# Patient Record
Sex: FEMALE | Race: WHITE | Employment: OTHER | URBAN - METROPOLITAN AREA
[De-identification: names, ages, dates, MRNs, and addresses within clinical notes are randomized per-mention and may not be internally consistent; named-entity substitution may affect disease eponyms.]

---

## 2017-07-13 LAB — ANTIBODY: NEGATIVE

## 2019-03-12 LAB
AVERAGE GLUCOSE: 103
BASOPHILS ABSOLUTE: 0.04 /ΜL
BASOPHILS RELATIVE PERCENT: 0.6 %
EOSINOPHILS ABSOLUTE: 0.2 /ΜL
EOSINOPHILS RELATIVE PERCENT: 2.9 %
HBA1C MFR BLD: 5.2 %
HCT VFR BLD CALC: 38.1 % (ref 36–46)
HEMOGLOBIN: 12.4 G/DL (ref 12–16)
LYMPHOCYTES ABSOLUTE: 1.5 /ΜL
LYMPHOCYTES RELATIVE PERCENT: 21.8 %
MCH RBC QN AUTO: 30 PG
MCHC RBC AUTO-ENTMCNC: 32.5 G/DL
MCV RBC AUTO: 92 FL
MONOCYTES ABSOLUTE: 0.65 /ΜL
MONOCYTES RELATIVE PERCENT: 9.4 %
NEUTROPHILS ABSOLUTE: 4.49 /ΜL
NEUTROPHILS RELATIVE PERCENT: 65.3 %
PLATELET # BLD: 236 K/ΜL
PMV BLD AUTO: 9.8 FL
RBC # BLD: 4.14 10^6/ΜL
VITAMIN D 25-HYDROXY: 47.6
VITAMIN D2, 25 HYDROXY: NORMAL
VITAMIN D3,25 HYDROXY: NORMAL
WBC # BLD: 6.88 10^3/ML

## 2019-09-16 LAB
ALBUMIN SERPL-MCNC: 4.3 G/DL
ALP BLD-CCNC: 72 U/L
ALT SERPL-CCNC: 14 U/L
ANION GAP SERPL CALCULATED.3IONS-SCNC: 11 MMOL/L
AST SERPL-CCNC: 19 U/L
BILIRUB SERPL-MCNC: 0.2 MG/DL (ref 0.1–1.4)
BUN BLDV-MCNC: 17 MG/DL
CALCIUM SERPL-MCNC: 9.7 MG/DL
CHLORIDE BLD-SCNC: 103 MMOL/L
CO2: 27 MMOL/L
CREAT SERPL-MCNC: 0.91 MG/DL
GFR CALCULATED: NORMAL
GLUCOSE BLD-MCNC: 92 MG/DL
POTASSIUM SERPL-SCNC: 5.4 MMOL/L
SODIUM BLD-SCNC: 141 MMOL/L
T3 FREE: 2.7
T4 FREE: 1.5
TOTAL PROTEIN: 7
TSH SERPL DL<=0.05 MIU/L-ACNC: 0.35 UIU/ML

## 2020-06-26 ENCOUNTER — OFFICE VISIT (OUTPATIENT)
Dept: INTERNAL MEDICINE | Age: 70
End: 2020-06-26
Payer: MEDICARE

## 2020-06-26 VITALS
TEMPERATURE: 97.8 F | HEIGHT: 66 IN | SYSTOLIC BLOOD PRESSURE: 118 MMHG | DIASTOLIC BLOOD PRESSURE: 82 MMHG | WEIGHT: 204 LBS | HEART RATE: 67 BPM | OXYGEN SATURATION: 97 % | BODY MASS INDEX: 32.78 KG/M2

## 2020-06-26 PROCEDURE — 99202 OFFICE O/P NEW SF 15 MIN: CPT | Performed by: PHYSICIAN ASSISTANT

## 2020-06-26 RX ORDER — LEVOTHYROXINE SODIUM 0.07 MG/1
TABLET ORAL
COMMUNITY
Start: 2020-04-16 | End: 2020-10-13 | Stop reason: SDUPTHER

## 2020-06-26 RX ORDER — PRAVASTATIN SODIUM 20 MG
20 TABLET ORAL DAILY
COMMUNITY
Start: 2019-09-16 | End: 2020-10-13 | Stop reason: SDUPTHER

## 2020-06-26 RX ORDER — CITALOPRAM 20 MG/1
20 TABLET ORAL DAILY
COMMUNITY
Start: 2019-09-16 | End: 2020-10-13 | Stop reason: SDUPTHER

## 2020-06-26 RX ORDER — AMOXICILLIN AND CLAVULANATE POTASSIUM 875; 125 MG/1; MG/1
1 TABLET, FILM COATED ORAL 2 TIMES DAILY
Qty: 14 TABLET | Refills: 0 | Status: SHIPPED | OUTPATIENT
Start: 2020-06-26 | End: 2021-08-30

## 2020-06-26 RX ORDER — KRILL/OM-3/DHA/EPA/PHOSPHO/AST 500MG-86MG
1 CAPSULE ORAL DAILY
COMMUNITY

## 2020-06-26 ASSESSMENT — ENCOUNTER SYMPTOMS
SHORTNESS OF BREATH: 0
SINUS COMPLAINT: 1
SINUS PRESSURE: 1
SORE THROAT: 0
SINUS PAIN: 1
SWOLLEN GLANDS: 0
RHINORRHEA: 1
COUGH: 0
TROUBLE SWALLOWING: 0

## 2020-06-26 ASSESSMENT — PATIENT HEALTH QUESTIONNAIRE - PHQ9
SUM OF ALL RESPONSES TO PHQ9 QUESTIONS 1 & 2: 2
SUM OF ALL RESPONSES TO PHQ QUESTIONS 1-9: 2
SUM OF ALL RESPONSES TO PHQ QUESTIONS 1-9: 2
1. LITTLE INTEREST OR PLEASURE IN DOING THINGS: 1
2. FEELING DOWN, DEPRESSED OR HOPELESS: 1

## 2020-06-26 NOTE — PROGRESS NOTES
daily Yes Historical Provider, MD   Cholecalciferol 50 MCG (2000 UT) CAPS Take by mouth daily Yes Historical Provider, MD   amoxicillin-clavulanate (AUGMENTIN) 875-125 MG per tablet Take 1 tablet by mouth 2 times daily Yes OMAR Caruso   paroxetine (PAXIL) 20 MG tablet Take 1 tablet by mouth daily. Yes Priscilla Quiñones MD   simvastatin (ZOCOR) 40 MG tablet Take 1 tablet by mouth daily. Yes Priscilla Quiñones MD   SELENIUM SULFIDE 2.5 % by Other route twice a week.  SHAMPOO  Yes Historical Provider, MD        No Known Allergies    Past Medical History:   Diagnosis Date    Depression     Hyperlipidemia     Protruded lumbar disc     L4/L5    Stress     Vitamin D deficiency        Past Surgical History:   Procedure Laterality Date    ANKLE SURGERY  2001    FOOT SURGERY  2001    SINUS SURGERY  07-       Social History     Socioeconomic History    Marital status:      Spouse name: Not on file    Number of children: Not on file    Years of education: Not on file    Highest education level: Not on file   Occupational History    Not on file   Social Needs    Financial resource strain: Not on file    Food insecurity     Worry: Not on file     Inability: Not on file    Transportation needs     Medical: Not on file     Non-medical: Not on file   Tobacco Use    Smoking status: Never Smoker    Smokeless tobacco: Never Used   Substance and Sexual Activity    Alcohol use: No    Drug use: Not on file    Sexual activity: Not on file   Lifestyle    Physical activity     Days per week: Not on file     Minutes per session: Not on file    Stress: Not on file   Relationships    Social connections     Talks on phone: Not on file     Gets together: Not on file     Attends Shinto service: Not on file     Active member of club or organization: Not on file     Attends meetings of clubs or organizations: Not on file     Relationship status: Not on file    Intimate partner violence     Fear of current

## 2020-09-14 ENCOUNTER — HOSPITAL ENCOUNTER (OUTPATIENT)
Dept: WOMENS IMAGING | Age: 70
Discharge: HOME | End: 2020-09-16
Payer: MEDICARE

## 2020-09-14 PROCEDURE — 77063 BREAST TOMOSYNTHESIS BI: CPT

## 2020-10-13 NOTE — TELEPHONE ENCOUNTER
Patient requesting refills to [address] in [facility].  Please advise

## 2020-10-14 RX ORDER — LEVOTHYROXINE SODIUM 0.07 MG/1
TABLET ORAL
Qty: 30 TABLET | Refills: 0 | Status: SHIPPED | OUTPATIENT
Start: 2020-10-14 | End: 2020-11-13 | Stop reason: SDUPTHER

## 2020-10-14 RX ORDER — CITALOPRAM 20 MG/1
20 TABLET ORAL DAILY
Qty: 30 TABLET | Refills: 0 | Status: SHIPPED | OUTPATIENT
Start: 2020-10-14 | End: 2020-11-13 | Stop reason: SDUPTHER

## 2020-10-14 RX ORDER — PRAVASTATIN SODIUM 20 MG
20 TABLET ORAL DAILY
Qty: 30 TABLET | Refills: 0 | Status: SHIPPED | OUTPATIENT
Start: 2020-10-14 | End: 2020-11-13 | Stop reason: SDUPTHER

## 2020-11-13 ENCOUNTER — OFFICE VISIT (OUTPATIENT)
Dept: INTERNAL MEDICINE | Age: 70
End: 2020-11-13
Payer: MEDICARE

## 2020-11-13 VITALS
WEIGHT: 204.2 LBS | TEMPERATURE: 96.6 F | DIASTOLIC BLOOD PRESSURE: 72 MMHG | BODY MASS INDEX: 32.82 KG/M2 | HEART RATE: 75 BPM | OXYGEN SATURATION: 98 % | SYSTOLIC BLOOD PRESSURE: 120 MMHG | HEIGHT: 66 IN

## 2020-11-13 DIAGNOSIS — E55.9 VITAMIN D DEFICIENCY: ICD-10-CM

## 2020-11-13 DIAGNOSIS — Z00.00 ROUTINE GENERAL MEDICAL EXAMINATION AT A HEALTH CARE FACILITY: ICD-10-CM

## 2020-11-13 DIAGNOSIS — E03.9 HYPOTHYROIDISM, UNSPECIFIED TYPE: ICD-10-CM

## 2020-11-13 PROBLEM — N95.0 POSTMENOPAUSAL BLEEDING: Status: ACTIVE | Noted: 2018-04-12

## 2020-11-13 PROBLEM — R93.89 THICKENED ENDOMETRIUM: Status: ACTIVE | Noted: 2018-04-12

## 2020-11-13 PROBLEM — C54.1 ENDOMETRIAL CANCER (HCC): Status: ACTIVE | Noted: 2018-05-25

## 2020-11-13 PROBLEM — M85.80 OSTEOPENIA: Status: ACTIVE | Noted: 2020-11-13

## 2020-11-13 PROBLEM — C55 UTERINE CANCER (HCC): Status: ACTIVE | Noted: 2018-05-30

## 2020-11-13 LAB
ALBUMIN SERPL-MCNC: 4.1 G/DL (ref 3.5–4.6)
ALP BLD-CCNC: 68 U/L (ref 40–130)
ALT SERPL-CCNC: 16 U/L (ref 0–33)
ANION GAP SERPL CALCULATED.3IONS-SCNC: 9 MEQ/L (ref 9–15)
AST SERPL-CCNC: 23 U/L (ref 0–35)
BASOPHILS ABSOLUTE: 0.1 K/UL (ref 0–0.2)
BASOPHILS RELATIVE PERCENT: 0.9 %
BILIRUB SERPL-MCNC: 0.3 MG/DL (ref 0.2–0.7)
BUN BLDV-MCNC: 19 MG/DL (ref 8–23)
CALCIUM SERPL-MCNC: 9.1 MG/DL (ref 8.5–9.9)
CHLORIDE BLD-SCNC: 101 MEQ/L (ref 95–107)
CHOLESTEROL, TOTAL: 232 MG/DL (ref 0–199)
CO2: 27 MEQ/L (ref 20–31)
CREAT SERPL-MCNC: 1.01 MG/DL (ref 0.5–0.9)
EOSINOPHILS ABSOLUTE: 0.2 K/UL (ref 0–0.7)
EOSINOPHILS RELATIVE PERCENT: 3.9 %
GFR AFRICAN AMERICAN: >60
GFR NON-AFRICAN AMERICAN: 53.9
GLOBULIN: 2.8 G/DL (ref 2.3–3.5)
GLUCOSE BLD-MCNC: 84 MG/DL (ref 70–99)
HCT VFR BLD CALC: 36.9 % (ref 37–47)
HDLC SERPL-MCNC: 58 MG/DL (ref 40–59)
HEMOGLOBIN: 12.3 G/DL (ref 12–16)
LDL CHOLESTEROL CALCULATED: 142 MG/DL (ref 0–129)
LYMPHOCYTES ABSOLUTE: 1.1 K/UL (ref 1–4.8)
LYMPHOCYTES RELATIVE PERCENT: 19.6 %
MCH RBC QN AUTO: 30.6 PG (ref 27–31.3)
MCHC RBC AUTO-ENTMCNC: 33.4 % (ref 33–37)
MCV RBC AUTO: 91.7 FL (ref 82–100)
MONOCYTES ABSOLUTE: 0.5 K/UL (ref 0.2–0.8)
MONOCYTES RELATIVE PERCENT: 8.4 %
NEUTROPHILS ABSOLUTE: 3.7 K/UL (ref 1.4–6.5)
NEUTROPHILS RELATIVE PERCENT: 67.2 %
PDW BLD-RTO: 13.9 % (ref 11.5–14.5)
PLATELET # BLD: 235 K/UL (ref 130–400)
POTASSIUM SERPL-SCNC: 5 MEQ/L (ref 3.4–4.9)
RBC # BLD: 4.02 M/UL (ref 4.2–5.4)
SODIUM BLD-SCNC: 137 MEQ/L (ref 135–144)
TOTAL PROTEIN: 6.9 G/DL (ref 6.3–8)
TRIGL SERPL-MCNC: 160 MG/DL (ref 0–150)
TSH REFLEX: 0.7 UIU/ML (ref 0.44–3.86)
WBC # BLD: 5.6 K/UL (ref 4.8–10.8)

## 2020-11-13 PROCEDURE — G0439 PPPS, SUBSEQ VISIT: HCPCS | Performed by: PHYSICIAN ASSISTANT

## 2020-11-13 RX ORDER — CITALOPRAM 20 MG/1
20 TABLET ORAL DAILY
Qty: 90 TABLET | Refills: 1 | Status: SHIPPED | OUTPATIENT
Start: 2020-11-13 | End: 2021-05-13 | Stop reason: SDUPTHER

## 2020-11-13 RX ORDER — LEVOTHYROXINE SODIUM 0.07 MG/1
TABLET ORAL
Qty: 90 TABLET | Refills: 1 | Status: SHIPPED | OUTPATIENT
Start: 2020-11-13 | End: 2021-05-13 | Stop reason: SDUPTHER

## 2020-11-13 RX ORDER — PRAVASTATIN SODIUM 20 MG
20 TABLET ORAL DAILY
Qty: 90 TABLET | Refills: 1 | Status: SHIPPED | OUTPATIENT
Start: 2020-11-13 | End: 2021-05-13 | Stop reason: SDUPTHER

## 2020-11-13 RX ORDER — PAROXETINE HYDROCHLORIDE 20 MG/1
20 TABLET, FILM COATED ORAL DAILY
Qty: 90 TABLET | Refills: 1 | Status: CANCELLED | OUTPATIENT
Start: 2020-11-13

## 2020-11-13 ASSESSMENT — PATIENT HEALTH QUESTIONNAIRE - PHQ9
1. LITTLE INTEREST OR PLEASURE IN DOING THINGS: 1
SUM OF ALL RESPONSES TO PHQ QUESTIONS 1-9: 2
SUM OF ALL RESPONSES TO PHQ9 QUESTIONS 1 & 2: 2
SUM OF ALL RESPONSES TO PHQ QUESTIONS 1-9: 2
SUM OF ALL RESPONSES TO PHQ QUESTIONS 1-9: 2
2. FEELING DOWN, DEPRESSED OR HOPELESS: 1

## 2020-11-13 ASSESSMENT — LIFESTYLE VARIABLES: HOW OFTEN DO YOU HAVE A DRINK CONTAINING ALCOHOL: 0

## 2020-11-13 NOTE — PROGRESS NOTES
Medicare Annual Wellness Visit  Name: Boby Robert Date: 2020   MRN: [id number] Sex: Female   Age: 68 y.o. Ethnicity: Non-/Non    : 1950 Race: Jarod Gan is here for Medicare AWV    Screenings for behavioral, psychosocial and functional/safety risks, and cognitive dysfunction are all negative except as indicated below. These results, as well as other patient data from the [address] form, are documented in Flowsheets linked to this Encounter. No Known Allergies      Prior to Visit Medications    Medication Sig Taking? Authorizing Provider   pravastatin (PRAVACHOL) 20 MG tablet Take 1 tablet by mouth daily Yes OMAR Muro   levothyroxine (SYNTHROID) 75 MCG tablet Take 1 tablet by mouth once daily Yes OMAR Muro   citalopram (CELEXA) 20 MG tablet Take 1 tablet by mouth daily Yes OMAR Muro   UNABLE TO FIND Take 1 tablet by mouth daily [facility] colon health Yes Historical Provider, MD [place] Oil 500 MG CAPS Take 1 capsule by mouth daily Yes Historical Provider, MD   UNABLE TO FIND Take 1 capsule by mouth daily Tumeric root extract Yes Historical Provider, MD   Pediatric Multivitamins-Fl (MULTI VIT/FL PO) Take 1 capsule by mouth daily Yes Historical Provider, MD   Cholecalciferol 50 MCG ( UT) CAPS Take by mouth daily Yes Historical Provider, MD   SELENIUM SULFIDE 2.5 % by Other route twice a week.  SHAMPOO  Yes Historical Provider, MD   amoxicillin-clavulanate (AUGMENTIN) 875-125 MG per tablet Take 1 tablet by mouth 2 times daily  OMAR Miguel         Past Medical History:   Diagnosis Date    Depression     Hyperlipidemia     Protruded lumbar disc     L4/L5    Stress     Vitamin D deficiency        Past Surgical History:   Procedure Laterality Date    ANKLE SURGERY      FOOT SURGERY      SINUS SURGERY  07-         Family History   Problem Relation Age of Onset    Anemia Mother    [place] Sicard Heart Disease Mother     Depression Mother     High Blood Pressure Father        CareTeam (Including outside providers/suppliers regularly involved in providing care):   Patient Care Team:  OMAR Rudolph as PCP - General (Physician Assistant)  OMAR Rudolph as PCP - [facility]    Wt Readings from Last 3 Encounters:   11/13/20 204 lb 3.2 oz (92.6 kg)   06/26/20 204 lb (92.5 kg)   08/17/12 217 lb 5 oz (98.6 kg)     Vitals:    11/13/20 0943   BP: 120/72   Site: Left Upper Arm   Position: Sitting   Cuff Size: Medium Adult   Pulse: 75   Temp: 96.6 °F (35.9 °C)   TempSrc: Temporal   SpO2: 98%   Weight: 204 lb 3.2 oz (92.6 kg)   Height: 5' 6\" (1.676 m)     Body mass index is 32.96 kg/m². Based upon direct observation of the patient, evaluation of cognition reveals recent and remote memory intact.     General Appearance: alert and oriented to person, place and time, well developed and well- nourished, in no acute distress  Skin: warm and dry, no rash or erythema  Head: normocephalic and atraumatic  Eyes: pupils equal, round, and reactive to light, extraocular eye movements intact, conjunctivae normal  ENT: tympanic membrane, external ear and ear canal normal bilaterally, nose without deformity, nasal mucosa and turbinates normal without polyps  Neck: supple and non-tender without mass, no thyromegaly or thyroid nodules, no cervical lymphadenopathy  Pulmonary/Chest: clear to auscultation bilaterally- no wheezes, rales or rhonchi, normal air movement, no respiratory distress  Cardiovascular: normal rate, regular rhythm, normal S1 and S2, no murmurs, rubs, clicks, or gallops, distal pulses intact, no carotid bruits  Abdomen: soft, non-tender, non-distended, normal bowel sounds, no masses or organomegaly  Extremities: no cyanosis, clubbing or edema  Musculoskeletal: normal range of motion, no joint swelling, deformity or tenderness  Neurologic: reflexes normal and symmetric, no cranial nerve deficit, gait, coordination and speech normal    Patient's complete Health Risk Assessment and screening values have been reviewed and are found in Flowsheets. The following problems were reviewed today and where indicated follow up appointments were made and/or referrals ordered. Positive Risk Factor Screenings with Interventions:       General Health and ACP:  General  In general, how would you say your health is?: Fair  In the past 7 days, have you experienced any of the following?  New or Increased Pain, New or Increased Fatigue, Loneliness, Social Isolation, Stress or Anger?: (!) New or Increased Pain, New or Increased Fatigue, Loneliness  Do you get the social and emotional support that you need?: Yes  Do you have a Living Will?: Yes  Advance Directives     Power of  Living Will ACP-Advance Directive ACP-Power of     Not on File Not on File [place]      General Health Risk Interventions:  · Pain issues: back pain  that is chronic, new knee pain, seeing ortho     Health Habits/Nutrition:  Health Habits/Nutrition  Do you exercise for at least 20 minutes 2-3 times per week?: (!) No  Have you lost any weight without trying in the past 3 months?: No  Do you eat fewer than 2 meals per day?: No  Have you seen a dentist within the past year?: Yes  Body mass index: (!) 32.96  Health Habits/Nutrition Interventions:  · Inadequate physical activity:  patient is not ready to increase his/her physical activity level at this time    Hearing/Vision:  No exam data present  Hearing/Vision  Do you or your family notice any trouble with your hearing?: No  Do you have difficulty driving, watching TV, or doing any of your daily activities because of your eyesight?: No  Have you had an eye exam within the past year?: (!) No  Hearing/Vision Interventions:  · Vision concerns:  patient encouraged to make appointment with his/her eye specialist    Safety:  Safety  Do you have working smoke detectors?: (!) No  Have all throw rugs been removed or fastened?: (!) No  Do you have non-slip mats or surfaces in all bathtubs/showers?: Yes  Do all of your stairways have a railing or banister?: Yes  Are your doorways, halls and stairs free of clutter?: Yes  Do you always fasten your seatbelt when you are in a car?: Yes  Safety Interventions:  · Home safety tips provided    Personalized Preventive Plan   Current Health Maintenance Status  Immunization History   Administered Date(s) Administered    Influenza Virus Vaccine 12/06/2013    Influenza Whole 11/16/2007    Influenza, High Dose (Fluzone 65 yrs and older) 09/22/2015, 09/17/2016, 09/20/2017, 10/19/2018, 10/22/2019    Influenza, High-dose, Quadv, 65 yrs +, IM (Fluzone) 11/04/2020    Pneumococcal Conjugate 13-valent ([id number]) 09/17/2016    Pneumococcal Polysaccharide ([id number]) 06/10/2014, 09/20/2017        Health Maintenance   Topic Date Due    DTaP/Tdap/Td vaccine (1 - Tdap) 12/01/1967    Shingles Vaccine (1 of 2) 12/01/1998    Annual Wellness Visit (AWV)  07/05/2020    Lipid screen  11/13/2021    TSH testing  11/13/2021    Breast cancer screen  09/14/2022    Colon cancer screen colonoscopy  10/16/2026    DEXA (modify frequency per FRAX score)  Completed    Flu vaccine  Completed    Pneumococcal 65+ years Vaccine  Completed    Hepatitis C screen  Completed    Hepatitis A vaccine  Aged Out    Hepatitis B vaccine  Aged Out    Hib vaccine  Aged Out    Meningococcal (ACWY) vaccine  Aged Out     Recommendations for [employer] Due: see orders and patient instructions/AVS.  . Recommended screening schedule for the next 5-10 years is provided to the patient in written form: see Patient Instructions/AVS.    Maggieeligio Dallas was seen today for medicare awv. Diagnoses and all orders for this visit:    Routine general medical examination at a health care facility  -     Comprehensive Metabolic Panel;  Future  -     CBC With Auto Differential; Future  -     Lipid Panel; Future    Hypothyroidism, unspecified type  -     levothyroxine (SYNTHROID) 75 MCG tablet; Take 1 tablet by mouth once daily  -     TSH with Reflex; Future    Mixed hyperlipidemia  -     pravastatin (PRAVACHOL) 20 MG tablet; Take 1 tablet by mouth daily    Major depressive disorder, recurrent episode, moderate (HCC)  -     citalopram (CELEXA) 20 MG tablet; Take 1 tablet by mouth daily    Vitamin D deficiency  -     Vitamin D 25 Hydroxy;  Future

## 2020-11-13 NOTE — PATIENT INSTRUCTIONS
Personalized Preventive Plan for Tomi Mendosa - 11/13/2020  Medicare offers a range of preventive health benefits. Some of the tests and screenings are paid in full while other may be subject to a deductible, co-insurance, and/or copay. Some of these benefits include a comprehensive review of your medical history including lifestyle, illnesses that may run in your family, and various assessments and screenings as appropriate. After reviewing your medical record and screening and assessments performed today your provider may have ordered immunizations, labs, imaging, and/or referrals for you. A list of these orders (if applicable) as well as your Preventive Care list are included within your After Visit Summary for your review. Other Preventive Recommendations:    · A preventive eye exam performed by an eye specialist is recommended every 1-2 years to screen for glaucoma; cataracts, macular degeneration, and other eye disorders. · A preventive dental visit is recommended every 6 months. · Try to get at least 150 minutes of exercise per week or 10,000 steps per day on a pedometer . · Order or download the FREE \"Exercise & Physical Activity: Your Everyday Guide\" from The [employer]. Call 5-233.592.6154 or search [employer] online. · You need 5807-3810 mg of calcium and 1916-9906 IU of vitamin D per day. It is possible to meet your calcium requirement with diet alone, but a vitamin D supplement is usually necessary to meet this goal.  · When exposed to the sun, use a sunscreen that protects against both UVA and UVB radiation with an SPF of 30 or greater. Reapply every 2 to 3 hours or after sweating, drying off with a towel, or swimming. · Always wear a seat belt when traveling in a car. Always wear a helmet when riding a bicycle or motorcycle.

## 2020-11-14 LAB — VITAMIN D 25-HYDROXY: 66.8 NG/ML (ref 30–100)

## 2021-05-13 ENCOUNTER — OFFICE VISIT (OUTPATIENT)
Dept: INTERNAL MEDICINE | Age: 71
End: 2021-05-13
Payer: MEDICARE

## 2021-05-13 VITALS
WEIGHT: 204 LBS | HEIGHT: 66 IN | OXYGEN SATURATION: 97 % | SYSTOLIC BLOOD PRESSURE: 122 MMHG | HEART RATE: 74 BPM | BODY MASS INDEX: 32.78 KG/M2 | TEMPERATURE: 97.3 F | DIASTOLIC BLOOD PRESSURE: 64 MMHG

## 2021-05-13 DIAGNOSIS — C54.1 ENDOMETRIAL CANCER (HCC): ICD-10-CM

## 2021-05-13 DIAGNOSIS — F33.1 MAJOR DEPRESSIVE DISORDER, RECURRENT EPISODE, MODERATE (HCC): ICD-10-CM

## 2021-05-13 DIAGNOSIS — C55 MALIGNANT NEOPLASM OF UTERUS, UNSPECIFIED SITE (HCC): ICD-10-CM

## 2021-05-13 DIAGNOSIS — E55.9 VITAMIN D DEFICIENCY: ICD-10-CM

## 2021-05-13 DIAGNOSIS — E03.9 HYPOTHYROIDISM, UNSPECIFIED TYPE: ICD-10-CM

## 2021-05-13 DIAGNOSIS — E78.2 MIXED HYPERLIPIDEMIA: Primary | ICD-10-CM

## 2021-05-13 PROCEDURE — 99214 OFFICE O/P EST MOD 30 MIN: CPT | Performed by: PHYSICIAN ASSISTANT

## 2021-05-13 RX ORDER — LEVOTHYROXINE SODIUM 0.07 MG/1
TABLET ORAL
Qty: 90 TABLET | Refills: 1 | Status: ON HOLD | OUTPATIENT
Start: 2021-05-13 | End: 2021-09-06 | Stop reason: HOSPADM

## 2021-05-13 RX ORDER — CITALOPRAM 20 MG/1
20 TABLET ORAL DAILY
Qty: 90 TABLET | Refills: 1 | Status: SHIPPED | OUTPATIENT
Start: 2021-05-13 | End: 2021-11-15 | Stop reason: SDUPTHER

## 2021-05-13 RX ORDER — LEVOTHYROXINE SODIUM 0.07 MG/1
TABLET ORAL
Qty: 90 TABLET | Refills: 1 | Status: SHIPPED | OUTPATIENT
Start: 2021-05-13 | End: 2021-05-13 | Stop reason: SDUPTHER

## 2021-05-13 RX ORDER — PRAVASTATIN SODIUM 20 MG
20 TABLET ORAL DAILY
Qty: 90 TABLET | Refills: 1 | Status: SHIPPED | OUTPATIENT
Start: 2021-05-13 | End: 2021-05-13 | Stop reason: SDUPTHER

## 2021-05-13 RX ORDER — CITALOPRAM 20 MG/1
20 TABLET ORAL DAILY
Qty: 90 TABLET | Refills: 1 | Status: SHIPPED | OUTPATIENT
Start: 2021-05-13 | End: 2021-05-13 | Stop reason: SDUPTHER

## 2021-05-13 RX ORDER — PRAVASTATIN SODIUM 20 MG
20 TABLET ORAL DAILY
Qty: 90 TABLET | Refills: 1 | Status: SHIPPED | OUTPATIENT
Start: 2021-05-13 | End: 2021-11-15 | Stop reason: SDUPTHER

## 2021-05-13 ASSESSMENT — ENCOUNTER SYMPTOMS
RESPIRATORY NEGATIVE: 1
GASTROINTESTINAL NEGATIVE: 1

## 2021-05-13 ASSESSMENT — PATIENT HEALTH QUESTIONNAIRE - PHQ9
SUM OF ALL RESPONSES TO PHQ QUESTIONS 1-9: 0
2. FEELING DOWN, DEPRESSED OR HOPELESS: 0
SUM OF ALL RESPONSES TO PHQ9 QUESTIONS 1 & 2: 0

## 2021-05-13 NOTE — PROGRESS NOTES
Nikki Campbell (: 1950) is a 65 y.o. female, Established patient, here for evaluation of the following chief complaint(s):  6 Month Follow-Up        ASSESSMENT/PLAN:  1. Mixed hyperlipidemia  - stable    - pravastatin (PRAVACHOL) 20 MG tablet; Take 1 tablet by mouth daily  Dispense: 90 tablet; Refill: 1    2. Hypothyroidism, unspecified type  - stable    - levothyroxine (SYNTHROID) 75 MCG tablet; Take 1 tablet by mouth once daily  Dispense: 90 tablet; Refill: 1    3. Major depressive disorder, recurrent episode, moderate (HCC)  - stable on Celexa   - citalopram (CELEXA) 20 MG tablet; Take 1 tablet by mouth daily  Dispense: 90 tablet; Refill: 1    4. Malignant neoplasm of uterus, unspecified site ([facility] [address]  5. Endometrial cancer ([facility] [address]  - advised a vaginal PAP with gyn , since has not seen them in many years     6. Vitamin D deficiency  - will get level drawn at next appt          Return in about 6 months (around 2021). SUBJECTIVE/OBJECTIVE:  HPI      Dyslipidemia  On pravastatin 20 mg  LDl was slightly elevated     Thyroid  Stable on Levothroid     Depression  Stable on celexa 20 mg             Review of Systems   Constitutional: Negative. HENT: Negative. Respiratory: Negative. Cardiovascular: Negative. Gastrointestinal: Negative. Genitourinary: Negative. Musculoskeletal: Negative. Neurological: Negative. Hematological: Negative. Psychiatric/Behavioral: Negative. Physical Exam  Vitals signs reviewed. Constitutional:       Appearance: Normal appearance. HENT:      Head: Normocephalic and atraumatic. Cardiovascular:      Rate and Rhythm: Normal rate and regular rhythm. Pulses: Normal pulses. Heart sounds: Normal heart sounds. Pulmonary:      Effort: Pulmonary effort is normal.      Breath sounds: Normal breath sounds. Neurological:      Mental Status: She is alert and oriented to person, place, and time.    Psychiatric:         Mood and Affect: Mood normal.         Behavior: Behavior normal.         Thought Content: Thought content normal.         Judgment: Judgment normal.         Vitals:    05/13/21 1104   BP: 122/64   Pulse: 74   Temp: 97.3 °F (36.3 °C)   SpO2: 97%   Weight: 204 lb (92.5 kg)   Height: 5' 6\" (1.676 m)                 An electronic signature was used to authenticate this note.     --OMAR Kerr

## 2021-08-30 ENCOUNTER — VIRTUAL VISIT (OUTPATIENT)
Dept: FAMILY MEDICINE CLINIC | Age: 71
End: 2021-08-30
Payer: MEDICARE

## 2021-08-30 DIAGNOSIS — J01.00 ACUTE MAXILLARY SINUSITIS, RECURRENCE NOT SPECIFIED: Primary | ICD-10-CM

## 2021-08-30 PROCEDURE — 99442 PR PHYS/QHP TELEPHONE EVALUATION 11-20 MIN: CPT | Performed by: NURSE PRACTITIONER

## 2021-08-30 RX ORDER — AMOXICILLIN AND CLAVULANATE POTASSIUM 875; 125 MG/1; MG/1
1 TABLET, FILM COATED ORAL 2 TIMES DAILY
Qty: 14 TABLET | Refills: 0 | Status: ON HOLD | OUTPATIENT
Start: 2021-08-30 | End: 2021-09-06 | Stop reason: HOSPADM

## 2021-08-30 SDOH — ECONOMIC STABILITY: FOOD INSECURITY: WITHIN THE PAST 12 MONTHS, THE FOOD YOU BOUGHT JUST DIDN'T LAST AND YOU DIDN'T HAVE MONEY TO GET MORE.: NEVER TRUE

## 2021-08-30 SDOH — ECONOMIC STABILITY: FOOD INSECURITY: WITHIN THE PAST 12 MONTHS, YOU WORRIED THAT YOUR FOOD WOULD RUN OUT BEFORE YOU GOT MONEY TO BUY MORE.: NEVER TRUE

## 2021-08-30 ASSESSMENT — ENCOUNTER SYMPTOMS
DIARRHEA: 1
RHINORRHEA: 0
ABDOMINAL PAIN: 0
COUGH: 1
WHEEZING: 0
SINUS PRESSURE: 1
SINUS PAIN: 1
NAUSEA: 1
VOMITING: 0
SORE THROAT: 0
SHORTNESS OF BREATH: 0

## 2021-08-30 ASSESSMENT — SOCIAL DETERMINANTS OF HEALTH (SDOH): HOW HARD IS IT FOR YOU TO PAY FOR THE VERY BASICS LIKE FOOD, HOUSING, MEDICAL CARE, AND HEATING?: NOT HARD AT ALL

## 2021-08-30 NOTE — PROGRESS NOTES
Hedy Aguiar is a 65 y.o. female evaluated via telephone on 8/30/2021. Consent:  She and/or health care decision maker is aware that that she may receive a bill for this telephone service, depending on her insurance coverage, and has provided verbal consent to proceed: Yes    Documentation:  I communicated with the patient and/or health care decision maker about URI symptoms. Details of this discussion including any medical advice provided:    Subjective  Hedy Aguiar, 65 y.o. female presents today with:  Chief Complaint   Patient presents with    Cough     SX for 8-9 days    Diarrhea       Cough  This is a new problem. The current episode started 1 to 4 weeks ago. The problem has been gradually worsening. The cough is non-productive. Associated symptoms include ear pain (right ear hurts occasionally), nasal congestion and postnasal drip. Pertinent negatives include no chest pain, chills, fever (resolved), rhinorrhea, sore throat, shortness of breath or wheezing. Treatments tried: OTC meds. The treatment provided no relief. Diarrhea   Associated symptoms include coughing (dry). Pertinent negatives include no abdominal pain, chills, fever (resolved) or vomiting. Patient presents for URI symptoms, sinus pain/pressure for about 9 days. States she aches all over. Also having diarrhea.     Has tried pepto bismol for the diarrhea, several OTC meds for sinus issues/cough, nothing seems to be helping    Has history of recurrent sinus infections and sinus surgery    Past Medical History:   Diagnosis Date    Depression     Hyperlipidemia     Protruded lumbar disc     L4/L5    Stress     Vitamin D deficiency        No Known Allergies    Current Outpatient Medications on File Prior to Visit   Medication Sig Dispense Refill    pravastatin (PRAVACHOL) 20 MG tablet Take 1 tablet by mouth daily 90 tablet 1    citalopram (CELEXA) 20 MG tablet Take 1 tablet by mouth daily 90 tablet 1    UNABLE TO FIND Take 1 tablet by mouth daily [place] [employer]      [place] Oil 500 MG CAPS Take 1 capsule by mouth daily      UNABLE TO FIND Take 1 capsule by mouth daily Tumeric root extract      Pediatric Multivitamins-Fl (MULTI VIT/FL PO) Take 1 capsule by mouth daily      Cholecalciferol 50 MCG (2000 UT) CAPS Take by mouth daily      SELENIUM SULFIDE 2.5 % by Other route twice a week. SHAMPOO        No current facility-administered medications on file prior to visit. Review of Systems   Constitutional: Positive for appetite change. Negative for chills and fever (resolved). HENT: Positive for congestion, ear pain (right ear hurts occasionally), postnasal drip, sinus pressure and sinus pain. Negative for rhinorrhea and sore throat. Head feels like full of cotton   Respiratory: Positive for cough (dry). Negative for shortness of breath and wheezing. Cardiovascular: Negative for chest pain. Gastrointestinal: Positive for diarrhea and nausea. Negative for abdominal pain and vomiting. Objective  Physical Exam:  Due to the current efforts to prevent transmission of COVID-19 and also the need to preserve PPE for other caregivers, a face-to-face encounter with the patient was not performed. That being said, all relevant records and diagnostic tests were reviewed, including laboratory results and imaging. Please reference any relevant documentation elsewhere. Care will be coordinated with the primary service. Assessment & Plan     Margaret Carcamo was seen today for cough and diarrhea. Diagnoses and all orders for this visit:    Acute maxillary sinusitis, recurrence not specified  -     COVID-19; Future  - amoxicillin-clavulanate (AUGMENTIN) 875-125 MG per tablet; Take 1 tablet by mouth 2 times daily for 7 days    - Given duration of symptoms and history of sinus issues/surgery will treat for sinus infection. Also testing for COVID. Advised to quarantine until the test results.  Advised ED for any worsening of symptoms. The patient was reminded that if an antibiotic has been prescribed the predicted course is improvement to cure with no persistent issues. Take antibiotics as directed. If any problems occur, an appointment should be made or ER visit if severe. Because of the risk with ANY antibiotic of C. Difficile colitis if persistent diarrhea or abdominal pain or any concerning symptoms, we should be notified. To reduce this risk, a probiotic pill, yogurt or other preparations containing active cultures should be ingested daily -particularly while on the antibiotic. If any persistent symptoms of illness, follow up appointment should be made in a timely fashion with a physician. Return if symptoms worsen or fail to improve, for follow up. I affirm this is a Patient Initiated Episode with a Patient who has not had a related appointment within my department in the past 7 days or scheduled within the next 24 hours. Patient identification was verified at the start of the visit: Yes    Total Time: minutes: 11-20 minutes    This telephone visit was provided as a focused evaluation during the [facility] pandemic/national emergency. A comprehensive review of all previous patient history and testing was not conducted. Pertinent findings were elicited during the visit.       Note: not billable if this call serves to triage the patient into an appointment for the relevant concern      Larwence Aase, AZAEL - CNP

## 2021-08-30 NOTE — PROGRESS NOTES
[x]  Normal  [] Abnormal-  Sclera  [x]  Normal  [] Abnormal -         Discharge [x]  None visible  [] Abnormal -    HENT:   [x] Normocephalic, atraumatic. [] Abnormal   [] Mouth/Throat: Mucous membranes are moist.     External Ears [x] Normal  [] Abnormal-     Neck: [x] No visualized mass     Pulmonary/Chest: [x] Respiratory effort normal.  [x] No visualized signs of difficulty breathing or respiratory distress        [] Abnormal-      Musculoskeletal:   [] Normal gait with no signs of ataxia         [] Normal range of motion of neck        [] Abnormal-       Neurological:        [x] No Facial Asymmetry (Cranial nerve 7 motor function) (limited exam to video visit)          [] No gaze palsy        [] Abnormal-         Skin:        [x] No significant exanthematous lesions or discoloration noted on facial skin         [] Abnormal-            Psychiatric:       [] Normal Affect [] No Hallucinations        [] Abnormal-     Other pertinent observable physical exam findings-     ASSESSMENT/PLAN:  There are no diagnoses linked to this encounter. No follow-ups on file. Lazaro Salmon is a 65 y.o. female being evaluated by a Virtual Visit (video visit) encounter to address concerns as mentioned above. A caregiver was present when appropriate. Due to this being a TeleHealth encounter (During [place] [address] public health emergency), evaluation of the following organ systems was limited: Vitals/Constitutional/EENT/Resp/CV/GI//MS/Neuro/Skin/Heme-Lymph-Imm. Pursuant to the emergency declaration under the [address] [place] [address], [address] [place] [address] authority and the [employer] and [employer] Act, this Virtual Visit was conducted with patient's (and/or legal guardian's) consent, to reduce the patient's risk of exposure to COVID-19 and provide necessary medical care.   The patient (and/or legal guardian) has also been advised to contact this office for worsening conditions or problems, and seek emergency medical treatment and/or call 911 if deemed necessary. Patient identification was verified at the start of the visit: YES    Total time spent on this encounter: Not billed by time. Services were provided through a video synchronous discussion virtually to substitute for in-person clinic visit. Patient was located at home. Provider was located at the [facility] office. --AZAEL Hill CNP on 8/30/2021 at 11:29 AM    An electronic signature was used to authenticate this note.

## 2021-08-31 ENCOUNTER — HOSPITAL ENCOUNTER (OUTPATIENT)
Age: 71
Setting detail: SPECIMEN
Discharge: HOME | End: 2021-08-31
Payer: MEDICARE

## 2021-09-03 ENCOUNTER — APPOINTMENT (OUTPATIENT)
Dept: GENERAL RADIOLOGY | Age: 71
End: 2021-09-03
Payer: MEDICARE

## 2021-09-03 ENCOUNTER — HOSPITAL ENCOUNTER (INPATIENT)
Age: 71
LOS: 2 days | Discharge: HOME | DRG: 178 | End: 2021-09-06
Attending: INTERNAL MEDICINE | Admitting: INTERNAL MEDICINE
Payer: MEDICARE

## 2021-09-03 ENCOUNTER — APPOINTMENT (OUTPATIENT)
Dept: CT IMAGING | Age: 71
End: 2021-09-03
Payer: MEDICARE

## 2021-09-03 ENCOUNTER — HOSPITAL ENCOUNTER (EMERGENCY)
Age: 71
Discharge: ANOTHER HOSPITAL | End: 2021-09-03
Attending: EMERGENCY MEDICINE
Payer: MEDICARE

## 2021-09-03 VITALS
BODY MASS INDEX: 30.53 KG/M2 | DIASTOLIC BLOOD PRESSURE: 67 MMHG | OXYGEN SATURATION: 97 % | SYSTOLIC BLOOD PRESSURE: 142 MMHG | RESPIRATION RATE: 17 BRPM | TEMPERATURE: 99.1 F | HEIGHT: 66 IN | HEART RATE: 87 BPM | WEIGHT: 190 LBS

## 2021-09-03 DIAGNOSIS — U07.1 PNEUMONIA DUE TO COVID-19 VIRUS: Primary | ICD-10-CM

## 2021-09-03 DIAGNOSIS — R53.1 GENERAL WEAKNESS: ICD-10-CM

## 2021-09-03 DIAGNOSIS — R19.7 DIARRHEA, UNSPECIFIED TYPE: ICD-10-CM

## 2021-09-03 DIAGNOSIS — N30.00 ACUTE CYSTITIS WITHOUT HEMATURIA: ICD-10-CM

## 2021-09-03 DIAGNOSIS — E86.0 DEHYDRATION: ICD-10-CM

## 2021-09-03 DIAGNOSIS — J12.82 PNEUMONIA DUE TO COVID-19 VIRUS: Primary | ICD-10-CM

## 2021-09-03 LAB
ALBUMIN SERPL-MCNC: 4.2 G/DL (ref 3.5–4.6)
ALP BLD-CCNC: 81 U/L (ref 40–130)
ALT SERPL-CCNC: 42 U/L (ref 0–33)
AMORPHOUS: ABNORMAL
AMYLASE: 57 U/L (ref 22–93)
ANION GAP SERPL CALCULATED.3IONS-SCNC: 16 MEQ/L (ref 9–15)
AST SERPL-CCNC: 37 U/L (ref 0–35)
BACTERIA: ABNORMAL /HPF
BASOPHILS ABSOLUTE: 0 K/UL (ref 0–0.1)
BASOPHILS RELATIVE PERCENT: 0.2 % (ref 0.1–1.2)
BILIRUB SERPL-MCNC: 0.4 MG/DL (ref 0.2–0.7)
BILIRUBIN URINE: ABNORMAL
BLOOD, URINE: NEGATIVE
BUN BLDV-MCNC: 18 MG/DL (ref 8–23)
CALCIUM SERPL-MCNC: 9.4 MG/DL (ref 8.5–9.9)
CHLORIDE BLD-SCNC: 96 MEQ/L (ref 95–107)
CLARITY: CLEAR
CO2: 20 MEQ/L (ref 20–31)
COLOR: ABNORMAL
CREAT SERPL-MCNC: 0.85 MG/DL (ref 0.5–0.9)
EOSINOPHILS ABSOLUTE: 0 K/UL (ref 0–0.4)
EOSINOPHILS RELATIVE PERCENT: 0.2 % (ref 0.7–5.8)
EPITHELIAL CELLS, UA: ABNORMAL /HPF
GFR AFRICAN AMERICAN: >60
GFR NON-AFRICAN AMERICAN: >60
GLOBULIN: 4 G/DL (ref 2.3–3.5)
GLUCOSE BLD-MCNC: 141 MG/DL (ref 70–99)
GLUCOSE URINE: NEGATIVE MG/DL
HCT VFR BLD CALC: 39.1 % (ref 37–47)
HEMOGLOBIN: 12.7 G/DL (ref 11.2–15.7)
IMMATURE GRANULOCYTES #: 0.1 K/UL
IMMATURE GRANULOCYTES %: 0.5 %
KETONES, URINE: ABNORMAL MG/DL
LACTIC ACID: 0.9 MMOL/L (ref 0.5–2.2)
LEUKOCYTE ESTERASE, URINE: ABNORMAL
LIPASE: 42 U/L (ref 12–95)
LYMPHOCYTES ABSOLUTE: 0.3 K/UL (ref 1.2–3.7)
LYMPHOCYTES RELATIVE PERCENT: 1.5 %
MCH RBC QN AUTO: 29.2 PG (ref 25.6–32.2)
MCHC RBC AUTO-ENTMCNC: 32.5 % (ref 32.2–35.5)
MCV RBC AUTO: 89.9 FL (ref 79.4–94.8)
MONOCYTES ABSOLUTE: 0.8 K/UL (ref 0.2–0.9)
MONOCYTES RELATIVE PERCENT: 3.9 % (ref 4.7–12.5)
MUCUS: PRESENT /LPF
NEUTROPHILS ABSOLUTE: 18.2 K/UL (ref 1.6–6.1)
NEUTROPHILS RELATIVE PERCENT: 93.7 % (ref 34–71.1)
NITRITE, URINE: NEGATIVE
PDW BLD-RTO: 12.8 % (ref 11.7–14.4)
PH UA: 5.5 (ref 5–9)
PLATELET # BLD: 405 K/UL (ref 182–369)
POTASSIUM SERPL-SCNC: 3.8 MEQ/L (ref 3.4–4.9)
PROTEIN UA: 30 MG/DL
RBC # BLD: 4.35 M/UL (ref 3.93–5.22)
SARS-COV-2, NAAT: DETECTED
SODIUM BLD-SCNC: 132 MEQ/L (ref 135–144)
SPECIFIC GRAVITY UA: >=1.03 (ref 1–1.03)
TOTAL PROTEIN: 8.2 G/DL (ref 6.3–8)
URINE REFLEX TO CULTURE: YES
UROBILINOGEN, URINE: 0.2 E.U./DL
WBC # BLD: 19.4 K/UL (ref 4–10)
WBC UA: ABNORMAL /HPF (ref 0–5)

## 2021-09-03 PROCEDURE — 2500000003 HC RX 250 WO HCPCS: Performed by: EMERGENCY MEDICINE

## 2021-09-03 PROCEDURE — 96365 THER/PROPH/DIAG IV INF INIT: CPT

## 2021-09-03 PROCEDURE — 96361 HYDRATE IV INFUSION ADD-ON: CPT

## 2021-09-03 PROCEDURE — 74176 CT ABD & PELVIS W/O CONTRAST: CPT

## 2021-09-03 PROCEDURE — 87086 URINE CULTURE/COLONY COUNT: CPT

## 2021-09-03 PROCEDURE — G0378 HOSPITAL OBSERVATION PER HR: HCPCS

## 2021-09-03 PROCEDURE — 96366 THER/PROPH/DIAG IV INF ADDON: CPT

## 2021-09-03 PROCEDURE — 81001 URINALYSIS AUTO W/SCOPE: CPT

## 2021-09-03 PROCEDURE — 6360000002 HC RX W HCPCS: Performed by: EMERGENCY MEDICINE

## 2021-09-03 PROCEDURE — 96375 TX/PRO/DX INJ NEW DRUG ADDON: CPT

## 2021-09-03 PROCEDURE — 85025 COMPLETE CBC W/AUTO DIFF WBC: CPT

## 2021-09-03 PROCEDURE — 80053 COMPREHEN METABOLIC PANEL: CPT

## 2021-09-03 PROCEDURE — 2580000003 HC RX 258: Performed by: EMERGENCY MEDICINE

## 2021-09-03 PROCEDURE — 36415 COLL VENOUS BLD VENIPUNCTURE: CPT

## 2021-09-03 PROCEDURE — G0379 DIRECT REFER HOSPITAL OBSERV: HCPCS

## 2021-09-03 PROCEDURE — 82150 ASSAY OF AMYLASE: CPT

## 2021-09-03 PROCEDURE — 87635 SARS-COV-2 COVID-19 AMP PRB: CPT

## 2021-09-03 PROCEDURE — 87040 BLOOD CULTURE FOR BACTERIA: CPT

## 2021-09-03 PROCEDURE — 83605 ASSAY OF LACTIC ACID: CPT

## 2021-09-03 PROCEDURE — 99285 EMERGENCY DEPT VISIT HI MDM: CPT

## 2021-09-03 PROCEDURE — 71045 X-RAY EXAM CHEST 1 VIEW: CPT

## 2021-09-03 PROCEDURE — 83690 ASSAY OF LIPASE: CPT

## 2021-09-03 RX ORDER — SODIUM CHLORIDE 9 MG/ML
INJECTION, SOLUTION INTRAVENOUS CONTINUOUS
Status: DISCONTINUED | OUTPATIENT
Start: 2021-09-03 | End: 2021-09-03 | Stop reason: HOSPADM

## 2021-09-03 RX ORDER — KETOROLAC TROMETHAMINE 30 MG/ML
30 INJECTION, SOLUTION INTRAMUSCULAR; INTRAVENOUS ONCE
Status: COMPLETED | OUTPATIENT
Start: 2021-09-03 | End: 2021-09-03

## 2021-09-03 RX ORDER — DEXAMETHASONE SODIUM PHOSPHATE 10 MG/ML
8 INJECTION, SOLUTION INTRAMUSCULAR; INTRAVENOUS ONCE
Status: COMPLETED | OUTPATIENT
Start: 2021-09-03 | End: 2021-09-03

## 2021-09-03 RX ORDER — 0.9 % SODIUM CHLORIDE 0.9 %
500 INTRAVENOUS SOLUTION INTRAVENOUS ONCE
Status: COMPLETED | OUTPATIENT
Start: 2021-09-03 | End: 2021-09-03

## 2021-09-03 RX ORDER — ONDANSETRON 2 MG/ML
4 INJECTION INTRAMUSCULAR; INTRAVENOUS ONCE
Status: COMPLETED | OUTPATIENT
Start: 2021-09-03 | End: 2021-09-03

## 2021-09-03 RX ADMIN — ONDANSETRON 4 MG: 2 INJECTION INTRAMUSCULAR; INTRAVENOUS at 15:20

## 2021-09-03 RX ADMIN — DEXAMETHASONE SODIUM PHOSPHATE 8 MG: 10 INJECTION, SOLUTION INTRAMUSCULAR; INTRAVENOUS at 17:14

## 2021-09-03 RX ADMIN — KETOROLAC TROMETHAMINE 30 MG: 30 INJECTION, SOLUTION INTRAMUSCULAR; INTRAVENOUS at 15:19

## 2021-09-03 RX ADMIN — SODIUM CHLORIDE: 9 INJECTION, SOLUTION INTRAVENOUS at 16:01

## 2021-09-03 RX ADMIN — CEFTRIAXONE 1000 MG: 1 INJECTION, POWDER, FOR SOLUTION INTRAMUSCULAR; INTRAVENOUS at 17:14

## 2021-09-03 RX ADMIN — SODIUM CHLORIDE 500 ML: 9 INJECTION, SOLUTION INTRAVENOUS at 15:19

## 2021-09-03 RX ADMIN — FAMOTIDINE 20 MG: 10 INJECTION, SOLUTION INTRAVENOUS at 15:20

## 2021-09-03 ASSESSMENT — ENCOUNTER SYMPTOMS
COUGH: 1
VOICE CHANGE: 0
STRIDOR: 0
CHOKING: 0
WHEEZING: 0
ABDOMINAL PAIN: 0
RHINORRHEA: 1
SINUS PRESSURE: 0
SHORTNESS OF BREATH: 1
SINUS PAIN: 1
TROUBLE SWALLOWING: 0
BACK PAIN: 0
EYE PAIN: 0
DIARRHEA: 1
EYE REDNESS: 0
FACIAL SWELLING: 0
NAUSEA: 1
CONSTIPATION: 0
BLOOD IN STOOL: 0
VOMITING: 0
EYE DISCHARGE: 0
SORE THROAT: 0
CHEST TIGHTNESS: 0

## 2021-09-03 ASSESSMENT — PAIN DESCRIPTION - FREQUENCY: FREQUENCY: CONTINUOUS

## 2021-09-03 ASSESSMENT — PAIN SCALES - GENERAL
PAINLEVEL_OUTOF10: 5
PAINLEVEL_OUTOF10: 5

## 2021-09-03 ASSESSMENT — PAIN DESCRIPTION - LOCATION: LOCATION: GENERALIZED

## 2021-09-03 ASSESSMENT — PAIN DESCRIPTION - DESCRIPTORS: DESCRIPTORS: ACHING

## 2021-09-03 ASSESSMENT — PAIN DESCRIPTION - PAIN TYPE: TYPE: ACUTE PAIN

## 2021-09-03 NOTE — ED NOTES
Dr. Le called through transfer center to consult with Dr. Abbe Neves.       Svitlana Davis  09/03/21 [id number]

## 2021-09-03 NOTE — ED NOTES
Transfer center called with bed assignment. Pt. Will go to [address]. Report number is [phone]. [facility] will be here for transport within the hour.        Brandee Cuba RN  09/03/21 0569

## 2021-09-03 NOTE — ED NOTES
Patient and family updated with POC and son would like notifed after bed assigned.  Geetha Moore (son) cell [facility]     Bianka Nunez RN  09/03/21 6603

## 2021-09-03 NOTE — ED PROVIDER NOTES
[facility] ED  eMERGENCY dEPARTMENT eNCOUnter      Pt Name: Maya Watters  [id number]  [place] 1950  Date of evaluation: 9/3/2021  Provider: Zeferino Doyle MD    [address] [place] [address]       Chief Complaint   Patient presents with    Concern For COVID-19     times one week     Generalized Body Aches     times one week     Diarrhea     times one week     Fatigue     HISTORY OF PRESENT ILLNESS   (Location/Symptom, Timing/Onset,Context/Setting, Quality, Duration, Modifying Factors, Severity)  Note limiting factors. Maya Watters is a 65 y.o. female who presents to the emergency department patient is sick for the past 1 week time with diarrhea ongoing several times a day watery stools no blood in the stool no fever no chills patient to worry about COVID-19 infection has generalized body ache fatigue no energy denies any chest tightness chest pain denies any fever patient has history anxiety depression hyperlipidemia hypothyroidism arthritis and remote history of uterine cancer patient non-smoker, has remote hysterectomy because of uterine cancer in the past patient with ear pain cough congestion for the last few days time seen by primary care physician and put on Augmentin as per patient    HPI    NursingNotes were reviewed. REVIEW OF SYSTEMS    (2-9 systems for level 4, 10 or more for level 5)     Review of Systems   Constitutional: Positive for activity change, appetite change and fatigue. Negative for fever. HENT: Positive for congestion, postnasal drip, rhinorrhea and sinus pain. Negative for drooling, facial swelling, mouth sores, nosebleeds, sinus pressure, sore throat, trouble swallowing and voice change. Eyes: Negative for pain, discharge, redness and visual disturbance. Respiratory: Positive for cough and shortness of breath. Negative for choking, chest tightness, wheezing and stridor. Cardiovascular: Negative for chest pain, palpitations and leg swelling.    Gastrointestinal: Positive for diarrhea and nausea. Negative for abdominal pain, blood in stool, constipation and vomiting. Endocrine: Negative for cold intolerance, polyphagia and polyuria. Genitourinary: Negative for dysuria, flank pain, frequency, genital sores and urgency. Musculoskeletal: Negative for back pain, joint swelling, neck pain and neck stiffness. Skin: Negative for pallor and rash. Neurological: Positive for weakness. Negative for tremors, seizures, syncope, numbness and headaches. Hematological: Negative for adenopathy. Does not bruise/bleed easily. Psychiatric/Behavioral: Negative for agitation, behavioral problems, hallucinations and sleep disturbance. The patient is not hyperactive. All other systems reviewed and are negative. Except as noted above the remainder of the review of systems was reviewed and negative. PAST MEDICAL HISTORY     Past Medical History:   Diagnosis Date    Depression     Hyperlipidemia     Protruded lumbar disc     L4/L5    Stress     Vitamin D deficiency          SURGICALHISTORY       Past Surgical History:   Procedure Laterality Date    ANKLE SURGERY  2001    FOOT SURGERY  2001    SINUS SURGERY  07-         CURRENT MEDICATIONS       Previous Medications    AMOXICILLIN-CLAVULANATE (AUGMENTIN) 875-125 MG PER TABLET    Take 1 tablet by mouth 2 times daily for 7 days    CHOLECALCIFEROL 50 MCG (2000 UT) CAPS    Take by mouth daily    CITALOPRAM (CELEXA) 20 MG TABLET    Take 1 tablet by mouth daily    KRILL  MG CAPS    Take 1 capsule by mouth daily    LEVOTHYROXINE (SYNTHROID) 75 MCG TABLET    Take 1 tablet by mouth once daily    PEDIATRIC MULTIVITAMINS-FL (MULTI VIT/FL PO)    Take 1 capsule by mouth daily    PRAVASTATIN (PRAVACHOL) 20 MG TABLET    Take 1 tablet by mouth daily    SELENIUM SULFIDE    2.5 % by Other route twice a week.  SHAMPOO     UNABLE TO FIND    Take 1 tablet by mouth daily [facility]    UNABLE TO FIND    Take 1 capsule by mouth daily Tumeric root extract       ALLERGIES     Patient has no known allergies. FAMILY HISTORY       Family History   Problem Relation Age of Onset    Anemia Mother     Heart Disease Mother     Depression Mother     High Blood Pressure Father           SOCIAL HISTORY       Social History     Socioeconomic History    Marital status:      Spouse name: None    Number of children: None    Years of education: None    Highest education level: None   Occupational History    None   Tobacco Use    Smoking status: Never Smoker    Smokeless tobacco: Never Used   Substance and Sexual Activity    Alcohol use: No    Drug use: Never    Sexual activity: Not Currently     Partners: Male   Other Topics Concern    None   Social History Narrative    None     Social Determinants of Health     Financial Resource Strain: Low Risk     Difficulty of Paying Living Expenses: Not hard at all   Food Insecurity: No Food Insecurity    Worried About Running Out of Food in the Last Year: Never true    Evans of Food in the Last Year: Never true   Transportation Needs:     Lack of Transportation (Medical):      Lack of Transportation (Non-Medical):    Physical Activity:     Days of Exercise per Week:     Minutes of Exercise per Session:    Stress:     Feeling of Stress :    Social Connections:     Frequency of Communication with Friends and Family:     Frequency of Social Gatherings with Friends and Family:     Attends Taoism Services:     Active Member of Clubs or Organizations:     Attends Club or Organization Meetings:     Marital Status:    Intimate Partner Violence:     Fear of Current or Ex-Partner:     Emotionally Abused:     Physically Abused:     Sexually Abused:        SCREENINGS    Sara Coma Scale  Eye Opening: Spontaneous  Best Verbal Response: Oriented  Best Motor Response: Obeys commands  Sara Coma Scale Score: 15 @FLOW([id number])@      PHYSICAL EXAM    (up to 7 for takes less than 2 seconds. Coloration: Skin is not jaundiced. Findings: No erythema, lesion or rash. Neurological:      Mental Status: She is alert and oriented to person, place, and time. Cranial Nerves: No cranial nerve deficit. Sensory: No sensory deficit. Motor: No weakness or abnormal muscle tone. Gait: Gait normal.      Deep Tendon Reflexes: Reflexes normal.   Psychiatric:         Behavior: Behavior normal.         Thought Content: Thought content normal.         DIAGNOSTIC RESULTS     EKG: All EKG's are interpreted by the Emergency Department Physician who either signs or Co-signsthis chart in the absence of a cardiologist.        RADIOLOGY:   Ethlyn Hippo such as CT, Ultrasound and MRI are read by the radiologist. Plain radiographic images are visualized and preliminarily interpreted by the emergency physician with the below findings:        Interpretation per the Radiologist below, if available at the time ofthis note:    CT ABDOMEN PELVIS WO CONTRAST Additional Contrast? None   Final Result      Bibasilar peripheral groundglass opacity. These findings may be seen in patients with covid 19 pneumonia. However, other infectious and inflammatory etiologies may present with similar appearance. Colonic diverticulosis, greatest in sigmoid colon. Fluid-filled ascending and transverse colon, may reflect diarrheal state. Loss colonic haustration. Finding nonspecific, may be seen in variety of inflammatory or infectious etiologies. Mild/moderate hiatal hernia. All CT scans at this facility use dose modulation, iterative reconstruction, and/or weight based dosing when appropriate to reduce radiation dose to as low as reasonably achievable. XR CHEST PORTABLE   Final Result      Patchy opacity in the left lung suspicious for an infectious process such as pneumonia.             ED BEDSIDE ULTRASOUND:   Performed by ED Physician - none    LABS:  Labs Reviewed   COVID-19, RAPID - Abnormal; Notable for the following components:       Result Value    SARS-CoV-2, NAAT DETECTED (*)     All other components within normal limits    Narrative:     Abdullahi OCONNOR tel. [phone],  covid results called to and read back by Brendacamrynzack Ham, 09/03/2021 15:46, by [facility]   COMPREHENSIVE METABOLIC PANEL - Abnormal; Notable for the following components:    Sodium 132 (*)     Anion Gap 16 (*)     Glucose 141 (*)     Total Protein 8.2 (*)     ALT 42 (*)     AST 37 (*)     Globulin 4.0 (*)     All other components within normal limits   CBC WITH AUTO DIFFERENTIAL - Abnormal; Notable for the following components:    WBC 19.4 (*)     Platelets 176 (*)     Neutrophils % 93.7 (*)     Monocytes % 3.9 (*)     Eosinophils % 0.2 (*)     Neutrophils Absolute 18.2 (*)     Lymphocytes Absolute 0.3 (*)     All other components within normal limits   URINE RT REFLEX TO CULTURE - Abnormal; Notable for the following components:    Protein, UA 30 (*)     All other components within normal limits   MICROSCOPIC URINALYSIS - Abnormal; Notable for the following components:    WBC, UA 10-20 (*)     Bacteria, UA FEW (*)     All other components within normal limits   C DIFF TOXIN/ANTIGEN   CULTURE, BLOOD 1   CULTURE, BLOOD 2   CULTURE, URINE   LIPASE   AMYLASE   LACTIC ACID, PLASMA       All other labs were within normal range or not returned as of this dictation.     EMERGENCY DEPARTMENT COURSE and DIFFERENTIAL DIAGNOSIS/MDM:   Vitals:    Vitals:    09/03/21 1457 09/03/21 1600 09/03/21 1716   BP: (!) 142/75 139/65 (!) 142/72   Pulse: 92 90 92   Resp: 18 20 20   Temp: 98.5 °F (36.9 °C)     TempSrc: Temporal     SpO2: 96% 97% 95%   Weight: 190 lb (86.2 kg)     Height: 5' 6\" (1.676 m)         MDM  Number of Diagnoses or Management Options  Acute cystitis without hematuria  Dehydration  Diarrhea, unspecified type  General weakness: established and improving  Pneumonia due to COVID-19 virus  Diagnosis management comments: Patient is dealing with sickness for the last 1 week time family think that she may be coming with the COVID-19 difficult to take care of at home because of extreme weakness and diarrhea for 5 times a day for the last 5 days ever since started with Augmentin patient was seen for cough congestion started on Augmentin few days ago on virtual visit Case discussed with the hospitalist at [facility], will start the patient to go to Covid unit and transfer center involved in arranging a ambulance after room assignment       Amount and/or Complexity of Data Reviewed  Clinical lab tests: ordered and reviewed  Tests in the radiology section of CPT®: ordered and reviewed      CRITICAL CARE TIME   Total Critical Care time was  minutes, excluding separately reportableprocedures. There was a high probability of clinicallysignificant/life threatening deterioration in the patient's condition which required my urgent intervention. CONSULTS:  None    PROCEDURES:  Unless otherwise noted below, none     Procedures    FINAL IMPRESSION      1. Pneumonia due to COVID-19 virus    2. Acute cystitis without hematuria    3. Diarrhea, unspecified type    4. Dehydration    5. General weakness          DISPOSITION/PLAN   DISPOSITION        PATIENT REFERRED TO:  No follow-up provider specified.     DISCHARGE MEDICATIONS:  New Prescriptions    No medications on file          (Please note that portions of this note were completed with a voice recognition program.  Efforts were made to edit the dictations but occasionally words are mis-transcribed.)    Schuyler Franco MD (electronically signed)  Attending Emergency Physician       Schuyler Franco MD  09/03/21 5994

## 2021-09-03 NOTE — ED NOTES
Spoke with transfer center to leilani hospitalist at [facility] for Dr to  consult.       Angela Ramos  09/03/21 9431

## 2021-09-03 NOTE — ED TRIAGE NOTES
Patient presents to ED with c/o generalized fatigue, body, aches, nausea, and diarrhea.  She had a virtual visit with her MD as few days ago and started on augmentin and swabbed for COVID test which is still pending

## 2021-09-04 PROBLEM — U07.1 COVID-19: Status: ACTIVE | Noted: 2021-09-04

## 2021-09-04 LAB
ALBUMIN SERPL-MCNC: 3.4 G/DL (ref 3.5–4.6)
ALP BLD-CCNC: 59 U/L (ref 40–130)
ALT SERPL-CCNC: 28 U/L (ref 0–33)
ANION GAP SERPL CALCULATED.3IONS-SCNC: 11 MEQ/L (ref 9–15)
AST SERPL-CCNC: 20 U/L (ref 0–35)
BASOPHILS ABSOLUTE: 0 K/UL (ref 0–0.2)
BASOPHILS RELATIVE PERCENT: 0.1 %
BILIRUB SERPL-MCNC: <0.2 MG/DL (ref 0.2–0.7)
BUN BLDV-MCNC: 11 MG/DL (ref 8–23)
C DIFF TOXIN/ANTIGEN: NORMAL
C-REACTIVE PROTEIN: 90.4 MG/L (ref 0–5)
C. DIFFICILE TOXIN MOLECULAR: ABNORMAL
CALCIUM SERPL-MCNC: 8.3 MG/DL (ref 8.5–9.9)
CHLORIDE BLD-SCNC: 106 MEQ/L (ref 95–107)
CO2: 20 MEQ/L (ref 20–31)
CREAT SERPL-MCNC: 0.72 MG/DL (ref 0.5–0.9)
D DIMER: 2.97 MG/L FEU (ref 0–0.5)
EOSINOPHILS ABSOLUTE: 0 K/UL (ref 0–0.7)
EOSINOPHILS RELATIVE PERCENT: 0 %
GFR AFRICAN AMERICAN: >60
GFR NON-AFRICAN AMERICAN: >60
GI BACTERIAL PATHOGENS BY PCR: NORMAL
GLOBULIN: 2.2 G/DL (ref 2.3–3.5)
GLUCOSE BLD-MCNC: 134 MG/DL (ref 70–99)
HCT VFR BLD CALC: 30.2 % (ref 37–47)
HEMOGLOBIN: 10.2 G/DL (ref 12–16)
LYMPHOCYTES ABSOLUTE: 0.3 K/UL (ref 1–4.8)
LYMPHOCYTES RELATIVE PERCENT: 1.8 %
MCH RBC QN AUTO: 29.6 PG (ref 27–31.3)
MCHC RBC AUTO-ENTMCNC: 33.9 % (ref 33–37)
MCV RBC AUTO: 87.3 FL (ref 82–100)
MONOCYTES ABSOLUTE: 0.6 K/UL (ref 0.2–0.8)
MONOCYTES RELATIVE PERCENT: 3.5 %
NEUTROPHILS ABSOLUTE: 15.8 K/UL (ref 1.4–6.5)
NEUTROPHILS RELATIVE PERCENT: 94.6 %
PDW BLD-RTO: 13.4 % (ref 11.5–14.5)
PLATELET # BLD: 355 K/UL (ref 130–400)
POTASSIUM SERPL-SCNC: 3.9 MEQ/L (ref 3.4–4.9)
PROCALCITONIN: 0.06 NG/ML (ref 0–0.15)
RBC # BLD: 3.46 M/UL (ref 4.2–5.4)
SARS-COV-2, PCR: DETECTED
SODIUM BLD-SCNC: 137 MEQ/L (ref 135–144)
TOTAL PROTEIN: 5.6 G/DL (ref 6.3–8)
TSH SERPL DL<=0.05 MIU/L-ACNC: 0.1 UIU/ML (ref 0.44–3.86)
WBC # BLD: 16.7 K/UL (ref 4.8–10.8)

## 2021-09-04 PROCEDURE — 84443 ASSAY THYROID STIM HORMONE: CPT

## 2021-09-04 PROCEDURE — 85025 COMPLETE CBC W/AUTO DIFF WBC: CPT

## 2021-09-04 PROCEDURE — 87506 IADNA-DNA/RNA PROBE TQ 6-11: CPT

## 2021-09-04 PROCEDURE — 36415 COLL VENOUS BLD VENIPUNCTURE: CPT

## 2021-09-04 PROCEDURE — 86140 C-REACTIVE PROTEIN: CPT

## 2021-09-04 PROCEDURE — 6370000000 HC RX 637 (ALT 250 FOR IP): Performed by: INTERNAL MEDICINE

## 2021-09-04 PROCEDURE — 85379 FIBRIN DEGRADATION QUANT: CPT

## 2021-09-04 PROCEDURE — 80053 COMPREHEN METABOLIC PANEL: CPT

## 2021-09-04 PROCEDURE — G0378 HOSPITAL OBSERVATION PER HR: HCPCS

## 2021-09-04 PROCEDURE — 87493 C DIFF AMPLIFIED PROBE: CPT

## 2021-09-04 PROCEDURE — 2580000003 HC RX 258: Performed by: INTERNAL MEDICINE

## 2021-09-04 PROCEDURE — 96372 THER/PROPH/DIAG INJ SC/IM: CPT

## 2021-09-04 PROCEDURE — 87449 NOS EACH ORGANISM AG IA: CPT

## 2021-09-04 PROCEDURE — 87324 CLOSTRIDIUM AG IA: CPT

## 2021-09-04 PROCEDURE — 6360000002 HC RX W HCPCS: Performed by: INTERNAL MEDICINE

## 2021-09-04 PROCEDURE — 2060000000 HC ICU INTERMEDIATE R&B

## 2021-09-04 PROCEDURE — 84145 PROCALCITONIN (PCT): CPT

## 2021-09-04 RX ORDER — SODIUM CHLORIDE, SODIUM LACTATE, POTASSIUM CHLORIDE, AND CALCIUM CHLORIDE .6; .31; .03; .02 G/100ML; G/100ML; G/100ML; G/100ML
1000 INJECTION, SOLUTION INTRAVENOUS ONCE
Status: COMPLETED | OUTPATIENT
Start: 2021-09-04 | End: 2021-09-04

## 2021-09-04 RX ORDER — POLYETHYLENE GLYCOL 3350 17 G/17G
17 POWDER, FOR SOLUTION ORAL DAILY PRN
Status: DISCONTINUED | OUTPATIENT
Start: 2021-09-04 | End: 2021-09-06 | Stop reason: HOSPADM

## 2021-09-04 RX ORDER — ACETAMINOPHEN 325 MG/1
650 TABLET ORAL EVERY 6 HOURS PRN
Status: DISCONTINUED | OUTPATIENT
Start: 2021-09-04 | End: 2021-09-06 | Stop reason: HOSPADM

## 2021-09-04 RX ORDER — ACETAMINOPHEN 650 MG/1
650 SUPPOSITORY RECTAL EVERY 6 HOURS PRN
Status: DISCONTINUED | OUTPATIENT
Start: 2021-09-04 | End: 2021-09-06 | Stop reason: HOSPADM

## 2021-09-04 RX ORDER — ONDANSETRON 2 MG/ML
4 INJECTION INTRAMUSCULAR; INTRAVENOUS EVERY 6 HOURS PRN
Status: DISCONTINUED | OUTPATIENT
Start: 2021-09-04 | End: 2021-09-06 | Stop reason: HOSPADM

## 2021-09-04 RX ORDER — ONDANSETRON 4 MG/1
4 TABLET, ORALLY DISINTEGRATING ORAL EVERY 8 HOURS PRN
Status: DISCONTINUED | OUTPATIENT
Start: 2021-09-04 | End: 2021-09-06 | Stop reason: HOSPADM

## 2021-09-04 RX ORDER — CITALOPRAM 20 MG/1
20 TABLET ORAL DAILY
Status: DISCONTINUED | OUTPATIENT
Start: 2021-09-04 | End: 2021-09-06 | Stop reason: HOSPADM

## 2021-09-04 RX ADMIN — Medication 125 MG: at 20:04

## 2021-09-04 RX ADMIN — SODIUM CHLORIDE, POTASSIUM CHLORIDE, SODIUM LACTATE AND CALCIUM CHLORIDE 1000 ML: 600; 310; 30; 20 INJECTION, SOLUTION INTRAVENOUS at 03:24

## 2021-09-04 RX ADMIN — CITALOPRAM HYDROBROMIDE 20 MG: 20 TABLET ORAL at 09:10

## 2021-09-04 RX ADMIN — ENOXAPARIN SODIUM 40 MG: 40 INJECTION SUBCUTANEOUS at 09:10

## 2021-09-04 RX ADMIN — Medication 125 MG: at 23:51

## 2021-09-04 RX ADMIN — LEVOTHYROXINE SODIUM 75 MCG: 25 TABLET ORAL at 05:16

## 2021-09-04 ASSESSMENT — PAIN DESCRIPTION - PAIN TYPE
TYPE: ACUTE PAIN;CHRONIC PAIN

## 2021-09-04 ASSESSMENT — PAIN DESCRIPTION - DESCRIPTORS
DESCRIPTORS: DULL;DISCOMFORT
DESCRIPTORS: DULL
DESCRIPTORS: DULL

## 2021-09-04 ASSESSMENT — PAIN DESCRIPTION - FREQUENCY
FREQUENCY: INTERMITTENT

## 2021-09-04 ASSESSMENT — PAIN SCALES - GENERAL
PAINLEVEL_OUTOF10: 0
PAINLEVEL_OUTOF10: 2
PAINLEVEL_OUTOF10: 2
PAINLEVEL_OUTOF10: 0

## 2021-09-04 ASSESSMENT — ENCOUNTER SYMPTOMS
VOMITING: 0
NAUSEA: 0
DIARRHEA: 0
SHORTNESS OF BREATH: 0
COUGH: 0

## 2021-09-04 ASSESSMENT — PAIN DESCRIPTION - LOCATION
LOCATION: GENERALIZED

## 2021-09-04 NOTE — CARE COORDINATION
[facility] Case Management Initial Discharge Assessment    Met with Patient to discuss discharge plan. PCP: OMAR Andrea                                Date of Last Visit: MARCH AND VV WITH OTHER MD     If no PCP, list provided? N/A    Discharge Planning    Living Arrangements: independently at home    Who do you live with? SON    Who helps you with your care:  self    If lives at home:     Do you have any barriers navigating in your home? no    Patient can perform ADL? Yes    Current Services (outpatient and in home) :  None    Dialysis: No    Is transportation available to get to your appointments? Yes    DME Equipment:  yes - [facility]     Respiratory equipment: None    Respiratory provider:  no     Pharmacy:  yes - [facility]    Consult with Medication Assistance Program?  No      Patient agreeable to [facility]? Declined    Patient agreeable to SNF/Rehab? Declined    Other discharge needs identified? N/A      Initial Discharge Plan? (Note: please see concurrent daily documentation for any updates after initial note).     DENIES NEEDS    Readmission Risk              Risk of Unplanned Readmission:  11         Electronically signed by Antoinette Nava RN on 9/4/2021 at 5:38 PM

## 2021-09-04 NOTE — PROGRESS NOTES
Comprehensive Nutrition Assessment    Type and Reason for Visit:  Initial, Positive Nutrition Screen (poor appetite/wt loss)    Nutrition Recommendations/Plan:   Continue General diet. Start high calorie ONS TID (strawberry)    Nutrition Assessment:  Pt meets criteria for severe malnutrition if current bedscale wt is accurate. Noted wt loss of 17% over the past 4 months with decreased po intake past week due to COVID-19 infection. Will start a high calorie ONS TID. Recommend verify actual wt (zero bedscale prior to weighing). Malnutrition Assessment:  Malnutrition Status:  Severe malnutrition (if current wt is accurate)    Context:  Acute Illness     Findings of the 6 clinical characteristics of malnutrition:  Energy Intake:  7 - 50% or less of estimated energy requirements for 5 or more days  Weight Loss:  7 - Greater than 7.5% over 3 months     Body Fat Loss:  Unable to assess     Muscle Mass Loss:  Unable to assess    Fluid Accumulation:  Unable to assess     Strength:  Not Performed    Estimated Daily Nutrient Needs:  Energy (kcal):  1660-9861  (kg x 22-25); Weight Used for Energy Requirements:  Current (76.6 kg)     Protein (g):  76-82 gm (kg IBW x 1.3-1.4); Weight Used for Protein Requirements:  Ideal (59 kg)        Fluid (ml/day):  ~1900 ml; Method Used for Fluid Requirements:  1 ml/kcal      Nutrition Related Findings:  Brionna Cox by phone, pt stated her appetite/po intake have declined over the past week. Lost sense of taste and smell. PMH: hld, depression. Labs/Meds reviewed. Wounds:  None       Current Nutrition Therapies:    ADULT DIET;  Regular; Safety Tray; Safety Tray (Disposables) (26-50%)    Anthropometric Measures:  · Height: 5' 6\" (167.6 cm)  · Current Body Weight: 168 lb (76.2 kg) (9/4)   · Admission Body Weight: 190 lb (86.2 kg) (stated)    · Usual Body Weight: 204 lb (92.5 kg) (5/13/21-office visit)     · Ideal Body Weight: 130 lbs; % Ideal Body Weight  > 100%   · BMI:

## 2021-09-04 NOTE — H&P
Hospital Medicine  History and Physical    Patient:  Vernon Reyes  MRN: [id number]    CHIEF COMPLAINT:  diarrhea    History Obtained From:  patient, electronic medical record  Primary Care Physician: OMAR Rowell    HISTORY OF PRESENT ILLNESS:   The patient is a 65 y.o. female who is transferred to [facility] from [place] emergency department for intractable diarrhea with dehydration. Patient is a 76-year-old female with a history of hypothyroid disease hyperlipidemia vitamin D deficiency who presents emergency department with approximately 4 days of intractable diarrhea of about 4-5 bowel movements daily. Bowel movements are watery they are nonbloody they are not formed she does not note that she can tell any severe malodorous nature of them given that she lost her taste and smell to cope and she was diagnosed with COVID-19. She tested positive for Covid yesterday. She admits to loss of taste and smell shortness of breath and fatigue. She is vaccinated against Covid x2    Past Medical History:      Diagnosis Date    Depression     Hyperlipidemia     Protruded lumbar disc     L4/L5    Stress     Vitamin D deficiency        Past Surgical History:      Procedure Laterality Date    ANKLE SURGERY  2001    FOOT SURGERY  2001    SINUS SURGERY  07-       Medications Prior to Admission:    Prior to Admission medications    Medication Sig Start Date End Date Taking?  Authorizing Provider   amoxicillin-clavulanate (AUGMENTIN) 875-125 MG per tablet Take 1 tablet by mouth 2 times daily for 7 days 8/30/21 9/6/21 Yes AZAEL Suazo - CNP   pravastatin (PRAVACHOL) 20 MG tablet Take 1 tablet by mouth daily 5/13/21  Yes OMAR Rowell   levothyroxine (SYNTHROID) 75 MCG tablet Take 1 tablet by mouth once daily 5/13/21  Yes OMAR Tian   citalopram (CELEXA) 20 MG tablet Take 1 tablet by mouth daily 5/13/21  Yes OMAR Tian   UNABLE TO FIND Take 1 tablet by mouth daily John [facility] 11/17/15   Historical Provider, MD Ryan Juan 500 MG CAPS Take 1 capsule by mouth daily    Historical Provider, MD   UNABLE TO FIND Take 1 capsule by mouth daily Tumeric root extract    Historical Provider, MD   Pediatric Multivitamins-Fl (MULTI VIT/FL PO) Take 1 capsule by mouth daily    Historical Provider, MD   Cholecalciferol 50 MCG (2000 UT) CAPS Take by mouth daily    Historical Provider, MD   SELENIUM SULFIDE 2.5 % by Other route twice a week. SHAMPOO     Historical Provider, MD       Allergies:  Patient has no known allergies. Social History:   TOBACCO:   reports that she has never smoked. She has never used smokeless tobacco.  ETOH:   reports no history of alcohol use. OCCUPATION: retired    Family History:       Problem Relation Age of Onset    Anemia Mother     Heart Disease Mother     Depression Mother     High Blood Pressure Father        REVIEW OF SYSTEMS:  Ten systems reviewed and negative except for as above. Physical Exam:    Vitals: /60   Pulse 86   Temp 98.4 °F (36.9 °C) (Oral)   Resp 18   Ht 5' 6\" (1.676 m)   Wt 190 lb (86.2 kg)   LMP  (LMP Unknown)   SpO2 94%   BMI 30.67 kg/m²   Constitutional: alert, appears stated age and cooperative  Skin: Skin color, texture, turgor normal. No rashes or lesions  Eyes:Eye: Normal external eye, conjunctiva, VINCE. ENT: Head: Normocephalic, no lesions, without obvious abnormality. Neck: no adenopathy, no carotid bruit, no JVD, supple, symmetrical, trachea midline and thyroid not enlarged, symmetric, no tenderness/mass/nodules  Respiratory: clear to auscultation bilaterally  Cardiovascular: regular rate and rhythm, S1, S2 normal, no murmur, click, rub or gallop  Gastrointestinal: soft, non-tender; bowel sounds normal; no masses,  no organomegaly  Genitourinary: Deferred  Musculoskeletal:extremities normal, atraumatic, no cyanosis or edema  Neurologic: Mental status AAOx3 No facial asymmetry or droop. transverse colon. Cecum through transverse colon fluid-filled. Loss of haustration, transverse and descending colon. No pericolonic fat stranding. No pericolonic fluid collections. Peritoneum:  No ascites, free air, or fluid collections. Vessels: Aorta normal in course and caliber. Lymph nodes:  Retroperitoneal:  No enlarged retroperitoneal lymph nodes. Mesenteric:  No enlarged mesenteric lymph nodes. Pelvic: No enlarged pelvic lymph nodes. Ureters: Normal in course and caliber. No calcifications. Bladder: No wall thickening. Reproductive organs: No pelvic masses. Abdominal Wall: 1.6 cm fat-containing periumbilical anterior abdominal wall defect Bones:  No bone lesions. Diffuse disc space narrowing L2-L3 through L5-S1, greatest at L5-S1. 3 mm anterolisthesis, L4 on L5. Small anterior osteophytes L2-L5. No post operative changes. Bibasilar peripheral groundglass opacity. These findings may be seen in patients with covid 19 pneumonia. However, other infectious and inflammatory etiologies may present with similar appearance. Colonic diverticulosis, greatest in sigmoid colon. Fluid-filled ascending and transverse colon, may reflect diarrheal state. Loss colonic haustration. Finding nonspecific, may be seen in variety of inflammatory or infectious etiologies. Mild/moderate hiatal hernia. All CT scans at this facility use dose modulation, iterative reconstruction, and/or weight based dosing when appropriate to reduce radiation dose to as low as reasonably achievable. XR CHEST PORTABLE    Result Date: 9/3/2021  EXAMINATION:  CHEST RADIOGRAPH (PORTABLE SINGLE VIEW AP) Exam Date/Time:  9/3/2021 3:10 PM Clinical History:   covid 19 sympt Comparison:  None available  RESULT: Lines, tubes, and devices:  None. Lungs and pleura:  Patchy left mid lung zone peripheral opacities. No pneumothorax. No pleural effusion. Low lung volumes. Cardiomediastinal silhouette:  Normal cardiomediastinal silhouette.  Other:   No acute osseous abnormality. Patchy opacity in the left lung suspicious for an infectious process such as pneumonia. Assessment and Plan   1. Dehydration with diarrhea: Check stools for C. difficile as symptoms may have started after antibiotics but the patient cannot quite remember. Likely diarrhea secondary to COVID-19, however given elevated leukocytosis and frequency of stools need to rule out C. difficile. Stool cultures ordered. LR infusion 100ml/hr x1L   2. Acute bacterial sinusitis: Patient has had 5 days of antibiotic therapy with Augmentin. Theoretically she has had to minimal threshold for full treatment regimen we will hold any further antibiotics until status testing can be completed  3. Mild elevated transaminitis: Repeat LFTs avoid hepatotoxic agents should trend down with IV fluids  4. Thrombocytopenia: Likely secondary to COVID-19 infection follow  5. Lymphocytopenia also secondary to COVID-19 follow  6. Hypothyroid disease check TSH continue Synthroid  7. Continue to follow inflammatory and thrombophilic factors.     8. DVT prophylaxis Lovenox per Covid protocol    Patient Active Problem List   Diagnosis Code    Hyperlipidemia E78.5    Depression F32.9    Stress F43.9    Vitamin D deficiency E55.9    Endometrial cancer ([facility] [address] C54.1    Hypothyroid E03.9    Maisonneuve fracture of right fibula S82.861A    Major depressive disorder, recurrent episode, moderate ([address] F33.1    Osteopenia M85.80    Other joint derangement, not elsewhere classified, ankle and foot M24.873, M24.876    Postmenopausal bleeding N95.0    Sprain of ankle, deltoid ligament S93.429A    Thickened endometrium R93.89    Uterine cancer ([address] C55    COVID-19 U07.1       Danuta Andino DO  Admitting Hospitalist    Emergency Contact:

## 2021-09-04 NOTE — PLAN OF CARE
Nutrition Problem #1: Unintended weight loss  Intervention: Food and/or Nutrient Delivery: Continue Current Diet, Start Oral Nutrition Supplement (Continue General diet. Start high calorie ONS TID (sratrawberry))  Nutritional Goals: intake > 75%.   Verify actual wt

## 2021-09-04 NOTE — PROGRESS NOTES
Progress Note  Date:2021       [facility]  Patient Name:Gloria Abbott     YOB: 1950     Age:70 y.o. Subjective    Subjective:  Symptoms:  No shortness of breath, malaise, cough, chest pain, weakness, headache, chest pressure, anorexia, diarrhea or anxiety. Diet:  No nausea or vomiting. Review of Systems   Respiratory: Negative for cough and shortness of breath. Cardiovascular: Negative for chest pain. Gastrointestinal: Negative for anorexia, diarrhea, nausea and vomiting. Neurological: Negative for weakness. Objective         Vitals Last 24 Hours:  TEMPERATURE:  Temp  Av.5 °F (36.9 °C)  Min: 98.1 °F (36.7 °C)  Max: 99.1 °F (37.3 °C)  RESPIRATIONS RANGE: Resp  Av.5  Min: 17  Max: 20  PULSE OXIMETRY RANGE: SpO2  Av.3 %  Min: 94 %  Max: 99 %  PULSE RANGE: Pulse  Av.5  Min: 72  Max: 92  BLOOD PRESSURE RANGE: Systolic (25GYJ), HPP:472 , Min:131 , OPH:733   ; Diastolic (17FGE), HJS:39, Min:60, Max:75    I/O (24Hr): Intake/Output Summary (Last 24 hours) at 2021 1155  Last data filed at 2021 1110  Gross per 24 hour   Intake 360 ml   Output 800 ml   Net -440 ml     Objective:  General Appearance:  Comfortable, well-appearing and in no acute distress. Vital signs: (most recent): Blood pressure 131/70, pulse 72, temperature 98.1 °F (36.7 °C), temperature source Oral, resp. rate 18, height 5' 6\" (1.676 m), weight 168 lb 14 oz (76.6 kg), SpO2 99 %, not currently breastfeeding. HEENT: Normal HEENT exam.    Lungs:  Normal effort and normal respiratory rate. Breath sounds clear to auscultation. Heart: Normal rate. Regular rhythm. S1 normal and S2 normal.    Abdomen: Abdomen is soft. There is no epigastric area or suprapubic area tenderness. Neurological: Patient is alert and oriented to person, place and time. Pupils:  Pupils are equal, round, and reactive to light. Skin:  Warm.       Labs/Imaging/Diagnostics Labs:  CBC:  Recent Labs     09/03/21  1537 09/04/21  0610   WBC 19.4* 16.7*   RBC 4.35 3.46*   HGB 12.7 10.2*   HCT 39.1 30.2*   MCV 89.9 87.3   RDW 12.8 13.4   * 355     CHEMISTRIES:  Recent Labs     09/03/21  1537 09/04/21  0610   * 137   K 3.8 3.9   CL 96 106   CO2 20 20   BUN 18 11   CREATININE 0.85 0.72   GLUCOSE 141* 134*     PT/INR:No results for input(s): PROTIME, INR in the last 72 hours. APTT:No results for input(s): APTT in the last 72 hours. LIVER PROFILE:  Recent Labs     09/03/21 1537 09/04/21  0610   AST 37* 20   ALT 42* 28   BILITOT 0.4 <0.2   ALKPHOS 81 59       Imaging Last 24 Hours:  CT ABDOMEN PELVIS WO CONTRAST Additional Contrast? None    Result Date: 9/3/2021  CT of the Abdomen and Pelvis without intravenous contrast medium History:  Abdominal pain, watery stool, covid 19 positive, weakness Technical Factors: CT imaging of the abdomen and pelvis were obtained and formatted as 5 mm contiguous axial images from the domes of the diaphragm to the symphysis pubis. Sagittal and coronal reconstructions were also obtained. Oral contrast medium:  None. Intravenous contrast medium:  None. Comparison:  None. Findings: Lungs/mediastinum:  Bibasilar peripheral groundglass opacities. Mild/moderate hiatal hernia. Liver:  Normal in size, shape, and attenuation. Bile Ducts:  Normal in caliber. Gallbladder:  No stones or wall thickening. Pancreas:  Normal without masses, cysts, ductal dilatation or calcification. Spleen:  Normal in size without masses or calcifications. No splenules. Kidneys:  Normal in size. Right kidney shows 2.7 cm cyst lateral midpole. No hydronephrosis, and no calculi bilaterally. Adrenals:  Normal. Small bowel:  Normal in caliber. Appendix:  Normal. Colon:  Normal in caliber. Diverticular change, sigmoid colon. Scattered diverticula are identified within the ascending and transverse colon. Cecum through transverse colon fluid-filled.  Loss of haustration, transverse and descending colon. No pericolonic fat stranding. No pericolonic fluid collections. Peritoneum:  No ascites, free air, or fluid collections. Vessels: Aorta normal in course and caliber. Lymph nodes:  Retroperitoneal:  No enlarged retroperitoneal lymph nodes. Mesenteric:  No enlarged mesenteric lymph nodes. Pelvic: No enlarged pelvic lymph nodes. Ureters: Normal in course and caliber. No calcifications. Bladder: No wall thickening. Reproductive organs: No pelvic masses. Abdominal Wall: 1.6 cm fat-containing periumbilical anterior abdominal wall defect Bones:  No bone lesions. Diffuse disc space narrowing L2-L3 through L5-S1, greatest at L5-S1. 3 mm anterolisthesis, L4 on L5. Small anterior osteophytes L2-L5. No post operative changes. Bibasilar peripheral groundglass opacity. These findings may be seen in patients with covid 19 pneumonia. However, other infectious and inflammatory etiologies may present with similar appearance. Colonic diverticulosis, greatest in sigmoid colon. Fluid-filled ascending and transverse colon, may reflect diarrheal state. Loss colonic haustration. Finding nonspecific, may be seen in variety of inflammatory or infectious etiologies. Mild/moderate hiatal hernia. All CT scans at this facility use dose modulation, iterative reconstruction, and/or weight based dosing when appropriate to reduce radiation dose to as low as reasonably achievable. XR CHEST PORTABLE    Result Date: 9/3/2021  EXAMINATION:  CHEST RADIOGRAPH (PORTABLE SINGLE VIEW AP) Exam Date/Time:  9/3/2021 3:10 PM Clinical History:   covid 19 sympt Comparison:  None available  RESULT: Lines, tubes, and devices:  None. Lungs and pleura:  Patchy left mid lung zone peripheral opacities. No pneumothorax. No pleural effusion. Low lung volumes. Cardiomediastinal silhouette:  Normal cardiomediastinal silhouette. Other:   No acute osseous abnormality.       Patchy opacity in the left lung suspicious for an infectious process such as pneumonia. Assessment//Plan           Hospital Problems         Last Modified POA    COVID-19 9/4/2021 Yes        Assessment & Plan  9/4: Patient was seen and evaluated. Follow-up diarrhea stool studies. No diarrhea since admission. No overnight events. Anticipate discharge tomorrow. Spoke with nursing.   Electronically signed by Pablo Giles MD on 9/4/21 at 11:55 AM EDT

## 2021-09-04 NOTE — ED NOTES
Patients son, Chaz Vidal, updated on plan of care and patient transfer. Room number given. [phone] cell, [phone] home.       Delia Aiken RN  09/03/21 6938

## 2021-09-04 NOTE — ACP (ADVANCE CARE PLANNING)
Advance Care Planning     Advance Care Planning Activator (Inpatient)  Conversation Note      Date of ACP Conversation: 9/4/2021     Conversation Conducted with: Patient with Decision Making Capacity    ACP Activator: Dominick Colmenares RN        Health Care Decision Maker:     Current Designated Health Care Decision Maker:     Primary Decision Maker: Yesi Almonte Child - [phone]    Secondary Decision Maker: Derrick Quinn - Child - [phone]  Click here to complete Healthcare Decision Makers including section of the Healthcare Decision Maker Relationship (ie \"Primary\")  Today we documented Decision Maker(s). The patient will provide ACP documents. Care Preferences    Ventilation: \"If you were in your present state of health and suddenly became very ill and were unable to breathe on your own, what would your preference be about the use of a ventilator (breathing machine) if it were available to you? \"      Would the patient desire the use of ventilator (breathing machine)?: yes    \"If your health worsens and it becomes clear that your chance of recovery is unlikely, what would your preference be about the use of a ventilator (breathing machine) if it were available to you? \"     Would the patient desire the use of ventilator (breathing machine)?: No      Resuscitation  \"CPR works best to restart the heart when there is a sudden event, like a heart attack, in someone who is otherwise healthy. Unfortunately, CPR does not typically restart the heart for people who have serious health conditions or who are very sick. \"    \"In the event your heart stopped as a result of an underlying serious health condition, would you want attempts to be made to restart your heart (answer \"yes\" for attempt to resuscitate) or would you prefer a natural death (answer \"no\" for do not attempt to resuscitate)? \" yes       [] Yes   [] No   Educated Patient / Beverly Alvarez regarding differences between Advance Directives and portable DNR orders.     Length of ACP Conversation in minutes:      Conversation Outcomes:  [x] ACP discussion completed  [] Existing advance directive reviewed with patient; no changes to patient's previously recorded wishes  [] New Advance Directive completed  [] Portable Do Not Rescitate prepared for Provider review and signature  [] POLST/POST/MOLST/MOST prepared for Provider review and signature      Follow-up plan:    [] Schedule follow-up conversation to continue planning  [] Referred individual to Provider for additional questions/concerns   [] Advised patient/agent/surrogate to review completed ACP document and update if needed with changes in condition, patient preferences or care setting    [x] This note routed to one or more involved healthcare providers    PT AWARE TO PROVIDE HER POA AND LIVING WILL WHEN ABLE

## 2021-09-04 NOTE — FLOWSHEET NOTE
2130 - pt to the floor. VS taken. No distress noted at this point. Admission assessment done. Home meds reviewed and ready to be reordered. Notified Dr. Asya Tello.     Electronically signed by Ankit Sales RN on 9/5/2021 at 7:28 AM

## 2021-09-05 LAB
ALBUMIN SERPL-MCNC: 3 G/DL (ref 3.5–4.6)
ALP BLD-CCNC: 63 U/L (ref 40–130)
ALT SERPL-CCNC: 25 U/L (ref 0–33)
ANION GAP SERPL CALCULATED.3IONS-SCNC: 9 MEQ/L (ref 9–15)
APTT: 32.2 SEC (ref 24.4–36.8)
AST SERPL-CCNC: 20 U/L (ref 0–35)
BASOPHILS ABSOLUTE: 0 K/UL (ref 0–0.2)
BASOPHILS RELATIVE PERCENT: 0.4 %
BILIRUB SERPL-MCNC: <0.2 MG/DL (ref 0.2–0.7)
BUN BLDV-MCNC: 12 MG/DL (ref 8–23)
C-REACTIVE PROTEIN: 62.4 MG/L (ref 0–5)
CALCIUM SERPL-MCNC: 8.3 MG/DL (ref 8.5–9.9)
CHLORIDE BLD-SCNC: 105 MEQ/L (ref 95–107)
CO2: 24 MEQ/L (ref 20–31)
CREAT SERPL-MCNC: 0.72 MG/DL (ref 0.5–0.9)
D DIMER: 2.76 MG/L FEU (ref 0–0.5)
EOSINOPHILS ABSOLUTE: 0.1 K/UL (ref 0–0.7)
EOSINOPHILS RELATIVE PERCENT: 0.6 %
FERRITIN: 326 NG/ML (ref 13–150)
FIBRINOGEN: 496 MG/DL (ref 235–507)
GFR AFRICAN AMERICAN: >60
GFR NON-AFRICAN AMERICAN: >60
GLOBULIN: 2.7 G/DL (ref 2.3–3.5)
GLUCOSE BLD-MCNC: 90 MG/DL (ref 70–99)
HCT VFR BLD CALC: 30.9 % (ref 37–47)
HEMOGLOBIN: 10.3 G/DL (ref 12–16)
INR BLD: 1.1
LACTATE DEHYDROGENASE: 173 U/L (ref 135–214)
LACTIC ACID: 0.6 MMOL/L (ref 0.5–2.2)
LYMPHOCYTES ABSOLUTE: 0.9 K/UL (ref 1–4.8)
LYMPHOCYTES RELATIVE PERCENT: 7.5 %
MAGNESIUM: 1.9 MG/DL (ref 1.7–2.4)
MCH RBC QN AUTO: 29.1 PG (ref 27–31.3)
MCHC RBC AUTO-ENTMCNC: 33.2 % (ref 33–37)
MCV RBC AUTO: 87.6 FL (ref 82–100)
MONOCYTES ABSOLUTE: 1.1 K/UL (ref 0.2–0.8)
MONOCYTES RELATIVE PERCENT: 8.8 %
NEUTROPHILS ABSOLUTE: 10 K/UL (ref 1.4–6.5)
NEUTROPHILS RELATIVE PERCENT: 82.7 %
PDW BLD-RTO: 13.2 % (ref 11.5–14.5)
PLATELET # BLD: 392 K/UL (ref 130–400)
POTASSIUM SERPL-SCNC: 3.9 MEQ/L (ref 3.4–4.9)
PROTHROMBIN TIME: 13.9 SEC (ref 12.3–14.9)
RBC # BLD: 3.53 M/UL (ref 4.2–5.4)
SODIUM BLD-SCNC: 138 MEQ/L (ref 135–144)
TOTAL PROTEIN: 5.7 G/DL (ref 6.3–8)
TROPONIN: <0.01 NG/ML (ref 0–0.01)
URINE CULTURE, ROUTINE: NORMAL
VITAMIN D 25-HYDROXY: 54.7 NG/ML (ref 30–100)
WBC # BLD: 12.1 K/UL (ref 4.8–10.8)

## 2021-09-05 PROCEDURE — 85025 COMPLETE CBC W/AUTO DIFF WBC: CPT

## 2021-09-05 PROCEDURE — 82728 ASSAY OF FERRITIN: CPT

## 2021-09-05 PROCEDURE — 85610 PROTHROMBIN TIME: CPT

## 2021-09-05 PROCEDURE — 83615 LACTATE (LD) (LDH) ENZYME: CPT

## 2021-09-05 PROCEDURE — G0378 HOSPITAL OBSERVATION PER HR: HCPCS

## 2021-09-05 PROCEDURE — 36415 COLL VENOUS BLD VENIPUNCTURE: CPT

## 2021-09-05 PROCEDURE — 80053 COMPREHEN METABOLIC PANEL: CPT

## 2021-09-05 PROCEDURE — 86140 C-REACTIVE PROTEIN: CPT

## 2021-09-05 PROCEDURE — 6370000000 HC RX 637 (ALT 250 FOR IP): Performed by: INTERNAL MEDICINE

## 2021-09-05 PROCEDURE — 85384 FIBRINOGEN ACTIVITY: CPT

## 2021-09-05 PROCEDURE — 2060000000 HC ICU INTERMEDIATE R&B

## 2021-09-05 PROCEDURE — 6360000002 HC RX W HCPCS: Performed by: INTERNAL MEDICINE

## 2021-09-05 PROCEDURE — 83605 ASSAY OF LACTIC ACID: CPT

## 2021-09-05 PROCEDURE — 96372 THER/PROPH/DIAG INJ SC/IM: CPT

## 2021-09-05 PROCEDURE — 85730 THROMBOPLASTIN TIME PARTIAL: CPT

## 2021-09-05 PROCEDURE — 84484 ASSAY OF TROPONIN QUANT: CPT

## 2021-09-05 PROCEDURE — 82306 VITAMIN D 25 HYDROXY: CPT

## 2021-09-05 PROCEDURE — 83735 ASSAY OF MAGNESIUM: CPT

## 2021-09-05 PROCEDURE — 85379 FIBRIN DEGRADATION QUANT: CPT

## 2021-09-05 RX ORDER — LEVOTHYROXINE SODIUM 0.05 MG/1
50 TABLET ORAL DAILY
Status: DISCONTINUED | OUTPATIENT
Start: 2021-09-06 | End: 2021-09-06 | Stop reason: HOSPADM

## 2021-09-05 RX ADMIN — ENOXAPARIN SODIUM 40 MG: 40 INJECTION SUBCUTANEOUS at 08:07

## 2021-09-05 RX ADMIN — Medication 250 MG: at 11:23

## 2021-09-05 RX ADMIN — Medication 250 MG: at 17:00

## 2021-09-05 RX ADMIN — LEVOTHYROXINE SODIUM 75 MCG: 25 TABLET ORAL at 05:55

## 2021-09-05 RX ADMIN — CITALOPRAM HYDROBROMIDE 20 MG: 20 TABLET ORAL at 08:07

## 2021-09-05 RX ADMIN — Medication 125 MG: at 05:55

## 2021-09-05 ASSESSMENT — PAIN DESCRIPTION - LOCATION
LOCATION: GENERALIZED

## 2021-09-05 ASSESSMENT — PAIN DESCRIPTION - FREQUENCY
FREQUENCY: INTERMITTENT
FREQUENCY: INTERMITTENT

## 2021-09-05 ASSESSMENT — PAIN SCALES - GENERAL
PAINLEVEL_OUTOF10: 0

## 2021-09-05 ASSESSMENT — ENCOUNTER SYMPTOMS
VOMITING: 0
SHORTNESS OF BREATH: 0
COUGH: 0
DIARRHEA: 0
NAUSEA: 0

## 2021-09-05 ASSESSMENT — PAIN DESCRIPTION - PAIN TYPE
TYPE: ACUTE PAIN;CHRONIC PAIN

## 2021-09-05 ASSESSMENT — PAIN DESCRIPTION - DESCRIPTORS
DESCRIPTORS: DULL;DISCOMFORT
DESCRIPTORS: DULL;DISCOMFORT
DESCRIPTORS: DISCOMFORT;DULL

## 2021-09-05 NOTE — PROGRESS NOTES
Progress Note  Date:2021       [facility]  Patient Name:Gloria Abbott     YOB: 1950     Age:70 y.o. Subjective    Subjective:  Symptoms:  No shortness of breath, malaise, cough, chest pain, weakness, headache, chest pressure, anorexia, diarrhea or anxiety. Diet:  No nausea or vomiting. Review of Systems   Respiratory: Negative for cough and shortness of breath. Cardiovascular: Negative for chest pain. Gastrointestinal: Negative for anorexia, diarrhea, nausea and vomiting. Neurological: Negative for weakness. Objective         Vitals Last 24 Hours:  TEMPERATURE:  Temp  Av.1 °F (36.7 °C)  Min: 98.1 °F (36.7 °C)  Max: 98.1 °F (36.7 °C)  RESPIRATIONS RANGE: Resp  Av  Min: 18  Max: 18  PULSE OXIMETRY RANGE: SpO2  Av %  Min: 96 %  Max: 100 %  PULSE RANGE: Pulse  Av  Min: 70  Max: 72  BLOOD PRESSURE RANGE: Systolic (07BLV), WUR:168 , Min:124 , VYM:941   ; Diastolic (38NHF), LZW:88, Min:54, Max:56    I/O (24Hr): Intake/Output Summary (Last 24 hours) at 2021 1033  Last data filed at 2021 1023  Gross per 24 hour   Intake 1680 ml   Output 800 ml   Net 880 ml     Objective:  General Appearance:  Comfortable, well-appearing and in no acute distress. Vital signs: (most recent): Blood pressure (!) 130/56, pulse 71, temperature 98.1 °F (36.7 °C), temperature source Oral, resp. rate 18, height 5' 6\" (1.676 m), weight 172 lb 9.3 oz (78.3 kg), SpO2 96 %, not currently breastfeeding. HEENT: Normal HEENT exam.    Lungs:  Normal effort and normal respiratory rate. Breath sounds clear to auscultation. Heart: Normal rate. Regular rhythm. S1 normal and S2 normal.    Abdomen: Abdomen is soft. There is no epigastric area or suprapubic area tenderness. Neurological: Patient is alert and oriented to person, place and time. Pupils:  Pupils are equal, round, and reactive to light. Skin:  Warm.       Labs/Imaging/Diagnostics Labs:  CBC:  Recent Labs     09/03/21  1537 09/04/21  0610 09/05/21  0738   WBC 19.4* 16.7* 12.1*   RBC 4.35 3.46* 3.53*   HGB 12.7 10.2* 10.3*   HCT 39.1 30.2* 30.9*   MCV 89.9 87.3 87.6   RDW 12.8 13.4 13.2   * 355 392     CHEMISTRIES:  Recent Labs     09/03/21  1537 09/04/21  0610 09/05/21  0737   * 137 138   K 3.8 3.9 3.9   CL 96 106 105   CO2 20 20 24   BUN 18 11 12   CREATININE 0.85 0.72 0.72   GLUCOSE 141* 134* 90   MG  --   --  1.9     PT/INR:  Recent Labs     09/05/21  0738   PROTIME 13.9   INR 1.1     APTT:  Recent Labs     09/05/21  0738   APTT 32.2     LIVER PROFILE:  Recent Labs     09/03/21 1537 09/04/21  0610 09/05/21  0737   AST 37* 20 20   ALT 42* 28 25   BILITOT 0.4 <0.2 <0.2   ALKPHOS 81 59 63       Imaging Last 24 Hours:  CT ABDOMEN PELVIS WO CONTRAST Additional Contrast? None    Result Date: 9/3/2021  CT of the Abdomen and Pelvis without intravenous contrast medium History:  Abdominal pain, watery stool, covid 19 positive, weakness Technical Factors: CT imaging of the abdomen and pelvis were obtained and formatted as 5 mm contiguous axial images from the domes of the diaphragm to the symphysis pubis. Sagittal and coronal reconstructions were also obtained. Oral contrast medium:  None. Intravenous contrast medium:  None. Comparison:  None. Findings: Lungs/mediastinum:  Bibasilar peripheral groundglass opacities. Mild/moderate hiatal hernia. Liver:  Normal in size, shape, and attenuation. Bile Ducts:  Normal in caliber. Gallbladder:  No stones or wall thickening. Pancreas:  Normal without masses, cysts, ductal dilatation or calcification. Spleen:  Normal in size without masses or calcifications. No splenules. Kidneys:  Normal in size. Right kidney shows 2.7 cm cyst lateral midpole. No hydronephrosis, and no calculi bilaterally. Adrenals:  Normal. Small bowel:  Normal in caliber. Appendix:  Normal. Colon:  Normal in caliber. Diverticular change, sigmoid colon.  Scattered diverticula are identified within the ascending and transverse colon. Cecum through transverse colon fluid-filled. Loss of haustration, transverse and descending colon. No pericolonic fat stranding. No pericolonic fluid collections. Peritoneum:  No ascites, free air, or fluid collections. Vessels: Aorta normal in course and caliber. Lymph nodes:  Retroperitoneal:  No enlarged retroperitoneal lymph nodes. Mesenteric:  No enlarged mesenteric lymph nodes. Pelvic: No enlarged pelvic lymph nodes. Ureters: Normal in course and caliber. No calcifications. Bladder: No wall thickening. Reproductive organs: No pelvic masses. Abdominal Wall: 1.6 cm fat-containing periumbilical anterior abdominal wall defect Bones:  No bone lesions. Diffuse disc space narrowing L2-L3 through L5-S1, greatest at L5-S1. 3 mm anterolisthesis, L4 on L5. Small anterior osteophytes L2-L5. No post operative changes. Bibasilar peripheral groundglass opacity. These findings may be seen in patients with covid 19 pneumonia. However, other infectious and inflammatory etiologies may present with similar appearance. Colonic diverticulosis, greatest in sigmoid colon. Fluid-filled ascending and transverse colon, may reflect diarrheal state. Loss colonic haustration. Finding nonspecific, may be seen in variety of inflammatory or infectious etiologies. Mild/moderate hiatal hernia. All CT scans at this facility use dose modulation, iterative reconstruction, and/or weight based dosing when appropriate to reduce radiation dose to as low as reasonably achievable. XR CHEST PORTABLE    Result Date: 9/3/2021  EXAMINATION:  CHEST RADIOGRAPH (PORTABLE SINGLE VIEW AP) Exam Date/Time:  9/3/2021 3:10 PM Clinical History:   covid 19 sympt Comparison:  None available  RESULT: Lines, tubes, and devices:  None. Lungs and pleura:  Patchy left mid lung zone peripheral opacities. No pneumothorax. No pleural effusion. Low lung volumes.  Cardiomediastinal silhouette:  Normal cardiomediastinal silhouette. Other:   No acute osseous abnormality. Patchy opacity in the left lung suspicious for an infectious process such as pneumonia. Assessment//Plan           Hospital Problems         Last Modified POA    COVID-19 9/4/2021 Yes        Assessment & Plan    9/4: Patient was seen and evaluated. Follow-up diarrhea stool studies. No diarrhea since admission. No overnight events. Anticipate discharge tomorrow. Spoke with nursing. 9/5: Stool positive for C. difficile, reduce Synthroid for abnormal TSH, no overnight events. Has diarrhea. Anticipate discharge tomorrow.   Electronically signed by Tone Trevino MD on 9/4/21 at 11:55 AM EDT

## 2021-09-05 NOTE — PROGRESS NOTES
Physician Progress Note      Alex Lynn  CSN #:                  [id number]  :                       1950  ADMIT DATE:       9/3/2021 10:34 PM  [address] [place] DATE:  RESPONDING  PROVIDER #:        Dorene Padron MD          QUERY TEXT:    Pt admitted with ***. Pt noted to have ***. If possible, please document in   the progress notes and discharge summary if you are evaluating and/or treating   any of the following: The medical record reflects the following:  Risk Factors: COVID-19 infection  Clinical Indicators: cough, SOB, WBC 19.4, D-dimer 2.97; CXR:  Patchy opacity   in the left lung suspicious for an infectious process such as pneumonia; CTA   chest - Bibasilar peripheral groundglass opacity. These findings may be seen   in patients with covid 19 pneumonia. Treatment: COVID protocols, Isolation, tele, CXR, CTA chest, labs and   monitoring    Thank you,  Real Guillen, RN BSN CDS  Options provided:  -- pneumonia due to COVID-19 infection  -- secondary bacterial pneumonia due to COVID-19 infection  -- Pneumonia ruled out  -- Other - I will add my own diagnosis  -- Disagree - Not applicable / Not valid  -- Disagree - Clinically unable to determine / Unknown  -- Refer to Clinical Documentation Reviewer    PROVIDER RESPONSE TEXT:    No Pneumonia present on admission.     Query created by: Juma Rico on 2021 8:58 AM      Electronically signed by:  Dorene Padron MD 2021 7:52 AM

## 2021-09-06 VITALS
WEIGHT: 172 LBS | OXYGEN SATURATION: 96 % | TEMPERATURE: 98.1 F | DIASTOLIC BLOOD PRESSURE: 56 MMHG | BODY MASS INDEX: 27.64 KG/M2 | RESPIRATION RATE: 16 BRPM | HEART RATE: 72 BPM | HEIGHT: 66 IN | SYSTOLIC BLOOD PRESSURE: 135 MMHG

## 2021-09-06 LAB
ALBUMIN SERPL-MCNC: 3.5 G/DL (ref 3.5–4.6)
ALP BLD-CCNC: 69 U/L (ref 40–130)
ALT SERPL-CCNC: 25 U/L (ref 0–33)
ANION GAP SERPL CALCULATED.3IONS-SCNC: 9 MEQ/L (ref 9–15)
AST SERPL-CCNC: 22 U/L (ref 0–35)
BASOPHILS ABSOLUTE: 0.1 K/UL (ref 0–0.2)
BASOPHILS RELATIVE PERCENT: 0.7 %
BILIRUB SERPL-MCNC: <0.2 MG/DL (ref 0.2–0.7)
BUN BLDV-MCNC: 9 MG/DL (ref 8–23)
C-REACTIVE PROTEIN: 49.5 MG/L (ref 0–5)
CALCIUM SERPL-MCNC: 9 MG/DL (ref 8.5–9.9)
CHLORIDE BLD-SCNC: 105 MEQ/L (ref 95–107)
CO2: 27 MEQ/L (ref 20–31)
CREAT SERPL-MCNC: 0.84 MG/DL (ref 0.5–0.9)
D DIMER: 2.63 MG/L FEU (ref 0–0.5)
EOSINOPHILS ABSOLUTE: 0.2 K/UL (ref 0–0.7)
EOSINOPHILS RELATIVE PERCENT: 2.6 %
GFR AFRICAN AMERICAN: >60
GFR NON-AFRICAN AMERICAN: >60
GLOBULIN: 2.6 G/DL (ref 2.3–3.5)
GLUCOSE BLD-MCNC: 85 MG/DL (ref 70–99)
HCT VFR BLD CALC: 33.8 % (ref 37–47)
HEMOGLOBIN: 11.7 G/DL (ref 12–16)
L. PNEUMOPHILA SEROGP 1 UR AG: NEGATIVE
LYMPHOCYTES ABSOLUTE: 1.4 K/UL (ref 1–4.8)
LYMPHOCYTES RELATIVE PERCENT: 16.1 %
MCH RBC QN AUTO: 30.2 PG (ref 27–31.3)
MCHC RBC AUTO-ENTMCNC: 34.5 % (ref 33–37)
MCV RBC AUTO: 87.6 FL (ref 82–100)
MONOCYTES ABSOLUTE: 0.9 K/UL (ref 0.2–0.8)
MONOCYTES RELATIVE PERCENT: 10.9 %
NEUTROPHILS ABSOLUTE: 5.9 K/UL (ref 1.4–6.5)
NEUTROPHILS RELATIVE PERCENT: 69.7 %
PDW BLD-RTO: 13.3 % (ref 11.5–14.5)
PLATELET # BLD: 415 K/UL (ref 130–400)
POTASSIUM SERPL-SCNC: 4.9 MEQ/L (ref 3.4–4.9)
PROCALCITONIN: 0.07 NG/ML (ref 0–0.15)
RBC # BLD: 3.86 M/UL (ref 4.2–5.4)
SODIUM BLD-SCNC: 141 MEQ/L (ref 135–144)
TOTAL PROTEIN: 6.1 G/DL (ref 6.3–8)
WBC # BLD: 8.5 K/UL (ref 4.8–10.8)

## 2021-09-06 PROCEDURE — 86140 C-REACTIVE PROTEIN: CPT

## 2021-09-06 PROCEDURE — 6370000000 HC RX 637 (ALT 250 FOR IP): Performed by: INTERNAL MEDICINE

## 2021-09-06 PROCEDURE — 36415 COLL VENOUS BLD VENIPUNCTURE: CPT

## 2021-09-06 PROCEDURE — G0378 HOSPITAL OBSERVATION PER HR: HCPCS

## 2021-09-06 PROCEDURE — 85025 COMPLETE CBC W/AUTO DIFF WBC: CPT

## 2021-09-06 PROCEDURE — 6360000002 HC RX W HCPCS: Performed by: NURSE PRACTITIONER

## 2021-09-06 PROCEDURE — 96372 THER/PROPH/DIAG INJ SC/IM: CPT

## 2021-09-06 PROCEDURE — 80053 COMPREHEN METABOLIC PANEL: CPT

## 2021-09-06 PROCEDURE — 85379 FIBRIN DEGRADATION QUANT: CPT

## 2021-09-06 PROCEDURE — 96374 THER/PROPH/DIAG INJ IV PUSH: CPT

## 2021-09-06 PROCEDURE — 84145 PROCALCITONIN (PCT): CPT

## 2021-09-06 PROCEDURE — 6360000002 HC RX W HCPCS: Performed by: INTERNAL MEDICINE

## 2021-09-06 RX ORDER — VANCOMYCIN HYDROCHLORIDE 250 MG/1
250 CAPSULE ORAL 4 TIMES DAILY
Qty: 40 CAPSULE | Refills: 0 | Status: SHIPPED | OUTPATIENT
Start: 2021-09-06 | End: 2021-09-16

## 2021-09-06 RX ORDER — LEVOTHYROXINE SODIUM 0.05 MG/1
50 TABLET ORAL DAILY
Qty: 30 TABLET | Refills: 3 | Status: SHIPPED | OUTPATIENT
Start: 2021-09-06 | End: 2022-03-14 | Stop reason: SDUPTHER

## 2021-09-06 RX ORDER — HYDRALAZINE HYDROCHLORIDE 20 MG/ML
10 INJECTION INTRAMUSCULAR; INTRAVENOUS EVERY 6 HOURS PRN
Status: DISCONTINUED | OUTPATIENT
Start: 2021-09-06 | End: 2021-09-06 | Stop reason: HOSPADM

## 2021-09-06 RX ADMIN — CITALOPRAM HYDROBROMIDE 20 MG: 20 TABLET ORAL at 09:42

## 2021-09-06 RX ADMIN — HYDRALAZINE HYDROCHLORIDE 10 MG: 20 INJECTION INTRAMUSCULAR; INTRAVENOUS at 06:19

## 2021-09-06 RX ADMIN — Medication 250 MG: at 06:15

## 2021-09-06 RX ADMIN — ENOXAPARIN SODIUM 40 MG: 40 INJECTION SUBCUTANEOUS at 09:43

## 2021-09-06 RX ADMIN — Medication 250 MG: at 00:02

## 2021-09-06 NOTE — DISCHARGE SUMMARY
Discharge Summary    Date: 9/6/2021  Patient Name: Ruth Skaggs YOB: 1950 Age: 65 y.o. Admit Date: 9/3/2021  Discharge Date: 9/6/2021  Discharge Condition: Edgard Napoles    Admission Diagnosis  COVID-19 (U07.1)     Discharge Diagnosis  Active Problems: COVID-19Resolved Problems: * No resolved hospital problems. [facility] Stay  Narrative of Hospital Course:  Patient was recently finished antibiotics for sinus infection, comes in with diarrhea found to have C. difficile. Patient started on p.o. vancomycin. Resolved diarrhea. White count is improving. Stools are more formed now compared to before. Patient will be discharged home. Patient already got a vaccinated for Will    Consultants:  None    Surgeries/procedures Performed:       Treatments:    IV Hydration and Antibiotics    Vancomycin    Discharge Plan/Disposition:  Home    Hospital/Incidental Findings Requiring Follow Up:    Patient Instructions:    Diet: Regular Diet    Activity:Activity as Tolerated  For number of days (if applicable): Other Instructions:    Provider Follow-Up:   No follow-ups on file. Significant Diagnostic Studies:    Recent Labs:  Admission on 09/03/2021C. difficile toxin Molecular                  Date: 09/04/2021Value:  *            Status: Final                 Value:POSITIVE  for  Clostridia Difficile A/BCONTACT PRECAUTIONS INDICATEDNormal Range:  NegativeGI Bacterial Pathogens By PCR                 Date: 09/04/2021Value:               Status: Final                 Value:DNA of organisms below NOT DETECTEDThe following organisms have been tested using nucleic acidtesting technology. Campylobacter speciesSalmonella speciesShigella speciesVibrio speciesYersinia enterocoliticaShigatoxin 1 and 2 (STEC)NorovirusRotavirusNormal Range: Not DetectedWBC                                           Date: 09/04/2021Value: 16.7*       Ref range: 4.8 - 10.8 K/uL    Status: Earnstine Shaper Date: 09/04/2021Value: 3.46*       Ref range: 4.20 - 5.40 M/uL   Status: FinalHemoglobin                                    Date: 09/04/2021Value: 10.2*       Ref range: 12.0 - 16.0 g/dL   Status: FinalHematocrit                                    Date: 09/04/2021Value: 30.2*       Ref range: 37.0 - 47.0 %      Status: FinalMCV                                           Date: 09/04/2021Value: 87.3        Ref range: 82.0 - 100.0 fL    Status: [address]                                           Date: 09/04/2021Value: 29.6        Ref range: 27.0 - 31.3 pg     Status: [address]                                          Date: 09/04/2021Value: 33.9        Ref range: 33.0 - 37.0 %      Status: FinalRDW                                           Date: 09/04/2021Value: 13.4        Ref range: 11.5 - 14.5 %      Status: FinalPlatelets                                     Date: 09/04/2021Value: 355         Ref range: 130 - 400 K/uL     Status: FinalNeutrophils %                                 Date: 09/04/2021Value: 94.6        Ref range: %                  Status: FinalLymphocytes %                                 Date: 09/04/2021Value: 1.8         Ref range: %                  Status: FinalMonocytes %                                   Date: 09/04/2021Value: 3.5         Ref range: %                  Status: FinalEosinophils %                                 Date: 09/04/2021Value: 0.0         Ref range: %                  Status: FinalBasophils %                                   Date: 09/04/2021Value: 0.1         Ref range: %                  Status: FinalNeutrophils Absolute                          Date: 09/04/2021Value: 15.8*       Ref range: 1.4 - 6.5 K/uL     Status: FinalLymphocytes Absolute                          Date: 09/04/2021Value: 0.3*        Ref range: 1.0 - 4.8 K/uL     Status: FinalMonocytes Absolute                            Date: 09/04/2021Value: 0.6         Ref range: 0.2 - 0.8 K/uL     Status: FinalEosinophils Absolute                          Date: 09/04/2021Value: 0.0         Ref range: 0.0 - 0.7 K/uL     Status: FinalBasophils Absolute                            Date: 09/04/2021Value: 0.0         Ref range: 0.0 - 0.2 K/uL     Status: FinalSodium                                        Date: 09/04/2021Value: 137         Ref range: 135 - 144 mEq/L    Status: FinalPotassium                                     Date: 09/04/2021Value: 3.9         Ref range: 3.4 - 4.9 mEq/L    Status: FinalChloride                                      Date: 09/04/2021Value: 106         Ref range: 95 - 107 mEq/L     Status: FinalCO2                                           Date: 09/04/2021Value: 20          Ref range: 20 - 31 mEq/L      Status: FinalAnion Gap                                     Date: 09/04/2021Value: 11          Ref range: 9 - 15 mEq/L       Status: FinalGlucose                                       Date: 09/04/2021Value: 134*        Ref range: 70 - 99 mg/dL      Status: FinalBUN                                           Date: 09/04/2021Value: 11          Ref range: 8 - 23 mg/dL       Status: FinalCREATININE                                    Date: 09/04/2021Value: 0.72        Ref range: 0.50 - 0.90 mg/dL  Status: FinalGFR Non-                      Date: 09/04/2021Value: >60.0       Ref range: >60                Status: Final              Comment: >60 mL/min/1.73m2 EGFR, calc. for ages 25 and older using theMDRD formula (not corrected for weight), is valid for stablerenal function. GFR                           Date: 09/04/2021Value: >60.0       Ref range: >60                Status: Final              Comment: >60 mL/min/1.73m2 EGFR, calc. for ages 25 and older using theMDRD formula (not corrected for weight), is valid for stablerenal function. Calcium                                       Date: 09/04/2021Value: 8.3*        Ref range: 8.5 - 9.9 mg/dL    Status: FinalTotal Protein Date: 09/04/2021Value: 5.6*        Ref range: 6.3 - 8.0 g/dL     Status: FinalAlbumin                                       Date: 09/04/2021Value: 3.4*        Ref range: 3.5 - 4.6 g/dL     Status: FinalTotal Bilirubin                               Date: 09/04/2021Value: <0.2        Ref range: 0.2 - 0.7 mg/dL    Status: FinalAlkaline Phosphatase                          Date: 09/04/2021Value: 59          Ref range: 40 - 130 U/L       Status: FinalALT                                           Date: 09/04/2021Value: 28          Ref range: 0 - 33 U/L         Status: FinalAST                                           Date: 09/04/2021Value: 20          Ref range: 0 - 35 U/L         Status: FinalGlobulin                                      Date: 09/04/2021Value: 2.2*        Ref range: 2.3 - 3.5 g/dL     Status: FinalProcalcitonin                                 Date: 09/04/2021Value: 0.06        Ref range: 0.00 - 0.15 ng/mL  Status: Final              Comment: Suspected Sepsis:Low likelihood of sepsis  <.50 ng/mLIncreased likelihood of sepsis 0.50-2.00 ng/mLAntibiotics encouragedHigh risk of sepsis/shock   >2.00 ng/mLAntibiotics strongly encouragedSuspected Lower Respiratory Tract Infections:Low likelihood of bacterial infection  <0.24 ng/mLIncreased likelihood of bacterial infection >0.24 ng/mLAntibiotics encouragedWith successful antibiotic therapy, PCT levels should decreaserapidly. (Half-life of 24 to 36 hours. )Procalcitonin values from samples collected within the first6 hours of systemic infection may still be low. Retesting may be indicated. Values from day 1 and day 4 can be entered into the Change inProcalcitonin Calculator to determine the patient'sMortality Risk http://www.leal.info/. com)In healthy neonates, plasma Procalcitonin (PCT) concentrationsincrease gradually after birth, reaching peak values at about24 hours of age then decrease to normal values below 0.5 ng/mLby 48-72 hours of age. CRP                                           Date: 09/04/2021Value: 90.4*       Ref range: 0.0 - 5.0 mg/L     Status: FinalD-Dimer, Quant                                Date: 09/04/2021Value: 2.97*       Ref range: 0.00 - 0.50 mg/L*  Status: Final              Comment: VTE (DVT or PE) cut-off = 0.50 mg/L FEUL. pneumophila Serogp 1 Ur Ag                 Date: 09/04/2021Value: Negative    Ref range: Negative           Status: Final              Comment: Sample is negative for the presence of L. pneumophila serogroup 1antigen inurine, suggesting no recent or current infection. Legionnaires' Diseasecannot be ruled out since other serogroups and species may also causedisease. INTERPRETIVE INFORMATION: Legionella pneumophila Antigen, UrineThis assay detects Legionella pneumophila serogroup one (1) antigen. Performed By: Pawan Ricardo [id number] Director: Amy Nix                                           Date: 09/04/2021Value: 0.105*      Ref range: 0.440 - 3.860 uI*  Status: FinalC. diff Toxin/Antigen                          Date: 09/04/2021Value:               Status: Final                 Value: Indeterminate- see results of C. difficile amplification PCRNormal Range: NegativeWBC                                           Date: 09/05/2021Value: 12.1*       Ref range: 4.8 - 10.8 K/uL    Status: FinalRBC                                           Date: 09/05/2021Value: 3.53*       Ref range: 4.20 - 5.40 M/uL   Status: FinalHemoglobin                                    Date: 09/05/2021Value: 10.3*       Ref range: 12.0 - 16.0 g/dL   Status: FinalHematocrit                                    Date: 09/05/2021Value: 30.9*       Ref range: 37.0 - 47.0 %      Status: FinalMCV                                           Date: 09/05/2021Value: 87.6        Ref range: 82.0 - 100.0 fL    [facility]                                           Date: 09/05/2021Value: 29.1        Ref range: 27.0 - 31.3 pg     Status: [address]                                          Date: 09/05/2021Value: 33.2        Ref range: 33.0 - 37.0 %      Status: FinalRDW                                           Date: 09/05/2021Value: 13.2        Ref range: 11.5 - 14.5 %      Status: FinalPlatelets                                     Date: 09/05/2021Value: 392         Ref range: 130 - 400 K/uL     Status: FinalNeutrophils %                                 Date: 09/05/2021Value: 82.7        Ref range: %                  Status: FinalLymphocytes %                                 Date: 09/05/2021Value: 7.5         Ref range: %                  Status: FinalMonocytes %                                   Date: 09/05/2021Value: 8.8         Ref range: %                  Status: FinalEosinophils %                                 Date: 09/05/2021Value: 0.6         Ref range: %                  Status: FinalBasophils %                                   Date: 09/05/2021Value: 0.4         Ref range: %                  Status: FinalNeutrophils Absolute                          Date: 09/05/2021Value: 10.0*       Ref range: 1.4 - 6.5 K/uL     Status: FinalLymphocytes Absolute                          Date: 09/05/2021Value: 0.9*        Ref range: 1.0 - 4.8 K/uL     Status: FinalMonocytes Absolute                            Date: 09/05/2021Value: 1.1*        Ref range: 0.2 - 0.8 K/uL     Status: FinalEosinophils Absolute                          Date: 09/05/2021Value: 0.1         Ref range: 0.0 - 0.7 K/uL     Status: FinalBasophils Absolute                            Date: 09/05/2021Value: 0.0         Ref range: 0.0 - 0.2 K/uL     Status: FinalSodium                                        Date: 09/05/2021Value: 138         Ref range: 135 - 144 mEq/L    Status: FinalPotassium                                     Date: 09/05/2021Value: 3.9         Ref range: 3.4 - 4.9 mEq/L    Status: FinalChloride Date: 09/05/2021Value: 25          Ref range: 0 - 33 U/L         Status: FinalAST                                           Date: 09/05/2021Value: 20          Ref range: 0 - 35 U/L         Status: FinalGlobulin                                      Date: 09/05/2021Value: 2.7         Ref range: 2.3 - 3.5 g/dL     Status: FinalFibrinogen                                    Date: 09/05/2021Value: 496.0       Ref range: 235.0 - 507.0 mg*  Status: FinalProtime                                       Date: 09/05/2021Value: 13.9        Ref range: 12.3 - 14.9 sec    Status: FinalINR                                           Date: 09/05/2021Value: 1.1           Status: FinalaPTT                                          Date: 09/05/2021Value: 32.2        Ref range: 24.4 - 36.8 sec    Status: Final              Comment: Effective 11/4/2020:Heparin Therapeutic Range: 64.0 - 98.0 seconds. CRP                                           Date: 09/05/2021Value: 62.4*       Ref range: 0.0 - 5.0 mg/L     Status: FinalLD                                            Date: 09/05/2021Value: 173         Ref range: 135 - 214 U/L      Status: FinalFerritin                                      Date: 09/05/2021Value: 326.0*      Ref range: 13.0 - 150.0 ng/*  Status: FinalD-Dimer, Quant                                Date: 09/05/2021Value: 2.76*       Ref range: 0.00 - 0.50 mg/L*  Status: Final              Comment: VTE (DVT or PE) cut-off = 0.50 mg/L FEUTroponin                                      Date: 09/05/2021Value: <0.010      Ref range: 0.000 - 0.010 ng*  Status: Final              Comment: Methodology by Troponin T. Lactic Acid                                   Date: 09/05/2021Value: 0.6         Ref range: 0.5 - 2.2 mmol/L   Status: FinalVit D, 25-Hydroxy                             Date: 09/05/2021Value: 54.7        Ref range: 30.0 - 100.0 ng/*  Status: Final              Comment: ( ng/mL)  Optimum LevelThis assay accurately quantifies the sum of vitamin D3, 25-Hydroxy andvitamin D2, 25-Hyroxy. Magnesium                                     Date: 09/05/2021Value: 1.9         Ref range: 1.7 - 2.4 mg/dL    Status: [address] [facility]                                           Date: 09/06/2021Value: 8.5         Ref range: 4.8 - 10.8 K/uL    Status: FinalRBC                                           Date: 09/06/2021Value: 3.86*       Ref range: 4.20 - 5.40 M/uL   Status: FinalHemoglobin                                    Date: 09/06/2021Value: 11.7*       Ref range: 12.0 - 16.0 g/dL   Status: FinalHematocrit                                    Date: 09/06/2021Value: 33.8*       Ref range: 37.0 - 47.0 %      Status: FinalMCV                                           Date: 09/06/2021Value: 87.6        Ref range: 82.0 - 100.0 fL    Status: [address] [place]                                           Date: 09/06/2021Value: 30.2        Ref range: 27.0 - 31.3 pg     Status: [address]                                          Date: 09/06/2021Value: 34.5        Ref range: 33.0 - 37.0 %      Status: FinalRDW                                           Date: 09/06/2021Value: 13.3        Ref range: 11.5 - 14.5 %      Status: FinalPlatelets                                     Date: 09/06/2021Value: 415*        Ref range: 130 - 400 K/uL     Status: FinalNeutrophils %                                 Date: 09/06/2021Value: 69.7        Ref range: %                  Status: FinalLymphocytes %                                 Date: 09/06/2021Value: 16.1        Ref range: %                  Status: FinalMonocytes %                                   Date: 09/06/2021Value: 10.9        Ref range: %                  Status: FinalEosinophils %                                 Date: 09/06/2021Value: 2.6         Ref range: %                  Status: FinalBasophils %                                   Date: 09/06/2021Value: 0.7         Ref range: %                  Status: FinalNeutrophils Absolute                          Date: 09/06/2021Value: 5.9         Ref range: 1.4 - 6.5 K/uL     Status: FinalLymphocytes Absolute                          Date: 09/06/2021Value: 1.4         Ref range: 1.0 - 4.8 K/uL     Status: FinalMonocytes Absolute                            Date: 09/06/2021Value: 0.9*        Ref range: 0.2 - 0.8 K/uL     Status: FinalEosinophils Absolute                          Date: 09/06/2021Value: 0.2         Ref range: 0.0 - 0.7 K/uL     Status: FinalBasophils Absolute                            Date: 09/06/2021Value: 0.1         Ref range: 0.0 - 0.2 K/uL     Status: FinalSodium                                        Date: 09/06/2021Value: 141         Ref range: 135 - 144 mEq/L    Status: FinalPotassium                                     Date: 09/06/2021Value: 4.9         Ref range: 3.4 - 4.9 mEq/L    Status: FinalChloride                                      Date: 09/06/2021Value: 105         Ref range: 95 - 107 mEq/L     Status: FinalCO2                                           Date: 09/06/2021Value: 27          Ref range: 20 - 31 mEq/L      Status: FinalAnion Gap                                     Date: 09/06/2021Value: 9           Ref range: 9 - 15 mEq/L       Status: FinalGlucose                                       Date: 09/06/2021Value: 85          Ref range: 70 - 99 mg/dL      Status: FinalBUN                                           Date: 09/06/2021Value: 9           Ref range: 8 - 23 mg/dL       Status: FinalCREATININE                                    Date: 09/06/2021Value: 0.84        Ref range: 0.50 - 0.90 mg/dL  Status: FinalGFR Non-                      Date: 09/06/2021Value: >60.0       Ref range: >60                Status: Final              Comment: >60 mL/min/1.73m2 EGFR, calc. for ages 25 and older using theMDRD formula (not corrected for weight), is valid for stablerenal function. GFR                           Date: 09/06/2021Value: >60.0       Ref range: >60                Status: Final              Comment: >60 mL/min/1.73m2 EGFR, calc. for ages 25 and older using theMDRD formula (not corrected for weight), is valid for stablerenal function. Calcium                                       Date: 09/06/2021Value: 9.0         Ref range: 8.5 - 9.9 mg/dL    Status: FinalTotal Protein                                 Date: 09/06/2021Value: 6.1*        Ref range: 6.3 - 8.0 g/dL     Status: FinalAlbumin                                       Date: 09/06/2021Value: 3.5         Ref range: 3.5 - 4.6 g/dL     Status: FinalTotal Bilirubin                               Date: 09/06/2021Value: <0.2        Ref range: 0.2 - 0.7 mg/dL    Status: FinalAlkaline Phosphatase                          Date: 09/06/2021Value: 69          Ref range: 40 - 130 U/L       Status: FinalALT                                           Date: 09/06/2021Value: 25          Ref range: 0 - 33 U/L         Status: FinalAST                                           Date: 09/06/2021Value: 22          Ref range: 0 - 35 U/L         Status: FinalGlobulin                                      Date: 09/06/2021Value: 2.6         Ref range: 2.3 - 3.5 g/dL     Status: FinalCRP                                           Date: 09/06/2021Value: 49.5*       Ref range: 0.0 - 5.0 mg/L     Status: FinalD-Dimer, Quant                                Date: 09/06/2021Value: 2.63*       Ref range: 0.00 - 0.50 mg/L*  Status: Final              Comment: VTE (DVT or PE) cut-off = 0.50 mg/L FEUProcalcitonin                                 Date: 09/06/2021Value: 0.07        Ref range: 0.00 - 0.15 ng/mL  Status: Final              Comment: Suspected Sepsis:Low likelihood of sepsis  <.50 ng/mLIncreased likelihood of sepsis 0.50-2.00 ng/mLAntibiotics encouragedHigh risk of sepsis/shock   >2.00 ng/mLAntibiotics strongly encouragedSuspected Lower Respiratory Tract Infections:Low likelihood of bacterial infection  <0.24 ng/mLIncreased likelihood of bacterial infection >0.24 ng/mLAntibiotics encouragedWith successful antibiotic therapy, PCT levels should decreaserapidly. (Half-life of 24 to 36 hours. )Procalcitonin values from samples collected within the first6 hours of systemic infection may still be low. Retesting may be indicated. Values from day 1 and day 4 can be entered into the Change inProcalcitonin Calculator to determine the patient'sMortality Risk http://www.leal.info/. com)In healthy neonates, plasma Procalcitonin (PCT) concentrationsincrease gradually after birth, reaching peak values at about24 hours of age then decrease to normal values below 0.5                        ng/mLby 48-72 hours of age.------------    Radiology last 7 days:  CT ABDOMEN PELVIS WO CONTRAST Additional Contrast? NoneResult Date: 9/3/2021Bibasilar peripheral groundglass opacity. These findings may be seen in patients with covid 19 pneumonia. However, other infectious and inflammatory etiologies may present with similar appearance. Colonic diverticulosis, greatest in sigmoid colon. Fluid-filled ascending and transverse colon, may reflect diarrheal state. Loss colonic haustration. Finding nonspecific, may be seen in variety of inflammatory or infectious etiologies. Mild/moderate hiatal hernia. All CT scans at this facility use dose modulation, iterative reconstruction, and/or weight based dosing when appropriate to reduce radiation dose to as low as reasonably achievable. XR CHEST PORTABLEResult Date: 9/3/2021Patchy opacity in the left lung suspicious for an infectious process such as pneumonia.      [unfilled]    Discharge Medications    Current Discharge Medication ListSTART taking these medicationsvancomycin (VANCOCIN) 250 MG capsuleTake 1 capsule by mouth 4 times daily for 10 daysQty: 40 capsule Refills: 0    Current Discharge Medication ListCONTINUE these medications which have CHANGEDlevothyroxine (SYNTHROID) 50 MCG tabletTake 1 tablet by mouth DailyQty: 30 tablet Refills: 3    Current Discharge Medication ListCONTINUE these medications which have NOT CHANGEDpravastatin (PRAVACHOL) 20 MG tabletTake 1 tablet by mouth dailyQty: 90 tablet Refills: 1Associated Diagnoses:Mixed hyperlipidemiacitalopram (CELEXA) 20 MG tabletTake 1 tablet by mouth dailyQty: 90 tablet Refills: 1Associated Diagnoses:Major depressive disorder, recurrent episode, moderate ([facility] [address]! ! UNABLE TO FINDTake 1 tablet by mouth daily Castro colon healthKrill Oil 500 MG CAPSTake 1 capsule by mouth daily!! UNABLE TO FINDTake 1 capsule by mouth daily Tumeric root extractPediatric Multivitamins-Fl (MULTI VIT/FL PO)Take 1 capsule by mouth dailyCholecalciferol 50 MCG (2000 UT) CAPSTake by mouth dailySELENIUM SULFIDE2. 5 % by Other route twice a week. SHAMPOO ! ! - Potential duplicate medications found. Please discuss with provider. Current Discharge Medication ListSTOP taking these medicationsamoxicillin-clavulanate (AUGMENTIN) 875-125 MG per tabletComments:Reason for Stopping:    Time Spent on Discharge:E] minutes were spent in patient examination, evaluation, counseling as well as medication reconciliation, prescriptions for required medications, discharge plan, and follow up.     Electronically signed by Mahsa Calvert MD on 9/6/21 at 10:44 AM EDT   overtime on dc summary was 45 min

## 2021-09-07 ENCOUNTER — CARE COORDINATION (OUTPATIENT)
Dept: CASE MANAGEMENT | Age: 71
End: 2021-09-07

## 2021-09-07 DIAGNOSIS — U07.1 COVID-19: Primary | ICD-10-CM

## 2021-09-07 PROCEDURE — 1111F DSCHRG MED/CURRENT MED MERGE: CPT | Performed by: PHYSICIAN ASSISTANT

## 2021-09-07 NOTE — CARE COORDINATION
[facility] Transitions Initial Follow Up Call    Call within 2 business days of discharge: Yes    Patient: Gloria Jacinto Patient : 1950   MRN: [id number]  Reason for Admission: 9/3/2021 - 2021 CV-19 PN, Sinus infection, C-difficile. Discharge Date: 21 RARS: Readmission Risk Score: 10  NR  CV-19    Last Discharge [facility]       Complaint Diagnosis Description Type Department Provider    9/3/21   Admission (Discharged) Lesley Chamorro MD    9/3/21 Concern For COVID-19; Generalized Body Aches; Diarrhea; Fatigue Pneumonia due to COVID-19 virus . .. ED (TRANSFER) [facility]  ED Francois Krishna MD           PCP VV  4:45    Transitions of Care Initial Call    Was this an external facility discharge? No Discharge Facility:     Challenges to be reviewed by the provider   Additional needs identified to be addressed with provider: No  none             Method of communication with provider : none      Advance Care Planning:   Does patient have an Advance Directive: reviewed and current. Was this a readmission? No  Patient stated reason for admission: Torie Jay reports loss of smell and decreased taste. Admits poor appetite. Has watery stools daily. No N/V. Has cough and stuffy nose. States generalized weakness. Has cane. Has/taking meds and on Vanco therapy. Enc good hydration and to attempt easy to digest bland bites as appetite develops, v/u. No home O2 or pulse oximeter. No fevers, facial pain, or hearing changes. No HHC or med needs. Patients top risk factors for readmission: medical condition-CV-19, C-dif    Care Transition Nurse (CTN) contacted the patient by telephone to perform post hospital discharge assessment. Verified name and  with patient as identifiers. Provided introduction to self, and explanation of the CTN role. CTN reviewed discharge instructions, medical action plan and red flags with patient who verbalized understanding.  Patient given an opportunity to ask questions and does not have any further questions or concerns at this time. Were discharge instructions available to patient? Yes. Reviewed appropriate site of care based on symptoms and resources available to patient including: When to call 911. The patient agrees to contact the PCP office for questions related to their healthcare. Medication reconciliation was performed with patient, who verbalizes understanding of administration of home medications. Advised obtaining a 90-day supply of all daily and as-needed medications. Covid Risk Education     Educated patient about risk for severe COVID-19 due to risk factors according to CDC guidelines. CTN reviewed discharge instructions, medical action plan and red flag symptoms with the patient who verbalized understanding. Discussed COVID vaccination status: Yes. Education provided on COVID-19 vaccination as appropriate. Discussed exposure protocols and quarantine with CDC Guidelines. Patient was given an opportunity to verbalize any questions and concerns and agrees to contact CTN or health care provider for questions related to their healthcare. Reviewed and educated patient on any new and changed medications related to discharge diagnosis. Was patient discharged with a pulse oximeter? No Discussed and confirmed pulse oximeter discharge instructions and when to notify provider or seek emergency care. CTN provided contact information. Plan for follow-up call in 3-5 days based on severity of symptoms and risk factors.   Plan for next call: symptom management-CV-19, C-dif    Care Transitions 24 Hour Call    Schedule Follow Up Appointment with PCP: Completed  Do you have any ongoing symptoms?: Yes  Patient-reported symptoms: Cough, Other (Comment: Poor appetite, Loss of smell and decreased taste, Stuffy nose, Watery stools.)  Do you have a copy of your discharge instructions?: Yes  Do you have all of your prescriptions and are they filled?: Yes  Have you scheduled your follow up appointment?: Yes  Were you discharged with any Home Care or Post Acute Services: No  Do you feel like you have everything you need to keep you well at home?: Yes  Care Transitions Interventions         Follow Up  Future Appointments   Date Time Provider Taye Shelley   11/15/2021 10:00 AM Yaakov Paulson, [facility], [address] [place] [address]

## 2021-09-09 LAB
BLOOD CULTURE, ROUTINE: NORMAL
CULTURE, BLOOD 2: NORMAL

## 2021-09-10 ENCOUNTER — CARE COORDINATION (OUTPATIENT)
Dept: CASE MANAGEMENT | Age: 71
End: 2021-09-10

## 2021-09-10 NOTE — CARE COORDINATION
[facility] Transitions Follow Up Call    9/10/2021    Patient: Lilibeth Dangelo  Patient : 1950   MRN: [id number]  Reason for Admission: 9/3/2021 - 2021 CV-19 PN, Sinus infection, C-difficile. Discharge Date: 21 RARS: Readmission Risk Score: 10  NR  CV-19     Care Transitions Follow Up Call    Needs to be reviewed by the provider   Additional needs identified to be addressed with provider: No  none             Method of communication with provider : none      Care Transition Nurse (CTN) contacted the patient by telephone to follow up after admission. Verified name and  with patient as identifiers. Addressed changes since last contact: Reports sinus congestion/pressure. Shares she has had sinus surgery in past and had h/o sinus fungal infection. Strongly advised to fu w/ ENT post quarantine for evaluation, v/u. Advised CV-19 can progress sinus problems, v/u. No fevers or chills. Stools have improved to 2 x daily and are now soft to formed. States improvement in cough and SOB. Has less fatigue but still rests often. No home or med needs. Discussed follow-up appointments. If no appointment was previously scheduled, appointment scheduling offered:  4:45 PCP. Is follow up appointment scheduled within 7 days of discharge? pending. CTN reviewed discharge instructions, medical action plan and red flags with patient and discussed any barriers to care and/or understanding of plan of care after discharge. Discussed appropriate site of care based on symptoms and resources available to patient including: When to call 911. The patient agrees to contact the PCP office for questions related to their healthcare. Patients top risk factors for readmission: CV-19, Sinusitis. Interventions to address risk factors: Reviewed s/s sinus infection to fu w/ ENT, v/u.      Non-[facility] follow up appointment(s):     CTN provided contact information for future needs.  No further follow-up call indicated based on severity of symptoms and risk factors. Plan for next call: symptom management-Hosp FU    Care Transitions Subsequent and Final Call    Subsequent and Final Calls  Do you have any ongoing symptoms?: Yes  Patient-reported symptoms: Other, Cough, Shortness of Breath  Have your medications changed?: No  Do you have any questions related to your medications?: No  Do you currently have any active services?: No  Do you have any needs or concerns that I can assist you with?: No  Identified Barriers: Lack of Education  Care Transitions Interventions  Other Interventions:            Follow Up  Future Appointments   Date Time Provider Taye Shelley   9/13/2021  4:45 PM OMAR [facility] Kendra   11/15/2021 10:00 AM MOAR [facility]

## 2021-09-13 ENCOUNTER — VIRTUAL VISIT (OUTPATIENT)
Dept: INTERNAL MEDICINE | Age: 71
End: 2021-09-13
Payer: MEDICARE

## 2021-09-13 DIAGNOSIS — U07.1 PNEUMONIA DUE TO 2019 NOVEL CORONAVIRUS: Primary | ICD-10-CM

## 2021-09-13 DIAGNOSIS — J12.82 PNEUMONIA DUE TO 2019 NOVEL CORONAVIRUS: Primary | ICD-10-CM

## 2021-09-13 DIAGNOSIS — Z12.31 ENCOUNTER FOR SCREENING MAMMOGRAM FOR MALIGNANT NEOPLASM OF BREAST: ICD-10-CM

## 2021-09-13 PROCEDURE — 99442 PR PHYS/QHP TELEPHONE EVALUATION 11-20 MIN: CPT | Performed by: PHYSICIAN ASSISTANT

## 2021-09-13 ASSESSMENT — ENCOUNTER SYMPTOMS
GASTROINTESTINAL NEGATIVE: 1
RESPIRATORY NEGATIVE: 1

## 2021-09-13 NOTE — PROGRESS NOTES
Brooklyn Stephens (: 1950) is a 65 y.o. female, Established patient, here for evaluation of the following chief complaint(s):  Follow-Up from Hospital (9/3/21 covid pneumonia. while there got c-diff)        ASSESSMENT/PLAN:  1. Pneumonia due to 2019 novel coronavirus  - feeling much better, will repeat CXR in one week  - pt understands to go back to ER of breathing issues occur   - XR CHEST STANDARD (2 VW); Future    2. Encounter for screening mammogram for malignant neoplasm of breast  - EDILMA DIGITAL SCREEN W OR WO CAD BILATERAL; Future      No follow-ups on file. SUBJECTIVE/OBJECTIVE:  HPI      Covid pneumonia , started   Got worse and went to the hospital 9/3  admited 9/3  Discharged    Was int  hospital , and got c dif   States no SOB, cough or fever   Abd pain is better    States stool is getting firmer    She is staying hydrated       Review of Systems   Constitutional: Negative. HENT: Negative. Respiratory: Negative. Cardiovascular: Negative. Gastrointestinal: Negative. Genitourinary: Negative. Neurological: Negative. Hematological: Negative. Psychiatric/Behavioral: Negative. No flowsheet data found. Physical Exam  Pulmonary:      Effort: Pulmonary effort is normal.   Neurological:      Mental Status: She is alert and oriented to person, place, and time. Psychiatric:         Mood and Affect: Mood normal.         Behavior: Behavior normal.         Thought Content: Thought content normal.         Judgment: Judgment normal.         There were no vitals filed for this visit. On this date 2021 I have spent 15 minutes reviewing previous notes, test results and face to face (virtual) with the patient discussing the diagnosis and importance of compliance with the treatment plan as well as documenting on the day of the visit.       Brooklyn Stephens is a 65 y.o. female being evaluated by a Virtual Visit (video visit) encounter to address concerns as mentioned above. A caregiver was present when appropriate. Due to this being a TeleHealth encounter (During YJOEH-92 public health emergency), evaluation of the following organ systems was limited: Vitals/Constitutional/EENT/Resp/CV/GI//MS/Neuro/Skin/Heme-Lymph-Imm. Pursuant to the emergency declaration under the [address] [place] [address], [address] [place] [address] and the [employer] Act, this Virtual Visit was conducted with patient's (and/or legal guardian's) consent, to reduce the patient's risk of exposure to COVID-19 and provide necessary medical care. The patient (and/or legal guardian) has also been advised to contact this office for worsening conditions or problems, and seek emergency medical treatment and/or call 911 if deemed necessary. Patient identification was verified at the start of the visit: Yes    Services were provided through a video synchronous discussion virtually to substitute for in-person clinic visit. Patient was located at home and provider was located in office or at home. An electronic signature was used to authenticate this note.     --OMAR Campbell

## 2021-09-15 ENCOUNTER — CARE COORDINATION (OUTPATIENT)
Dept: CASE MANAGEMENT | Age: 71
End: 2021-09-15

## 2021-09-20 ENCOUNTER — HOSPITAL ENCOUNTER (OUTPATIENT)
Age: 71
Discharge: HOME | End: 2021-09-22
Payer: MEDICARE

## 2021-09-20 ENCOUNTER — HOSPITAL ENCOUNTER (OUTPATIENT)
Dept: GENERAL RADIOLOGY | Age: 71
Discharge: HOME | End: 2021-09-22
Payer: MEDICARE

## 2021-09-20 DIAGNOSIS — U07.1 PNEUMONIA DUE TO 2019 NOVEL CORONAVIRUS: ICD-10-CM

## 2021-09-20 DIAGNOSIS — J12.82 PNEUMONIA DUE TO 2019 NOVEL CORONAVIRUS: ICD-10-CM

## 2021-09-20 PROCEDURE — 71046 X-RAY EXAM CHEST 2 VIEWS: CPT

## 2021-09-21 ENCOUNTER — HOSPITAL ENCOUNTER (OUTPATIENT)
Dept: WOMENS IMAGING | Age: 71
Discharge: HOME | End: 2021-09-23
Payer: MEDICARE

## 2021-09-21 ENCOUNTER — TELEPHONE (OUTPATIENT)
Dept: FAMILY MEDICINE CLINIC | Age: 71
End: 2021-09-21

## 2021-09-21 DIAGNOSIS — Z12.31 ENCOUNTER FOR SCREENING MAMMOGRAM FOR MALIGNANT NEOPLASM OF BREAST: ICD-10-CM

## 2021-09-21 PROCEDURE — 77063 BREAST TOMOSYNTHESIS BI: CPT

## 2021-09-21 NOTE — TELEPHONE ENCOUNTER
----- Message from Robert Samano sent at 9/20/2021  4:56 PM EDT -----  Subject: Message to Provider    QUESTIONS  Information for Provider? The patient is wanting the results for her x ray   she had done today. requesting call back as soon as possible.  ---------------------------------------------------------------------------  --------------  CALL BACK INFO  What is the best way for the office to contact you? OK to leave message on   voicemail  Preferred Call Back Phone Number? [phone]  ---------------------------------------------------------------------------  --------------  SCRIPT ANSWERS  Relationship to Patient?  Self

## 2021-09-22 ENCOUNTER — CARE COORDINATION (OUTPATIENT)
Dept: CASE MANAGEMENT | Age: 71
End: 2021-09-22

## 2021-09-22 NOTE — CARE COORDINATION
[facility] Transitions Follow Up Call    2021    Patient: Hawk Laboy  Patient : 1950   MRN: [id number]  Reason for Admission: 9/3/2021 - 2021 CV-19 PN, Sinus infection, C-difficile.   Discharge Date: 21 RARS: Readmission Risk Score: 10    Second subsequent CV-19 outreach attempt. Left hippa VM. CTN s/o. Care Transitions Subsequent and Final Call    Subsequent and Final Calls  Care Transitions Interventions  Other Interventions:            Follow Up  Future Appointments   Date Time Provider Taye Shelley   11/15/2021 10:00 AM Nils Osler, [facility]

## 2021-11-15 ENCOUNTER — OFFICE VISIT (OUTPATIENT)
Dept: INTERNAL MEDICINE | Age: 71
End: 2021-11-15
Payer: MEDICARE

## 2021-11-15 VITALS
SYSTOLIC BLOOD PRESSURE: 128 MMHG | HEIGHT: 64 IN | DIASTOLIC BLOOD PRESSURE: 66 MMHG | TEMPERATURE: 97.2 F | BODY MASS INDEX: 32.78 KG/M2 | OXYGEN SATURATION: 97 % | WEIGHT: 192 LBS | HEART RATE: 67 BPM

## 2021-11-15 DIAGNOSIS — M17.12 ARTHRITIS OF LEFT KNEE: ICD-10-CM

## 2021-11-15 DIAGNOSIS — E78.2 MIXED HYPERLIPIDEMIA: ICD-10-CM

## 2021-11-15 DIAGNOSIS — Z00.00 ROUTINE GENERAL MEDICAL EXAMINATION AT A HEALTH CARE FACILITY: Primary | ICD-10-CM

## 2021-11-15 DIAGNOSIS — E03.9 HYPOTHYROIDISM, UNSPECIFIED TYPE: ICD-10-CM

## 2021-11-15 DIAGNOSIS — F33.1 MAJOR DEPRESSIVE DISORDER, RECURRENT EPISODE, MODERATE (HCC): ICD-10-CM

## 2021-11-15 DIAGNOSIS — C54.1 ENDOMETRIAL CANCER (HCC): ICD-10-CM

## 2021-11-15 PROBLEM — Z85.42 HISTORY OF ENDOMETRIAL CANCER: Status: ACTIVE | Noted: 2018-05-25

## 2021-11-15 PROBLEM — Z85.42 HISTORY OF UTERINE CANCER: Status: ACTIVE | Noted: 2018-05-30

## 2021-11-15 LAB
T4 FREE: 1.28 NG/DL (ref 0.84–1.68)
TSH SERPL DL<=0.05 MIU/L-ACNC: 1.11 UIU/ML (ref 0.44–3.86)

## 2021-11-15 PROCEDURE — G0439 PPPS, SUBSEQ VISIT: HCPCS | Performed by: PHYSICIAN ASSISTANT

## 2021-11-15 RX ORDER — PRAVASTATIN SODIUM 20 MG
20 TABLET ORAL DAILY
Qty: 90 TABLET | Refills: 1 | Status: SHIPPED | OUTPATIENT
Start: 2021-11-15 | End: 2022-06-03

## 2021-11-15 RX ORDER — CITALOPRAM 20 MG/1
20 TABLET ORAL DAILY
Qty: 90 TABLET | Refills: 1 | Status: SHIPPED | OUTPATIENT
Start: 2021-11-15 | End: 2022-06-03

## 2021-11-15 RX ORDER — CITALOPRAM 10 MG/1
10 TABLET ORAL DAILY
Qty: 90 TABLET | Refills: 1 | Status: SHIPPED | OUTPATIENT
Start: 2021-11-15 | End: 2022-06-03

## 2021-11-15 ASSESSMENT — PATIENT HEALTH QUESTIONNAIRE - PHQ9
SUM OF ALL RESPONSES TO PHQ QUESTIONS 1-9: 11
1. LITTLE INTEREST OR PLEASURE IN DOING THINGS: 2
SUM OF ALL RESPONSES TO PHQ QUESTIONS 1-9: 11
10. IF YOU CHECKED OFF ANY PROBLEMS, HOW DIFFICULT HAVE THESE PROBLEMS MADE IT FOR YOU TO DO YOUR WORK, TAKE CARE OF THINGS AT HOME, OR GET ALONG WITH OTHER PEOPLE: 1
6. FEELING BAD ABOUT YOURSELF - OR THAT YOU ARE A FAILURE OR HAVE LET YOURSELF OR YOUR FAMILY DOWN: 1
2. FEELING DOWN, DEPRESSED OR HOPELESS: 1
3. TROUBLE FALLING OR STAYING ASLEEP: 1
4. FEELING TIRED OR HAVING LITTLE ENERGY: 3
8. MOVING OR SPEAKING SO SLOWLY THAT OTHER PEOPLE COULD HAVE NOTICED. OR THE OPPOSITE, BEING SO FIGETY OR RESTLESS THAT YOU HAVE BEEN MOVING AROUND A LOT MORE THAN USUAL: 1
SUM OF ALL RESPONSES TO PHQ QUESTIONS 1-9: 11
5. POOR APPETITE OR OVEREATING: 1
7. TROUBLE CONCENTRATING ON THINGS, SUCH AS READING THE NEWSPAPER OR WATCHING TELEVISION: 1
SUM OF ALL RESPONSES TO PHQ9 QUESTIONS 1 & 2: 3

## 2021-11-15 ASSESSMENT — COLUMBIA-SUICIDE SEVERITY RATING SCALE - C-SSRS
1. WITHIN THE PAST MONTH, HAVE YOU WISHED YOU WERE DEAD OR WISHED YOU COULD GO TO SLEEP AND NOT WAKE UP?: NO
6. HAVE YOU EVER DONE ANYTHING, STARTED TO DO ANYTHING, OR PREPARED TO DO ANYTHING TO END YOUR LIFE?: NO
2. HAVE YOU ACTUALLY HAD ANY THOUGHTS OF KILLING YOURSELF?: NO

## 2021-11-15 ASSESSMENT — LIFESTYLE VARIABLES: HOW OFTEN DO YOU HAVE A DRINK CONTAINING ALCOHOL: 0

## 2021-11-15 NOTE — PATIENT INSTRUCTIONS
Personalized Preventive Plan for Astrid Villegas - 11/15/2021  Medicare offers a range of preventive health benefits. Some of the tests and screenings are paid in full while other may be subject to a deductible, co-insurance, and/or copay. Some of these benefits include a comprehensive review of your medical history including lifestyle, illnesses that may run in your family, and various assessments and screenings as appropriate. After reviewing your medical record and screening and assessments performed today your provider may have ordered immunizations, labs, imaging, and/or referrals for you. A list of these orders (if applicable) as well as your Preventive Care list are included within your After Visit Summary for your review. Other Preventive Recommendations:    · A preventive eye exam performed by an eye specialist is recommended every 1-2 years to screen for glaucoma; cataracts, macular degeneration, and other eye disorders. · A preventive dental visit is recommended every 6 months. · Try to get at least 150 minutes of exercise per week or 10,000 steps per day on a pedometer . · Order or download the FREE \"Exercise & Physical Activity: Your Everyday Guide\" from [employer]. Call 4-392.449.8810 or search [employer] online. · You need 4318-0104 mg of calcium and 5884-2186 IU of vitamin D per day. It is possible to meet your calcium requirement with diet alone, but a vitamin D supplement is usually necessary to meet this goal.  · When exposed to the sun, use a sunscreen that protects against both UVA and UVB radiation with an SPF of 30 or greater. Reapply every 2 to 3 hours or after sweating, drying off with a towel, or swimming. · Always wear a seat belt when traveling in a car. Always wear a helmet when riding a bicycle or motorcycle.

## 2021-11-15 NOTE — PROGRESS NOTES
CareTeam (Including outside providers/suppliers regularly involved in providing care):   Patient Care Team:  OMAR Rivas as PCP - General (Physician Assistant)  OMAR Rivas as PCP - [facility]    Wt Readings from Last 3 Encounters:   11/15/21 192 lb (87.1 kg)   09/06/21 172 lb (78 kg)   09/03/21 190 lb (86.2 kg)     Vitals:    11/15/21 1038   BP: 128/66   Site: Right Upper Arm   Position: Sitting   Cuff Size: Large Adult   Pulse: 67   Temp: 97.2 °F (36.2 °C)   TempSrc: Temporal   SpO2: 97%   Weight: 192 lb (87.1 kg)   Height: 5' 4\" (1.626 m)     Body mass index is 32.96 kg/m². Based upon direct observation of the patient, evaluation of cognition reveals recent and remote memory intact.     General Appearance: alert and oriented to person, place and time, well developed and well- nourished, in no acute distress  Skin: warm and dry, no rash or erythema  Head: normocephalic and atraumatic  Eyes: pupils equal, round, and reactive to light, extraocular eye movements intact, conjunctivae normal  ENT: tympanic membrane, external ear and ear canal normal bilaterally, nose without deformity, nasal mucosa and turbinates normal without polyps  Neck: supple and non-tender without mass, no thyromegaly or thyroid nodules, no cervical lymphadenopathy  Pulmonary/Chest: clear to auscultation bilaterally- no wheezes, rales or rhonchi, normal air movement, no respiratory distress  Cardiovascular: normal rate, regular rhythm, normal S1 and S2, no murmurs, rubs, clicks, or gallops, distal pulses intact, no carotid bruits  Abdomen: soft, non-tender, non-distended, normal bowel sounds, no masses or organomegaly  Extremities: no cyanosis, clubbing or edema  Musculoskeletal: normal range of motion, no joint swelling, deformity or tenderness  Neurologic: reflexes normal and symmetric, no cranial nerve deficit, gait, coordination and speech normal    Patient's complete Health Risk Assessment and screening values have been reviewed and are found in Flowsheets. The following problems were reviewed today and where indicated follow up appointments were made and/or referrals ordered. Positive Risk Factor Screenings with Interventions:       Depression:  PHQ-2 Score: 3  PHQ-9 Total Score: 11    Severity:1-4 = minimal depression, 5-9 = mild depression, 10-14 = moderate depression, 15-19 = moderately severe depression, 20-27 = severe depression  Depression Interventions:  · on Celexa, does not feel like herself since covid    · Will increase Celexa to 30 mg         General Health and ACP:  General  In general, how would you say your health is?: Good  In the past 7 days, have you experienced any of the following?  New or Increased Pain, New or Increased Fatigue, Loneliness, Social Isolation, Stress or Anger?: (!) New or Increased Fatigue, Loneliness  Do you get the social and emotional support that you need?: Yes  Do you have a Living Will?: Yes  Advance Directives     Power of  Living Will ACP-Advance Directive ACP-Power of     Not on File Not on File Not on File Not on File      General Health Risk Interventions:  · Fatigue: since covid, will increase the Celexa dose     Health Habits/Nutrition:  Health Habits/Nutrition  Do you exercise for at least 20 minutes 2-3 times per week?: (!) No  Have you lost any weight without trying in the past 3 months?: (!) Yes  Do you eat only one meal per day?: No  Have you seen the dentist within the past year?: Appointment is scheduled  Body mass index: (!) 32.95  Health Habits/Nutrition Interventions:  · Inadequate physical activity:  educational materials provided to promote increased physical activity    Hearing/Vision:  No exam data present  Hearing/Vision  Do you or your family notice any trouble with your hearing that hasn't been managed with hearing aids?: No  Do you have difficulty driving, watching TV, or doing any of your daily activities because of your eyesight?: No  Have you had an eye exam within the past year?: (!) No  Hearing/Vision Interventions:  · Vision concerns:  patient encouraged to make appointment with his/her eye specialist    Safety:  Safety  Do you have working smoke detectors?: (!) No  Have all throw rugs been removed or fastened?: (!) No  Do you have non-slip mats or surfaces in all bathtubs/showers?: Yes  Do all of your stairways have a railing or banister?: Yes  Are your doorways, halls and stairs free of clutter?: Yes  Do you always fasten your seatbelt when you are in a car?: Yes  Safety Interventions:  · Home safety tips provided     Personalized Preventive Plan   Current Health Maintenance Status  Immunization History   Administered Date(s) Administered    COVID-19, Jauregui Peter, PF, 30mcg/0.3mL 03/05/2021, 03/26/2021    Influenza Virus Vaccine 12/06/2013, 11/09/2017    Influenza Whole 11/16/2007, 11/16/2007    Influenza, High Dose (Fluzone 65 yrs and older) 09/22/2015, 09/22/2015, 09/17/2016, 09/17/2016, 09/20/2017, 09/20/2017, 10/19/2018, 10/19/2018, 10/22/2019, 10/22/2019, 10/27/2021    Influenza, High-dose, Quadv, 65 yrs +, IM (Fluzone) 11/04/2020, 11/04/2020    Pneumococcal Conjugate 13-valent (Kailey Axe) 09/17/2016, 09/17/2016    Pneumococcal Polysaccharide ([id number]) 06/10/2014, 09/20/2017, 09/20/2017, 11/01/2017        Health Maintenance   Topic Date Due    DTaP/Tdap/Td vaccine (1 - Tdap) Never done    Shingles Vaccine (1 of 2) Never done    COVID-19 Vaccine (3 - Booster for Juventino Gilbert series) 09/26/2021    Annual Wellness Visit (AWV)  11/14/2021    Lipid screen  11/13/2021    TSH testing  11/15/2022    Breast cancer screen  09/21/2023    Colon cancer screen colonoscopy  10/16/2026    DEXA (modify frequency per FRAX score)  Completed    Flu vaccine  Completed    Pneumococcal 65+ years Vaccine  Completed    Hepatitis C screen  Completed    Hepatitis A vaccine  Aged Out    Hepatitis B vaccine  Aged Out    Hib vaccine  Aged Out    Meningococcal (ACWY) vaccine  Aged Out     Recommendations for [place] Due: see orders and patient instructions/AVS.  . Recommended screening schedule for the next 5-10 years is provided to the patient in written form: see Patient Instructions/AVS.    Holden Delgado was seen today for medicare aw. Diagnoses and all orders for this visit:    Routine general medical examination at a health care facility       -       Reviewed labs done  This year     Major depressive disorder, recurrent episode, moderate ([facility] [address]  -     Not controlled , will increase dose to celexa 30 mg daily    -     citalopram (CELEXA) 10 MG tablet; Take 1 tablet by mouth daily With the 20 mg tablet  -     citalopram (CELEXA) 20 MG tablet; Take 1 tablet by mouth daily    Endometrial cancer ([facility] [address]          -    Had hysterectomy    Mixed hyperlipidemia  -     pravastatin (PRAVACHOL) 20 MG tablet; Take 1 tablet by mouth daily    Hypothyroidism, unspecified type  -     TSH Without Reflex;  Future  -     T4, Free; Future    Arthritis of left knee  -     Ambulatory referral to Orthopedic Surgery

## 2021-12-20 DIAGNOSIS — M17.12 PRIMARY OSTEOARTHRITIS OF LEFT KNEE: Primary | ICD-10-CM

## 2021-12-21 ENCOUNTER — HOSPITAL ENCOUNTER (OUTPATIENT)
Dept: GENERAL RADIOLOGY | Age: 71
Discharge: HOME | End: 2021-12-23
Payer: MEDICARE

## 2021-12-21 ENCOUNTER — OFFICE VISIT (OUTPATIENT)
Dept: ORTHOPEDIC SURGERY | Age: 71
End: 2021-12-21
Payer: MEDICARE

## 2021-12-21 VITALS
WEIGHT: 192 LBS | HEIGHT: 64 IN | TEMPERATURE: 97 F | BODY MASS INDEX: 32.78 KG/M2 | OXYGEN SATURATION: 96 % | HEART RATE: 68 BPM

## 2021-12-21 DIAGNOSIS — M17.12 PRIMARY OSTEOARTHRITIS OF LEFT KNEE: ICD-10-CM

## 2021-12-21 DIAGNOSIS — M17.0 PRIMARY OSTEOARTHRITIS OF BOTH KNEES: ICD-10-CM

## 2021-12-21 DIAGNOSIS — M17.12 PRIMARY OSTEOARTHRITIS OF LEFT KNEE: Primary | ICD-10-CM

## 2021-12-21 PROCEDURE — 73564 X-RAY EXAM KNEE 4 OR MORE: CPT | Performed by: ORTHOPAEDIC SURGERY

## 2021-12-21 PROCEDURE — 99204 OFFICE O/P NEW MOD 45 MIN: CPT | Performed by: ORTHOPAEDIC SURGERY

## 2021-12-21 PROCEDURE — 73564 X-RAY EXAM KNEE 4 OR MORE: CPT

## 2021-12-21 PROCEDURE — 20610 DRAIN/INJ JOINT/BURSA W/O US: CPT | Performed by: ORTHOPAEDIC SURGERY

## 2021-12-21 RX ORDER — LIDOCAINE HYDROCHLORIDE 10 MG/ML
8 INJECTION, SOLUTION INFILTRATION; PERINEURAL ONCE
Status: COMPLETED | OUTPATIENT
Start: 2021-12-21 | End: 2021-12-21

## 2021-12-21 RX ORDER — CAYENNE 450 MG
CAPSULE ORAL
COMMUNITY
End: 2022-05-13 | Stop reason: ALTCHOICE

## 2021-12-21 RX ORDER — TRIAMCINOLONE ACETONIDE 40 MG/ML
80 INJECTION, SUSPENSION INTRA-ARTICULAR; INTRAMUSCULAR ONCE
Status: COMPLETED | OUTPATIENT
Start: 2021-12-21 | End: 2021-12-21

## 2021-12-21 RX ORDER — MELOXICAM 15 MG/1
15 TABLET ORAL DAILY
Qty: 30 TABLET | Refills: 1 | Status: SHIPPED | OUTPATIENT
Start: 2021-12-21 | End: 2022-01-20

## 2021-12-21 RX ADMIN — LIDOCAINE HYDROCHLORIDE 8 ML: 10 INJECTION, SOLUTION INFILTRATION; PERINEURAL at 14:09

## 2021-12-21 RX ADMIN — TRIAMCINOLONE ACETONIDE 80 MG: 40 INJECTION, SUSPENSION INTRA-ARTICULAR; INTRAMUSCULAR at 14:11

## 2021-12-21 ASSESSMENT — ENCOUNTER SYMPTOMS
SHORTNESS OF BREATH: 0
DIARRHEA: 0
ABDOMINAL PAIN: 0
EYE PAIN: 0
CONSTIPATION: 0
COUGH: 0
EYE ITCHING: 0
EYE DISCHARGE: 0

## 2021-12-21 NOTE — PROGRESS NOTES
This is a consult from [place], [place] for evaluation of the patient's left knee pain. Patient ID:  Charly Braun is a 66 y.o. female who presents today for evaluation of left knee pain. Injury: no  Metal Allergy: no    Location: left knee pain, located on the medial and lateral aspect of the knee  Pain: yes; 6 on a scale of 1 to 10  Onset: gradual  Duration: 4 months  Frequency:  occurs daily  Quality: aching and boring   Swelling: patient notes intermittent swelling of the joint  Aggravating factors: weight bearing activity, standing and walking  Alleviating factors: removing weight from leg and rest  Mechanical symptoms: none  Radiation: no    Activities: walking independently  Restriction:  decreased ambulatory tolerance  Progression:  worsening    Previous treatment:  anti-inflammatories and steroid injection   NSAIDs:  ibuprofen/motrin  PT:  none    Medications:    Current Outpatient Medications on File Prior to Visit   Medication Sig Dispense Refill    Probiotic Product (ACIDOPHILUS) CHEW       citalopram (CELEXA) 10 MG tablet Take 1 tablet by mouth daily With the 20 mg tablet 90 tablet 1    pravastatin (PRAVACHOL) 20 MG tablet Take 1 tablet by mouth daily 90 tablet 1    citalopram (CELEXA) 20 MG tablet Take 1 tablet by mouth daily 90 tablet 1    levothyroxine (SYNTHROID) 50 MCG tablet Take 1 tablet by mouth Daily 30 tablet 3    UNABLE TO FIND Take 1 tablet by mouth daily [facility]      Krill Oil 500 MG CAPS Take 1 capsule by mouth daily      UNABLE TO FIND Take 1 capsule by mouth daily Tumeric root extract      Pediatric Multivitamins-Fl (MULTI VIT/FL PO) Take 1 capsule by mouth daily      Cholecalciferol 50 MCG (2000 UT) CAPS Take by mouth daily       No current facility-administered medications on file prior to visit.        Allergies:    No Known Allergies    Past Medical History:    Past Medical History:   Diagnosis Date    Depression     Hyperlipidemia     Protruded lumbar disc     L4/L5    Stress     Vitamin D deficiency        Past Surgical History:    Past Surgical History:   Procedure Laterality Date    ANKLE SURGERY  2001   [address]  [address]    SINUS SURGERY  07-       Social History:    Social History     Socioeconomic History    Marital status:      Spouse name: Not on file    Number of children: Not on file    Years of education: Not on file    Highest education level: Not on file   Occupational History    Not on file   Tobacco Use    Smoking status: Never Smoker    Smokeless tobacco: Never Used   Substance and Sexual Activity    Alcohol use: No    Drug use: Never    Sexual activity: Not Currently     Partners: Male   Other Topics Concern    Not on file   Social History Narrative    Not on file     Social Determinants of Health     Financial Resource Strain: Low Risk     Difficulty of Paying Living Expenses: Not hard at all   Food Insecurity: No Food Insecurity    Worried About Running Out of Food in the Last Year: Never true    [address] in the Last Year: Never true   Transportation Needs:     Lack of Transportation (Medical): Not on file    Lack of Transportation (Non-Medical):  Not on file   Physical Activity:     Days of Exercise per Week: Not on file    Minutes of Exercise per Session: Not on file   Stress:     Feeling of Stress : Not on file   Social Connections:     Frequency of Communication with Friends and Family: Not on file    Frequency of Social Gatherings with Friends and Family: Not on file    Attends Zoroastrianism Services: Not on file    Active Member of Clubs or Organizations: Not on file    Attends Club or Organization Meetings: Not on file    Marital Status: Not on file   Intimate Partner Violence:     Fear of Current or Ex-Partner: Not on file    Emotionally Abused: Not on file    Physically Abused: Not on file    Sexually Abused: Not on file   Housing Stability:     Unable to Pay for Housing in the Last Year: Not on file    Number of Places Lived in the Last Year: Not on file    Unstable Housing in the Last Year: Not on file       Family History:    Family History   Problem Relation Age of Onset    Anemia Mother     Heart Disease Mother     Depression Mother     High Blood Pressure Father        Occupation:  retired   - Occupational requirements:  none    Workers Compensation:  Have you missed work for this issue?  no  Is this issue being addressed under a worker's comp claim? no    Review of Systems:    Review of Systems   Constitutional: Negative for activity change, appetite change and chills. HENT: Negative for congestion, ear pain and hearing loss. Eyes: Negative for pain, discharge and itching. Respiratory: Negative for cough and shortness of breath. Cardiovascular: Negative for chest pain and leg swelling. Gastrointestinal: Negative for abdominal pain, constipation and diarrhea. Endocrine: Negative for cold intolerance, heat intolerance and polydipsia. Genitourinary: Negative for difficulty urinating, flank pain and frequency. Skin: Negative for rash and wound. Allergic/Immunologic: Negative for environmental allergies and food allergies. Neurological: Negative for dizziness, seizures and syncope. She has not regained her sense of smell or taste since she had Covid around Labor Day. No acute illness. Physical Exam:    Examination of the left knee    Gait:  antalgic gait affecting left  Inspection:  neutral  Swelling:  none  Erythema:  none  Ecchymosis:  none  Effusion:  1+  Palpation:  distal medial femoral condyle, distal lateral femoral condyle and lateral joint line  Extension:  5  Flexion:  110  Strength:  5  Varus/Valgus Instability:  none  Anterior/Posterior Instability:  none  Malika:  negative  Thessaly:  negative  Modified Apley:  negative  Lachman:  negative  Patellar compression:  positive  Neurological/Vascular:  Sensation grossly intact.   Dorsalis pedis palpable and 2+    Radiographs:  Radiographs of the bilateral knee were personally reviewed, bilateral standing AP, bilateral notch, bilateral sunrise, lateral left knee and lateral right knee and they revealed:  no evidence of fracture and The patient has significant lateral compartment osteoarthritis of the left knee. She has marginal osteophytes. She has some patellofemoral osteophytes on the sunrise. She appears to be maintaining a medial joint line. On the right knee, the patient has some mild medial knee osteoarthritis. Also mild patellofemoral osteoarthritis. She is maintaining a lateral joint space. MRI: None    Diagnosis:   Diagnosis Orders   1. Primary osteoarthritis of left knee         Plan:    Patient has osteoarthritis of the left knee. Treatment options were discussed. She has had 2 steroid injections, and she has gotten about a years relief out of each 1. The last one was in the year 2000. So, we went over treatment options. Patient opted for an intra-articular steroid injection in the left knee as well as meloxicam 15 mg once a day. We discussed arthritis and its progressive nature. She will follow-up as needed. Time Out  [x] Patient Verified  [x] Site Verified  [x] Laterality Verified  [x] Procedure Verified    Before aspiration/injection risks/benefits of a cortisone injection including infection, local skin irritation, skin atrophy, continued pain/discomfort, elevated blood sugar, burning, failure to relieve pain, possible late infection were discussed with patient. Patient verbalized understanding and wanted to proceed with planned procedure. After prepping and draping the left knee in the usual sterile fashion, 2 cc of 40 mg/ml kenalog with 8 cc of 1% lidocaine were injected intra-articularly without any complications. Patient tolerated this well.     Post-procedure discomfort can be alleviated with additional medications/ice/elevation/rest over the first 24 hours as recommended. The patient tolerated the procedure well. If fluid was aspirated it was sent for further studies  in sterile specimen container as ordered to the lab    No orders of the defined types were placed in this encounter. Orders Placed This Encounter   Medications    meloxicam (MOBIC) 15 MG tablet     Sig: Take 1 tablet by mouth daily     Dispense:  30 tablet     Refill:  1       Return if symptoms worsen or fail to improve.     Josefa Mast MD

## 2022-03-14 RX ORDER — LEVOTHYROXINE SODIUM 0.05 MG/1
50 TABLET ORAL DAILY
Qty: 30 TABLET | Refills: 3 | Status: SHIPPED | OUTPATIENT
Start: 2022-03-14 | End: 2022-07-08

## 2022-05-13 ENCOUNTER — OFFICE VISIT (OUTPATIENT)
Dept: INTERNAL MEDICINE | Age: 72
End: 2022-05-13
Payer: MEDICARE

## 2022-05-13 VITALS
OXYGEN SATURATION: 97 % | HEART RATE: 65 BPM | BODY MASS INDEX: 33.12 KG/M2 | SYSTOLIC BLOOD PRESSURE: 122 MMHG | WEIGHT: 194 LBS | HEIGHT: 64 IN | DIASTOLIC BLOOD PRESSURE: 68 MMHG

## 2022-05-13 DIAGNOSIS — E03.9 HYPOTHYROIDISM, UNSPECIFIED TYPE: ICD-10-CM

## 2022-05-13 DIAGNOSIS — U09.9 POST-COVID CHRONIC FATIGUE: ICD-10-CM

## 2022-05-13 DIAGNOSIS — U09.9 POST-COVID-19 SYNDROME MANIFESTING AS CHRONIC LOSS OF SMELL AND TASTE: Primary | ICD-10-CM

## 2022-05-13 DIAGNOSIS — R43.8 POST-COVID-19 SYNDROME MANIFESTING AS CHRONIC LOSS OF SMELL AND TASTE: Primary | ICD-10-CM

## 2022-05-13 DIAGNOSIS — G93.32 POST-COVID CHRONIC FATIGUE: ICD-10-CM

## 2022-05-13 LAB
ALBUMIN SERPL-MCNC: 4.3 G/DL (ref 3.5–4.6)
ALP BLD-CCNC: 76 U/L (ref 40–130)
ALT SERPL-CCNC: 12 U/L (ref 0–33)
ANION GAP SERPL CALCULATED.3IONS-SCNC: 12 MEQ/L (ref 9–15)
AST SERPL-CCNC: 23 U/L (ref 0–35)
BASOPHILS ABSOLUTE: 0 K/UL (ref 0–0.2)
BASOPHILS RELATIVE PERCENT: 0.7 %
BILIRUB SERPL-MCNC: <0.2 MG/DL (ref 0.2–0.7)
BUN BLDV-MCNC: 20 MG/DL (ref 8–23)
CALCIUM SERPL-MCNC: 8.7 MG/DL (ref 8.5–9.9)
CHLORIDE BLD-SCNC: 102 MEQ/L (ref 95–107)
CO2: 26 MEQ/L (ref 20–31)
CREAT SERPL-MCNC: 1.04 MG/DL (ref 0.5–0.9)
EOSINOPHILS ABSOLUTE: 0.2 K/UL (ref 0–0.7)
EOSINOPHILS RELATIVE PERCENT: 2.5 %
GFR AFRICAN AMERICAN: >60
GFR NON-AFRICAN AMERICAN: 51.9
GLOBULIN: 2.6 G/DL (ref 2.3–3.5)
GLUCOSE BLD-MCNC: 82 MG/DL (ref 70–99)
HCT VFR BLD CALC: 38.1 % (ref 37–47)
HEMOGLOBIN: 12.4 G/DL (ref 12–16)
LYMPHOCYTES ABSOLUTE: 1 K/UL (ref 1–4.8)
LYMPHOCYTES RELATIVE PERCENT: 17.2 %
MCH RBC QN AUTO: 30.3 PG (ref 27–31.3)
MCHC RBC AUTO-ENTMCNC: 32.6 % (ref 33–37)
MCV RBC AUTO: 92.9 FL (ref 82–100)
MONOCYTES ABSOLUTE: 0.5 K/UL (ref 0.2–0.8)
MONOCYTES RELATIVE PERCENT: 8.6 %
NEUTROPHILS ABSOLUTE: 4.3 K/UL (ref 1.4–6.5)
NEUTROPHILS RELATIVE PERCENT: 71 %
PDW BLD-RTO: 13.9 % (ref 11.5–14.5)
PLATELET # BLD: 257 K/UL (ref 130–400)
POTASSIUM SERPL-SCNC: 4.7 MEQ/L (ref 3.4–4.9)
RBC # BLD: 4.1 M/UL (ref 4.2–5.4)
SODIUM BLD-SCNC: 140 MEQ/L (ref 135–144)
T4 FREE: 1.43 NG/DL (ref 0.84–1.68)
TOTAL PROTEIN: 6.9 G/DL (ref 6.3–8)
TSH SERPL DL<=0.05 MIU/L-ACNC: 1.42 UIU/ML (ref 0.44–3.86)
WBC # BLD: 6.1 K/UL (ref 4.8–10.8)

## 2022-05-13 PROCEDURE — 99214 OFFICE O/P EST MOD 30 MIN: CPT | Performed by: PHYSICIAN ASSISTANT

## 2022-05-13 ASSESSMENT — ENCOUNTER SYMPTOMS
RESPIRATORY NEGATIVE: 1
GASTROINTESTINAL NEGATIVE: 1

## 2022-05-13 NOTE — PROGRESS NOTES
Eliazar Krishna (: 1950) is a 66 y.o. female, Established patient, here for evaluation of the following chief complaint(s):  Fatigue (Pt had covid 9mo. ago, and says she is still very tired, and cannot taste and smell everything. )        ASSESSMENT/PLAN:  1. 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 Post-COVID chronic fatigue  - will get dome labs today   - CBC with Auto Differential; Future  - Comprehensive Metabolic Panel; Future  - T4, Free; Future  - TSH; Future    3. Hypothyroidism, unspecified type  - recent change in medication, will have sure this is not contributing to the fatigue   - T4, Free; Future  - TSH; Future        No follow-ups on file. SUBJECTIVE/OBJECTIVE:  HPI    Post COVID follow-up  Patient had COVID 9 months ago, states that she is still having chronic fatigue, still cannot taste or smell most things  She is hypothyroid, but stable on current medication. Last TSH was normal in November,  while inpatient, her dose of Synthroid was decreased    She denies cough and shortness of breath  No chest pain  She is not hypoxic today, SPO2 is at 97%  Feels \" like my head is full of cotton\"     Review of Systems   Constitutional: Positive for fatigue. HENT: Negative. Respiratory: Negative. Cardiovascular: Negative. Gastrointestinal: Negative. Neurological: Negative. Psychiatric/Behavioral: Negative. Physical Exam  HENT:      Head: Normocephalic. Cardiovascular:      Rate and Rhythm: Normal rate and regular rhythm. Heart sounds: Normal heart sounds. Pulmonary:      Effort: Pulmonary effort is normal.      Breath sounds: Normal breath sounds. Musculoskeletal:      Cervical back: No tenderness. Neurological:      General: No focal deficit present. Mental Status: She is alert and oriented to person, place, and time.    Psychiatric:         Mood and Affect: Mood normal. Behavior: Behavior normal.         Vitals:    05/13/22 1009   BP: 122/68   Site: Left Upper Arm   Position: Sitting   Cuff Size: Large Adult   Pulse: 65   SpO2: 97%   Weight: 194 lb (88 kg)   Height: 5' 4\" (1.626 m)                 An electronic signature was used to authenticate this note.     --OMAR Enriquez

## 2022-05-31 DIAGNOSIS — F33.1 MAJOR DEPRESSIVE DISORDER, RECURRENT EPISODE, MODERATE (HCC): ICD-10-CM

## 2022-05-31 DIAGNOSIS — E78.2 MIXED HYPERLIPIDEMIA: ICD-10-CM

## 2022-06-02 NOTE — TELEPHONE ENCOUNTER
pharmacy requesting medication refill. Please approve or deny this request.    Rx requested:  Requested Prescriptions     Pending Prescriptions Disp Refills    citalopram (CELEXA) 10 MG tablet [Pharmacy Med Name: Citalopram Hydrobromide 10 MG Oral Tablet] 90 tablet 0     Sig: TAKE 1 TABLET BY MOUTH ONCE DAILY WITH  THE  20  MG  TABLET    pravastatin (PRAVACHOL) 20 MG tablet [Pharmacy Med Name: Pravastatin Sodium 20 MG Oral Tablet] 90 tablet 0     Sig: Take 1 tablet by mouth once daily    citalopram (CELEXA) 20 mg tablet [Pharmacy Med Name: Citalopram Hydrobromide 20 MG Oral Tablet] 90 tablet 0     Sig: Take 1 tablet by mouth once daily         Last Office Visit:   5/13/2022      Next Visit Date:  No future appointments.

## 2022-06-03 RX ORDER — CITALOPRAM 20 MG/1
TABLET ORAL
Qty: 90 TABLET | Refills: 1 | Status: SHIPPED | OUTPATIENT
Start: 2022-06-03

## 2022-06-03 RX ORDER — PRAVASTATIN SODIUM 20 MG
TABLET ORAL
Qty: 90 TABLET | Refills: 1 | Status: SHIPPED | OUTPATIENT
Start: 2022-06-03

## 2022-06-03 RX ORDER — CITALOPRAM 10 MG/1
TABLET ORAL
Qty: 90 TABLET | Refills: 1 | Status: SHIPPED | OUTPATIENT
Start: 2022-06-03

## 2022-07-08 RX ORDER — LEVOTHYROXINE SODIUM 50 UG/1
TABLET ORAL
Qty: 90 TABLET | Refills: 1 | Status: SHIPPED | OUTPATIENT
Start: 2022-07-08

## 2022-07-08 NOTE — TELEPHONE ENCOUNTER
Comments:     Last Office Visit (last PCP visit):   5/13/2022    Next Visit Date:  No future appointments. **If hasn't been seen in over a year OR hasn't followed up according to last diabetes/ADHD visit, make appointment for patient before sending refill to provider.     Rx requested:  Requested Prescriptions     Pending Prescriptions Disp Refills    EUTHYROX 50 MCG tablet [Pharmacy Med Name: Euthyrox 50 MCG Oral Tablet] 30 tablet 0     Sig: Take 1 tablet by mouth once daily

## 2022-08-15 ENCOUNTER — TELEPHONE (OUTPATIENT)
Dept: INTERNAL MEDICINE | Age: 72
End: 2022-08-15

## 2022-08-15 DIAGNOSIS — Z12.31 SCREENING MAMMOGRAM, ENCOUNTER FOR: Primary | ICD-10-CM

## 2022-08-15 NOTE — TELEPHONE ENCOUNTER
Patient states she is due for a mammogram in September and would like a order put in so she can schedule    Thank you

## 2022-09-30 ENCOUNTER — HOSPITAL ENCOUNTER (OUTPATIENT)
Dept: WOMENS IMAGING | Age: 72
Discharge: HOME | End: 2022-10-02
Payer: MEDICARE

## 2022-09-30 DIAGNOSIS — Z12.31 SCREENING MAMMOGRAM, ENCOUNTER FOR: ICD-10-CM

## 2022-09-30 PROCEDURE — 77063 BREAST TOMOSYNTHESIS BI: CPT

## 2022-10-31 DIAGNOSIS — M17.12 PRIMARY OSTEOARTHRITIS OF LEFT KNEE: ICD-10-CM

## 2022-10-31 DIAGNOSIS — M17.11 PRIMARY OSTEOARTHRITIS OF RIGHT KNEE: Primary | ICD-10-CM

## 2022-11-03 ENCOUNTER — OFFICE VISIT (OUTPATIENT)
Dept: ORTHOPEDIC SURGERY | Age: 72
End: 2022-11-03
Payer: MEDICARE

## 2022-11-03 DIAGNOSIS — M17.12 PRIMARY OSTEOARTHRITIS OF LEFT KNEE: Primary | ICD-10-CM

## 2022-11-03 PROCEDURE — 20610 DRAIN/INJ JOINT/BURSA W/O US: CPT | Performed by: ORTHOPAEDIC SURGERY

## 2022-11-03 RX ORDER — TRIAMCINOLONE ACETONIDE 40 MG/ML
80 INJECTION, SUSPENSION INTRA-ARTICULAR; INTRAMUSCULAR ONCE
Status: COMPLETED | OUTPATIENT
Start: 2022-11-03 | End: 2022-11-03

## 2022-11-03 RX ORDER — LIDOCAINE HYDROCHLORIDE 10 MG/ML
8 INJECTION, SOLUTION INFILTRATION; PERINEURAL ONCE
Status: COMPLETED | OUTPATIENT
Start: 2022-11-03 | End: 2022-11-03

## 2022-11-03 RX ADMIN — LIDOCAINE HYDROCHLORIDE 8 ML: 10 INJECTION, SOLUTION INFILTRATION; PERINEURAL at 10:24

## 2022-11-03 RX ADMIN — TRIAMCINOLONE ACETONIDE 80 MG: 40 INJECTION, SUSPENSION INTRA-ARTICULAR; INTRAMUSCULAR at 10:25

## 2022-11-03 NOTE — PROGRESS NOTES
Patient ID:  Suhail Pedroza is a 66 y.o. female who presents today for follow up of left knee pain. Injury: no    Location: left knee pain, located on the global aspect of the knee  Pain: yes; 7 on a scale of 1 to 10  Onset: gradual  Duration: 3 months  Frequency:  occurs daily  Quality: aching and boring   Swelling: patient notes intermittent swelling of the joint  Aggravating factors: weight bearing activity, standing, and walking  Alleviating factors: removing weight from leg and rest  Mechanical symptoms: none  Radiation: no    Activities: walking independently  Restriction:  decreased ambulatory tolerance  Progression:  worsening    Previous treatment:  anti-inflammatories and steroid injection   NSAIDs:  ibuprofen/motrin  PT:  none    Medications:    Current Outpatient Medications on File Prior to Visit   Medication Sig Dispense Refill    EUTHYROX 50 MCG tablet Take 1 tablet by mouth once daily 90 tablet 1    citalopram (CELEXA) 10 MG tablet TAKE 1 TABLET BY MOUTH ONCE DAILY WITH  THE  20  MG  TABLET 90 tablet 1    pravastatin (PRAVACHOL) 20 MG tablet Take 1 tablet by mouth once daily 90 tablet 1    citalopram (CELEXA) 20 mg tablet Take 1 tablet by mouth once daily 90 tablet 1    UNABLE TO FIND Take 1 tablet by mouth daily [facility]      Krill Oil 500 MG CAPS Take 1 capsule by mouth daily      UNABLE TO FIND Take 1 capsule by mouth daily Tumeric root extract      Pediatric Multivitamins-Fl (MULTI VIT/FL PO) Take 1 capsule by mouth daily      Cholecalciferol 50 MCG (2000 UT) CAPS Take by mouth daily      meloxicam (MOBIC) 15 MG tablet Take 1 tablet by mouth daily 30 tablet 1     No current facility-administered medications on file prior to visit.        Allergies:    No Known Allergies    Physical Exam:    Examination of the left knee    Gait:  antalgic gait affecting left  Inspection:  neutral  Swelling:  none  Erythema:  none  Ecchymosis:  none  Effusion:  1+  Palpation:  distal medial femoral condyle, distal lateral femoral condyle, and lateral patella  Extension:  5  Flexion:  110  Strength:  5  Varus/Valgus stability:  none  Anterior/Posterior Instability:  none  Malika:  negative  Thessaly:  positive  Modified Apley:  negative  Lachman:  negative  Patellar compression:  positive  Neurological/Vascular:  Sensation grossly intact. Dorsalis pedis palpable and 2+    Radiographs:  None today    MRI: None    Diagnosis:   Diagnosis Orders   1. Primary osteoarthritis of left knee  [place] ARTHROCENTESIS ASPIR&/INJ MAJOR JT/BURSA W/O US          Plan:    The patient got a good response from the last steroid injection but now it has worn off. She refused x-rays and said that she wanted to get an MRI to figure out what is going on inside the knee. I explained to her that based on my examination as well as the previous x-rays, that I am fairly confident the main issue is the arthritis. She probably is working on tearing the meniscus on the medial side of the knee because she has arthritis there, but the main issue is the arthritis. We talked about this. Treatment options were discussed. We discussed redoing the steroid injection. I explained to her that if it is the arthritis this will make the knee feel better. If it does not help, then we can loop around and obtain an MRI. She was receptive to this. Follow-up as needed. Time Out  [x] Patient Verified  [x] Site Verified  [x] Laterality Verified  [x] Procedure Verified    Before aspiration/injection risks/benefits of a cortisone injection including infection, local skin irritation, skin atrophy, continued pain/discomfort, elevated blood sugar, burning, failure to relieve pain, possible late infection were discussed with patient. Patient verbalized understanding and wanted to proceed with planned procedure.     After prepping and draping the left knee in the usual sterile fashion, 2 cc of 40 mg/ml kenalog with 8 cc of 1% lidocaine were injected intra-articularly without any complications. Patient tolerated this well. Post-procedure discomfort can be alleviated with additional medications/ice/elevation/rest over the first 24 hours as recommended. The patient tolerated the procedure well. If fluid was aspirated it was sent for further studies  in sterile specimen container as ordered to the lab     Orders Placed This Encounter   Procedures    [place] ARTHROCENTESIS ASPIR&/INJ MAJOR JT/BURSA W/O US       Orders Placed This Encounter   Medications    lidocaine 1 % injection 8 mL    triamcinolone acetonide (KENALOG-40) injection 80 mg       Return if symptoms worsen or fail to improve.     Geetha Oseguera MD

## 2022-12-02 DIAGNOSIS — E78.2 MIXED HYPERLIPIDEMIA: ICD-10-CM

## 2022-12-02 DIAGNOSIS — F33.1 MAJOR DEPRESSIVE DISORDER, RECURRENT EPISODE, MODERATE (HCC): ICD-10-CM

## 2022-12-02 RX ORDER — CITALOPRAM 10 MG/1
TABLET ORAL
Qty: 90 TABLET | Refills: 1 | Status: SHIPPED | OUTPATIENT
Start: 2022-12-02

## 2022-12-02 RX ORDER — PRAVASTATIN SODIUM 20 MG
TABLET ORAL
Qty: 90 TABLET | Refills: 1 | Status: SHIPPED | OUTPATIENT
Start: 2022-12-02

## 2022-12-02 RX ORDER — CITALOPRAM 20 MG/1
TABLET ORAL
Qty: 90 TABLET | Refills: 1 | Status: SHIPPED | OUTPATIENT
Start: 2022-12-02

## 2022-12-02 NOTE — TELEPHONE ENCOUNTER
Comments:     Last Office Visit (last PCP visit):   5/13/2022    Next Visit Date:  No future appointments. **If hasn't been seen in over a year OR hasn't followed up according to last diabetes/ADHD visit, make appointment for patient before sending refill to provider.     Rx requested:  Requested Prescriptions     Pending Prescriptions Disp Refills    citalopram (CELEXA) 20 MG tablet [Pharmacy Med Name: Citalopram Hydrobromide 20 MG Oral Tablet] 90 tablet 0     Sig: Take 1 tablet by mouth once daily    citalopram (CELEXA) 10 MG tablet [Pharmacy Med Name: Citalopram Hydrobromide 10 MG Oral Tablet] 90 tablet 0     Sig: TAKE 1 TABLET BY MOUTH ONCE DAILY WITH  THE  20MG  TABLET    pravastatin (PRAVACHOL) 20 MG tablet [Pharmacy Med Name: Pravastatin Sodium 20 MG Oral Tablet] 90 tablet 0     Sig: Take 1 tablet by mouth once daily

## 2022-12-09 ENCOUNTER — OFFICE VISIT (OUTPATIENT)
Dept: ORTHOPEDIC SURGERY | Age: 72
End: 2022-12-09
Payer: MEDICARE

## 2022-12-09 VITALS
TEMPERATURE: 96.8 F | OXYGEN SATURATION: 99 % | WEIGHT: 189 LBS | HEART RATE: 75 BPM | BODY MASS INDEX: 32.27 KG/M2 | HEIGHT: 64 IN

## 2022-12-09 DIAGNOSIS — M17.12 PRIMARY OSTEOARTHRITIS OF LEFT KNEE: Primary | ICD-10-CM

## 2022-12-09 PROCEDURE — 1123F ACP DISCUSS/DSCN MKR DOCD: CPT | Performed by: PHYSICIAN ASSISTANT

## 2022-12-09 PROCEDURE — 99214 OFFICE O/P EST MOD 30 MIN: CPT | Performed by: PHYSICIAN ASSISTANT

## 2022-12-09 RX ORDER — HYALURONATE SODIUM, STABILIZED 60 MG/3 ML
60 SYRINGE (ML) INTRAARTICULAR ONCE
Qty: 3 ML | Refills: 0 | Status: SHIPPED | OUTPATIENT
Start: 2022-12-09 | End: 2022-12-09

## 2022-12-09 ASSESSMENT — ENCOUNTER SYMPTOMS
RESPIRATORY NEGATIVE: 1
EYES NEGATIVE: 1
GASTROINTESTINAL NEGATIVE: 1

## 2022-12-09 NOTE — PROGRESS NOTES
Jeet Auguste (:  1950) is a 74 y.o. female,Established patient, here for evaluation of the following chief complaint(s):  Follow-up (Primary osteoarthritis of left knee- Pt states she was given an cortisone injection that did not help she says she is still having pain while walking or standing. )         ASSESSMENT/PLAN:  1. Primary osteoarthritis of left knee  -     MRI KNEE LEFT WO CONTRAST; Future    No follow-ups on file. Subjective   SUBJECTIVE/OBJECTIVE:  This is 73-year-old female complaining of left knee pain. She been told previously that she has arthritis of the knee as well as a medial meniscal tear from MRI scan 4 years ago. She had seen Dr. Purnima Ann 5 weeks ago and was given intra-articular cortisone injection of the left knee. States she has not received any lasting benefit. She would like an MRI scan. Eyes instability or locking. She states she cannot continue the way she is due to the amount of pain she is having presently. She had refused x-rays at her last visit. Review of Systems   Constitutional: Negative. HENT: Negative. Eyes: Negative. Respiratory: Negative. Gastrointestinal: Negative. Genitourinary: Negative. Musculoskeletal: Negative. Psychiatric/Behavioral: Negative. Objective   Radiographs of the bilateral knee were personally reviewed, bilateral    standing AP, bilateral notch, bilateral sunrise, lateral left knee and    lateral right knee and they revealed:   no evidence of fracture and The patient has significant lateral    compartment osteoarthritis of the left knee. She has marginal    osteophytes. She has some patellofemoral osteophytes on the sunrise. She    appears to be maintaining a medial joint line. On the right knee, the    patient has some mild medial knee osteoarthritis. Also mild    patellofemoral osteoarthritis. She is maintaining a lateral joint space.          Physical Exam  Musculoskeletal: Comments: Left knee-little joint effusion, small popliteal cyst.  Tenderness with palpation to lateral femoral condyle. Patellofemoral crepitus with range of motion. Calf is soft and nontender. Plain to the patient x-rays she had done a year ago showed degenerative arthritis of the knee. She states her cortisone injection offered her very limited improvement in her pain. She would like an MRI scan. I told her we will proceed with MRI scan looking for any operative meniscal tearing. I would like to proceed with joint lubricant request from her insurance company. She will follow-up with me after her MRI scan. Rationale for Viscosupplementation: Renewal Request   As a part of a multimodal treatment plan, we are requesting authorization of hyaluronic acid viscosupplementation injections for the improvement of symptoms related to osteoarthritis. Authorization is being requested for treatment of the left knee. Rationale for authorization of these injections is based on the following elements:     Signs and Symptoms   Length of symptoms > 3 months   Pain interferes with ADLs? Yes   Radiographic evidence of OA? Yes     Previous Treatments   Bracing attempted? Yes   Formal PT attempted? Yes   NSAID medication attempted? Yes   Corticosteroid injection attempted? Yes   Weight management attempted? Yes     Prior Viscosupplementation   Prior viscosupplementation? No   Prior viscosupplementation improved  symptoms? No  Prior viscosupplementation improved  symptoms at least 6 months? No      Requested Viscosupplementation   Preferred product: Durolane  Alternative product: Synvisc 1 or payor preferred       On this date 12/9/2022 I have spent 35 minutes reviewing previous notes, test results and face to face with the patient discussing the diagnosis and importance of compliance with the treatment plan as well as documenting on the day of the visit.       An electronic signature was used to authenticate this note.    --OMAR Goldberg

## 2022-12-16 ENCOUNTER — HOSPITAL ENCOUNTER (OUTPATIENT)
Dept: MRI IMAGING | Age: 72
End: 2022-12-16
Payer: MEDICARE

## 2022-12-16 DIAGNOSIS — M17.12 PRIMARY OSTEOARTHRITIS OF LEFT KNEE: ICD-10-CM

## 2022-12-16 PROCEDURE — 73721 MRI JNT OF LWR EXTRE W/O DYE: CPT

## 2022-12-27 ENCOUNTER — TELEPHONE (OUTPATIENT)
Dept: ORTHOPEDIC SURGERY | Age: 72
End: 2022-12-27

## 2023-01-04 ENCOUNTER — OFFICE VISIT (OUTPATIENT)
Dept: ORTHOPEDIC SURGERY | Age: 73
End: 2023-01-04
Payer: MEDICARE

## 2023-01-04 VITALS
HEIGHT: 64 IN | WEIGHT: 189 LBS | HEART RATE: 76 BPM | OXYGEN SATURATION: 96 % | BODY MASS INDEX: 32.27 KG/M2 | TEMPERATURE: 97.7 F

## 2023-01-04 DIAGNOSIS — M17.11 PRIMARY OSTEOARTHRITIS OF RIGHT KNEE: Primary | ICD-10-CM

## 2023-01-04 DIAGNOSIS — M17.12 PRIMARY OSTEOARTHRITIS OF LEFT KNEE: ICD-10-CM

## 2023-01-04 PROCEDURE — 99214 OFFICE O/P EST MOD 30 MIN: CPT | Performed by: PHYSICIAN ASSISTANT

## 2023-01-04 PROCEDURE — 1123F ACP DISCUSS/DSCN MKR DOCD: CPT | Performed by: PHYSICIAN ASSISTANT

## 2023-01-04 RX ORDER — VIT C/B6/B5/MAGNESIUM/HERB 173 50-5-6-5MG
CAPSULE ORAL
COMMUNITY

## 2023-01-04 RX ORDER — ASCORBIC ACID 125 MG
TABLET,CHEWABLE ORAL
COMMUNITY

## 2023-01-04 ASSESSMENT — ENCOUNTER SYMPTOMS
RESPIRATORY NEGATIVE: 1
GASTROINTESTINAL NEGATIVE: 1
EYES NEGATIVE: 1

## 2023-01-04 NOTE — PROGRESS NOTES
Laurita Bran (:  1950) is a 74 y.o. female,Established patient, here for evaluation of the following chief complaint(s):  Follow-up (Pt is here for Follow up , Pt is here for MRI Results for left knee. Pt states she is having continuous pain on left knee. )         ASSESSMENT/PLAN:  1. Primary osteoarthritis of right knee  -     XR KNEE RIGHT (MIN 4 VIEWS); Future      No follow-ups on file. Subjective   SUBJECTIVE/OBJECTIVE:  This is a 68-year-old retired female complaining of left knee pain. She had received intra-articular cortisone injection performed by Dr. Eh Khan November 3, 2022 with no improvement in her pain. She had requested an MRI scan and is here for results from the MRI scan. We were awaiting approval for Visco lubricant. She states she has been taking over-the-counter Tylenol and using topical anti-inflammatory creams with limited improvement in her pain. States she cannot continue to go on with this knee pain. Review of Systems   Constitutional: Negative. HENT: Negative. Eyes: Negative. Respiratory: Negative. Gastrointestinal: Negative. Genitourinary: Negative. Musculoskeletal: Negative. Psychiatric/Behavioral: Negative. Objective   EXAMINATION:   MRI OF THE LEFT KNEE WITHOUT CONTRAST, 2022 1:47 pm       TECHNIQUE:   Multiplanar multisequence MRI of the left knee was performed without the   administration of intravenous contrast.       COMPARISON:   None. HISTORY:   ORDERING SYSTEM PROVIDED HISTORY: Primary osteoarthritis of left knee   TECHNOLOGIST PROVIDED HISTORY:   Reason for exam:->knee pain and swelling   What reading provider will be dictating this exam?->CRC       FINDINGS:   MENISCI: Intact medial and lateral menisci with chronic myxoid degeneration. There is marked extrusion of the lateral meniscus from the joint line   secondary to lateral compartment marginal osteophytes.        CRUCIATE LIGAMENTS: Intact anterior cruciate and posterior cruciate ligaments. EXTENSOR MECHANISM: Intact quadriceps and patellar tendons. Intact patellar   retinacula. LATERAL COLLATERAL LIGAMENT COMPLEX: Intact IT band, lateral collateral   ligament proper, biceps femoris tendon and popliteus tendon. MEDIAL COLLATERAL LIGAMENT COMPLEX: The superficial and deep components of   the medial collateral ligament are intact. KNEE JOINT: Widespread grade 4 cartilage degeneration through the lateral   compartment with mild subchondral edema. Mild grade 2 cartilage damage   through the patellofemoral and medial compartments, without subchondral   edema. Moderate effusion and small, leaking Baker's cyst.       BONE MARROW: No evidence of fracture. Normal marrow signal.           Impression   1. Severe lateral compartment degenerative change with mild subchondral edema. 2. Moderate effusion and small, leaking Baker's cyst.       RECOMMENDATIONS:   Careful clinical correlation and follow up recommended. Physical Exam  Musculoskeletal:      Comments: Left knee-joint effusion present. Popliteal cyst present. No warmth, erythema, fluctuance or sign of infection. Full extension, flexion is 110 degrees about 10 degrees less than the right. Tenderness with palpation at the medial femoral condyle. Patellofemoral crepitus with range of motion. Calf is soft and nontender. Explained to the patient she has severe degenerative arthritis of the left knee lateral compartment. I explained to her that she is a candidate for joint replacement. I explained her we could try the Visco lubricant but that eventually we would end up performing joint replacement surgery. She prefers to proceed with joint replacement surgery. She will discuss this with Dr. Wesly Hernandez. I would like her to have x-rays in a knee performed before she leaves today.   On this date 1/4/2023 I have spent 35 minutes reviewing previous notes, test results and face to face with the patient discussing the diagnosis and importance of compliance with the treatment plan as well as documenting on the day of the visit. An electronic signature was used to authenticate this note.     --OMAR Hewitt

## 2023-01-06 NOTE — TELEPHONE ENCOUNTER
Comments:     Last Office Visit (last PCP visit):   5/13/2022    Next Visit Date:  No future appointments. **If hasn't been seen in over a year OR hasn't followed up according to last diabetes/ADHD visit, make appointment for patient before sending refill to provider.     Rx requested:  Requested Prescriptions     Pending Prescriptions Disp Refills    levothyroxine (SYNTHROID) 50 MCG tablet [Pharmacy Med Name: Levothyroxine Sodium 50 MCG Oral Tablet] 90 tablet 0     Sig: Take 1 tablet by mouth once daily

## 2023-01-09 RX ORDER — LEVOTHYROXINE SODIUM 0.05 MG/1
TABLET ORAL
Qty: 90 TABLET | Refills: 1 | Status: SHIPPED | OUTPATIENT
Start: 2023-01-09

## 2023-01-09 RX ORDER — LEVOTHYROXINE SODIUM 0.05 MG/1
50 TABLET ORAL DAILY
Qty: 30 TABLET | Refills: 3 | OUTPATIENT
Start: 2023-01-09

## 2023-01-09 NOTE — TELEPHONE ENCOUNTER
Comments:     Last Office Visit (last PCP visit):   5/13/2022    Next Visit Date:  No future appointments. **If hasn't been seen in over a year OR hasn't followed up according to last diabetes/ADHD visit, make appointment for patient before sending refill to provider.     Rx requested:  Requested Prescriptions     Pending Prescriptions Disp Refills    levothyroxine (SYNTHROID) 50 MCG tablet 30 tablet 3     Sig: Take 1 tablet by mouth Daily

## 2023-01-18 ENCOUNTER — TELEPHONE (OUTPATIENT)
Dept: INTERNAL MEDICINE | Age: 73
End: 2023-01-18

## 2023-01-18 NOTE — TELEPHONE ENCOUNTER
Called pt and she will call back to schedule AWV. She is having knee replacement and will be meeting with surgeon tomorrow to find out when it is.

## 2023-01-19 ENCOUNTER — OFFICE VISIT (OUTPATIENT)
Dept: ORTHOPEDIC SURGERY | Age: 73
End: 2023-01-19
Payer: MEDICARE

## 2023-01-19 VITALS
WEIGHT: 188 LBS | BODY MASS INDEX: 32.1 KG/M2 | OXYGEN SATURATION: 98 % | TEMPERATURE: 97.6 F | HEIGHT: 64 IN | HEART RATE: 72 BPM

## 2023-01-19 DIAGNOSIS — D69.6 THIN BLOOD (HCC): ICD-10-CM

## 2023-01-19 DIAGNOSIS — E10.44 DIABETIC AMYOTROPHY ASSOCIATED WITH TYPE 1 DIABETES MELLITUS (HCC): ICD-10-CM

## 2023-01-19 DIAGNOSIS — N30.01 ACUTE CYSTITIS WITH HEMATURIA: ICD-10-CM

## 2023-01-19 DIAGNOSIS — M17.12 PRIMARY OSTEOARTHRITIS OF LEFT KNEE: Primary | ICD-10-CM

## 2023-01-19 PROCEDURE — 99214 OFFICE O/P EST MOD 30 MIN: CPT | Performed by: ORTHOPAEDIC SURGERY

## 2023-01-19 PROCEDURE — 1123F ACP DISCUSS/DSCN MKR DOCD: CPT | Performed by: ORTHOPAEDIC SURGERY

## 2023-01-19 NOTE — PROGRESS NOTES
Patient ID:  Hedy Aguiar is a 74 y.o. female who presents today for follow up of left knee pain. Injury: no  Metal Allergy: no     Location: left knee pain, located on the medial and lateral aspect of the knee  Pain: yes; 6 on a scale of 1 to 10  Onset: gradual  Duration: 4 months  Frequency:  occurs daily  Quality: aching and boring   Swelling: patient notes intermittent swelling of the joint  Aggravating factors: weight bearing activity, standing and walking  Alleviating factors: removing weight from leg and rest  Mechanical symptoms: none  Radiation: no     Activities: walking independently  Restriction:  decreased ambulatory tolerance  Progression:  worsening     Previous treatment:  anti-inflammatories and steroid injection   NSAIDs:  ibuprofen/motrin  PT:  none    Medications:    Current Outpatient Medications on File Prior to Visit   Medication Sig Dispense Refill    levothyroxine (SYNTHROID) 50 MCG tablet Take 1 tablet by mouth once daily 90 tablet 1    turmeric 500 MG CAPS       citalopram (CELEXA) 20 MG tablet Take 1 tablet by mouth once daily 90 tablet 1    citalopram (CELEXA) 10 MG tablet TAKE 1 TABLET BY MOUTH ONCE DAILY WITH  THE  20MG  TABLET 90 tablet 1    pravastatin (PRAVACHOL) 20 MG tablet Take 1 tablet by mouth once daily 90 tablet 1    UNABLE TO FIND Take 1 tablet by mouth daily [facility]      Krill Oil 500 MG CAPS Take 1 capsule by mouth daily      UNABLE TO FIND Take 1 capsule by mouth daily Tumeric root extract      Pediatric Multivitamins-Fl (MULTI VIT/FL PO) Take 1 capsule by mouth daily      Cholecalciferol (VITAMIN D3 ADULT GUMMIES) 25 MCG (1000 UT) CHEW       meloxicam (MOBIC) 15 MG tablet Take 1 tablet by mouth daily 30 tablet 1    Cholecalciferol 50 MCG (2000 UT) CAPS Take by mouth daily       No current facility-administered medications on file prior to visit.        Allergies:    No Known Allergies    Physical Exam:    Examination of the left knee    Gait: antalgic gait affecting left  Inspection:  genu valgus  Swelling:  none  Erythema:  none  Ecchymosis:  none  Effusion:  1+  Palpation:  distal medial femoral condyle, medial joint line, distal lateral femoral condyle, lateral joint line, and lateral patella  Extension:  5  Flexion:  110  Strength:  5  Varus/Valgus stability:  none  Anterior/Posterior Instability:  none  Malika:  negative  Thessaly:  positive  Modified Apley:  positive  Lachman:  negative  Patellar compression:  positive  Neurological/Vascular:  Sensation grossly intact. Dorsalis pedis palpable and 2+    Radiographs:  Radiographs of the left knee were personally reviewed, and they revealed:  Radiographs left knee, 4 view, bilateral AP weightbearing, bilateral    tunnel view, left lateral, bilateral merchant view. No acute fracture or    dislocation. Severe degenerative arthritis of the lateral compartment the    left knee with bone-on-bone contact. Patellofemoral joint spacing is    preserved. Medial compartment joint spacing bilaterally is preserved. Significant increase in degenerative arthritis of the lateral compartment    of the left knee when compared to previous left knee x-rays December 21, 2021       MRI: None    Diagnosis:   Diagnosis Orders   1. Primary osteoarthritis of left knee  Ambulatory referral to [address] Metabolic Panel    Prealbumin    Ambulatory referral to Physical Therapy      2. 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 Thin blood (HCC)  CBC with Auto Differential    Ferritin    Iron and TIBC    Protime-INR    Transferrin    Type and screen      4. Acute cystitis with hematuria  Culture, Urine    Urinalysis          Plan:    Patient has osteoarthritis left knee. We sat down and discussed arthritis. Treatment options were discussed. Ultimately, the patient opted to proceed with a left total knee replacement.   The patient is opted to proceed with a knee replacement surgery. I brought the model in, and we sat down and talked about surgery. We talked about the recovery. I stressed the importance of physical therapy and active participation in physical therapy to the patient in order to achieve a satisfactory postoperative outcome. We went over the risks of surgery. Risks include, but are not limited to, infection, neurovascular injury, hematoma formation, wound healing complications, blood clot, persistent postoperative pain, ligament injury, and risks of anesthesia. The patient expressed understanding and wishes to proceed with a total knee replacement as above. Orders Placed This Encounter   Procedures    Culture, Urine     Standing Status:   Future     Standing Expiration Date:   1/19/2024     Order Specific Question:   Specify (ex-cath, midstream, cysto, etc)? Answer:   MIDSTREAM    Basic Metabolic Panel     Standing Status:   Future     Standing Expiration Date:   1/19/2024    CBC with Auto Differential     Standing Status:   Future     Standing Expiration Date:   1/19/2024    Ferritin     Standing Status:   Future     Standing Expiration Date:   1/19/2024    Hemoglobin A1C     Standing Status:   Future     Standing Expiration Date:   1/19/2024    Iron and TIBC     Standing Status:   Future     Standing Expiration Date:   1/19/2024     Order Specific Question:   Is Patient Fasting? Answer:   NO     Order Specific Question:   No of Hours? Answer:   0    Prealbumin     Standing Status:   Future     Standing Expiration Date:   1/19/2024    Protime-INR     Standing Status:   Future     Standing Expiration Date:   1/19/2024     Order Specific Question:   Daily Coumadin Dose?      Answer:   NO    Transferrin     Standing Status:   Future     Standing Expiration Date:   1/19/2024    Urinalysis     Standing Status:   Future     Standing Expiration Date:   1/19/2024    Ambulatory referral to Home Health     Referral Priority:   Routine Referral Type:   Home Health Care     Referral Reason:   Specialty Services Required     Number of Visits Requested:   1    Ambulatory referral to Physical Therapy     Referral Priority:   Routine     Referral Type:   Eval and Treat     Referral Reason:   Specialty Services Required     Requested Specialty:   Physical Therapist     Number of Visits Requested:   1    Type and screen     Standing Status:   Future     Standing Expiration Date:   2024       Orders Placed This Encounter   Medications    Misc. Devices (BATH/SHOWER SEAT) MISC     Sig: Dispense 1 Shower/Bath seat     Dispense:  1 each     Refill:  0    Misc. Devices (CLASSICS ROLLING WALKER) MISC     Sig: Dispense 1 rolling walker     Dispense:  1 each     Refill:  0    Misc. Devices (RAISED TOILET SEAT) MISC     Sig: Dispense 1 raised toilet seat     Dispense:  1 each     Refill:  0       No follow-ups on file. Yifan Rosario MD        Surgery Phone: [phone] [facility]   Surgery Fax: [phone]    Phone: [phone]          Fax: [phone]: Surgery Scheduling, PAT & PRE-OP Order Form  Call to advance [place] at [phone] at least 24 hours prior to date of service     Surgery Location: [facility] [place] [facility]: [address] [place] [address], [address] [place] [address], MD Surgery Date: 23  Time: to follow  Patient's Name: Tomi Mendosa : 1950    #:  [id number]  Gender: female  Home Phone:  [phone] Cell Phone: [phone]  Emergency Contact:  [address]   Phone: [phone]  Payor: Ledy Herrera /  /  /    ID No.: [id number]      PROVIDER TO COMPLETE:  Diagnosis: The primary encounter diagnosis was Primary osteoarthritis of left knee. 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    Procedure/Consent: Left total knee arthroplasty  CPT CODES: 03352  Case Comments/Implants: Juan C Page Total Knee System  Surgery Scheduled as: Outpatient  Anesthesia Requested: Choice with adductor canal block  Referring Family Doctor: [address] [place], [place] [address]OMAR  [x] [facility] PAT Date/Time:                                                            [x] History & Physical [] Physician will Provide [] Attached [] Dictated [] Other  [x] Follow Anesthesia Pre-Op Orders for X-rays, [facility]     [x] SN & PT to evaluate and treat/educate disease management, medications, home safety & equipment needs for total joint patients  [] Other: ____________________________________________________  Consults: Medical/Cardiac Clearance done by  ____________________  PRE-OP ORDERS:   Allergies: Patient has no known allergies.  Latex Allergies:             Diabetic:           [] IV ________________________  [x] IV Start with J-loop     Preprinted Orders: Attached [] Yes [] No   ANTIBIOTIC PRE-OP: [x] ANCEF 2 gram IVPB if > 120 kg 3 grams IVPB within 1 hour of incision, if ALLERGIC, use VANCOMYCIN 1 gram IV, 2 hours pre-op  [x] TXA Protocol [] Other:   [x] NPO   [] Betablocker (if needed) _____________________________________   [x] Knee high anti-embolic hose [] Thigh high anti-embolic hose   Other: _____________ chlorhexidine_________________________________________    Physician Signature Required:    Date/Time: 1/19/2023

## 2023-01-24 ENCOUNTER — PREP FOR PROCEDURE (OUTPATIENT)
Dept: ORTHOPEDIC SURGERY | Age: 73
End: 2023-01-24

## 2023-01-24 PROBLEM — M17.12 OSTEOARTHRITIS OF LEFT KNEE: Status: ACTIVE | Noted: 2023-01-24

## 2023-02-01 ENCOUNTER — APPOINTMENT (OUTPATIENT)
Dept: CT IMAGING | Age: 73
End: 2023-02-01
Payer: MEDICARE

## 2023-02-01 ENCOUNTER — HOSPITAL ENCOUNTER (EMERGENCY)
Age: 73
Discharge: ANOTHER HOSPITAL | End: 2023-02-01
Payer: MEDICARE

## 2023-02-01 ENCOUNTER — APPOINTMENT (OUTPATIENT)
Dept: GENERAL RADIOLOGY | Age: 73
End: 2023-02-01
Payer: MEDICARE

## 2023-02-01 ENCOUNTER — HOSPITAL ENCOUNTER (INPATIENT)
Age: 73
LOS: 2 days | Discharge: NURSING, REHAB OR LONG-TERM CARE | End: 2023-02-03
Attending: STUDENT IN AN ORGANIZED HEALTH CARE EDUCATION/TRAINING PROGRAM | Admitting: SURGERY
Payer: MEDICARE

## 2023-02-01 VITALS
HEART RATE: 87 BPM | RESPIRATION RATE: 16 BRPM | TEMPERATURE: 98.4 F | HEIGHT: 64 IN | DIASTOLIC BLOOD PRESSURE: 67 MMHG | WEIGHT: 188 LBS | OXYGEN SATURATION: 96 % | SYSTOLIC BLOOD PRESSURE: 135 MMHG | BODY MASS INDEX: 32.1 KG/M2

## 2023-02-01 DIAGNOSIS — S72.011A SUBCAPITAL FRACTURE OF FEMUR, RIGHT, CLOSED, INITIAL ENCOUNTER (HCC): Primary | ICD-10-CM

## 2023-02-01 DIAGNOSIS — S72.001A CLOSED FRACTURE OF RIGHT HIP, INITIAL ENCOUNTER (HCC): Primary | ICD-10-CM

## 2023-02-01 DIAGNOSIS — G89.18 ACUTE POST-OPERATIVE PAIN: ICD-10-CM

## 2023-02-01 DIAGNOSIS — S72.001A CLOSED RIGHT HIP FRACTURE, INITIAL ENCOUNTER (HCC): ICD-10-CM

## 2023-02-01 LAB
ABO/RH: NORMAL
ALBUMIN SERPL-MCNC: 4.3 G/DL (ref 3.5–4.6)
ALP BLD-CCNC: 77 U/L (ref 40–130)
ALT SERPL-CCNC: 17 U/L (ref 0–33)
ANION GAP SERPL CALCULATED.3IONS-SCNC: 15 MEQ/L (ref 9–15)
ANTIBODY SCREEN: NORMAL
AST SERPL-CCNC: 24 U/L (ref 0–35)
BASOPHILS ABSOLUTE: 0.1 K/UL (ref 0–0.1)
BASOPHILS RELATIVE PERCENT: 0.7 % (ref 0.1–1.2)
BILIRUB SERPL-MCNC: 0.4 MG/DL (ref 0.2–0.7)
BUN BLDV-MCNC: 22 MG/DL (ref 8–23)
CALCIUM SERPL-MCNC: 9.5 MG/DL (ref 8.5–9.9)
CHLORIDE BLD-SCNC: 100 MEQ/L (ref 95–107)
CO2: 24 MEQ/L (ref 20–31)
CREAT SERPL-MCNC: 0.92 MG/DL (ref 0.5–0.9)
EOSINOPHILS ABSOLUTE: 0.3 K/UL (ref 0–0.4)
EOSINOPHILS RELATIVE PERCENT: 3.2 % (ref 0.7–5.8)
ETHANOL PERCENT: NORMAL G/DL
ETHANOL: <10 MG/DL (ref 0–0.08)
GFR SERPL CREATININE-BSD FRML MDRD: >60 ML/MIN/{1.73_M2}
GLOBULIN: 3.1 G/DL (ref 2.3–3.5)
GLUCOSE BLD-MCNC: 93 MG/DL (ref 70–99)
HCT VFR BLD CALC: 38.2 % (ref 37–47)
HEMOGLOBIN: 12.5 G/DL (ref 11.2–15.7)
IMMATURE GRANULOCYTES #: 0 K/UL
IMMATURE GRANULOCYTES %: 0.4 %
INR BLD: 1
LYMPHOCYTES ABSOLUTE: 0.5 K/UL (ref 1.2–3.7)
LYMPHOCYTES RELATIVE PERCENT: 5.3 %
MCH RBC QN AUTO: 30.2 PG (ref 25.6–32.2)
MCHC RBC AUTO-ENTMCNC: 32.7 % (ref 32.2–35.5)
MCV RBC AUTO: 92.3 FL (ref 79.4–94.8)
MONOCYTES ABSOLUTE: 0.7 K/UL (ref 0.2–0.9)
MONOCYTES RELATIVE PERCENT: 7.1 % (ref 4.7–12.5)
NEUTROPHILS ABSOLUTE: 8.4 K/UL (ref 1.6–6.1)
NEUTROPHILS RELATIVE PERCENT: 83.3 % (ref 34–71.1)
PDW BLD-RTO: 13.5 % (ref 11.7–14.4)
PLATELET # BLD: 242 K/UL (ref 182–369)
POTASSIUM REFLEX MAGNESIUM: 4.1 MEQ/L (ref 3.4–4.9)
PROTHROMBIN TIME: 13.2 SEC (ref 12.3–14.9)
RBC # BLD: 4.14 M/UL (ref 3.93–5.22)
SODIUM BLD-SCNC: 139 MEQ/L (ref 135–144)
TOTAL PROTEIN: 7.4 G/DL (ref 6.3–8)
WBC # BLD: 10 K/UL (ref 4–10)

## 2023-02-01 PROCEDURE — 99223 1ST HOSP IP/OBS HIGH 75: CPT | Performed by: SURGERY

## 2023-02-01 PROCEDURE — 82077 ASSAY SPEC XCP UR&BREATH IA: CPT

## 2023-02-01 PROCEDURE — 6360000002 HC RX W HCPCS: Performed by: STUDENT IN AN ORGANIZED HEALTH CARE EDUCATION/TRAINING PROGRAM

## 2023-02-01 PROCEDURE — 80053 COMPREHEN METABOLIC PANEL: CPT

## 2023-02-01 PROCEDURE — 36415 COLL VENOUS BLD VENIPUNCTURE: CPT

## 2023-02-01 PROCEDURE — 1210000000 HC MED SURG R&B

## 2023-02-01 PROCEDURE — 85610 PROTHROMBIN TIME: CPT

## 2023-02-01 PROCEDURE — 99285 EMERGENCY DEPT VISIT HI MDM: CPT

## 2023-02-01 PROCEDURE — 2580000003 HC RX 258: Performed by: SURGERY

## 2023-02-01 PROCEDURE — 93005 ELECTROCARDIOGRAM TRACING: CPT

## 2023-02-01 PROCEDURE — 86901 BLOOD TYPING SEROLOGIC RH(D): CPT

## 2023-02-01 PROCEDURE — 73700 CT LOWER EXTREMITY W/O DYE: CPT

## 2023-02-01 PROCEDURE — 70450 CT HEAD/BRAIN W/O DYE: CPT

## 2023-02-01 PROCEDURE — 86850 RBC ANTIBODY SCREEN: CPT

## 2023-02-01 PROCEDURE — 73502 X-RAY EXAM HIP UNI 2-3 VIEWS: CPT

## 2023-02-01 PROCEDURE — 86900 BLOOD TYPING SEROLOGIC ABO: CPT

## 2023-02-01 PROCEDURE — 85025 COMPLETE CBC W/AUTO DIFF WBC: CPT

## 2023-02-01 RX ORDER — OXYCODONE HYDROCHLORIDE 5 MG/1
10 TABLET ORAL EVERY 4 HOURS PRN
Status: DISCONTINUED | OUTPATIENT
Start: 2023-02-01 | End: 2023-02-03 | Stop reason: HOSPADM

## 2023-02-01 RX ORDER — POLYETHYLENE GLYCOL 3350 17 G/17G
17 POWDER, FOR SOLUTION ORAL DAILY
Status: DISCONTINUED | OUTPATIENT
Start: 2023-02-02 | End: 2023-02-03

## 2023-02-01 RX ORDER — BISACODYL 10 MG
10 SUPPOSITORY, RECTAL RECTAL DAILY PRN
Status: DISCONTINUED | OUTPATIENT
Start: 2023-02-01 | End: 2023-02-03 | Stop reason: HOSPADM

## 2023-02-01 RX ORDER — ONDANSETRON 2 MG/ML
4 INJECTION INTRAMUSCULAR; INTRAVENOUS ONCE
Status: COMPLETED | OUTPATIENT
Start: 2023-02-01 | End: 2023-02-01

## 2023-02-01 RX ORDER — SODIUM CHLORIDE 9 MG/ML
INJECTION, SOLUTION INTRAVENOUS PRN
Status: DISCONTINUED | OUTPATIENT
Start: 2023-02-01 | End: 2023-02-03 | Stop reason: HOSPADM

## 2023-02-01 RX ORDER — ACETAMINOPHEN 325 MG/1
650 TABLET ORAL EVERY 4 HOURS PRN
Status: DISCONTINUED | OUTPATIENT
Start: 2023-02-01 | End: 2023-02-02

## 2023-02-01 RX ORDER — ONDANSETRON 4 MG/1
4 TABLET, ORALLY DISINTEGRATING ORAL EVERY 8 HOURS PRN
Status: DISCONTINUED | OUTPATIENT
Start: 2023-02-01 | End: 2023-02-03 | Stop reason: HOSPADM

## 2023-02-01 RX ORDER — ONDANSETRON 2 MG/ML
4 INJECTION INTRAMUSCULAR; INTRAVENOUS EVERY 6 HOURS PRN
Status: DISCONTINUED | OUTPATIENT
Start: 2023-02-01 | End: 2023-02-03 | Stop reason: HOSPADM

## 2023-02-01 RX ORDER — MORPHINE SULFATE 4 MG/ML
4 INJECTION, SOLUTION INTRAMUSCULAR; INTRAVENOUS ONCE
Status: COMPLETED | OUTPATIENT
Start: 2023-02-01 | End: 2023-02-01

## 2023-02-01 RX ORDER — SODIUM CHLORIDE 0.9 % (FLUSH) 0.9 %
5-40 SYRINGE (ML) INJECTION PRN
Status: DISCONTINUED | OUTPATIENT
Start: 2023-02-01 | End: 2023-02-03 | Stop reason: HOSPADM

## 2023-02-01 RX ORDER — SODIUM CHLORIDE 0.9 % (FLUSH) 0.9 %
5-40 SYRINGE (ML) INJECTION EVERY 12 HOURS SCHEDULED
Status: DISCONTINUED | OUTPATIENT
Start: 2023-02-01 | End: 2023-02-03 | Stop reason: HOSPADM

## 2023-02-01 RX ORDER — SODIUM CHLORIDE, SODIUM LACTATE, POTASSIUM CHLORIDE, CALCIUM CHLORIDE 600; 310; 30; 20 MG/100ML; MG/100ML; MG/100ML; MG/100ML
INJECTION, SOLUTION INTRAVENOUS CONTINUOUS
Status: DISCONTINUED | OUTPATIENT
Start: 2023-02-01 | End: 2023-02-02

## 2023-02-01 RX ORDER — OXYCODONE HYDROCHLORIDE 5 MG/1
5 TABLET ORAL EVERY 4 HOURS PRN
Status: DISCONTINUED | OUTPATIENT
Start: 2023-02-01 | End: 2023-02-03 | Stop reason: HOSPADM

## 2023-02-01 RX ADMIN — MORPHINE SULFATE 4 MG: 4 INJECTION, SOLUTION INTRAMUSCULAR; INTRAVENOUS at 20:02

## 2023-02-01 RX ADMIN — ONDANSETRON 4 MG: 2 INJECTION INTRAMUSCULAR; INTRAVENOUS at 20:01

## 2023-02-01 RX ADMIN — SODIUM CHLORIDE, PRESERVATIVE FREE 10 ML: 5 INJECTION INTRAVENOUS at 22:17

## 2023-02-01 RX ADMIN — SODIUM CHLORIDE, POTASSIUM CHLORIDE, SODIUM LACTATE AND CALCIUM CHLORIDE: 600; 310; 30; 20 INJECTION, SOLUTION INTRAVENOUS at 22:25

## 2023-02-01 ASSESSMENT — ENCOUNTER SYMPTOMS
BACK PAIN: 0
CONSTIPATION: 0
VOICE CHANGE: 0
BACK PAIN: 0
ALLERGIC/IMMUNOLOGIC NEGATIVE: 1
PHOTOPHOBIA: 0
SORE THROAT: 0
RHINORRHEA: 0
ABDOMINAL PAIN: 0
NAUSEA: 0
SINUS PAIN: 0
EYE PAIN: 0
VOMITING: 0
COUGH: 0
SHORTNESS OF BREATH: 0
EYE REDNESS: 0
EYE PAIN: 0
SINUS PRESSURE: 1
CHOKING: 0
ABDOMINAL PAIN: 0
EYE DISCHARGE: 0
BLOOD IN STOOL: 0
FACIAL SWELLING: 0
SHORTNESS OF BREATH: 0
SINUS PAIN: 0
ABDOMINAL DISTENTION: 0
FACIAL SWELLING: 0
STRIDOR: 0
APNEA: 0
NAUSEA: 0
VOMITING: 0
SINUS PRESSURE: 0
TROUBLE SWALLOWING: 0
CHEST TIGHTNESS: 0
COLOR CHANGE: 0
WHEEZING: 0
DIARRHEA: 0
EYE ITCHING: 0

## 2023-02-01 ASSESSMENT — PAIN DESCRIPTION - LOCATION
LOCATION: HIP
LOCATION: HIP

## 2023-02-01 ASSESSMENT — PAIN DESCRIPTION - ORIENTATION
ORIENTATION: RIGHT
ORIENTATION: RIGHT

## 2023-02-01 ASSESSMENT — PAIN - FUNCTIONAL ASSESSMENT
PAIN_FUNCTIONAL_ASSESSMENT: NONE - DENIES PAIN
PAIN_FUNCTIONAL_ASSESSMENT: NONE - DENIES PAIN

## 2023-02-01 ASSESSMENT — PAIN SCALES - GENERAL: PAINLEVEL_OUTOF10: 5

## 2023-02-01 NOTE — ED NOTES
Per Lucina Ferguson NP, Dr Jordan Astudillo requests Dr Nelson Alfaro be paged as a courtesy due to upcoming elective surgery patient was already scheduled with     Hang Mello RN  02/01/23 [id number]

## 2023-02-01 NOTE — ED NOTES
Dr Yony Craig answering service notified, Dr Cortez Barriga will take the case and they will relay the message to Dr Nely Arce RN  02/01/23 4591

## 2023-02-01 NOTE — ED NOTES
ED Encounter summary, and Emtala included in patient transfer packet     Philippe Rueda RN  02/01/23 4552

## 2023-02-01 NOTE — ED NOTES
Vaishnavi Fraire NP speaking with Dr Augie De Jesus orthopedics regarding case     Mary Jo Quiros RN  02/01/23 [phone]

## 2023-02-01 NOTE — ED NOTES
Dr Jakob Vilchis speaking with Christi Mitchell NP regarding patient as requested     Felix Snyder RN  02/01/23 0773

## 2023-02-01 NOTE — ED NOTES
Dr Amber Green paged for consult if patient can be admitted to [facility] [place] per Dr Guzman Young NP request          Jessica Baumann RN  02/01/23 0185

## 2023-02-01 NOTE — ED NOTES
Per Cesilia Randolph NP, transfer to [facility] [place] under accepting service trauma due to no surgeries at [facility] for the next week.      [address]  02/01/23 7318

## 2023-02-01 NOTE — ED PROVIDER NOTES
[address] [place] [address] ED  EMERGENCY DEPARTMENT ENCOUNTER      Pt Name: Suhail Pedroza  MRN: [id number]  [place] 1950  Date of evaluation: 2/1/2023  Provider: AZAEL Crowe CNP    CHIEF COMPLAINT       Chief Complaint   Patient presents with    Hip Pain     Right hip pain. Susi Leap on Monday due to dizziness injuring right hip. Negative LOC or blood thinners. HISTORY OF PRESENT ILLNESS   (Location/Symptom, Timing/Onset, Context/Setting, Quality, Duration, Modifying Factors, Severity)  Note limiting factors. Suhail Pedroza is a 74 y.o. female who, per chart review, has past medical history of depression, hpl presents to the emergency department with c/o moderate, constant, aching R hip pain x 3 days. States she has been feeling dizzy lately which caused her to lose her balance and fall, landing on her R hip on Monday. Pt denies hitting her head, denies LOC. States pain is worse with ambulation. Pt denies chest pain, shortness of breath, palpitations, fevers, abdominal pain, nausea, vomiting, diarrhea, dysuria, hematuria, flank pain, incontinence. Nursing Notes were reviewed. REVIEW OF SYSTEMS    (2-9 systems for level 4, 10 or more for level 5)     Review of Systems   Constitutional:  Negative for chills, diaphoresis, fatigue and fever. HENT:  Negative for congestion, dental problem, drooling, ear discharge, ear pain, facial swelling, hearing loss, mouth sores, nosebleeds, postnasal drip, rhinorrhea, sinus pressure, sinus pain, sneezing, sore throat, tinnitus, trouble swallowing and voice change. Eyes:  Negative for photophobia, pain, discharge, redness, itching and visual disturbance. Respiratory:  Negative for apnea, cough, choking, chest tightness, shortness of breath, wheezing and stridor. Cardiovascular:  Negative for chest pain, palpitations and leg swelling.    Gastrointestinal:  Negative for abdominal distention, abdominal pain, blood in stool, constipation, diarrhea, nausea and vomiting. Endocrine: Negative. Genitourinary: Negative. Negative for decreased urine volume, difficulty urinating, dysuria, flank pain, frequency and hematuria. Musculoskeletal: Negative. Negative for arthralgias, back pain, gait problem, joint swelling, myalgias, neck pain and neck stiffness. Skin: Negative. Negative for color change, rash and wound. Allergic/Immunologic: Negative. Neurological:  Positive for dizziness. Negative for tremors, seizures, syncope, facial asymmetry, speech difficulty, weakness, light-headedness, numbness and headaches. Psychiatric/Behavioral: Negative. Negative for confusion. All other systems reviewed and are negative. Except as noted above the remainder of the review of systems was reviewed and negative. PAST MEDICAL HISTORY     Past Medical History:   Diagnosis Date    Depression     Hyperlipidemia     Protruded lumbar disc     L4/L5    Stress     Vitamin D deficiency          SURGICAL HISTORY       Past Surgical History:   Procedure Laterality Date    ANKLE SURGERY  2001    FOOT SURGERY  2001    SINUS SURGERY  07-         CURRENT MEDICATIONS       Discharge Medication List as of 2/1/2023  6:04 PM        CONTINUE these medications which have NOT CHANGED    Details   !! Misc. Devices (BATH/SHOWER SEAT) MISC Disp-1 each, R-0, PrintDispense 1 Shower/Bath seat      !! Misc. Devices (CLASSICS ROLLING WALKER) MISC Dispense 1 rolling walker, Disp-1 each, R-0Print      !! Misc.  Devices (RAISED TOILET SEAT) MISC Disp-1 each, R-0, PrintDispense 1 raised toilet seat      levothyroxine (SYNTHROID) 50 MCG tablet Take 1 tablet by mouth once daily, Disp-90 tablet, R-1Normal      Cholecalciferol (VITAMIN D3 ADULT GUMMIES) 25 MCG (1000 UT) CHEW Historical Med      turmeric 500 MG CAPS Historical Med      !! citalopram (CELEXA) 20 MG tablet Take 1 tablet by mouth once daily, Disp-90 tablet, R-1Normal      !! citalopram (CELEXA) 10 MG tablet TAKE 1 TABLET BY MOUTH ONCE DAILY WITH  THE  20MG  TABLET, Disp-90 tablet, R-1Normal      pravastatin (PRAVACHOL) 20 MG tablet Take 1 tablet by mouth once daily, Disp-90 tablet, R-1Normal      meloxicam (MOBIC) 15 MG tablet Take 1 tablet by mouth daily, Disp-30 tablet, R-1Normal      !! UNABLE TO FIND Take 1 tablet by mouth daily [facility]      [employer] 500 MG CAPS Take 1 capsule by mouth dailyHistorical Med      !! UNABLE TO FIND Take 1 capsule by mouth daily Tumeric root extractHistorical Med      Pediatric Multivitamins-Fl (MULTI VIT/FL PO) Take 1 capsule by mouth dailyHistorical Med      Cholecalciferol 50 MCG (2000 UT) CAPS Take by mouth dailyHistorical Med       !! - Potential duplicate medications found. Please discuss with provider. ALLERGIES     Patient has no known allergies. FAMILY HISTORY       Family History   Problem Relation Age of Onset    Anemia Mother     Heart Disease Mother     Depression Mother     High Blood Pressure Father           SOCIAL HISTORY       Social History     Socioeconomic History    Marital status:      Spouse name: None    Number of children: None    Years of education: None    Highest education level: None   Tobacco Use    Smoking status: Never    Smokeless tobacco: Never   Substance and Sexual Activity    Alcohol use: No    Drug use: Never    Sexual activity: Not Currently     Partners: Male       SCREENINGS                               CIWA Assessment  BP: 135/67  Heart Rate: 87                 PHYSICAL EXAM    (up to 7 for level 4, 8 or more for level 5)     ED Triage Vitals   BP Temp Temp Source Heart Rate Resp SpO2 Height Weight   02/01/23 1627 02/01/23 1627 02/01/23 1627 02/01/23 1627 02/01/23 1627 02/01/23 1627 02/01/23 1631 02/01/23 1631   (!) 157/78 98.4 °F (36.9 °C) Temporal 88 18 96 % 5' 4\" (1.626 m) 188 lb (85.3 kg)       Physical Exam  Vitals and nursing note reviewed.    Constitutional:       General: She is not in acute distress. Appearance: Normal appearance. She is normal weight. She is not ill-appearing, toxic-appearing or diaphoretic. HENT:      Head: Normocephalic and atraumatic. Right Ear: Tympanic membrane, ear canal and external ear normal.      Left Ear: Tympanic membrane, ear canal and external ear normal.      Nose: Nose normal. No congestion or rhinorrhea. Mouth/Throat:      Mouth: Mucous membranes are moist.      Pharynx: Oropharynx is clear. No oropharyngeal exudate or posterior oropharyngeal erythema. Eyes:      Extraocular Movements: Extraocular movements intact. Conjunctiva/sclera: Conjunctivae normal.      Pupils: Pupils are equal, round, and reactive to light. Cardiovascular:      Rate and Rhythm: Normal rate and regular rhythm. Pulses: Normal pulses. Heart sounds: Normal heart sounds. Pulmonary:      Effort: Pulmonary effort is normal. No respiratory distress. Breath sounds: Normal breath sounds. Abdominal:      General: Bowel sounds are normal.      Palpations: Abdomen is soft. Tenderness: There is no abdominal tenderness. There is no guarding or rebound. Musculoskeletal:         General: Normal range of motion. Cervical back: Normal range of motion and neck supple. No tenderness. Right hip: Tenderness present. Left hip: Normal.   Skin:     General: Skin is warm and dry. Capillary Refill: Capillary refill takes less than 2 seconds. Findings: No rash. Neurological:      General: No focal deficit present. Mental Status: She is alert and oriented to person, place, and time. Cranial Nerves: No cranial nerve deficit. Sensory: No sensory deficit. Motor: No weakness. Gait: Gait normal.   Psychiatric:         Mood and Affect: Mood normal.         Behavior: Behavior normal.         Thought Content:  Thought content normal.         Judgment: Judgment normal.       DIAGNOSTIC RESULTS     EKG: All EKG's are interpreted by the Emergency Department Physician who either signs or Co-signs this chart in the absence of a cardiologist.        RADIOLOGY:   Non-plain film images such as CT, Ultrasound and MRI are read by the radiologist. Plain radiographic images are visualized and preliminarily interpreted by the emergency physician with the below findings:        Interpretation per the Radiologist below, if available at the time of this note:    CT HIP RIGHT WO CONTRAST   Final Result   1. Acute subcapital fracture of the proximal right femur with valgus   angulation of the femoral head. XR HIP RIGHT (2-3 VIEWS)   Final Result   Right femoral neck fracture. CT HEAD WO CONTRAST   Final Result   1. There is no acute intracranial abnormality. Specifically, there is no   intracranial hemorrhage. 2. Atrophy and periventricular leukomalacia,               ED BEDSIDE ULTRASOUND:   Performed by ED Physician - none    LABS:  Labs Reviewed   CBC WITH AUTO DIFFERENTIAL - Abnormal; Notable for the following components:       Result Value    Neutrophils % 83.3 (*)     Neutrophils Absolute 8.4 (*)     Lymphocytes Absolute 0.5 (*)     All other components within normal limits   COMPREHENSIVE METABOLIC PANEL W/ REFLEX TO MG FOR LOW K - Abnormal; Notable for the following components:    Creatinine 0.92 (*)     All other components within normal limits   PROTIME-INR   URINALYSIS WITH REFLEX TO CULTURE       All other labs were within normal range or not returned as of this dictation. EMERGENCY DEPARTMENT COURSE and DIFFERENTIAL DIAGNOSIS/MDM:   Vitals:    Vitals:    02/01/23 1627 02/01/23 1631 02/01/23 1745   BP: (!) 157/78  135/67   Pulse: 88  87   Resp: 18  16   Temp: 98.4 °F (36.9 °C)     TempSrc: Temporal     SpO2: 96%  96%   Weight:  188 lb (85.3 kg)    Height:  5' 4\" (1.626 m)        MDM      74 y.o. female presents to the ED for evaluation of dizziness, R hip pain s/p fall 2 days ago.  Pt offered pain medications but declines them at this time, states there is no pain while laying still. No bruising noted. Palpable DP and PT pulses bilaterally with good cap refill in toes. CT head negative. Labs unremarkable. Xray shows R femoral neck fracture. CT hip shows:  Acute subcapital fracture of the proximal right femur with valgus   angulation of the femoral head. Spoke with trauma surgeon, Dr. Kera Franco, who agrees to accept pt at [facility]. Spoke with pt's orthopedic physician, Dr. Ana Maria Cedillo, who agrees to consult. Spoke with ED physician at [facility], Dr. Candace De Leon, who agrees to accept pt ED to ED. Pt transferred to [facility] in stable condition. REASSESSMENT          CRITICAL CARE TIME   Total Critical Care time was  minutes, excluding separately reportable procedures. There was a high probability of clinically significant/life threatening deterioration in the patient's condition which required my urgent intervention. CONSULTS:  None    PROCEDURES:  Unless otherwise noted below, none     Procedures    FINAL IMPRESSION      1. Closed fracture of right hip, initial encounter [facility])          DISPOSITION/PLAN   DISPOSITION Decision To Transfer 02/01/2023 05:38:45 PM      PATIENT REFERRED TO:  No follow-up provider specified. DISCHARGE MEDICATIONS:  Discharge Medication List as of 2/1/2023  6:04 PM        Controlled Substances Monitoring:     No flowsheet data found.     (Please note that portions of this note were completed with a voice recognition program.  Efforts were made to edit the dictations but occasionally words are mis-transcribed.)    AZAEL Rajan CNP (electronically signed)  Attending Emergency Physician           AZAEL Rajan CNP  02/01/23 2025

## 2023-02-01 NOTE — ED NOTES
Pt arrived to ED via [facility] as transfer from [facility] for right hip fracture. Pt states she fell on Monday and the pain got worse. Pt is usually ambulatory with cane. Pt denies hitting her head, no LOC, no blood thinners. Pt denies chest pain, abd pain. Pt states she is waiting a left knee replacement.  Pt is a&ox4     Cholo Evans RN  02/01/23 [id number]

## 2023-02-01 NOTE — ED NOTES
Shady Mccoy speaking with Dr Leonel England regarding transfer to trauma ER to ER, accepted per [facility], RN  02/01/23 3157

## 2023-02-01 NOTE — ED NOTES
Patient transferred to ED [facility] ED for trauma  Report called to [facility] RN in ED     [facility], RN  02/01/23 5393

## 2023-02-02 ENCOUNTER — APPOINTMENT (OUTPATIENT)
Dept: GENERAL RADIOLOGY | Age: 73
End: 2023-02-02
Payer: MEDICARE

## 2023-02-02 ENCOUNTER — ANESTHESIA EVENT (OUTPATIENT)
Dept: OPERATING ROOM | Age: 73
End: 2023-02-02
Payer: MEDICARE

## 2023-02-02 ENCOUNTER — APPOINTMENT (OUTPATIENT)
Dept: CT IMAGING | Age: 73
End: 2023-02-02
Payer: MEDICARE

## 2023-02-02 ENCOUNTER — ANESTHESIA (OUTPATIENT)
Dept: OPERATING ROOM | Age: 73
End: 2023-02-02
Payer: MEDICARE

## 2023-02-02 ENCOUNTER — APPOINTMENT (OUTPATIENT)
Dept: ULTRASOUND IMAGING | Age: 73
End: 2023-02-02
Payer: MEDICARE

## 2023-02-02 PROBLEM — S72.011A SUBCAPITAL FRACTURE OF FEMUR, RIGHT, CLOSED, INITIAL ENCOUNTER (HCC): Status: ACTIVE | Noted: 2023-02-02

## 2023-02-02 LAB
ANION GAP SERPL CALCULATED.3IONS-SCNC: 9 MEQ/L (ref 9–15)
BASOPHILS ABSOLUTE: 0.1 K/UL (ref 0–0.2)
BASOPHILS RELATIVE PERCENT: 0.6 %
BUN BLDV-MCNC: 18 MG/DL (ref 8–23)
CALCIUM SERPL-MCNC: 8.2 MG/DL (ref 8.5–9.9)
CHLORIDE BLD-SCNC: 106 MEQ/L (ref 95–107)
CO2: 23 MEQ/L (ref 20–31)
CREAT SERPL-MCNC: 0.93 MG/DL (ref 0.5–0.9)
EOSINOPHILS ABSOLUTE: 0.3 K/UL (ref 0–0.7)
EOSINOPHILS RELATIVE PERCENT: 4 %
GFR SERPL CREATININE-BSD FRML MDRD: >60 ML/MIN/{1.73_M2}
GLUCOSE BLD-MCNC: 118 MG/DL (ref 70–99)
HCT VFR BLD CALC: 32.7 % (ref 37–47)
HEMOGLOBIN: 11 G/DL (ref 12–16)
LV EF: 63 %
LVEF MODALITY: NORMAL
LYMPHOCYTES ABSOLUTE: 0.8 K/UL (ref 1–4.8)
LYMPHOCYTES RELATIVE PERCENT: 10.6 %
MCH RBC QN AUTO: 30.9 PG (ref 27–31.3)
MCHC RBC AUTO-ENTMCNC: 33.6 % (ref 33–37)
MCV RBC AUTO: 92.1 FL (ref 79.4–94.8)
MONOCYTES ABSOLUTE: 0.8 K/UL (ref 0.2–0.8)
MONOCYTES RELATIVE PERCENT: 10.7 %
NEUTROPHILS ABSOLUTE: 5.8 K/UL (ref 1.4–6.5)
NEUTROPHILS RELATIVE PERCENT: 74.1 %
PDW BLD-RTO: 13.8 % (ref 11.5–14.5)
PLATELET # BLD: 189 K/UL (ref 130–400)
POTASSIUM REFLEX MAGNESIUM: 3.8 MEQ/L (ref 3.4–4.9)
RBC # BLD: 3.55 M/UL (ref 4.2–5.4)
SODIUM BLD-SCNC: 138 MEQ/L (ref 135–144)
WBC # BLD: 7.8 K/UL (ref 4.8–10.8)

## 2023-02-02 PROCEDURE — 2580000003 HC RX 258: Performed by: SURGERY

## 2023-02-02 PROCEDURE — 2709999900 HC NON-CHARGEABLE SUPPLY: Performed by: ORTHOPAEDIC SURGERY

## 2023-02-02 PROCEDURE — 36415 COLL VENOUS BLD VENIPUNCTURE: CPT

## 2023-02-02 PROCEDURE — 6360000002 HC RX W HCPCS: Performed by: ORTHOPAEDIC SURGERY

## 2023-02-02 PROCEDURE — 73501 X-RAY EXAM HIP UNI 1 VIEW: CPT

## 2023-02-02 PROCEDURE — 2500000003 HC RX 250 WO HCPCS: Performed by: NURSE ANESTHETIST, CERTIFIED REGISTERED

## 2023-02-02 PROCEDURE — 1210000000 HC MED SURG R&B

## 2023-02-02 PROCEDURE — 72131 CT LUMBAR SPINE W/O DYE: CPT

## 2023-02-02 PROCEDURE — 6370000000 HC RX 637 (ALT 250 FOR IP): Performed by: NURSE PRACTITIONER

## 2023-02-02 PROCEDURE — 2500000003 HC RX 250 WO HCPCS: Performed by: ORTHOPAEDIC SURGERY

## 2023-02-02 PROCEDURE — 6370000000 HC RX 637 (ALT 250 FOR IP): Performed by: PHYSICIAN ASSISTANT

## 2023-02-02 PROCEDURE — 7100000000 HC PACU RECOVERY - FIRST 15 MIN: Performed by: ORTHOPAEDIC SURGERY

## 2023-02-02 PROCEDURE — 99222 1ST HOSP IP/OBS MODERATE 55: CPT | Performed by: ORTHOPAEDIC SURGERY

## 2023-02-02 PROCEDURE — 6360000002 HC RX W HCPCS: Performed by: NURSE PRACTITIONER

## 2023-02-02 PROCEDURE — 2580000003 HC RX 258: Performed by: ORTHOPAEDIC SURGERY

## 2023-02-02 PROCEDURE — 20610 DRAIN/INJ JOINT/BURSA W/O US: CPT | Performed by: ORTHOPAEDIC SURGERY

## 2023-02-02 PROCEDURE — C1769 GUIDE WIRE: HCPCS | Performed by: ORTHOPAEDIC SURGERY

## 2023-02-02 PROCEDURE — 0QH634Z INSERTION OF INTERNAL FIXATION DEVICE INTO RIGHT UPPER FEMUR, PERCUTANEOUS APPROACH: ICD-10-PCS | Performed by: ORTHOPAEDIC SURGERY

## 2023-02-02 PROCEDURE — 6360000002 HC RX W HCPCS: Performed by: ANESTHESIOLOGY

## 2023-02-02 PROCEDURE — 3600000014 HC SURGERY LEVEL 4 ADDTL 15MIN: Performed by: ORTHOPAEDIC SURGERY

## 2023-02-02 PROCEDURE — 93880 EXTRACRANIAL BILAT STUDY: CPT

## 2023-02-02 PROCEDURE — 7100000001 HC PACU RECOVERY - ADDTL 15 MIN: Performed by: ORTHOPAEDIC SURGERY

## 2023-02-02 PROCEDURE — 3209999900 FLUORO FOR SURGICAL PROCEDURES

## 2023-02-02 PROCEDURE — C8929 TTE W OR WO FOL WCON,DOPPLER: HCPCS

## 2023-02-02 PROCEDURE — A4217 STERILE WATER/SALINE, 500 ML: HCPCS | Performed by: ORTHOPAEDIC SURGERY

## 2023-02-02 PROCEDURE — 6370000000 HC RX 637 (ALT 250 FOR IP): Performed by: SURGERY

## 2023-02-02 PROCEDURE — 72128 CT CHEST SPINE W/O DYE: CPT

## 2023-02-02 PROCEDURE — 3600000004 HC SURGERY LEVEL 4 BASE: Performed by: ORTHOPAEDIC SURGERY

## 2023-02-02 PROCEDURE — 6360000002 HC RX W HCPCS: Performed by: SURGERY

## 2023-02-02 PROCEDURE — 6360000002 HC RX W HCPCS: Performed by: NURSE ANESTHETIST, CERTIFIED REGISTERED

## 2023-02-02 PROCEDURE — 85025 COMPLETE CBC W/AUTO DIFF WBC: CPT

## 2023-02-02 PROCEDURE — 27235 TREAT THIGH FRACTURE: CPT | Performed by: ORTHOPAEDIC SURGERY

## 2023-02-02 PROCEDURE — 3700000001 HC ADD 15 MINUTES (ANESTHESIA): Performed by: ORTHOPAEDIC SURGERY

## 2023-02-02 PROCEDURE — 6370000000 HC RX 637 (ALT 250 FOR IP): Performed by: ORTHOPAEDIC SURGERY

## 2023-02-02 PROCEDURE — 3700000000 HC ANESTHESIA ATTENDED CARE: Performed by: ORTHOPAEDIC SURGERY

## 2023-02-02 PROCEDURE — C1713 ANCHOR/SCREW BN/BN,TIS/BN: HCPCS | Performed by: ORTHOPAEDIC SURGERY

## 2023-02-02 PROCEDURE — 3E0U33Z INTRODUCTION OF ANTI-INFLAMMATORY INTO JOINTS, PERCUTANEOUS APPROACH: ICD-10-PCS | Performed by: ORTHOPAEDIC SURGERY

## 2023-02-02 PROCEDURE — 80048 BASIC METABOLIC PNL TOTAL CA: CPT

## 2023-02-02 DEVICE — SCREW BNE L80MM DIA7.3MM THRD L16MM CANC S STL SELF DRL ST: Type: IMPLANTABLE DEVICE | Site: HIP | Status: FUNCTIONAL

## 2023-02-02 DEVICE — WASHER ORTH DIA13MM FOR CANN SCR: Type: IMPLANTABLE DEVICE | Site: HIP | Status: FUNCTIONAL

## 2023-02-02 RX ORDER — SODIUM CHLORIDE 9 MG/ML
INJECTION, SOLUTION INTRAVENOUS PRN
Status: DISCONTINUED | OUTPATIENT
Start: 2023-02-02 | End: 2023-02-03 | Stop reason: HOSPADM

## 2023-02-02 RX ORDER — LIDOCAINE HYDROCHLORIDE 20 MG/ML
INJECTION, SOLUTION INFILTRATION; PERINEURAL PRN
Status: DISCONTINUED | OUTPATIENT
Start: 2023-02-02 | End: 2023-02-02 | Stop reason: SDUPTHER

## 2023-02-02 RX ORDER — METOCLOPRAMIDE HYDROCHLORIDE 5 MG/ML
10 INJECTION INTRAMUSCULAR; INTRAVENOUS
Status: DISCONTINUED | OUTPATIENT
Start: 2023-02-02 | End: 2023-02-02 | Stop reason: HOSPADM

## 2023-02-02 RX ORDER — TRIAMCINOLONE ACETONIDE 40 MG/ML
INJECTION, SUSPENSION INTRA-ARTICULAR; INTRAMUSCULAR PRN
Status: DISCONTINUED | OUTPATIENT
Start: 2023-02-02 | End: 2023-02-02 | Stop reason: HOSPADM

## 2023-02-02 RX ORDER — ONDANSETRON 2 MG/ML
INJECTION INTRAMUSCULAR; INTRAVENOUS PRN
Status: DISCONTINUED | OUTPATIENT
Start: 2023-02-02 | End: 2023-02-02 | Stop reason: SDUPTHER

## 2023-02-02 RX ORDER — SODIUM CHLORIDE 0.9 % (FLUSH) 0.9 %
5-40 SYRINGE (ML) INJECTION EVERY 12 HOURS SCHEDULED
Status: DISCONTINUED | OUTPATIENT
Start: 2023-02-02 | End: 2023-02-02 | Stop reason: HOSPADM

## 2023-02-02 RX ORDER — CELECOXIB 200 MG/1
200 CAPSULE ORAL ONCE
Status: COMPLETED | OUTPATIENT
Start: 2023-02-02 | End: 2023-02-02

## 2023-02-02 RX ORDER — OXYCODONE HYDROCHLORIDE 5 MG/1
5 TABLET ORAL EVERY 4 HOURS PRN
Status: DISCONTINUED | OUTPATIENT
Start: 2023-02-02 | End: 2023-02-02 | Stop reason: SDUPTHER

## 2023-02-02 RX ORDER — DEXAMETHASONE SODIUM PHOSPHATE 4 MG/ML
INJECTION, SOLUTION INTRA-ARTICULAR; INTRALESIONAL; INTRAMUSCULAR; INTRAVENOUS; SOFT TISSUE PRN
Status: DISCONTINUED | OUTPATIENT
Start: 2023-02-02 | End: 2023-02-02 | Stop reason: SDUPTHER

## 2023-02-02 RX ORDER — METHOCARBAMOL 500 MG/1
500 TABLET, FILM COATED ORAL 3 TIMES DAILY
Status: DISCONTINUED | OUTPATIENT
Start: 2023-02-02 | End: 2023-02-03 | Stop reason: HOSPADM

## 2023-02-02 RX ORDER — LIDOCAINE HYDROCHLORIDE 20 MG/ML
INJECTION, SOLUTION EPIDURAL; INFILTRATION; INTRACAUDAL; PERINEURAL PRN
Status: DISCONTINUED | OUTPATIENT
Start: 2023-02-02 | End: 2023-02-02 | Stop reason: HOSPADM

## 2023-02-02 RX ORDER — MORPHINE SULFATE 2 MG/ML
2 INJECTION, SOLUTION INTRAMUSCULAR; INTRAVENOUS
Status: DISCONTINUED | OUTPATIENT
Start: 2023-02-02 | End: 2023-02-02

## 2023-02-02 RX ORDER — ACETAMINOPHEN 325 MG/1
650 TABLET ORAL EVERY 6 HOURS
Status: DISCONTINUED | OUTPATIENT
Start: 2023-02-02 | End: 2023-02-02

## 2023-02-02 RX ORDER — SCOLOPAMINE TRANSDERMAL SYSTEM 1 MG/1
1 PATCH, EXTENDED RELEASE TRANSDERMAL ONCE
Status: DISCONTINUED | OUTPATIENT
Start: 2023-02-02 | End: 2023-02-03 | Stop reason: HOSPADM

## 2023-02-02 RX ORDER — LEVOTHYROXINE SODIUM 0.05 MG/1
50 TABLET ORAL DAILY
Status: DISCONTINUED | OUTPATIENT
Start: 2023-02-03 | End: 2023-02-03 | Stop reason: HOSPADM

## 2023-02-02 RX ORDER — GABAPENTIN 100 MG/1
100 CAPSULE ORAL ONCE
Status: COMPLETED | OUTPATIENT
Start: 2023-02-02 | End: 2023-02-02

## 2023-02-02 RX ORDER — SODIUM CHLORIDE 9 MG/ML
25 INJECTION, SOLUTION INTRAVENOUS PRN
Status: DISCONTINUED | OUTPATIENT
Start: 2023-02-02 | End: 2023-02-02 | Stop reason: HOSPADM

## 2023-02-02 RX ORDER — PRAVASTATIN SODIUM 10 MG
20 TABLET ORAL NIGHTLY
Status: DISCONTINUED | OUTPATIENT
Start: 2023-02-02 | End: 2023-02-03 | Stop reason: HOSPADM

## 2023-02-02 RX ORDER — ONDANSETRON 2 MG/ML
4 INJECTION INTRAMUSCULAR; INTRAVENOUS
Status: DISCONTINUED | OUTPATIENT
Start: 2023-02-02 | End: 2023-02-02 | Stop reason: HOSPADM

## 2023-02-02 RX ORDER — SODIUM CHLORIDE 0.9 % (FLUSH) 0.9 %
5-40 SYRINGE (ML) INJECTION EVERY 12 HOURS SCHEDULED
Status: DISCONTINUED | OUTPATIENT
Start: 2023-02-02 | End: 2023-02-03 | Stop reason: HOSPADM

## 2023-02-02 RX ORDER — CEFAZOLIN SODIUM IN 0.9 % NACL 2 G/100 ML
2000 PLASTIC BAG, INJECTION (ML) INTRAVENOUS EVERY 8 HOURS
Status: COMPLETED | OUTPATIENT
Start: 2023-02-03 | End: 2023-02-03

## 2023-02-02 RX ORDER — FENTANYL CITRATE 50 UG/ML
INJECTION, SOLUTION INTRAMUSCULAR; INTRAVENOUS PRN
Status: DISCONTINUED | OUTPATIENT
Start: 2023-02-02 | End: 2023-02-02 | Stop reason: SDUPTHER

## 2023-02-02 RX ORDER — OXYCODONE HCL 10 MG/1
10 TABLET, FILM COATED, EXTENDED RELEASE ORAL ONCE
Status: COMPLETED | OUTPATIENT
Start: 2023-02-02 | End: 2023-02-02

## 2023-02-02 RX ORDER — DIPHENHYDRAMINE HYDROCHLORIDE 50 MG/ML
12.5 INJECTION INTRAMUSCULAR; INTRAVENOUS
Status: DISCONTINUED | OUTPATIENT
Start: 2023-02-02 | End: 2023-02-02 | Stop reason: HOSPADM

## 2023-02-02 RX ORDER — MAGNESIUM HYDROXIDE 1200 MG/15ML
LIQUID ORAL CONTINUOUS PRN
Status: DISCONTINUED | OUTPATIENT
Start: 2023-02-02 | End: 2023-02-02 | Stop reason: HOSPADM

## 2023-02-02 RX ORDER — SODIUM CHLORIDE 0.9 % (FLUSH) 0.9 %
5-40 SYRINGE (ML) INJECTION PRN
Status: DISCONTINUED | OUTPATIENT
Start: 2023-02-02 | End: 2023-02-03 | Stop reason: HOSPADM

## 2023-02-02 RX ORDER — CEFAZOLIN SODIUM IN 0.9 % NACL 2 G/100 ML
2000 PLASTIC BAG, INJECTION (ML) INTRAVENOUS EVERY 8 HOURS
Status: DISCONTINUED | OUTPATIENT
Start: 2023-02-02 | End: 2023-02-02 | Stop reason: SDUPTHER

## 2023-02-02 RX ORDER — BUPIVACAINE HYDROCHLORIDE 2.5 MG/ML
INJECTION, SOLUTION EPIDURAL; INFILTRATION; INTRACAUDAL PRN
Status: DISCONTINUED | OUTPATIENT
Start: 2023-02-02 | End: 2023-02-02 | Stop reason: HOSPADM

## 2023-02-02 RX ORDER — FENTANYL CITRATE 0.05 MG/ML
50 INJECTION, SOLUTION INTRAMUSCULAR; INTRAVENOUS EVERY 10 MIN PRN
Status: DISCONTINUED | OUTPATIENT
Start: 2023-02-02 | End: 2023-02-02 | Stop reason: HOSPADM

## 2023-02-02 RX ORDER — ENOXAPARIN SODIUM 100 MG/ML
40 INJECTION SUBCUTANEOUS DAILY
Status: DISCONTINUED | OUTPATIENT
Start: 2023-02-02 | End: 2023-02-03

## 2023-02-02 RX ORDER — CEFAZOLIN SODIUM IN 0.9 % NACL 2 G/100 ML
2000 PLASTIC BAG, INJECTION (ML) INTRAVENOUS ONCE
Status: COMPLETED | OUTPATIENT
Start: 2023-02-02 | End: 2023-02-02

## 2023-02-02 RX ORDER — PROPOFOL 10 MG/ML
INJECTION, EMULSION INTRAVENOUS PRN
Status: DISCONTINUED | OUTPATIENT
Start: 2023-02-02 | End: 2023-02-02 | Stop reason: SDUPTHER

## 2023-02-02 RX ORDER — ACETAMINOPHEN 500 MG
1000 TABLET ORAL ONCE
Status: COMPLETED | OUTPATIENT
Start: 2023-02-02 | End: 2023-02-02

## 2023-02-02 RX ORDER — SODIUM CHLORIDE 0.9 % (FLUSH) 0.9 %
5-40 SYRINGE (ML) INJECTION PRN
Status: DISCONTINUED | OUTPATIENT
Start: 2023-02-02 | End: 2023-02-02 | Stop reason: HOSPADM

## 2023-02-02 RX ORDER — SODIUM CHLORIDE, SODIUM LACTATE, POTASSIUM CHLORIDE, CALCIUM CHLORIDE 600; 310; 30; 20 MG/100ML; MG/100ML; MG/100ML; MG/100ML
INJECTION, SOLUTION INTRAVENOUS CONTINUOUS
Status: DISCONTINUED | OUTPATIENT
Start: 2023-02-02 | End: 2023-02-03

## 2023-02-02 RX ORDER — MEPERIDINE HYDROCHLORIDE 25 MG/ML
12.5 INJECTION INTRAMUSCULAR; INTRAVENOUS; SUBCUTANEOUS
Status: DISCONTINUED | OUTPATIENT
Start: 2023-02-02 | End: 2023-02-02 | Stop reason: HOSPADM

## 2023-02-02 RX ORDER — CITALOPRAM 20 MG/1
30 TABLET ORAL DAILY
Status: DISCONTINUED | OUTPATIENT
Start: 2023-02-02 | End: 2023-02-03 | Stop reason: HOSPADM

## 2023-02-02 RX ORDER — OXYCODONE HYDROCHLORIDE 5 MG/1
5 TABLET ORAL
Status: DISCONTINUED | OUTPATIENT
Start: 2023-02-02 | End: 2023-02-02 | Stop reason: HOSPADM

## 2023-02-02 RX ADMIN — OXYCODONE 10 MG: 5 TABLET ORAL at 06:06

## 2023-02-02 RX ADMIN — ENOXAPARIN SODIUM 40 MG: 100 INJECTION SUBCUTANEOUS at 20:59

## 2023-02-02 RX ADMIN — PHENYLEPHRINE HYDROCHLORIDE 100 MCG: 10 INJECTION INTRAVENOUS at 17:28

## 2023-02-02 RX ADMIN — CITALOPRAM HYDROBROMIDE 30 MG: 20 TABLET ORAL at 21:00

## 2023-02-02 RX ADMIN — ACETAMINOPHEN 650 MG: 325 TABLET ORAL at 19:54

## 2023-02-02 RX ADMIN — ONDANSETRON 4 MG: 2 INJECTION INTRAMUSCULAR; INTRAVENOUS at 17:09

## 2023-02-02 RX ADMIN — SODIUM CHLORIDE, POTASSIUM CHLORIDE, SODIUM LACTATE AND CALCIUM CHLORIDE: 600; 310; 30; 20 INJECTION, SOLUTION INTRAVENOUS at 19:40

## 2023-02-02 RX ADMIN — SODIUM CHLORIDE, PRESERVATIVE FREE 10 ML: 5 INJECTION INTRAVENOUS at 21:04

## 2023-02-02 RX ADMIN — PROPOFOL 150 MG: 10 INJECTION, EMULSION INTRAVENOUS at 17:04

## 2023-02-02 RX ADMIN — METHOCARBAMOL 500 MG: 500 TABLET ORAL at 22:20

## 2023-02-02 RX ADMIN — Medication 2000 MG: at 17:07

## 2023-02-02 RX ADMIN — FENTANYL CITRATE 50 MCG: 50 INJECTION, SOLUTION INTRAMUSCULAR; INTRAVENOUS at 17:04

## 2023-02-02 RX ADMIN — HYDROMORPHONE HYDROCHLORIDE 0.5 MG: 1 INJECTION, SOLUTION INTRAMUSCULAR; INTRAVENOUS; SUBCUTANEOUS at 19:53

## 2023-02-02 RX ADMIN — GABAPENTIN 100 MG: 100 CAPSULE ORAL at 16:02

## 2023-02-02 RX ADMIN — FENTANYL CITRATE 50 MCG: 50 INJECTION, SOLUTION INTRAMUSCULAR; INTRAVENOUS at 17:30

## 2023-02-02 RX ADMIN — SODIUM CHLORIDE, PRESERVATIVE FREE 10 ML: 5 INJECTION INTRAVENOUS at 21:03

## 2023-02-02 RX ADMIN — OXYCODONE HYDROCHLORIDE 10 MG: 10 TABLET, FILM COATED, EXTENDED RELEASE ORAL at 16:02

## 2023-02-02 RX ADMIN — CELECOXIB 200 MG: 200 CAPSULE ORAL at 16:02

## 2023-02-02 RX ADMIN — PHENYLEPHRINE HYDROCHLORIDE 100 MCG: 10 INJECTION INTRAVENOUS at 17:46

## 2023-02-02 RX ADMIN — ACETAMINOPHEN 1000 MG: 500 TABLET ORAL at 16:02

## 2023-02-02 RX ADMIN — PRAVASTATIN SODIUM 20 MG: 10 TABLET ORAL at 21:00

## 2023-02-02 RX ADMIN — LIDOCAINE HYDROCHLORIDE 80 MG: 20 INJECTION, SOLUTION INFILTRATION; PERINEURAL at 17:04

## 2023-02-02 RX ADMIN — DEXAMETHASONE SODIUM PHOSPHATE 4 MG: 4 INJECTION, SOLUTION INTRAMUSCULAR; INTRAVENOUS at 17:09

## 2023-02-02 RX ADMIN — SODIUM CHLORIDE, POTASSIUM CHLORIDE, SODIUM LACTATE AND CALCIUM CHLORIDE: 600; 310; 30; 20 INJECTION, SOLUTION INTRAVENOUS at 18:35

## 2023-02-02 ASSESSMENT — PAIN SCALES - GENERAL
PAINLEVEL_OUTOF10: 0
PAINLEVEL_OUTOF10: 8
PAINLEVEL_OUTOF10: 8
PAINLEVEL_OUTOF10: 6
PAINLEVEL_OUTOF10: 8
PAINLEVEL_OUTOF10: 0
PAINLEVEL_OUTOF10: 0

## 2023-02-02 ASSESSMENT — ENCOUNTER SYMPTOMS
DIARRHEA: 0
TROUBLE SWALLOWING: 0
VOICE CHANGE: 0
SHORTNESS OF BREATH: 0
COLOR CHANGE: 0
CONSTIPATION: 0
ABDOMINAL PAIN: 0
SORE THROAT: 0
VOMITING: 0
NAUSEA: 0
COUGH: 0
BACK PAIN: 0

## 2023-02-02 ASSESSMENT — PAIN DESCRIPTION - LOCATION
LOCATION: HIP

## 2023-02-02 ASSESSMENT — PAIN DESCRIPTION - DESCRIPTORS
DESCRIPTORS: ACHING

## 2023-02-02 ASSESSMENT — PAIN - FUNCTIONAL ASSESSMENT: PAIN_FUNCTIONAL_ASSESSMENT: PREVENTS OR INTERFERES SOME ACTIVE ACTIVITIES AND ADLS

## 2023-02-02 ASSESSMENT — PAIN DESCRIPTION - ORIENTATION: ORIENTATION: RIGHT

## 2023-02-02 NOTE — CONSULTS
Consult Note  Patient: Brandon Harris  Unit/Bed: B038/W652-70  YOB: 1950  MRN: [id number]  Acct: [de-identified]   Admitting Diagnosis: Closed right hip fracture, initial encounter [facility]) John Schmitt  Subcapital fracture of femur, right, closed, initial encounter [facility]) Dorisann Cockayne  Date:  2/1/2023  Hospital Day: 1    Complaint:  Right hip pain     Requesting physician:   Dr. Gino Mares     History of Present Illness:  Patient is a 74year old female with history of depression, hyperlipidemia, protruded lumbar disc, and vitamin d deficiency. Patient presented to [facility] emergency room yesterday with complaints of right hip pain s/p mechanical fall Monday in her home. Patient reports that she became dizzy and lost her balance causing her to fall onto her right side. Patient was not able to initially get up after fall. She reports that she has been trying to manage pain at home and was using a walker to attempt to ambulate however the pain got progressively worse. Imaging completed in the emergency room showed patient to have a subcapital fracture of proximal right femur with valgus angulation in which patient was transferred to [facility] admitted to trauma and orthopedics consulted for this fracture. Patient reports pain to right hip that is intermittent and exacerbated by movement and palpation and relieved by rest and pain medication. She denies any numbness or tingling to this extremity. She denies any dizziness currently and states that the dizziness is intermittent and just recently started experiencing this. She denies any chest pain, shortness of breath, N/V/D, abdominal pain or urinary symptoms.      PMHx:  Past Medical History:   Diagnosis Date    Depression     Hyperlipidemia     Protruded lumbar disc     L4/L5    Stress     Vitamin D deficiency        PSHx:  Past Surgical History:   Procedure Laterality Date    ANKLE SURGERY  2001    FOOT SURGERY  2001    SINUS SURGERY  07- Social Hx:  Social History     Socioeconomic History    Marital status:      Spouse name: None    Number of children: None    Years of education: None    Highest education level: None   Tobacco Use    Smoking status: Never    Smokeless tobacco: Never   Substance and Sexual Activity    Alcohol use: No    Drug use: Never    Sexual activity: Not Currently     Partners: Male       Family Hx:  Family History   Problem Relation Age of Onset    Anemia Mother     Heart Disease Mother     Depression Mother     High Blood Pressure Father        Review of Systems:   Review of Systems   Constitutional:  Negative for appetite change and fever. HENT:  Negative for drooling, ear pain, sore throat, trouble swallowing and voice change. Respiratory:  Negative for cough and shortness of breath. Cardiovascular:  Negative for chest pain. Gastrointestinal:  Negative for abdominal pain, constipation, diarrhea, nausea and vomiting. Genitourinary:  Negative for decreased urine volume and dysuria. Musculoskeletal:  Positive for gait problem (inability to bear weight RLE). Negative for arthralgias and back pain. Skin:  Negative for color change. Neurological:  Positive for dizziness (intermittent). Negative for weakness, light-headedness, numbness and headaches. Psychiatric/Behavioral:  Negative for agitation and behavioral problems. All other systems reviewed and are negative. Physical Examination:    /61   Pulse 84   Temp 98.4 °F (36.9 °C) (Oral)   Resp 20   Ht 5' 4\" (1.626 m)   Wt 188 lb (85.3 kg)   LMP  (LMP Unknown)   SpO2 96%   BMI 32.27 kg/m²    Physical Exam  Vitals and nursing note reviewed. Constitutional:       General: She is not in acute distress. Appearance: Normal appearance. She is well-developed. HENT:      Head: Normocephalic and atraumatic.       Nose: Nose normal.      Mouth/Throat:      Mouth: Mucous membranes are moist.   Eyes:      General:         Right eye: No discharge. Left eye: No discharge. Conjunctiva/sclera: Conjunctivae normal.   Neck:      Vascular: No JVD. Trachea: No tracheal deviation. Cardiovascular:      Rate and Rhythm: Normal rate. Pulmonary:      Effort: Pulmonary effort is normal.      Breath sounds: Normal breath sounds. Abdominal:      General: Bowel sounds are normal.      Palpations: Abdomen is soft. Musculoskeletal:         General: Tenderness (lateral right hip with palpation) and signs of injury (slight external rotation of the RLE) present. Cervical back: Normal range of motion and neck supple. No rigidity. No muscular tenderness. Comments: Decreased ROM to RLE secondary to pain and fracture    Lymphadenopathy:      Cervical: No cervical adenopathy. Skin:     General: Skin is warm and dry. Capillary Refill: Capillary refill takes less than 2 seconds. Neurological:      Mental Status: She is alert and oriented to person, place, and time.    Psychiatric:         Mood and Affect: Mood normal.         Behavior: Behavior normal.       LABS:  CBC:   Lab Results   Component Value Date/Time    WBC 7.8 02/02/2023 02:18 AM    RBC 3.55 02/02/2023 02:18 AM    HGB 11.0 02/02/2023 02:18 AM    HCT 32.7 02/02/2023 02:18 AM    MCV 92.1 02/02/2023 02:18 AM    MCH 30.9 02/02/2023 02:18 AM    MCHC 33.6 02/02/2023 02:18 AM    RDW 13.8 02/02/2023 02:18 AM     02/02/2023 02:18 AM    MPV 9.8 03/12/2019 12:00 AM     CBC with Differential:    Lab Results   Component Value Date/Time    WBC 7.8 02/02/2023 02:18 AM    RBC 3.55 02/02/2023 02:18 AM    HGB 11.0 02/02/2023 02:18 AM    HCT 32.7 02/02/2023 02:18 AM     02/02/2023 02:18 AM    MCV 92.1 02/02/2023 02:18 AM    MCH 30.9 02/02/2023 02:18 AM    MCHC 33.6 02/02/2023 02:18 AM    RDW 13.8 02/02/2023 02:18 AM    LYMPHOPCT 10.6 02/02/2023 02:18 AM    MONOPCT 10.7 02/02/2023 02:18 AM    EOSPCT 2.9 03/12/2019 12:00 AM    BASOPCT 0.6 02/02/2023 02:18 AM    MONOSABS 0.8 02/02/2023 02:18 AM    LYMPHSABS 0.8 02/02/2023 02:18 AM    EOSABS 0.3 02/02/2023 02:18 AM    BASOSABS 0.1 02/02/2023 02:18 AM     CMP:    Lab Results   Component Value Date/Time     02/02/2023 02:18 AM    K 3.8 02/02/2023 02:18 AM     02/02/2023 02:18 AM    CO2 23 02/02/2023 02:18 AM    BUN 18 02/02/2023 02:18 AM    CREATININE 0.93 02/02/2023 02:18 AM    GFRAA >60.0 05/13/2022 10:28 AM    LABGLOM >60.0 02/02/2023 02:18 AM    GLUCOSE 118 02/02/2023 02:18 AM    PROT 7.4 02/01/2023 04:55 PM    LABALBU 4.3 02/01/2023 04:55 PM    CALCIUM 8.2 02/02/2023 02:18 AM    BILITOT 0.4 02/01/2023 04:55 PM    ALKPHOS 77 02/01/2023 04:55 PM    AST 24 02/01/2023 04:55 PM    ALT 17 02/01/2023 04:55 PM     BMP:    Lab Results   Component Value Date/Time     02/02/2023 02:18 AM    K 3.8 02/02/2023 02:18 AM     02/02/2023 02:18 AM    CO2 23 02/02/2023 02:18 AM    BUN 18 02/02/2023 02:18 AM    LABALBU 4.3 02/01/2023 04:55 PM    CREATININE 0.93 02/02/2023 02:18 AM    CALCIUM 8.2 02/02/2023 02:18 AM    GFRAA >60.0 05/13/2022 10:28 AM    LABGLOM >60.0 02/02/2023 02:18 AM    GLUCOSE 118 02/02/2023 02:18 AM     Magnesium:  Lab Results   Component Value Date/Time    MG 1.9 09/05/2021 07:37 AM     Troponin:    Lab Results   Component Value Date/Time    TROPONINI <0.010 09/05/2021 07:37 AM           Assessment:    Active Hospital Problems    Diagnosis Date Noted    Closed right hip fracture, initial encounter [facility]) Gwvivianth Angelo 02/01/2023     Priority: Medium     74year old female admitted to hospital after she sustained a mechanical fall from standing this past Monday. Images reviewed ans are consistent with subcapital fracture with valgus angulation of right femur. This type of fracture will require operative repair to include right hip pinning. The procedure was discussed with the patient including risks and benefits and the patient chooses to proceed with surgery.      She is currently undergoing cardiac/syncopal workup to include echocardiogram and carotid ultrasound however this workup does not need to be completed in its entirety prior to surgery and she has been cleared by the trauma team for the above surgery. Plan:  NPO  Consent for right hip pinning  Continue pain control  Strict bedrest pre-operatively       Electronically signed by AZAEL Enriquez CNP on 2/2/2023 at 9:09 AM      Addendum by Dr. Oleksandr Trevizo:  I agree with everything above. I sat down and discussed surgical intervention of the right hip fracture with the patient. We discussed percutaneous pinning. We discussed percutaneous screw fixation. We discussed the possibility of infection and DVT. Patient consented to the procedure as planned. Additionally, patient had a left total knee scheduled for later this month. This will obviously be delayed. We discussed a left knee steroid injection to assist her in her recovery from the hip. The patient was receptive to this. Plan is to proceed with right hip pinning and left knee steroid injection.

## 2023-02-02 NOTE — DISCHARGE INSTR - COC
Continuity of Care Form    Patient Name: Connor Mustafa   :  1950  MRN:  [id number]    6 [facility] date:  2023  Discharge date:  2/3/23      Code Status Order: Full Code   Advance Directives:     Admitting Physician:  Elie Gonzalez MD  PCP: OMAR Chakraborty    Discharging Nurse: Ulysses Mccann 23 Unit/Room#: I411/R203-45  Discharging Unit Phone Number: [phone]    Emergency Contact:   Extended Emergency Contact Information  Primary Emergency Contact: [facility]  Address: [address], [address] [facility] [address] [facility] [address] Phone: [phone]  Mobile Phone: [phone]  Relation: Child  Secondary Emergency Contact: Nely Marsh  Address: [address] [facility], [address] Phone: [phone]  Mobile Phone: [phone]  Relation: Child    Past Surgical History:  Past Surgical History:   Procedure Laterality Date    ANKLE SURGERY      FOOT SURGERY      SINUS SURGERY  07-       Immunization History:   Immunization History   Administered Date(s) Administered    COVID-19, PFIZER PURPLE top, DILUTE for use, (age 15 y+), 30mcg/0.3mL 2021, 2021    Influenza Virus Vaccine 2013, 2017    Influenza Whole 2007, 2007    Influenza, FLUZONE (age 72 y+), High Dose, 0.7mL 2020, 2020    Influenza, High Dose (Fluzone 65 yrs and older) 2015, 2015, 2016, 2016, 2017, 2017, 10/19/2018, 10/19/2018, 10/22/2019, 10/22/2019, 10/27/2021    Pneumococcal Conjugate 13-valent Livia Tristin) 2016, 2016    Pneumococcal Polysaccharide (Aztgofkcn50) 06/10/2014, 2017, 2017, 2017       Active Problems:  Patient Active Problem List   Diagnosis Code    Hyperlipidemia E78.5    Depression F32. A    Stress F43.9    Vitamin D deficiency E55.9    History of endometrial cancer Z85.42    Hypothyroid E03.9    Maisonneuve fracture of right fibula V42.434W    Major depressive disorder, recurrent episode, moderate (HCC) F33.1    Osteopenia M85.80    Other joint derangement, not elsewhere classified, ankle and foot M24.873, M24.876    Postmenopausal bleeding N95.0    Sprain of ankle, deltoid ligament S93.429A    Thickened endometrium R93.89    History of uterine cancer Z85.42    COVID-19 U07.1    Osteoarthritis of left knee M17.12    [facility] S72.001A       Isolation/Infection:   Isolation            No Isolation          Patient Infection Status       Infection Onset Added Last Indicated Last Indicated By Review Planned Expiration Resolved Resolved By    None active    Resolved    COVID-19 21 COVID-19, Rapid   21     C-diff Rule Out 21 Clostridium Difficile Toxin/Antigen (Ordered)   21 Rule-Out Test Resulted    COVID-19 (Rule Out) 21 COVID-19, Rapid (Ordered)   21 Rule-Out Test Resulted            Nurse Assessment:  Last Vital Signs: BP (!) 112/51   Pulse 76   Temp 98.2 °F (36.8 °C) (Oral)   Resp 22   Ht 5' 4\" (1.626 m)   Wt 188 lb (85.3 kg)   LMP  (LMP Unknown)   SpO2 96%   BMI 32.27 kg/m²     Last documented pain score (0-10 scale): Pain Level: 6  Last Weight:   Wt Readings from Last 1 Encounters:   23 188 lb (85.3 kg)     Mental Status:  oriented and alert    IV Access:  - None    Nursing Mobility/ADLs:  Walking   Assisted  Transfer  Assisted  Bathing  Assisted  Dressing  Assisted  Toileting  Assisted  Feeding  Assisted  Med Admin  Dependent in hospital  Med Delivery   whole    Wound Care Documentation and Therapy:        Elimination:  Continence:    Bowel: Yes  Bladder: No, purewick used in hospital  Urinary Catheter: None   Colostomy/Ileostomy/Ileal Conduit: No       Date of Last BM: Passing flatulence  No intake or output data in the 24 hours ending 23 1518  No intake/output data recorded. Safety Concerns: At Risk for Falls    Impairments/Disabilities:      None    Nutrition Therapy:  Current Nutrition Therapy:   - Oral Diet:  General    Routes of Feeding: Oral  Liquids: Thin Liquids  Daily Fluid Restriction: no  Last Modified Barium Swallow with Video (Video Swallowing Test): not done    Treatments at the Time of Hospital Discharge:   Respiratory Treatments: None  Oxygen Therapy:  is not on home oxygen therapy. Ventilator:    - No ventilator support    Rehab Therapies: Physical Therapy and Occupational Therapy  Weight Bearing Status/Restrictions: Touchdown weight bearing (10-25 lbs) only on right leg  Other Medical Equipment (for information only, NOT a DME order):  walker  Other Treatments:     Patient's personal belongings (please select all that are sent with patient):  Discharging patient with all personal belongings. RN SIGNATURE:  Electronically signed by Ursula Napoles RN on 2/3/23 at 5:48 PM EST    CASE MANAGEMENT/SOCIAL WORK SECTION    Inpatient Status Date: 2/1/2023    Readmission Risk Assessment Score:  Readmission Risk              Risk of Unplanned Readmission:  12           Discharging to Facility/ Agency   Name: [facility]ASI BARKLEY  Address:  Phone:  Fax:    Dialysis Facility (if applicable)   Name:  Address:  Dialysis Schedule:  Phone:  Fax:    / signature: Electronically signed by PERLA Roe on 2/2/23 at 3:18 PM EST    PHYSICIAN SECTION    Prognosis: Good    Condition at Discharge: Stable    Rehab Potential (if transferring to Rehab): Good    Recommended Labs or Other Treatments After Discharge:   TTWB to operative extremity with use of walker for assistance with ambulation   Aquacel dressing to be removed pod7 on 2/9/23 and incision left open to air  Staples to be removed pod14 on 2/16/23 and steri strips applied.  Instruct patient to leave steri strips in place until they fall off or surgeon removes them   Follow up with orthopedic surgeon in two weeks  Lovenox 30mg BID x28 days for DVT prophylaxis (end date 7/7/7138)    Physician Certification: I certify the above information and transfer of Connor Mustafa  is necessary for the continuing treatment of the diagnosis listed and that she requires Igor Rayuel for less 30 days.      Update Admission H&P: No change in H&P    PHYSICIAN SIGNATURE:  Electronically signed by OMAR Bose on 2/3/23 at 4:32 PM EST

## 2023-02-02 NOTE — PLAN OF CARE
Problem: Skin/Tissue Integrity  Goal: Absence of new skin breakdown  Description: 1. Monitor for areas of redness and/or skin breakdown  2. Assess vascular access sites hourly  3. Every 4-6 hours minimum:  Change oxygen saturation probe site  4. Every 4-6 hours:  If on nasal continuous positive airway pressure, respiratory therapy assess nares and determine need for appliance change or resting period.   2/2/2023 0049 by Derek Washington RN  Outcome: Progressing  2/2/2023 0049 by Derek Washington RN  Outcome: Progressing     Problem: Safety - Adult  Goal: Free from fall injury  2/2/2023 0049 by Derek Washington RN  Outcome: Progressing  2/2/2023 0049 by Derek Washington RN  Outcome: Progressing     Problem: ABCDS Injury Assessment  Goal: Absence of physical injury  2/2/2023 0049 by Derek Washington RN  Outcome: Progressing  2/2/2023 0049 by Derek Washington RN  Outcome: Progressing     Problem: Neurosensory - Adult  Goal: Achieves stable or improved neurological status  2/2/2023 0049 by Derek Washington RN  Outcome: Progressing  2/2/2023 0049 by Derek Washington RN  Outcome: Progressing  Goal: Achieves maximal functionality and self care  2/2/2023 0049 by Derek Washington RN  Outcome: Progressing  2/2/2023 0049 by Derek Washington RN  Outcome: Progressing     Problem: Musculoskeletal - Adult  Goal: Return mobility to safest level of function  2/2/2023 0049 by Derek Washington RN  Outcome: Progressing  2/2/2023 0049 by Derek Washington RN  Outcome: Progressing  Goal: Maintain proper alignment of affected body part  2/2/2023 0049 by Derek Washington RN  Outcome: Progressing  2/2/2023 0049 by Derek Washington RN  Outcome: Progressing  Goal: Return ADL status to a safe level of function  2/2/2023 0049 by Derek Washington RN  Outcome: Progressing  2/2/2023 0049 by Derek Washington RN  Outcome: Progressing

## 2023-02-02 NOTE — CARE COORDINATION
Case Management Assessment  Initial Evaluation    Date/Time of Evaluation: 2/2/2023 12:41 PM  Assessment Completed by: Shira Toledo RN    If patient is discharged prior to next notation, then this note serves as note for discharge by case management. Patient Name: Bryan Mathis                   YOB: 1950  Diagnosis: Closed right hip fracture, initial encounter [facility]) Shukri Patrickot  Subcapital fracture of femur, right, closed, initial encounter [facility]) Wyatt Galo                   Date / Time: 2/1/2023  6:32 PM    Patient Admission Status: Inpatient   Readmission Risk (Low < 19, Mod (19-27), High > 27): Readmission Risk Score: 11.9    Current PCP: OMAR Betts  PCP verified by CM? Yes    Chart Reviewed: Yes      History Provided by: Patient  Patient Orientation: Alert and Oriented, Person, Place, Situation, Self    Patient Cognition: Alert    Hospitalization in the last 30 days (Readmission):  No    If yes, Readmission Assessment in CM Navigator will be completed. Advance Directives:      Code Status: Full Code   Patient's Primary Decision Maker is: Legal Next of Kin    Primary Decision MakerMontthi Miller Child - [phone]    Secondary Decision Maker: Sreedhar Torres Child - [phone]    Discharge Planning:    Patient lives with: Children (SON) Type of Home: House  Primary Care Giver: Self  Patient Support Systems include: Children   Current Financial resources: Medicare  Current community resources: None  Current services prior to admission: Durable Medical Equipment            Current DME: Marcheta Bookbinder, Shower Chair, Other (Comment) (RAISED TOILET SEAT)            Type of Home Care services:  None    ADLS  Prior functional level:  Independent in ADLs/IADLs  Current functional level: Assistance with the following:, Bathing, Dressing, Toileting, Mobility    PT AM-PAC:   /24  OT AM-PAC:   /24    Family can provide assistance at DC: No (SON WORKS)  Would you like Case Management to discuss the discharge plan with any other family members/significant others, and if so, who? No  Plans to Return to Present Housing: No  Other Identified Issues/Barriers to RETURNING to current housing: N/A  Potential Assistance needed at discharge: [facility]            Potential DME:    Patient [facility] to [facility]  Plan for transportation at discharge: [address]: [facility] MEDICARE / Plan: Rosalinda Dolan ESSENTIAL/PLUS / Product Type: *No Product type* /     Does insurance require precert for SNF: Yes    Potential assistance Purchasing Medications: No  Meds-to-Beds request: Yes      [place], [address] [facility] [address] [address]  Phone: [phone] Fax: [phone]      Notes:    Factors facilitating achievement of predicted outcomes: Family support, Motivated, Cooperative, Pleasant, and Good insight into deficits    Barriers to discharge: Pain    Additional Case Management Notes: HOME WITH SON. HAS CANE WALKER SHOWER CHAIR AND RAISED TOILET SEAT. PT STATES SHE WILL NEED REHAB. FOC OFFERED AND SHE CHOSE #1[facility] VS #2[facility]    The Plan for Transition of Care is related to the following treatment goals of Closed right hip fracture, initial encounter ([facility] [S72.001A]  Subcapital fracture of femur, right, closed, initial encounter ([facility] [Q41.649A]    IF APPLICABLE: The Patient and/or patient representative Samara Hudson and her family were provided with a choice of provider and agrees with the discharge plan. Freedom of choice list with basic dialogue that supports the patient's individualized plan of care/goals and shares the quality data associated with the providers was provided to: Patient   Patient Representative Name:       The Patient and/or Patient Representative Agree with the Discharge Plan?  Yes    Cheryl Oakley, RN  Case Management Department

## 2023-02-02 NOTE — PROGRESS NOTES
TRAUMA DAILY PROGRESS NOTE      Patient Name: Vonda Owens  Admission Date 2023    Hospital Day: 1  Patient seen and examined on 2023    INTERVAL HISTORY/EVENTS     Background:  Vonda Owens is a 74 y.o. female with PMHx depression, HLD, vitamin d deficiency. Patient presented to [facility] [place] [facility] [place] ED on 2023 s/p fall d/t dizziness 3 days prior to presentation. (-)head strike. (-)LOC. (-)AC/AP medications. Patient with complaint of right hip pain. Trauma work up significant for acute subcapital fracture of right femur. Patient admitted to Trauma RNF with Ortho consult. Hospital Course:  2023: R hip pain after fall 3 days prior. Admitted to trauma RNF with Ortho consult. 24 Hour Events:  No acute events since admission overnight. Patient reports moderate right hip pain. Patient also with back pain. Denies numbness/tingling, neck pain, SOB, chest wall pain, abdominal pain, headache, N/V. Patient NPO for OR today. Voiding spontaneously. Vitals: afebrile. Not tachycardic. -157. SPO2 98% on RA. Labs: no leukocytosis. H/H downtrend to 11.0/32.7 (12.5/38.2). electrolytes and renal function is appropriate. Intake& output: none recorded    BM x0 since admit      PHYSICAL EXAM     Vitals Average, Min and Max for last 24 hours:  Temp: Temp: 98.4 °F (36.9 °C);  Temp  Av.5 °F (36.9 °C)  Min: 98.4 °F (36.9 °C)  Max: 98.6 °F (37 °C)  Respirations: Resp  Av.8  Min: 14  Max: 21  Pulse: Pulse  Av.1  Min: 81  Max: 88  Blood Pressure: Systolic (82PCQ), IUK:788 , Min:127 , UNB:862   ; Diastolic (31FEV), LYD:31, Min:61, Max:78    SpO2: SpO2  Av.6 %  Min: 86 %  Max: 98 %    24hr Intake/Output: No intake or output data in the 24 hours ending 23 0831    Vitals: /61   Pulse 84   Temp 98.4 °F (36.9 °C) (Oral)   Resp 20   Ht 5' 4\" (1.626 m)   Wt 188 lb (85.3 kg)   LMP  (LMP Unknown)   SpO2 96%   BMI 32.27 kg/m²     Physical Exam:  Constitutional: laying in bed. NAD  HEENT: Atraumatic normocephalic. Cardiovascular: Regular rate and rhythm. Pulmonary: Clear to ausculation bilaterally. No wheezing, rhonchi or rales. Non-labored breathing on RA. Abdominal: Soft. Non-distended. Non-tender. Musculoskeletal: T/L spine TTP at junction. No c-spine TTP. Neurological: Alert, awake, and orientated x 3. Motor and sensory grossly intact. No focal deficits. GCS of 15.    Skin: warm/dry    LABORATORY RESULTS (LAST 24 HOURS)     CBC with Differential:    Lab Results   Component Value Date/Time    WBC 7.8 02/02/2023 02:18 AM    RBC 3.55 02/02/2023 02:18 AM    HGB 11.0 02/02/2023 02:18 AM    HCT 32.7 02/02/2023 02:18 AM     02/02/2023 02:18 AM    MCV 92.1 02/02/2023 02:18 AM    MCH 30.9 02/02/2023 02:18 AM    MCHC 33.6 02/02/2023 02:18 AM    RDW 13.8 02/02/2023 02:18 AM    LYMPHOPCT 10.6 02/02/2023 02:18 AM    MONOPCT 10.7 02/02/2023 02:18 AM    EOSPCT 2.9 03/12/2019 12:00 AM    BASOPCT 0.6 02/02/2023 02:18 AM    MONOSABS 0.8 02/02/2023 02:18 AM    LYMPHSABS 0.8 02/02/2023 02:18 AM    EOSABS 0.3 02/02/2023 02:18 AM    BASOSABS 0.1 02/02/2023 02:18 AM     CMP:    Lab Results   Component Value Date/Time     02/02/2023 02:18 AM    K 3.8 02/02/2023 02:18 AM     02/02/2023 02:18 AM    CO2 23 02/02/2023 02:18 AM    BUN 18 02/02/2023 02:18 AM    CREATININE 0.93 02/02/2023 02:18 AM    GFRAA >60.0 05/13/2022 10:28 AM    LABGLOM >60.0 02/02/2023 02:18 AM    GLUCOSE 118 02/02/2023 02:18 AM    PROT 7.4 02/01/2023 04:55 PM    LABALBU 4.3 02/01/2023 04:55 PM    CALCIUM 8.2 02/02/2023 02:18 AM    BILITOT 0.4 02/01/2023 04:55 PM    ALKPHOS 77 02/01/2023 04:55 PM    AST 24 02/01/2023 04:55 PM    ALT 17 02/01/2023 04:55 PM     Magnesium:    Lab Results   Component Value Date/Time    MG 1.9 09/05/2021 07:37 AM     PT/INR:    Lab Results   Component Value Date/Time    PROTIME 13.2 02/01/2023 05:22 PM    INR 1.0 02/01/2023 05:22 PM     PTT:    Lab Results   Component Value Date/Time APTT 32.2 09/05/2021 07:38 AM       IMAGING RESULTS (PERSONALLY REVIEWED)     CT T/L spine: No acute fracture in T/L spine    ASSESSMENT & PLAN     Diagnoses:  Syncopal fall  Right subcapital femur fx  Acute pain due to trauma  Acute blood loss anemia      PMHx: depression, HLD, vitamin d deficiency    Incidental Findings: needs reviewed      ASSESSMENT/PLAN:  Neurological: PMHx depression, Acute pain d/t trauma  - continue tylenol 650mg q6hr  - start robaxin 500mg TID  - continue oxycodone 5/10mg q4hr prn mod/severe pain  - continue dilaudid 0.25mg q3hr prn breakthrough pain  - continue home celexa 30mg daily     Cardiovascular:   - continue home pravastatin 20mg daily     Respiratory:   - Maintain O2 sats > 92%  - Encourage IS 10x hourly     GI/Diet:   - resume regular diet post-op  - bowel regimen     Renal/Electrolytes:   - continue mIVF until tolerating PO post-op  - AM BMP  - I&O     ID:   No active infection. Remains afebrile, normotensive, and without leukocytosis. - filemon-op abx     Heme:   - no indication for transfusion at this time  - AM CBC     Endocrine:   - no acute glycemic issues     MSK: right subcapsular femur fx with plan for OR today  - Ortho: plan for OR today. Cleared from trauma standpoint.  - Spines: cleared (T/L CT negative)  - Weight Bearing Restrictions: NWB to RLE until new post-op recs  - Activity: bed rest until post-op  - PT/OT: eval post-op pending    Prophylaxis:   - SCDs and Lovenox 30mg BID for VTE PPx    Lines/Tubes/Drains:  - Maintain PIVs  - No indication for naranjo catheter, monitor for urinary retention    Dispo: Plan for OR today with ortho for operative repair of right hip  Accom Status: General Status      - Follow up with Ortho TBD      Please call for any questions or concerns.   Sanjeev Singer Treatment Team Sticky Note for contact information]      Delvis Roman PA-C  Trauma/Critical Care  [see Treatment Team Sticky Note for contact information]     This patient's plan of care was discussed and made in collaboration with Trauma Attending physician, Joel Garcia MD.

## 2023-02-02 NOTE — ED PROVIDER NOTES
[address] [place] [address] [address]  eMERGENCY dEPARTMENT eNCOUnter      Pt Name: Jeet Auguste  MRN: [id number]  [place] 1950  Date of evaluation: 2/1/2023  Provider: Kentrell Brasher MD      HISTORY OF PRESENT ILLNESS      Chief Complaint   Patient presents with    Hip Pain     [facility] transfer, right hip fracture       The history is provided by the Patient. Jeet Auguste is a 74 y.o. female with a PMH clinically significant for HLD, Hypothyroidism, Osteopenia, MDD, OA presenting to the ED via EMS as a transfer from [facility] for admission to trauma service s/p fall from standing with right femur fracture noted on imaging. Patient states she has had sinus congestion and pressure that has been ongoing over the past several days to weeks causing her to become lightheaded and dizzy at times. States it caused her to fall today, falling onto her right side. Denies hitting head or LOC. No headache or neck pain. Also denies any associated back pain, chest pain, shortness of breath, abdominal pain. Only complaining of pain to the right hip at this time. Was not able to ambulate after the event. Denies any anticoagulation. States that she has otherwise been feeling well. Was supposed to see her ENT regarding the lightheadedness and dizziness. Has also had sinus congestion/pressure and bilateral ear pain. Has had this in the past but is unsure regarding the exact diagnosis. Denies any associated numbness, weakness or tingling. Also denies any vision problems. Has otherwise been feeling well. Currently complaining of pain that is aching, throbbing, constant and worse with any type of movement or palpation of the right hip. Per Chart Review: PMH as noted above obtained via outpatient chart review. Evaluation at [facility], ED appreciated. Imaging showing acute subcapital fracture of the proximal right femur with valgus angulation of the femoral head.   CT head without evidence of acute intracranial abnormality. CMP, CBC and INR largely unremarkable. Only showing mild MAKENZIE. REVIEW OF SYSTEMS       Review of Systems   Constitutional:  Negative for activity change and fever. HENT:  Positive for congestion, ear pain and sinus pressure. Negative for facial swelling, nosebleeds and sinus pain. Eyes:  Negative for pain and visual disturbance. Respiratory:  Negative for shortness of breath. Cardiovascular:  Negative for chest pain. Gastrointestinal:  Negative for abdominal pain, nausea and vomiting. Genitourinary:  Negative for hematuria. Musculoskeletal:  Positive for arthralgias, gait problem and myalgias. Negative for back pain, neck pain and neck stiffness. Skin:  Positive for wound. Neurological:  Positive for dizziness and light-headedness. Negative for weakness, numbness and headaches. PAST MEDICAL HISTORY     Past Medical History:   Diagnosis Date    Depression     Hyperlipidemia     Protruded lumbar disc     L4/L5    Stress     Vitamin D deficiency        SURGICAL HISTORY       Past Surgical History:   Procedure Laterality Date    ANKLE SURGERY  2001    FOOT SURGERY  2001    SINUS SURGERY  07-       FAMILY HISTORY       Family History   Problem Relation Age of Onset    Anemia Mother     Heart Disease Mother     Depression Mother     High Blood Pressure Father        SOCIAL HISTORY       Social History     Socioeconomic History    Marital status:      Spouse name: None    Number of children: None    Years of education: None    Highest education level: None   Tobacco Use    Smoking status: Never    Smokeless tobacco: Never   Substance and Sexual Activity    Alcohol use: No    Drug use: Never    Sexual activity: Not Currently     Partners: Male       CURRENT MEDICATIONS       Current Discharge Medication List        CONTINUE these medications which have NOT CHANGED    Details   !! Misc.  Devices (BATH/SHOWER SEAT) MISC Dispense 1 Shower/Bath seat  Qty: 1 each, Refills: 0 !! Misc. Devices (CLASSICS ROLLING WALKER) MISC Dispense 1 rolling walker  Qty: 1 each, Refills: 0      !! Misc. Devices (RAISED TOILET SEAT) MISC Dispense 1 raised toilet seat  Qty: 1 each, Refills: 0      levothyroxine (SYNTHROID) 50 MCG tablet Take 1 tablet by mouth once daily  Qty: 90 tablet, Refills: 1      Cholecalciferol (VITAMIN D3 ADULT GUMMIES) 25 MCG (1000 UT) CHEW       turmeric 500 MG CAPS       !! citalopram (CELEXA) 20 MG tablet Take 1 tablet by mouth once daily  Qty: 90 tablet, Refills: 1    Associated Diagnoses: Major depressive disorder, recurrent episode, moderate ([address]      ! ! citalopram (CELEXA) 10 MG tablet TAKE 1 TABLET BY MOUTH ONCE DAILY WITH  THE  20MG  TABLET  Qty: 90 tablet, Refills: 1    Associated Diagnoses: Major depressive disorder, recurrent episode, moderate (HCC)      pravastatin (PRAVACHOL) 20 MG tablet Take 1 tablet by mouth once daily  Qty: 90 tablet, Refills: 1    Associated Diagnoses: Mixed hyperlipidemia      meloxicam (MOBIC) 15 MG tablet Take 1 tablet by mouth daily  Qty: 30 tablet, Refills: 1      !! UNABLE TO FIND Take 1 tablet by mouth daily [facility]      Krill Oil 500 MG CAPS Take 1 capsule by mouth daily      !! UNABLE TO FIND Take 1 capsule by mouth daily Tumeric root extract      Pediatric Multivitamins-Fl (MULTI VIT/FL PO) Take 1 capsule by mouth daily      Cholecalciferol 50 MCG (2000 UT) CAPS Take by mouth daily       ! ! - Potential duplicate medications found. Please discuss with provider. ALLERGIES     Patient has no known allergies. PHYSICAL EXAM       ED Triage Vitals [02/01/23 1834]   BP Temp Temp src Heart Rate Resp SpO2 Height Weight   (!) 147/65 98.6 °F (37 °C) -- 86 21 97 % -- --       Physical Exam  Vitals and nursing note reviewed. Exam conducted with a chaperone present. Constitutional:       General: She is not in acute distress. HENT:      Head: Normocephalic and atraumatic.       Right Ear: Tympanic membrane normal. Left Ear: Tympanic membrane normal.      Nose:      Right Nostril: No septal hematoma. Left Nostril: No septal hematoma. Mouth/Throat:      Mouth: Mucous membranes are moist. No injury. Pharynx: Oropharynx is clear. Eyes:      Extraocular Movements: Extraocular movements intact. Conjunctiva/sclera: Conjunctivae normal.      Pupils: Pupils are equal, round, and reactive to light. Neck:      Trachea: Trachea normal.   Cardiovascular:      Rate and Rhythm: Normal rate and regular rhythm. Pulses: Normal pulses. Heart sounds: Normal heart sounds. Pulmonary:      Effort: No respiratory distress. Breath sounds: Normal breath sounds. Chest:      Chest wall: No deformity, tenderness or crepitus. Abdominal:      General: There is no distension. Palpations: Abdomen is soft. Tenderness: There is no abdominal tenderness. Musculoskeletal:         General: Tenderness and signs of injury present. No deformity. Cervical back: Neck supple. No tenderness. No spinous process tenderness. Right hip: Tenderness and bony tenderness present. Decreased range of motion. Right upper leg: Tenderness (proximal) present. Left knee: Laceration (superficial abrasion.) present. No swelling, deformity, ecchymosis or bony tenderness. Normal range of motion. No tenderness. Right lower leg: No edema. Left lower leg: No edema. Comments: Pelvis stable. Skin:     General: Skin is warm and dry. Capillary Refill: Capillary refill takes less than 2 seconds. Neurological:      General: No focal deficit present. Mental Status: She is alert and oriented to person, place, and time.    Psychiatric:         Mood and Affect: Mood normal.         Behavior: Behavior normal.       MDM:   Chart Reviewed: PMH and additional information as noted in HPI obtained from chart review    Vitals:    Vitals:    02/01/23 1834 02/01/23 1933   BP: (!) 147/65 (!) 149/69 Pulse: 86 82   Resp: 21 14   Temp: 98.6 °F (37 °C)    SpO2: 97% 98%       PROCEDURES:  Unless otherwise noted below, none  Procedures    LABS:  Labs Reviewed - No data to display    Last Blak[place]    (Results Pending)            74 y.o. female with a PMH clinically significant for HLD, Hypothyroidism, Osteopenia, MDD, OA presenting to the ED via EMS as a transfer from [facility] for admission to trauma service s/p fall from standing with right femur fracture noted on imaging. Upon initial evaluation, Pt Afebrile, HDS and in NAD. PE as noted above. Labs,  and Imaging interpreted by myself and as noted above. Given findings, clinical presentation most likely consistent w/ subcapital fracture of the right femur status post fall from standing with initial presentation and evaluation at outside ED as noted above. Already discussed with Dr. Lady Jade, trauma surgery, who was contacted again in the ED. Amenable to accepting care of the patient. No further recommendations in the ED. Orthopedics already consulted prior to arrival.  Ordered single dose of morphine and Zofran for symptomatic relief in the ED. Will be admitted to the floors at this time. No additional significant traumatic injuries identified on exam.  Pt was administered   Medications   morphine sulfate (PF) injection 4 mg (4 mg IntraVENous Given 2/1/23 2002)   ondansetron (ZOFRAN) injection 4 mg (4 mg IntraVENous Given 2/1/23 2001)       Plan: Admit to Trauma for further evaluation and management. Report given to Dr. Lady Jade. and Patient understanding and amenable to the POC. CRITICAL CARE TIME   Total CriticalCare time was 0 minutes, excluding separately reportable procedures. There was a high probability of clinically significant/life threatening deterioration in the patient's condition which required my urgent intervention. FINAL IMPRESSION      1.  Subcapital fracture of femur, right, closed, initial encounter ([facility] [address]     DISPOSITION/PLAN   DISPOSITION Admitted 02/01/2023 07:25:46 PM      Current Discharge Medication List           MD Jake Cordova MD  02/01/23 2049

## 2023-02-02 NOTE — ANESTHESIA POSTPROCEDURE EVALUATION
Department of Anesthesiology  Postprocedure Note    Patient: Marvin Aguilar  MRN: [id number]  YOB: 1950  Date of evaluation: 2/2/2023      Procedure Summary     Date: 02/02/23 Room / Location: [address]    Anesthesia Start: 1700 Anesthesia Stop:     Procedure: RIGHT HIP PINNING AND LEFT KNEE STERIOD INJECTION (Right: Hip) Diagnosis:       Subcapital fracture of femur, right, closed, initial encounter ([facility]      ([address] [facility], RIGHT , CLOSED INITIAL ENCOUNTER)    Surgeons: Maria R Lan MD Responsible Provider: Enma Kearney MD    Anesthesia Type: general ASA Status: 3          Anesthesia Type: No value filed.     Bc Phase I: Bc Score: 10    Bc Phase II:        Anesthesia Post Evaluation    Patient location during evaluation: PACU  Patient participation: complete - patient participated  Level of consciousness: awake  Pain score: 0  Airway patency: patent  Nausea & Vomiting: no nausea  Complications: no  Cardiovascular status: hemodynamically stable  Respiratory status: face mask  Hydration status: euvolemic

## 2023-02-02 NOTE — H&P
Trauma Consult / H & P Note    BASIC INJURY INFORMATION:  Level of activation: CAT 2  Mode of transport: EMS  Mechanism of injury: Fall from Standing  Complicating features: NA  Protective measures: NA    HISTORY OF PRESENT INJURY:   Jeet Auguste is a 74 y.o. female who presented to the ED after a fall with moderate to severe right hip pain. She states she has been feeling dizzy intermittently the last couple of weeks and this caused her to fall landing directly on her right hip. Denies head/neck pain or LOC. Hip pain is moderate to severe, sharp, constant and worse with movement. ED w/u identified hip fx and ortho and trauma consulted. PRIMARY SURVEY:  Airway: Intact  Breathing: Normal   Breath Sounds: Breath Sounds Equal Bilaterally  Circulation:    Pulses: Normal   Skin: Warm  Disability:   Pupils: PERRL   GCS:    Best Eyes: 4    Best Verbal: 5    Best Motor: 6    Total: 15    SECONDARY SURVEY:  Vitals:   Vitals:    02/01/23 1834   BP: (!) 147/65   Pulse: 86   Resp: 21   Temp: 98.6 °F (37 °C)   SpO2: 97%     Neurologic: Alert and Oriented, Appropriate  HEENT:   Head: No lacerations, bony step-offs, or abrasions   Eyes: EOM intact   Ears: Not Examined   Nose: No bloody discharge; Throat: Not examined  Neck: No midline tenderness  Pulmonary: External exam: no crepitus or pain with palpation, no contusions or abrasions;  Cardiovascular:    Pulses: Bilateral radial, femoral, DP and PT pulses are normal;  Abdomen: Palpation: no tenderness   Rectal: Not performed  Pelvis/Perineum: Additional findings: right hip tender  Musculoskeletal:    Back/Spine: Thoracolumbar spinal column non-tender; no step off or deformity; Extremities:  Additional Findings: right hip tender    PAST MEDICAL HISTORY:  Past Medical History:   Diagnosis Date    Depression     Hyperlipidemia     Protruded lumbar disc     L4/L5    Stress     Vitamin D deficiency        PAST SURGICAL HISTORY:  Past Surgical History:   Procedure Laterality Date    ANKLE SURGERY  2001    FOOT SURGERY  2001    SINUS SURGERY  07-       PRE-ADMISSION MEDICATIONS:   Prior to Admission medications    Medication Sig Start Date End Date Taking? Authorizing Provider   Misc. Devices (BATH/SHOWER SEAT) MISC Dispense 1 Shower/Bath seat 1/19/23   MD Shivam Lawson. Devices (CLASSICS ROLLING WALKER) MISC Dispense 1 rolling walker 1/19/23   MD Shivam Lawson. Devices (RAISED TOILET SEAT) [facility] Dispense 1 raised toilet seat 1/19/23   Cande Mares MD   levothyroxine (SYNTHROID) 50 MCG tablet Take 1 tablet by mouth once daily 1/9/23   OMAR Escobedo   Cholecalciferol (VITAMIN D3 ADULT GUMMIES) 25 MCG (1000 UT) [facility] Provider, MD   turmeric 500 MG CAPS     Historical Provider, MD   citalopram (CELEXA) 20 MG tablet Take 1 tablet by mouth once daily 12/2/22   OMAR Mendoza   citalopram (CELEXA) 10 MG tablet TAKE 1 TABLET BY MOUTH ONCE DAILY WITH  THE  20MG  TABLET 12/2/22   OMAR Mendoza   pravastatin (PRAVACHOL) 20 MG tablet Take 1 tablet by mouth once daily 12/2/22   OMAR Mendoza   meloxicam (MOBIC) 15 MG tablet Take 1 tablet by mouth daily 12/21/21 1/20/22  Cande Mares MD   UNABLE TO FIND Take 1 tablet by mouth daily [facility] 11/17/15   Historical Provider, MD Magu[place] Oil 500 MG CAPS Take 1 capsule by mouth daily    Historical Provider, MD   UNABLE TO FIND Take 1 capsule by mouth daily Tumeric root extract    Historical Provider, MD   Pediatric Multivitamins-Fl (MULTI VIT/FL PO) Take 1 capsule by mouth daily    Historical Provider, MD   Cholecalciferol 50 MCG (2000 UT) CAPS Take by mouth daily    Historical Provider, MD       ALLERGIES:  Patient has no known allergies. SOCIAL HISTORY:   Social History     Socioeconomic History    Marital status:       Spouse name: None    Number of children: None    Years of education: None    Highest education level: None   Tobacco Use Smoking status: Never    Smokeless tobacco: Never   Substance and Sexual Activity    Alcohol use: No    Drug use: Never    Sexual activity: Not Currently     Partners: Male       FAMILY HISTORY:  Family History   Problem Relation Age of Onset    Anemia Mother     Heart Disease Mother     Depression Mother     High Blood Pressure Father            REVIEW OF SYSTEMS:      Constitutional: dizziness   HENT: Negative for congestion, facial swelling and bloody nose  Eyes: Negative for  vision changes  Respiratory: Negative for shortness of breath, difficulty breathing  Cardiovascular: Negative for chest wall pain. Gastrointestinal: Negative for abdominal distention, abdominal pain and vomiting. Genitourinary: Negative for  hematuria  Musculoskeletal: fall, hip pain, per HPI  Skin: Negative for bruising, abrasions  Neurological: Negative for dizziness, weakness and light-headedness. Hematological: Negative for easy bruising/bleeding  Psychiatric/Behavioral: Negative for behavioral problems. Except as noted above the remainder of the review of systems was reviewed and negative.          BASIC LABS:    CBC with Differential:    Lab Results   Component Value Date/Time    WBC 10.0 02/01/2023 04:55 PM    RBC 4.14 02/01/2023 04:55 PM    HGB 12.5 02/01/2023 04:55 PM    HCT 38.2 02/01/2023 04:55 PM     02/01/2023 04:55 PM    MCV 92.3 02/01/2023 04:55 PM    MCH 30.2 02/01/2023 04:55 PM    MCHC 32.7 02/01/2023 04:55 PM    RDW 13.5 02/01/2023 04:55 PM    LYMPHOPCT 5.3 02/01/2023 04:55 PM    MONOPCT 7.1 02/01/2023 04:55 PM    EOSPCT 2.9 03/12/2019 12:00 AM    BASOPCT 0.7 02/01/2023 04:55 PM    MONOSABS 0.7 02/01/2023 04:55 PM    LYMPHSABS 0.5 02/01/2023 04:55 PM    EOSABS 0.3 02/01/2023 04:55 PM    BASOSABS 0.1 02/01/2023 04:55 PM     CMP:    Lab Results   Component Value Date/Time     02/01/2023 04:55 PM    K 4.1 02/01/2023 04:55 PM     02/01/2023 04:55 PM    CO2 24 02/01/2023 04:55 PM    BUN 22 02/01/2023 04:55 PM    CREATININE 0.92 02/01/2023 04:55 PM    GFRAA >60.0 05/13/2022 10:28 AM    LABGLOM >60.0 02/01/2023 04:55 PM    GLUCOSE 93 02/01/2023 04:55 PM    PROT 7.4 02/01/2023 04:55 PM    LABALBU 4.3 02/01/2023 04:55 PM    CALCIUM 9.5 02/01/2023 04:55 PM    BILITOT 0.4 02/01/2023 04:55 PM    ALKPHOS 77 02/01/2023 04:55 PM    AST 24 02/01/2023 04:55 PM    ALT 17 02/01/2023 04:55 PM     BMP:    Lab Results   Component Value Date/Time     02/01/2023 04:55 PM    K 4.1 02/01/2023 04:55 PM     02/01/2023 04:55 PM    CO2 24 02/01/2023 04:55 PM    BUN 22 02/01/2023 04:55 PM    LABALBU 4.3 02/01/2023 04:55 PM    CREATININE 0.92 02/01/2023 04:55 PM    CALCIUM 9.5 02/01/2023 04:55 PM    GFRAA >60.0 05/13/2022 10:28 AM    LABGLOM >60.0 02/01/2023 04:55 PM    GLUCOSE 93 02/01/2023 04:55 PM     Magnesium:  Lab Results   Component Value Date/Time    MG 1.9 09/05/2021 07:37 AM     Troponin:    Lab Results   Component Value Date/Time    TROPONINI <0.010 09/05/2021 07:37 AM     INR:    Recent Labs     02/01/23  1722   INR 1.0           RADIOLOGY:    CT HEAD:   1. There is no acute intracranial abnormality. Specifically, there is no   intracranial hemorrhage. 2. Atrophy and periventricular leukomalacia,       Additional plain films:   Right femoral neck fracture.     ASSESSMENT:  66yo F s/p fall 2 days ago with right hip fracture; also with dizziness     PLAN:  -admit, pain control, NPO at [facility] for OR with ortho in AM; consider syncope w/u      Dante Phelps MD

## 2023-02-02 NOTE — ANESTHESIA PRE PROCEDURE
Department of Anesthesiology  Preprocedure Note       Name:  Claudia Titus   Age:  74 y.o.  :  1950                                          MRN:  [id number]         Date:  2023      Surgeon: Chago Guevara):  Latanya Temple MD    Procedure: Procedure(s):  RIGHT HIP PINNING  C-ARM FRACUTRE TABLE SYNTHES 7.3 CANNULATED SCREWS- ZUNILDA AWARE ROOM 273    Medications prior to admission:   Prior to Admission medications    Medication Sig Start Date End Date Taking? Authorizing Provider   Misc. Devices (BATH/SHOWER SEAT) MISC Dispense 1 Shower/Bath seat 23   Latanya Temple MD   Misc. Devices (CLASSICS ROLLING WALKER) MISC Dispense 1 rolling walker 23   Latanya Temple MD   Misc.  Devices (RAISED TOILET SEAT) MISC Dispense 1 raised toilet seat 23   Latanya Temple MD   levothyroxine (SYNTHROID) 50 MCG tablet Take 1 tablet by mouth once daily 23   OMAR Haro   Cholecalciferol (VITAMIN D3 ADULT GUMMIES) 25 MCG (1000 UT) 2500 [facility] Provider, MD   turmeric 500 MG CAPS     Historical Provider, MD   citalopram (CELEXA) 20 MG tablet Take 1 tablet by mouth once daily 22   OMAR Sneed   citalopram (CELEXA) 10 MG tablet TAKE 1 TABLET BY MOUTH ONCE DAILY WITH  THE  20MG  TABLET 22   OMAR Sneed   pravastatin (PRAVACHOL) 20 MG tablet Take 1 tablet by mouth once daily 22   OMAR Sneed   UNABLE TO FIND Take 1 tablet by mouth daily [facility] 11/17/15   Historical Provider, MD Hu Row Oil 500 MG CAPS Take 1 capsule by mouth daily    Historical Provider, MD   UNABLE TO FIND Take 1 capsule by mouth daily Tumeric root extract    Historical Provider, MD   Pediatric Multivitamins-Fl (MULTI VIT/FL PO) Take 1 capsule by mouth daily    Historical Provider, MD       Current medications:    Current Facility-Administered Medications   Medication Dose Route Frequency Provider Last Rate Last Admin    sodium chloride flush 0.9 % injection 5-40 mL 5-40 mL IntraVENous 2 times per day Yuliya Walsh MD   10 mL at 02/01/23 2217    sodium chloride flush 0.9 % injection 5-40 mL  5-40 mL IntraVENous PRN Yuliya Walsh MD        0.9 % sodium chloride infusion   IntraVENous PRN Yuliya Walsh MD        ondansetron (ZOFRAN-ODT) disintegrating tablet 4 mg  4 mg Oral Q8H PRN Yuliya Walsh MD        Or    ondansetron [facility]) injection 4 mg  4 mg IntraVENous Q6H PRN Yuliya Walsh MD        polyethylene glycol [facility]) packet 17 g  17 g Oral Daily Yuliya Walsh MD        lactated ringers IV soln infusion   IntraVENous Continuous Yuliya Walsh MD 50 mL/hr at 02/01/23 2225 New Bag at 02/01/23 2225    acetaminophen (TYLENOL) tablet 650 mg  650 mg Oral Q4H PRN Yuliya Walsh MD        oxyCODONE (ROXICODONE) immediate release tablet 5 mg  5 mg Oral Q4H PRN Yuliya Walsh MD        Or    oxyCODONE (ROXICODONE) immediate release tablet 10 mg  10 mg Oral Q4H PRN Yuliya Walsh MD   10 mg at 02/02/23 0606    HYDROmorphone (DILAUDID) injection 0.25 mg  0.25 mg IntraVENous Q3H PRN Yuliya Walsh MD        Or    HYDROmorphone (DILAUDID) injection 0.5 mg  0.5 mg IntraVENous Q3H PRN Yuliya Walsh MD        bisacodyl (DULCOLAX) suppository 10 mg  10 mg Rectal Daily PRN Yuliya Walsh MD           Allergies:  No Known Allergies    Problem List:    Patient Active Problem List   Diagnosis Code    Hyperlipidemia E78.5    Depression F32. A    Stress F43.9    Vitamin D deficiency E55.9    History of endometrial cancer Z85.42    Hypothyroid E03.9    Maisonneuve fracture of right fibula S82.861A    Major depressive disorder, recurrent episode, moderate (HCC) F33.1    Osteopenia M85.80    Other joint derangement, not elsewhere classified, ankle and foot M24.873, M24.876    Postmenopausal bleeding N95.0    Sprain of ankle, deltoid ligament S93.429A    Thickened endometrium R93.89    History of uterine cancer Z85.42    COVID-19 U07.1    Osteoarthritis of left knee M17.12    [facility] S72.001A       Past Medical History:        Diagnosis Date    Depression     Hyperlipidemia     Protruded lumbar disc     L4/L5    Stress     Vitamin D deficiency        Past Surgical History:        Procedure Laterality Date    ANKLE SURGERY  2001    FOOT SURGERY  2001    SINUS SURGERY  07-       Social History:    Social History     Tobacco Use    Smoking status: Never    Smokeless tobacco: Never   Substance Use Topics    Alcohol use: No                                Counseling given: Not Answered      Vital Signs (Current):   Vitals:    02/02/23 0730 02/02/23 0734 02/02/23 1154 02/02/23 1156   BP: 127/61  (!) 109/48 (!) 112/51   Pulse: 84  78 76   Resp: 20  22    Temp: 98.4 °F (36.9 °C)  98.2 °F (36.8 °C)    TempSrc: Oral  Oral    SpO2: 96% 96% 90% 96%   Weight:       Height:                                                  BP Readings from Last 3 Encounters:   02/02/23 (!) 112/51   02/01/23 135/67   05/13/22 122/68       NPO Status:                                                                                 BMI:   Wt Readings from Last 3 Encounters:   02/02/23 188 lb (85.3 kg)   02/01/23 188 lb (85.3 kg)   01/19/23 188 lb (85.3 kg)     Body mass index is 32.27 kg/m².     CBC:   Lab Results   Component Value Date/Time    WBC 7.8 02/02/2023 02:18 AM    RBC 3.55 02/02/2023 02:18 AM    HGB 11.0 02/02/2023 02:18 AM    HCT 32.7 02/02/2023 02:18 AM    MCV 92.1 02/02/2023 02:18 AM    RDW 13.8 02/02/2023 02:18 AM     02/02/2023 02:18 AM       CMP:   Lab Results   Component Value Date/Time     02/02/2023 02:18 AM    K 3.8 02/02/2023 02:18 AM     02/02/2023 02:18 AM    CO2 23 02/02/2023 02:18 AM    BUN 18 02/02/2023 02:18 AM    CREATININE 0.93 02/02/2023 02:18 AM    GFRAA >60.0 05/13/2022 10:28 AM    LABGLOM >60.0 02/02/2023 02:18 AM    GLUCOSE 118 02/02/2023 02:18 AM    PROT 7.4 02/01/2023 04:55 PM    CALCIUM 8.2 02/02/2023 02:18 AM    BILITOT 0.4 02/01/2023 04:55 PM    ALKPHOS 77 02/01/2023 04:55 PM    AST 24 02/01/2023 04:55 PM    ALT 17 02/01/2023 04:55 PM       POC Tests: No results for input(s): POCGLU, POCNA, POCK, POCCL, POCBUN, POCHEMO, POCHCT in the last 72 hours. Coags:   Lab Results   Component Value Date/Time    PROTIME 13.2 02/01/2023 05:22 PM    INR 1.0 02/01/2023 05:22 PM    APTT 32.2 09/05/2021 07:38 AM       HCG (If Applicable): No results found for: PREGTESTUR, PREGSERUM, HCG, HCGQUANT     ABGs: No results found for: PHART, PO2ART, QET6VZX, XSE6QPS, BEART, D4HHFGST     Type & Screen (If Applicable):  No results found for: LABABO, LABRH    Drug/Infectious Status (If Applicable):  No results found for: HIV, HEPCAB    COVID-19 Screening (If Applicable):   Lab Results   Component Value Date/Time    COVID19 DETECTED 09/03/2021 03:30 PM    COVID19 DETECTED 09/01/2021 07:25 PM           Anesthesia Evaluation  Patient summary reviewed and Nursing notes reviewed  Airway: Mallampati: II  TM distance: >3 FB   Neck ROM: full  Mouth opening: > = 3 FB   Dental: normal exam         Pulmonary:normal exam                               Cardiovascular:Negative CV ROS          ECG reviewed                        Neuro/Psych:   (+) psychiatric history:            GI/Hepatic/Renal:   (+) morbid obesity          Endo/Other:    (+) hypothyroidism::., .                 Abdominal:   (+) obese,           Vascular: negative vascular ROS. Other Findings:           Anesthesia Plan      general     ASA 3       Induction: intravenous. MIPS: Prophylactic antiemetics administered. Anesthetic plan and risks discussed with patient. Plan discussed with CRNA.     Attending anesthesiologist reviewed and agrees with Preprocedure content                Kassandra Amaro MD   2/2/2023

## 2023-02-02 NOTE — OP NOTE
Operative Note      Patient: Josy Hart  YOB: 1950  MRN: [id number]    Date of Procedure: 2/2/2023    Pre-Op Diagnosis: SUBCAPITAL FRACTURE OF FEMUR, RIGHT , CLOSED INITIAL ENCOUNTER; osteoarthritis left knee    Post-Op Diagnosis: Same       Procedure(s):  RIGHT HIP PINNING AND LEFT KNEE STERIOD INJECTION; Left knee intra-articular steroid injection    Surgeon(s):  Germain Hanley MD    Assistant:   First Assistant: [address]    Anesthesia: Choice    Estimated Blood Loss (mL): less than 50     Complications: None    Specimens:   * No specimens in log *    Implants:  * No implants in log *      Drains: * No LDAs found *    Findings: Subcapital valgus impacted femoral neck fracture on the right    Detailed Description of Procedure:   Patient was brought to the operating room. She was actually on the schedule for a left knee replacement later this month. Unfortunately, she fell and sustained a right femoral neck fracture. In the preoperative holding area we discussed administering a steroid injection in the left knee so that that did not flareup as she recovers from the right hip fracture. The patient was brought to the operating room. After adequate induction of anesthesia by anesthesiology patient was placed supine on the operating table. The left knee was prepped with ChloraPrep. The patient received an intra-articular steroid injection into the left knee consisting of 4 cc of 2% lidocaine without epinephrine, 4 cc of 0.25% Marcaine, and 2 cc of 40 mg/mL Kenalog. A sterile bandage was applied. Patient was then placed onto the fracture table with the right leg in traction, adduction, and slight internal rotation. The left leg was placed in a flexed and abducted position. The C arm was brought in and the positioning of the leg was evaluated and it was adequate. The right lower extremity was then prepped and draped in sterile fashion with a ChloraPrep scrub.   An incision was made over the lateral aspect of the right hip just proximal to the lesser trochanter. Sharp dissection was carried out through skin and subcutaneous tissue down to the level of the IT band. The IT band was split in line with the skin incision and the musculature was elevated up off of the lateral femur. A guidewire for the 7 3 cannulated screws was then inserted and under fluoroscopic guidance. Its placement was confirmed on the AP and lateral.  The Bud gun aiming device was then brought up and slid over the original screw, and 1 was placed proximal and anterior and a third was placed proximal and posterior. Placement of the guidewires was confirmed on AP and lateral.  All 3 of them look good. The distalmost screw at the calcar was measured. It was measured to 80 mm. An 80 mm screw with a washer was inserted and over the guidewire. Excellent compression was achieved. The guidewire was removed. The anterior screw was then measured. It to measured 80 mm. An 80 mm screw was inserted in without a washer and once again, excellent compression was obtained. The posterior screw was then measured. It was determined that it would be a third 80 mm screw. The 80 mm screw without a washer was then inserted and over the guidewire. Excellent compression was obtained. The guidewires were all removed. Radiographs on AP and lateral were taken. Short threads were utilized due to the fact that this was a valgus impacted femoral neck fracture, and all of the threads were past the fracture. The screws were all contained within the femoral head on AP and lateral.  At this point in time, the wound was irrigated out with normal saline. 5 cc of 2% Marcaine without epinephrine were injected into the incision area. Deep dermal layers were closed with a 2-0 Vicryl suture. Subcutaneous tissue was closed with a 2-0 Vicryl suture. Skin was closed with a running 4-0 Monocryl and skin glue.   A sterile dressing was applied and patient was stable to the PACU.    Postoperative management:  Patient will be toe-touch weightbearing on the right side.  She will receive Lovenox for DVT prophylaxis.  She will receive appropriate pain medications.    Electronically signed by Elliot Garcia MD on 2/2/2023 at 5:48 PM

## 2023-02-03 VITALS
TEMPERATURE: 97.6 F | WEIGHT: 188 LBS | HEART RATE: 87 BPM | RESPIRATION RATE: 19 BRPM | SYSTOLIC BLOOD PRESSURE: 120 MMHG | OXYGEN SATURATION: 95 % | HEIGHT: 64 IN | DIASTOLIC BLOOD PRESSURE: 74 MMHG | BODY MASS INDEX: 32.1 KG/M2

## 2023-02-03 PROBLEM — G89.18 ACUTE POST-OPERATIVE PAIN: Status: ACTIVE | Noted: 2023-02-03

## 2023-02-03 PROBLEM — G89.11 ACUTE PAIN DUE TO TRAUMA: Status: ACTIVE | Noted: 2023-02-02

## 2023-02-03 PROBLEM — R42 DIZZINESS: Status: ACTIVE | Noted: 2023-02-01

## 2023-02-03 PROBLEM — D62 ACUTE BLOOD LOSS ANEMIA: Status: ACTIVE | Noted: 2023-02-03

## 2023-02-03 LAB
ANION GAP SERPL CALCULATED.3IONS-SCNC: 11 MEQ/L (ref 9–15)
BASOPHILS ABSOLUTE: 0 K/UL (ref 0–0.2)
BASOPHILS RELATIVE PERCENT: 0 %
BUN BLDV-MCNC: 20 MG/DL (ref 8–23)
CALCIUM SERPL-MCNC: 8.2 MG/DL (ref 8.5–9.9)
CHLORIDE BLD-SCNC: 103 MEQ/L (ref 95–107)
CO2: 24 MEQ/L (ref 20–31)
CREAT SERPL-MCNC: 0.84 MG/DL (ref 0.5–0.9)
EKG ATRIAL RATE: 87 BPM
EKG P AXIS: 45 DEGREES
EKG P-R INTERVAL: 154 MS
EKG Q-T INTERVAL: 368 MS
EKG QRS DURATION: 78 MS
EKG QTC CALCULATION (BAZETT): 442 MS
EKG R AXIS: 44 DEGREES
EKG T AXIS: 45 DEGREES
EKG VENTRICULAR RATE: 87 BPM
EOSINOPHILS ABSOLUTE: 0 K/UL (ref 0–0.7)
EOSINOPHILS RELATIVE PERCENT: 0 %
GFR SERPL CREATININE-BSD FRML MDRD: >60 ML/MIN/{1.73_M2}
GLUCOSE BLD-MCNC: 115 MG/DL (ref 70–99)
HCT VFR BLD CALC: 31.4 % (ref 37–47)
HEMOGLOBIN: 10.5 G/DL (ref 12–16)
LYMPHOCYTES ABSOLUTE: 0.3 K/UL (ref 1–4.8)
LYMPHOCYTES RELATIVE PERCENT: 4.8 %
MCH RBC QN AUTO: 30.5 PG (ref 27–31.3)
MCHC RBC AUTO-ENTMCNC: 33.5 % (ref 33–37)
MCV RBC AUTO: 90.9 FL (ref 79.4–94.8)
MONOCYTES ABSOLUTE: 0.3 K/UL (ref 0.2–0.8)
MONOCYTES RELATIVE PERCENT: 4 %
NEUTROPHILS ABSOLUTE: 5.8 K/UL (ref 1.4–6.5)
NEUTROPHILS RELATIVE PERCENT: 91.2 %
PDW BLD-RTO: 13.6 % (ref 11.5–14.5)
PLATELET # BLD: 204 K/UL (ref 130–400)
POTASSIUM REFLEX MAGNESIUM: 4.6 MEQ/L (ref 3.4–4.9)
RBC # BLD: 3.46 M/UL (ref 4.2–5.4)
SODIUM BLD-SCNC: 138 MEQ/L (ref 135–144)
WBC # BLD: 6.4 K/UL (ref 4.8–10.8)

## 2023-02-03 PROCEDURE — 93010 ELECTROCARDIOGRAM REPORT: CPT | Performed by: INTERNAL MEDICINE

## 2023-02-03 PROCEDURE — 6370000000 HC RX 637 (ALT 250 FOR IP): Performed by: SURGERY

## 2023-02-03 PROCEDURE — 97166 OT EVAL MOD COMPLEX 45 MIN: CPT

## 2023-02-03 PROCEDURE — 6360000002 HC RX W HCPCS: Performed by: ORTHOPAEDIC SURGERY

## 2023-02-03 PROCEDURE — 36415 COLL VENOUS BLD VENIPUNCTURE: CPT

## 2023-02-03 PROCEDURE — 80048 BASIC METABOLIC PNL TOTAL CA: CPT

## 2023-02-03 PROCEDURE — 97162 PT EVAL MOD COMPLEX 30 MIN: CPT

## 2023-02-03 PROCEDURE — 6360000002 HC RX W HCPCS: Performed by: NURSE PRACTITIONER

## 2023-02-03 PROCEDURE — 99232 SBSQ HOSP IP/OBS MODERATE 35: CPT | Performed by: PHYSICIAN ASSISTANT

## 2023-02-03 PROCEDURE — 97535 SELF CARE MNGMENT TRAINING: CPT

## 2023-02-03 PROCEDURE — 2700000000 HC OXYGEN THERAPY PER DAY

## 2023-02-03 PROCEDURE — 6360000002 HC RX W HCPCS: Performed by: PHYSICIAN ASSISTANT

## 2023-02-03 PROCEDURE — 85025 COMPLETE CBC W/AUTO DIFF WBC: CPT

## 2023-02-03 PROCEDURE — 6370000000 HC RX 637 (ALT 250 FOR IP): Performed by: PHYSICIAN ASSISTANT

## 2023-02-03 RX ORDER — POLYETHYLENE GLYCOL 3350 17 G/17G
17 POWDER, FOR SOLUTION ORAL ONCE
Status: COMPLETED | OUTPATIENT
Start: 2023-02-03 | End: 2023-02-03

## 2023-02-03 RX ORDER — ACETAMINOPHEN 325 MG/1
650 TABLET ORAL EVERY 6 HOURS
Qty: 112 TABLET | Refills: 0 | Status: SHIPPED | OUTPATIENT
Start: 2023-02-03 | End: 2023-02-17

## 2023-02-03 RX ORDER — SENNA AND DOCUSATE SODIUM 50; 8.6 MG/1; MG/1
1 TABLET, FILM COATED ORAL 2 TIMES DAILY
Status: DISCONTINUED | OUTPATIENT
Start: 2023-02-03 | End: 2023-02-03 | Stop reason: HOSPADM

## 2023-02-03 RX ORDER — ENOXAPARIN SODIUM 100 MG/ML
30 INJECTION SUBCUTANEOUS 2 TIMES DAILY
Qty: 16.8 ML | Refills: 0 | Status: SHIPPED | OUTPATIENT
Start: 2023-02-03 | End: 2023-03-03

## 2023-02-03 RX ORDER — SENNA AND DOCUSATE SODIUM 50; 8.6 MG/1; MG/1
1 TABLET, FILM COATED ORAL 2 TIMES DAILY
Qty: 28 TABLET | Refills: 0 | Status: SHIPPED | OUTPATIENT
Start: 2023-02-03 | End: 2023-02-17

## 2023-02-03 RX ORDER — ACETAMINOPHEN 325 MG/1
650 TABLET ORAL EVERY 6 HOURS
Status: DISCONTINUED | OUTPATIENT
Start: 2023-02-03 | End: 2023-02-03 | Stop reason: HOSPADM

## 2023-02-03 RX ORDER — POLYETHYLENE GLYCOL 3350 17 G/17G
17 POWDER, FOR SOLUTION ORAL DAILY PRN
Status: DISCONTINUED | OUTPATIENT
Start: 2023-02-03 | End: 2023-02-03 | Stop reason: HOSPADM

## 2023-02-03 RX ORDER — ENOXAPARIN SODIUM 100 MG/ML
30 INJECTION SUBCUTANEOUS 2 TIMES DAILY
Status: DISCONTINUED | OUTPATIENT
Start: 2023-02-03 | End: 2023-02-03 | Stop reason: HOSPADM

## 2023-02-03 RX ORDER — OXYCODONE HYDROCHLORIDE 5 MG/1
5 TABLET ORAL EVERY 4 HOURS PRN
Qty: 20 TABLET | Refills: 0 | Status: SHIPPED | OUTPATIENT
Start: 2023-02-03 | End: 2023-02-08

## 2023-02-03 RX ORDER — KETOROLAC TROMETHAMINE 15 MG/ML
15 INJECTION, SOLUTION INTRAMUSCULAR; INTRAVENOUS EVERY 6 HOURS
Status: DISCONTINUED | OUTPATIENT
Start: 2023-02-03 | End: 2023-02-03 | Stop reason: HOSPADM

## 2023-02-03 RX ORDER — METHOCARBAMOL 500 MG/1
500 TABLET, FILM COATED ORAL 3 TIMES DAILY
Qty: 30 TABLET | Refills: 0 | Status: SHIPPED | OUTPATIENT
Start: 2023-02-03 | End: 2023-02-13

## 2023-02-03 RX ADMIN — POLYETHYLENE GLYCOL 3350 17 G: 17 POWDER, FOR SOLUTION ORAL at 10:09

## 2023-02-03 RX ADMIN — METHOCARBAMOL 500 MG: 500 TABLET ORAL at 10:11

## 2023-02-03 RX ADMIN — Medication 2000 MG: at 01:49

## 2023-02-03 RX ADMIN — KETOROLAC TROMETHAMINE 15 MG: 15 INJECTION, SOLUTION INTRAMUSCULAR; INTRAVENOUS at 16:52

## 2023-02-03 RX ADMIN — Medication 2000 MG: at 10:13

## 2023-02-03 RX ADMIN — SENNOSIDES AND DOCUSATE SODIUM 1 TABLET: 50; 8.6 TABLET ORAL at 16:53

## 2023-02-03 RX ADMIN — METHOCARBAMOL 500 MG: 500 TABLET ORAL at 16:52

## 2023-02-03 RX ADMIN — POLYETHYLENE GLYCOL 3350 17 G: 17 POWDER, FOR SOLUTION ORAL at 16:52

## 2023-02-03 RX ADMIN — LEVOTHYROXINE SODIUM 50 MCG: 0.05 TABLET ORAL at 06:21

## 2023-02-03 RX ADMIN — ACETAMINOPHEN 325MG 650 MG: 325 TABLET ORAL at 16:52

## 2023-02-03 RX ADMIN — CITALOPRAM HYDROBROMIDE 30 MG: 20 TABLET ORAL at 10:08

## 2023-02-03 RX ADMIN — ENOXAPARIN SODIUM 40 MG: 100 INJECTION SUBCUTANEOUS at 10:08

## 2023-02-03 RX ADMIN — OXYCODONE 10 MG: 5 TABLET ORAL at 14:13

## 2023-02-03 ASSESSMENT — PAIN DESCRIPTION - ORIENTATION
ORIENTATION: RIGHT

## 2023-02-03 ASSESSMENT — PAIN DESCRIPTION - DESCRIPTORS
DESCRIPTORS: SHOOTING
DESCRIPTORS: SHARP
DESCRIPTORS: ACHING

## 2023-02-03 ASSESSMENT — PAIN SCALES - GENERAL
PAINLEVEL_OUTOF10: 7
PAINLEVEL_OUTOF10: 5
PAINLEVEL_OUTOF10: 7
PAINLEVEL_OUTOF10: 2
PAINLEVEL_OUTOF10: 2
PAINLEVEL_OUTOF10: 7

## 2023-02-03 ASSESSMENT — PAIN DESCRIPTION - LOCATION
LOCATION: HIP

## 2023-02-03 NOTE — CARE COORDINATION
Quality round completed with care management team. [place] spoke with Teresa lAdridge. Per Teresa Aldridge, Pending Pre-cert at this time. DC plan: [facility]. Pend pre-cert. LSW will follow. Electronically signed by Shannon Murrieta on 2/3/2023 at 10:28 AM    PER DOUGLAS AQUINO 9. PT IS ABLE TO COME WHEN MEDICALLY CLEAR. NOTIFY CM.     LSW WILL FOLLOW.      Electronically signed by PERLA Murrieta on 2/3/2023 at 11:42 AM

## 2023-02-03 NOTE — PLAN OF CARE
Problem: Physical Therapy - Adult  Goal: By Discharge: Performs mobility at highest level of function for planned discharge setting. See evaluation for individualized goals. 2/3/2023 1007 by Jourdan Hay PT  Outcome: Progressing     Problem: Occupational Therapy - Adult  Goal: By Discharge: Performs self-care activities at highest level of function for planned discharge setting. See evaluation for individualized goals.   2/3/2023 1009 by Nimesh Ann OTR/L  Outcome: Progressing

## 2023-02-03 NOTE — DISCHARGE SUMMARY
[address]  [address]  [place] [place]  MRN: [id number]  YOB: 1950  74 y. o.female      Attending  Lois Santos MD ?   Date of Admission  2/1/2023 Date of Discharge  2/3/2023      ? DIAGNOSES:  Principal Problem:    Closed right hip fracture, initial encounter [facility])  Active Problems:    Subcapital fracture of femur, right, closed, initial encounter ([facility]    Dizziness    Acute pain due to trauma    Acute post-operative pain    Acute blood loss anemia  Resolved Problems:    * No resolved hospital problems. *         PROCEDURES:  2/2/2023: s/p right hip pinning and left knee steroid injection with left knee intra-articular steroid injection with Ortho (Dr. Cortez Barriga)  ? DISCHARGE MEDICATIONS:   Current Discharge Medication List             Details   oxyCODONE (ROXICODONE) 5 MG immediate release tablet Take 1 tablet by mouth every 4 hours as needed for Pain (for severe pain only (7-10/10)) for up to 5 days. Max Daily Amount: 30 mg  Qty: 20 tablet, Refills: 0    Comments: Reduce doses taken as pain becomes manageable  Associated Diagnoses: Closed right hip fracture, initial encounter ([facility] Acute post-operative pain      acetaminophen (TYLENOL) 325 MG tablet Take 2 tablets by mouth in the morning and 2 tablets at noon and 2 tablets in the evening and 2 tablets before bedtime. Do all this for 14 days. Qty: 112 tablet, Refills: 0      enoxaparin Sodium (LOVENOX) 30 MG/0.3ML injection Inject 0.3 mLs into the skin 2 times daily for 28 days  Qty: 16.8 mL, Refills: 0      sennosides-docusate sodium (SENOKOT-S) 8.6-50 MG tablet Take 1 tablet by mouth in the morning and at bedtime for 14 days  Qty: 28 tablet, Refills: 0      methocarbamol (ROBAXIN) 500 MG tablet Take 1 tablet by mouth 3 times daily for 10 days  Qty: 30 tablet, Refills: 0                Details   !! Misc.  Devices (BATH/SHOWER SEAT) MISC Dispense 1 Shower/Bath seat  Qty: 1 each, Refills: 0 !! Misc. Devices (CLASSICS ROLLING WALKER) MISC Dispense 1 rolling walker  Qty: 1 each, Refills: 0      !! Misc. Devices (RAISED TOILET SEAT) MIS Dispense 1 raised toilet seat  Qty: 1 each, Refills: 0      levothyroxine (SYNTHROID) 50 MCG tablet Take 1 tablet by mouth once daily  Qty: 90 tablet, Refills: 1      Cholecalciferol (VITAMIN D3 ADULT GUMMIES) 25 MCG (1000 UT) CHEW       turmeric 500 MG CAPS       !! citalopram (CELEXA) 20 MG tablet Take 1 tablet by mouth once daily  Qty: 90 tablet, Refills: 1    Associated Diagnoses: Major depressive disorder, recurrent episode, moderate ([address]      ! ! citalopram (CELEXA) 10 MG tablet TAKE 1 TABLET BY MOUTH ONCE DAILY WITH  THE  20MG  TABLET  Qty: 90 tablet, Refills: 1    Associated Diagnoses: Major depressive disorder, recurrent episode, moderate (HCC)      pravastatin (PRAVACHOL) 20 MG tablet Take 1 tablet by mouth once daily  Qty: 90 tablet, Refills: 1    Associated Diagnoses: Mixed hyperlipidemia      !! UNABLE TO FIND Take 1 tablet by mouth daily [facility]      Krill Oil 500 MG CAPS Take 1 capsule by mouth daily      !! UNABLE TO FIND Take 1 capsule by mouth daily Tumeric root extract      Pediatric Multivitamins-Fl (MULTI VIT/FL PO) Take 1 capsule by mouth daily       ! ! - Potential duplicate medications found. Please discuss with provider.             Current Facility-Administered Medications:     enoxaparin Sodium (LOVENOX) injection 30 mg, 30 mg, SubCUTAneous, BID, Horton Sever, PA    polyethylene glycol (GLYCOLAX) packet 17 g, 17 g, Oral, Daily PRN, Horton Sever, PA    sennosides-docusate sodium (SENOKOT-S) 8.6-50 MG tablet 1 tablet, 1 tablet, Oral, BID, Horton Sever, PA, 1 tablet at 02/03/23 1653    acetaminophen (TYLENOL) tablet 650 mg, 650 mg, Oral, Q6H, Horton Sever, PA, 650 mg at 02/03/23 1652    ketorolac (TORADOL) injection 15 mg, 15 mg, IntraVENous, Q6H, Horton Sever, PA, 15 mg at 02/03/23 1652    scopolamine (TRANSDERM-SCOP) transdermal patch 1 patch, 1 patch, TransDERmal, Once, Claudia Villalobos, APRN - CNP, 1 patch at 02/02/23 1602    tranexamic acid (CYKLOKAPRON) 1,000 mg in sodium chloride 0.9 % 60 mL IVPB, 1,000 mg, IntraVENous, Once, Claudia Villalobos, APRN - CNP    citalopram (CELEXA) tablet 30 mg, 30 mg, Oral, Daily, OMAR Tran, 30 mg at 02/03/23 1008    levothyroxine (SYNTHROID) tablet 50 mcg, 50 mcg, Oral, Daily, OMAR Tran, 50 mcg at 02/03/23 6454    pravastatin (PRAVACHOL) tablet 20 mg, 20 mg, Oral, Nightly, OMAR Tran, 20 mg at 02/02/23 2100    sodium chloride flush 0.9 % injection 5-40 mL, 5-40 mL, IntraVENous, 2 times per day, Lessie Crigler, MD, 10 mL at 02/02/23 2103    sodium chloride flush 0.9 % injection 5-40 mL, 5-40 mL, IntraVENous, PRN, Lessie Crigler, MD    0.9 % sodium chloride infusion, , IntraVENous, PRN, Lessie Crigler, MD    HYDROmorphone (DILAUDID) injection 0.25 mg, 0.25 mg, IntraVENous, Q3H PRN, OMAR Tran    methocarbamol (ROBAXIN) tablet 500 mg, 500 mg, Oral, TID, OMAR Tran, 500 mg at 02/03/23 1652    sodium chloride flush 0.9 % injection 5-40 mL, 5-40 mL, IntraVENous, 2 times per day, Rolando Katz MD, 10 mL at 02/02/23 2104    sodium chloride flush 0.9 % injection 5-40 mL, 5-40 mL, IntraVENous, PRN, Rolando Katz MD    0.9 % sodium chloride infusion, , IntraVENous, PRN, Rolando Katz MD    ondansetron (ZOFRAN-ODT) disintegrating tablet 4 mg, 4 mg, Oral, Q8H PRN **OR** ondansetron (ZOFRAN) injection 4 mg, 4 mg, IntraVENous, Q6H PRN, Rolando Katz MD    oxyCODONE (ROXICODONE) immediate release tablet 5 mg, 5 mg, Oral, Q4H PRN **OR** oxyCODONE (ROXICODONE) immediate release tablet 10 mg, 10 mg, Oral, Q4H PRN, Rolando Katz MD, 10 mg at 02/03/23 1413    bisacodyl (DULCOLAX) suppository 10 mg, 10 mg, Rectal, Daily PRN, Rolando Katz MD         REASON FOR HOSPITALIZATION:  Tarah Andrew is a 74 y.o. female with PMHx depression, HLD, vitamin d deficiency. Patient presented [facility] [place] [facility] on 2/1/2023 s/p fall d/t dizziness 3 days prior to presentation. (-)head strike. (-)LOC. (-)AC/AP medications. Patient with complaint of right hip pain. Trauma work up significant for acute subcapital fracture of right femur. Patient admitted to Trauma RNF with Ortho consult. SIGNIFICANT FINDINGS:  Catalog of Injuries:   Syncopal fall (dizziness, no LOC)  Right subcapital femur fx  Acute pain due to trauma  Acute blood loss anemia  Acute post-operative pain    Incidental Findings: none, RAL, 2/3/2023       HOSPITAL COURSE:  2/1/2023: R hip pain after fall 3 days prior. Admitted to trauma RNF with Ortho consult. 2/2/2023: CT T/L spine negative for acute injury. ECHO and bilateral carotid US completed and w/o finding to contribute to dizziness. S/p right hip pinning and left knee steroid injection with left knee intra-articular steroid injection with Ortho (Dr. Hugo Gilbert). 2/3/2023: Slight downtrend in Hgb to 10.5 from 11.0. electrolytes and renal function appropriate. Pain controlled on PO regimen. PT/OT recommend SNF. Weaned to RA. Patient medically cleared for SNF. At time of discharge, [facility] Pac was tolerating a regular diet, having bowel movements, and had adequate analgesia on oral pain medications. Pt's activity level was OOBAT. The patient had no signs or symptoms of complications. Patient was determined stable for discharge to: [address]    The patient was seen and examined on the day of discharge with the following findings:  Constitutional: laying in bed. NAD. Family at bedside. Appears more comfortable today. HEENT: Atraumatic normocephalic. Cardiovascular: Regular rate and rhythm. Pulmonary: Clear to ausculation bilaterally. No wheezing, rhonchi or rales. Non-labored breathing on 1L NC  Abdominal: Soft. Non-distended. Non-tender. Musculoskeletal: T/L spine TTP at junction (negative imaging). No c-spine TTP. Right hip with aquacel dressing. No strike through. Compartments soft/compressible. Neurological: Alert, awake, and orientated x 3. Motor and sensory grossly intact distal to operative site. No focal deficits. GCS of 15. Skin: warm/dry        ANTICIPATED FOLLOW UP:  Future Appointments   Date Time Provider Taye Shelley   2/20/2023 12:00 PM Sushma Quiles, [address] [place] [address]   2/21/2023 11:45 AM OMAR Ortega [facility] OR [facility] [place]   3/14/2023 11:00 AM OMAR Ortega [facility] OR [facility] AT [place]     No discharge procedures on file. Other indicated follow up and instructions for scheduling:  - Follow up with Ortho in 2 weeks (Dr. Mark Fowler)  - No need for routine Trauma follow up      VTE RISK AT DISCHARGE:  Patient does not require post-discharge VTE prophylaxis, per trauma protocol. Patient will continue VTE prophylaxis with Lovenox 30mg BID while in SNF/Acute Rehab, and use may be discontinued per discretion of rehab/SNF provider. --  Alexander Antony PA-C  Trauma/Critical Care/ Emergency General Surgery    [see treatment team sticky note for contact information]      35 minutes were spent on the discharge of this patient including final examination of the patient, discussion of the hospital stay, instructions for continuing care to all relevant caregivers, preparation of discharge records, prescriptions and referral forms, and clear identification of reasons to return to clinic or to emergency room.

## 2023-02-03 NOTE — PROGRESS NOTES
[facility] OCCUPATIONAL THERAPY EVALUATION - ACUTE     NAME: Gómez Smith  : 1950 (89 y.o.)  MRN: [id number]  CODE STATUS: Full Code  Room: X349/Y839-07    Date of Service: 2/3/2023    Patient Diagnosis(es): Closed right hip fracture, initial encounter ([facility] [S72.001A]  Subcapital fracture of femur, right, closed, initial encounter [facility]) [S72.011A]   Patient Active Problem List    Diagnosis Date Noted    Subcapital fracture of femur, right, closed, initial encounter ([facility] [address] 2023    Closed right hip fracture, initial encounter ([facility] [address] 2023    Osteoarthritis of left knee 2023    COVID-19 2021    Osteopenia 2020    History of uterine cancer 2018    History of endometrial cancer 2018    Postmenopausal bleeding 2018    Thickened endometrium 2018    Other joint derangement, not elsewhere classified, ankle and foot 2015    Major depressive disorder, recurrent episode, moderate ([address] 2015    Hypothyroid 2015    [place] fracture of right fibula 2015    Sprain of ankle, deltoid ligament 2015    Hyperlipidemia     Depression     Stress     Vitamin D deficiency         Past Medical History:   Diagnosis Date    Depression     Hyperlipidemia     Protruded lumbar disc     L4/L5    Stress     Vitamin D deficiency      Past Surgical History:   Procedure Laterality Date    ANKLE SURGERY      FOOT SURGERY      HIP SURGERY Right 2023    RIGHT HIP PINNING AND LEFT KNEE STERIOD INJECTION performed by Geetha Oseguera MD at [place] [address]  07-        Restrictions  Restrictions/Precautions: Fall Risk, Up as Tolerated, Weight Bearing      Right Lower Extremity Weight Bearing: Toe Touch Weight Bearing       Safety Devices: Safety Devices  Type of Devices: All fall risk precautions in place;Call light within reach; Chair alarm in place; Left in chair     Patient's date of birth confirmed: Yes    General:  Chart Reviewed: Yes  Patient assessed for rehabilitation services?: Yes    Subjective  Subjective: \"I feel okay\"       Pain at start of treatment: Yes: 9/10    Pain at end of treatment: Yes: 9/10    Location: R hip  Nursing notified: Declined  RN:   Intervention: Repositioned Other: Pt states she just had pain medication    Prior Level of Function:  Social/Functional History  Lives With: Son  Type of Home: House  Home Layout: Laundry in basement, One level  Home Access: Stairs to enter with rails  Entrance Stairs - Number of Steps: 3- grab bar  Bathroom Shower/Tub: Tub/Shower unit  Bathroom Equipment: Shower chair, Grab bars in shower, Hand-held shower  Home Equipment: [place], [address]  Has the patient had two or more falls in the past year or any fall with injury in the past year?: Yes  ADL Assistance: Independent  Homemaking Assistance: Independent (Son does heavy cleaning and laundry in basement)  Ambulation Assistance: Independent (Cane)  Transfer Assistance: Independent  Active : Yes  Leisure & Hobbies: Enjoys playing games    OBJECTIVE:     Orientation Status:  Orientation  Overall Orientation Status: Within Functional Limits    Observation:  Observation/Palpation  Posture: Good  Observation: Pt alert and attentive, agreeable to OT eval, no acute distress    Cognition Status:  Cognition  Overall Cognitive Status: WFL    Perception Status:  Perception  Overall Perceptual Status: WFL    Vision and Hearing Status:  Vision  Vision Exceptions: Wears glasses for reading  Hearing  Hearing: Within functional limits   Vision - Basic Assessment  Prior Vision: Wears glasses only for reading  Visual History: No significant visual history  Patient Visual Report: No visual complaint reported. Visual Field Cut: No  Oculo Motor Control:  WNL    GROSS ASSESSMENT AROM/PROM:  AROM: Within functional limits       ROM:   LUE AROM (degrees)  LUE AROM : WFL  Left Hand AROM (degrees)  Left Hand AROM: WFL  RUE AROM (degrees)  RUE AROM : WFL  Right Hand AROM (degrees)  Right Hand AROM: WFL    UE STRENGTH:  Strength: Generally decreased, functional    UE COORDINATION:  Coordination: Generally decreased, functional    UE TONE:  Tone: Normal    UE SENSATION:  Sensation: Intact    Hand Dominance:  Hand Dominance  Hand Dominance: Right    ADL Status:  ADL  Feeding: Independent  Grooming: Setup  UE Bathing: Setup  LE Bathing: Maximum assistance  UE Dressing: Setup  LE Dressing: Dependent/Total  Toileting: Dependent/Total  Additional Comments: Simulated ADLs as above. Limited d/t pain and significant difficulty maintaining TTWB  Toilet Transfers  Toilet Transfer: Unable to assess  Toilet Transfers Comments: Anticipate dependent    Educated pt. on technique for transfer to improve independence and safety to toilet or BSC. Pt with reported understanding but significant difficulty sequencing and following restrictions. Functional Mobility:    Transfers  Sit to stand: Moderate assistance  Stand to sit: Moderate assistance  Transfer Comments: Significant effort, difficulty maintaining TTWB during transitions    Patient ambulated pivot only to bedside chair  with [employer] at Total A level. Max +2, with significant assist to maintain TTWB and initiation. Bed Mobility  Bed mobility  Rolling to Right: Moderate assistance  Supine to Sit: Moderate assistance  Scooting: Minimal assistance; Moderate assistance  Bed Mobility Comments: Assist to sequence scooting and supine>sit. Verbal cues for follow through. increased time and effort to complete. Seated and Standing Balance:  Balance  Sitting: Intact  Standing: Impaired  Standing - Static: Fair  Standing - Dynamic: Fair    Functional Endurance:  Activity Tolerance  Activity Tolerance: Patient limited by pain; Patient limited by fatigue    D/C Recommendations:  OT D/C RECOMMENDATIONS  REQUIRES OT FOLLOW-UP: Yes    Equipment Recommendations:  OT Equipment Recommendations  Other: Continue to assess    OT Education: Patient Education  Education Given To: Patient  Education Provided: Role of Therapy;Plan of Care  Education Method: Verbal  Barriers to Learning: None  Education Outcome: Verbalized understanding    OT Follow Up:   OT D/C RECOMMENDATIONS  REQUIRES OT FOLLOW-UP: Yes       Assessment/Discharge Disposition:  Assessment: Pt is a 74year old woman from home with family support who presents to [id number] [place] [address] with the above deficits which impact her ability to perform ADLs and IADLs. Pt. limited d/t pain and weakness.   Performance deficits / Impairments: Decreased functional mobility , Decreased ADL status, Decreased strength, Decreased endurance, Decreased balance, Decreased high-level IADLs  Prognosis: Good  Discharge Recommendations: Continue to assess pending progress  Decision Making: Medium Complexity  History: Pt's medical history is moderately complex  Exam: Pt. has 6 performance deficits  Assistance / Modification: Pt requires mod A    AMPAC (Six Click) Self care Score   How much help is needed for putting on and taking off regular lower body clothing?: Total  How much help is needed for bathing (which includes washing, rinsing, drying)?: A Lot  How much help is needed for toileting (which includes using toilet, bedpan, or urinal)?: Total  How much help is needed for putting on and taking off regular upper body clothing?: A Little  How much help is needed for taking care of personal grooming?: A Little  How much help for eating meals?: None  AM-[facility] Inpatient Daily Activity Raw Score: 14  AM-PAC Inpatient ADL T-Scale Score : 33.39  ADL Inpatient CMS 0-100% Score: 59.67    Therapy key for assistance levels -   Independent/Mod I = Pt. is able to perform task with no assistance but may require a device   Stand by assistance = Pt. does not perform task at an independent level but does not need physical assistance, requires verbal cues  Minimal, Moderate, Maximal Assistance = Pt. requires physical assistance (25%, 50%, 75% assist from helper) for task but is able to actively participate in task   Dependent = Pt. requires total assistance with task and is not able to actively participate with task completion     Plan:  Occupational Therapy Plan  Times Per Week: 1-4x  Therapy Duration:  (Length of acute stay)  Current Treatment Recommendations: Strengthening, Balance training, Functional mobility training, Endurance training, Pain management, Safety education & training, Patient/Caregiver education & training, Equipment evaluation, education, & procurement, Self-Care / ADL, Home management training    Goals:   Patient will:    - Improve functional endurance to tolerate/complete 30 mins of ADL's  - Be Mod I in UB ADLs   - Be Min in LB ADLs  - Be Min A in ADL transfers without LOB  - Be Min A in toileting tasks  - Improve B UE strength and endurance to 4/5 in order to participate in self-care activities as projected. - Access appropriate D/C site with as few architectural barriers as possible. - Sequence self-care tasks with no verbal cues for safety    Patient Goal: Patient goals : \"I want to get home\"     Discussed and agreed upon: Yes Comments:       Therapy Time:   Individual   Time In 927   Time Out 50   Minutes 23   ADL/IADL trainin minutes  Eval: 15 minutes     Electronically signed by:     STEPHANIE Olivas,   2/3/2023, 10:05 AM

## 2023-02-03 NOTE — PROGRESS NOTES
TRAUMA DAILY PROGRESS NOTE      Patient Name: Gómez Smith  Admission Date 2023    Hospital Day: 2  Patient seen and examined on 2/3/2023    INTERVAL HISTORY/EVENTS     Background:  Gómez Smith is a 74 y.o. female with PMHx depression, HLD, vitamin d deficiency. Patient presented to [facility] [place] AT [place] [facility] on 2023 s/p fall d/t dizziness 3 days prior to presentation. (-)head strike. (-)LOC. (-)AC/AP medications. Patient with complaint of right hip pain. Trauma work up significant for acute subcapital fracture of right femur. Patient admitted to Trauma RNF with Ortho consult. Hospital Course:  2023: R hip pain after fall 3 days prior. Admitted to trauma RNF with Ortho consult. 2023: s/p right hip pinning and left knee steroid injection with Ortho      24 Hour Events:  POD#1 s/p right hip pinning and left knee steroid injection. Patient reports that pain is well controlled at rest but is exacerbated with movement and working with PT/OT today. Rates current pain after working with PT/OT at 7/10 severity. Denies numbness/tingling. Tolerating PO diet. Voiding spontaneously with external catheter. No BM, does endorse feeling constipated. Vitals: afebrile. Not tachycardic. -157. SPO2 98% on RA. Labs: no leukocytosis. H/H mild downtrend to 10.5/31.4 (11.0/32.7). Electrolytes and renal function appropriate. Intake& output:  PO: 480 recorded  UOP: none recorded    BM x0 since admit      PHYSICAL EXAM     Vitals Average, Min and Max for last 24 hours:  Temp: Temp: 97.6 °F (36.4 °C);  Temp  Av.8 °F (36.6 °C)  Min: 97.3 °F (36.3 °C)  Max: 98.2 °F (36.8 °C)  Respirations: Resp  Av.3  Min: 10  Max: 20  Pulse: Pulse  Av.5  Min: 58  Max: 78  Blood Pressure: Systolic (78GFQ), TGS:017 , Min:119 , ANA MARIA:040   ; Diastolic (13VPO), GEV:40, Min:55, Max:93    SpO2: SpO2  Av %  Min: 88 %  Max: 100 %    24hr Intake/Output:   Intake/Output Summary (Last 24 hours) at 2/3/2023 6391 Ezmq data filed at 2/3/2023 0254  Gross per 24 hour   Intake 480 ml   Output --   Net 480 ml       Vitals: /68   Pulse 67   Temp 97.6 °F (36.4 °C)   Resp 19   Ht 5' 4\" (1.626 m)   Wt 188 lb (85.3 kg)   LMP  (LMP Unknown)   SpO2 99%   BMI 32.27 kg/m²     Physical Exam:  Constitutional: laying in bed. NAD. Family at bedside. Appears more comfortable today. HEENT: Atraumatic normocephalic. Cardiovascular: Regular rate and rhythm. Pulmonary: Clear to ausculation bilaterally. No wheezing, rhonchi or rales. Non-labored breathing on 1L NC  Abdominal: Soft. Non-distended. Non-tender. Musculoskeletal: T/L spine TTP at junction (negative imaging). No c-spine TTP. Right hip with aquacel dressing. No strike through. Compartments soft/compressible. Neurological: Alert, awake, and orientated x 3. Motor and sensory grossly intact distal to operative site. No focal deficits. GCS of 15.    Skin: warm/dry    LABORATORY RESULTS (LAST 24 HOURS)     CBC with Differential:    Lab Results   Component Value Date/Time    WBC 6.4 02/03/2023 05:42 AM    RBC 3.46 02/03/2023 05:42 AM    HGB 10.5 02/03/2023 05:42 AM    HCT 31.4 02/03/2023 05:42 AM     02/03/2023 05:42 AM    MCV 90.9 02/03/2023 05:42 AM    MCH 30.5 02/03/2023 05:42 AM    MCHC 33.5 02/03/2023 05:42 AM    RDW 13.6 02/03/2023 05:42 AM    LYMPHOPCT 4.8 02/03/2023 05:42 AM    MONOPCT 4.0 02/03/2023 05:42 AM    EOSPCT 2.9 03/12/2019 12:00 AM    BASOPCT 0.0 02/03/2023 05:42 AM    MONOSABS 0.3 02/03/2023 05:42 AM    LYMPHSABS 0.3 02/03/2023 05:42 AM    EOSABS 0.0 02/03/2023 05:42 AM    BASOSABS 0.0 02/03/2023 05:42 AM     CMP:    Lab Results   Component Value Date/Time     02/03/2023 05:42 AM    K 4.6 02/03/2023 05:42 AM     02/03/2023 05:42 AM    CO2 24 02/03/2023 05:42 AM    BUN 20 02/03/2023 05:42 AM    CREATININE 0.84 02/03/2023 05:42 AM    GFRAA >60.0 05/13/2022 10:28 AM    LABGLOM >60.0 02/03/2023 05:42 AM    GLUCOSE 115 02/03/2023 05:42 AM PROT 7.4 02/01/2023 04:55 PM    LABALBU 4.3 02/01/2023 04:55 PM    CALCIUM 8.2 02/03/2023 05:42 AM    BILITOT 0.4 02/01/2023 04:55 PM    ALKPHOS 77 02/01/2023 04:55 PM    AST 24 02/01/2023 04:55 PM    ALT 17 02/01/2023 04:55 PM     Magnesium:    Lab Results   Component Value Date/Time    MG 1.9 09/05/2021 07:37 AM     PT/INR:    Lab Results   Component Value Date/Time    PROTIME 13.2 02/01/2023 05:22 PM    INR 1.0 02/01/2023 05:22 PM     PTT:    Lab Results   Component Value Date/Time    APTT 32.2 09/05/2021 07:38 AM       IMAGING RESULTS (PERSONALLY REVIEWED)     US Carotid Artery Bilateral:  -The right internal carotid artery demonstrates 0-50% stenosis. -The left internal carotid artery demonstrates 0-50% stenosis. -Bilateral vertebral arteries are patent with flow in the normal direction. ECHO:    Left ventricular ejection fraction is visually estimated at 60-65%. Normal left ventricular size and function. E/A flow reversal noted. Suggestive of diastolic dysfunction. Mild-to-moderate aortic regurgitation is noted. Mild-to-moderate tricuspid regurgitation . ASSESSMENT & PLAN     Diagnoses:  Syncopal fall  Right subcapital femur fx  Acute pain due to trauma  Acute blood loss anemia  Acute post-operative pain      PMHx: depression, HLD, vitamin d deficiency    Incidental Findings: needs reviewed  There is a small hiatal hernia.  right renal cyst    ASSESSMENT/PLAN:  Neurological: PMHx depression, Acute pain d/t trauma, acute post-operative pain. - Tylenol fell off MAR. Resume tylenol 650mg q6hr  - continue robaxin 500mg TID  - Start toradol 15mg q6hr  - continue oxycodone 5/10mg q4hr prn mod/severe pain  - continue dilaudid 0.25mg q3hr prn breakthrough pain  - continue home celexa 30mg daily     Cardiovascular: PMHx HLD. ? syncopal fall (reports dizziness but did not loss consciousness)  - continue home pravastatin 20mg daily  - ECHO and carotid US w/o findings that would likely contribute to syncope  - Orthostatic BP when able.     Respiratory: On 1L NC O2  - Maintain O2 sats > 92%, wean to RA  - Encourage IS 10x hourly     GI/Diet:   - Continue regular diet  - bowel regimen (senokot-s BID, miralax daily prn)     Renal/Electrolytes: voiding spontaneously with external catheter  - HLIV  - AM BMP  - I&O     ID: No active infection. Remains afebrile, normotensive, and without leukocytosis.  - filemon-op abx completed (x3 doses)  - No additional abx     Heme: Acute blood loss anemia with mild H/H downtrend, HDS. No not suspect active bleeding  - no indication for transfusion at this time  - AM CBC     Endocrine:   - no acute glycemic issues     MSK: right subcapsular femur fx (ortho, s/p right hip pinning and left knee steroid injection with Ortho on 2/2)  - Ortho: outpatient post-op eval  - Spines: cleared (T/L CT negative)  - Weight Bearing Restrictions: TTWB to RLE  - Activity: OOBAT with assistance  - PT/OT: recommending SNF    Prophylaxis:   - SCDs and Lovenox 30mg BID for VTE PPx    Lines/Tubes/Drains:  - Maintain PIVs  - No indication for naranjo catheter, monitor for urinary retention    Dispo: Plan for OR today with ortho for operative repair of right hip  Accom Status: General Status      - Follow up with Ortho TBD      Please call for any questions or concerns.  [see Treatment Team Sticky Note for contact information]      Nicole Moore PA-C  Trauma/Critical Care  [see Treatment Team Sticky Note for contact information]     This patient's plan of care was discussed and made in collaboration with Trauma Attending physician, Krystian Perry MD.

## 2023-02-03 NOTE — PROGRESS NOTES
Physical Therapy Med Surg Initial Assessment  Facility/Department: [facility]  [id number]       NAME: Padilla Bunn  : 1950 (74 y.o.)  MRN: [id number]  CODE STATUS: Full Code    Date of Service: 2/3/2023    Patient Diagnosis(es): Closed right hip fracture, initial encounter ([address] [S72.001A]  Subcapital fracture of femur, right, closed, initial encounter [facility]) Meldon Host   Chief Complaint   Patient presents with    Hip Pain     [facility] transfer, right hip fracture     Patient Active Problem List    Diagnosis Date Noted    Subcapital fracture of femur, right, closed, initial encounter ([address] 2023    Closed right hip fracture, initial encounter ([address] 2023    Osteoarthritis of left knee 2023    COVID-19 2021    Osteopenia 2020    History of uterine cancer 2018    History of endometrial cancer 2018    Postmenopausal bleeding 2018    Thickened endometrium 2018    Other joint derangement, not elsewhere classified, ankle and foot 2015    Major depressive disorder, recurrent episode, moderate ([address] 2015    Hypothyroid 2015    Maisonneuve fracture of right fibula 2015    Sprain of ankle, deltoid ligament 2015    Hyperlipidemia     Depression     Stress     Vitamin D deficiency         Past Medical History:   Diagnosis Date    Depression     Hyperlipidemia     Protruded lumbar disc     L4/L5    Stress     Vitamin D deficiency      Past Surgical History:   Procedure Laterality Date    ANKLE SURGERY      FOOT SURGERY  2001    HIP SURGERY Right 2023    RIGHT HIP PINNING AND LEFT KNEE STERIOD INJECTION performed by Nellie James MD at [place] [address]  07-       Chart Reviewed: Yes  Patient assessed for rehabilitation services?: Yes  Family / Caregiver Present: No  Diagnosis: Closed right hip fracture, initial encounter ([address] s/p TFN 23  General Comment  Comments: Pt resting in bed - agreeable to PT evaluation    Restrictions:  Restrictions/Precautions: Fall Risk, Up as Tolerated, Weight Bearing  Lower Extremity Weight Bearing Restrictions  Right Lower Extremity Weight Bearing: Toe Touch Weight Bearing     SUBJECTIVE:   Subjective: \"I was supposed to have a knee replacement this month. \"    Pain  Pain: 9/10 pre and post session pain. RN notified. Pt repositioned for comfort. Prior Level of Function:  Social/Functional History  Lives With: Son  Type of Home: House  Home Layout: Laundry in basement, One level  Home Access: Stairs to enter with rails  Entrance Stairs - Number of Steps: 3- grab bar  Bathroom Shower/Tub: Tub/Shower unit  Bathroom Equipment: Shower chair, Grab bars in shower, Hand-held shower  Home Equipment: Oanh Clinton, rolling, Cane  Has the patient had two or more falls in the past year or any fall with injury in the past year?: Yes  ADL Assistance: Independent  Homemaking Assistance: Independent (Son does heavy cleaning and laundry in basement)  Ambulation Assistance: Independent (Cane)  Transfer Assistance: Independent  Active : Yes  Leisure & Hobbies: Enjoys playing games    OBJECTIVE:   Vision  Vision: Impaired  Vision Exceptions: Wears glasses for reading  Hearing: Within functional limits    Cognition:  Overall Orientation Status: Within Functional Limits  Follows Commands: Within Functional Limits  Overall Cognitive Status: WFL    Observation/Palpation  Observation: No acute distress noted. Pt pleasant and motivated.     ROM:  RLE PROM: WFL  LLE PROM: WFL    Strength:  Strength RLE  Comment: Grossly 1+/5  Strength LLE  Comment: Grossly 2/5  Strength Other  Other: Trunk strength grossly 2/5    Neuro:  Balance  Sitting - Static: Good  Sitting - Dynamic: Fair  Standing - Static: Poor  Standing - Dynamic: Poor                      Tone: Normal    Sensation: Intact    Bed mobility  Rolling to Right: Moderate assistance  Supine to Sit: Moderate assistance  Scooting: Minimal assistance; Moderate assistance  Bed Mobility Comments: Assist to sequence scooting and supine>sit. Verbal cues for follow through. increased time and effort to complete. Transfers  Sit to Stand: Maximum Assistance;2 Person Assistance  Stand to Sit: Moderate Assistance  Bed to Chair: Maximum assistance;2 Person Assistance  Comment: Demonstration provided for sequencing. Tactile and verbal cues for hand placement and follow through. Struggles to coordinate movements for pivot bed>chair requiring Max+2 assist. Unable to maintain TTWB R LE without MaxA. Ambulation  Assistance: Minimal assistance; Moderate assistance;2 Person assistance  Quality of Gait: Focus on coordinating heel lift and approapriate bracing on [employer] for support. Distance: standing only                   Activity Tolerance  Activity Tolerance: Patient tolerated treatment well    Patient Education  Education Given To: Patient  Education Provided: Role of Therapy;Plan of Care  Education Method: Demonstration;Verbal  Education Outcome: Verbalized understanding;Continued education needed       ASSESSMENT:   Body Structures, Functions, Activity Limitations Requiring Skilled Therapeutic Intervention: Decreased functional mobility ; Decreased ADL status; Decreased ROM; Decreased body mechanics; Decreased strength;Decreased safe awareness;Decreased endurance;Decreased balance;Decreased coordination; Increased pain  Decision Making: High Complexity  History: High  Exam: Med  Clinical Presentation: Med    Therapy Prognosis: Good  Barriers to Learning: none         DISCHARGE RECOMMENDATIONS:  Discharge Recommendations: Continue to assess pending progress, Patient would benefit from continued therapy after discharge    Assessment: Continued PT indicated to progress mobility and facilitate DC at highest level of indep and safety. Concerns for pt returning home at this time. Rec therapy stay prior to DC home.   Requires PT Follow-Up: Yes       PLAN OF CARE:  Physcial Therapy Plan  General Plan: 2 times a day 7 days a week  Current Treatment Recommendations: Strengthening, Balance training, Functional mobility training, Transfer training, ADL/Self-care training, Endurance training, Gait training, Neuromuscular re-education, Pain management, Home exercise program, Safety education & training, Patient/Caregiver education & training, Equipment evaluation, education, & procurement, Positioning, Modalities    Safety Devices  Type of Devices: All fall risk precautions in place    Goals:  Long Term Goals  Long Term Goal 1: Pt to complete bed mobility at SBA  Long Term Goal 2: Pt to complete transfers with SBA  Long Term Goal 3: Pt to ambulate 5-10ft with LRD and Odette  Long Term Goal 4: Pt to manage and propel WC indep 50ft  Long Term Goal 5: pt to complete HEP with indep    AMPAC (6 CLICK) BASIC MOBILITY  AM-PAC Inpatient Mobility Raw Score : 8     Therapy Time:   Individual   Time In 0927   Time Out 0950   Minutes 23   Timed Code Treatment Minutes: 8 Minutes (transfers)       Jing Vogel PT, 02/03/23 at 10:07 AM         Definitions for assistance levels  Independent = pt does not require any physical supervision or assistance from another person for activity completion. Device may be needed.   Stand by assistance = pt requires verbal cues or instructions from another person, close to but not touching, to perform the activity  Minimal assistance= pt performs 75% or more of the activity; assistance is required to complete the activity  Moderate assistance= pt performs 50% of the activity; assistance is required to complete the activity  Maximal assistance = pt performs 25% of the activity; assistance is required to complete the activity  Dependent = pt requires total physical assistance to accomplish the task

## 2023-02-03 NOTE — PLAN OF CARE
See OT evaluation for all goals and OT POC.  Electronically signed by STEPHANIE Claudio on 2/3/2023 at 10:09 AM

## 2023-02-03 NOTE — PROGRESS NOTES
Physical Therapy Med Surg Daily Treatment Note  [facility]       NAME: Sb Delacruz  : 1950 (74 y.o.)  MRN: [id number]  CODE STATUS: Full Code    Date of Service: 2/3/2023    Patient Diagnosis(es): Closed right hip fracture, initial encounter ([facility] [S72.001A]  Subcapital fracture of femur, right, closed, initial encounter [facility]) Robin Segovia   Chief Complaint   Patient presents with    Hip Pain     [facility] transfer, right hip fracture     Patient Active Problem List    Diagnosis Date Noted    Subcapital fracture of femur, right, closed, initial encounter ([facility] 2023    Closed right hip fracture, initial encounter ([facility] 2023    Osteoarthritis of left knee 2023    COVID-19 2021    Osteopenia 2020    History of uterine cancer 2018    History of endometrial cancer 2018    Postmenopausal bleeding 2018    Thickened endometrium 2018    Other joint derangement, not elsewhere classified, ankle and foot 2015    Major depressive disorder, recurrent episode, moderate ([facility] 2015    Hypothyroid 2015    Maisonneuve fracture of right fibula 2015    Sprain of ankle, deltoid ligament 2015    Hyperlipidemia     Depression     Stress     Vitamin D deficiency         Past Medical History:   Diagnosis Date    Depression     Hyperlipidemia     Protruded lumbar disc     L4/L5    Stress     Vitamin D deficiency      Past Surgical History:   Procedure Laterality Date    ANKLE SURGERY      FOOT SURGERY  2001    HIP SURGERY Right 2023    RIGHT HIP PINNING AND LEFT KNEE STERIOD INJECTION performed by Mathilda Blizzard, MD at [place] [address]  07-       Chart Reviewed: Yes  Patient assessed for rehabilitation services?: Yes  Family / Caregiver Present: No  Diagnosis: Closed right hip fracture, initial encounter ([facility] [address] s/p TFN 23  General Comment  Comments: Pt resting in bed - agreeable to PT evaluation    Restrictions:  Restrictions/Precautions: Fall Risk;Up as Tolerated;Weight Bearing  Lower Extremity Weight Bearing Restrictions  Right Lower Extremity Weight Bearing: Toe Touch Weight Bearing    SUBJECTIVE:   Subjective: \"I am ready to get to bed. \"    Pain  Pain: 9/10 post session pain. RN notified. Pt repositioned for comfort. OBJECTIVE:        Bed mobility  Sit to Supine: Moderate assistance (pt needing assistance to lift BLE into bed.)  Bed Mobility Comments: Verbal cues for follow through. increased time and effort to complete. Transfers  Sit to Stand: Maximum Assistance;2 Person Assistance  Stand to Sit: Maximum Assistance  Bed to Chair: Maximum assistance;2 Person Assistance  Comment: Demonstration provided for sequencing. Tactile and verbal cues for hand placement and follow through. Struggles to maintain TTWB R LE without MaxA. PT Exercises  A/AROM Exercises: AP/QS/GS x 10 pt instructed to complete throughout the day pt verbalizes understanding. Activity Tolerance  Activity Tolerance: Patient tolerated treatment well          ASSESSMENT   Assessment: pt fatigued, agreeable to get back into bed, better pivoting towards L side, but still unable to maintain TTWB RLE without Max A.      Discharge Recommendations:  Continue to assess pending progress, Patient would benefit from continued therapy after discharge         Goals  Long Term Goals  Long Term Goal 1: Pt to complete bed mobility at SBA  Long Term Goal 2: Pt to complete transfers with SBA  Long Term Goal 3: Pt to ambulate 5-10ft with LRD and Odette  Long Term Goal 4: Pt to manage and propel WC indep 50ft  Long Term Goal 5: pt to complete HEP with indep    PLAN    General Plan: 2 times a day 7 days a week  Safety Devices  Type of Devices: Bed alarm in place, Call light within reach, Left in bed     AMPAC (6 CLICK) BASIC MOBILITY  AM-PAC Inpatient Mobility Raw Score : 10      Therapy Time Individual   Time In 1143   Time Out 1154   Minutes 11     BM/Trsf: 8  Therex: 3        Margareth Garcia PTA, 02/03/23 at 12:57 PM         Definitions for assistance levels  Independent = pt does not require any physical supervision or assistance from another person for activity completion. Device may be needed.   Stand by assistance = pt requires verbal cues or instructions from another person, close to but not touching, to perform the activity  Minimal assistance= pt performs 75% or more of the activity; assistance is required to complete the activity  Moderate assistance= pt performs 50% of the activity; assistance is required to complete the activity  Maximal assistance = pt performs 25% of the activity; assistance is required to complete the activity  Dependent = pt requires total physical assistance to accomplish the task

## 2023-02-03 NOTE — DISCHARGE INSTRUCTIONS
Dr. Giovanni Ramirez., MD  [facility]    Post Operative Instructions for Total Hip Replacement    Incision and dressing  Your incision is covered with a waterproof Aquacel dressing. It has been impregnated with silver nitrate to help toure off infection. The dressing is to be removed 7 days after the date of your surgery (2/9/2023)    The incision has been closed with skin glue. The glue will flake off over time and fall off on its own. DO NOT apply any lotions, creams, peroxide or Betadine to the skin glue, as it will degrade and dissolve the glue. DO NOT peel the glue off, as this could result in opening of the incision. Staples to be removed on 2/16/23 by Orthopedic Surgeon and steri strips applied. Instruct patient to leave steri strips in place until they fall off or surgeon removes them     Showering  The Aquacel dressing is waterproof. You may shower when you feel ready. Once the Aquacel dressing has been removed, you may continue to shower. The glue provides a waterproof seal to the incision. Let the water run over the incision, and pat dry with a towel. Do not scrub or rub the glue. Compression stockings  The compression stockings/JOSELUIS hose are used to help reduce swelling and assist in the prevention of blood clots. They are to be worn on the operative leg for 3 weeks. They should be worn full-time during the day, but may be removed at nighttime or during periods of elevation during the day. They are optional on the nonoperative leg, depending on how much swelling may be encountered. Activity  If your incision is near your groin (anterior approach) you have no restrictions other than to avoid a figure-of-four position with the operative leg crossed over the nonoperative leg.   If your incision is on the side/back of your hip (lateral approach) you will have hip precautions, meaning no bending past 90 degrees, no crossing your legs, no sleeping on your operative side, and it is recommended to use a pillow between your knees at night. You will begin activity immediately after surgery. Physical therapy will commence (at home or as an outpatient) within a few days of surgery. You may only use toe touch weight bearing to your operative extremity. Use walker for assistance with ambulation   It is encouraged that you get up for short walks frequently throughout the day. Pump your ankles up and down at least 10 times every hour while you are awake, or if you are standing, stand on your toes 10 times every hour while you are awake. The muscle contractions will assist in moving the blood and in the prevention of blood clots. Rest and elevate your leg frequently throughout the day. Proper elevation aims to return fluid and edema to the heart. That is, the foot should be above the knee, the knee should be above the hip, and the hip should be above the heart. This can be accomplished by placing a pillow under your rear end, and propping the leg up on the back of a couch or on a wall. Ice your incision frequently -  20 minutes every hour for the first few days and then as needed to assist with swelling. You will begin walking with a walker. Physical therapy will help you progress from a walker to a cane and then to no assistive devices as you progress through your therapy. Medications  You will need to take Lovenox 30mg twice a day for 4 weeks. This is to help prevent the formation of blood clots. You will receive 3 medications to assist in the management of your pain. The medications all work in different fashions, attacking the pain from different directions:  Tylenol - you will take 650 mg of Tylenol 4 times a day  Robaxin - you will take 500mg of Robaxin 4 times a day for muscle spasms  Oxycodone -this is a narcotic pain medication; you will take this medication as needed for breakthrough pain control.   For the first few days after surgery, it is recommended to take the oxycodone around-the-clock. It is easier to stay on top of the pain than it is to play catch up with the pain. As your pain improves, you may cut back on the oxycodone. You may then wean off the Tylenol thereafter. The pain medications may be discontinued when you feel they are no longer necessary. Take pain medications as directed; taking more than as directed can be dangerous and have negative side effects. An over-the-counter stool softener such as Colace or Dulcolax as recommended his pain medications can lead to constipation. Take this as needed until your bowel function is normal.    Follow-up  You will have a follow-up appointment with Dr. Purnima Ann approximately 2-3 weeks after the date of your surgery. CALL THE OFFICE ([phone]) if:  You run a fever of 100 degrees or higher that will not subside with Tylenol. You noticed drainage from the incision. You have worsening swelling or pain that is not relieved by rest or medication.

## 2023-02-03 NOTE — PROGRESS NOTES
Hip surgery    Progress Note    Subjective:     Post-Operative Day: 1 Status Post right hip pinning   Systemic or Specific Complaints:No Complaints    Objective:     CURRENT VITALS:  /64   Pulse 64   Temp 97.8 °F (36.6 °C)   Resp 20   Ht 5' 4\" (1.626 m)   Wt 188 lb (85.3 kg)   LMP  (LMP Unknown)   SpO2 100%   BMI 32.27 kg/m²     General: alert, appears stated age, and cooperative   Wound: No Erythema, No Edema, No Drainage, and dressing CDI   Motion: Painful range of Motion   DVT Exam: No evidence of DVT seen on physical exam.  Negative Tawny's sign. No significant calf/ankle edema. NVI in lower extremity. Thigh swollen but soft. Moving foot and ankle. Data Review  Recent Labs     02/01/23  1655 02/02/23  0218 02/03/23  0542   WBC 10.0 7.8 6.4   RBC 4.14 3.55* 3.46*   HGB 12.5 11.0* 10.5*   HCT 38.2 32.7* 31.4*   MCV 92.3 92.1 90.9   MCH 30.2 30.9 30.5   MCHC 32.7 33.6 33.5   RDW 13.5 13.8 13.6    189 204       Assessment:     Status Post right TFN. Doing well postoperatively. Acute blood loss anemia secondary to expected surgical blood loss and fracture; stable. Hgb this am 10.5. Patient asymptomatic, remains hemodynamically stable with no signs of active bleeding. Recommend daily CBC's while in hospital.     Acute postoperative pain: patient reports mild pain at this time to operative extremity at rest. Reports pain to be exacerbated by movement and relieved by rest, ice and pain medication. Continue current pain regimen. Plan:      1: Continues current post-op course :  2:  Continue Deep venous thrombosis prophylaxis: Lovenox per primary team   3:  Continue physical therapy: TTWB to operative extremity   4:  Continue Pain Control  5. Discharge planning: patient will most likely need SNF at discharge due to weight bearing restrictions. Discharge disposition TBD. CM and SW following for discharge planning. Trauma will take care of all discharge needs.

## 2023-02-03 NOTE — CARE COORDINATION
Transportation set up through [facility].  time 8PM.   Notify CM and Facility. Electronically signed by PERLA Hernandez on 2/3/2023 at 2:59 PM    [id number] COMPLETED. NOTIFY Faby Carson.      Electronically signed by PERLA Hernandez on 2/3/2023 at 4:37 PM

## 2023-02-03 NOTE — FLOWSHEET NOTE
Received report from Ike/adam. S/p right hip pinning and left knee steroid injection. General anesthesia/LMA. Right toes appeared slightly lateral per recovery, however postop xray indicates alignment. Dressings CDI. Recovery unable to report whether or not anesthesia administered txa. Ancef administered at 1707. Scopolamine patch left ear. Waiting patient arrival to room. Report given to Miranda Núñez RN.   Electronically signed by Paige Burgos RN on 2/2/2023 at 7:27 PM

## 2023-02-04 NOTE — PROGRESS NOTES
1930-Transport here to  pt, assisted pt to cot, items given to transport, pt denied any further needs.

## 2023-02-04 NOTE — FLOWSHEET NOTE
Discharge instructions reviewed with patient. Needs reinforcement. Medications discussed and reviewed including side effects. Follow-up appointments discussed. Also discussed and reviewed post op instructions, signs and symptoms of infection, walker use and safety. Report given to Jim Lowry, [phone].   Electronically signed by Heike Whitaker RN on 2/3/2023 at 8:24 PM

## 2023-02-05 NOTE — PROGRESS NOTES
Physical Therapy  Facility/Department: [facility] T014/[id number]  Physical Therapy Discharge      NAME: Eris Evans    : 1950 (21 y.o.)  MRN: [id number]    Account: [de-identified]  Gender: female      Patient has been discharged from acute care hospital. DC patient from current PT program.      Electronically signed by Magnolia Miranda PT on 23 at 3:15 PM EST

## 2023-02-06 ENCOUNTER — OFFICE VISIT (OUTPATIENT)
Dept: GERIATRIC MEDICINE | Age: 73
End: 2023-02-06
Payer: MEDICARE

## 2023-02-06 VITALS
OXYGEN SATURATION: 95 % | HEART RATE: 69 BPM | DIASTOLIC BLOOD PRESSURE: 75 MMHG | SYSTOLIC BLOOD PRESSURE: 155 MMHG | TEMPERATURE: 97.7 F | RESPIRATION RATE: 18 BRPM

## 2023-02-06 DIAGNOSIS — S72.001A CLOSED RIGHT HIP FRACTURE, INITIAL ENCOUNTER (HCC): Primary | ICD-10-CM

## 2023-02-06 DIAGNOSIS — E03.9 HYPOTHYROIDISM, UNSPECIFIED TYPE: ICD-10-CM

## 2023-02-06 DIAGNOSIS — D62 ACUTE BLOOD LOSS ANEMIA: ICD-10-CM

## 2023-02-06 DIAGNOSIS — F33.1 MAJOR DEPRESSIVE DISORDER, RECURRENT EPISODE, MODERATE (HCC): ICD-10-CM

## 2023-02-06 DIAGNOSIS — M17.12 OSTEOARTHRITIS OF LEFT KNEE, UNSPECIFIED OSTEOARTHRITIS TYPE: ICD-10-CM

## 2023-02-06 DIAGNOSIS — Z85.42 HISTORY OF ENDOMETRIAL CANCER: ICD-10-CM

## 2023-02-06 PROCEDURE — 99305 1ST NF CARE MODERATE MDM 35: CPT | Performed by: FAMILY MEDICINE

## 2023-02-06 PROCEDURE — 1123F ACP DISCUSS/DSCN MKR DOCD: CPT | Performed by: FAMILY MEDICINE

## 2023-02-06 RX ORDER — MELOXICAM 15 MG/1
15 TABLET ORAL DAILY
COMMUNITY

## 2023-02-06 ASSESSMENT — ENCOUNTER SYMPTOMS
WHEEZING: 0
ABDOMINAL PAIN: 0
CONSTIPATION: 0
RHINORRHEA: 0
COUGH: 0
DIARRHEA: 0
SHORTNESS OF BREATH: 0
SORE THROAT: 0

## 2023-02-06 NOTE — PROGRESS NOTES
[facility]   [address]  [address]  P: [phone]   F: [phone]     Chief Complaint  Chief Complaint   Patient presents with    Joint Pain       HPI:  74 y. o.female who presents for the following:      (Hx of uterine cancer, MDD, hypothyroidism)    [place] was seen in the ED initially following a fall at home resulting in R hip pain. Found to have a femur fx and taken to surgery for replacement. She actually had plans for knee replacement scheduled later in the month that had to be postponed. Other than some post op anemia, she convalesced well and was discharged to the SNF for rehab. We met during lunch. She is up in a wheelchair in the cafeteria. Pain is well controlled. No n/v or constipation. She feels she doesn't really need to be here and really just wants to go home as soon as she is able. Review of Systems   Constitutional:  Negative for chills and fever. HENT:  Negative for congestion, rhinorrhea and sore throat. Respiratory:  Negative for cough, shortness of breath and wheezing. Gastrointestinal:  Negative for abdominal pain, constipation and diarrhea. Endocrine: Negative for polydipsia and polyuria. Genitourinary:  Negative for dysuria, frequency and urgency. Musculoskeletal:  Positive for arthralgias. Neurological:  Negative for syncope, light-headedness, numbness and headaches. Psychiatric/Behavioral:  Negative for sleep disturbance. The patient is not nervous/anxious. Past Medical History:   Diagnosis Date    Depression     Hyperlipidemia     Protruded lumbar disc     L4/L5    Stress     Vitamin D deficiency      Past Surgical History:   Procedure Laterality Date    ANKLE SURGERY  2001    FOOT SURGERY  2001    HIP SURGERY Right 2/2/2023    RIGHT HIP PINNING AND LEFT KNEE STERIOD INJECTION performed by Nelda Cantrell MD at [place] [address]  07-     Social History     Socioeconomic History    Marital status:   Spouse name: Not on file    Number of children: Not on file    Years of education: Not on file    Highest education level: Not on file   Occupational History    Not on file   Tobacco Use    Smoking status: Never    Smokeless tobacco: Never   Substance and Sexual Activity    Alcohol use: No    Drug use: Never    Sexual activity: Not Currently     Partners: Male   Other Topics Concern    Not on file   Social History Narrative    Not on file     Social Determinants of Health     Financial Resource Strain: Not on file   Food Insecurity: Not on file   Transportation Needs: Not on file   Physical Activity: Not on file   Stress: Not on file   Social Connections: Not on file   Intimate Partner Violence: Not on file   Housing Stability: Not on file     Family History   Problem Relation Age of Onset    Anemia Mother     Heart Disease Mother     Depression Mother     High Blood Pressure Father       No Known Allergies  Current Outpatient Medications   Medication Sig Dispense Refill    oxyCODONE (ROXICODONE) 5 MG immediate release tablet Take 1 tablet by mouth every 4 hours as needed for Pain (for severe pain only (7-10/10)) for up to 5 days. Max Daily Amount: 30 mg 20 tablet 0    acetaminophen (TYLENOL) 325 MG tablet Take 2 tablets by mouth in the morning and 2 tablets at noon and 2 tablets in the evening and 2 tablets before bedtime. Do all this for 14 days. 112 tablet 0    enoxaparin Sodium (LOVENOX) 30 MG/0.3ML injection Inject 0.3 mLs into the skin 2 times daily for 28 days 16.8 mL 0    sennosides-docusate sodium (SENOKOT-S) 8.6-50 MG tablet Take 1 tablet by mouth in the morning and at bedtime for 14 days 28 tablet 0    methocarbamol (ROBAXIN) 500 MG tablet Take 1 tablet by mouth 3 times daily for 10 days 30 tablet 0    Misc. Devices (BATH/SHOWER SEAT) MISC Dispense 1 Shower/Bath seat 1 each 0    Misc. Devices (CLASSICS ROLLING WALKER) MISC Dispense 1 rolling walker 1 each 0    Misc.  Devices (RAISED TOILET SEAT) MISC Dispense 1 raised toilet seat 1 each 0    levothyroxine (SYNTHROID) 50 MCG tablet Take 1 tablet by mouth once daily 90 tablet 1    Cholecalciferol (VITAMIN D3 ADULT GUMMIES) 25 MCG (1000 UT) CHEW       turmeric 500 MG CAPS       citalopram (CELEXA) 20 MG tablet Take 1 tablet by mouth once daily 90 tablet 1    citalopram (CELEXA) 10 MG tablet TAKE 1 TABLET BY MOUTH ONCE DAILY WITH  THE  20MG  TABLET 90 tablet 1    pravastatin (PRAVACHOL) 20 MG tablet Take 1 tablet by mouth once daily 90 tablet 1    UNABLE TO FIND Take 1 tablet by mouth daily [facility]      Krill Oil 500 MG CAPS Take 1 capsule by mouth daily      UNABLE TO FIND Take 1 capsule by mouth daily Tumeric root extract      Pediatric Multivitamins-Fl (MULTI VIT/FL PO) Take 1 capsule by mouth daily       No current facility-administered medications for this visit. Vitals:    02/06/23 0946   BP: (!) 155/75   Pulse: 69   Resp: 18   Temp: 97.7 °F (36.5 °C)   SpO2: 95%       Physical exam:  Physical Exam  Vitals reviewed. Constitutional:       General: She is not in acute distress. Appearance: She is well-developed. HENT:      Head: Normocephalic and atraumatic. Right Ear: Tympanic membrane, ear canal and external ear normal. Tympanic membrane is not erythematous. Tympanic membrane has normal mobility. Left Ear: Tympanic membrane, ear canal and external ear normal. Tympanic membrane is not erythematous. Tympanic membrane has normal mobility. Nose: Nose normal.      Mouth/Throat:      Pharynx: No oropharyngeal exudate. Neck:      Thyroid: No thyromegaly. Cardiovascular:      Rate and Rhythm: Normal rate and regular rhythm. Heart sounds: Normal heart sounds. No murmur heard. Pulmonary:      Effort: Pulmonary effort is normal. No respiratory distress. Breath sounds: Normal breath sounds. No wheezing. Abdominal:      General: Bowel sounds are normal. There is no distension. Palpations: Abdomen is soft. Tenderness: There is no abdominal tenderness. There is no guarding or rebound. Musculoskeletal:      Cervical back: Normal range of motion. Lymphadenopathy:      Cervical: No cervical adenopathy. Skin:     General: Skin is warm and dry. Neurological:      Mental Status: She is alert and oriented to person, place, and time. Psychiatric:         Behavior: Behavior normal.       Assessment/Plan:  74 y.o. female here mainly for s/p R hip surgery:  - MSK: doing excellent; pain controlled on current pain and bowel regimen; has PT/OT in place as well as f/u with ortho in the future  - the rest of her chronic problems are stable on their current med regimens     Diagnosis Orders   1. Closed right hip fracture, initial encounter ([facility] [address]        2. Acute blood loss anemia        3. History of endometrial cancer        4. Hypothyroidism, unspecified type        5. Major depressive disorder, recurrent episode, moderate (HCC)        6.  Osteoarthritis of left knee, unspecified osteoarthritis type                 Janice Chamorro MD

## 2023-02-07 ENCOUNTER — OFFICE VISIT (OUTPATIENT)
Dept: GERIATRIC MEDICINE | Age: 73
End: 2023-02-07
Payer: MEDICARE

## 2023-02-07 DIAGNOSIS — E03.9 HYPOTHYROIDISM, UNSPECIFIED TYPE: ICD-10-CM

## 2023-02-07 DIAGNOSIS — F32.A DEPRESSION, UNSPECIFIED DEPRESSION TYPE: ICD-10-CM

## 2023-02-07 DIAGNOSIS — M17.12 OSTEOARTHRITIS OF LEFT KNEE, UNSPECIFIED OSTEOARTHRITIS TYPE: ICD-10-CM

## 2023-02-07 DIAGNOSIS — S72.001A CLOSED RIGHT HIP FRACTURE, INITIAL ENCOUNTER (HCC): Primary | ICD-10-CM

## 2023-02-07 DIAGNOSIS — J01.90 ACUTE SINUSITIS WITH SYMPTOMS > 10 DAYS: ICD-10-CM

## 2023-02-07 DIAGNOSIS — G89.18 POST-OPERATIVE PAIN: ICD-10-CM

## 2023-02-07 PROCEDURE — 1123F ACP DISCUSS/DSCN MKR DOCD: CPT | Performed by: FAMILY MEDICINE

## 2023-02-07 PROCEDURE — 99310 SBSQ NF CARE HIGH MDM 45: CPT | Performed by: FAMILY MEDICINE

## 2023-02-10 ENCOUNTER — OFFICE VISIT (OUTPATIENT)
Dept: GERIATRIC MEDICINE | Age: 73
End: 2023-02-10

## 2023-02-10 DIAGNOSIS — S72.001A CLOSED RIGHT HIP FRACTURE, INITIAL ENCOUNTER (HCC): Primary | ICD-10-CM

## 2023-02-10 DIAGNOSIS — F32.A DEPRESSION, UNSPECIFIED DEPRESSION TYPE: ICD-10-CM

## 2023-02-10 DIAGNOSIS — G89.18 POST-OPERATIVE PAIN: ICD-10-CM

## 2023-02-10 DIAGNOSIS — M17.12 OSTEOARTHRITIS OF LEFT KNEE, UNSPECIFIED OSTEOARTHRITIS TYPE: ICD-10-CM

## 2023-02-16 ENCOUNTER — OFFICE VISIT (OUTPATIENT)
Dept: ORTHOPEDIC SURGERY | Age: 73
End: 2023-02-16

## 2023-02-16 VITALS
HEART RATE: 63 BPM | BODY MASS INDEX: 32.78 KG/M2 | WEIGHT: 192 LBS | OXYGEN SATURATION: 95 % | TEMPERATURE: 98 F | HEIGHT: 64 IN

## 2023-02-16 DIAGNOSIS — S72.001D CLOSED FRACTURE OF RIGHT HIP WITH ROUTINE HEALING, SUBSEQUENT ENCOUNTER: ICD-10-CM

## 2023-02-16 DIAGNOSIS — M16.11 PRIMARY OSTEOARTHRITIS OF RIGHT HIP: Primary | ICD-10-CM

## 2023-02-16 DIAGNOSIS — S72.001D CLOSED FRACTURE OF RIGHT HIP REQUIRING OPERATIVE REPAIR WITH ROUTINE HEALING, SUBSEQUENT ENCOUNTER: Primary | ICD-10-CM

## 2023-02-16 DIAGNOSIS — M17.12 PRIMARY OSTEOARTHRITIS OF LEFT KNEE: ICD-10-CM

## 2023-02-16 RX ORDER — LIDOCAINE HYDROCHLORIDE 10 MG/ML
8 INJECTION, SOLUTION INFILTRATION; PERINEURAL ONCE
Status: DISCONTINUED | OUTPATIENT
Start: 2023-02-16 | End: 2023-02-16

## 2023-02-16 RX ORDER — TRIAMCINOLONE ACETONIDE 40 MG/ML
80 INJECTION, SUSPENSION INTRA-ARTICULAR; INTRAMUSCULAR ONCE
Status: DISCONTINUED | OUTPATIENT
Start: 2023-02-16 | End: 2023-02-16

## 2023-02-16 NOTE — PROGRESS NOTES
Ortho Hip Follow Up Note    Narrative Referring Provider:   Tariq Arndt M.D.    PCP:   OMAR Celaya    =============================================  IMPRESSION/PLAN:  Impressions indicate:   =============================================  Shaan Miranda is s/p  percutaneous pinning of the right hip fracture  completed on 2/2/2023. IMPRESSION:  Normal post-operative state    PLAN:    Continue toe-touch weightbearing. Continue Lovenox. Follow-up in 6 weeks. Patient Reassurance:   Progress appears to be within the normal speed of recovery      Follow up:   6 weeks  X-rays needed:  yes    Past Medical History:   Diagnosis Date    Depression     Hyperlipidemia     Protruded lumbar disc     L4/L5    Stress     Vitamin D deficiency        HPI:  Shaan Miranda presents today for:  a routine 1st post-op visit. Status post:  Right hip screw fixation    Post-operative course was complicated by:  uneventful/none    Patient rates their condition as  improving on the right hip and worsening on the left knee . Does the patient still experience pain? groin pain     Post Op discharge patient location: in a skilled nursing facility   Functional Assessment is as follows: patient remains in long-term care facility  Functional difficulties:  She is toe-touch weightbearing on the right knee she has severe left knee osteoarthritis. She is having a hard time ambulating.   Pain Medication: narcotic    ==================================  EXAM: POST OP HIP  ==================================  right POST-OPERATIVE HIP    SKIN: Appropriate post-operative appearance, No evidence of erythema, warmth, discharge or drainage, and Incision clean, dry and intact     Range of Motion: pain with internal rotation    Neurovascular Status: Sensation Intact, Moves foot and ankle up & down, Moves toes up and down, and 2+ dorsalis pedis      IMAGING:  X-ray Hips: right hip AP pelvis, AP right hip and frog lateral right hip reveal:  The right subcapital hip fracture has been fixed with three 7.3 cannulated screws. Fracture alignment looks good. There is been no interval displacement from the intraoperative films. This hardware remains in place. No evidence of avascular necrosis or collapse of the head. The patient's maintaining a joint space in each hip. The symphysis pubis and the sacroiliac joints are intact. Provider: Rodolfo Magana.  MD

## 2023-02-17 ENCOUNTER — TELEPHONE (OUTPATIENT)
Dept: INTERNAL MEDICINE | Age: 73
End: 2023-02-17

## 2023-02-17 NOTE — TELEPHONE ENCOUNTER
Pt was at [facility] for hip surgery. She had what was called PURE WIMAURICIO. Is this something that can be prescribed to patient?  If so she is requesting it please    Thank you

## 2023-02-20 ENCOUNTER — TELEPHONE (OUTPATIENT)
Dept: ORTHOPEDIC SURGERY | Age: 73
End: 2023-02-20

## 2023-02-20 ENCOUNTER — HOSPITAL ENCOUNTER (OUTPATIENT)
Dept: PHYSICAL THERAPY | Age: 73
Setting detail: THERAPIES SERIES
Discharge: HOME | End: 2023-02-20

## 2023-02-20 NOTE — PROGRESS NOTES
Physical Therapy: Daily Note   Patient: Josy Hart (89 y.o. female)   Examination Date: 2023  No data recorded  No data recorded   :  1950 # of Visits since [facility]:   Visit count could not be calculated. Make sure you are using a visit which is associated with an episode. MRN: [id number]  CSN: [id number] Start of Care Date:   No linked episodes   Insurance: Payor: Paulding Brielle / Plan: Zac Smallwood ESSENTIAL/PLUS / Product Type: *No Product type* /   Insurance ID: [id number] - (Medicare Managed) Secondary Insurance (if applicable):    Referring Physician: Dmitriy Kemp MD     PCP: OMAR Edmondson Visits to Date/Visits Approved:   /      No Show/Cancelled Appts:   /       Medical Diagnosis: Primary osteoarthritis of left knee [M17.12]    No data recorded      SUBJECTIVE EXAMINATION     Patient Comments:  Pt telephoned pt as did not show for PT eval this date at 1200. Pt relates fell and broke hip and is doing [address] for hip currently. Knee surgery postponed until hip heals and strong enough.            Therapy Time  Individual Time In:         Individual Time Out:    Minutes:  5 no show ( no charge)        Electronically signed by Reinaldo Pringle PT  on 2023 at 12:30 PM   POC NOTE

## 2023-02-21 RX ORDER — ENOXAPARIN SODIUM 100 MG/ML
30 INJECTION SUBCUTANEOUS 2 TIMES DAILY
Qty: 6 ML | Refills: 0 | Status: SHIPPED | OUTPATIENT
Start: 2023-02-21 | End: 2023-03-03

## 2023-02-21 NOTE — PROGRESS NOTES
Call received from [address] ([address] [facility]) regarding DVT ppx. Patient has been discharged home from SNF w/o script to continue Lovenox 30mg BID. Confirmed with Edwar Padilla that patient needs to remain on Lovenox 30mg BID for 4 weeks (end date 3/3/2023). Additional script has been sent to patient's pharmacy ([facility], [phone]).     [place], [place]  [address] [facility]  Emergency General Surgery  [phone] (1K-3X)  [phone]

## 2023-02-22 VITALS — WEIGHT: 191.9 LBS | BODY MASS INDEX: 32.94 KG/M2

## 2023-02-22 ASSESSMENT — ENCOUNTER SYMPTOMS
SINUS PAIN: 1
SHORTNESS OF BREATH: 1
EYES NEGATIVE: 1
SINUS PRESSURE: 1
CONSTIPATION: 0
NAUSEA: 0
GASTROINTESTINAL NEGATIVE: 1
COLOR CHANGE: 0
SORE THROAT: 0
VOICE CHANGE: 0
ALLERGIC/IMMUNOLOGIC NEGATIVE: 1
WHEEZING: 0

## 2023-02-22 NOTE — PROGRESS NOTES
Subjective:      Patient was examined at [address] [place] [address], [place] [address]      Chief Complaint   Patient presents with    Pain    Sinus Problem    Other    Follow-up     Patient ID: Dell Serna is a 74 y.o. female being seen today. Admitted today posthospitalization for right femur fracture post fall at home. Patient presents today is alert and orient x3. Patient ordered for Lovenox twice daily x30 days postprocedure. Also on methocarbamol 500 mg 3 times daily x10 days. Lastly on oxycodone 5 mg p.o. every 4 hours as needed. Denies constipation sedation or other adverse side effects on current regime. Taking senna S p.o. twice daily as prescribed though feels causing much loose stool and diarrhea. Plan of care in future for left knee replacement as was postponed due to recent hospitalization. States patient states approximately 1 week prior to hospitalization was having much sinus tenderness bilateral ear pain with intermittent sinus drainage and cough has increased in the last couple days. Past Medical History:   Diagnosis Date    Depression     Hyperlipidemia     Protruded lumbar disc     L4/L5    Stress     Vitamin D deficiency      Past Surgical History:   Procedure Laterality Date    ANKLE SURGERY  2001    FOOT SURGERY  2001    HIP SURGERY Right 2/2/2023    RIGHT HIP PINNING AND LEFT KNEE STERIOD INJECTION performed by Latanya Temple MD at [place] [address]  07-     Family History   Problem Relation Age of Onset    Anemia Mother     Heart Disease Mother     Depression Mother     High Blood Pressure Father      Social History     Socioeconomic History    Marital status:       Spouse name: Not on file    Number of children: Not on file    Years of education: Not on file    Highest education level: Not on file   Occupational History    Not on file   Tobacco Use    Smoking status: Never    Smokeless tobacco: Never   Substance and Sexual Activity Alcohol use: No    Drug use: Never    Sexual activity: Not Currently     Partners: Male   Other Topics Concern    Not on file   Social History Narrative    Not on file     Social Determinants of Health     Financial Resource Strain: Not on file   Food Insecurity: Not on file   Transportation Needs: Not on file   Physical Activity: Not on file   Stress: Not on file   Social Connections: Not on file   Intimate Partner Violence: Not on file   Housing Stability: Not on file       Allergies: Patient has no known allergies. NF MEDICATIONS REVIEWED    Review of Systems   Constitutional:  Positive for activity change. Negative for appetite change, fatigue, fever and unexpected weight change. HENT:  Positive for ear pain, sinus pressure and sinus pain. Negative for sore throat and voice change. Eyes: Negative. Respiratory:  Positive for shortness of breath. Negative for wheezing. Cardiovascular: Negative. Negative for chest pain. Gastrointestinal: Negative. Negative for constipation and nausea. Endocrine: Negative. Genitourinary: Negative. Musculoskeletal:  Positive for arthralgias and gait problem. Negative for joint swelling and myalgias. Skin: Negative. Negative for color change and rash. Allergic/Immunologic: Negative. Neurological:  Positive for weakness. Negative for dizziness, tremors and syncope. Hematological: Negative. Psychiatric/Behavioral: Negative. Negative for agitation, confusion, decreased concentration and suicidal ideas. The patient is not nervous/anxious. Objective:   BP: 157/82 mmHg  Temp:97.9 °F  Pulse:61 bpmg  Weight:191.5 Lbs  Resp:18 Breaths/min  O2:96 %    Physical Exam  Vitals reviewed. Constitutional:       Appearance: She is well-developed. She is ill-appearing. HENT:      Head: Normocephalic. Nose:      Right Sinus: Maxillary sinus tenderness present. Left Sinus: Maxillary sinus tenderness present.    Eyes:      Pupils: Pupils are equal, round, and reactive to light. Cardiovascular:      Rate and Rhythm: Normal rate. Pulmonary:      Effort: Pulmonary effort is normal.      Breath sounds: Normal breath sounds. Abdominal:      General: Bowel sounds are normal. There is no distension. Palpations: Abdomen is soft. Tenderness: There is no abdominal tenderness. There is no guarding. Musculoskeletal:         General: Normal range of motion. Cervical back: Normal range of motion. Skin:     General: Skin is warm and dry. Neurological:      Mental Status: She is oriented to person, place, and time. Mental status is at baseline. Motor: Weakness present. Gait: Gait abnormal.   Psychiatric:         Behavior: Behavior normal.       Weight Trends  Wt Readings from Last 10 Encounters:   02/16/23 192 lb (87.1 kg)   02/22/23 191 lb 14.4 oz (87 kg)   02/02/23 188 lb (85.3 kg)   02/01/23 188 lb (85.3 kg)   01/19/23 188 lb (85.3 kg)   01/04/23 189 lb (85.7 kg)   12/09/22 189 lb (85.7 kg)   05/13/22 194 lb (88 kg)   12/21/21 192 lb (87.1 kg)   11/15/21 192 lb (87.1 kg)        Assessment and Plan:      Diagnosis Orders   1. Closed right hip fracture, initial encounter ([facility] [address]        2. Hypothyroidism, unspecified type        3. Post-operative pain        4. Acute sinusitis with symptoms > 10 days        5. Osteoarthritis of left knee, unspecified osteoarthritis type        6. Depression, unspecified depression type             Mood pleasant at today's visit. Continue Lovenox as prescribed. Patient is on levothyroxine 50 mg p.o. daily. NICOLETTE wilson S. Patient does have as needed constipation modalities if needed. Related to maxillary sinus tenderness in the presence of bilateral ear pain and fullness start patient on Augmentin p.o. twice daily. Ordered for CBC BMP TSH in AM.      - Advance Care Planning Discussion (less than 16 minutes):    Code status discussed.  Explained in extensive detail nuances between Full Code, DNR CCA and DNR CC. Discussed maximizing length of life vs maximizing quality of life with patient/POA/primary decision maker opting for Full code. All related questions were answered completely. Much active listening, presence, and emotional support were given. adhere to the JNC VIII guidelines for HTN managementand the NCEP ATP III guidelines for LDL-C management. No orders of the defined types were placed in this encounter. No orders of the defined types were placed in this encounter. No follow-ups on file. Encourage fluids and good nutrition. Stress fall prevention strategies. Electronically signed by AZAEL Blackwood CNP    Total time includes reviewing Plan of Care, labs, reviewing history, medications, and allergies, counseling patient, performing medically appropriate evaluation and examination, ordering medications, and documenting in the medical record. Please note this report is partially produced by using speech recognition hardware. It may contain errors related to the system, including grammar, punctuation and spelling as well as words and phrases that may seem inaccurate.   For any questions or concerns feel free to contact me for clarification

## 2023-02-26 VITALS — BODY MASS INDEX: 33.26 KG/M2 | WEIGHT: 193.75 LBS

## 2023-02-26 ASSESSMENT — ENCOUNTER SYMPTOMS
CONSTIPATION: 0
GASTROINTESTINAL NEGATIVE: 1
SORE THROAT: 0
EYES NEGATIVE: 1
WHEEZING: 0
NAUSEA: 0
VOICE CHANGE: 0
COLOR CHANGE: 0
SHORTNESS OF BREATH: 1
ALLERGIC/IMMUNOLOGIC NEGATIVE: 1

## 2023-02-26 NOTE — PROGRESS NOTES
Subjective:      Patient was examined at [address] [place] [address], [place] [address]      Chief Complaint   Patient presents with    Hip Injury    Pain    Other    Follow-up     Patient ID: Josy Hart is a 74 y.o. female being seen today. Patient remains at baseline mental status of alert and orient x3. Very pleasant and cooperative at today's visit. Feels pain controlled on Oxy IR as needed. No noted issues with constipation or other adverse effects on medication regime. Recent hemoglobin 10.4 recent white blood cell 7.0 TSH 0.347. Feels ear/sinus pain much improved post Augmentin therapy. Past Medical History:   Diagnosis Date    Depression     Hyperlipidemia     Protruded lumbar disc     L4/L5    Stress     Vitamin D deficiency      Past Surgical History:   Procedure Laterality Date    ANKLE SURGERY  2001    FOOT SURGERY  2001    HIP SURGERY Right 2/2/2023    RIGHT HIP PINNING AND LEFT KNEE STERIOD INJECTION performed by Germain Hanley MD at [place] [address]  07-     Family History   Problem Relation Age of Onset    Anemia Mother     Heart Disease Mother     Depression Mother     High Blood Pressure Father      Social History     Socioeconomic History    Marital status:       Spouse name: Not on file    Number of children: Not on file    Years of education: Not on file    Highest education level: Not on file   Occupational History    Not on file   Tobacco Use    Smoking status: Never    Smokeless tobacco: Never   Substance and Sexual Activity    Alcohol use: No    Drug use: Never    Sexual activity: Not Currently     Partners: Male   Other Topics Concern    Not on file   Social History Narrative    Not on file     Social Determinants of Health     Financial Resource Strain: Not on file   Food Insecurity: Not on file   Transportation Needs: Not on file   Physical Activity: Not on file   Stress: Not on file   Social Connections: Not on file   Intimate Partner Violence: Not on file   Housing Stability: Not on file       Allergies: Patient has no known allergies. NF MEDICATIONS REVIEWED    Review of Systems   Constitutional:  Positive for activity change. Negative for appetite change, fatigue, fever and unexpected weight change. HENT:  Negative for sore throat and voice change. Eyes: Negative. Respiratory:  Positive for shortness of breath. Negative for wheezing. Cardiovascular: Negative. Negative for chest pain. Gastrointestinal: Negative. Negative for constipation and nausea. Endocrine: Negative. Genitourinary: Negative. Musculoskeletal:  Positive for arthralgias and gait problem. Negative for joint swelling and myalgias. Skin: Negative. Negative for color change and rash. Allergic/Immunologic: Negative. Neurological:  Positive for weakness. Negative for dizziness, tremors and syncope. Hematological: Negative. Psychiatric/Behavioral: Negative. Negative for agitation, confusion, decreased concentration and suicidal ideas. The patient is not nervous/anxious. Objective:   BP: 148/72 mmHg  Temp:98.1 °F  Pulse:66 bpm  Weight:193.75 Lbs  Resp:18 Breaths/min  O2:96 %    Physical Exam  Vitals reviewed. Constitutional:       Appearance: She is well-developed. She is ill-appearing. HENT:      Head: Normocephalic. Eyes:      Pupils: Pupils are equal, round, and reactive to light. Cardiovascular:      Rate and Rhythm: Normal rate. Pulmonary:      Effort: Pulmonary effort is normal.      Breath sounds: Normal breath sounds. Abdominal:      General: Bowel sounds are normal. There is no distension. Palpations: Abdomen is soft. Tenderness: There is no abdominal tenderness. There is no guarding. Musculoskeletal:         General: Normal range of motion. Cervical back: Normal range of motion. Skin:     General: Skin is warm and dry. Neurological:      Mental Status: She is oriented to person, place, and time. Mental status is at baseline. Motor: Weakness present. Gait: Gait abnormal.   Psychiatric:         Behavior: Behavior normal.       Weight Trends  Wt Readings from Last 10 Encounters:   02/16/23 192 lb (87.1 kg)   02/26/23 193 lb 12 oz (87.9 kg)   02/22/23 191 lb 14.4 oz (87 kg)   02/02/23 188 lb (85.3 kg)   02/01/23 188 lb (85.3 kg)   01/19/23 188 lb (85.3 kg)   01/04/23 189 lb (85.7 kg)   12/09/22 189 lb (85.7 kg)   05/13/22 194 lb (88 kg)   12/21/21 192 lb (87.1 kg)        Assessment and Plan:      Diagnosis Orders   1. Closed right hip fracture, initial encounter ([facility] [address]        2. Post-operative pain        3. Osteoarthritis of left knee, unspecified osteoarthritis type        4. Depression, unspecified depression type           Continue current pain management regime. Repeat CBC on February 14. Wound clean dry and intact with no erythema or drainage. Mood stable. Continue PT OT as tolerated. adhere to the JNC VIII guidelines for HTN managementand the NCEP ATP III guidelines for LDL-C management. No orders of the defined types were placed in this encounter. No orders of the defined types were placed in this encounter. No follow-ups on file. Encourage fluids and good nutrition. Stress fall prevention strategies. Electronically signed by AZAEL Farrell CNP    Total time includes reviewing Plan of Care, labs, reviewing history, medications, and allergies, counseling patient, performing medically appropriate evaluation and examination, ordering medications, and documenting in the medical record. Please note this report is partially produced by using speech recognition hardware. It may contain errors related to the system, including grammar, punctuation and spelling as well as words and phrases that may seem inaccurate.   For any questions or concerns feel free to contact me for clarification

## 2023-03-14 ENCOUNTER — TELEPHONE (OUTPATIENT)
Dept: INTERNAL MEDICINE | Age: 73
End: 2023-03-14

## 2023-03-14 NOTE — TELEPHONE ENCOUNTER
Home Physical Therapist called today she reassessed the patients home therapy plan, she is going to extend it Twice weekly for 1 week then Once weekly for 2 weeks starting March 19th 2023.

## 2023-03-15 ENCOUNTER — PREP FOR PROCEDURE (OUTPATIENT)
Dept: ORTHOPEDIC SURGERY | Age: 73
End: 2023-03-15

## 2023-03-22 ENCOUNTER — TELEPHONE (OUTPATIENT)
Dept: ORTHOPEDIC SURGERY | Age: 73
End: 2023-03-22

## 2023-03-22 NOTE — TELEPHONE ENCOUNTER
Surgery Scheduling and Authorization Information    Surgery Date:5/8/2023  Surgery good through dates: 5/8/2023-8/5/2023  CPT Code(s) 69020  Procedure(s) Left total knee arthroplasty,      Approved [x] Not Required [] Denied []    Auth #  [id number]    How authorization obtained:  [x] web portal             [] via phone       [] Via fax    Provider  [] Leroy Novak  [] Ajay Jovel  [x] Jody Velez  [] Tierra Ortiz  [] Javan Mcburney  [] Jayce Tamez  [] Vonnie Landau  [] Devon Shrestha  [] Ross Rivera  [] Rambo Roblero  [] Angel Dyer        Surgery Sheet Faxed to Scheduling [x]  Surgery and Prior Authorization Information Sheet Scanned [x]

## 2023-03-30 ENCOUNTER — OFFICE VISIT (OUTPATIENT)
Dept: ORTHOPEDIC SURGERY | Age: 73
End: 2023-03-30
Payer: MEDICARE

## 2023-03-30 VITALS
TEMPERATURE: 98.6 F | HEART RATE: 78 BPM | BODY MASS INDEX: 31.58 KG/M2 | SYSTOLIC BLOOD PRESSURE: 138 MMHG | WEIGHT: 185 LBS | HEIGHT: 64 IN | RESPIRATION RATE: 16 BRPM | DIASTOLIC BLOOD PRESSURE: 58 MMHG | OXYGEN SATURATION: 97 %

## 2023-03-30 DIAGNOSIS — M17.12 PRIMARY OSTEOARTHRITIS OF LEFT KNEE: Primary | ICD-10-CM

## 2023-03-30 DIAGNOSIS — M54.16 LUMBAR RADICULOPATHY: ICD-10-CM

## 2023-03-30 PROCEDURE — 99214 OFFICE O/P EST MOD 30 MIN: CPT | Performed by: ORTHOPAEDIC SURGERY

## 2023-03-30 PROCEDURE — 1123F ACP DISCUSS/DSCN MKR DOCD: CPT | Performed by: ORTHOPAEDIC SURGERY

## 2023-03-31 NOTE — PROGRESS NOTES
close to her surgery. She was in agreement. In an effort to settle everything down, remember to put her on some steroid pills to cut down on the inflammation. We then going to get her going into physical therapy. We will see if she is able to proceed with surgery as the next few weeks progress. Orders Placed This Encounter   Procedures    [facility] Chris Cotter     Referral Priority:   Routine     Referral Type:   Eval and Treat     Referral Reason:   Specialty Services Required     Requested Specialty:   Physical Therapist     Number of Visits Requested:   1       Orders Placed This Encounter   Medications    Handicap Placard MISC     Sig: by Does not apply route 3/30/2023-3/30/2025     Dispense:  1 each     Refill:  0       Return for Postop.     Refugio Magaña MD

## 2023-04-05 ENCOUNTER — OFFICE VISIT (OUTPATIENT)
Dept: FAMILY MEDICINE CLINIC | Age: 73
End: 2023-04-05
Payer: MEDICARE

## 2023-04-05 VITALS
WEIGHT: 185 LBS | SYSTOLIC BLOOD PRESSURE: 126 MMHG | DIASTOLIC BLOOD PRESSURE: 70 MMHG | BODY MASS INDEX: 31.58 KG/M2 | TEMPERATURE: 97.8 F | HEIGHT: 64 IN | HEART RATE: 73 BPM | OXYGEN SATURATION: 95 %

## 2023-04-05 DIAGNOSIS — F33.1 MAJOR DEPRESSIVE DISORDER, RECURRENT EPISODE, MODERATE (HCC): ICD-10-CM

## 2023-04-05 DIAGNOSIS — R60.0 EDEMA OF RIGHT FOOT: Primary | ICD-10-CM

## 2023-04-05 DIAGNOSIS — Z91.81 AT HIGH RISK FOR FALLS: ICD-10-CM

## 2023-04-05 PROCEDURE — 1123F ACP DISCUSS/DSCN MKR DOCD: CPT | Performed by: NURSE PRACTITIONER

## 2023-04-05 PROCEDURE — 99213 OFFICE O/P EST LOW 20 MIN: CPT | Performed by: NURSE PRACTITIONER

## 2023-04-05 SDOH — ECONOMIC STABILITY: INCOME INSECURITY: HOW HARD IS IT FOR YOU TO PAY FOR THE VERY BASICS LIKE FOOD, HOUSING, MEDICAL CARE, AND HEATING?: NOT HARD AT ALL

## 2023-04-05 SDOH — ECONOMIC STABILITY: FOOD INSECURITY: WITHIN THE PAST 12 MONTHS, YOU WORRIED THAT YOUR FOOD WOULD RUN OUT BEFORE YOU GOT MONEY TO BUY MORE.: NEVER TRUE

## 2023-04-05 SDOH — ECONOMIC STABILITY: FOOD INSECURITY: WITHIN THE PAST 12 MONTHS, THE FOOD YOU BOUGHT JUST DIDN'T LAST AND YOU DIDN'T HAVE MONEY TO GET MORE.: NEVER TRUE

## 2023-04-05 SDOH — ECONOMIC STABILITY: HOUSING INSECURITY
IN THE LAST 12 MONTHS, WAS THERE A TIME WHEN YOU DID NOT HAVE A STEADY PLACE TO SLEEP OR SLEPT IN A SHELTER (INCLUDING NOW)?: NO

## 2023-04-05 ASSESSMENT — ENCOUNTER SYMPTOMS
BACK PAIN: 0
WHEEZING: 0
COUGH: 0
NAUSEA: 0
SHORTNESS OF BREATH: 0
VOMITING: 0
COLOR CHANGE: 0

## 2023-04-05 ASSESSMENT — PATIENT HEALTH QUESTIONNAIRE - PHQ9
SUM OF ALL RESPONSES TO PHQ QUESTIONS 1-9: 0
SUM OF ALL RESPONSES TO PHQ9 QUESTIONS 1 & 2: 0
6. FEELING BAD ABOUT YOURSELF - OR THAT YOU ARE A FAILURE OR HAVE LET YOURSELF OR YOUR FAMILY DOWN: 0
8. MOVING OR SPEAKING SO SLOWLY THAT OTHER PEOPLE COULD HAVE NOTICED. OR THE OPPOSITE, BEING SO FIGETY OR RESTLESS THAT YOU HAVE BEEN MOVING AROUND A LOT MORE THAN USUAL: 0
10. IF YOU CHECKED OFF ANY PROBLEMS, HOW DIFFICULT HAVE THESE PROBLEMS MADE IT FOR YOU TO DO YOUR WORK, TAKE CARE OF THINGS AT HOME, OR GET ALONG WITH OTHER PEOPLE: 0
4. FEELING TIRED OR HAVING LITTLE ENERGY: 0
5. POOR APPETITE OR OVEREATING: 0
3. TROUBLE FALLING OR STAYING ASLEEP: 0
7. TROUBLE CONCENTRATING ON THINGS, SUCH AS READING THE NEWSPAPER OR WATCHING TELEVISION: 0
1. LITTLE INTEREST OR PLEASURE IN DOING THINGS: 0
SUM OF ALL RESPONSES TO PHQ QUESTIONS 1-9: 0
SUM OF ALL RESPONSES TO PHQ QUESTIONS 1-9: 0
9. THOUGHTS THAT YOU WOULD BE BETTER OFF DEAD, OR OF HURTING YOURSELF: 0
2. FEELING DOWN, DEPRESSED OR HOPELESS: 0
SUM OF ALL RESPONSES TO PHQ QUESTIONS 1-9: 0

## 2023-04-06 ENCOUNTER — HOSPITAL ENCOUNTER (OUTPATIENT)
Dept: ULTRASOUND IMAGING | Age: 73
Discharge: HOME | End: 2023-04-08
Payer: MEDICARE

## 2023-04-06 DIAGNOSIS — R60.0 EDEMA OF RIGHT FOOT: ICD-10-CM

## 2023-04-06 PROCEDURE — 93971 EXTREMITY STUDY: CPT

## 2023-04-18 ENCOUNTER — HOSPITAL ENCOUNTER (OUTPATIENT)
Dept: PHYSICAL THERAPY | Age: 73
Setting detail: THERAPIES SERIES
Discharge: HOME | End: 2023-04-18
Payer: MEDICARE

## 2023-04-18 PROCEDURE — 97163 PT EVAL HIGH COMPLEX 45 MIN: CPT

## 2023-04-18 PROCEDURE — 97110 THERAPEUTIC EXERCISES: CPT

## 2023-04-18 ASSESSMENT — PAIN SCALES - GENERAL: PAINLEVEL_OUTOF10: 3

## 2023-04-18 ASSESSMENT — PAIN DESCRIPTION - PAIN TYPE: TYPE: ACUTE PAIN

## 2023-04-18 ASSESSMENT — PAIN DESCRIPTION - DESCRIPTORS: DESCRIPTORS: TIGHTNESS;ACHING;DULL

## 2023-04-18 ASSESSMENT — PAIN DESCRIPTION - LOCATION: LOCATION: HIP;BACK;LEG

## 2023-04-18 ASSESSMENT — PAIN DESCRIPTION - ORIENTATION: ORIENTATION: RIGHT;OUTER

## 2023-04-18 NOTE — PROGRESS NOTES
1  Comments: *able to take a few steps wtih cane, unsteady and slwoer than walker  Stairs/Curb  Stairs?: No        Left PROM  Right PROM         PROM LLE (degrees)  LLE General PROM: ankle DF +1- deg, knee flex 120 deg, hip flex >100    PROM RLE (degrees)  RLE General PROM: ankle DF +5 deg, knee flex 128 deg, hip flex >100     Left Strength  Right Strength         Strength LLE  L Hip Flexion: 4+/5  L Hip Extension: 3+/5  L Hip ABduction: 4/5  L Hip ADduction: 4/5  L Knee Flexion: At least, 2+/5  L Knee Extension: 4+/5  L Ankle Dorsiflexion: 5/5    Strength RLE  R Hip Flexion: 4/5  R Hip Extension: 3-/5  R Hip ABduction: 3-/5  R Hip ADduction: 3+/5  R Knee Flexion: At least, 2+/5  R Knee Extension: 4+/5  R Ankle Dorsiflexion: 5/5       Lumbar Assessment    Full flexion in sitting; sidbend in sitting WNL ea way; rotatoin in sitting WFL ea way  *difficulty testing in standing due to impaired balance          Muscle Length/Flexibility: Muscle Length LE  Right Gastroc/Soleus: Tight        Special Tests:   Special Tests Lumbar Spine  SLR: R (-), L (-)  Slump Test: R (+), L (-)  Forward Bend Test: (-)  Special Tests for Knee  Other Knee Test 1:  (Modified LEFS: 27/64= 58% disability)    Balance/Gait Assessment(s) Performed:  5 Times Sit to Stand   Current Score: 33 seconds (with UE support; limited by c/o increasing L knee pain (upcomign TKR scheduled)) (Date: 4/18/2023)    Interpretation of Score: The 5 Times Sit to Stand Test (FTSST) measures functional lower limb muscle strength and may be useful in quantifying functional change of transitional movements. Greater than 16.0 seconds indicates increased risk of falls. Cut-off scores of 16 seconds discriminates fallers from non-fallers. < 61years old: 11 seconds = normal; 79-80 years old: 13 seconds = normal; [de-identified]90 years old: 15 seconds = normal.  Timed Up and Go (TUG)  Current Score: 33 Seconds (Date: 4/18/2023)    Interpretation of Score:  This tests the patients

## 2023-04-20 ENCOUNTER — OFFICE VISIT (OUTPATIENT)
Dept: ORTHOPEDIC SURGERY | Age: 73
End: 2023-04-20

## 2023-04-20 VITALS
SYSTOLIC BLOOD PRESSURE: 143 MMHG | DIASTOLIC BLOOD PRESSURE: 92 MMHG | HEART RATE: 67 BPM | TEMPERATURE: 97.2 F | BODY MASS INDEX: 31.76 KG/M2 | WEIGHT: 185 LBS | OXYGEN SATURATION: 98 %

## 2023-04-20 DIAGNOSIS — M17.12 PRIMARY OSTEOARTHRITIS OF LEFT KNEE: ICD-10-CM

## 2023-04-20 DIAGNOSIS — Z01.818 PREOP EXAMINATION: Primary | ICD-10-CM

## 2023-04-20 PROCEDURE — PREOPEXAM PRE-OP EXAM: Performed by: PHYSICIAN ASSISTANT

## 2023-04-20 RX ORDER — SODIUM CHLORIDE 0.9 % (FLUSH) 0.9 %
5-40 SYRINGE (ML) INJECTION PRN
OUTPATIENT
Start: 2023-04-20

## 2023-04-20 RX ORDER — CHLORHEXIDINE GLUCONATE 213 G/1000ML
SOLUTION TOPICAL
Qty: 118 ML | Refills: 0 | Status: SHIPPED | OUTPATIENT
Start: 2023-04-20

## 2023-04-20 RX ORDER — SODIUM CHLORIDE 0.9 % (FLUSH) 0.9 %
5-40 SYRINGE (ML) INJECTION EVERY 12 HOURS SCHEDULED
OUTPATIENT
Start: 2023-04-20

## 2023-04-20 RX ORDER — SODIUM CHLORIDE, SODIUM LACTATE, POTASSIUM CHLORIDE, CALCIUM CHLORIDE 600; 310; 30; 20 MG/100ML; MG/100ML; MG/100ML; MG/100ML
INJECTION, SOLUTION INTRAVENOUS CONTINUOUS
OUTPATIENT
Start: 2023-04-20

## 2023-04-20 RX ORDER — SODIUM CHLORIDE 9 MG/ML
INJECTION, SOLUTION INTRAVENOUS PRN
OUTPATIENT
Start: 2023-04-20

## 2023-04-20 NOTE — H&P
significant valgus. The bone quality appears to be good for age and reported activity level. Assessment:     End stage arthritis, left knee     Plan:     The patient history, physical examination and imaging studies are consistent with advanced degenerative joint disease of the left knee. The treatment options including medical management, injection therapy arthroscopy and arthroplasty were discussed at length. The risks and benefits of total knee arthroplasty were presented and reviewed. The risks due to aseptic loosening, infection, stiffness, patella tracking problems, thromboembolic complications among others were discussed. The patient acknowledged the explanation, agreed to proceed with the plan and a consent was signed. Explained to the patient she needs to bathe daily with Hibiclens soap for 3 days prior to her surgery. She understands to be n.p.o. the night before surgery. She does not need to take any medication the morning of surgery.

## 2023-04-21 ENCOUNTER — HOSPITAL ENCOUNTER (OUTPATIENT)
Dept: PHYSICAL THERAPY | Age: 73
Setting detail: THERAPIES SERIES
Discharge: HOME | End: 2023-04-21
Payer: MEDICARE

## 2023-04-21 PROCEDURE — 97110 THERAPEUTIC EXERCISES: CPT

## 2023-04-21 PROCEDURE — 97116 GAIT TRAINING THERAPY: CPT

## 2023-04-21 ASSESSMENT — PAIN SCALES - GENERAL: PAINLEVEL_OUTOF10: 4

## 2023-04-21 ASSESSMENT — PAIN DESCRIPTION - LOCATION: LOCATION: HIP;BACK;LEG

## 2023-04-21 ASSESSMENT — PAIN DESCRIPTION - PAIN TYPE: TYPE: ACUTE PAIN

## 2023-04-21 ASSESSMENT — PAIN DESCRIPTION - ORIENTATION: ORIENTATION: RIGHT;OUTER

## 2023-04-21 NOTE — PROGRESS NOTES
Physical Therapy: Daily Note   Patient: Iraj Barry (75 y.o. female)   Examination Date:   Plan of Care/Certification Expiration Date: 23    No data recorded   :  1950 # of Visits since [facility]:   2   MRN: [id number]  CSN: [id number] Start of Care Date:   2023   Insurance: Payor: Bianca Josh / Plan: Ingris Del ESSENTIAL/PLUS / Product Type: *No Product type* /   Insurance ID: [id number] - (Medicare Managed) Secondary Insurance (if applicable):    Referring Physician: MD Dr. Aki Rojas   PCP: Ancil Crigler, PA Visits to Date/Visits Approved: 2 / 10    No Show/Cancelled Appts: 0 / 0     Medical Diagnosis: Radiculopathy, lumbar region [M54.16]    Treatment Diagnosis: R hip/LE pain, h/ORIF 2023, radicular pain        SUBJECTIVE EXAMINATION   Pain Level: Pain Screening  Patient Currently in Pain: Yes  Pain Assessment: 0-10  Pain Level: 4  Best Pain Level: 0  Worst Pain Level: 4  Pain Type: Acute pain  Pain Location: Hip, Back, Leg  Pain Orientation: Right, Outer    Patient Comments: Subjective: Pt. arrived to therapy with s. cane but reported that she felt like she needed to use a walker as she is not used to walking so far. She was able to walk from the parking lot into clinic about 76' with cane and switched to ww. HEP Compliance: Good        OBJECTIVE EXAMINATION   Restrictions:  Restrictions/Precautions: Fall Risk; Up as Tolerated; Weight Bearing   No data recorded No data recorded              TREATMENT     Exercises:  Therapeutic exercise (CPT 52274)   Treatment Reasoning    Exercise 1: bridge x 10  Exercise 2: sit to stand x 5 reps without arm rest supine to sit x 2 with marked time to complete all transfers  Exercise 3: standing hip ABD and TR  Exercise 6: SLRs x 10 ea  Exercise 7: seated LAQ x 20  Exercise 8: seated hip flex x 20    Limitations addressed:  Mobility, Strength, Activity tolerance, Posture, Pain modulation  Therapist provided: Verbal cuing,

## 2023-04-25 ENCOUNTER — HOSPITAL ENCOUNTER (OUTPATIENT)
Dept: PHYSICAL THERAPY | Age: 73
Setting detail: THERAPIES SERIES
Discharge: HOME | End: 2023-04-25
Payer: MEDICARE

## 2023-04-25 PROCEDURE — 97110 THERAPEUTIC EXERCISES: CPT

## 2023-04-25 PROCEDURE — 97116 GAIT TRAINING THERAPY: CPT

## 2023-04-25 ASSESSMENT — PAIN DESCRIPTION - LOCATION: LOCATION: HIP;BACK;LEG

## 2023-04-25 ASSESSMENT — PAIN DESCRIPTION - ORIENTATION: ORIENTATION: RIGHT;OUTER

## 2023-04-25 ASSESSMENT — PAIN DESCRIPTION - PAIN TYPE: TYPE: ACUTE PAIN

## 2023-04-25 ASSESSMENT — PAIN DESCRIPTION - DESCRIPTORS: DESCRIPTORS: ACHING

## 2023-04-25 ASSESSMENT — PAIN SCALES - GENERAL: PAINLEVEL_OUTOF10: 3

## 2023-04-25 NOTE — PROGRESS NOTES
Physical Therapy: Daily Note   Patient: Lo Alcocer (48 y.o. female)   Examination Date:   Plan of Care/Certification Expiration Date: 23    No data recorded   :  1950 # of Visits since [facility]:   3   MRN: [id number]  CSN: [id number] Start of Care Date:   2023   Insurance: Payor: Dieudonne Mcclellan / Plan: Pati Galarza ESSENTIAL/PLUS / Product Type: *No Product type* /   Insurance ID: [id number] - (Medicare Managed) Secondary Insurance (if applicable):    Referring Physician: MD Dr. Jakob Austin   PCP: OMAR Tierney Visits to Date/Visits Approved: 3 / 10    No Show/Cancelled Appts: 0 / 0     Medical Diagnosis: Radiculopathy, lumbar region [M54.16]    Treatment Diagnosis: R hip/LE pain, h/ORIF 2023, radicular pain        SUBJECTIVE EXAMINATION   Pain Level: Pain Screening  Patient Currently in Pain: Yes  Pain Assessment: 0-10  Pain Level: 3  Best Pain Level: 3  Worst Pain Level: 4  Pain Type: Acute pain  Pain Location: Hip, Back, Leg  Pain Orientation: Right, Outer  Pain Descriptors: Aching    Patient Comments: Subjective: Pt. reported that she was able to drive to therapy session today. She also stated that her right LE is more bothersome than the L today. She did feel fatigued after treatment and would feel more comfortable with someone walking her out into the car.     HEP Compliance: Good        OBJECTIVE EXAMINATION   Restrictions:  Restrictions/Precautions: Fall Risk; Up as Tolerated; Weight Bearing   No data recorded No data recorded              TREATMENT     Exercises:  Therapeutic exercise (CPT 11494)   Treatment Reasoning    Exercise 1: bridge x 10  Exercise 2: sit to stand x 5 reps without arm rest supine to sit x 2 with marked time to complete all transfers  Exercise 3: Knee fallouts/ hp ADD with ball 5secx 20  Exercise 4: standing hip ABD x 20  Exercise 5: Supine pelvic tilts  Exercise 6: SLRs x 20 ea  Exercise 7: seated LAQ x 20  Exercise 8: seated hip

## 2023-04-28 ENCOUNTER — HOSPITAL ENCOUNTER (OUTPATIENT)
Dept: PHYSICAL THERAPY | Age: 73
Setting detail: THERAPIES SERIES
Discharge: HOME | End: 2023-04-28
Payer: MEDICARE

## 2023-04-28 PROCEDURE — 97110 THERAPEUTIC EXERCISES: CPT

## 2023-04-28 PROCEDURE — 97116 GAIT TRAINING THERAPY: CPT

## 2023-04-28 ASSESSMENT — PAIN DESCRIPTION - LOCATION: LOCATION: HIP;BACK;LEG

## 2023-04-28 ASSESSMENT — PAIN DESCRIPTION - PAIN TYPE: TYPE: ACUTE PAIN

## 2023-04-28 ASSESSMENT — PAIN DESCRIPTION - DESCRIPTORS: DESCRIPTORS: ACHING

## 2023-04-28 ASSESSMENT — PAIN SCALES - GENERAL
PAINLEVEL_OUTOF10: 3
PAINLEVEL_OUTOF10: 3

## 2023-04-28 ASSESSMENT — PAIN DESCRIPTION - ORIENTATION: ORIENTATION: RIGHT;LEFT

## 2023-04-28 NOTE — PROGRESS NOTES
Physical Therapy: Daily Note   Patient: Celeste Moeller (74 y.o. female)   Examination Date:   Plan of Care/Certification Expiration Date: 23    No data recorded   :  1950 # of Visits since [facility]:   4   MRN: [id number]  CSN: [id number] Start of Care Date:   2023   Insurance: Payor: Scott Ndiaye / Plan: Rosalinda Dolan ESSENTIAL/PLUS / Product Type: *No Product type* /   Insurance ID: [id number] - (Medicare Managed) Secondary Insurance (if applicable):    Referring Physician: MD Dr. Margie Denis   PCP: OMAR Escobedo Visits to Date/Visits Approved: 4 / 10    No Show/Cancelled Appts: 0 / 0     Medical Diagnosis: Radiculopathy, lumbar region [M54.16]    Treatment Diagnosis: R hip/LE pain, h/ORIF 2023, radicular pain        SUBJECTIVE EXAMINATION   Pain Level: Pain Screening  Patient Currently in Pain: Yes  Pain Assessment: 0-10  Pain Level: 3  Best Pain Level: 3  Worst Pain Level: 4  Pain Type: Acute pain  Pain Location: Hip, Back, Leg  Pain Orientation: Right, Left  Pain Descriptors: Aching    Patient Comments: Subjective: Pt. with 3/10 pain in the left knee and right ankle. She feels a little more rough today due to the rainy weather. She does have her left knee surgery scheduled for May 8th.     HEP Compliance: Good        OBJECTIVE EXAMINATION   Restrictions:  Restrictions/Precautions: Fall Risk; Up as Tolerated; Weight Bearing   No data recorded No data recorded              TREATMENT     Exercises:  Therapeutic exercise (CPT 76090)   Treatment Reasoning    Exercise 1: bridge x 10  Exercise 3: Knee fallouts/ hp ADD with ball 5secx 20  Exercise 4: standing hip ABD x 20  Exercise 5: Supine pelvic tilts 3 sec x15  Exercise 6: SLRs x 20 ea  Exercise 7: seated LAQ x 20  Exercise 8: seated hip flex x 20                          Gait: (CPT [id number])  Treatment Reasoning    Gait Training 1: gait with ww x 350' with supervision and cues for sequencing and decreasing right LE EV

## 2023-05-01 ENCOUNTER — HOSPITAL ENCOUNTER (OUTPATIENT)
Dept: PREADMISSION TESTING | Age: 73
Discharge: HOME | End: 2023-05-05
Payer: MEDICARE

## 2023-05-01 PROCEDURE — 86900 BLOOD TYPING SEROLOGIC ABO: CPT

## 2023-05-01 PROCEDURE — 86850 RBC ANTIBODY SCREEN: CPT

## 2023-05-01 PROCEDURE — 86901 BLOOD TYPING SEROLOGIC RH(D): CPT

## 2023-05-02 ENCOUNTER — HOSPITAL ENCOUNTER (OUTPATIENT)
Dept: PHYSICAL THERAPY | Age: 73
Setting detail: THERAPIES SERIES
Discharge: HOME | End: 2023-05-02
Payer: MEDICARE

## 2023-05-02 LAB
ABO + RH BLD: NORMAL
BLD GP AB SCN SERPL QL: NORMAL

## 2023-05-02 PROCEDURE — 97110 THERAPEUTIC EXERCISES: CPT

## 2023-05-02 PROCEDURE — 97116 GAIT TRAINING THERAPY: CPT

## 2023-05-02 NOTE — PROGRESS NOTES
Physical Therapy  Physical Therapy: Daily Note   Patient: Chantel Booker (25 y.o. female)   Examination Date:   Plan of Care/Certification Expiration Date: 23    No data recorded   :  1950 # of Visits since [facility]:   5   MRN: [id number]  CSN: [id number] Start of Care Date:   2023   Insurance: Payor: Richard Brooks / Plan: Gridleyzina Zhange ESSENTIAL/PLUS / Product Type: *No Product type* /   Insurance ID: [id number] - (Medicare Managed) Secondary Insurance (if applicable):    Referring Physician: Mercedes Archer MD     PCP: OMAR Ellington Visits to Date/Visits Approved: 5 / 10    No Show/Cancelled Appts: 0 / 0     Medical Diagnosis: Radiculopathy, lumbar region [M54.16]    Treatment Diagnosis: R hip/LE pain, h/ORIF 2023, radicular pain        SUBJECTIVE EXAMINATION   Pain Level:      Patient Comments:      HEP Compliance: Good        OBJECTIVE EXAMINATION   Restrictions:  Restrictions/Precautions: Fall Risk; Up as Tolerated; Weight Bearing   No data recorded No data recorded              TREATMENT     Exercises:      Treatment Reasoning    Exercise 1: bridge 2x 10  Exercise 3: Knee fallouts/ hp ADD with ball 5secx 20  Exercise 5: Supine pelvic tilts 3 sec x15  Exercise 6: SLRs x 20 ea  Exercise 7: seated LAQ x 20 hold 3 sec  Exercise 8: seated hip flex x 20 hold 3 sec  Exercise 9: L sidelying R clamshell x 20 hold 3 sec  Exercise 10: L sidelying R hip ABD 2 x  5 AAROM  Exercise 11: Hooklying hip ABD GTB x 20 hold 3 sec  Exercise 12: Supoprted standing R march x 10  Exercise 13: supported standing R hip ext x 10  Exercise 14: supported standing R hip abd x 10    Limitations addressed: Mobility, Strength, Activity tolerance, Posture, Pain modulation                     Gait: (CPT Q3036582)  Treatment Reasoning    Gait Training 1: gait with WW x 220' with supervision VC to dec right LE EV and inc heel toe pattern as well as inc in R hip flexion.   Fatigues easily cues for posutral

## 2023-05-05 ENCOUNTER — HOSPITAL ENCOUNTER (OUTPATIENT)
Dept: PHYSICAL THERAPY | Age: 73
Setting detail: THERAPIES SERIES
Discharge: HOME | End: 2023-05-05
Payer: MEDICARE

## 2023-05-05 PROCEDURE — 97110 THERAPEUTIC EXERCISES: CPT

## 2023-05-05 ASSESSMENT — PAIN DESCRIPTION - LOCATION: LOCATION: KNEE

## 2023-05-05 ASSESSMENT — PAIN DESCRIPTION - DESCRIPTORS: DESCRIPTORS: ACHING

## 2023-05-05 ASSESSMENT — PAIN SCALES - GENERAL: PAINLEVEL_OUTOF10: 3

## 2023-05-05 ASSESSMENT — PAIN DESCRIPTION - ORIENTATION: ORIENTATION: LEFT

## 2023-05-05 NOTE — PROGRESS NOTES
Physical Therapy: Discharge Note   Patient: Humble Wright (37 y.o. female)   Examination Date:   Plan of Care/Certification Expiration Date: 23    Progress Note Counter: DC to HEP on 23 as pt is having a Left Total knee replacement on 23. :  1950 # of Visits since [facility]:   6   MRN: [id number]  CSN: [id number] Start of Care Date:   2023   Insurance: Payor: Diamond Mullen / Plan: Xochitl Brooks ESSENTIAL/PLUS / Product Type: *No Product type* /   Insurance ID: [id number] - (Medicare Managed) Secondary Insurance (if applicable):    Referring Physician: MD Dr. Rudy Conway   PCP: OMAR Muñoz Visits to Date/Visits Approved: 6 / 10    No Show/Cancelled Appts: 0 / 0     Medical Diagnosis: Radiculopathy, lumbar region [M54.16]    Treatment Diagnosis: R hip/LE pain, h/ORIF 2023, radicular pain        SUBJECTIVE EXAMINATION   Pain Level: Pain Screening  Patient Currently in Pain: Yes  Pain Assessment: 0-10  Pain Level: 3  Pain Location: Knee  Pain Orientation: Left  Pain Descriptors: Aching    Patient Comments: Subjective: Pt reports feeling more dizzy today but having left knee 3/10 achy pain.     HEP Compliance: Good        OBJECTIVE EXAMINATION   Restrictions:  Restrictions/Precautions: Fall Risk; Up as Tolerated; Weight Bearing   No data recorded No data recorded      TREATMENT     Exercises:      Treatment Reasoning    Exercise 1: bridge 2x 10  Exercise 2: sit to stand  2 x 5 reps with armrest  Exercise 4: standing hip ABD x 20  Exercise 10: L sidelying R hip ABD 2 x  5 AAROM  Exercise 11: Hooklying hip ABD GTB x 20 hold 3 sec  Exercise 12: Standing march x 10  Exercise 13: supported standing R hip ext x 10  Exercise 14: Supported standing R hip abd x 10  Exercise 15: Standing heel/toe raises x 10                          Pt Education: Additional Comments: DC to HEP       ASSESSMENT     Assessment: Assessment: Pt arrives to therapy with 3/10 left knee pain

## 2023-05-08 ENCOUNTER — ANESTHESIA EVENT (OUTPATIENT)
Dept: OPERATING ROOM | Age: 73
End: 2023-05-08
Payer: MEDICARE

## 2023-05-08 ENCOUNTER — HOSPITAL ENCOUNTER (OUTPATIENT)
Age: 73
Setting detail: OBSERVATION
Discharge: NURSING, REHAB OR LONG-TERM CARE | End: 2023-05-09
Attending: ORTHOPAEDIC SURGERY | Admitting: ORTHOPAEDIC SURGERY
Payer: MEDICARE

## 2023-05-08 ENCOUNTER — APPOINTMENT (OUTPATIENT)
Dept: ULTRASOUND IMAGING | Age: 73
End: 2023-05-08
Attending: ORTHOPAEDIC SURGERY
Payer: MEDICARE

## 2023-05-08 ENCOUNTER — ANESTHESIA (OUTPATIENT)
Dept: OPERATING ROOM | Age: 73
End: 2023-05-08
Payer: MEDICARE

## 2023-05-08 DIAGNOSIS — Z96.652 STATUS POST TOTAL KNEE REPLACEMENT, LEFT: Primary | ICD-10-CM

## 2023-05-08 PROCEDURE — 7100000001 HC PACU RECOVERY - ADDTL 15 MIN: Performed by: ORTHOPAEDIC SURGERY

## 2023-05-08 PROCEDURE — 97166 OT EVAL MOD COMPLEX 45 MIN: CPT

## 2023-05-08 PROCEDURE — 6370000000 HC RX 637 (ALT 250 FOR IP): Performed by: NURSE PRACTITIONER

## 2023-05-08 PROCEDURE — 97116 GAIT TRAINING THERAPY: CPT

## 2023-05-08 PROCEDURE — 2580000003 HC RX 258: Performed by: PHYSICIAN ASSISTANT

## 2023-05-08 PROCEDURE — C1776 JOINT DEVICE (IMPLANTABLE): HCPCS | Performed by: ORTHOPAEDIC SURGERY

## 2023-05-08 PROCEDURE — 3600000014 HC SURGERY LEVEL 4 ADDTL 15MIN: Performed by: ORTHOPAEDIC SURGERY

## 2023-05-08 PROCEDURE — 64999 UNLISTED PX NERVOUS SYSTEM: CPT | Performed by: ANESTHESIOLOGY

## 2023-05-08 PROCEDURE — 6360000002 HC RX W HCPCS: Performed by: NURSE PRACTITIONER

## 2023-05-08 PROCEDURE — 3700000001 HC ADD 15 MINUTES (ANESTHESIA): Performed by: ORTHOPAEDIC SURGERY

## 2023-05-08 PROCEDURE — 3700000000 HC ANESTHESIA ATTENDED CARE: Performed by: ORTHOPAEDIC SURGERY

## 2023-05-08 PROCEDURE — 2580000003 HC RX 258: Performed by: ANESTHESIOLOGY

## 2023-05-08 PROCEDURE — 3600000004 HC SURGERY LEVEL 4 BASE: Performed by: ORTHOPAEDIC SURGERY

## 2023-05-08 PROCEDURE — A4217 STERILE WATER/SALINE, 500 ML: HCPCS | Performed by: ORTHOPAEDIC SURGERY

## 2023-05-08 PROCEDURE — G0378 HOSPITAL OBSERVATION PER HR: HCPCS

## 2023-05-08 PROCEDURE — 6360000002 HC RX W HCPCS: Performed by: ANESTHESIOLOGY

## 2023-05-08 PROCEDURE — 6360000002 HC RX W HCPCS: Performed by: PHYSICIAN ASSISTANT

## 2023-05-08 PROCEDURE — 6370000000 HC RX 637 (ALT 250 FOR IP): Performed by: INTERNAL MEDICINE

## 2023-05-08 PROCEDURE — 2500000003 HC RX 250 WO HCPCS: Performed by: ANESTHESIOLOGY

## 2023-05-08 PROCEDURE — 97162 PT EVAL MOD COMPLEX 30 MIN: CPT

## 2023-05-08 PROCEDURE — 2580000003 HC RX 258: Performed by: NURSE PRACTITIONER

## 2023-05-08 PROCEDURE — 2580000003 HC RX 258: Performed by: ORTHOPAEDIC SURGERY

## 2023-05-08 PROCEDURE — 7100000000 HC PACU RECOVERY - FIRST 15 MIN: Performed by: ORTHOPAEDIC SURGERY

## 2023-05-08 PROCEDURE — 64447 NJX AA&/STRD FEMORAL NRV IMG: CPT | Performed by: ANESTHESIOLOGY

## 2023-05-08 PROCEDURE — 2709999900 HC NON-CHARGEABLE SUPPLY: Performed by: ORTHOPAEDIC SURGERY

## 2023-05-08 PROCEDURE — 76942 ECHO GUIDE FOR BIOPSY: CPT

## 2023-05-08 PROCEDURE — 97535 SELF CARE MNGMENT TRAINING: CPT

## 2023-05-08 PROCEDURE — 97530 THERAPEUTIC ACTIVITIES: CPT

## 2023-05-08 DEVICE — CRUCIATE RETAINING FEMORAL
Type: IMPLANTABLE DEVICE | Site: KNEE | Status: FUNCTIONAL
Brand: TRIATHLON

## 2023-05-08 DEVICE — IMPL CAPPED KNEE K2 TOTAL/HEMI ADV CEMENTLESS STRYKER: Type: IMPLANTABLE DEVICE | Site: KNEE | Status: FUNCTIONAL

## 2023-05-08 DEVICE — TIBIAL BEARING INSERT - CS
Type: IMPLANTABLE DEVICE | Site: KNEE | Status: FUNCTIONAL
Brand: TRIATHLON

## 2023-05-08 DEVICE — TIBIAL COMPONENT
Type: IMPLANTABLE DEVICE | Site: KNEE | Status: FUNCTIONAL
Brand: TRIATHLON

## 2023-05-08 DEVICE — PATELLA
Type: IMPLANTABLE DEVICE | Site: KNEE | Status: FUNCTIONAL
Brand: TRIATHLON

## 2023-05-08 RX ORDER — MIDAZOLAM HYDROCHLORIDE 1 MG/ML
INJECTION INTRAMUSCULAR; INTRAVENOUS
Status: COMPLETED
Start: 2023-05-08 | End: 2023-05-08

## 2023-05-08 RX ORDER — SODIUM CHLORIDE 9 MG/ML
INJECTION, SOLUTION INTRAVENOUS PRN
Status: DISCONTINUED | OUTPATIENT
Start: 2023-05-08 | End: 2023-05-09 | Stop reason: HOSPADM

## 2023-05-08 RX ORDER — SODIUM CHLORIDE, SODIUM LACTATE, POTASSIUM CHLORIDE, CALCIUM CHLORIDE 600; 310; 30; 20 MG/100ML; MG/100ML; MG/100ML; MG/100ML
INJECTION, SOLUTION INTRAVENOUS CONTINUOUS
Status: DISCONTINUED | OUTPATIENT
Start: 2023-05-08 | End: 2023-05-09

## 2023-05-08 RX ORDER — MULTIVITAMIN WITH IRON
1 TABLET ORAL DAILY
Status: DISCONTINUED | OUTPATIENT
Start: 2023-05-08 | End: 2023-05-09 | Stop reason: HOSPADM

## 2023-05-08 RX ORDER — SENNA AND DOCUSATE SODIUM 50; 8.6 MG/1; MG/1
1 TABLET, FILM COATED ORAL 2 TIMES DAILY
Status: DISCONTINUED | OUTPATIENT
Start: 2023-05-08 | End: 2023-05-09 | Stop reason: HOSPADM

## 2023-05-08 RX ORDER — TRANEXAMIC ACID 100 MG/ML
INJECTION, SOLUTION INTRAVENOUS PRN
Status: DISCONTINUED | OUTPATIENT
Start: 2023-05-08 | End: 2023-05-08 | Stop reason: SDUPTHER

## 2023-05-08 RX ORDER — SODIUM CHLORIDE 0.9 % (FLUSH) 0.9 %
5-40 SYRINGE (ML) INJECTION PRN
Status: DISCONTINUED | OUTPATIENT
Start: 2023-05-08 | End: 2023-05-08 | Stop reason: HOSPADM

## 2023-05-08 RX ORDER — OXYCODONE HYDROCHLORIDE 5 MG/1
5 TABLET ORAL
Status: DISCONTINUED | OUTPATIENT
Start: 2023-05-08 | End: 2023-05-08 | Stop reason: HOSPADM

## 2023-05-08 RX ORDER — KETOROLAC TROMETHAMINE 15 MG/ML
15 INJECTION, SOLUTION INTRAMUSCULAR; INTRAVENOUS EVERY 6 HOURS
Status: COMPLETED | OUTPATIENT
Start: 2023-05-08 | End: 2023-05-09

## 2023-05-08 RX ORDER — PROPOFOL 10 MG/ML
INJECTION, EMULSION INTRAVENOUS PRN
Status: DISCONTINUED | OUTPATIENT
Start: 2023-05-08 | End: 2023-05-08 | Stop reason: SDUPTHER

## 2023-05-08 RX ORDER — ONDANSETRON 2 MG/ML
4 INJECTION INTRAMUSCULAR; INTRAVENOUS EVERY 6 HOURS PRN
Status: DISCONTINUED | OUTPATIENT
Start: 2023-05-08 | End: 2023-05-09 | Stop reason: HOSPADM

## 2023-05-08 RX ORDER — FENTANYL CITRATE 50 UG/ML
50 INJECTION, SOLUTION INTRAMUSCULAR; INTRAVENOUS EVERY 10 MIN PRN
Status: DISCONTINUED | OUTPATIENT
Start: 2023-05-08 | End: 2023-05-08 | Stop reason: HOSPADM

## 2023-05-08 RX ORDER — ACETAMINOPHEN 325 MG/1
650 TABLET ORAL EVERY 6 HOURS
Status: DISCONTINUED | OUTPATIENT
Start: 2023-05-08 | End: 2023-05-09 | Stop reason: HOSPADM

## 2023-05-08 RX ORDER — ACETAMINOPHEN 500 MG
1000 TABLET ORAL ONCE
Status: COMPLETED | OUTPATIENT
Start: 2023-05-08 | End: 2023-05-08

## 2023-05-08 RX ORDER — CITALOPRAM 20 MG/1
20 TABLET ORAL DAILY
Status: DISCONTINUED | OUTPATIENT
Start: 2023-05-08 | End: 2023-05-08

## 2023-05-08 RX ORDER — SODIUM CHLORIDE 0.9 % (FLUSH) 0.9 %
5-40 SYRINGE (ML) INJECTION PRN
Status: DISCONTINUED | OUTPATIENT
Start: 2023-05-08 | End: 2023-05-09 | Stop reason: HOSPADM

## 2023-05-08 RX ORDER — SODIUM CHLORIDE 9 MG/ML
25 INJECTION, SOLUTION INTRAVENOUS PRN
Status: DISCONTINUED | OUTPATIENT
Start: 2023-05-08 | End: 2023-05-08 | Stop reason: HOSPADM

## 2023-05-08 RX ORDER — SODIUM CHLORIDE, SODIUM LACTATE, POTASSIUM CHLORIDE, CALCIUM CHLORIDE 600; 310; 30; 20 MG/100ML; MG/100ML; MG/100ML; MG/100ML
INJECTION, SOLUTION INTRAVENOUS CONTINUOUS
Status: DISCONTINUED | OUTPATIENT
Start: 2023-05-08 | End: 2023-05-08 | Stop reason: HOSPADM

## 2023-05-08 RX ORDER — SODIUM CHLORIDE 9 MG/ML
INJECTION, SOLUTION INTRAVENOUS PRN
Status: DISCONTINUED | OUTPATIENT
Start: 2023-05-08 | End: 2023-05-08 | Stop reason: HOSPADM

## 2023-05-08 RX ORDER — POLYETHYLENE GLYCOL 3350 17 G/17G
17 POWDER, FOR SOLUTION ORAL DAILY PRN
Status: DISCONTINUED | OUTPATIENT
Start: 2023-05-08 | End: 2023-05-09 | Stop reason: HOSPADM

## 2023-05-08 RX ORDER — SODIUM CHLORIDE 0.9 % (FLUSH) 0.9 %
5-40 SYRINGE (ML) INJECTION EVERY 12 HOURS SCHEDULED
Status: DISCONTINUED | OUTPATIENT
Start: 2023-05-08 | End: 2023-05-08 | Stop reason: HOSPADM

## 2023-05-08 RX ORDER — LEVOTHYROXINE SODIUM 0.03 MG/1
50 TABLET ORAL DAILY
Status: DISCONTINUED | OUTPATIENT
Start: 2023-05-08 | End: 2023-05-09 | Stop reason: HOSPADM

## 2023-05-08 RX ORDER — CITALOPRAM 20 MG/1
30 TABLET ORAL DAILY
Status: DISCONTINUED | OUTPATIENT
Start: 2023-05-09 | End: 2023-05-09 | Stop reason: HOSPADM

## 2023-05-08 RX ORDER — ONDANSETRON 4 MG/1
4 TABLET, ORALLY DISINTEGRATING ORAL EVERY 8 HOURS PRN
Status: DISCONTINUED | OUTPATIENT
Start: 2023-05-08 | End: 2023-05-09 | Stop reason: HOSPADM

## 2023-05-08 RX ORDER — ROPIVACAINE HYDROCHLORIDE 5 MG/ML
INJECTION, SOLUTION EPIDURAL; INFILTRATION; PERINEURAL
Status: COMPLETED
Start: 2023-05-08 | End: 2023-05-08

## 2023-05-08 RX ORDER — CELECOXIB 100 MG/1
200 CAPSULE ORAL ONCE
Status: COMPLETED | OUTPATIENT
Start: 2023-05-08 | End: 2023-05-08

## 2023-05-08 RX ORDER — MIDAZOLAM HYDROCHLORIDE 1 MG/ML
INJECTION INTRAMUSCULAR; INTRAVENOUS PRN
Status: DISCONTINUED | OUTPATIENT
Start: 2023-05-08 | End: 2023-05-08 | Stop reason: SDUPTHER

## 2023-05-08 RX ORDER — MAGNESIUM HYDROXIDE 1200 MG/15ML
LIQUID ORAL CONTINUOUS PRN
Status: DISCONTINUED | OUTPATIENT
Start: 2023-05-08 | End: 2023-05-08 | Stop reason: HOSPADM

## 2023-05-08 RX ORDER — TIZANIDINE 2 MG/1
4 TABLET ORAL EVERY 6 HOURS PRN
Status: DISCONTINUED | OUTPATIENT
Start: 2023-05-08 | End: 2023-05-09 | Stop reason: HOSPADM

## 2023-05-08 RX ORDER — OXYCODONE HCL 10 MG/1
10 TABLET, FILM COATED, EXTENDED RELEASE ORAL ONCE
Status: COMPLETED | OUTPATIENT
Start: 2023-05-08 | End: 2023-05-08

## 2023-05-08 RX ORDER — METOCLOPRAMIDE HYDROCHLORIDE 5 MG/ML
10 INJECTION INTRAMUSCULAR; INTRAVENOUS
Status: DISCONTINUED | OUTPATIENT
Start: 2023-05-08 | End: 2023-05-08 | Stop reason: HOSPADM

## 2023-05-08 RX ORDER — DIPHENHYDRAMINE HYDROCHLORIDE 50 MG/ML
12.5 INJECTION INTRAMUSCULAR; INTRAVENOUS
Status: DISCONTINUED | OUTPATIENT
Start: 2023-05-08 | End: 2023-05-08 | Stop reason: HOSPADM

## 2023-05-08 RX ORDER — PRAVASTATIN SODIUM 40 MG
20 TABLET ORAL NIGHTLY
Status: DISCONTINUED | OUTPATIENT
Start: 2023-05-08 | End: 2023-05-09 | Stop reason: HOSPADM

## 2023-05-08 RX ORDER — SODIUM CHLORIDE 0.9 % (FLUSH) 0.9 %
5-40 SYRINGE (ML) INJECTION EVERY 12 HOURS SCHEDULED
Status: DISCONTINUED | OUTPATIENT
Start: 2023-05-08 | End: 2023-05-09 | Stop reason: HOSPADM

## 2023-05-08 RX ORDER — OXYCODONE HYDROCHLORIDE 5 MG/1
10 TABLET ORAL EVERY 4 HOURS PRN
Status: DISCONTINUED | OUTPATIENT
Start: 2023-05-08 | End: 2023-05-09 | Stop reason: HOSPADM

## 2023-05-08 RX ORDER — BUPIVACAINE HYDROCHLORIDE 7.5 MG/ML
INJECTION, SOLUTION INTRASPINAL PRN
Status: DISCONTINUED | OUTPATIENT
Start: 2023-05-08 | End: 2023-05-08 | Stop reason: SDUPTHER

## 2023-05-08 RX ORDER — ONDANSETRON 2 MG/ML
4 INJECTION INTRAMUSCULAR; INTRAVENOUS
Status: DISCONTINUED | OUTPATIENT
Start: 2023-05-08 | End: 2023-05-08 | Stop reason: HOSPADM

## 2023-05-08 RX ORDER — SCOLOPAMINE TRANSDERMAL SYSTEM 1 MG/1
1 PATCH, EXTENDED RELEASE TRANSDERMAL ONCE
Status: DISCONTINUED | OUTPATIENT
Start: 2023-05-08 | End: 2023-05-09 | Stop reason: HOSPADM

## 2023-05-08 RX ORDER — MEPERIDINE HYDROCHLORIDE 25 MG/ML
12.5 INJECTION INTRAMUSCULAR; INTRAVENOUS; SUBCUTANEOUS
Status: DISCONTINUED | OUTPATIENT
Start: 2023-05-08 | End: 2023-05-08 | Stop reason: HOSPADM

## 2023-05-08 RX ORDER — OXYCODONE HYDROCHLORIDE 5 MG/1
5 TABLET ORAL EVERY 4 HOURS PRN
Status: DISCONTINUED | OUTPATIENT
Start: 2023-05-08 | End: 2023-05-09 | Stop reason: HOSPADM

## 2023-05-08 RX ORDER — GABAPENTIN 100 MG/1
100 CAPSULE ORAL ONCE
Status: DISCONTINUED | OUTPATIENT
Start: 2023-05-08 | End: 2023-05-08 | Stop reason: HOSPADM

## 2023-05-08 RX ORDER — ROPIVACAINE HYDROCHLORIDE 5 MG/ML
INJECTION, SOLUTION EPIDURAL; INFILTRATION; PERINEURAL PRN
Status: DISCONTINUED | OUTPATIENT
Start: 2023-05-08 | End: 2023-05-08 | Stop reason: SDUPTHER

## 2023-05-08 RX ORDER — ASPIRIN 81 MG/1
81 TABLET ORAL 2 TIMES DAILY
Status: DISCONTINUED | OUTPATIENT
Start: 2023-05-08 | End: 2023-05-09 | Stop reason: HOSPADM

## 2023-05-08 RX ORDER — SODIUM CHLORIDE, SODIUM LACTATE, POTASSIUM CHLORIDE, CALCIUM CHLORIDE 600; 310; 30; 20 MG/100ML; MG/100ML; MG/100ML; MG/100ML
INJECTION, SOLUTION INTRAVENOUS CONTINUOUS PRN
Status: DISCONTINUED | OUTPATIENT
Start: 2023-05-08 | End: 2023-05-08 | Stop reason: SDUPTHER

## 2023-05-08 RX ORDER — SODIUM CHLORIDE, SODIUM LACTATE, POTASSIUM CHLORIDE, CALCIUM CHLORIDE 600; 310; 30; 20 MG/100ML; MG/100ML; MG/100ML; MG/100ML
INJECTION, SOLUTION INTRAVENOUS
Status: DISPENSED
Start: 2023-05-08 | End: 2023-05-08

## 2023-05-08 RX ADMIN — KETOROLAC TROMETHAMINE 15 MG: 15 INJECTION, SOLUTION INTRAMUSCULAR; INTRAVENOUS at 18:06

## 2023-05-08 RX ADMIN — ASPIRIN 81 MG: 81 TABLET, COATED ORAL at 21:00

## 2023-05-08 RX ADMIN — ACETAMINOPHEN 650 MG: 325 TABLET ORAL at 18:06

## 2023-05-08 RX ADMIN — TRANEXAMIC ACID 1000 MG: 100 INJECTION, SOLUTION INTRAVENOUS at 07:58

## 2023-05-08 RX ADMIN — SODIUM CHLORIDE, POTASSIUM CHLORIDE, SODIUM LACTATE AND CALCIUM CHLORIDE: 600; 310; 30; 20 INJECTION, SOLUTION INTRAVENOUS at 10:05

## 2023-05-08 RX ADMIN — ACETAMINOPHEN 650 MG: 325 TABLET ORAL at 11:59

## 2023-05-08 RX ADMIN — LEVOTHYROXINE SODIUM 50 MCG: 0.03 TABLET ORAL at 14:21

## 2023-05-08 RX ADMIN — PROPOFOL 50 MG: 10 INJECTION, EMULSION INTRAVENOUS at 07:50

## 2023-05-08 RX ADMIN — ROPIVACAINE HYDROCHLORIDE 15 ML: 5 INJECTION, SOLUTION EPIDURAL; INFILTRATION; PERINEURAL at 07:15

## 2023-05-08 RX ADMIN — CEFAZOLIN 2000 MG: 2 INJECTION, POWDER, FOR SOLUTION INTRAMUSCULAR; INTRAVENOUS at 14:21

## 2023-05-08 RX ADMIN — BUPIVACAINE HYDROCHLORIDE IN DEXTROSE 1.4 ML: 7.5 INJECTION, SOLUTION SUBARACHNOID at 07:47

## 2023-05-08 RX ADMIN — PRAVASTATIN SODIUM 20 MG: 40 TABLET ORAL at 21:00

## 2023-05-08 RX ADMIN — SENNOSIDES AND DOCUSATE SODIUM 1 TABLET: 50; 8.6 TABLET ORAL at 11:59

## 2023-05-08 RX ADMIN — ROPIVACAINE HYDROCHLORIDE 15 ML: 5 INJECTION, SOLUTION EPIDURAL; INFILTRATION; PERINEURAL at 07:20

## 2023-05-08 RX ADMIN — KETOROLAC TROMETHAMINE 15 MG: 15 INJECTION, SOLUTION INTRAMUSCULAR; INTRAVENOUS at 11:58

## 2023-05-08 RX ADMIN — SENNOSIDES AND DOCUSATE SODIUM 1 TABLET: 50; 8.6 TABLET ORAL at 21:00

## 2023-05-08 RX ADMIN — TRANEXAMIC ACID 1000 MG: 100 INJECTION, SOLUTION INTRAVENOUS at 08:46

## 2023-05-08 RX ADMIN — CELECOXIB 200 MG: 100 CAPSULE ORAL at 06:49

## 2023-05-08 RX ADMIN — OXYCODONE HYDROCHLORIDE 10 MG: 5 TABLET ORAL at 20:59

## 2023-05-08 RX ADMIN — SODIUM CHLORIDE, POTASSIUM CHLORIDE, SODIUM LACTATE AND CALCIUM CHLORIDE: 600; 310; 30; 20 INJECTION, SOLUTION INTRAVENOUS at 07:02

## 2023-05-08 RX ADMIN — CEFAZOLIN 2000 MG: 1 INJECTION, POWDER, FOR SOLUTION INTRAMUSCULAR; INTRAVENOUS at 07:50

## 2023-05-08 RX ADMIN — PROPOFOL 100 MCG/KG/MIN: 10 INJECTION, EMULSION INTRAVENOUS at 07:51

## 2023-05-08 RX ADMIN — ACETAMINOPHEN 1000 MG: 500 TABLET, FILM COATED ORAL at 06:49

## 2023-05-08 RX ADMIN — MULTIVITAMIN TABLET 1 TABLET: TABLET at 14:21

## 2023-05-08 RX ADMIN — OXYCODONE HYDROCHLORIDE 10 MG: 10 TABLET, FILM COATED, EXTENDED RELEASE ORAL at 06:49

## 2023-05-08 RX ADMIN — MIDAZOLAM HYDROCHLORIDE 2 MG: 2 INJECTION, SOLUTION INTRAMUSCULAR; INTRAVENOUS at 07:12

## 2023-05-08 RX ADMIN — SODIUM CHLORIDE, POTASSIUM CHLORIDE, SODIUM LACTATE AND CALCIUM CHLORIDE: 600; 310; 30; 20 INJECTION, SOLUTION INTRAVENOUS at 08:25

## 2023-05-08 RX ADMIN — SODIUM CHLORIDE, POTASSIUM CHLORIDE, SODIUM LACTATE AND CALCIUM CHLORIDE: 600; 310; 30; 20 INJECTION, SOLUTION INTRAVENOUS at 07:37

## 2023-05-08 ASSESSMENT — PAIN SCALES - GENERAL
PAINLEVEL_OUTOF10: 0
PAINLEVEL_OUTOF10: 3
PAINLEVEL_OUTOF10: 0
PAINLEVEL_OUTOF10: 0
PAINLEVEL_OUTOF10: 3
PAINLEVEL_OUTOF10: 0
PAINLEVEL_OUTOF10: 0
PAINLEVEL_OUTOF10: 3
PAINLEVEL_OUTOF10: 8

## 2023-05-08 ASSESSMENT — PAIN DESCRIPTION - ORIENTATION
ORIENTATION: LEFT

## 2023-05-08 ASSESSMENT — PAIN DESCRIPTION - LOCATION
LOCATION: KNEE

## 2023-05-08 ASSESSMENT — PAIN DESCRIPTION - DESCRIPTORS
DESCRIPTORS: THROBBING
DESCRIPTORS: ACHING

## 2023-05-08 ASSESSMENT — PAIN - FUNCTIONAL ASSESSMENT: PAIN_FUNCTIONAL_ASSESSMENT: 0-10

## 2023-05-08 NOTE — ANESTHESIA PROCEDURE NOTES
Peripheral Block    Patient location during procedure: pre-op  Reason for block: post-op pain management and at surgeon's request  Start time: 5/8/2023 7:13 AM  End time: 5/8/2023 7:16 AM  Staffing  Performed: anesthesiologist   Anesthesiologist: Ernesto Mccain MD  Preanesthetic Checklist  Completed: patient identified, IV checked, site marked, risks and benefits discussed, surgical/procedural consents, equipment checked, pre-op evaluation, timeout performed, anesthesia consent given, oxygen available and monitors applied/VS acknowledged  Peripheral Block   Patient position: supine  Prep: ChloraPrep  Provider prep: mask and sterile gloves (Sterile probe cover)  Patient monitoring: cardiac monitor, continuous pulse ox, frequent blood pressure checks and IV access  Block type: Saphenous  Laterality: left  Injection technique: single-shot  Guidance: ultrasound guided  Local infiltration: ropivacaine  Infiltration strength: 0.5 %  Local infiltration: ropivacaine  Dose: 15 mL    Needle   Needle type: combined needle/nerve stimulator   Needle gauge: 21 G  Needle localization: anatomical landmarks and ultrasound guidance  Needle length: 10 cm  Assessment   Injection assessment: negative aspiration for heme, no paresthesia on injection and local visualized surrounding nerve on ultrasound  Paresthesia pain: immediately resolved  Slow fractionated injection: yes  Hemodynamics: stable  Real-time US image taken/store: yes    Additional Notes  Ultrasound image printed and saved in patient chart.     Sterile probe cover used

## 2023-05-08 NOTE — OP NOTE
Operative Note      Patient: Heather Valera  YOB: 1950  MRN: [id number]    Date of Procedure: 5/8/2023    Pre-Op Diagnosis Codes:     * Osteoarthritis of left knee [M17.12]    Post-Op Diagnosis: Same       Procedure(s):  Left total knee arthroplasty,[place] Tri[employer]lon Total Knee System,Choice with adductor canal block preop with Brennan Han 4/20/23 at 10:30 am and lab work for 4/14/23 at 1:00 pm    Surgeon(s):  Chris Clarke MD    Assistant:   Physician Assistant: Fredi Garcia PA-C    Anesthesia: Choice    Estimated Blood Loss (mL): less than 786     Complications: None    Specimens:   * No specimens in log *    Implants:  Implant Name Type Inv. Item Serial No.  Lot No. LRB No. Used Action   COMPONENT PAT [id number] SUPERIOR/INFERIOR KNEE - [id number]  COMPONENT PAT [id number] SUPERIOR/INFERIOR KNEE  [employer] [id number] Left 1 Implanted   COMPONENT FEM SZ 4 L KNEE CRUCE RET CEMENTLESS BEAD W/ ZACHARIAH - [id number]  COMPONENT FEM SZ 4 L KNEE CRUCE RET CEMENTLESS BEAD W/ ZACHARIAH  [employer] [id number] Left 1 Implanted   INSERT TIB BEAR SZ 4 [id number] KNEE X3 CNDYL STABILIZING - [id number]  INSERT TIB BEAR SZ 4 [id number] KNEE X3 [employer]ND[employer]ABIL[employer]ING  [employer] [id number] Left 1 Implanted   BASEPLATE TIB SZ 4 [id number] ML70MM KNEE TRITANIUM 4 CRUCFRM - [id number]  BASEPLATE TIB SZ 4 [id number] ML70MM KNEE TRITANIUM 4 CRUCFRM  [employer] [id number] Left 1 Implanted         Drains: * No LDAs found *    Findings: OA left knee      Detailed Description of Procedure:   Patient was brought to the operating room. After adequate induction of anesthesia by anesthesiology, the patient was placed supine on the operating table. Tourniquet was applied to the left upper thigh. The left lower extremity was prepped and draped in sterile fashion with a ChloraPrep scrub. The limb was exsanguinated tourniquet was insufflated.   Midline approach to the knee was

## 2023-05-08 NOTE — ANESTHESIA PRE PROCEDURE
CHEW Take 1,000 Units by mouth daily    Historical Provider, MD   turmeric 500 MG CAPS     Historical Provider, MD   citalopram (CELEXA) 20 MG tablet Take 1 tablet by mouth once daily  Patient taking differently: Take 1 tablet by mouth daily Indications: Depression Give with 10mg tab to =30mg 12/2/22   OMAR Murray   pravastatin (PRAVACHOL) 20 MG tablet Take 1 tablet by mouth once daily  Patient taking differently: Take 1 tablet by mouth nightly Indications: High Amount of Fats in the Blood 12/2/22   [facility]   UNABLE TO FIND Take 1 tablet by mouth daily [facility] 11/17/15   Historical Provider, MD Morfin Crate Oil 500 MG CAPS Take 1 capsule by mouth daily Indications: Nutritional Support    Historical Provider, MD   UNABLE TO FIND Take 1 capsule by mouth daily Tumeric root extract    Historical Provider, MD   Pediatric Multivitamins-Fl (MULTI VIT/FL PO) Take 1 capsule by mouth daily    Historical Provider, MD       Current medications:    Current Facility-Administered Medications   Medication Dose Route Frequency Provider Last Rate Last Admin    gabapentin (NEURONTIN) capsule 100 mg  100 mg Oral Once Adjondi Vear Loveless, APRN - CNP        scopolamine (TRANSDERM-SCOP) transdermal patch 1 patch  1 patch TransDERmal Once Adjondi Vear Loveless, APRN - CNP   1 patch at 05/08/23 1395    tranexamic acid (CYKLOKAPRON) 1,000 mg in sodium chloride 0.9 % 60 mL IVPB  1,000 mg IntraVENous Once Adjondi Vear Loveless, APRN - CNP        tranexamic acid (CYKLOKAPRON) 1,000 mg in sodium chloride 0.9 % 60 mL IVPB  1,000 mg IntraVENous Once Rusty Nelson, APRN - CNP        sodium chloride flush 0.9 % injection 5-40 mL  5-40 mL IntraVENous 2 times per day Merrilee Holstein, PA        sodium chloride flush 0.9 % injection 5-40 mL  5-40 mL IntraVENous PRN OMAR Hernandez        0.9 % sodium chloride infusion   IntraVENous PRN Vola OMAR Henson        lactated ringers IV soln infusion

## 2023-05-08 NOTE — H&P
The history and physical in the electronic medical record dated 4/20/23 was personally reviewed. There are no interval changes that need to be made. Plan is to proceed with a left total knee as scheduled. The patient is opted to proceed with a knee replacement surgery. I brought the model in, and we sat down and talked about surgery. We talked about the recovery. I stressed the importance of physical therapy and active participation in physical therapy to the patient in order to achieve a satisfactory postoperative outcome. We went over the risks of surgery. Risks include, but are not limited to, infection, neurovascular injury, hematoma formation, wound healing complications, blood clot, persistent postoperative pain, ligament injury, and risks of anesthesia. The patient expressed understanding and wishes to proceed with a total knee replacement as above.

## 2023-05-08 NOTE — CARE COORDINATION
BSBS Medicare HMO. Benefits obtained and clinicals faxed to [facility] for review for appropriateness, for admission to [facility].

## 2023-05-08 NOTE — CONSULTS
Hospital Medicine  History and Physical    Patient:  Donnell Mackenzie  MRN: [id number]    CHIEF COMPLAINT:  No chief complaint on file. History Obtained From:  Patient, EMR  Primary Care Physician: OMAR Christianson    HISTORY OF PRESENT ILLNESS:   The patient is a 74 y.o. female with PMH of anxiety, depression, incontinence and hypothyroidism. Patient had left knee arthroplasty by Dr. Carie Oliver for osteoarthritis. Medical consult was requested postoperatively. Patient denies any chest or abdominal pain. No nausea or vomiting. No headaches, loss of consciousness or seizure. Past Medical History:      Diagnosis Date    Depression     Hyperlipidemia     Protruded lumbar disc     L4/L5    Stress     Vitamin D deficiency        Past Surgical History:      Procedure Laterality Date    ANKLE SURGERY  2001    FOOT SURGERY  2001    HIP SURGERY Right 2/2/2023    RIGHT HIP PINNING AND LEFT KNEE STERIOD INJECTION performed by Neli Durán MD at [place] [address]  07-       Medications Prior to Admission:    Prior to Admission medications    Medication Sig Start Date End Date Taking? Authorizing Provider   chlorhexidine (HIBICLENS) 4 % external liquid Apply topically daily for 3 days prior to surgery. 4/20/23   OMAR Sawant   oxybutynin (DITROPAN XL) 10 MG extended release tablet Take 1 tablet by mouth daily 4/6/23   OMAR Christianson   Handicap Placard MISC by Does not apply route 3/30/2023-3/30/2025 3/30/23   Neli Durán MD   meloxicam (MOBIC) 15 MG tablet Take 1 tablet by mouth daily Indications: Inflammation    Historical Provider, MD   Multiple Vitamin (MULTIVITAMIN ADULT PO) Take 1 tablet by mouth Daily Indications: Nutritional Support    Historical Provider, MD   acetaminophen (TYLENOL) 325 MG tablet Take 2 tablets by mouth in the morning and 2 tablets at noon and 2 tablets in the evening and 2 tablets before bedtime. Do all this for 14 days.  2/3/23 2/17/23  Rojelio Shelby

## 2023-05-08 NOTE — ANESTHESIA PROCEDURE NOTES
Peripheral Block    Patient location during procedure: pre-op  Reason for block: post-op pain management and at surgeon's request  Start time: 5/8/2023 7:17 AM  End time: 5/8/2023 7:20 AM  Staffing  Performed: anesthesiologist   Anesthesiologist: Betty Titus MD  Preanesthetic Checklist  Completed: patient identified, IV checked, site marked, risks and benefits discussed, surgical/procedural consents, equipment checked, pre-op evaluation, timeout performed, anesthesia consent given, oxygen available and monitors applied/VS acknowledged  Peripheral Block   Patient position: supine  Prep: ChloraPrep  Provider prep: mask and sterile gloves (Sterile probe cover)  Patient monitoring: cardiac monitor, continuous pulse ox, frequent blood pressure checks and IV access  Block type: iPacks  Laterality: left  Injection technique: single-shot  Guidance: ultrasound guided  Local infiltration: ropivacaine  Infiltration strength: 0.5 %  Local infiltration: ropivacaine  Dose: 15 mL    Needle   Needle type: combined needle/nerve stimulator   Needle gauge: 21 G  Needle localization: anatomical landmarks and ultrasound guidance  Needle length: 10 cm  Assessment   Injection assessment: negative aspiration for heme, no paresthesia on injection and local visualized surrounding nerve on ultrasound  Paresthesia pain: immediately resolved  Slow fractionated injection: yes  Hemodynamics: stable  Real-time US image taken/store: yes    Additional Notes  Ultrasound image printed and saved in patient chart.     Sterile probe cover used

## 2023-05-08 NOTE — ANESTHESIA POSTPROCEDURE EVALUATION
Department of Anesthesiology  Postprocedure Note    Patient: Iraj Barry  MRN: [id number]  YOB: 1950  Date of evaluation: 5/8/2023      Procedure Summary     Date: 05/08/23 Room / Location: [address]    Anesthesia Start: 8575 Anesthesia Stop: 8814    Procedure: Left total knee arthroplasty,[place] Triathlon Total Knee System,Choice with adductor canal block preop with Nimesh Real 4/20/23 at 10:30 am and lab work for 4/14/23 at 1:00 pm (Left: Knee) Diagnosis:       Osteoarthritis of left knee      (Osteoarthritis of left knee [M17.12])    Surgeons: Cheyanne Brasher MD Responsible Provider: Lindsay Conner MD    Anesthesia Type: MAC, regional, spinal ASA Status: 2          Anesthesia Type: No value filed.     Bc Phase I: Bc Score: 10    Bc Phase II:        Anesthesia Post Evaluation    Patient location during evaluation: PACU  Patient participation: complete - patient participated  Level of consciousness: awake and alert  Pain score: 0  Airway patency: patent  Nausea & Vomiting: no nausea and no vomiting  Complications: no  Cardiovascular status: hemodynamically stable  Respiratory status: nasal cannula  Hydration status: stable  Multimodal analgesia pain management approach

## 2023-05-08 NOTE — ANESTHESIA PROCEDURE NOTES
Spinal Block    Patient location during procedure: OR  End time: 5/8/2023 7:47 AM  Reason for block: primary anesthetic  Staffing  Performed: anesthesiologist   Anesthesiologist: Ascencion Caruso MD  Spinal Block  Patient position: sitting  Prep: Betadine  Patient monitoring: cardiac monitor, continuous pulse ox and frequent blood pressure checks  Approach: midline  Location: L3/L4  Provider prep: mask and sterile gloves  Local infiltration: lidocaine  Needle  Needle type: Pencan   Needle gauge: 24 G  Needle length: 3.5 in  Assessment  Sensory level: T6  Events: cerebrospinal fluid  Lysis level: CSF aspirated. CSF: clear  Attempts: 2  Hemodynamics: stable  Additional Notes  Free flowing CSF. Negative heme. Negative paresthesia.   .  Preanesthetic Checklist  Completed: patient identified, IV checked, site marked, risks and benefits discussed, surgical/procedural consents, equipment checked, pre-op evaluation, timeout performed, anesthesia consent given, oxygen available and monitors applied/VS acknowledged

## 2023-05-09 ENCOUNTER — HOSPITAL ENCOUNTER (INPATIENT)
Age: 73
LOS: 9 days | Discharge: HOME HEALTH | DRG: 561 | End: 2023-05-18
Attending: INTERNAL MEDICINE | Admitting: INTERNAL MEDICINE
Payer: MEDICARE

## 2023-05-09 VITALS
DIASTOLIC BLOOD PRESSURE: 59 MMHG | TEMPERATURE: 97.9 F | BODY MASS INDEX: 34.31 KG/M2 | HEIGHT: 64 IN | OXYGEN SATURATION: 95 % | HEART RATE: 71 BPM | WEIGHT: 201 LBS | SYSTOLIC BLOOD PRESSURE: 162 MMHG | RESPIRATION RATE: 18 BRPM

## 2023-05-09 DIAGNOSIS — Z96.652 STATUS POST LEFT KNEE REPLACEMENT: Primary | ICD-10-CM

## 2023-05-09 LAB
ANION GAP SERPL CALCULATED.3IONS-SCNC: 6 MEQ/L (ref 9–15)
BUN SERPL-MCNC: 24 MG/DL (ref 8–23)
CALCIUM SERPL-MCNC: 8.2 MG/DL (ref 8.5–9.9)
CHLORIDE SERPL-SCNC: 104 MEQ/L (ref 95–107)
CO2 SERPL-SCNC: 28 MEQ/L (ref 20–31)
CREAT SERPL-MCNC: 0.99 MG/DL (ref 0.5–0.9)
ERYTHROCYTE [DISTWIDTH] IN BLOOD BY AUTOMATED COUNT: 13.2 % (ref 11.7–14.4)
GLUCOSE SERPL-MCNC: 115 MG/DL (ref 70–99)
HCT VFR BLD AUTO: 30.7 % (ref 37–47)
HGB BLD-MCNC: 9.8 G/DL (ref 11.2–15.7)
MCH RBC QN AUTO: 29.9 PG (ref 25.6–32.2)
MCHC RBC AUTO-ENTMCNC: 31.9 % (ref 32.2–35.5)
MCV RBC AUTO: 93.6 FL (ref 79.4–94.8)
PLATELET # BLD AUTO: 209 K/UL (ref 182–369)
POTASSIUM SERPL-SCNC: 4.5 MEQ/L (ref 3.4–4.9)
RBC # BLD AUTO: 3.28 M/UL (ref 3.93–5.22)
SODIUM SERPL-SCNC: 138 MEQ/L (ref 135–144)
WBC # BLD AUTO: 8 K/UL (ref 4–10)

## 2023-05-09 PROCEDURE — 36415 COLL VENOUS BLD VENIPUNCTURE: CPT

## 2023-05-09 PROCEDURE — 6370000000 HC RX 637 (ALT 250 FOR IP): Performed by: INTERNAL MEDICINE

## 2023-05-09 PROCEDURE — G0378 HOSPITAL OBSERVATION PER HR: HCPCS

## 2023-05-09 PROCEDURE — 97530 THERAPEUTIC ACTIVITIES: CPT

## 2023-05-09 PROCEDURE — 6370000000 HC RX 637 (ALT 250 FOR IP): Performed by: NURSE PRACTITIONER

## 2023-05-09 PROCEDURE — 80048 BASIC METABOLIC PNL TOTAL CA: CPT

## 2023-05-09 PROCEDURE — 97535 SELF CARE MNGMENT TRAINING: CPT

## 2023-05-09 PROCEDURE — 2580000003 HC RX 258: Performed by: NURSE PRACTITIONER

## 2023-05-09 PROCEDURE — 97116 GAIT TRAINING THERAPY: CPT

## 2023-05-09 PROCEDURE — 97110 THERAPEUTIC EXERCISES: CPT

## 2023-05-09 PROCEDURE — 85027 COMPLETE CBC AUTOMATED: CPT

## 2023-05-09 PROCEDURE — 1200000002 HC SEMI PRIVATE SWING BED

## 2023-05-09 PROCEDURE — 6360000002 HC RX W HCPCS: Performed by: NURSE PRACTITIONER

## 2023-05-09 RX ORDER — CITALOPRAM 20 MG/1
30 TABLET ORAL DAILY
Status: CANCELLED | OUTPATIENT
Start: 2023-05-10

## 2023-05-09 RX ORDER — DOCUSATE SODIUM 100 MG/1
100 CAPSULE, LIQUID FILLED ORAL 2 TIMES DAILY
Qty: 60 CAPSULE | Refills: 0 | Status: ON HOLD | OUTPATIENT
Start: 2023-05-09 | End: 2023-06-08

## 2023-05-09 RX ORDER — LISINOPRIL 5 MG/1
5 TABLET ORAL DAILY
Status: DISCONTINUED | OUTPATIENT
Start: 2023-05-09 | End: 2023-05-09 | Stop reason: HOSPADM

## 2023-05-09 RX ORDER — MELOXICAM 15 MG/1
15 TABLET ORAL DAILY
Qty: 30 TABLET | Refills: 1 | Status: ON HOLD | OUTPATIENT
Start: 2023-05-09 | End: 2023-06-08

## 2023-05-09 RX ORDER — LISINOPRIL 5 MG/1
5 TABLET ORAL DAILY
Status: DISCONTINUED | OUTPATIENT
Start: 2023-05-10 | End: 2023-05-10

## 2023-05-09 RX ORDER — TIZANIDINE 2 MG/1
4 TABLET ORAL EVERY 6 HOURS PRN
Status: DISCONTINUED | OUTPATIENT
Start: 2023-05-09 | End: 2023-05-18 | Stop reason: HOSPADM

## 2023-05-09 RX ORDER — POLYETHYLENE GLYCOL 3350 17 G/17G
17 POWDER, FOR SOLUTION ORAL DAILY PRN
Status: DISCONTINUED | OUTPATIENT
Start: 2023-05-09 | End: 2023-05-18 | Stop reason: HOSPADM

## 2023-05-09 RX ORDER — PRAVASTATIN SODIUM 40 MG
20 TABLET ORAL NIGHTLY
Status: DISCONTINUED | OUTPATIENT
Start: 2023-05-09 | End: 2023-05-18 | Stop reason: HOSPADM

## 2023-05-09 RX ORDER — OXYCODONE HYDROCHLORIDE 5 MG/1
5 TABLET ORAL EVERY 4 HOURS PRN
Status: CANCELLED | OUTPATIENT
Start: 2023-05-09

## 2023-05-09 RX ORDER — ACETAMINOPHEN 325 MG/1
650 TABLET ORAL EVERY 6 HOURS
Status: CANCELLED | OUTPATIENT
Start: 2023-05-09

## 2023-05-09 RX ORDER — OXYCODONE HYDROCHLORIDE 5 MG/1
5 TABLET ORAL EVERY 4 HOURS PRN
Qty: 28 TABLET | Refills: 0 | Status: ON HOLD | OUTPATIENT
Start: 2023-05-09 | End: 2023-05-16

## 2023-05-09 RX ORDER — SODIUM CHLORIDE 0.9 % (FLUSH) 0.9 %
5-40 SYRINGE (ML) INJECTION PRN
Status: DISCONTINUED | OUTPATIENT
Start: 2023-05-09 | End: 2023-05-18 | Stop reason: HOSPADM

## 2023-05-09 RX ORDER — LEVOTHYROXINE SODIUM 0.03 MG/1
50 TABLET ORAL DAILY
Status: CANCELLED | OUTPATIENT
Start: 2023-05-10

## 2023-05-09 RX ORDER — FERROUS SULFATE 325(65) MG
325 TABLET ORAL 2 TIMES DAILY WITH MEALS
Status: CANCELLED | OUTPATIENT
Start: 2023-05-09

## 2023-05-09 RX ORDER — TIZANIDINE 2 MG/1
4 TABLET ORAL EVERY 6 HOURS PRN
Status: CANCELLED | OUTPATIENT
Start: 2023-05-09

## 2023-05-09 RX ORDER — ASPIRIN 81 MG/1
81 TABLET ORAL 2 TIMES DAILY
Status: DISCONTINUED | OUTPATIENT
Start: 2023-05-09 | End: 2023-05-18 | Stop reason: HOSPADM

## 2023-05-09 RX ORDER — LEVOTHYROXINE SODIUM 0.03 MG/1
50 TABLET ORAL DAILY
Status: DISCONTINUED | OUTPATIENT
Start: 2023-05-10 | End: 2023-05-18 | Stop reason: HOSPADM

## 2023-05-09 RX ORDER — MULTIVITAMIN WITH IRON
1 TABLET ORAL DAILY
Status: CANCELLED | OUTPATIENT
Start: 2023-05-10

## 2023-05-09 RX ORDER — OXYCODONE HYDROCHLORIDE 5 MG/1
10 TABLET ORAL EVERY 4 HOURS PRN
Status: DISCONTINUED | OUTPATIENT
Start: 2023-05-09 | End: 2023-05-18 | Stop reason: HOSPADM

## 2023-05-09 RX ORDER — CITALOPRAM 20 MG/1
30 TABLET ORAL DAILY
Status: DISCONTINUED | OUTPATIENT
Start: 2023-05-10 | End: 2023-05-18 | Stop reason: HOSPADM

## 2023-05-09 RX ORDER — OXYCODONE HYDROCHLORIDE 5 MG/1
5 TABLET ORAL EVERY 4 HOURS PRN
Status: DISCONTINUED | OUTPATIENT
Start: 2023-05-09 | End: 2023-05-18 | Stop reason: HOSPADM

## 2023-05-09 RX ORDER — ASPIRIN 81 MG/1
81 TABLET ORAL 2 TIMES DAILY
Qty: 56 TABLET | Refills: 3 | Status: ON HOLD | OUTPATIENT
Start: 2023-05-09

## 2023-05-09 RX ORDER — POLYETHYLENE GLYCOL 3350 17 G/17G
17 POWDER, FOR SOLUTION ORAL DAILY PRN
Status: CANCELLED | OUTPATIENT
Start: 2023-05-09

## 2023-05-09 RX ORDER — FERROUS SULFATE 325(65) MG
325 TABLET ORAL 2 TIMES DAILY WITH MEALS
Status: DISCONTINUED | OUTPATIENT
Start: 2023-05-09 | End: 2023-05-09 | Stop reason: HOSPADM

## 2023-05-09 RX ORDER — PRAVASTATIN SODIUM 40 MG
20 TABLET ORAL NIGHTLY
Status: CANCELLED | OUTPATIENT
Start: 2023-05-09

## 2023-05-09 RX ORDER — SENNA AND DOCUSATE SODIUM 50; 8.6 MG/1; MG/1
1 TABLET, FILM COATED ORAL 2 TIMES DAILY
Status: DISCONTINUED | OUTPATIENT
Start: 2023-05-09 | End: 2023-05-18 | Stop reason: HOSPADM

## 2023-05-09 RX ORDER — SODIUM CHLORIDE 0.9 % (FLUSH) 0.9 %
5-40 SYRINGE (ML) INJECTION PRN
Status: CANCELLED | OUTPATIENT
Start: 2023-05-09

## 2023-05-09 RX ORDER — ACETAMINOPHEN 500 MG
1000 TABLET ORAL 3 TIMES DAILY
Qty: 42 TABLET | Refills: 1 | Status: ON HOLD | OUTPATIENT
Start: 2023-05-09

## 2023-05-09 RX ORDER — MULTIVITAMIN WITH IRON
1 TABLET ORAL DAILY
Status: DISCONTINUED | OUTPATIENT
Start: 2023-05-10 | End: 2023-05-18 | Stop reason: HOSPADM

## 2023-05-09 RX ORDER — FERROUS SULFATE 325(65) MG
325 TABLET ORAL 2 TIMES DAILY WITH MEALS
Status: DISCONTINUED | OUTPATIENT
Start: 2023-05-10 | End: 2023-05-18 | Stop reason: HOSPADM

## 2023-05-09 RX ORDER — SENNA AND DOCUSATE SODIUM 50; 8.6 MG/1; MG/1
1 TABLET, FILM COATED ORAL 2 TIMES DAILY
Status: CANCELLED | OUTPATIENT
Start: 2023-05-09

## 2023-05-09 RX ORDER — ASPIRIN 81 MG/1
81 TABLET ORAL 2 TIMES DAILY
Status: CANCELLED | OUTPATIENT
Start: 2023-05-09

## 2023-05-09 RX ORDER — LISINOPRIL 5 MG/1
5 TABLET ORAL DAILY
Status: CANCELLED | OUTPATIENT
Start: 2023-05-09

## 2023-05-09 RX ORDER — ACETAMINOPHEN 325 MG/1
650 TABLET ORAL EVERY 6 HOURS
Status: DISCONTINUED | OUTPATIENT
Start: 2023-05-09 | End: 2023-05-10

## 2023-05-09 RX ORDER — OXYCODONE HYDROCHLORIDE 5 MG/1
10 TABLET ORAL EVERY 4 HOURS PRN
Status: CANCELLED | OUTPATIENT
Start: 2023-05-09

## 2023-05-09 RX ADMIN — KETOROLAC TROMETHAMINE 15 MG: 15 INJECTION, SOLUTION INTRAMUSCULAR; INTRAVENOUS at 00:14

## 2023-05-09 RX ADMIN — OXYCODONE HYDROCHLORIDE 5 MG: 5 TABLET ORAL at 22:50

## 2023-05-09 RX ADMIN — ACETAMINOPHEN 650 MG: 325 TABLET ORAL at 00:14

## 2023-05-09 RX ADMIN — ASPIRIN 81 MG: 81 TABLET, COATED ORAL at 20:18

## 2023-05-09 RX ADMIN — SENNOSIDES AND DOCUSATE SODIUM 1 TABLET: 50; 8.6 TABLET ORAL at 20:17

## 2023-05-09 RX ADMIN — OXYCODONE HYDROCHLORIDE 10 MG: 5 TABLET ORAL at 09:10

## 2023-05-09 RX ADMIN — SENNOSIDES AND DOCUSATE SODIUM 1 TABLET: 50; 8.6 TABLET ORAL at 09:05

## 2023-05-09 RX ADMIN — ASPIRIN 81 MG: 81 TABLET, COATED ORAL at 09:05

## 2023-05-09 RX ADMIN — ACETAMINOPHEN 650 MG: 325 TABLET ORAL at 13:18

## 2023-05-09 RX ADMIN — OXYCODONE 10 MG: 5 TABLET ORAL at 17:34

## 2023-05-09 RX ADMIN — TIZANIDINE 4 MG: 2 TABLET ORAL at 09:05

## 2023-05-09 RX ADMIN — CITALOPRAM HYDROBROMIDE 30 MG: 20 TABLET ORAL at 09:05

## 2023-05-09 RX ADMIN — MULTIVITAMIN TABLET 1 TABLET: TABLET at 09:05

## 2023-05-09 RX ADMIN — OXYCODONE HYDROCHLORIDE 10 MG: 5 TABLET ORAL at 05:20

## 2023-05-09 RX ADMIN — OXYCODONE HYDROCHLORIDE 10 MG: 5 TABLET ORAL at 13:22

## 2023-05-09 RX ADMIN — LISINOPRIL 5 MG: 5 TABLET ORAL at 13:18

## 2023-05-09 RX ADMIN — PRAVASTATIN SODIUM 20 MG: 40 TABLET ORAL at 20:17

## 2023-05-09 RX ADMIN — LEVOTHYROXINE SODIUM 50 MCG: 0.03 TABLET ORAL at 09:05

## 2023-05-09 RX ADMIN — ACETAMINOPHEN 650 MG: 325 TABLET ORAL at 20:18

## 2023-05-09 RX ADMIN — FERROUS SULFATE TAB 325 MG (65 MG ELEMENTAL FE) 325 MG: 325 (65 FE) TAB at 13:17

## 2023-05-09 RX ADMIN — SODIUM CHLORIDE, POTASSIUM CHLORIDE, SODIUM LACTATE AND CALCIUM CHLORIDE: 600; 310; 30; 20 INJECTION, SOLUTION INTRAVENOUS at 05:18

## 2023-05-09 RX ADMIN — ACETAMINOPHEN 650 MG: 325 TABLET ORAL at 05:21

## 2023-05-09 RX ADMIN — CEFAZOLIN 2000 MG: 2 INJECTION, POWDER, FOR SOLUTION INTRAMUSCULAR; INTRAVENOUS at 00:18

## 2023-05-09 ASSESSMENT — PAIN SCALES - GENERAL
PAINLEVEL_OUTOF10: 9
PAINLEVEL_OUTOF10: 10

## 2023-05-09 ASSESSMENT — PAIN DESCRIPTION - ORIENTATION
ORIENTATION: LEFT

## 2023-05-09 ASSESSMENT — PAIN DESCRIPTION - DESCRIPTORS
DESCRIPTORS: ACHING
DESCRIPTORS: THROBBING

## 2023-05-09 ASSESSMENT — PAIN DESCRIPTION - LOCATION
LOCATION: KNEE
LOCATION: KNEE
LOCATION: LEG;KNEE
LOCATION: KNEE

## 2023-05-09 NOTE — PROGRESS NOTES
8651- Pt admitted to [facility], bed 2. Pt transferred via hospital bed by anesthesia and OR nurse. Report received. Pt awake but sleepy. Pt alert and oriented x 3. VSS. 97% on RA. Left LE with +2 pulse, warm to touch, dressing in place. Pt denies pain. Pt able to move toes minimally, no sensation to LE. Pt able to feel at hip area.
Approval received for Skilled INPT stay. Today through 5/15/23 with a NRD of 5/13/23.  Auth # [id number]
Department of Orthopedic Surgery  Joint Service  Attending Progress Note        Subjective:  No complaints    Vitals  VITALS:  BP (!) 162/59   Pulse 71   Temp 97.9 °F (36.6 °C) (Oral)   Resp 18   Ht 5' 4\" (1.626 m)   Wt 201 lb (91.2 kg)   LMP  (LMP Unknown)   SpO2 95%   BMI 34.50 kg/m²     PHYSICAL EXAM:    Orientation:  alert and oriented to person, place and time    Left Lower Extremity    Incision:  dressing in place, clean, dry, and intact    Lower Extremity Motor :   Quadriceps:  5/5  Extensor hallucis:  5/5  Dorsiflexion:  5/5  Plantarflexion:  5/5    Lower Extremity Sensory:  Tibial Nerve:  intact  Superficial Peroneal Nerve:  intact  Deep Peroneal Nerve:  intact    Pulses:    Posterior Tibial:  2  Dorsalis Pedis:  2  Posterior Tibial Artery:  2    Abnormal Exam findings:  none    Brace:  no    LABS:    HgB:    Lab Results   Component Value Date/Time    HGB 9.8 05/09/2023 05:27 AM     INR:  No results found for: PTINR  CBC with Differential:    Lab Results   Component Value Date/Time    WBC 8.0 05/09/2023 05:27 AM    RBC 3.28 05/09/2023 05:27 AM    HGB 9.8 05/09/2023 05:27 AM    HCT 30.7 05/09/2023 05:27 AM     05/09/2023 05:27 AM    MCV 93.6 05/09/2023 05:27 AM    MCH 29.9 05/09/2023 05:27 AM    MCHC 31.9 05/09/2023 05:27 AM    RDW 13.2 05/09/2023 05:27 AM    LYMPHOPCT 13.0 04/14/2023 01:13 PM    MONOPCT 8.8 04/14/2023 01:13 PM    EOSPCT 5.7 02/10/2023 12:00 AM    BASOPCT 0.7 04/14/2023 01:13 PM    MONOSABS 0.6 04/14/2023 01:13 PM    LYMPHSABS 0.9 04/14/2023 01:13 PM    EOSABS 0.1 04/14/2023 01:13 PM    BASOSABS 0.0 04/14/2023 01:13 PM     BMP:    Lab Results   Component Value Date/Time     05/09/2023 05:27 AM    K 4.5 05/09/2023 05:27 AM     05/09/2023 05:27 AM    CO2 28 05/09/2023 05:27 AM    BUN 24 05/09/2023 05:27 AM    LABALBU 4.3 02/01/2023 04:55 PM    CREATININE 0.99 05/09/2023 05:27 AM    CALCIUM 8.2 05/09/2023 05:27 AM    GFRAA >60.0 05/13/2022 10:28 AM    LABGLOM 59.7
Facility/Department: [facility] MED SURG UNIT  Physical Therapy Initial Assessment - OBS post op     Name: Shelby Poon  : 1950  MRN: [id number]  Date of Service: 2023    Discharge Recommendations:  Continue to assess pending progress, Subacute/Skilled Nursing Facility          Patient Diagnosis(es): s/p left total knee replacement   Past Medical History:  has a past medical history of Depression, Hyperlipidemia, Protruded lumbar disc, Stress, and Vitamin D deficiency. Past Surgical History:  has a past surgical history that includes Foot surgery (); Ankle surgery (); sinus surgery (07-); and hip surgery (Right, 2023). Assessment   Body Structures, Functions, Activity Limitations Requiring Skilled Therapeutic Intervention: Decreased functional mobility ; Decreased ADL status; Decreased ROM; Decreased strength;Decreased endurance;Decreased balance; Increased pain  Assessment: Pt is a 75 yo female who had a left total knee replacement on 23. PT completed assessment and walked pt to the restroom. Pt needed CGA/Min A with sit to stand transfers and demonstrated good control with stand to sit transfes. Pt ambulated with a slow and cautious gait patteern. Pt then returned to her recliner and was positioned for comfort. Pt would benefit from PT for 1-2x per day for 5-7 days per week. PT recommends [facility] for continued rehab.   Treatment Diagnosis: s/p left total knee replacement  Therapy Prognosis: Good  Decision Making: Medium Complexity  Requires PT Follow-Up: Yes     Plan   Physcial Therapy Plan  General Plan: 5-7 times per week (QD to BID)  Current Treatment Recommendations: Strengthening, ROM, Balance training, Functional mobility training, Transfer training, Endurance training, Gait training, Stair training, Safety education & training, Home exercise program, Modalities  Additional Comments: CP  Safety Devices  Type of Devices: Gait belt, Patient at risk for falls, Call light within reach,
Facility/Department: [facility] MED SURG UNIT  Daily Treatment Note  NAME: Tawanna Aggarwal  : 1950  MRN: [id number]    Date of Service: 2023    Discharge Recommendations:  Continue to assess pending progress, Subacute/Skilled Nursing Facility        Patient Diagnosis(es): The encounter diagnosis was Status post total knee replacement, left. Assessment   Assessment: Pt. cooperative but demonstrates high pain level limiting her ability to tolerate. She requires frequent cues for hand placements for transfers and encouragment as she does not feel like she is able to complete some tasks today. Pt. up in chair and returned to chair with call light and CP provided to sit up for lunch. Activity Tolerance: Patient limited by pain; Patient limited by endurance; Patient limited by fatigue     Plan    Physcial Therapy Plan  General Plan: 5-7 times per week  Current Treatment Recommendations: Strengthening;ROM;Balance training;Functional mobility training;Transfer training; Endurance training;Gait training;Stair training; Safety education & training;Home exercise program;Modalities  Additional Comments: CP     Restrictions        Subjective    Subjective  Subjective: \"10/10\" in the left knee but better than earlier. Objective   Vitals     Transfer Training  Transfer Training: Yes  Overall Level of Assistance: Minimum assistance;Assist X1  Interventions: Safety awareness training;Verbal cues;Manual cues; Weight shifting training/pressure relief  Sit to Stand: Minimum assistance;Assist X1  Stand to Sit: Minimum assistance;Assist X1  Toilet Transfer: Minimum assistance;Assist X1  Gait Training  Gait Training: Yes  Right Side Weight Bearing: As tolerated  Left Side Weight Bearing: As tolerated  Gait  Overall Level of Assistance: Minimum assistance  Interventions: Manual cues; Safety awareness training; Tactile cues; Weight shifting training/pressure relief  Base of Support: Narrowed  Speed/Mary Jo: Slow  Step Length: Left
Facility/Department: [facility] MED SURG UNIT  Daily Treatment Note  NAME: Charles Rothman  : 1950  MRN: [id number]    Date of Service: 2023    Discharge Recommendations:  Continue to assess pending progress (RACHEL)         Patient Diagnosis(es): The encounter diagnosis was Status post total knee replacement, left. Assessment    Assessment: Pt approached for OT intervention this date, presents seated in chair. Recently finished lunch and agreeable to shower with OT. Rates 10/10 pain in L knee. Pt requires additional time and effort for self care tasks, functional transfers, and functional mobility. Pt requires repeated instruction at times d/t decreased engagement and alertness, RN is aware. Pt admits to \"foggy\" feeling since surgery. Pt requires 2 assist to hike pants over hips for stability and level of assist needed. Pt would benefit from [facility] stay to ensure safety and independence with ADLs. Activity Tolerance: Patient limited by pain; Patient limited by endurance; Patient limited by fatigue  Discharge Recommendations: Continue to assess pending progress (RACHEL)      Plan   Occupational Therapy Plan  Times Per Week: 4-7  Times Per Day: Once a day  Current Treatment Recommendations: Strengthening;Balance training;Functional mobility training; Endurance training; Safety education & training;Modalities; Pain management;Equipment evaluation, education, & procurement;Self-Care / ADL; Home management training;Positioning;Patient/Caregiver education & training     Restrictions   L TKR    Subjective   Subjective  Subjective: \"I'm not much better today. \"  Pain: 10/10 pre/post session  Orientation  Overall Orientation Status: Within Functional Limits  Orientation Level: Oriented X4  Pain: 10/10 pre/post session, RN aware and provided meds  Cognition  Overall Cognitive Status: [facility]  Cognition Comment: Pleasant and cooperative, decreased alertness at times and needs repeated instruction.  Pt reports she feels \"foggy\"
Facility/Department: [facility] MED SURG UNIT  Occupational Therapy Initial Assessment    Name: Charles Rothman  : 1950  MRN: [id number]  Date of Service: 2023    Discharge Recommendations:  Continue to assess pending progress (RACHEL)          Patient Diagnosis(es): There were no encounter diagnoses. Past Medical History:  has a past medical history of Depression, Hyperlipidemia, Protruded lumbar disc, Stress, and Vitamin D deficiency. Past Surgical History:  has a past surgical history that includes Foot surgery (); Ankle surgery (); sinus surgery (07-); and hip surgery (Right, 2023). Treatment Diagnosis: Decreased ADL/IADL status d/t L TKR      Assessment   Performance deficits / Impairments: Decreased functional mobility ; Decreased safe awareness;Decreased balance;Decreased ADL status; Decreased posture;Decreased endurance;Decreased high-level IADLs  Assessment: Pt assessed for skilled OT services following admission to [facility] for elective L TKR with Dr. Cassandra Acevedo on 23. PLOF pt is from home with son who works full time. Pt used a cane or walker at baseline d/t L knee pain, fell and broke hip in January of this year. Has DME/AE and has access to items through [employer]. Pt reports she was completing ADLs at Mod I level and shared household tasks with son. Son takes care of laundry in the basement. Pt drives and is active in [employer]. Upon OT eval, pt presents resting in bed. Alert but felt \"out of it\" from anesthesia. LLE is no longer numb. Pt agreeable to attempt mobility to bathroom using FWW. Pt felt lightheadedness and nauseated and needed additional rest breaks. Pt would benefit from short [facility] stay to ensure safe transition home and independence with ADLs.   Treatment Diagnosis: Decreased ADL/IADL status d/t L TKR  Activity Tolerance  Activity Tolerance: Patient limited by fatigue;Treatment limited secondary to medical complications (free text)  Activity Tolerance Comments: Dizzy and slightly
Following up on a patient. Patient is having pain. Patient is having somewhat difficult time ambulating. The plan is to admit her to skilled care. ROS: 12 system review otherwise is negative for acute signs or symptoms over the last 24 hrs. Exam: General appearance: alert, appears stated age and cooperative, moderate acute distress no acute distress, slightly lethargic. Color, texture, turgor normal. No rashes or lesions  HEENT: Head: Normocephalic, no lesions, without obvious abnormality. Neck: no adenopathy, no carotid bruit, no JVD, supple, symmetrical, trachea midline, and thyroid not enlarged, symmetric, no tenderness/mass/nodules  Lungs: clear to auscultation bilaterally  Heart: regular rate and rhythm, S1, S2 normal, no murmur, click, rub or gallop  Abdomen: soft, non-tender; bowel sounds normal; no masses,  no organomegaly  Extremities: extremities normal, atraumatic, no cyanosis or edema. Left leg is wrapped with a surgical dressing from the mid thigh down to the ankle to be inspected by orthopedic team.  Neurologic: Mental status: Alert, oriented, thought content appropriate       Assessment and plan:     *Status post left knee arthroplasty. All surgical related issues including but not limited to pain management, pharmacological DVT prophylaxis, ambulation instructions, monitoring for wound infection, wound bleed, wound dehiscence and outpatient follow-up are to be addressed by orthopedic team.     *Monitor for potential postoperative issues and/or complications such as delirium, atelectasis, infection, bleeding, ileus. *Depression and anxiety. Resume Celexa. *Hypothyroidism, resume Synthroid. *Urinary incontinence. Resume Ditropan. Monitor for retention. *Hypertension, could be caused by pain and discomfort. I started patient on lisinopril. Continue to monitor and titrate accordingly. *Mild blood loss anemia. I started patient on iron.   Continue to
Physical Therapy  Facility/Department: [facility] MED SURG UNIT  Daily Treatment Note  NAME: Doretha Villela  : 1950  MRN: [id number]    Date of Service: 2023    Discharge Recommendations:  Continue to assess pending progress, Subacute/Skilled Nursing Facility        Patient Diagnosis(es): The encounter diagnosis was Status post total knee replacement, left. Assessment   Assessment: (P) Pt. appears limited by L knee pain and demo's resistance to weightshift on L knee. Pt. requires extra time to complete tsf's and short gait distance from shower chair to recliner. After pt. rest in chair, pt. tolerated limited LE ther ex for ROM and strengthening with second arrival. Pt. lethargic and limited to lknee pain and stiffness reported. Activity Tolerance: (P) Patient limited by pain; Patient limited by endurance; Patient limited by fatigue     Plan    Physcial Therapy Plan  General Plan: (P) 5-7 times per week  Current Treatment Recommendations: (P) Strengthening;ROM;Balance training;Functional mobility training;Transfer training; Endurance training;Gait training;Stair training; Safety education & training;Home exercise program;Modalities  Additional Comments: CP     Restrictions        Subjective    Subjective  Subjective: My pain is 10/10 while in shower needing assistance to ambulate to chair. Pt. R knee pain 8/10 with second arrival while resting in recliner. Pain: 10/10 pre/post session, RN aware and provided meds  Orientation  Overall Orientation Status: Within Functional Limits  Orientation Level: Oriented X4  Cognition  Overall Cognitive Status: WFL  Cognition Comment: Pleasant and cooperative, decreased alertness at times and needs repeated instruction.  Pt reports she feels \"foggy\"     Objective   Vitals     Bed Mobility Training  Bed Mobility Training: No  Balance  Sitting: Intact  Standing: With support (FWW used for standing and mobility tasks CGA)  Transfer Training  Transfer Training: Yes  Overall Level
Pt alert and oriented. Pt call light in reach and bed alarm is on. Pt up with a one assist with a walker. Pt has pain in her left knee from surgery, pt medicated per mar. Pt resting at this time. Will continue to monitor pt.   Electronically signed by Dorina Sánchez RN on 5/8/2023 at 11:12 PM
Pt dressing on left leg taken off. Aquacell dressing is clean, dry and intact. Pt tolerated ace wrap removal well. Pt in pain 10/10. Medicated per mar. Will continue to monitor pt.   Electronically signed by Geetha Peterson RN on 5/9/2023 at 5:40 AM
laying in recliner chair with feet elevated. Patient appears well groomed with short kept hair and wearing hospital attire. Patient is alert and oriented to person, place, and situation. Patient reports residing in [place] with adult son. Patient identifies son as supportive and report that he aids with laundry needs. Patient reports goal is to get stronger before returning home. Reviewed OT recommendations. Patient reports desire to admit to [address] [place] [address] for inpatient skilled therapies. It is worth noting that patient's insurance requires a pre-cert before patient is eligable for inpatient skilled therapies. This  updated [facility] RN House supervisor whom plans to check patient's benefits.   SS to continue to follow and assist with smooth transition to [facility] unit pending insurance approval    Electronically signed by PERLA Mike on 5/8/2023 at 2:32 PM

## 2023-05-10 PROBLEM — Z96.652 STATUS POST LEFT KNEE REPLACEMENT: Status: ACTIVE | Noted: 2023-05-10

## 2023-05-10 PROBLEM — Z96.651 STATUS POST RIGHT KNEE REPLACEMENT: Status: ACTIVE | Noted: 2023-05-10

## 2023-05-10 LAB
ANION GAP SERPL CALCULATED.3IONS-SCNC: 11 MEQ/L (ref 9–15)
BUN SERPL-MCNC: 17 MG/DL (ref 8–23)
CALCIUM SERPL-MCNC: 8.6 MG/DL (ref 8.5–9.9)
CHLORIDE SERPL-SCNC: 103 MEQ/L (ref 95–107)
CO2 SERPL-SCNC: 25 MEQ/L (ref 20–31)
CREAT SERPL-MCNC: 0.76 MG/DL (ref 0.5–0.9)
ERYTHROCYTE [DISTWIDTH] IN BLOOD BY AUTOMATED COUNT: 13.2 % (ref 11.7–14.4)
GLUCOSE SERPL-MCNC: 102 MG/DL (ref 70–99)
HCT VFR BLD AUTO: 31.9 % (ref 37–47)
HGB BLD-MCNC: 10.5 G/DL (ref 11.2–15.7)
MCH RBC QN AUTO: 30.3 PG (ref 25.6–32.2)
MCHC RBC AUTO-ENTMCNC: 32.9 % (ref 32.2–35.5)
MCV RBC AUTO: 92.2 FL (ref 79.4–94.8)
PLATELET # BLD AUTO: 228 K/UL (ref 182–369)
POTASSIUM SERPL-SCNC: 4.3 MEQ/L (ref 3.4–4.9)
RBC # BLD AUTO: 3.46 M/UL (ref 3.93–5.22)
SODIUM SERPL-SCNC: 139 MEQ/L (ref 135–144)
WBC # BLD AUTO: 9 K/UL (ref 4–10)

## 2023-05-10 PROCEDURE — 97530 THERAPEUTIC ACTIVITIES: CPT

## 2023-05-10 PROCEDURE — 6370000000 HC RX 637 (ALT 250 FOR IP): Performed by: INTERNAL MEDICINE

## 2023-05-10 PROCEDURE — 85027 COMPLETE CBC AUTOMATED: CPT

## 2023-05-10 PROCEDURE — 97162 PT EVAL MOD COMPLEX 30 MIN: CPT

## 2023-05-10 PROCEDURE — 80048 BASIC METABOLIC PNL TOTAL CA: CPT

## 2023-05-10 PROCEDURE — 1200000002 HC SEMI PRIVATE SWING BED

## 2023-05-10 PROCEDURE — 97116 GAIT TRAINING THERAPY: CPT

## 2023-05-10 PROCEDURE — 97166 OT EVAL MOD COMPLEX 45 MIN: CPT

## 2023-05-10 PROCEDURE — 97110 THERAPEUTIC EXERCISES: CPT

## 2023-05-10 PROCEDURE — 36415 COLL VENOUS BLD VENIPUNCTURE: CPT

## 2023-05-10 RX ORDER — ACETAMINOPHEN 325 MG/1
650 TABLET ORAL EVERY 6 HOURS PRN
Status: DISCONTINUED | OUTPATIENT
Start: 2023-05-10 | End: 2023-05-18 | Stop reason: HOSPADM

## 2023-05-10 RX ORDER — OXYBUTYNIN CHLORIDE 5 MG/1
10 TABLET, EXTENDED RELEASE ORAL DAILY
Status: DISCONTINUED | OUTPATIENT
Start: 2023-05-10 | End: 2023-05-18 | Stop reason: HOSPADM

## 2023-05-10 RX ORDER — DOCUSATE SODIUM 100 MG/1
100 CAPSULE, LIQUID FILLED ORAL 2 TIMES DAILY
Status: DISCONTINUED | OUTPATIENT
Start: 2023-05-10 | End: 2023-05-18 | Stop reason: HOSPADM

## 2023-05-10 RX ORDER — LISINOPRIL 10 MG/1
10 TABLET ORAL DAILY
Status: DISCONTINUED | OUTPATIENT
Start: 2023-05-11 | End: 2023-05-18 | Stop reason: HOSPADM

## 2023-05-10 RX ORDER — VITAMIN B COMPLEX
1000 TABLET ORAL DAILY
Status: DISCONTINUED | OUTPATIENT
Start: 2023-05-10 | End: 2023-05-18 | Stop reason: HOSPADM

## 2023-05-10 RX ORDER — PRAVASTATIN SODIUM 40 MG
20 TABLET ORAL DAILY
Status: DISCONTINUED | OUTPATIENT
Start: 2023-05-10 | End: 2023-05-10

## 2023-05-10 RX ADMIN — LISINOPRIL 5 MG: 5 TABLET ORAL at 08:29

## 2023-05-10 RX ADMIN — ACETAMINOPHEN 650 MG: 325 TABLET ORAL at 00:38

## 2023-05-10 RX ADMIN — OXYCODONE 10 MG: 5 TABLET ORAL at 22:16

## 2023-05-10 RX ADMIN — LEVOTHYROXINE SODIUM 50 MCG: 0.03 TABLET ORAL at 08:30

## 2023-05-10 RX ADMIN — Medication 1000 UNITS: at 12:45

## 2023-05-10 RX ADMIN — POLYETHYLENE GLYCOL 3350 17 G: 17 POWDER, FOR SOLUTION ORAL at 21:35

## 2023-05-10 RX ADMIN — ASPIRIN 81 MG: 81 TABLET, COATED ORAL at 08:29

## 2023-05-10 RX ADMIN — OXYCODONE 10 MG: 5 TABLET ORAL at 08:30

## 2023-05-10 RX ADMIN — SENNOSIDES AND DOCUSATE SODIUM 1 TABLET: 50; 8.6 TABLET ORAL at 08:31

## 2023-05-10 RX ADMIN — SENNOSIDES AND DOCUSATE SODIUM 1 TABLET: 50; 8.6 TABLET ORAL at 21:06

## 2023-05-10 RX ADMIN — DOCUSATE SODIUM 100 MG: 100 CAPSULE, LIQUID FILLED ORAL at 21:06

## 2023-05-10 RX ADMIN — CITALOPRAM HYDROBROMIDE 30 MG: 20 TABLET ORAL at 08:30

## 2023-05-10 RX ADMIN — PRAVASTATIN SODIUM 20 MG: 40 TABLET ORAL at 21:06

## 2023-05-10 RX ADMIN — ASPIRIN 81 MG: 81 TABLET, COATED ORAL at 21:06

## 2023-05-10 RX ADMIN — OXYCODONE 10 MG: 5 TABLET ORAL at 17:21

## 2023-05-10 RX ADMIN — OXYBUTYNIN CHLORIDE 10 MG: 5 TABLET, EXTENDED RELEASE ORAL at 12:45

## 2023-05-10 RX ADMIN — FERROUS SULFATE TAB 325 MG (65 MG ELEMENTAL FE) 325 MG: 325 (65 FE) TAB at 17:21

## 2023-05-10 RX ADMIN — DOCUSATE SODIUM 100 MG: 100 CAPSULE, LIQUID FILLED ORAL at 12:45

## 2023-05-10 RX ADMIN — FERROUS SULFATE TAB 325 MG (65 MG ELEMENTAL FE) 325 MG: 325 (65 FE) TAB at 08:29

## 2023-05-10 RX ADMIN — MULTIVITAMIN TABLET 1 TABLET: TABLET at 08:31

## 2023-05-10 RX ADMIN — OXYCODONE 10 MG: 5 TABLET ORAL at 12:45

## 2023-05-10 RX ADMIN — ACETAMINOPHEN 650 MG: 325 TABLET ORAL at 06:35

## 2023-05-10 ASSESSMENT — PAIN DESCRIPTION - LOCATION
LOCATION: KNEE

## 2023-05-10 ASSESSMENT — PAIN SCALES - GENERAL
PAINLEVEL_OUTOF10: 7
PAINLEVEL_OUTOF10: 8
PAINLEVEL_OUTOF10: 7
PAINLEVEL_OUTOF10: 6
PAINLEVEL_OUTOF10: 10
PAINLEVEL_OUTOF10: 10

## 2023-05-10 ASSESSMENT — PAIN DESCRIPTION - ORIENTATION
ORIENTATION: LEFT

## 2023-05-10 ASSESSMENT — PAIN DESCRIPTION - PAIN TYPE: TYPE: SURGICAL PAIN

## 2023-05-10 ASSESSMENT — PAIN DESCRIPTION - DESCRIPTORS
DESCRIPTORS: ACHING;THROBBING
DESCRIPTORS: ACHING
DESCRIPTORS: THROBBING;JABBING
DESCRIPTORS: ACHING;THROBBING
DESCRIPTORS: ACHING;JABBING

## 2023-05-11 PROCEDURE — 97530 THERAPEUTIC ACTIVITIES: CPT

## 2023-05-11 PROCEDURE — 97110 THERAPEUTIC EXERCISES: CPT

## 2023-05-11 PROCEDURE — 97535 SELF CARE MNGMENT TRAINING: CPT

## 2023-05-11 PROCEDURE — 1200000002 HC SEMI PRIVATE SWING BED

## 2023-05-11 PROCEDURE — 97116 GAIT TRAINING THERAPY: CPT

## 2023-05-11 PROCEDURE — 6370000000 HC RX 637 (ALT 250 FOR IP): Performed by: INTERNAL MEDICINE

## 2023-05-11 RX ORDER — OXYCODONE HYDROCHLORIDE AND ACETAMINOPHEN 5; 325 MG/1; MG/1
1 TABLET ORAL EVERY 4 HOURS PRN
Status: DISCONTINUED | OUTPATIENT
Start: 2023-05-11 | End: 2023-05-11

## 2023-05-11 RX ADMIN — SENNOSIDES AND DOCUSATE SODIUM 1 TABLET: 50; 8.6 TABLET ORAL at 14:26

## 2023-05-11 RX ADMIN — SENNOSIDES AND DOCUSATE SODIUM 1 TABLET: 50; 8.6 TABLET ORAL at 20:43

## 2023-05-11 RX ADMIN — CITALOPRAM HYDROBROMIDE 30 MG: 20 TABLET ORAL at 14:26

## 2023-05-11 RX ADMIN — FERROUS SULFATE TAB 325 MG (65 MG ELEMENTAL FE) 325 MG: 325 (65 FE) TAB at 14:26

## 2023-05-11 RX ADMIN — OXYBUTYNIN CHLORIDE 10 MG: 5 TABLET, EXTENDED RELEASE ORAL at 14:25

## 2023-05-11 RX ADMIN — MULTIVITAMIN TABLET 1 TABLET: TABLET at 05:55

## 2023-05-11 RX ADMIN — POLYETHYLENE GLYCOL 3350 17 G: 17 POWDER, FOR SOLUTION ORAL at 14:26

## 2023-05-11 RX ADMIN — LEVOTHYROXINE SODIUM 50 MCG: 0.03 TABLET ORAL at 14:26

## 2023-05-11 RX ADMIN — ASPIRIN 81 MG: 81 TABLET, COATED ORAL at 20:43

## 2023-05-11 RX ADMIN — DOCUSATE SODIUM 100 MG: 100 CAPSULE, LIQUID FILLED ORAL at 20:43

## 2023-05-11 RX ADMIN — LISINOPRIL 10 MG: 10 TABLET ORAL at 14:27

## 2023-05-11 RX ADMIN — DOCUSATE SODIUM 100 MG: 100 CAPSULE, LIQUID FILLED ORAL at 14:26

## 2023-05-11 RX ADMIN — FERROUS SULFATE TAB 325 MG (65 MG ELEMENTAL FE) 325 MG: 325 (65 FE) TAB at 16:28

## 2023-05-11 RX ADMIN — Medication 1000 UNITS: at 14:26

## 2023-05-11 RX ADMIN — ASPIRIN 81 MG: 81 TABLET, COATED ORAL at 14:27

## 2023-05-11 RX ADMIN — NALOXEGOL OXALATE 25 MG: 12.5 TABLET, FILM COATED ORAL at 16:28

## 2023-05-11 RX ADMIN — OXYCODONE 10 MG: 5 TABLET ORAL at 23:33

## 2023-05-11 RX ADMIN — OXYCODONE 10 MG: 5 TABLET ORAL at 12:40

## 2023-05-11 RX ADMIN — OXYCODONE 10 MG: 5 TABLET ORAL at 16:40

## 2023-05-11 RX ADMIN — OXYCODONE 10 MG: 5 TABLET ORAL at 05:55

## 2023-05-11 RX ADMIN — PRAVASTATIN SODIUM 20 MG: 40 TABLET ORAL at 20:43

## 2023-05-11 ASSESSMENT — PAIN DESCRIPTION - DESCRIPTORS
DESCRIPTORS: ACHING
DESCRIPTORS: ACHING;THROBBING

## 2023-05-11 ASSESSMENT — PAIN SCALES - GENERAL
PAINLEVEL_OUTOF10: 0
PAINLEVEL_OUTOF10: 6
PAINLEVEL_OUTOF10: 7
PAINLEVEL_OUTOF10: 0
PAINLEVEL_OUTOF10: 10
PAINLEVEL_OUTOF10: 9
PAINLEVEL_OUTOF10: 10

## 2023-05-11 ASSESSMENT — PAIN DESCRIPTION - LOCATION
LOCATION: KNEE
LOCATION: KNEE

## 2023-05-11 ASSESSMENT — PAIN - FUNCTIONAL ASSESSMENT: PAIN_FUNCTIONAL_ASSESSMENT: ACTIVITIES ARE NOT PREVENTED

## 2023-05-11 ASSESSMENT — PAIN DESCRIPTION - ORIENTATION
ORIENTATION: LEFT
ORIENTATION: LEFT

## 2023-05-12 LAB
ANION GAP SERPL CALCULATED.3IONS-SCNC: 10 MEQ/L (ref 9–15)
BASOPHILS # BLD: 0.1 K/UL (ref 0–0.1)
BASOPHILS NFR BLD: 0.7 % (ref 0.1–1.2)
BUN SERPL-MCNC: 16 MG/DL (ref 8–23)
CALCIUM SERPL-MCNC: 8.4 MG/DL (ref 8.5–9.9)
CHLORIDE SERPL-SCNC: 104 MEQ/L (ref 95–107)
CO2 SERPL-SCNC: 27 MEQ/L (ref 20–31)
CREAT SERPL-MCNC: 0.78 MG/DL (ref 0.5–0.9)
EOSINOPHIL # BLD: 0.2 K/UL (ref 0–0.4)
EOSINOPHIL NFR BLD: 2.4 % (ref 0.7–5.8)
ERYTHROCYTE [DISTWIDTH] IN BLOOD BY AUTOMATED COUNT: 13.6 % (ref 11.7–14.4)
GLUCOSE SERPL-MCNC: 103 MG/DL (ref 70–99)
HCT VFR BLD AUTO: 26.5 % (ref 37–47)
HGB BLD-MCNC: 8.8 G/DL (ref 11.2–15.7)
IMM GRANULOCYTES # BLD: 0 K/UL
IMM GRANULOCYTES NFR BLD: 0.5 %
LYMPHOCYTES # BLD: 0.9 K/UL (ref 1.2–3.7)
LYMPHOCYTES NFR BLD: 12.2 %
MCH RBC QN AUTO: 30.6 PG (ref 25.6–32.2)
MCHC RBC AUTO-ENTMCNC: 33.2 % (ref 32.2–35.5)
MCV RBC AUTO: 92 FL (ref 79.4–94.8)
MONOCYTES # BLD: 0.9 K/UL (ref 0.2–0.9)
MONOCYTES NFR BLD: 12.2 % (ref 4.7–12.5)
NEUTROPHILS # BLD: 5.5 K/UL (ref 1.6–6.1)
NEUTS SEG NFR BLD: 72 % (ref 34–71.1)
PLATELET # BLD AUTO: 263 K/UL (ref 182–369)
POTASSIUM SERPL-SCNC: 3.9 MEQ/L (ref 3.4–4.9)
RBC # BLD AUTO: 2.88 M/UL (ref 3.93–5.22)
SODIUM SERPL-SCNC: 141 MEQ/L (ref 135–144)
WBC # BLD AUTO: 7.6 K/UL (ref 4–10)

## 2023-05-12 PROCEDURE — 97535 SELF CARE MNGMENT TRAINING: CPT

## 2023-05-12 PROCEDURE — 36415 COLL VENOUS BLD VENIPUNCTURE: CPT

## 2023-05-12 PROCEDURE — 97110 THERAPEUTIC EXERCISES: CPT

## 2023-05-12 PROCEDURE — 6370000000 HC RX 637 (ALT 250 FOR IP): Performed by: INTERNAL MEDICINE

## 2023-05-12 PROCEDURE — 1200000002 HC SEMI PRIVATE SWING BED

## 2023-05-12 PROCEDURE — 97116 GAIT TRAINING THERAPY: CPT

## 2023-05-12 PROCEDURE — 80048 BASIC METABOLIC PNL TOTAL CA: CPT

## 2023-05-12 PROCEDURE — 85025 COMPLETE CBC W/AUTO DIFF WBC: CPT

## 2023-05-12 PROCEDURE — 97530 THERAPEUTIC ACTIVITIES: CPT

## 2023-05-12 RX ADMIN — LISINOPRIL 10 MG: 10 TABLET ORAL at 08:43

## 2023-05-12 RX ADMIN — NALOXEGOL OXALATE 25 MG: 12.5 TABLET, FILM COATED ORAL at 13:31

## 2023-05-12 RX ADMIN — OXYCODONE 10 MG: 5 TABLET ORAL at 08:44

## 2023-05-12 RX ADMIN — DOCUSATE SODIUM 100 MG: 100 CAPSULE, LIQUID FILLED ORAL at 20:30

## 2023-05-12 RX ADMIN — FERROUS SULFATE TAB 325 MG (65 MG ELEMENTAL FE) 325 MG: 325 (65 FE) TAB at 08:43

## 2023-05-12 RX ADMIN — ASPIRIN 81 MG: 81 TABLET, COATED ORAL at 20:30

## 2023-05-12 RX ADMIN — DOCUSATE SODIUM 100 MG: 100 CAPSULE, LIQUID FILLED ORAL at 08:43

## 2023-05-12 RX ADMIN — OXYCODONE 10 MG: 5 TABLET ORAL at 18:27

## 2023-05-12 RX ADMIN — LEVOTHYROXINE SODIUM 50 MCG: 0.03 TABLET ORAL at 08:42

## 2023-05-12 RX ADMIN — OXYCODONE 10 MG: 5 TABLET ORAL at 23:44

## 2023-05-12 RX ADMIN — OXYBUTYNIN CHLORIDE 10 MG: 5 TABLET, EXTENDED RELEASE ORAL at 08:43

## 2023-05-12 RX ADMIN — PRAVASTATIN SODIUM 20 MG: 40 TABLET ORAL at 20:30

## 2023-05-12 RX ADMIN — Medication 1000 UNITS: at 08:43

## 2023-05-12 RX ADMIN — SENNOSIDES AND DOCUSATE SODIUM 1 TABLET: 50; 8.6 TABLET ORAL at 20:29

## 2023-05-12 RX ADMIN — MULTIVITAMIN TABLET 1 TABLET: TABLET at 08:42

## 2023-05-12 RX ADMIN — FERROUS SULFATE TAB 325 MG (65 MG ELEMENTAL FE) 325 MG: 325 (65 FE) TAB at 18:27

## 2023-05-12 RX ADMIN — MAGNESIUM HYDROXIDE 30 ML: 1200 LIQUID ORAL at 13:31

## 2023-05-12 RX ADMIN — TIZANIDINE 4 MG: 2 TABLET ORAL at 23:43

## 2023-05-12 RX ADMIN — CITALOPRAM HYDROBROMIDE 30 MG: 20 TABLET ORAL at 08:42

## 2023-05-12 RX ADMIN — ASPIRIN 81 MG: 81 TABLET, COATED ORAL at 08:43

## 2023-05-12 RX ADMIN — SENNOSIDES AND DOCUSATE SODIUM 1 TABLET: 50; 8.6 TABLET ORAL at 08:43

## 2023-05-12 ASSESSMENT — PAIN SCALES - GENERAL
PAINLEVEL_OUTOF10: 3
PAINLEVEL_OUTOF10: 8
PAINLEVEL_OUTOF10: 10
PAINLEVEL_OUTOF10: 8

## 2023-05-12 ASSESSMENT — PAIN DESCRIPTION - LOCATION
LOCATION: KNEE
LOCATION: KNEE

## 2023-05-12 ASSESSMENT — PAIN - FUNCTIONAL ASSESSMENT: PAIN_FUNCTIONAL_ASSESSMENT: PREVENTS OR INTERFERES SOME ACTIVE ACTIVITIES AND ADLS

## 2023-05-12 ASSESSMENT — PAIN DESCRIPTION - DESCRIPTORS
DESCRIPTORS: SHARP
DESCRIPTORS: HEAVINESS;PRESSURE

## 2023-05-12 ASSESSMENT — PAIN DESCRIPTION - ORIENTATION: ORIENTATION: LEFT

## 2023-05-13 PROCEDURE — 1200000002 HC SEMI PRIVATE SWING BED

## 2023-05-13 PROCEDURE — 97530 THERAPEUTIC ACTIVITIES: CPT

## 2023-05-13 PROCEDURE — 6370000000 HC RX 637 (ALT 250 FOR IP): Performed by: INTERNAL MEDICINE

## 2023-05-13 PROCEDURE — 97535 SELF CARE MNGMENT TRAINING: CPT

## 2023-05-13 PROCEDURE — 97116 GAIT TRAINING THERAPY: CPT

## 2023-05-13 RX ADMIN — OXYCODONE 10 MG: 5 TABLET ORAL at 10:46

## 2023-05-13 RX ADMIN — OXYCODONE 10 MG: 5 TABLET ORAL at 18:55

## 2023-05-13 RX ADMIN — MAGNESIUM HYDROXIDE 30 ML: 1200 LIQUID ORAL at 11:38

## 2023-05-13 RX ADMIN — LEVOTHYROXINE SODIUM 50 MCG: 0.03 TABLET ORAL at 06:06

## 2023-05-13 RX ADMIN — FERROUS SULFATE TAB 325 MG (65 MG ELEMENTAL FE) 325 MG: 325 (65 FE) TAB at 08:02

## 2023-05-13 RX ADMIN — OXYCODONE 10 MG: 5 TABLET ORAL at 14:45

## 2023-05-13 RX ADMIN — SENNOSIDES AND DOCUSATE SODIUM 1 TABLET: 50; 8.6 TABLET ORAL at 08:02

## 2023-05-13 RX ADMIN — PRAVASTATIN SODIUM 20 MG: 40 TABLET ORAL at 20:17

## 2023-05-13 RX ADMIN — Medication 1000 UNITS: at 08:02

## 2023-05-13 RX ADMIN — ASPIRIN 81 MG: 81 TABLET, COATED ORAL at 08:02

## 2023-05-13 RX ADMIN — SENNOSIDES AND DOCUSATE SODIUM 1 TABLET: 50; 8.6 TABLET ORAL at 20:17

## 2023-05-13 RX ADMIN — LISINOPRIL 10 MG: 10 TABLET ORAL at 08:02

## 2023-05-13 RX ADMIN — FERROUS SULFATE TAB 325 MG (65 MG ELEMENTAL FE) 325 MG: 325 (65 FE) TAB at 18:04

## 2023-05-13 RX ADMIN — OXYCODONE 10 MG: 5 TABLET ORAL at 06:07

## 2023-05-13 RX ADMIN — DOCUSATE SODIUM 100 MG: 100 CAPSULE, LIQUID FILLED ORAL at 08:02

## 2023-05-13 RX ADMIN — OXYBUTYNIN CHLORIDE 10 MG: 5 TABLET, EXTENDED RELEASE ORAL at 08:02

## 2023-05-13 RX ADMIN — CITALOPRAM HYDROBROMIDE 30 MG: 20 TABLET ORAL at 08:02

## 2023-05-13 RX ADMIN — ASPIRIN 81 MG: 81 TABLET, COATED ORAL at 20:17

## 2023-05-13 RX ADMIN — DOCUSATE SODIUM 100 MG: 100 CAPSULE, LIQUID FILLED ORAL at 20:17

## 2023-05-13 RX ADMIN — MULTIVITAMIN TABLET 1 TABLET: TABLET at 08:02

## 2023-05-13 ASSESSMENT — PAIN DESCRIPTION - DESCRIPTORS
DESCRIPTORS: THROBBING
DESCRIPTORS: ACHING
DESCRIPTORS: ACHING

## 2023-05-13 ASSESSMENT — PAIN SCALES - GENERAL
PAINLEVEL_OUTOF10: 10
PAINLEVEL_OUTOF10: 9
PAINLEVEL_OUTOF10: 3
PAINLEVEL_OUTOF10: 7
PAINLEVEL_OUTOF10: 9

## 2023-05-13 ASSESSMENT — PAIN - FUNCTIONAL ASSESSMENT: PAIN_FUNCTIONAL_ASSESSMENT: PREVENTS OR INTERFERES SOME ACTIVE ACTIVITIES AND ADLS

## 2023-05-13 ASSESSMENT — PAIN DESCRIPTION - ORIENTATION
ORIENTATION: LEFT

## 2023-05-13 ASSESSMENT — PAIN DESCRIPTION - PAIN TYPE: TYPE: SURGICAL PAIN

## 2023-05-13 ASSESSMENT — PAIN DESCRIPTION - LOCATION
LOCATION: KNEE
LOCATION: LEG
LOCATION: LEG;KNEE
LOCATION: KNEE
LOCATION: KNEE
LOCATION: LEG

## 2023-05-13 ASSESSMENT — PAIN DESCRIPTION - FREQUENCY: FREQUENCY: CONTINUOUS

## 2023-05-13 ASSESSMENT — PAIN DESCRIPTION - ONSET: ONSET: ON-GOING

## 2023-05-14 PROCEDURE — 97110 THERAPEUTIC EXERCISES: CPT

## 2023-05-14 PROCEDURE — 97535 SELF CARE MNGMENT TRAINING: CPT

## 2023-05-14 PROCEDURE — 97530 THERAPEUTIC ACTIVITIES: CPT

## 2023-05-14 PROCEDURE — 97116 GAIT TRAINING THERAPY: CPT

## 2023-05-14 PROCEDURE — 1200000002 HC SEMI PRIVATE SWING BED

## 2023-05-14 PROCEDURE — 6370000000 HC RX 637 (ALT 250 FOR IP): Performed by: INTERNAL MEDICINE

## 2023-05-14 RX ORDER — METOCLOPRAMIDE 5 MG/1
5 TABLET ORAL
Status: COMPLETED | OUTPATIENT
Start: 2023-05-14 | End: 2023-05-15

## 2023-05-14 RX ADMIN — ASPIRIN 81 MG: 81 TABLET, COATED ORAL at 20:09

## 2023-05-14 RX ADMIN — TIZANIDINE 4 MG: 2 TABLET ORAL at 01:10

## 2023-05-14 RX ADMIN — MULTIVITAMIN TABLET 1 TABLET: TABLET at 08:45

## 2023-05-14 RX ADMIN — OXYCODONE 10 MG: 5 TABLET ORAL at 20:09

## 2023-05-14 RX ADMIN — FERROUS SULFATE TAB 325 MG (65 MG ELEMENTAL FE) 325 MG: 325 (65 FE) TAB at 08:45

## 2023-05-14 RX ADMIN — TIZANIDINE 4 MG: 2 TABLET ORAL at 20:09

## 2023-05-14 RX ADMIN — PRAVASTATIN SODIUM 20 MG: 40 TABLET ORAL at 20:09

## 2023-05-14 RX ADMIN — POLYETHYLENE GLYCOL 3350 17 G: 17 POWDER, FOR SOLUTION ORAL at 08:55

## 2023-05-14 RX ADMIN — MAGNESIUM HYDROXIDE 30 ML: 1200 LIQUID ORAL at 20:09

## 2023-05-14 RX ADMIN — LISINOPRIL 10 MG: 10 TABLET ORAL at 08:45

## 2023-05-14 RX ADMIN — OXYCODONE 10 MG: 5 TABLET ORAL at 06:51

## 2023-05-14 RX ADMIN — DOCUSATE SODIUM 100 MG: 100 CAPSULE, LIQUID FILLED ORAL at 08:45

## 2023-05-14 RX ADMIN — OXYCODONE 10 MG: 5 TABLET ORAL at 01:10

## 2023-05-14 RX ADMIN — SENNOSIDES AND DOCUSATE SODIUM 1 TABLET: 50; 8.6 TABLET ORAL at 08:45

## 2023-05-14 RX ADMIN — LEVOTHYROXINE SODIUM 50 MCG: 0.03 TABLET ORAL at 06:41

## 2023-05-14 RX ADMIN — METOCLOPRAMIDE 5 MG: 5 TABLET ORAL at 16:08

## 2023-05-14 RX ADMIN — OXYCODONE 10 MG: 5 TABLET ORAL at 16:07

## 2023-05-14 RX ADMIN — DOCUSATE SODIUM 100 MG: 100 CAPSULE, LIQUID FILLED ORAL at 20:09

## 2023-05-14 RX ADMIN — MAGNESIUM HYDROXIDE 30 ML: 1200 LIQUID ORAL at 09:31

## 2023-05-14 RX ADMIN — OXYBUTYNIN CHLORIDE 10 MG: 5 TABLET, EXTENDED RELEASE ORAL at 08:45

## 2023-05-14 RX ADMIN — NALOXEGOL OXALATE 25 MG: 12.5 TABLET, FILM COATED ORAL at 09:31

## 2023-05-14 RX ADMIN — ASPIRIN 81 MG: 81 TABLET, COATED ORAL at 08:45

## 2023-05-14 RX ADMIN — Medication 1000 UNITS: at 08:45

## 2023-05-14 RX ADMIN — OXYCODONE 10 MG: 5 TABLET ORAL at 11:35

## 2023-05-14 RX ADMIN — METOCLOPRAMIDE 5 MG: 5 TABLET ORAL at 09:31

## 2023-05-14 RX ADMIN — FERROUS SULFATE TAB 325 MG (65 MG ELEMENTAL FE) 325 MG: 325 (65 FE) TAB at 16:08

## 2023-05-14 RX ADMIN — SENNOSIDES AND DOCUSATE SODIUM 1 TABLET: 50; 8.6 TABLET ORAL at 20:09

## 2023-05-14 RX ADMIN — CITALOPRAM HYDROBROMIDE 30 MG: 20 TABLET ORAL at 08:45

## 2023-05-14 ASSESSMENT — PAIN DESCRIPTION - ORIENTATION
ORIENTATION: LEFT

## 2023-05-14 ASSESSMENT — PAIN DESCRIPTION - DESCRIPTORS
DESCRIPTORS: ACHING

## 2023-05-14 ASSESSMENT — PAIN DESCRIPTION - LOCATION
LOCATION: LEG
LOCATION: KNEE

## 2023-05-14 ASSESSMENT — PAIN SCALES - GENERAL
PAINLEVEL_OUTOF10: 6
PAINLEVEL_OUTOF10: 9
PAINLEVEL_OUTOF10: 10
PAINLEVEL_OUTOF10: 7
PAINLEVEL_OUTOF10: 10
PAINLEVEL_OUTOF10: 6
PAINLEVEL_OUTOF10: 7
PAINLEVEL_OUTOF10: 8

## 2023-05-15 ENCOUNTER — APPOINTMENT (OUTPATIENT)
Dept: ULTRASOUND IMAGING | Age: 73
DRG: 561 | End: 2023-05-15
Attending: INTERNAL MEDICINE
Payer: MEDICARE

## 2023-05-15 LAB
ANION GAP SERPL CALCULATED.3IONS-SCNC: 8 MEQ/L (ref 9–15)
BUN SERPL-MCNC: 24 MG/DL (ref 8–23)
CALCIUM SERPL-MCNC: 8.3 MG/DL (ref 8.5–9.9)
CHLORIDE SERPL-SCNC: 103 MEQ/L (ref 95–107)
CO2 SERPL-SCNC: 29 MEQ/L (ref 20–31)
CREAT SERPL-MCNC: 0.95 MG/DL (ref 0.5–0.9)
ERYTHROCYTE [DISTWIDTH] IN BLOOD BY AUTOMATED COUNT: 13.2 % (ref 11.7–14.4)
GLUCOSE SERPL-MCNC: 101 MG/DL (ref 70–99)
HCT VFR BLD AUTO: 27.7 % (ref 37–47)
HGB BLD-MCNC: 9 G/DL (ref 11.2–15.7)
MCH RBC QN AUTO: 30.2 PG (ref 25.6–32.2)
MCHC RBC AUTO-ENTMCNC: 32.5 % (ref 32.2–35.5)
MCV RBC AUTO: 93 FL (ref 79.4–94.8)
PLATELET # BLD AUTO: 310 K/UL (ref 182–369)
POTASSIUM SERPL-SCNC: 4.5 MEQ/L (ref 3.4–4.9)
RBC # BLD AUTO: 2.98 M/UL (ref 3.93–5.22)
SODIUM SERPL-SCNC: 140 MEQ/L (ref 135–144)
WBC # BLD AUTO: 6.6 K/UL (ref 4–10)

## 2023-05-15 PROCEDURE — 97530 THERAPEUTIC ACTIVITIES: CPT

## 2023-05-15 PROCEDURE — 93970 EXTREMITY STUDY: CPT

## 2023-05-15 PROCEDURE — 36415 COLL VENOUS BLD VENIPUNCTURE: CPT

## 2023-05-15 PROCEDURE — 1200000002 HC SEMI PRIVATE SWING BED

## 2023-05-15 PROCEDURE — 97110 THERAPEUTIC EXERCISES: CPT

## 2023-05-15 PROCEDURE — 85027 COMPLETE CBC AUTOMATED: CPT

## 2023-05-15 PROCEDURE — 97116 GAIT TRAINING THERAPY: CPT

## 2023-05-15 PROCEDURE — 97535 SELF CARE MNGMENT TRAINING: CPT

## 2023-05-15 PROCEDURE — 6370000000 HC RX 637 (ALT 250 FOR IP): Performed by: INTERNAL MEDICINE

## 2023-05-15 PROCEDURE — 80048 BASIC METABOLIC PNL TOTAL CA: CPT

## 2023-05-15 RX ORDER — FUROSEMIDE 20 MG/1
30 TABLET ORAL DAILY
Status: DISCONTINUED | OUTPATIENT
Start: 2023-05-15 | End: 2023-05-18

## 2023-05-15 RX ADMIN — OXYBUTYNIN CHLORIDE 10 MG: 5 TABLET, EXTENDED RELEASE ORAL at 08:54

## 2023-05-15 RX ADMIN — METOCLOPRAMIDE 5 MG: 5 TABLET ORAL at 06:33

## 2023-05-15 RX ADMIN — NALOXEGOL OXALATE 25 MG: 12.5 TABLET, FILM COATED ORAL at 06:36

## 2023-05-15 RX ADMIN — FERROUS SULFATE TAB 325 MG (65 MG ELEMENTAL FE) 325 MG: 325 (65 FE) TAB at 08:54

## 2023-05-15 RX ADMIN — CITALOPRAM HYDROBROMIDE 30 MG: 20 TABLET ORAL at 08:54

## 2023-05-15 RX ADMIN — PRAVASTATIN SODIUM 20 MG: 40 TABLET ORAL at 20:26

## 2023-05-15 RX ADMIN — DOCUSATE SODIUM 100 MG: 100 CAPSULE, LIQUID FILLED ORAL at 20:27

## 2023-05-15 RX ADMIN — LEVOTHYROXINE SODIUM 50 MCG: 0.03 TABLET ORAL at 06:34

## 2023-05-15 RX ADMIN — FUROSEMIDE 30 MG: 20 TABLET ORAL at 15:00

## 2023-05-15 RX ADMIN — TIZANIDINE 4 MG: 2 TABLET ORAL at 17:56

## 2023-05-15 RX ADMIN — ASPIRIN 81 MG: 81 TABLET, COATED ORAL at 20:27

## 2023-05-15 RX ADMIN — LISINOPRIL 10 MG: 10 TABLET ORAL at 08:54

## 2023-05-15 RX ADMIN — Medication 1000 UNITS: at 08:54

## 2023-05-15 RX ADMIN — MULTIVITAMIN TABLET 1 TABLET: TABLET at 08:54

## 2023-05-15 RX ADMIN — OXYCODONE 10 MG: 5 TABLET ORAL at 20:26

## 2023-05-15 RX ADMIN — SENNOSIDES AND DOCUSATE SODIUM 1 TABLET: 50; 8.6 TABLET ORAL at 20:26

## 2023-05-15 RX ADMIN — OXYCODONE 10 MG: 5 TABLET ORAL at 13:30

## 2023-05-15 RX ADMIN — ASPIRIN 81 MG: 81 TABLET, COATED ORAL at 08:54

## 2023-05-15 RX ADMIN — TIZANIDINE 4 MG: 2 TABLET ORAL at 04:09

## 2023-05-15 RX ADMIN — OXYCODONE 10 MG: 5 TABLET ORAL at 04:09

## 2023-05-15 RX ADMIN — OXYCODONE 10 MG: 5 TABLET ORAL at 09:11

## 2023-05-15 RX ADMIN — FERROUS SULFATE TAB 325 MG (65 MG ELEMENTAL FE) 325 MG: 325 (65 FE) TAB at 17:19

## 2023-05-15 ASSESSMENT — PAIN DESCRIPTION - LOCATION
LOCATION: KNEE
LOCATION: LEG;KNEE
LOCATION: KNEE
LOCATION: KNEE

## 2023-05-15 ASSESSMENT — PAIN DESCRIPTION - ORIENTATION
ORIENTATION: LEFT

## 2023-05-15 ASSESSMENT — PAIN SCALES - GENERAL
PAINLEVEL_OUTOF10: 9
PAINLEVEL_OUTOF10: 5
PAINLEVEL_OUTOF10: 8
PAINLEVEL_OUTOF10: 9
PAINLEVEL_OUTOF10: 6
PAINLEVEL_OUTOF10: 10

## 2023-05-15 ASSESSMENT — PAIN DESCRIPTION - DESCRIPTORS
DESCRIPTORS: ACHING
DESCRIPTORS: ACHING

## 2023-05-16 PROCEDURE — 6370000000 HC RX 637 (ALT 250 FOR IP): Performed by: INTERNAL MEDICINE

## 2023-05-16 PROCEDURE — 97110 THERAPEUTIC EXERCISES: CPT

## 2023-05-16 PROCEDURE — 97116 GAIT TRAINING THERAPY: CPT

## 2023-05-16 PROCEDURE — 97530 THERAPEUTIC ACTIVITIES: CPT

## 2023-05-16 PROCEDURE — 1200000002 HC SEMI PRIVATE SWING BED

## 2023-05-16 PROCEDURE — 97535 SELF CARE MNGMENT TRAINING: CPT

## 2023-05-16 RX ORDER — OXYCODONE HYDROCHLORIDE AND ACETAMINOPHEN 5; 325 MG/1; MG/1
1 TABLET ORAL EVERY 4 HOURS PRN
Status: DISCONTINUED | OUTPATIENT
Start: 2023-05-16 | End: 2023-05-18 | Stop reason: HOSPADM

## 2023-05-16 RX ORDER — CYCLOBENZAPRINE HCL 10 MG
10 TABLET ORAL 3 TIMES DAILY
Status: DISCONTINUED | OUTPATIENT
Start: 2023-05-16 | End: 2023-05-18 | Stop reason: HOSPADM

## 2023-05-16 RX ADMIN — DOCUSATE SODIUM 100 MG: 100 CAPSULE, LIQUID FILLED ORAL at 09:38

## 2023-05-16 RX ADMIN — CITALOPRAM HYDROBROMIDE 30 MG: 20 TABLET ORAL at 09:39

## 2023-05-16 RX ADMIN — Medication 1000 UNITS: at 09:39

## 2023-05-16 RX ADMIN — OXYCODONE AND ACETAMINOPHEN 1 TABLET: 5; 325 TABLET ORAL at 13:40

## 2023-05-16 RX ADMIN — ASPIRIN 81 MG: 81 TABLET, COATED ORAL at 09:39

## 2023-05-16 RX ADMIN — SENNOSIDES AND DOCUSATE SODIUM 1 TABLET: 50; 8.6 TABLET ORAL at 09:38

## 2023-05-16 RX ADMIN — FUROSEMIDE 30 MG: 20 TABLET ORAL at 09:38

## 2023-05-16 RX ADMIN — CYCLOBENZAPRINE 10 MG: 10 TABLET, FILM COATED ORAL at 13:40

## 2023-05-16 RX ADMIN — LISINOPRIL 10 MG: 10 TABLET ORAL at 09:38

## 2023-05-16 RX ADMIN — CYCLOBENZAPRINE 10 MG: 10 TABLET, FILM COATED ORAL at 21:35

## 2023-05-16 RX ADMIN — FERROUS SULFATE TAB 325 MG (65 MG ELEMENTAL FE) 325 MG: 325 (65 FE) TAB at 18:19

## 2023-05-16 RX ADMIN — SENNOSIDES AND DOCUSATE SODIUM 1 TABLET: 50; 8.6 TABLET ORAL at 21:35

## 2023-05-16 RX ADMIN — MULTIVITAMIN TABLET 1 TABLET: TABLET at 09:39

## 2023-05-16 RX ADMIN — OXYCODONE AND ACETAMINOPHEN 1 TABLET: 5; 325 TABLET ORAL at 18:51

## 2023-05-16 RX ADMIN — OXYBUTYNIN CHLORIDE 10 MG: 5 TABLET, EXTENDED RELEASE ORAL at 09:39

## 2023-05-16 RX ADMIN — ASPIRIN 81 MG: 81 TABLET, COATED ORAL at 21:35

## 2023-05-16 RX ADMIN — OXYCODONE AND ACETAMINOPHEN 1 TABLET: 5; 325 TABLET ORAL at 09:39

## 2023-05-16 RX ADMIN — OXYCODONE AND ACETAMINOPHEN 1 TABLET: 5; 325 TABLET ORAL at 22:51

## 2023-05-16 RX ADMIN — PRAVASTATIN SODIUM 20 MG: 40 TABLET ORAL at 21:35

## 2023-05-16 RX ADMIN — CYCLOBENZAPRINE 10 MG: 10 TABLET, FILM COATED ORAL at 09:39

## 2023-05-16 RX ADMIN — FERROUS SULFATE TAB 325 MG (65 MG ELEMENTAL FE) 325 MG: 325 (65 FE) TAB at 09:38

## 2023-05-16 RX ADMIN — OXYCODONE 10 MG: 5 TABLET ORAL at 05:36

## 2023-05-16 RX ADMIN — LEVOTHYROXINE SODIUM 50 MCG: 0.03 TABLET ORAL at 05:37

## 2023-05-16 RX ADMIN — DOCUSATE SODIUM 100 MG: 100 CAPSULE, LIQUID FILLED ORAL at 21:35

## 2023-05-16 RX ADMIN — NALOXEGOL OXALATE 25 MG: 12.5 TABLET, FILM COATED ORAL at 05:36

## 2023-05-16 ASSESSMENT — PAIN DESCRIPTION - FREQUENCY
FREQUENCY: CONTINUOUS
FREQUENCY: CONTINUOUS

## 2023-05-16 ASSESSMENT — PAIN DESCRIPTION - LOCATION
LOCATION: KNEE

## 2023-05-16 ASSESSMENT — PAIN DESCRIPTION - ONSET
ONSET: ON-GOING
ONSET: ON-GOING

## 2023-05-16 ASSESSMENT — PAIN DESCRIPTION - PAIN TYPE
TYPE: SURGICAL PAIN
TYPE: SURGICAL PAIN

## 2023-05-16 ASSESSMENT — PAIN SCALES - GENERAL
PAINLEVEL_OUTOF10: 9
PAINLEVEL_OUTOF10: 7
PAINLEVEL_OUTOF10: 2
PAINLEVEL_OUTOF10: 7

## 2023-05-16 ASSESSMENT — PAIN DESCRIPTION - DESCRIPTORS
DESCRIPTORS: ACHING
DESCRIPTORS: ACHING
DESCRIPTORS: ACHING;SORE

## 2023-05-16 ASSESSMENT — PAIN DESCRIPTION - ORIENTATION
ORIENTATION: LEFT

## 2023-05-16 ASSESSMENT — PAIN - FUNCTIONAL ASSESSMENT
PAIN_FUNCTIONAL_ASSESSMENT: ACTIVITIES ARE NOT PREVENTED
PAIN_FUNCTIONAL_ASSESSMENT: ACTIVITIES ARE NOT PREVENTED

## 2023-05-17 PROCEDURE — 97535 SELF CARE MNGMENT TRAINING: CPT

## 2023-05-17 PROCEDURE — 6370000000 HC RX 637 (ALT 250 FOR IP): Performed by: INTERNAL MEDICINE

## 2023-05-17 PROCEDURE — 1200000002 HC SEMI PRIVATE SWING BED

## 2023-05-17 PROCEDURE — 97110 THERAPEUTIC EXERCISES: CPT

## 2023-05-17 PROCEDURE — 97530 THERAPEUTIC ACTIVITIES: CPT

## 2023-05-17 PROCEDURE — 97116 GAIT TRAINING THERAPY: CPT

## 2023-05-17 RX ADMIN — ASPIRIN 81 MG: 81 TABLET, COATED ORAL at 10:25

## 2023-05-17 RX ADMIN — FUROSEMIDE 30 MG: 20 TABLET ORAL at 10:23

## 2023-05-17 RX ADMIN — OXYCODONE AND ACETAMINOPHEN 1 TABLET: 5; 325 TABLET ORAL at 17:51

## 2023-05-17 RX ADMIN — ASPIRIN 81 MG: 81 TABLET, COATED ORAL at 21:41

## 2023-05-17 RX ADMIN — CYCLOBENZAPRINE 10 MG: 10 TABLET, FILM COATED ORAL at 15:01

## 2023-05-17 RX ADMIN — MULTIVITAMIN TABLET 1 TABLET: TABLET at 10:23

## 2023-05-17 RX ADMIN — OXYCODONE AND ACETAMINOPHEN 1 TABLET: 5; 325 TABLET ORAL at 03:35

## 2023-05-17 RX ADMIN — CYCLOBENZAPRINE 10 MG: 10 TABLET, FILM COATED ORAL at 10:23

## 2023-05-17 RX ADMIN — CYCLOBENZAPRINE 10 MG: 10 TABLET, FILM COATED ORAL at 21:41

## 2023-05-17 RX ADMIN — OXYCODONE AND ACETAMINOPHEN 1 TABLET: 5; 325 TABLET ORAL at 10:13

## 2023-05-17 RX ADMIN — Medication 1000 UNITS: at 10:22

## 2023-05-17 RX ADMIN — DOCUSATE SODIUM 100 MG: 100 CAPSULE, LIQUID FILLED ORAL at 10:22

## 2023-05-17 RX ADMIN — PRAVASTATIN SODIUM 20 MG: 40 TABLET ORAL at 21:41

## 2023-05-17 RX ADMIN — FERROUS SULFATE TAB 325 MG (65 MG ELEMENTAL FE) 325 MG: 325 (65 FE) TAB at 17:51

## 2023-05-17 RX ADMIN — CITALOPRAM HYDROBROMIDE 30 MG: 20 TABLET ORAL at 10:22

## 2023-05-17 RX ADMIN — LEVOTHYROXINE SODIUM 50 MCG: 0.03 TABLET ORAL at 06:42

## 2023-05-17 RX ADMIN — SENNOSIDES AND DOCUSATE SODIUM 1 TABLET: 50; 8.6 TABLET ORAL at 10:22

## 2023-05-17 RX ADMIN — OXYBUTYNIN CHLORIDE 10 MG: 5 TABLET, EXTENDED RELEASE ORAL at 10:22

## 2023-05-17 RX ADMIN — LISINOPRIL 10 MG: 10 TABLET ORAL at 10:22

## 2023-05-17 RX ADMIN — FERROUS SULFATE TAB 325 MG (65 MG ELEMENTAL FE) 325 MG: 325 (65 FE) TAB at 10:22

## 2023-05-17 RX ADMIN — OXYCODONE AND ACETAMINOPHEN 1 TABLET: 5; 325 TABLET ORAL at 21:46

## 2023-05-17 ASSESSMENT — PAIN SCALES - GENERAL
PAINLEVEL_OUTOF10: 6
PAINLEVEL_OUTOF10: 10
PAINLEVEL_OUTOF10: 4
PAINLEVEL_OUTOF10: 2
PAINLEVEL_OUTOF10: 2
PAINLEVEL_OUTOF10: 10
PAINLEVEL_OUTOF10: 5
PAINLEVEL_OUTOF10: 8
PAINLEVEL_OUTOF10: 5

## 2023-05-17 ASSESSMENT — PAIN DESCRIPTION - DESCRIPTORS
DESCRIPTORS: ACHING
DESCRIPTORS: ACHING;SORE
DESCRIPTORS: ACHING
DESCRIPTORS: STABBING;ACHING
DESCRIPTORS: ACHING

## 2023-05-17 ASSESSMENT — PAIN DESCRIPTION - PAIN TYPE
TYPE: SURGICAL PAIN
TYPE: SURGICAL PAIN
TYPE: ACUTE PAIN

## 2023-05-17 ASSESSMENT — PAIN DESCRIPTION - ONSET
ONSET: ON-GOING

## 2023-05-17 ASSESSMENT — PAIN DESCRIPTION - LOCATION
LOCATION: KNEE

## 2023-05-17 ASSESSMENT — PAIN DESCRIPTION - ORIENTATION
ORIENTATION: LEFT

## 2023-05-17 ASSESSMENT — PAIN DESCRIPTION - FREQUENCY
FREQUENCY: CONTINUOUS

## 2023-05-17 ASSESSMENT — PAIN - FUNCTIONAL ASSESSMENT
PAIN_FUNCTIONAL_ASSESSMENT: ACTIVITIES ARE NOT PREVENTED

## 2023-05-17 NOTE — PLAN OF CARE
Problem: Discharge Planning  Goal: Discharge to home or other facility with appropriate resources  Outcome: Progressing     Problem: Safety - Adult  Goal: Free from fall injury  Outcome: Progressing     Problem: ABCDS Injury Assessment  Goal: Absence of physical injury  Outcome: Progressing     Problem: Nutrition Deficit:  Goal: Optimize nutritional status  Outcome: Progressing     Problem: Pain  Goal: Verbalizes/displays adequate comfort level or baseline comfort level  Outcome: Progressing

## 2023-05-17 NOTE — CARE COORDINATION
Patient attended care conference, with son in the solarium. She stated she was in 10/10 pain. We discussed pain med regimen, and the fact that she had not requested any pain medication since approx 3:30am. Marvin Lezama RN medicating pt for pain @ this time. No other nursing concerns at this time. Discharging with Edwar  5/18/23.

## 2023-05-17 NOTE — DISCHARGE INSTR - COC
Continuity of Care Form    Patient Name: Huber Zaldivar   :  1950  MRN:  [id number]    Admit date:  2023  Discharge date:  ***    Code Status Order: Full Code   Advance Directives:     Admitting Physician:  Abrahan Ayers MD  PCP: OMAR Bourne    Discharging Nurse: [facility] Unit/Room#: 8532/5864-05  Discharging Unit Phone Number: [phone]    Emergency Contact:   Extended Emergency Contact Information  Primary Emergency Contact: [facility]  [address], [address] [facility] [address] Phone: [phone]  Mobile Phone: [phone]  Relation: Child  Secondary Emergency Contact: Nahid Kc  Address: [address], [address] [facility] [address] Phone: [phone]  Mobile Phone: [phone]  Relation: Child    Past Surgical History:  Past Surgical History:   Procedure Laterality Date    ANKLE SURGERY      FOOT SURGERY      HIP SURGERY Right 2023    RIGHT HIP PINNING AND LEFT KNEE STERIOD INJECTION performed by Elbert Monteiro MD at [place] [address]  07-    TOTAL KNEE ARTHROPLASTY Left 2023    Left total knee arthroplasty,[place] Triathlon Total Knee System,Choice with adductor canal block preop with [facility] 23 at 10:30 am and lab work for 23 at 1:00 pm performed by Elbert Monteiro MD at [facility]       Immunization History:   Immunization History   Administered Date(s) Administered    Influenza Virus Vaccine 2013, 2017    Influenza Whole 2007, 2007    Influenza, FLUZONE (age 72 y+), High Dose, 0.7mL 2020, 2020    Influenza, High Dose (Fluzone 65 yrs and older) 2015, 2015, 2016, 2016, 2017, 2017, 10/19/2018, 10/19/2018, 10/22/2019, 10/22/2019, 10/27/2021    Pneumococcal, PCV-13, PREVNAR 15, (age 6w+), IM, 0.5mL 2016, 2016    Pneumococcal, PPSV23, PNEUMOVAX 21, (age 2y+), SC/IM,

## 2023-05-18 VITALS
OXYGEN SATURATION: 98 % | DIASTOLIC BLOOD PRESSURE: 56 MMHG | TEMPERATURE: 97.7 F | HEIGHT: 64 IN | HEART RATE: 77 BPM | RESPIRATION RATE: 18 BRPM | SYSTOLIC BLOOD PRESSURE: 123 MMHG | BODY MASS INDEX: 34.31 KG/M2 | WEIGHT: 201 LBS

## 2023-05-18 LAB
ANION GAP SERPL CALCULATED.3IONS-SCNC: 10 MEQ/L (ref 9–15)
BUN SERPL-MCNC: 36 MG/DL (ref 8–23)
CALCIUM SERPL-MCNC: 8.5 MG/DL (ref 8.5–9.9)
CHLORIDE SERPL-SCNC: 100 MEQ/L (ref 95–107)
CO2 SERPL-SCNC: 31 MEQ/L (ref 20–31)
CREAT SERPL-MCNC: 1.34 MG/DL (ref 0.5–0.9)
ERYTHROCYTE [DISTWIDTH] IN BLOOD BY AUTOMATED COUNT: 13.2 % (ref 11.7–14.4)
GLUCOSE SERPL-MCNC: 103 MG/DL (ref 70–99)
HCT VFR BLD AUTO: 30.6 % (ref 37–47)
HGB BLD-MCNC: 9.7 G/DL (ref 11.2–15.7)
MCH RBC QN AUTO: 30 PG (ref 25.6–32.2)
MCHC RBC AUTO-ENTMCNC: 31.7 % (ref 32.2–35.5)
MCV RBC AUTO: 94.7 FL (ref 79.4–94.8)
PLATELET # BLD AUTO: 402 K/UL (ref 182–369)
POTASSIUM SERPL-SCNC: 4.6 MEQ/L (ref 3.4–4.9)
RBC # BLD AUTO: 3.23 M/UL (ref 3.93–5.22)
SODIUM SERPL-SCNC: 141 MEQ/L (ref 135–144)
WBC # BLD AUTO: 8.3 K/UL (ref 4–10)

## 2023-05-18 PROCEDURE — 80048 BASIC METABOLIC PNL TOTAL CA: CPT

## 2023-05-18 PROCEDURE — 97116 GAIT TRAINING THERAPY: CPT

## 2023-05-18 PROCEDURE — 97110 THERAPEUTIC EXERCISES: CPT

## 2023-05-18 PROCEDURE — 97530 THERAPEUTIC ACTIVITIES: CPT

## 2023-05-18 PROCEDURE — 85027 COMPLETE CBC AUTOMATED: CPT

## 2023-05-18 PROCEDURE — 6370000000 HC RX 637 (ALT 250 FOR IP): Performed by: INTERNAL MEDICINE

## 2023-05-18 PROCEDURE — 36415 COLL VENOUS BLD VENIPUNCTURE: CPT

## 2023-05-18 RX ORDER — FERROUS SULFATE 325(65) MG
325 TABLET ORAL 2 TIMES DAILY WITH MEALS
Qty: 60 TABLET | Refills: 0 | Status: SHIPPED | OUTPATIENT
Start: 2023-05-18

## 2023-05-18 RX ORDER — ASPIRIN 81 MG/1
81 TABLET ORAL 2 TIMES DAILY
Qty: 28 TABLET | Refills: 0 | Status: SHIPPED | OUTPATIENT
Start: 2023-05-18 | End: 2023-06-01

## 2023-05-18 RX ORDER — OXYCODONE HYDROCHLORIDE AND ACETAMINOPHEN 5; 325 MG/1; MG/1
1 TABLET ORAL EVERY 4 HOURS PRN
Qty: 20 TABLET | Refills: 0 | Status: SHIPPED | OUTPATIENT
Start: 2023-05-18 | End: 2023-05-25

## 2023-05-18 RX ORDER — LISINOPRIL 10 MG/1
10 TABLET ORAL DAILY
Qty: 30 TABLET | Refills: 3 | Status: SHIPPED | OUTPATIENT
Start: 2023-05-19

## 2023-05-18 RX ADMIN — ASPIRIN 81 MG: 81 TABLET, COATED ORAL at 08:12

## 2023-05-18 RX ADMIN — CYCLOBENZAPRINE 10 MG: 10 TABLET, FILM COATED ORAL at 08:13

## 2023-05-18 RX ADMIN — SENNOSIDES AND DOCUSATE SODIUM 1 TABLET: 50; 8.6 TABLET ORAL at 08:13

## 2023-05-18 RX ADMIN — LISINOPRIL 10 MG: 10 TABLET ORAL at 08:13

## 2023-05-18 RX ADMIN — ACETAMINOPHEN 650 MG: 325 TABLET ORAL at 00:01

## 2023-05-18 RX ADMIN — Medication 1000 UNITS: at 08:13

## 2023-05-18 RX ADMIN — LEVOTHYROXINE SODIUM 50 MCG: 0.03 TABLET ORAL at 06:17

## 2023-05-18 RX ADMIN — FUROSEMIDE 30 MG: 20 TABLET ORAL at 08:12

## 2023-05-18 RX ADMIN — OXYCODONE AND ACETAMINOPHEN 1 TABLET: 5; 325 TABLET ORAL at 08:12

## 2023-05-18 RX ADMIN — FERROUS SULFATE TAB 325 MG (65 MG ELEMENTAL FE) 325 MG: 325 (65 FE) TAB at 08:13

## 2023-05-18 RX ADMIN — CITALOPRAM HYDROBROMIDE 30 MG: 20 TABLET ORAL at 08:12

## 2023-05-18 RX ADMIN — OXYBUTYNIN CHLORIDE 10 MG: 5 TABLET, EXTENDED RELEASE ORAL at 08:12

## 2023-05-18 RX ADMIN — DOCUSATE SODIUM 100 MG: 100 CAPSULE, LIQUID FILLED ORAL at 08:12

## 2023-05-18 RX ADMIN — MULTIVITAMIN TABLET 1 TABLET: TABLET at 08:12

## 2023-05-18 ASSESSMENT — PAIN DESCRIPTION - LOCATION
LOCATION: KNEE

## 2023-05-18 ASSESSMENT — PAIN SCALES - GENERAL
PAINLEVEL_OUTOF10: 10
PAINLEVEL_OUTOF10: 1
PAINLEVEL_OUTOF10: 4

## 2023-05-18 ASSESSMENT — PAIN DESCRIPTION - FREQUENCY
FREQUENCY: CONTINUOUS

## 2023-05-18 ASSESSMENT — PAIN DESCRIPTION - PAIN TYPE
TYPE: ACUTE PAIN

## 2023-05-18 ASSESSMENT — PAIN DESCRIPTION - DESCRIPTORS
DESCRIPTORS: SORE
DESCRIPTORS: ACHING;SORE
DESCRIPTORS: SHOOTING

## 2023-05-18 ASSESSMENT — PAIN DESCRIPTION - ORIENTATION
ORIENTATION: LEFT

## 2023-05-18 ASSESSMENT — PAIN DESCRIPTION - ONSET
ONSET: ON-GOING

## 2023-05-18 ASSESSMENT — PAIN - FUNCTIONAL ASSESSMENT
PAIN_FUNCTIONAL_ASSESSMENT: ACTIVITIES ARE NOT PREVENTED
PAIN_FUNCTIONAL_ASSESSMENT: PREVENTS OR INTERFERES SOME ACTIVE ACTIVITIES AND ADLS
PAIN_FUNCTIONAL_ASSESSMENT: ACTIVITIES ARE NOT PREVENTED

## 2023-05-18 ASSESSMENT — PAIN DESCRIPTION - DIRECTION: RADIATING_TOWARDS: L FOOT

## 2023-05-18 NOTE — DISCHARGE SUMMARY
Discharge Summary    Patient:  Celeste Moeller  YOB: 1950    MRN: [id number]   Acct: [de-identified]    Primary Care Physician: OMAR Escobedo    Admit date:  5/9/2023    Discharge date:   05/18/23      Discharge Diagnoses:   Status post right knee replacement  Principal Problem:    Status post right knee replacement  Active Problems:    Status post left knee replacement  Resolved Problems:    * No resolved hospital problems. *      Admitted for: [facility] Course: patient had long standing knee OA, underwent RTKA and was transferred to [facility] subacute status. During her acute rehab participated in therapy. No adverse events were observed. After completing her acute rehab will be DC home with [facility]      Consultants:  PT    Discharge Medications:       Medication List        START taking these medications      ferrous sulfate 325 (65 Fe) MG tablet  Commonly known as: IRON 325  Take 1 tablet by mouth 2 times daily (with meals)     lisinopril 10 MG tablet  Commonly known as: PRINIVIL;ZESTRIL  Take 1 tablet by mouth daily  Start taking on: May 19, 2023     oxyCODONE-acetaminophen 5-325 MG per tablet  Commonly known as: PERCOCET  Take 1 tablet by mouth every 4 hours as needed for Pain for up to 7 days.  Max Daily Amount: 6 tablets            CHANGE how you take these medications      citalopram 20 MG tablet  Commonly known as: CELEXA  Take 1 tablet by mouth once daily  What changed: additional instructions     levothyroxine 50 MCG tablet  Commonly known as: SYNTHROID  Take 1 tablet by mouth once daily  What changed: when to take this     pravastatin 20 MG tablet  Commonly known as: PRAVACHOL  Take 1 tablet by mouth once daily  What changed: when to take this            CONTINUE taking these medications      acetaminophen 500 MG tablet  Commonly known as: TYLENOL  Take 2 tablets by mouth 3 times daily     aspirin 81 MG EC tablet  Take 1 tablet by mouth 2 times daily for 14 days     *

## 2023-05-18 NOTE — PROGRESS NOTES
1330 Went over discharge instructions at this time. Patient and son understanding of medications and follow up appointments. Transported patient to son's car at this time with wheelchair. Patient in stable condition when leaving unit.
Chart reviewed. Patient is currently receiving skilled therapy at [facility] [place]. Multidisciplinary team met with patient and patient's son in care conference. Discussed patient's care and DC planning. Patient's insurance reported to have issued Notice of Non-Coverage with LCD 5/17 and DC 5/18. Therapy recommending Edwar Padilla upon DC. [place] of choice provided. [facility] identified as agency of choice. SS to follow up with request and coordinate skilled Edwar Padilla to follow patient.
Comprehensive Nutrition Assessment    Type and Reason for Visit:  Reassess    Nutrition Recommendations/Plan:   Continue current diet  PO >75% Meals     Malnutrition Assessment:  Malnutrition Status:  No malnutrition (05/10/23 1107)    Context:  Social/Environmental Circumstances     Findings of the 6 clinical characteristics of malnutrition:  Energy Intake:  Mild decrease in energy intake (Comment)  Weight Loss:  No significant weight loss     Body Fat Loss:  No significant body fat loss     Muscle Mass Loss:  No significant muscle mass loss    Fluid Accumulation:  Unable to assess     Strength:  Not Performed    Nutrition Assessment:    Pt continues to report decreased appetite. Declines oral nutritional supplement despite encouragement. Pt expressed desire to lose weight. Educated on importance of wt maintenance during recovery and therapy. No s/s of malnutrition at this time. Nutrition Related Findings:    Admit to sub acute post left total knee. PMHx includes: HLD, hypothyroid, depression. Admit weight 5/10 201#- weight up from [facility] per weight hx review ~ 185-195#. Monitor weights. Unable to obtain weight at time of visit due to patient out of bed. New weight requested. Edema noted post op to left knee. Lacted ringers previously ordered (now d/c). Diet is regular, PO % meals. No chewing/swallowing difficulty noted. No nutritional concerns from patient at this time- c/o decreased appetite but declines ONS. Meds reviewed, labs reviewed (5/15)- no nutrition related concerns. Skin- no pressure areas, surgical incision to left knee. Last BM per EMR 5/15- pt previously complaining of constipation with meds given per nursing note on 5/14. Wound Type: Surgical Incision (left knee)       Current Nutrition Intake & Therapies:    Average Meal Intake: 51-75%, %  Average Supplements Intake: None Ordered  ADULT DIET;  Regular    Anthropometric Measures:  Height: 5' 4\" (162.6 cm)  Ideal Body Weight (IBW):
[employer] issued by insurance company with LCD of 5/17/23. Patient notified, Enxertos 30 explained. Patient signed, and was given copy. Patient does not express an interest in appealing at this time. Signed Enxert[employer] 30 faxed back to insurance.
Facility/Department: [facility] RACHEL UNIT  Daily Treatment Note  NAME: Zeeshan Perrin  : 1950  MRN: [id number]    Date of Service: 2023    Discharge Recommendations:  Home with assist PRN, Home with Home health PT, Outpatient PT   PT Equipment Recommendations  Equipment Needed: No    Patient Diagnosis(es): s/p L TKR, knee pain, difficulty walking    Assessment   Assessment: Pt tolerated gait/stair training well this date with less physical assist, but remains slow to mobilize. Contineus with high levels of pain, but able to toelrate gait training and therex without significant guarding. Did appear more fatigued this date and reported feeling more tired than usual, but again able to comlete all activities without complaint. Pain remains biggest limiting factor addressed with stretching, rest and ice and received meds from nursing partway [facility] session. Making good progrss toward goals,slowly increasing independence with mobility. COntineus to benefit from skilled PT to improve LE strength, functoin and mobility. Activity Tolerance: Patient limited by pain; Patient limited by endurance; Patient limited by fatigue  Equipment Needed: No     Plan    Physcial Therapy Plan  General Plan: 5-7 times per week (1-2x/day as tolerated)  Current Treatment Recommendations: Strengthening;ROM;Balance training;Functional mobility training;Transfer training;Gait training;Stair training; Safety education & training;Home exercise program;Modalities;Manual;Endurance training  Additional Comments: CP, KT tape     Restrictions  Restrictions/Precautions  Restrictions/Precautions: Fall Risk, Up as Tolerated (R TKR)  Required Braces or Orthoses?: No     Subjective    Subjective  Subjective: 9/10 knee pain; up in bedside chair and statse shes very tired after eating lunch, but agreeable to therapy.   Pain: 9/10 L knee     Objective   Vitals     Bed Mobility Training  Bed Mobility Training: Yes  Supine to Sit: Stand-by assistance (increased
Facility/Department: [facility] MED SURG UNIT  Daily Treatment Note  NAME: Coy Echeverria  : 1950  MRN: [id number]    Date of Service: 2023    Discharge Recommendations:  Home with assist PRN, Home with Home health PT, Outpatient PT        Patient Diagnosis(es): L TKA    Assessment Pt is passively agreeable to PT interventions- very quiet, flat affect, pt continuously noting how \"sleepy\" she is today. Pt moves slowly, demo very rigid gait, frequently reverting to step to pattern, able to correct with frequent cues. Pt able to stand from various suface heights with CGA, however requires mult attempts, and marked time to complete task with encouragement. Issued and verbally reviewed HEP, educated pt on benefits of continued mobility upon homegoing, in addition to continuation of intermittently elevating LEs and use of ice to assist with decreasing pain/inflammation- pt is receptive and verbalizes understanding. Pt seated in w/c for start of care conference, 9/10 L knee pain- nurse notified. Plan    Physcial Therapy Plan  General Plan: 5-7 times per week  Current Treatment Recommendations: Strengthening;ROM;Balance training;Functional mobility training;Transfer training;Gait training;Stair training; Safety education & training;Home exercise program;Modalities;Manual;Endurance training  Additional Comments: CP, KT tape     Restrictions  Restrictions/Precautions: Fall Risk, Up as Tolerated (R TKR)  Required Braces or Orthoses?: No     Subjective    Subjective: Pt seated in chair, resting head on tray table  Pain: 5/10 L knee     Objective   Vitals     Bed Mobility Training  Bed Mobility Training: No  Balance  Sitting: Intact  Standing: With support  Transfer Training  Transfer Training: Yes  Overall Level of Assistance: Contact-guard assistance  Interventions: Tactile cues; Verbal cues  Sit to Stand: Contact-guard assistance  Stand to Sit: Stand-by assistance;Supervision  Gait Training  Gait Training:
Facility/Department: [facility] SUBACUTE UNIT  Daily Treatment Note  NAME: Rajendra Kincaid  : 1950  MRN: [id number]    Date of Service: 2023    Discharge Recommendations:  Continue to assess pending progress, Home with Home health OT         Patient Diagnosis(es): There were no encounter diagnoses. Assessment    Assessment: Pt approached for OT intervention this afternoon, presents seated in chair and had recently finished lunch. Agreeable to HEP education. OT provided handouts and demonstration of each exercise with pt demonstrating each x5 to ensure competency. Pt needed to use the bathroom, OT assisted with transfer to bathroom, pt wanted a few minutes on the toilet. Instructed pt to use her call button when she's done for aide, pt confirmed understanding. Activity Tolerance: Patient limited by pain; Patient limited by endurance; Patient limited by fatigue  Discharge Recommendations: Continue to assess pending progress;Home with Home health OT      Plan   Occupational Therapy Plan  Times Per Week: 4-7  Times Per Day: Once a day  Current Treatment Recommendations: Strengthening;Balance training;Functional mobility training; Endurance training; Safety education & training;Modalities; Pain management;Equipment evaluation, education, & procurement;Self-Care / ADL; Home management training;Positioning;Patient/Caregiver education & training     Restrictions   R TKR    Subjective   Subjective  Subjective: \"I'm doing okay but my pain is still high. \"  Pain: 9/10 pain L knee during functional mobility  Orientation  Overall Orientation Status: Within Functional Limits  Orientation Level: Oriented X4  Pain: 9/10 L knee  Cognition  Overall Cognitive Status: WFL        Objective    Bed Mobility Training  Bed Mobility Training: No  Balance  Sitting: Intact  Standing: With support (FWW used for mobility and standing, CGA)  Transfer Training  Transfer Training: Yes  Overall Level of Assistance: Contact-guard assistance (With
Facility/Department: [facility] SUBACUTE UNIT  Daily Treatment Note  NAME: Latanya Chang  : 1950  MRN: [id number]    Date of Service: 5/15/2023    Discharge Recommendations:  Continue to assess pending progress, Home with Home health OT         Patient Diagnosis(es): There were no encounter diagnoses. Assessment    Assessment: Pt on toilet upon OT arrival, ready to get off. Pt transitioned to sink for grooming then EOB for LB dressing. Pt is making incremental gains towards goals, limited by on-going high pain levels. Pt rates L knee pain at 8 or greater during OT interventions. LLE is bruised, swollen, and shiny RN made aware. Activity Tolerance: Patient limited by pain; Patient limited by endurance  Discharge Recommendations: Continue to assess pending progress;Home with Home health OT      Plan   Occupational Therapy Plan  Times Per Week: 4-7  Times Per Day: Once a day  Current Treatment Recommendations: Strengthening;Balance training;Functional mobility training; Endurance training; Safety education & training;Modalities; Pain management;Equipment evaluation, education, & procurement;Self-Care / ADL; Home management training;Positioning;Patient/Caregiver education & training     Restrictions   L TKR    Subjective   Subjective  Subjective: \"I was hoping my pain would be better. \"  Pain: 8/10 pain L knee  Orientation  Overall Orientation Status: Within Functional Limits  Orientation Level: Oriented X4  Pain: 8/10 L knee  Cognition  Overall Cognitive Status: WFL  Cognition Comment: Pleasant and cooperative, a little slow to process and make decisions        Objective    Bed Mobility Training  Bed Mobility Training: No  Balance  Sitting: Intact  Standing: With support (FWW used for standing and mobility tasks SBA/CGA)  Transfer Training  Transfer Training: Yes  Overall Level of Assistance: Minimum assistance (With FWW from lower surface)  Interventions: Safety awareness training;Verbal cues  Sit to Stand: Minimum
Hospitalist Progress Note      PCP: OMAR Finney    Date of Admission: 5/9/2023    Chief Complaint: knee pain    Subjective: pt was sleeping, easy to awake     Medications:  Reviewed    Infusion Medications   Scheduled Medications    furosemide  30 mg Oral Daily    lisinopril  10 mg Oral Daily    Vitamin D  1,000 Units Oral Daily    docusate sodium  100 mg Oral BID    oxybutynin  10 mg Oral Daily    sennosides-docusate sodium  1 tablet Oral BID    aspirin  81 mg Oral BID    levothyroxine  50 mcg Oral Daily    multivitamin  1 tablet Oral Daily    pravastatin  20 mg Oral Nightly    citalopram  30 mg Oral Daily    ferrous sulfate  325 mg Oral BID      PRN Meds: oxyCODONE-acetaminophen, acetaminophen, sodium chloride flush, [Held by provider] oxyCODONE **OR** [Held by provider] oxyCODONE, polyethylene glycol, tiZANidine      Intake/Output Summary (Last 24 hours) at 5/16/2023 0720  Last data filed at 5/15/2023 0957  Gross per 24 hour   Intake 240 ml   Output --   Net 240 ml       Exam:    BP (!) 174/65   Pulse 76   Temp 97.9 °F (36.6 °C)   Resp 16   Ht 5' 4\" (1.626 m)   Wt 201 lb (91.2 kg)   LMP  (LMP Unknown)   SpO2 100%   BMI 34.50 kg/m²     General appearance: No apparent distress, appears stated age and cooperative. HEENT: Pupils equal, round, and reactive to light. Conjunctivae/corneas clear. Neck: Supple, with full range of motion. No jugular venous distention. Trachea midline. Respiratory:  Normal respiratory effort. Clear to auscultation, bilaterally without Rales/Wheezes/Rhonchi. Cardiovascular: Regular rate and rhythm with normal S1/S2 without murmurs, rubs or gallops. Abdomen: Soft, non-tender, non-distended with normal bowel sounds. Musculoskeletal: trace Left leg/knee edema. Full range of motion without deformity. Skin: Skin color, texture, turgor normal.  No rashes or lesions. Neurologic:  Neurovascularly intact without any focal sensory/motor deficits.  Cranial nerves: II-XII
Hospitalist Progress Note      PCP: OMAR Wheeler    Date of Admission: 5/9/2023    Chief Complaint: pain    Subjective: pt back from therapy, sitting in chair     Medications:  Reviewed    Infusion Medications   Scheduled Medications    cyclobenzaprine  10 mg Oral TID    furosemide  30 mg Oral Daily    lisinopril  10 mg Oral Daily    Vitamin D  1,000 Units Oral Daily    docusate sodium  100 mg Oral BID    oxybutynin  10 mg Oral Daily    sennosides-docusate sodium  1 tablet Oral BID    aspirin  81 mg Oral BID    levothyroxine  50 mcg Oral Daily    multivitamin  1 tablet Oral Daily    pravastatin  20 mg Oral Nightly    citalopram  30 mg Oral Daily    ferrous sulfate  325 mg Oral BID WC     PRN Meds: oxyCODONE-acetaminophen, acetaminophen, sodium chloride flush, [Held by provider] oxyCODONE **OR** [Held by provider] oxyCODONE, polyethylene glycol, [Held by provider] tiZANidine      Intake/Output Summary (Last 24 hours) at 5/17/2023 1033  Last data filed at 5/16/2023 2104  Gross per 24 hour   Intake 120 ml   Output --   Net 120 ml       Exam:    BP (!) 157/86   Pulse 75   Temp 98.7 °F (37.1 °C) (Oral)   Resp 18   Ht 5' 4\" (1.626 m)   Wt 201 lb (91.2 kg)   LMP  (LMP Unknown)   SpO2 96%   BMI 34.50 kg/m²     General appearance: No apparent distress, appears stated age and cooperative. HEENT: Pupils equal, round, and reactive to light. Conjunctivae/corneas clear. Neck: Supple, with full range of motion. No jugular venous distention. Trachea midline. Respiratory:  Normal respiratory effort. Clear to auscultation, bilaterally without Rales/Wheezes/Rhonchi. Cardiovascular: Regular rate and rhythm with normal S1/S2 without murmurs, rubs or gallops. Abdomen: Soft, non-tender, non-distended with normal bowel sounds. Musculoskeletal: No clubbing, cyanosis or edema bilaterally. Full range of motion without deformity. Skin: Skin color, texture, turgor normal.  No rashes or lesions.   Neurologic:
Met with patient and family for weekly care conference, current diet remains appropriate and well tolerated . Intake appears fair. Pt has declined supplement in house. Recommend supplement for home going for times of decreased appetite and to consume adequate kcal/pro for medications. Son plans to  supplements for home-going. Current weight ordered for further assessment. RD to continue to monitor for duration of admission.
OT reports that pts LLE appears more swollen then previous. Pt reports persistent pain despite PRN med regimen. Dr Reji Herrera notified. New orders for ultrasound LE received. Ultrasound tech to come to floor at 1600.
Occupational Therapy  Facility/Department: [facility] MED SURG UNIT  Daily Treatment Note  NAME: Indy Muhammad  : 1950  MRN: [id number]    Date of Service: 2023    Discharge Recommendations:  Home with Home health OT, son lives with pt. Treatment Diagnosis: Decreased ADL/IADL status d/t L TKR    Patient Diagnosis: L TKA    Assessment      OT follow up: yes  Pt. Was agreeable to a full shower with OT. Pt. Stated she has 8/10 pain in left knee. Pt. Still requires extra time to complete tasks with mod vc for encouragement to do as much as she can. Pt. Moves slowly due to increased pain. Pt.'s ADL status is listed below and has not increased in status from last ADLs with OT. Trained and educated pt. In shower transfer with correct techniques using grab bars and shower chair. Trained and educated pt. In wearing house coats or dresses at home for easier dressing techniques due to pt. Still needs Mod A/Min A for LB dressing. Pt. Needs assistance with changing socks and starting underwear and pants. Pt. Tolerated standing at sink brush her teeth for 6 minutes with ww today. Pt. Did increase her standing tolerance. Educated pt. On bending her knee as much as possible to prevent scar tissue build up in her knee. Pt. Is not completing step through walking pattern, she is stepping to. Answered pt.'s questions and concerns. Retrieved pt. Ice packs for knee to decrease pain and swelling. Plan       Occupational Therapy Plan  Times Per Week: 4-7  Times Per Day:  Once a day  Current Treatment Recommendations: Strengthening, Balance training, Functional mobility training, Endurance training, Safety education & training, Modalities, Pain management, Equipment evaluation, education, & procurement, Self-Care / ADL, Home management training, Positioning, Patient/Caregiver education & training      Restrictions   Restrictions/Precautions  Restrictions/Precautions: Fall Risk, Up as Tolerated (R TKR)  Required Braces or
Physical Therapy  Facility/Department: [facility] MED SURG UNIT  Daily Treatment Note  NAME: Marvin Aguilar  : 1950  MRN: [id number]    Date of Service: 2023    Discharge Recommendations:  Home with assist PRN, Home with Home health PT, Outpatient PT        Patient Diagnosis(es): There were no encounter diagnoses. Assessment   Assessment: (P) Pt. limited by pain and hesitant to move L LE due to difficulty and pain. Pt. reports 0/10 pain at rest and 10/10 with ROM or weight bear with stand. Pt. able to demo increase step length B LE with the need for verbal cues during gait training. Pt. progressing with non recipical stair function with use of hand rail and cane needing contact guard assist for safety. Moreover, pt. requires additional time with vc's to engage self participation to complete sit to stand without assistance. Activity Tolerance: Patient limited by pain; Patient limited by endurance; Patient limited by fatigue     Plan    Physcial Therapy Plan  General Plan: (P) 5-7 times per week (1-2x a day.)  Current Treatment Recommendations: (P) Strengthening;ROM;Balance training;Functional mobility training;Transfer training;Gait training;Stair training; Safety education & training;Home exercise program;Modalities;Manual;Endurance training  Additional Comments: CP, KT tape     Restrictions  Restrictions/Precautions  Restrictions/Precautions: Fall Risk, Up as Tolerated (R TKR)  Required Braces or Orthoses?: No     Subjective    Subjective  Subjective: I have no pain at rest. My left knee hurts more when I move it 10/10.   Pain: 9/10 L knee  Orientation  Overall Orientation Status: Within Functional Limits  Orientation Level: Oriented X4  Cognition  Overall Cognitive Status: WFL     Objective   Vitals     Bed Mobility Training  Bed Mobility Training: No  Balance  Sitting: Intact  Standing: With support (FWW used for mobility and standing, CGA)  Transfer Training  Transfer Training: Yes  Overall Level of
Physical Therapy  Facility/Department: [facility] MED SURG UNIT  Daily Treatment Note  NAME: Sb Delacruz  : 1950  MRN: [id number]    Date of Service: 5/15/2023    Discharge Recommendations:  Home with assist PRN, Home with Home health PT, Outpatient PT        Patient Diagnosis(es): There were no encounter diagnoses. Assessment   Assessment: (P) Pt. improving slowly with increasing L knee weight bear tolerance to safely ambulate and perform stair function with least assistance. Pt. requires at least stand by assist/contact guard assistance for safety. CGA needed more with pt. attempting to increase step through gait with increasing step length while increasing visual upright. Pt. slow but steady with tsf's and gait overall. Pt. requires at least CGA with stair training to simulate home. Pt. indicates she has limited tolerance to move bending or straightening left knee due to bend. Pt. cooperative with ther ex and tries to improve L knee ROM with vc's. Left knee AAROM 5 - 88 degrees with enocuragement. Activity Tolerance: Patient limited by pain; Patient limited by endurance     Plan    Physcial Therapy Plan  General Plan: 5-7 times per week  Current Treatment Recommendations: Strengthening;ROM;Balance training;Functional mobility training;Transfer training;Gait training;Stair training; Safety education & training;Home exercise program;Modalities;Manual;Endurance training     Restrictions  Restrictions/Precautions  Restrictions/Precautions: Fall Risk, Up as Tolerated (R TKR)  Required Braces or Orthoses?: No     Subjective    Subjective  Subjective: Pt. up in chair and reports 5/10 L knee pain after recieving pain meds. Pain increases to 8/10 with walking.      Objective   Vitals     Transfer Training  Transfer Training: Yes  Overall Level of Assistance: Stand-by assistance;Contact-guard assistance  Sit to Stand: Stand-by assistance;Contact-guard assistance  Stand to Sit: Stand-by assistance;Contact-guard
Physical Therapy  Facility/Department: [facility] MED SURG UNIT  Daily Treatment Note  NAME: Sb Delacruz  : 1950  MRN: [id number]    Date of Service: 2023    Discharge Recommendations:  (P) Home with assist PRN, Home with Home health PT, Outpatient PT        Patient Diagnosis(es): The encounter diagnosis was Status post left knee replacement. Assessment   Assessment: (P) Pt very fatigued this date with increased 10/10 L knee pain, restricting L LE movement and ability for Ther Ex and distance for gait training. PT to be D/C today. Safety care verbal instruction givem to Pt to ambulate up and down 2 steps into house with pt verbally understadning. Pt required much time to complete each task today due to increased L knee pain and fatigue with much VC'ing. PT has revieved HEP. Activity Tolerance: (P) Patient limited by pain; Patient limited by endurance; Patient limited by fatigue     Plan    Physcial Therapy Plan  General Plan: (P) 5-7 times per week  Current Treatment Recommendations: (P) Strengthening;ROM;Balance training;Functional mobility training;Transfer training;Gait training;Stair training; Safety education & training;Home exercise program;Modalities;Manual;Endurance training     Restrictions  Restrictions/Precautions  Restrictions/Precautions: Fall Risk, Up as Tolerated (R TKR)  Required Braces or Orthoses?: No     Subjective    Subjective  Subjective: (P) Pt states, \"I feel tired today\". Pt to be D/C today. Pt states 10/10 L knee pain but agreeable to therapy.   Orientation  Overall Orientation Status: (P) Within Functional Limits  Cognition  Overall Cognitive Status: (P) WFL     Objective   Vitals     Balance  Sitting: (P) Intact  Standing: (P) With support  Transfer Training  Transfer Training: (P) Yes  Overall Level of Assistance: (P) Contact-guard assistance  Interventions: (P) Verbal cues  Sit to Stand: (P) Contact-guard assistance (Pt follows thrpugh with VC's for hand placement.)  Stand to
Pt resting in bed with eyes closed. Respirations even and unlabored. Ambulates with assist of walker. (L) knee incision well approximated. (L) knee remains ecchymotic and slightly edematous this evening. Pt medicated for c/o pain to (L) knee/leg earlier in shift with effective results. Call light and over bed table within reach. Bed alarm on.
This  contacted [facility] per patient's request to coordinate services. Spoke with Kobi Jackman in intake with [facility]. Referral faxed to Kobi Jackman as requested. Kobi Jackman also requesting that [facility] order along with DC Summary be faxed to [facility] upon patient's DC. FIDEL completed. Plan remains for patient to DC back home with adult son on Thursday 5/18. SS to continue to follow while patient is at [facility].
Ultrasound to bedside.
mobility  Long Term Goal 2: Indep with transfers  Long Term Goal 3: Pt able to ambulate with AD for 125'x1 with mod inde with increased step length L LE  Long Term Goal 4: Pt able to ambulate up/down 2 steps with supervision and no HRs for safe entry into her home  Long Term Goal 5: Increase pts L knee AROM 0-100 degrees to improve pts mobillty  Patient Goals   Patient Goals :  To get stronger    Education   Almas, plan, goals, Edwar 76 and OP PT asap for best success    Therapy Time   Individual Concurrent Group Co-treatment   Time In  1000         Time Out  1020         Minutes  [address] [id number], [id number]
Concurrent Group Co-treatment   Time In  145         Time Out  230         Minutes  Nancy Santos , [place] .[id number]

## 2023-05-18 NOTE — CARE COORDINATION
[address] [place] [address] was notified of pt's DC today. Requested clinicals were faxed to [facility]. Fax verification received.

## 2023-05-18 NOTE — PLAN OF CARE
Problem: Discharge Planning  Goal: Discharge to home or other facility with appropriate resources  5/18/2023 0331 by Tuan Gregg RN  Outcome: Progressing  5/17/2023 1822 by Marquez Durbin RN  Outcome: Progressing     Problem: Safety - Adult  Goal: Free from fall injury  5/18/2023 0331 by Tuan Gregg RN  Outcome: Progressing  5/17/2023 1822 by Marquez Durbin RN  Outcome: Progressing     Problem: ABCDS Injury Assessment  Goal: Absence of physical injury  5/18/2023 0331 by Tuan Gregg RN  Outcome: Progressing  5/17/2023 1822 by Marquez Durbin RN  Outcome: Progressing     Problem: Nutrition Deficit:  Goal: Optimize nutritional status  5/18/2023 0331 by Tuan Gregg RN  Outcome: Progressing  5/17/2023 1822 by Marquez Durbin RN  Outcome: Progressing     Problem: Pain  Goal: Verbalizes/displays adequate comfort level or baseline comfort level  5/18/2023 0331 by Tuan Gregg RN  Outcome: Progressing  5/17/2023 1822 by Marquez Durbin RN  Outcome: Progressing

## 2023-05-19 ENCOUNTER — TELEPHONE (OUTPATIENT)
Dept: ORTHOPEDIC SURGERY | Age: 73
End: 2023-05-19

## 2023-05-19 NOTE — TELEPHONE ENCOUNTER
Care Transitions Initial Follow Up Call    Outreach made within 2 business days of discharge: Yes    Patient: Akosua Dukes Patient : 1950   MRN: [id number]  Reason for Admission: There are no discharge diagnoses documented for the most recent discharge.   Discharge Date: 23       Spoke with: Pt was called, left message to call back    Discharge department/facility: [facility]        Scheduled appointment with PCP within 7-14 days    Follow Up  Future Appointments   Date Time Provider Taye Shelley   2023  1:00 PM Juventino Angel       [facility], [address] [place] [address]

## 2023-05-22 ENCOUNTER — HOSPITAL ENCOUNTER (OUTPATIENT)
Dept: ORTHOPEDIC SURGERY | Age: 73
Discharge: HOME | End: 2023-05-24
Payer: MEDICARE

## 2023-05-22 ENCOUNTER — OFFICE VISIT (OUTPATIENT)
Dept: ORTHOPEDIC SURGERY | Age: 73
End: 2023-05-22

## 2023-05-22 VITALS — WEIGHT: 184 LBS | BODY MASS INDEX: 31.41 KG/M2 | HEIGHT: 64 IN

## 2023-05-22 DIAGNOSIS — Z96.652 STATUS POST TOTAL LEFT KNEE REPLACEMENT: Primary | ICD-10-CM

## 2023-05-22 DIAGNOSIS — Z96.652 STATUS POST TOTAL LEFT KNEE REPLACEMENT: ICD-10-CM

## 2023-05-22 PROCEDURE — 73562 X-RAY EXAM OF KNEE 3: CPT

## 2023-05-22 PROCEDURE — 99024 POSTOP FOLLOW-UP VISIT: CPT | Performed by: PHYSICIAN ASSISTANT

## 2023-05-22 RX ORDER — OXYCODONE HYDROCHLORIDE 5 MG/1
5 TABLET ORAL 2 TIMES DAILY PRN
Qty: 14 TABLET | Refills: 0 | Status: SHIPPED | OUTPATIENT
Start: 2023-05-22 | End: 2023-05-29

## 2023-05-22 NOTE — PROGRESS NOTES
Narrative Referring Provider:   Gray Khan      PCP:   OMAR Muñoz    ============================  IMPRESSION/PLAN:  ============================  Humble Wright is s/p left Total knee replacement completed on May 8, 2023. IMPRESSION: Excellent early outcome and No complaints or limitations    PLAN:   Begin outpatient physical therapy  Routine follow-up. Ice compression and elevation  Patient Reassurance: Normal post-operative course discussed with patient. Patient reassured and supported. All questions answered. Follow up 1 months No X-Rays Needed    Patient presents today for a a routine 1st post-op visit    Status post op:  right Total knee replacement     BMI: There is no height or weight on file to calculate BMI. Post-operative recovery was complicated by uneventful/none. Patient rates their condition as improving. Does the patient still experience pain? 2/10 pain, aching    Post Op discharge patient location: in home. Functional Assessment is as follows: is ready to begin outpatient PT. Functional difficulties: None. Pain Medication: Tylenol, rare oxycodone  Currently Ambulating with: A walker  =================================  EXAM: POST OP KNEE  =================================  Right Post-Operative Knee    Ambulates with a limp favoring the left. SKIN: Appropriate postop appearance, No evidence of erythema, warmth, discharge or drainage and Incision clean/dry/intact. Range of motion is 0 degrees in extension and   90 degrees of flexion. Extension La degrees    Pain with ROM: Yes with deeper flexion    There is Mild effusion. Mal-alignment: No    Tender to the palpation of Medial femoral condyle and Medial joint line    Neurovascular Status: Sensation Intact, Moves foot and ankle up & down, 2+ dorsalis pedis and negative homans sign    Stability:Varus/Valgus- Yes, stable    Quad strength: improving    Imagin.  Implants are well

## 2023-05-31 ENCOUNTER — HOSPITAL ENCOUNTER (OUTPATIENT)
Dept: PHYSICAL THERAPY | Age: 73
Setting detail: THERAPIES SERIES
Discharge: HOME | End: 2023-05-31
Payer: MEDICARE

## 2023-05-31 PROCEDURE — 97116 GAIT TRAINING THERAPY: CPT

## 2023-05-31 PROCEDURE — 97162 PT EVAL MOD COMPLEX 30 MIN: CPT

## 2023-05-31 PROCEDURE — 97110 THERAPEUTIC EXERCISES: CPT

## 2023-05-31 ASSESSMENT — PAIN DESCRIPTION - ORIENTATION: ORIENTATION: LEFT

## 2023-05-31 ASSESSMENT — PAIN DESCRIPTION - LOCATION: LOCATION: KNEE

## 2023-05-31 ASSESSMENT — PAIN DESCRIPTION - DESCRIPTORS: DESCRIPTORS: ACHING

## 2023-05-31 ASSESSMENT — PAIN SCALES - GENERAL: PAINLEVEL_OUTOF10: 3

## 2023-05-31 NOTE — PROGRESS NOTES
reports that she had a copy of the other exercies as home from her inpt rehab. PT reviewed scar tissue mobs and also encouraged focusing on heel slides for increasing her ROM. Pts Tawny's was neg for her L LE. Pt would benefit from PT for 1-2x per week for 4-6 weeks to progress with her POC. Body Structures, Functions, Activity Limitations Requiring Skilled Therapeutic Intervention: Decreased functional mobility , Decreased ROM, Decreased strength, Decreased endurance, Increased pain    Statement of Medical Necessity: Physical Therapy is both indicated and medically necessary as outlined in the POC to increase the likelihood of meeting the functionally related goals stated below. Patient's Activity Tolerance:        Patient's rehabilitation potential/prognosis is considered to be: Good          GOALS   Patient Goal(s):  To get stronger and get her endurance back  Short Term Goals Completed by 1-2 weeks from date of evaluation Goal Status   Pt reports having 3/10 or less left knee pain with increased ambulation     Pt able to complete her HEP 3-5x per week                                                         Long Term Goals Completed by 4-6 weeks from date of evaluation Goal Status   Pt reports having 1/10 left knee pain or less with community ambulation     Increase her left knee AROM 0-120 so that pt can ambulate up/down a flight of steps reciprocally     Increase her left knee strenght to 4+/5 or > so that pt can ambulate >440'x 1 with her st cane with equals step length B LE     Improve pts LEFS from 50% on evaluationt on <15% by time of DC     Pt indep with an advanced HEP to progress with strength and mobility                                        TREATMENT PLAN            Pt. actively involved in establishing Plan of Care and Goals: Yes  Patient/ Caregiver education and instruction:      KT tape        Treatment may include any combination of the following: Current Treatment Recommendations:

## 2023-06-02 ENCOUNTER — HOSPITAL ENCOUNTER (OUTPATIENT)
Dept: PHYSICAL THERAPY | Age: 73
Setting detail: THERAPIES SERIES
Discharge: HOME | End: 2023-06-02
Payer: MEDICARE

## 2023-06-02 PROCEDURE — 97110 THERAPEUTIC EXERCISES: CPT

## 2023-06-02 PROCEDURE — 97140 MANUAL THERAPY 1/> REGIONS: CPT

## 2023-06-02 ASSESSMENT — PAIN SCALES - GENERAL: PAINLEVEL_OUTOF10: 4

## 2023-06-02 ASSESSMENT — PAIN DESCRIPTION - LOCATION: LOCATION: KNEE

## 2023-06-02 ASSESSMENT — PAIN DESCRIPTION - PAIN TYPE: TYPE: ACUTE PAIN;SURGICAL PAIN

## 2023-06-02 NOTE — PROGRESS NOTES
to <1/10 with transfers and indepenent with modalities for pain management   New   Imrpove ambulation to >250 ft with st cane independently   New   Improve TUG to <20 sec with cane to demonstrate improved safety with household mobility   New   Indpendent with HEP for LE strengthening for longterm symptom management   New                                                TREATMENT PLAN   Plan Frequency: 1-2  Plan weeks: 4-6  Current Treatment Recommendations: Strengthening, ROM, Balance training, Functional mobility training, Transfer training, Manual, Gait training, Stair training, Pain management, Home exercise program, Modalities  Modalities: Heat/Cold, Ultrasound, E-stim - unattended   Additional Comments: KT tape       Therapy Time  Individual Time In:      3:30pm   Individual Time Out:  4:30pm  Minutes:  60 min         Electronically signed by Columba Harper PT  on 6/2/2023 at 4:39 PM

## 2023-06-05 ENCOUNTER — HOSPITAL ENCOUNTER (OUTPATIENT)
Dept: PHYSICAL THERAPY | Age: 73
Setting detail: THERAPIES SERIES
Discharge: HOME | End: 2023-06-05
Payer: MEDICARE

## 2023-06-05 PROCEDURE — 97110 THERAPEUTIC EXERCISES: CPT

## 2023-06-05 PROCEDURE — G0283 ELEC STIM OTHER THAN WOUND: HCPCS

## 2023-06-05 PROCEDURE — 97140 MANUAL THERAPY 1/> REGIONS: CPT

## 2023-06-05 ASSESSMENT — PAIN DESCRIPTION - ORIENTATION: ORIENTATION: LEFT

## 2023-06-05 ASSESSMENT — PAIN DESCRIPTION - PAIN TYPE: TYPE: SURGICAL PAIN;ACUTE PAIN

## 2023-06-05 ASSESSMENT — PAIN DESCRIPTION - DESCRIPTORS: DESCRIPTORS: ACHING

## 2023-06-05 ASSESSMENT — PAIN SCALES - GENERAL: PAINLEVEL_OUTOF10: 6

## 2023-06-05 ASSESSMENT — PAIN DESCRIPTION - LOCATION: LOCATION: KNEE

## 2023-06-05 NOTE — PROGRESS NOTES
Improve max pain to 2/10 R hip and independent with use of ice for pain control New   Instruct in HEP for LE strengthening and report compliance 5/7 days New   Improve ambulation to tolerate walking from car to/from and within therapy without use of w/c New                                     Long Term Goals Completed by 4 weeks Goal Status   improve R hip strength by 1/2 grade to be able to lift leg against gravity all planes and improve hip stability with gait and transfers New   IMprove R hip pain to <1/10 with transfers and indepenent with modalities for pain management New   Imrpove ambulation to >250 ft with st cane independently New   Improve TUG to <20 sec with cane to demonstrate improved safety with household mobility New   Indpendent with HEP for LE strengthening for longterm symptom management New                            TREATMENT PLAN   Plan Frequency: 1-2  Plan weeks: 4-6  Current Treatment Recommendations: Strengthening, ROM, Balance training, Functional mobility training, Transfer training, Manual, Gait training, Stair training, Pain management, Home exercise program, Modalities  Modalities: Heat/Cold, Ultrasound, E-stim - unattended   Specific Instructions for Next Treatment: review HEP, progress strengthening; gentle R Hip stretching  Additional Comments: KT tape       Therapy Time  Individual Time In:      130   Individual Time Out:  230  Minutes:  60        Electronically signed by Lang Barreto PTA  on 3/8/6924 at 3:45 PM   POC NOTE

## 2023-06-07 ENCOUNTER — HOSPITAL ENCOUNTER (OUTPATIENT)
Dept: PHYSICAL THERAPY | Age: 73
Setting detail: THERAPIES SERIES
Discharge: HOME | End: 2023-06-07
Payer: MEDICARE

## 2023-06-07 PROCEDURE — 97116 GAIT TRAINING THERAPY: CPT

## 2023-06-07 PROCEDURE — 97110 THERAPEUTIC EXERCISES: CPT

## 2023-06-07 ASSESSMENT — PAIN DESCRIPTION - DESCRIPTORS: DESCRIPTORS: ACHING;SORE

## 2023-06-07 ASSESSMENT — PAIN SCALES - GENERAL: PAINLEVEL_OUTOF10: 3

## 2023-06-07 ASSESSMENT — PAIN DESCRIPTION - ORIENTATION: ORIENTATION: LEFT

## 2023-06-07 ASSESSMENT — PAIN DESCRIPTION - LOCATION: LOCATION: KNEE

## 2023-06-07 ASSESSMENT — PAIN DESCRIPTION - PAIN TYPE: TYPE: SURGICAL PAIN

## 2023-06-07 NOTE — PROGRESS NOTES
walking from car to/from and within therapy without use of w/c New                                                   Long Term Goals Completed by 4 weeks Goal Status   improve R hip strength by 1/2 grade to be able to lift leg against gravity all planes and improve hip stability with gait and transfers New   IMprove R hip pain to <1/10 with transfers and indepenent with modalities for pain management New   Imrpove ambulation to >250 ft with st cane independently New   Improve TUG to <20 sec with cane to demonstrate improved safety with household mobility New   Indpendent with HEP for LE strengthening for longterm symptom management New          TREATMENT PLAN   Plan Frequency: 1-2  Plan weeks: 4-6  Current Treatment Recommendations: Strengthening, ROM, Balance training, Functional mobility training, Transfer training, Manual, Gait training, Stair training, Pain management, Home exercise program, Modalities  Modalities: Heat/Cold, Ultrasound, E-stim - unattended   Specific Instructions for Next Treatment: review HEP, progress strengthening; gentle R Hip stretching  Additional Comments: KT tape       Therapy Time  Individual Time In:       1245  Individual Time Out:  1330  Minutes:  39        Electronically signed by Hollie Chávez PTA  on 0/6/2329 at 1:36 PM   POC NOTE

## 2023-06-19 ENCOUNTER — HOSPITAL ENCOUNTER (OUTPATIENT)
Dept: PHYSICAL THERAPY | Age: 73
Setting detail: THERAPIES SERIES
Discharge: HOME | End: 2023-06-19
Payer: MEDICARE

## 2023-06-19 PROCEDURE — 97110 THERAPEUTIC EXERCISES: CPT

## 2023-06-19 ASSESSMENT — PAIN SCALES - GENERAL: PAINLEVEL_OUTOF10: 3

## 2023-06-19 NOTE — PROGRESS NOTES
Physical Therapy: Daily Note   Patient: Iraj Barry (81 y.o. female)   Examination Date:   Plan of Care/Certification Expiration Date: 23    Progress Note Counter: Total knee replacement on 23     :  1950 # of Visits since [facility]:   6   MRN: [id number]  CSN: [id number] Start of Care Date:   2023   Insurance: Payor: [facility] MEDICARE / Plan: Ingrisrosy Mathis ESSENTIAL/PLUS / Product Type: *No Product type* /   Insurance ID: [id number] - (Medicare Managed) Secondary Insurance (if applicable):    Referring Physician: MD Dr Aki Rojas   PCP: Ancil Crigler, PA Visits to Date/Visits Approved:     No Show/Cancelled Appts:   / 0     Medical Diagnosis: Radiculopathy, lumbar region [M54.16]    Treatment Diagnosis: s/p Left total knee replacement on 23        SUBJECTIVE EXAMINATION   Pain Level: Pain Screening  Patient Currently in Pain: Yes  Pain Assessment: 0-10  Pain Level: 3 (L knee)    Patient Comments: Subjective: Pt. states her L knee pain is 3/10 achy pain. Pt states she has an appt with her doctor next monday d/t her fatigue. Pt reports no R hip pain currently \"but it hurts off and off especially if I've walked far\".     HEP Compliance: Good        OBJECTIVE EXAMINATION   Restrictions:  Restrictions/Precautions: Fall Risk; Up as Tolerated (R TKR)   Required Braces or Orthoses?: No   No data recorded        Left AROM  Right AROM      AROM LLE (degrees)  L Knee Flexion (0-145): 0-115 deg post stretch         TREATMENT     Exercises:      Treatment Reasoning    Exercise 2: heel slides seated x 10 hold 10 sec  Exercise 3: Recumbant bike x 5 min full revolutions  Exercise 6: Supported standing 3 way hip x 10 alternating LE's with RTB at knees  Exercise 11: Hooklying hip ABD BTB x 20 hold 3 sec  Exercise 17: mini bridge; H3'' x 10  Exercise 18: supine HS stretch; H30'' x 3  Exercise 19: SLR; LLE no hold x 5  Exercise 20: B hip flexor stretch; H30'' x 3

## 2023-06-21 ENCOUNTER — HOSPITAL ENCOUNTER (OUTPATIENT)
Dept: PHYSICAL THERAPY | Age: 73
Setting detail: THERAPIES SERIES
Discharge: HOME | End: 2023-06-21
Payer: MEDICARE

## 2023-06-21 PROCEDURE — 97116 GAIT TRAINING THERAPY: CPT

## 2023-06-21 PROCEDURE — 97110 THERAPEUTIC EXERCISES: CPT

## 2023-06-21 NOTE — PROGRESS NOTES
Physical Therapy  Physical Therapy: Daily Note   Patient: Charles Rothman (45 y.o. female)   Examination Date:   Plan of Care/Certification Expiration Date: 23    Progress Note Counter: Total knee replacement on 23     :  1950 # of Visits since [facility]:   7   MRN: [id number]  CSN: [id number] Start of Care Date:   2023   Insurance: Payor: Jenny Joe / Plan: [employer] ESSENTIAL/PLUS / Product Type: *No Product type* /   Insurance ID: [id number] - (Medicare Managed) Secondary Insurance (if applicable):    Referring Physician: Estefany Rushing MD     PCP: OMAR Ivy Visits to Date/Visits Approved:     No Show/Cancelled Appts: 0 / 0     Medical Diagnosis: Radiculopathy, lumbar region [M54.16]    Treatment Diagnosis: s/p Left total knee replacement on 23        SUBJECTIVE EXAMINATION   Pain Level: Pain Screening  Patient Currently in Pain: No    Patient Comments: Subjective: Pt. arrives to theapy this date with no pain. Walking with Rollator.   Pt. wearing sandals to therapy session and observed small step length upon arrival.    HEP Compliance: Fair        OBJECTIVE EXAMINATION   Restrictions:  Restrictions/Precautions: Fall Risk; Up as Tolerated (R TKR)   Required Braces or Orthoses?: No   No data recorded      Left AROM  Right AROM      AROM LLE (degrees)  L Knee Flexion (0-145): 0-117 deg post stretch             TREATMENT     Exercises:      Treatment Reasoning    Exercise 1: QS x 10 hold 10 sec  Exercise 2: heel slides supine x 10 hold 10 sec  Exercise 3: Recumbant bike x 5 min full revolutions  Exercise 4: Supported standing alternating march x 10 hold 1-3 sec B 2#  Exercise 5: Supported standing heel and toe raises x 10 2#  Exercise 6: Supported standing 3 way hip x 10 alternating LE's with RTB at knees  Exercise 18: supine HS stretch; H30'' x 3  Exercise 19: SLR; LLE hold 3 sec x 10  Exercise 20: B hip flexor stretch; H30'' x 3

## 2023-06-26 ENCOUNTER — OFFICE VISIT (OUTPATIENT)
Dept: INTERNAL MEDICINE | Age: 73
End: 2023-06-26
Payer: MEDICARE

## 2023-06-26 VITALS
WEIGHT: 183.2 LBS | HEIGHT: 64 IN | RESPIRATION RATE: 16 BRPM | TEMPERATURE: 97.2 F | BODY MASS INDEX: 31.28 KG/M2 | DIASTOLIC BLOOD PRESSURE: 62 MMHG | HEART RATE: 74 BPM | SYSTOLIC BLOOD PRESSURE: 114 MMHG | OXYGEN SATURATION: 98 %

## 2023-06-26 DIAGNOSIS — F33.1 MAJOR DEPRESSIVE DISORDER, RECURRENT EPISODE, MODERATE (HCC): ICD-10-CM

## 2023-06-26 DIAGNOSIS — R53.83 FATIGUE, UNSPECIFIED TYPE: ICD-10-CM

## 2023-06-26 DIAGNOSIS — E03.9 HYPOTHYROIDISM, UNSPECIFIED TYPE: ICD-10-CM

## 2023-06-26 DIAGNOSIS — E78.2 MIXED HYPERLIPIDEMIA: ICD-10-CM

## 2023-06-26 DIAGNOSIS — E55.9 VITAMIN D DEFICIENCY: ICD-10-CM

## 2023-06-26 DIAGNOSIS — R53.83 FATIGUE, UNSPECIFIED TYPE: Primary | ICD-10-CM

## 2023-06-26 LAB
ANISOCYTOSIS BLD QL SMEAR: ABNORMAL
BASOPHILS # BLD: 0 K/UL (ref 0–0.2)
BASOPHILS NFR BLD: 0 %
EOSINOPHIL # BLD: 0.1 K/UL (ref 0–0.7)
EOSINOPHIL NFR BLD: 1 %
ERYTHROCYTE [DISTWIDTH] IN BLOOD BY AUTOMATED COUNT: 15.7 % (ref 11.5–14.5)
HCT VFR BLD AUTO: 37 % (ref 37–47)
HGB BLD-MCNC: 11.9 G/DL (ref 12–16)
LYMPHOCYTES # BLD: 1.8 K/UL (ref 1–4.8)
LYMPHOCYTES NFR BLD: 22 %
MCH RBC QN AUTO: 30.9 PG (ref 27–31.3)
MCHC RBC AUTO-ENTMCNC: 32.2 % (ref 33–37)
MCV RBC AUTO: 95.9 FL (ref 79.4–94.8)
MONOCYTES # BLD: 0.9 K/UL (ref 0.2–0.8)
MONOCYTES NFR BLD: 11 %
NEUTROPHILS # BLD: 5.5 K/UL (ref 1.4–6.5)
NEUTS SEG NFR BLD: 66 %
OVALOCYTES BLD QL SMEAR: ABNORMAL
PLATELET # BLD AUTO: 185 K/UL (ref 130–400)
PLATELET BLD QL SMEAR: ADEQUATE
RBC # BLD AUTO: 3.86 M/UL (ref 4.2–5.4)
T4 FREE SERPL-MCNC: 1.31 NG/DL (ref 0.84–1.68)
TSH SERPL-MCNC: 0.43 UIU/ML (ref 0.44–3.86)
WBC # BLD AUTO: 8.4 K/UL (ref 4.8–10.8)

## 2023-06-26 PROCEDURE — 99214 OFFICE O/P EST MOD 30 MIN: CPT | Performed by: PHYSICIAN ASSISTANT

## 2023-06-26 PROCEDURE — 1123F ACP DISCUSS/DSCN MKR DOCD: CPT | Performed by: PHYSICIAN ASSISTANT

## 2023-06-26 RX ORDER — CITALOPRAM 20 MG/1
20 TABLET ORAL DAILY
Qty: 90 TABLET | Refills: 1 | Status: SHIPPED | OUTPATIENT
Start: 2023-06-26

## 2023-06-26 RX ORDER — PRAVASTATIN SODIUM 20 MG
20 TABLET ORAL DAILY
Qty: 90 TABLET | Refills: 1 | Status: SHIPPED | OUTPATIENT
Start: 2023-06-26

## 2023-06-26 RX ORDER — LEVOTHYROXINE SODIUM 0.05 MG/1
50 TABLET ORAL DAILY
Qty: 90 TABLET | Refills: 1 | Status: SHIPPED | OUTPATIENT
Start: 2023-06-26

## 2023-06-26 RX ORDER — CITALOPRAM 10 MG/1
TABLET ORAL
Qty: 90 TABLET | Refills: 1 | Status: SHIPPED | OUTPATIENT
Start: 2023-06-26

## 2023-06-26 RX ORDER — CHOLECALCIFEROL (VITAMIN D3) 1250 MCG
CAPSULE ORAL
COMMUNITY

## 2023-06-26 ASSESSMENT — ENCOUNTER SYMPTOMS
GASTROINTESTINAL NEGATIVE: 1
RESPIRATORY NEGATIVE: 1

## 2023-06-27 ENCOUNTER — HOSPITAL ENCOUNTER (OUTPATIENT)
Dept: PHYSICAL THERAPY | Age: 73
Setting detail: THERAPIES SERIES
Discharge: HOME | End: 2023-06-27
Payer: MEDICARE

## 2023-06-27 LAB — VITAMIN D 25-HYDROXY: 52.4 NG/ML

## 2023-06-27 PROCEDURE — 97110 THERAPEUTIC EXERCISES: CPT

## 2023-06-27 PROCEDURE — 97116 GAIT TRAINING THERAPY: CPT

## 2023-06-27 PROCEDURE — 97530 THERAPEUTIC ACTIVITIES: CPT

## 2023-06-27 ASSESSMENT — PAIN SCALES - GENERAL: PAINLEVEL_OUTOF10: 3

## 2023-06-27 ASSESSMENT — PAIN DESCRIPTION - PAIN TYPE: TYPE: SURGICAL PAIN

## 2023-06-27 ASSESSMENT — PAIN DESCRIPTION - LOCATION: LOCATION: KNEE

## 2023-06-28 ENCOUNTER — OFFICE VISIT (OUTPATIENT)
Dept: ORTHOPEDIC SURGERY | Age: 73
End: 2023-06-28

## 2023-06-28 DIAGNOSIS — Z96.652 STATUS POST TOTAL LEFT KNEE REPLACEMENT: Primary | ICD-10-CM

## 2023-06-28 PROCEDURE — 99024 POSTOP FOLLOW-UP VISIT: CPT | Performed by: PHYSICIAN ASSISTANT

## 2023-06-29 ENCOUNTER — HOSPITAL ENCOUNTER (OUTPATIENT)
Dept: PHYSICAL THERAPY | Age: 73
Setting detail: THERAPIES SERIES
Discharge: HOME | End: 2023-06-29
Payer: MEDICARE

## 2023-06-29 PROCEDURE — 97110 THERAPEUTIC EXERCISES: CPT

## 2023-06-29 ASSESSMENT — PAIN DESCRIPTION - DESCRIPTORS: DESCRIPTORS: ACHING;SORE

## 2023-06-29 ASSESSMENT — PAIN DESCRIPTION - PAIN TYPE: TYPE: SURGICAL PAIN

## 2023-06-29 ASSESSMENT — PAIN DESCRIPTION - LOCATION: LOCATION: KNEE

## 2023-06-29 ASSESSMENT — PAIN DESCRIPTION - ORIENTATION: ORIENTATION: LEFT

## 2023-06-29 ASSESSMENT — PAIN SCALES - GENERAL: PAINLEVEL_OUTOF10: 3

## 2023-07-07 ENCOUNTER — HOSPITAL ENCOUNTER (OUTPATIENT)
Dept: PHYSICAL THERAPY | Age: 73
Setting detail: THERAPIES SERIES
Discharge: HOME | End: 2023-07-07
Payer: MEDICARE

## 2023-07-07 PROCEDURE — 97530 THERAPEUTIC ACTIVITIES: CPT

## 2023-07-07 PROCEDURE — 97116 GAIT TRAINING THERAPY: CPT

## 2023-07-07 PROCEDURE — 97110 THERAPEUTIC EXERCISES: CPT

## 2023-07-07 PROCEDURE — 97140 MANUAL THERAPY 1/> REGIONS: CPT

## 2023-07-07 ASSESSMENT — PAIN DESCRIPTION - ORIENTATION: ORIENTATION: LEFT;ANTERIOR

## 2023-07-07 ASSESSMENT — PAIN DESCRIPTION - LOCATION: LOCATION: KNEE

## 2023-07-07 ASSESSMENT — PAIN DESCRIPTION - PAIN TYPE: TYPE: SURGICAL PAIN

## 2023-07-07 ASSESSMENT — PAIN DESCRIPTION - DESCRIPTORS: DESCRIPTORS: ACHING;SORE

## 2023-07-07 ASSESSMENT — PAIN SCALES - GENERAL: PAINLEVEL_OUTOF10: 2

## 2023-07-07 NOTE — PROGRESS NOTES
4-/5, 4/5  L Hip ABduction: 4/5, 4+/5  L Hip ADduction: 4/5, 4+/5  L Knee Flexion: 4/5  L Knee Extension: 4/5  L Ankle Dorsiflexion: 4+/5 General Strength Testing LE:  (sitting MMT at recheck)  Strength RLE  R Hip Flexion: 4+/5, 5/5  R Hip Extension: 4/5  R Hip ABduction: 4/5, 4+/5  R Hip ADduction: 4/5, 4+/5  R Knee Flexion: 4+/5  R Knee Extension: 5/5  R Ankle Dorsiflexion: 5/5       TREATMENT     Exercises:      Treatment Reasoning        Verbally reviewed HEP  , SLR x20, supine hi abd x10                        Therapeutic Activities: (CPT 32546) Treatment Reasoning     Education plan, goals, status                      Gait: (CPT 78219)  Treatment Reasoning    Gait Training 1: ambulation with cane and shuffling gait - tried [facility] music to help clear fett- helped some  300ft in 6 minutes, rest one minute in standing and 140 ft  in 3minutes and 10 seconds. Very small steps, shuffing at time, RLE with inc ext rot- recent fall with hip fx, then L TKA. Pt Education: PT Education: PT Role, Plan of Care, Evaluative findings, Goals, Home Exercise Program  Patient Education: Pt. to continue to perform HEPdaily and cane in community  Additional Comments: KT tape       ASSESSMENT     Assessment: Assessment: Recheck late this date with holiday week scheduling. Pt has met 3/3 STG. All LTG in porgress, imporved strength, ROM and mobility noted. Pt case complicated by fall  Rhip fx, then L TKA. Pt will need at least 1-2x week for 4-5 more weeks or 10 more visits to achieve all LTG. Pt agreees to plan and glad ROM and strength are better. Body Structures, Functions, Activity Limitations Requiring Skilled Therapeutic Intervention: Decreased functional mobility , Decreased ROM, Decreased strength, Decreased endurance, Increased pain    Post-Treatment Pain Level:  3    Activity Tolerance: Patient limited by endurance; Patient limited by pain    Therapy Prognosis: Good       GOALS   Patient Goals :  To

## 2023-07-10 ENCOUNTER — HOSPITAL ENCOUNTER (OUTPATIENT)
Dept: PHYSICAL THERAPY | Age: 73
Setting detail: THERAPIES SERIES
Discharge: HOME | End: 2023-07-10
Payer: MEDICARE

## 2023-07-10 PROCEDURE — 97116 GAIT TRAINING THERAPY: CPT

## 2023-07-10 PROCEDURE — 97110 THERAPEUTIC EXERCISES: CPT

## 2023-07-10 NOTE — PROGRESS NOTES
Physical Therapy  Daily Treatment Note  Date: 7/10/2023  Patient Name: Miryam Butt  MRN: [id number]     :   1950    Referring Physician: MD Dr Gabino Phillips   PCP: OMAR Cervantes    Medical Diagnosis: Radiculopathy, lumbar region [M54.16]    Treatment Diagnosis: s/p Left total knee replacement on 23      Insurance: Payor: Lopez Prior / Plan: Paola Esparza ESSENTIAL/PLUS / Product Type: *No Product type* /   Insurance ID: [id number] - (Medicare Managed)    Subjective:   General  Referring Provider (secondary): Dr Gabino Chappell  PT Visit Information  Onset Date: 23  Total # of Visits Approved: 21  Total # of Visits to Date: 15  Plan of Care/Certification Expiration Date: 23  No Show: 0  Canceled Appointment: 0  Progress Note Counter: Total knee replacement on 23  Referring Provider (secondary): Dr Gabino Chappell  Subjective  Subjective: Pt. states she has been walking with her cane now about 2 weeks  Pain Screening  Patient Currently in Pain: No       Treatment Activities:  Exercises:      Treatment Reasoning    Exercise 1: QS x 10 hold 5 sec with OP in seated position  Exercise 2: seated heelslides L x 10 hold 5 sec  Exercise 3: supported standing march x 20 hold 3 sec 2#  Exercise 4: Supported standing 3 way hip 2# x 20 ea hold 3 sec only alternating hip ABD  Exercise 6: HS curl x 20 hold 3 sec B GTB  Exercise 7: seated LAQ x 20 hold 5 sec B LE 2#                          Gait: (CPT M6334373)  Treatment Reasoning    Gait Training 1: ambulation with SPC focusing on inc step length and dec shuffling pattern. Cues throguhout for 250'                    5-112 deg in sitting L knee AROM   Assessment:   Conditions Requiring Skilled Therapeutic Intervention  Body Structures, Functions, Activity Limitations Requiring Skilled Therapeutic Intervention: Decreased functional mobility ; Decreased ROM; Decreased strength;Decreased endurance; Increased pain  Assessment: Continued focusing on gait

## 2023-07-13 ENCOUNTER — HOSPITAL ENCOUNTER (OUTPATIENT)
Dept: PHYSICAL THERAPY | Age: 73
Setting detail: THERAPIES SERIES
Discharge: HOME | End: 2023-07-13
Payer: MEDICARE

## 2023-07-13 PROCEDURE — 97110 THERAPEUTIC EXERCISES: CPT

## 2023-07-13 ASSESSMENT — PAIN SCALES - GENERAL: PAINLEVEL_OUTOF10: 2

## 2023-07-13 NOTE — PROGRESS NOTES
Physical Therapy: Daily Note   Patient: Jimy Arias (18 y.o. female)   Examination Date:   Plan of Care/Certification Expiration Date: 23    Progress Note Counter: Total knee replacement on 23     :  1950 # of Visits since [facility]:   12   MRN: [id number]  CSN: [id number] Start of Care Date:   2023   Insurance: Payor: [facility] MEDICARE / Plan: Becki Myers ESSENTIAL/PLUS / Product Type: *No Product type* /   Insurance ID: [id number] - (Medicare Managed) Secondary Insurance (if applicable):    Referring Physician: MD Dr Chau Vidal   PCP: OMAR Johnson Visits to Date/Visits Approved: 15 / 21    No Show/Cancelled Appts: 0 / 0     Medical Diagnosis: Radiculopathy, lumbar region [M54.16]    Treatment Diagnosis: s/p Left total knee replacement on 23        SUBJECTIVE EXAMINATION   Pain Level: Pain Screening  Patient Currently in Pain: Yes  Pain Assessment: 0-10  Pain Level: 2 (L knee)    Patient Comments: Subjective: Pt states exercises are going well at home. \"I'm always doing exercises with my leg while I watch TV\". HEP Compliance: Fair        OBJECTIVE EXAMINATION   Restrictions:  Restrictions/Precautions: Fall Risk; Up as Tolerated (R TKR)   Required Braces or Orthoses?: No   No data recorded          Left AROM  Right AROM      AROM LLE (degrees)  L Knee Flexion (0-145): 3-114  deg seated         TREATMENT     Exercises:      Treatment Reasoning    Exercise 2: seated heelslides scooting to edge of seat  L x 10 hold 5 sec  Exercise 3: supported standing march x 20 hold 3 sec 2#  Exercise 4: Supported standing 3 way hip 2# x 20 ea hold 3 sec only alternating hip ABD  Exercise 5: Hip ABD seated x 10 hold 3-5 sec BTB  Exercise 6: seated HS stretch; H30'' x 3 LLE                          Pt Education: Additional Comments: KT tape       ASSESSMENT     Assessment: Assessment: Pt progressing with ROM achieving 3-114 deg this date with encouragement.  Pt needing VC's

## 2023-07-18 ENCOUNTER — HOSPITAL ENCOUNTER (OUTPATIENT)
Dept: PHYSICAL THERAPY | Age: 73
Setting detail: THERAPIES SERIES
Discharge: HOME | End: 2023-07-18
Payer: MEDICARE

## 2023-07-18 PROCEDURE — 97110 THERAPEUTIC EXERCISES: CPT

## 2023-07-18 ASSESSMENT — PAIN DESCRIPTION - ORIENTATION: ORIENTATION: LEFT

## 2023-07-18 ASSESSMENT — PAIN SCALES - GENERAL: PAINLEVEL_OUTOF10: 4

## 2023-07-18 ASSESSMENT — PAIN DESCRIPTION - LOCATION: LOCATION: KNEE

## 2023-07-18 ASSESSMENT — PAIN DESCRIPTION - PAIN TYPE: TYPE: ACUTE PAIN;SURGICAL PAIN

## 2023-07-18 ASSESSMENT — PAIN DESCRIPTION - DESCRIPTORS: DESCRIPTORS: SORE

## 2023-07-18 NOTE — PROGRESS NOTES
Physical Therapy  Daily Treatment Note  Date: 2023  Patient Name: Abdirashid Palmer  MRN: [id number]     :   1950    Referring Physician: MD Dr Destiny Morris   PCP: OMAR Pearson    Medical Diagnosis: Radiculopathy, lumbar region [M54.16]    Treatment Diagnosis: s/p Left total knee replacement on 23      Insurance: Payor: Artur Lara / Plan: Alyssia Pappas ESSENTIAL/PLUS / Product Type: *No Product type* /   Insurance ID: [id number] - (Medicare Managed)    Subjective:   General  Referring Provider (secondary): Dr Destiny Hercules  PT Visit Information  Onset Date: 23  Total # of Visits Approved: 21  Total # of Visits to Date: 15  Plan of Care/Certification Expiration Date: 23  No Show: 0  Canceled Appointment: 0  Progress Note Counter: Total knee replacement on 23  Referring Provider (secondary): Dr Mary Peralta: Pt. states her L knee is hurting today. 4/10 L knee. Ambulating with SPC. Pt. states son wants her to stop at [address] [place] [address] Drive  going to look [employer]. Pain Screening  Patient Currently in Pain: Yes  Pain Assessment: 0-10  Pain Level: 4  Pain Type: Acute pain, Surgical pain  Pain Location: Knee  Pain Orientation: Left  Pain Descriptors: Sore       Treatment Activities:  Exercises:      Treatment Reasoning    Exercise 1: QS x 10 hold 5 sec with OP in supine  Exercise 2: sidelying hip ABD 2 x5 R only unable to lay on L side due to pain  Exercise 3: L sidelying R clamshells x 10 hold 5 sec  Exercise 4: SLR x 10 B 2# hold 1-3 sec  Exercise 5: Bridge x 10 hold 5 sec  Exercise 6: attempted prone and R sidelying pt. unable  Exercise 7: sit<>stand from plinth attempted hands in lap unable performed one hand but aoiding R coming to stand x 5-6  Exercise 8: wall sides with GTB above knees pressing out into band.   Hand in hand support x 10   Recumbant bike x 5 min                         Assessment:   Conditions Requiring Skilled Therapeutic

## 2023-07-20 ENCOUNTER — HOSPITAL ENCOUNTER (OUTPATIENT)
Dept: PHYSICAL THERAPY | Age: 73
Setting detail: THERAPIES SERIES
Discharge: HOME | End: 2023-07-20
Payer: MEDICARE

## 2023-07-20 PROCEDURE — 97110 THERAPEUTIC EXERCISES: CPT

## 2023-07-20 NOTE — PROGRESS NOTES
Physical Therapy: Daily Note   Patient: Juana Hillman (34 y.o. female)   Examination Date:   Plan of Care/Certification Expiration Date: 23    Progress Note Counter: Total knee replacement on 23     :  1950 # of Visits since [facility] [place]:   14   MRN: [id number]  CSN: [id number] Start of Care Date:   2023   Insurance: Payor: [facility] MEDICARE / Plan: Rusty Mcintosh ESSENTIAL/PLUS / Product Type: *No Product type* /   Insurance ID: [id number] - (Medicare Managed) Secondary Insurance (if applicable):    Referring Physician: MD Dr Thor Dubois Degree   PCP: OMAR Vick Visits to Date/Visits Approved:     No Show/Cancelled Appts: 0 / 0     Medical Diagnosis: Radiculopathy, lumbar region [M54.16]    Treatment Diagnosis: s/p Left total knee replacement on 23        SUBJECTIVE EXAMINATION   Pain Level:      Patient Comments: Subjective: Pt reports knee feels tight today. Pt reports pain 3/10. HEP Compliance: Good        OBJECTIVE EXAMINATION   Restrictions:  Restrictions/Precautions: Fall Risk; Up as Tolerated (R TKR)   Required Braces or Orthoses?: No   No data recorded          Left AROM  Right AROM      AROM LLE (degrees)  L Knee Flexion (0-145): 2-110 deg         TREATMENT     Exercises:      Treatment Reasoning    Exercise 1: seated knee flexion and seated knee extension H3'' x 10 ea  Exercise 6: recumbent bike partial to full revolutions x 5 min  (seat at 3.5)  Exercise 17: step ups fwd 4'' box x 10, step ups lateral 4'' x 10  Exercise 18: seated HS stretch; H30'' x 3  Exercise 20: standing from 22'' high plinth x 3 no hands, x 3from 21'' high plinth no hands, x 1 from 20'' plinth no hands, from 25'' chair unable to stand without UE's                          Pt Education: Additional Comments: KT tape       ASSESSMENT     Assessment: Assessment: Pt continues to progress with strengthening tolerating step ups this date with no c/o pain.  Pt displaying some inc in

## 2023-07-25 ENCOUNTER — APPOINTMENT (OUTPATIENT)
Dept: PHYSICAL THERAPY | Age: 73
End: 2023-07-25
Payer: MEDICARE

## 2023-07-25 ENCOUNTER — HOSPITAL ENCOUNTER (OUTPATIENT)
Dept: PHYSICAL THERAPY | Age: 73
Setting detail: THERAPIES SERIES
Discharge: HOME | End: 2023-07-25
Payer: MEDICARE

## 2023-07-25 PROCEDURE — 97140 MANUAL THERAPY 1/> REGIONS: CPT

## 2023-07-25 PROCEDURE — 97110 THERAPEUTIC EXERCISES: CPT

## 2023-07-25 ASSESSMENT — PAIN DESCRIPTION - LOCATION: LOCATION: KNEE

## 2023-07-25 ASSESSMENT — PAIN SCALES - GENERAL: PAINLEVEL_OUTOF10: 3

## 2023-07-25 ASSESSMENT — PAIN DESCRIPTION - DESCRIPTORS: DESCRIPTORS: SORE

## 2023-07-25 ASSESSMENT — PAIN DESCRIPTION - PAIN TYPE: TYPE: ACUTE PAIN;SURGICAL PAIN

## 2023-07-25 ASSESSMENT — PAIN DESCRIPTION - ORIENTATION: ORIENTATION: LEFT

## 2023-07-25 NOTE — PROGRESS NOTES
Physical Therapy: Daily Note   Patient: Mily Elise (72 y.o. female)   Examination Date: 69/15/8940  Plan of Care/Certification Expiration Date: 23    Progress Note Counter: Total knee replacement on 23     :  1950 # of Visits since [facility]:   15   MRN: [id number]  CSN: [id number] Start of Care Date:   2023   Insurance: Payor: [facility] MEDICARE / Plan: Prudencio Li ESSENTIAL/PLUS / Product Type: *No Product type* /   Insurance ID: [id number] - (Medicare Managed) Secondary Insurance (if applicable):    Referring Physician: MD Dr Daniela Odonnell   PCP: OMAR Bhagat Visits to Date/Visits Approved:     No Show/Cancelled Appts: 0 / 0     Medical Diagnosis: Radiculopathy, lumbar region [M54.16]    Treatment Diagnosis: s/p Left total knee replacement on 23        SUBJECTIVE EXAMINATION   Pain Level: Pain Screening  Patient Currently in Pain: Yes  Pain Assessment: 0-10  Pain Level: 3  Best Pain Level: 0  Worst Pain Level: 5  Post Treatment Pain Level: 2  Pain Type: Acute pain, Surgical pain  Pain Location: Knee  Pain Orientation: Left  Pain Descriptors: Sore    Patient Comments: Subjective: Pt. with 3/10 pain in the knee today. Otherwise no new complaints.     HEP Compliance: Good        OBJECTIVE EXAMINATION   Restrictions:  Restrictions/Precautions: Fall Risk; Up as Tolerated (R TKR)   Required Braces or Orthoses?: No   No data recorded              TREATMENT     Exercises:      Treatment Reasoning    Exercise 4: SLR x 10 B 2# hold 1-3 sec  Exercise 6: recumbent bike full revolutions x 5 min  Exercise 9: standing heel and toe raises x 20  Exercise 12: Standing march x 20  Exercise 13: supported standing R hip ext x 20  Exercise 14: Supported standing  hip abd x 20 BLE  Exercise 15: Standing heel/toe raises x 20  Exercise 16: Mini squats x 20 with cues  Exercise 17: step ups fwd 4'' box x 10, step ups lateral 4'' x 10  Exercise 18: seated HS stretch; H30'' x 3    Limitations

## 2023-07-27 ENCOUNTER — HOSPITAL ENCOUNTER (OUTPATIENT)
Dept: PHYSICAL THERAPY | Age: 73
Setting detail: THERAPIES SERIES
Discharge: HOME | End: 2023-07-27
Payer: MEDICARE

## 2023-07-27 PROCEDURE — 97110 THERAPEUTIC EXERCISES: CPT

## 2023-07-27 NOTE — PROGRESS NOTES
met  Patient Goals   Patient Goals :  To get stronger and get her endurance back- 7/7/23 - in progress- stronger and able to use cane, slowly getting better    Plan:    Physcial Therapy Plan  Plan weeks: total of approx 10 weeks  Specific Instructions for Next Treatment: review HEP, progress strengthening; gentle R Hip stretching  Current Treatment Recommendations: Strengthening, ROM, Balance training, Functional mobility training, Transfer training, Manual, Gait training, Stair training, Pain management, Home exercise program, Modalities  Additional Comments: KT tape        Therapy Time:   Individual Concurrent Group Co-treatment   Time In  215         Time Out  300         Minutes  [address] [place] [address]

## 2023-08-01 ENCOUNTER — HOSPITAL ENCOUNTER (OUTPATIENT)
Dept: PHYSICAL THERAPY | Age: 73
Setting detail: THERAPIES SERIES
Discharge: HOME | End: 2023-08-01
Payer: MEDICARE

## 2023-08-01 PROCEDURE — 97530 THERAPEUTIC ACTIVITIES: CPT

## 2023-08-01 PROCEDURE — 97110 THERAPEUTIC EXERCISES: CPT

## 2023-08-01 PROCEDURE — 97116 GAIT TRAINING THERAPY: CPT

## 2023-08-01 ASSESSMENT — PAIN DESCRIPTION - ORIENTATION: ORIENTATION: LEFT

## 2023-08-01 ASSESSMENT — PAIN DESCRIPTION - LOCATION: LOCATION: KNEE

## 2023-08-01 ASSESSMENT — PAIN DESCRIPTION - PAIN TYPE: TYPE: SURGICAL PAIN

## 2023-08-01 NOTE — PROGRESS NOTES
have advanced HEP- reviewed supine and seated exs, plan to review standing exs next/last session Partially met, Met                                                TREATMENT PLAN   Plan Frequency: Recommend D/C with HEP following one last treatment session           Therapy Time  Individual Time In:    1345     Individual Time Out:  1450  Minutes:  72        Electronically signed by Collette Wilkins, PT  on 8/1/2023 at 3:24 PM   POC NOTE

## 2023-08-03 ENCOUNTER — HOSPITAL ENCOUNTER (OUTPATIENT)
Dept: PHYSICAL THERAPY | Age: 73
Setting detail: THERAPIES SERIES
Discharge: HOME | End: 2023-08-03
Payer: MEDICARE

## 2023-08-03 ENCOUNTER — APPOINTMENT (OUTPATIENT)
Dept: PHYSICAL THERAPY | Age: 73
End: 2023-08-03
Payer: MEDICARE

## 2023-08-03 PROCEDURE — 97110 THERAPEUTIC EXERCISES: CPT

## 2023-08-03 PROCEDURE — 97116 GAIT TRAINING THERAPY: CPT

## 2023-08-03 ASSESSMENT — PAIN DESCRIPTION - PAIN TYPE: TYPE: SURGICAL PAIN

## 2023-08-03 ASSESSMENT — PAIN DESCRIPTION - LOCATION: LOCATION: KNEE

## 2023-08-03 ASSESSMENT — PAIN DESCRIPTION - DESCRIPTORS: DESCRIPTORS: SORE

## 2023-08-03 ASSESSMENT — PAIN SCALES - GENERAL: PAINLEVEL_OUTOF10: 3

## 2023-08-03 ASSESSMENT — PAIN DESCRIPTION - ORIENTATION: ORIENTATION: LEFT

## 2023-08-03 NOTE — PROGRESS NOTES
Physical Therapy  Physical Therapy: Daily Note   Patient: An Hill (73 y.o. female)   Examination Date:   Plan of Care/Certification Expiration Date: 23    Progress Note Counter: Total knee replacement on 23     :  1950 # of Visits since [facility]:   18   MRN: [id number]  CSN: [id number] Start of Care Date:   2023   Insurance: Payor: [facility] MEDICARE / Plan: Luvenia  ESSENTIAL/PLUS / Product Type: *No Product type* /   Insurance ID: [id number] - (Medicare Managed) Secondary Insurance (if applicable):    Referring Physician: MD Dr Segundo Chaudhari   PCP: OMAR Han Visits to Date/Visits Approved:     No Show/Cancelled Appts: 0 / 0     Medical Diagnosis: Radiculopathy, lumbar region [M54.16]    Treatment Diagnosis: s/p Left total knee replacement on 23        SUBJECTIVE EXAMINATION   Pain Level: Pain Screening  Patient Currently in Pain: Yes  Pain Assessment: 0-10  Pain Level: 3  Pain Type: Surgical pain  Pain Location: Knee  Pain Orientation: Left  Pain Descriptors: Sore    Patient Comments: Subjective: \"I'm just so tired that shantanu;p wears me out. \" Pt. states she did not use the raild because she thought it would be too hot. Pt. uses SPC arriving to therapy needing seated rest prior to walking back to therapy gym. Reports walks with no AD in her home and cane for community ambulation. HEP Compliance: Good        OBJECTIVE EXAMINATION   Restrictions:  Restrictions/Precautions: Fall Risk; Up as Tolerated (R TKR)   Required Braces or Orthoses?: No   No data recorded          Left AROM  Right AROM      AROM LLE (degrees)  L Knee Flexion (0-145): 1-115 deg AROM L knee  L Knee Extension (0): lacking 1 degree         TREATMENT     Exercises:      Treatment Reasoning    Exercise 1: seated HS curl x 15 hold 5 sec  Exercise 2: reviewed QS  Exercise 3: standing hip flexion, ext, abd x 10 ea hold 3 sec B VC for upright gaze throughout.   Exercise 4: standing

## 2023-08-16 ENCOUNTER — OFFICE VISIT (OUTPATIENT)
Dept: ORTHOPEDIC SURGERY | Age: 73
End: 2023-08-16

## 2023-08-16 VITALS — WEIGHT: 180 LBS | BODY MASS INDEX: 30.73 KG/M2 | HEIGHT: 64 IN

## 2023-08-16 DIAGNOSIS — Z96.652 PRESENCE OF TOTAL KNEE JOINT PROSTHESIS, LEFT: Primary | ICD-10-CM

## 2023-08-16 NOTE — PROGRESS NOTES
Narrative Referring Provider:   Lou Torres      PCP:   OMAR Dobbins    ============================  IMPRESSION/PLAN:  ============================  Martin Spain is s/p left Total knee replacement completed on 2023. IMPRESSION: Excellent early outcome and No complaints or limitations    PLAN:  No new treatment indicated. Routine follow-up. Patient Reassurance: Normal post-operative course discussed with patient. Patient reassured and supported. All questions answered. Follow up May 2024 X-Rays Needed    Patient presents today for an intermediate post-op visit    Status post op:  left Total knee replacement     BMI: There is no height or weight on file to calculate BMI. Post-operative recovery was complicated by uneventful/none. Patient rates their condition as improving. Does the patient still experience pain? 2/10 pain, aching with prolonged ambulation    Post Op discharge patient location: in home. Functional Assessment is as follows: Therapy is complete. Functional difficulties: None. Pain Medication: None  Currently Ambulating with: No assistive devices    =================================  EXAM: POST OP KNEE  =================================  Left Post-Operative Knee    Ambulates with a normal gait    SKIN: Appropriate postop appearance, No evidence of erythema, warmth, discharge or drainage and Incision clean/dry/intact. Range of motion is 0 degrees in extension and   118 degrees of flexion. Extension La degrees    Pain with ROM: No    There is no effusion.      Mal-alignment: No    No tenderness to palpation    Neurovascular Status: Sensation Intact, Moves foot and ankle up & down, 2+ dorsalis pedis and negative homans sign    Stability:Varus/Valgus- Yes, stable    Quad strength: improving    Imaging:  None    Provider:   Divina Soni MD

## 2023-08-18 ENCOUNTER — TELEPHONE (OUTPATIENT)
Dept: INTERNAL MEDICINE | Age: 73
End: 2023-08-18

## 2023-08-22 DIAGNOSIS — E03.9 HYPOTHYROIDISM, UNSPECIFIED TYPE: ICD-10-CM

## 2023-08-22 DIAGNOSIS — E78.2 MIXED HYPERLIPIDEMIA: ICD-10-CM

## 2023-08-22 DIAGNOSIS — F33.1 MAJOR DEPRESSIVE DISORDER, RECURRENT EPISODE, MODERATE (HCC): ICD-10-CM

## 2023-08-22 RX ORDER — PRAVASTATIN SODIUM 20 MG
20 TABLET ORAL DAILY
Qty: 90 TABLET | Refills: 1 | Status: SHIPPED | OUTPATIENT
Start: 2023-08-22

## 2023-08-22 RX ORDER — CITALOPRAM 20 MG/1
20 TABLET ORAL DAILY
Qty: 90 TABLET | Refills: 1 | Status: SHIPPED | OUTPATIENT
Start: 2023-08-22

## 2023-08-22 RX ORDER — CITALOPRAM HYDROBROMIDE 10 MG/1
TABLET ORAL
Qty: 90 TABLET | Refills: 1 | Status: SHIPPED | OUTPATIENT
Start: 2023-08-22

## 2023-08-22 RX ORDER — LEVOTHYROXINE SODIUM 0.05 MG/1
50 TABLET ORAL DAILY
Qty: 90 TABLET | Refills: 1 | Status: SHIPPED | OUTPATIENT
Start: 2023-08-22

## 2023-08-22 NOTE — TELEPHONE ENCOUNTER
Comments: Pharmacy Change    Last Office Visit (last PCP visit):   6/26/2023    Next Visit Date:  Future Appointments   Date Time Provider [address]   5/29/2024 11:00 AM David Finley MD [facility] AND [facility]       **If hasn't been seen in over a year OR hasn't followed up according to last diabetes/ADHD visit, make appointment for patient before sending refill to provider.     Rx requested:  Requested Prescriptions     Pending Prescriptions Disp Refills    levothyroxine (SYNTHROID) 50 MCG tablet 90 tablet 1     Sig: Take 1 tablet by mouth daily    citalopram (CELEXA) 20 MG tablet 90 tablet 1     Sig: Take 1 tablet by mouth daily Indications: Depression Give with 10mg tab to =30mg    pravastatin (PRAVACHOL) 20 MG tablet 90 tablet 1     Sig: Take 1 tablet by mouth daily    citalopram (CELEXA) 10 MG tablet 90 tablet 1     Sig: TAKE 1 TABLET BY MOUTH ONCE DAILY WITH THE 20MG TABLET

## 2023-09-07 ENCOUNTER — HOSPITAL ENCOUNTER (OUTPATIENT)
Dept: LAB | Age: 73
Discharge: HOME | End: 2023-09-07
Payer: MEDICARE

## 2023-09-07 LAB
CRP SERPL HS-MCNC: <3 MG/L (ref 0–5)
ERYTHROCYTE [SEDIMENTATION RATE] IN BLOOD BY WESTERGREN METHOD: 15 MM (ref 0–30)
HBA1C MFR BLD: 5.5 % (ref 4.8–5.9)
T4 FREE SERPL-MCNC: 1.21 NG/DL (ref 0.84–1.68)
TSH SERPL-MCNC: 0.98 UIU/ML (ref 0.44–3.86)

## 2023-09-07 PROCEDURE — 82607 VITAMIN B-12: CPT

## 2023-09-07 PROCEDURE — 86789 WEST NILE VIRUS ANTIBODY: CPT

## 2023-09-07 PROCEDURE — 86788 WEST NILE VIRUS AB IGM: CPT

## 2023-09-07 PROCEDURE — 85652 RBC SED RATE AUTOMATED: CPT

## 2023-09-07 PROCEDURE — 84443 ASSAY THYROID STIM HORMONE: CPT

## 2023-09-07 PROCEDURE — 86592 SYPHILIS TEST NON-TREP QUAL: CPT

## 2023-09-07 PROCEDURE — 84439 ASSAY OF FREE THYROXINE: CPT

## 2023-09-07 PROCEDURE — 83090 ASSAY OF HOMOCYSTEINE: CPT

## 2023-09-07 PROCEDURE — 86335 IMMUNFIX E-PHORSIS/URINE/CSF: CPT

## 2023-09-07 PROCEDURE — 84156 ASSAY OF PROTEIN URINE: CPT

## 2023-09-07 PROCEDURE — 83036 HEMOGLOBIN GLYCOSYLATED A1C: CPT

## 2023-09-07 PROCEDURE — 36415 COLL VENOUS BLD VENIPUNCTURE: CPT

## 2023-09-07 PROCEDURE — 86431 RHEUMATOID FACTOR QUANT: CPT

## 2023-09-07 PROCEDURE — 86617 LYME DISEASE ANTIBODY: CPT

## 2023-09-07 PROCEDURE — 82746 ASSAY OF FOLIC ACID SERUM: CPT

## 2023-09-07 PROCEDURE — 86038 ANTINUCLEAR ANTIBODIES: CPT

## 2023-09-07 PROCEDURE — 86140 C-REACTIVE PROTEIN: CPT

## 2023-09-07 PROCEDURE — 84207 ASSAY OF VITAMIN B-6: CPT

## 2023-09-07 PROCEDURE — 83921 ORGANIC ACID SINGLE QUANT: CPT

## 2023-09-07 PROCEDURE — 86334 IMMUNOFIX E-PHORESIS SERUM: CPT

## 2023-09-08 LAB
FOLATE: >20 NG/ML
RPR SER QL: NORMAL
VITAMIN B-12: 1633 PG/ML (ref 232–1245)

## 2023-09-10 LAB
B BURGDOR IGG SER QL IB: NEGATIVE
B BURGDOR IGM SER QL IB: NEGATIVE
EER IMMUNOFIX ELECTROPHORESIS GEL: NORMAL
HOMOCYSTEINE: 10.9 UMOL/L
INTERPRETATION SERPL IFE-IMP: NORMAL
RHEUMATOID FACTOR: <10 IU/ML

## 2023-09-11 LAB
INTERPRETATION UR IFE-IMP: NORMAL
METHYLMALONATE SERPL-SCNC: 0.44 UMOL/L (ref 0–0.4)
WNV IGG SER-ACNC: 0.26 IV
WNV IGM SER-ACNC: 0 IV

## 2023-09-12 LAB — VIT B6 SERPL-MCNC: 138.8 NMOL/L (ref 20–125)

## 2023-09-13 LAB — NUCLEAR IGG SER QL IA: NORMAL

## 2023-10-03 DIAGNOSIS — N39.41 URGE INCONTINENCE OF URINE: ICD-10-CM

## 2023-10-05 DIAGNOSIS — N39.41 URGE INCONTINENCE OF URINE: ICD-10-CM

## 2023-10-05 RX ORDER — OXYBUTYNIN CHLORIDE 10 MG/1
10 TABLET, EXTENDED RELEASE ORAL DAILY
Qty: 30 TABLET | Refills: 0 | Status: SHIPPED | OUTPATIENT
Start: 2023-10-05

## 2023-10-05 NOTE — TELEPHONE ENCOUNTER
Patient is requesting a refill on OXYBUTYNIN CL ER 10 MG, please.  She is requesting 90 day refill    Thank you

## 2023-10-05 NOTE — TELEPHONE ENCOUNTER
Comments:     Last Office Visit (last PCP visit):   6/26/2023    Next Visit Date:  Future Appointments   Date Time Provider [address]   5/29/2024 11:00 AM Florencio High MD [facility]       **If hasn't been seen in over a year OR hasn't followed up according to last diabetes/ADHD visit, make appointment for patient before sending refill to provider.     Rx requested:  Requested Prescriptions     Pending Prescriptions Disp Refills    oxybutynin (DITROPAN-XL) 10 MG extended release tablet [Pharmacy Med Name: oxybutynin chloride ER 10 mg tablet,extended release 24 hr] 30 tablet 0     Sig: TAKE ONE TABLET BY MOUTH EVERY DAY

## 2023-10-06 RX ORDER — OXYBUTYNIN CHLORIDE 10 MG/1
10 TABLET, EXTENDED RELEASE ORAL DAILY
Qty: 30 TABLET | Refills: 0 | OUTPATIENT
Start: 2023-10-06

## 2023-10-17 ENCOUNTER — TELEMEDICINE (OUTPATIENT)
Dept: INTERNAL MEDICINE | Age: 73
End: 2023-10-17
Payer: COMMERCIAL

## 2023-10-17 ENCOUNTER — TELEPHONE (OUTPATIENT)
Dept: INTERNAL MEDICINE | Age: 73
End: 2023-10-17

## 2023-10-17 DIAGNOSIS — Z00.00 MEDICARE ANNUAL WELLNESS VISIT, SUBSEQUENT: Primary | ICD-10-CM

## 2023-10-17 DIAGNOSIS — Z71.89 ACP (ADVANCE CARE PLANNING): ICD-10-CM

## 2023-10-17 DIAGNOSIS — F32.A DEPRESSION, UNSPECIFIED DEPRESSION TYPE: ICD-10-CM

## 2023-10-17 PROCEDURE — 1123F ACP DISCUSS/DSCN MKR DOCD: CPT | Performed by: PHYSICIAN ASSISTANT

## 2023-10-17 PROCEDURE — G0439 PPPS, SUBSEQ VISIT: HCPCS | Performed by: PHYSICIAN ASSISTANT

## 2023-10-17 RX ORDER — SERTRALINE HYDROCHLORIDE 25 MG/1
25 TABLET, FILM COATED ORAL DAILY
Qty: 30 TABLET | Refills: 0 | Status: SHIPPED | OUTPATIENT
Start: 2023-10-17

## 2023-10-17 ASSESSMENT — PATIENT HEALTH QUESTIONNAIRE - PHQ9
5. POOR APPETITE OR OVEREATING: 0
SUM OF ALL RESPONSES TO PHQ9 QUESTIONS 1 & 2: 2
6. FEELING BAD ABOUT YOURSELF - OR THAT YOU ARE A FAILURE OR HAVE LET YOURSELF OR YOUR FAMILY DOWN: 1
SUM OF ALL RESPONSES TO PHQ QUESTIONS 1-9: 7
SUM OF ALL RESPONSES TO PHQ QUESTIONS 1-9: 6
8. MOVING OR SPEAKING SO SLOWLY THAT OTHER PEOPLE COULD HAVE NOTICED. OR THE OPPOSITE, BEING SO FIGETY OR RESTLESS THAT YOU HAVE BEEN MOVING AROUND A LOT MORE THAN USUAL: 0
SUM OF ALL RESPONSES TO PHQ QUESTIONS 1-9: 7
10. IF YOU CHECKED OFF ANY PROBLEMS, HOW DIFFICULT HAVE THESE PROBLEMS MADE IT FOR YOU TO DO YOUR WORK, TAKE CARE OF THINGS AT HOME, OR GET ALONG WITH OTHER PEOPLE: 0
SUM OF ALL RESPONSES TO PHQ QUESTIONS 1-9: 7
3. TROUBLE FALLING OR STAYING ASLEEP: 1
7. TROUBLE CONCENTRATING ON THINGS, SUCH AS READING THE NEWSPAPER OR WATCHING TELEVISION: 1
9. THOUGHTS THAT YOU WOULD BE BETTER OFF DEAD, OR OF HURTING YOURSELF: 1
2. FEELING DOWN, DEPRESSED OR HOPELESS: 1
4. FEELING TIRED OR HAVING LITTLE ENERGY: 1
1. LITTLE INTEREST OR PLEASURE IN DOING THINGS: 1

## 2023-10-17 ASSESSMENT — LIFESTYLE VARIABLES
HOW OFTEN DO YOU HAVE A DRINK CONTAINING ALCOHOL: NEVER
HOW MANY STANDARD DRINKS CONTAINING ALCOHOL DO YOU HAVE ON A TYPICAL DAY: PATIENT DOES NOT DRINK

## 2023-10-17 NOTE — PROGRESS NOTES
Medicare Annual Wellness Visit    Rachana Dave is here for No chief complaint on file. Assessment & Plan   Medicare annual wellness  -      Reviewed health maintenance   -      Labs reviewed   -      Mammogram UTD   -      Colon cancer screen UTD     ACP (advance care planning)  -     [facility] Referral to ACP Clinical Specialist    Depression, unspecified depression type  -     sertraline (ZOLOFT) 25 MG tablet; Take 1 tablet by mouth daily, Disp-30 tablet, R-0Normal  -     continue celexa  -     she will f/u in 3 weeks        Recommendations for Preventive Services Due: see orders and patient instructions/AVS.  Recommended screening schedule for the next 5-10 years is provided to the patient in written form: see Patient Instructions/AVS.     No follow-ups on file. Subjective       Patient's complete Health Risk Assessment and screening values have been reviewed and are found in Flowsheets. The following problems were reviewed today and where indicated follow up appointments were made and/or referrals ordered. Positive Risk Factor Screenings with Interventions:    Fall Risk:  Do you feel unsteady or are you worried about falling? : no  2 or more falls in past year?: (!) yes  Fall with injury in past year?: no     Interventions:    Use of cane all the time is advised     Cognitive: Words recalled: 1 Word Recalled           Total Score Interpretation: Abnormal Mini-Cog      Interventions:  Seeing Dr Joshua Lan     Depression:  PHQ-2 Score: 2  PHQ-9 Total Score: 7    Interpretation:   1-4 = minimal  5-9 = mild  10-14 = moderate  15-19 = moderately severe  20-27 = severe  Interventions:  Continue Celexa, added Zoloft for better control          Self-assessment of health:   In general, how would you say your health is?: (!) Poor    Interventions:  Patient declines any further evaluation or treatment      Weight and Activity:  Physical Activity: Inactive (10/17/2023)    Exercise Vital Sign     Days of Exercise per

## 2023-10-18 ENCOUNTER — CLINICAL DOCUMENTATION (OUTPATIENT)
Dept: SPIRITUAL SERVICES | Age: 73
End: 2023-10-18

## 2023-10-18 NOTE — ACP (ADVANCE CARE PLANNING)
made within a week. [] 2nd -  Date:                 Intervention:  [] Spoke with Patient  [] Left Voice mail [] Email / Mail    [] Senchahart  [] Other [id number]) : Outcomes:                [] 3rd -  Date:                Intervention:  [] Spoke with Patient   [] Left Voice mail [] Email / Mail    [] Senchahart  [] Other [id number]) : Outcomes:           []  Additional Outreach -  Date:     (Specify Dates & special circumstances): Outcomes:          Thank you for this referral.

## 2023-10-21 ENCOUNTER — HOSPITAL ENCOUNTER (OUTPATIENT)
Dept: RADIOLOGY | Facility: HOSPITAL | Age: 73
Discharge: HOME | End: 2023-10-21
Payer: COMMERCIAL

## 2023-10-21 DIAGNOSIS — R51.9 HEADACHE, UNSPECIFIED: ICD-10-CM

## 2023-10-21 PROCEDURE — 2550000001 HC RX 255 CONTRASTS: Performed by: PSYCHIATRY & NEUROLOGY

## 2023-10-21 PROCEDURE — 70553 MRI BRAIN STEM W/O & W/DYE: CPT | Performed by: RADIOLOGY

## 2023-10-21 PROCEDURE — 70553 MRI BRAIN STEM W/O & W/DYE: CPT | Mod: MG

## 2023-10-21 PROCEDURE — A9575 INJ GADOTERATE MEGLUMI 0.1ML: HCPCS | Performed by: PSYCHIATRY & NEUROLOGY

## 2023-10-21 RX ORDER — GADOTERATE MEGLUMINE 376.9 MG/ML
0.2 INJECTION INTRAVENOUS
Status: COMPLETED | OUTPATIENT
Start: 2023-10-21 | End: 2023-10-21

## 2023-10-21 RX ADMIN — GADOTERATE MEGLUMINE 17 ML: 376.9 INJECTION INTRAVENOUS at 16:19

## 2023-10-30 ENCOUNTER — PATIENT MESSAGE (OUTPATIENT)
Dept: SPIRITUAL SERVICES | Age: 73
End: 2023-10-30

## 2023-11-06 DIAGNOSIS — N39.41 URGE INCONTINENCE OF URINE: ICD-10-CM

## 2023-11-06 NOTE — TELEPHONE ENCOUNTER
Comments: Patient would like to have more refills on this meedication    Last Office Visit (last PCP visit):   10/17/2023    Next Visit Date:  Future Appointments   Date Time Provider [address] [place] [address]   11/9/2023  1:30 PM [facility] ROOM [address] [place] [address]   5/29/2024 11:00 AM Odette Marshall MD [facility]       **If hasn't been seen in over a year OR hasn't followed up according to last diabetes/ADHD visit, make appointment for patient before sending refill to provider.     Rx requested:  Requested Prescriptions     Pending Prescriptions Disp Refills    oxybutynin (DITROPAN-XL) 10 MG extended release tablet 30 tablet 0     Sig: Take 1 tablet by mouth daily

## 2023-11-07 RX ORDER — OXYBUTYNIN CHLORIDE 10 MG/1
10 TABLET, EXTENDED RELEASE ORAL DAILY
Qty: 90 TABLET | Refills: 1 | Status: SHIPPED | OUTPATIENT
Start: 2023-11-07

## 2023-11-09 ENCOUNTER — HOSPITAL ENCOUNTER (OUTPATIENT)
Dept: WOMENS IMAGING | Age: 73
Discharge: HOME | End: 2023-11-11
Payer: COMMERCIAL

## 2023-11-09 DIAGNOSIS — Z12.31 BREAST CANCER SCREENING BY MAMMOGRAM: ICD-10-CM

## 2023-11-09 PROCEDURE — 77063 BREAST TOMOSYNTHESIS BI: CPT

## 2023-11-16 DIAGNOSIS — F32.A DEPRESSION, UNSPECIFIED DEPRESSION TYPE: ICD-10-CM

## 2023-11-16 NOTE — TELEPHONE ENCOUNTER
Comments:     Last Office Visit (last PCP visit):   10/17/2023    Next Visit Date:  Future Appointments   Date Time Provider [address]   5/29/2024 11:00 AM Jaden Nance MD [facility]       **If hasn't been seen in over a year OR hasn't followed up according to last diabetes/ADHD visit, make appointment for patient before sending refill to provider.     Rx requested:  Requested Prescriptions     Pending Prescriptions Disp Refills    sertraline (ZOLOFT) 25 MG tablet 30 tablet 5     Sig: Take 1 tablet by mouth daily

## 2023-11-17 RX ORDER — SERTRALINE HYDROCHLORIDE 25 MG/1
25 TABLET, FILM COATED ORAL DAILY
Qty: 30 TABLET | Refills: 5 | Status: SHIPPED | OUTPATIENT
Start: 2023-11-17

## 2024-01-02 ENCOUNTER — APPOINTMENT (OUTPATIENT)
Dept: RADIOLOGY | Facility: HOSPITAL | Age: 74
End: 2024-01-02
Payer: COMMERCIAL

## 2024-01-02 ENCOUNTER — HOSPITAL ENCOUNTER (OUTPATIENT)
Dept: RADIOLOGY | Facility: HOSPITAL | Age: 74
End: 2024-01-02
Payer: COMMERCIAL

## 2024-01-03 ENCOUNTER — APPOINTMENT (OUTPATIENT)
Dept: RADIOLOGY | Facility: HOSPITAL | Age: 74
End: 2024-01-03
Payer: COMMERCIAL

## 2024-01-03 ENCOUNTER — HOSPITAL ENCOUNTER (OUTPATIENT)
Dept: RADIOLOGY | Facility: HOSPITAL | Age: 74
Discharge: HOME | End: 2024-01-03
Payer: COMMERCIAL

## 2024-01-03 VITALS
OXYGEN SATURATION: 99 % | WEIGHT: 172.4 LBS | SYSTOLIC BLOOD PRESSURE: 141 MMHG | HEIGHT: 64 IN | TEMPERATURE: 99.3 F | RESPIRATION RATE: 18 BRPM | BODY MASS INDEX: 29.43 KG/M2 | HEART RATE: 74 BPM | DIASTOLIC BLOOD PRESSURE: 72 MMHG

## 2024-01-03 DIAGNOSIS — G91.2 (IDIOPATHIC) NORMAL PRESSURE HYDROCEPHALUS (MULTI): ICD-10-CM

## 2024-01-03 DIAGNOSIS — G91.2 NPH (NORMAL PRESSURE HYDROCEPHALUS) (MULTI): ICD-10-CM

## 2024-01-03 LAB
INR PPP: 1 (ref 0.9–1.1)
PLATELET # BLD AUTO: 294 X10*3/UL (ref 150–450)
PROTHROMBIN TIME: 11.4 SECONDS (ref 9.8–12.8)

## 2024-01-03 PROCEDURE — 2500000005 HC RX 250 GENERAL PHARMACY W/O HCPCS

## 2024-01-03 PROCEDURE — A9548 IN111 PENTETATE: HCPCS

## 2024-01-03 PROCEDURE — 62328 DX LMBR SPI PNXR W/FLUOR/CT: CPT | Performed by: RADIOLOGY

## 2024-01-03 PROCEDURE — 78630 CEREBROSPINAL FLUID SCAN: CPT

## 2024-01-03 PROCEDURE — 85610 PROTHROMBIN TIME: CPT | Performed by: RADIOLOGY

## 2024-01-03 PROCEDURE — 36415 COLL VENOUS BLD VENIPUNCTURE: CPT | Performed by: RADIOLOGY

## 2024-01-03 PROCEDURE — 62328 DX LMBR SPI PNXR W/FLUOR/CT: CPT

## 2024-01-03 PROCEDURE — 3430000001 HC RX 343 DIAGNOSTIC RADIOPHARMACEUTICALS

## 2024-01-03 PROCEDURE — 78830 RP LOCLZJ TUM SPECT W/CT 1: CPT

## 2024-01-03 PROCEDURE — 85049 AUTOMATED PLATELET COUNT: CPT | Performed by: RADIOLOGY

## 2024-01-03 RX ORDER — SERTRALINE HYDROCHLORIDE 25 MG/1
25 TABLET, FILM COATED ORAL DAILY
COMMUNITY
Start: 2023-12-14

## 2024-01-03 RX ORDER — LEVOTHYROXINE SODIUM 75 UG/1
75 TABLET ORAL DAILY
COMMUNITY
Start: 2020-07-20

## 2024-01-03 RX ORDER — CITALOPRAM 10 MG/1
10 TABLET ORAL DAILY
COMMUNITY
Start: 2023-08-22

## 2024-01-03 RX ORDER — ACETAMINOPHEN 500 MG
2000 TABLET ORAL
COMMUNITY

## 2024-01-03 RX ORDER — OXYBUTYNIN CHLORIDE 10 MG/1
10 TABLET, EXTENDED RELEASE ORAL DAILY
COMMUNITY

## 2024-01-03 RX ORDER — LIDOCAINE HYDROCHLORIDE 20 MG/ML
INJECTION, SOLUTION EPIDURAL; INFILTRATION; INTRACAUDAL; PERINEURAL AS NEEDED
Status: COMPLETED | OUTPATIENT
Start: 2024-01-03 | End: 2024-01-03

## 2024-01-03 RX ORDER — GUAIFENESIN AND PHENYLEPHRINE HCL 400; 10 MG/1; MG/1
500 TABLET ORAL DAILY
COMMUNITY

## 2024-01-03 RX ORDER — ACETAMINOPHEN 500 MG
TABLET ORAL
COMMUNITY

## 2024-01-03 RX ORDER — ASPIRIN 81 MG/1
1 TABLET ORAL DAILY
COMMUNITY
Start: 2023-05-18

## 2024-01-03 RX ORDER — LISINOPRIL 10 MG/1
10 TABLET ORAL DAILY
COMMUNITY
Start: 2023-05-19

## 2024-01-03 RX ORDER — TOPIRAMATE 25 MG/1
25 TABLET ORAL 2 TIMES DAILY
COMMUNITY

## 2024-01-03 RX ORDER — ACETAMINOPHEN 500 MG
1000 TABLET ORAL EVERY 4 HOURS PRN
COMMUNITY
Start: 2023-05-09

## 2024-01-03 RX ORDER — PRAVASTATIN SODIUM 20 MG/1
20 TABLET ORAL DAILY
COMMUNITY
Start: 2019-09-16

## 2024-01-03 RX ORDER — FERROUS SULFATE 325(65) MG
325 TABLET ORAL
COMMUNITY
Start: 2023-05-18

## 2024-01-03 RX ORDER — CITALOPRAM 20 MG/1
20 TABLET, FILM COATED ORAL DAILY
COMMUNITY
Start: 2019-09-16

## 2024-01-03 RX ORDER — TIZANIDINE 4 MG/1
4 TABLET ORAL NIGHTLY PRN
COMMUNITY

## 2024-01-03 RX ADMIN — INDIUM IN-111 PENTETATE DISODIUM 0.48 MILLICURIE: 1 SOLUTION INTRATHECAL at 15:53

## 2024-01-03 RX ADMIN — LIDOCAINE HYDROCHLORIDE 4 ML: 20 INJECTION, SOLUTION EPIDURAL; INFILTRATION; INTRACAUDAL; PERINEURAL at 12:27

## 2024-01-03 ASSESSMENT — PAIN SCALES - GENERAL
PAINLEVEL_OUTOF10: 3
PAINLEVEL_OUTOF10: 5 - MODERATE PAIN
PAINLEVEL_OUTOF10: 3

## 2024-01-03 ASSESSMENT — PAIN - FUNCTIONAL ASSESSMENT
PAIN_FUNCTIONAL_ASSESSMENT: 0-10

## 2024-01-03 ASSESSMENT — COLUMBIA-SUICIDE SEVERITY RATING SCALE - C-SSRS
6. HAVE YOU EVER DONE ANYTHING, STARTED TO DO ANYTHING, OR PREPARED TO DO ANYTHING TO END YOUR LIFE?: NO
2. HAVE YOU ACTUALLY HAD ANY THOUGHTS OF KILLING YOURSELF?: NO
1. IN THE PAST MONTH, HAVE YOU WISHED YOU WERE DEAD OR WISHED YOU COULD GO TO SLEEP AND NOT WAKE UP?: NO

## 2024-01-03 ASSESSMENT — PAIN DESCRIPTION - DESCRIPTORS: DESCRIPTORS: SHARP

## 2024-01-04 ENCOUNTER — HOSPITAL ENCOUNTER (OUTPATIENT)
Dept: RADIOLOGY | Facility: HOSPITAL | Age: 74
Discharge: HOME | End: 2024-01-04
Payer: COMMERCIAL

## 2024-01-04 DIAGNOSIS — G91.2 NPH (NORMAL PRESSURE HYDROCEPHALUS) (MULTI): ICD-10-CM

## 2024-01-04 DIAGNOSIS — G91.2 NORMAL PRESSURE HYDROCEPHALUS (MULTI): ICD-10-CM

## 2024-01-05 ENCOUNTER — HOSPITAL ENCOUNTER (OUTPATIENT)
Dept: RADIOLOGY | Facility: HOSPITAL | Age: 74
Discharge: HOME | End: 2024-01-05
Payer: COMMERCIAL

## 2024-01-05 DIAGNOSIS — G91.2 NPH (NORMAL PRESSURE HYDROCEPHALUS) (MULTI): ICD-10-CM

## 2024-01-05 PROCEDURE — 78630 CEREBROSPINAL FLUID SCAN: CPT

## 2024-01-05 PROCEDURE — 78832 RP LOCLZJ TUM SPECT W/CT 2: CPT | Performed by: NUCLEAR MEDICINE

## 2024-01-05 NOTE — ADDENDUM NOTE
Encounter addended by: Les Santos on: 1/5/2024 3:29 PM   Actions taken: Image imported, Imaging Exam ended

## 2024-01-16 DIAGNOSIS — E78.2 MIXED HYPERLIPIDEMIA: ICD-10-CM

## 2024-01-16 NOTE — TELEPHONE ENCOUNTER
Comments:     Last Office Visit (last PCP visit):   10/17/2023    Next Visit Date:  Future Appointments   Date Time Provider Department Center   5/29/2024 11:00 AM Elliot Garcia MD [facility]       **If hasn't been seen in over a year OR hasn't followed up according to last diabetes/ADHD visit, make appointment for patient before sending refill to provider.    Rx requested:  Requested Prescriptions     Pending Prescriptions Disp Refills    pravastatin (PRAVACHOL) 20 MG tablet 90 tablet 1     Sig: Take 1 tablet by mouth daily

## 2024-01-16 NOTE — TELEPHONE ENCOUNTER
Patient is requesting a refill on PRAVASTATIN 20 MG. She stated she takes 30 MG daily and there should be one for 10 MG. Not sure how this works.    Thank you

## 2024-01-18 RX ORDER — PRAVASTATIN SODIUM 20 MG
20 TABLET ORAL DAILY
Qty: 90 TABLET | Refills: 1 | Status: SHIPPED | OUTPATIENT
Start: 2024-01-18

## 2024-02-06 DIAGNOSIS — E03.9 HYPOTHYROIDISM, UNSPECIFIED TYPE: ICD-10-CM

## 2024-02-06 DIAGNOSIS — N39.41 URGE INCONTINENCE OF URINE: ICD-10-CM

## 2024-02-06 RX ORDER — LEVOTHYROXINE SODIUM 0.05 MG/1
50 TABLET ORAL DAILY
Qty: 90 TABLET | Refills: 0 | Status: SHIPPED | OUTPATIENT
Start: 2024-02-06

## 2024-02-06 RX ORDER — OXYBUTYNIN CHLORIDE 10 MG/1
10 TABLET, EXTENDED RELEASE ORAL DAILY
Qty: 90 TABLET | Refills: 0 | Status: SHIPPED | OUTPATIENT
Start: 2024-02-06

## 2024-02-06 NOTE — TELEPHONE ENCOUNTER
Comments:     Last Office Visit (last PCP visit):   10/17/2023    Next Visit Date:  Future Appointments   Date Time Provider Department Center   5/29/2024 11:00 AM Elliot Garcia MD [facility]       **If hasn't been seen in over a year OR hasn't followed up according to last diabetes/ADHD visit, make appointment for patient before sending refill to provider.    Rx requested:  Requested Prescriptions     Pending Prescriptions Disp Refills    levothyroxine (SYNTHROID) 50 MCG tablet [Pharmacy Med Name: levothyroxine 50 mcg tablet] 90 tablet 1     Sig: Take 1 tablet by mouth daily    oxyBUTYnin (DITROPAN-XL) 10 MG extended release tablet [Pharmacy Med Name: oxybutynin chloride ER 10 mg tablet,extended release 24 hr] 90 tablet 1     Sig: Take 1 tablet by mouth daily

## 2024-02-21 LAB
NON-UH HIE APPEARANCE, U: CLEAR
NON-UH HIE APTT PATIENT: 30.3 SECONDS (ref 27–38)
NON-UH HIE BASO COUNT: 0.05 X1000 (ref 0–0.2)
NON-UH HIE BASOS %: 0.7 %
NON-UH HIE BILIRUBIN, U: NEGATIVE
NON-UH HIE BLOOD, U: NEGATIVE
NON-UH HIE BUN/CREAT RATIO: 20
NON-UH HIE BUN: 20 MG/DL (ref 9–23)
NON-UH HIE CALCIUM: 9.3 MG/DL (ref 8.7–10.4)
NON-UH HIE CALCULATED OSMOLALITY: 286 MOSM/KG (ref 275–295)
NON-UH HIE CHLORIDE: 108 MMOL/L (ref 98–107)
NON-UH HIE CO2, VENOUS: 27 MMOL/L (ref 20–31)
NON-UH HIE COLOR, U: YELLOW
NON-UH HIE CREATININE: 1 MG/DL (ref 0.5–0.8)
NON-UH HIE DIFF?: NO
NON-UH HIE EOS COUNT: 0.26 X1000 (ref 0–0.5)
NON-UH HIE EOSIN %: 3.8 %
NON-UH HIE GFR AA: >60
NON-UH HIE GLOMERULAR FILTRATION RATE: 54 ML/MIN/1.73M?
NON-UH HIE GLUCOSE QUAL, U: NEGATIVE
NON-UH HIE GLUCOSE: 73 MG/DL (ref 74–106)
NON-UH HIE HCT: 36 % (ref 36–46)
NON-UH HIE HGB: 11.9 G/DL (ref 12–16)
NON-UH HIE HYALINE CAST: <1 #/HPF
NON-UH HIE INR: 1
NON-UH HIE INSTR WBC: 7
NON-UH HIE K: 4.2 MMOL/L (ref 3.5–5.1)
NON-UH HIE KETONES, U: NEGATIVE
NON-UH HIE LEUKOCYTE ESTERASE, U: NEGATIVE
NON-UH HIE LYMPH %: 13.4 %
NON-UH HIE LYMPH COUNT: 0.94 X1000 (ref 1.2–4.8)
NON-UH HIE MCH: 30.5 PG (ref 27–34)
NON-UH HIE MCHC: 32.9 G/DL (ref 32–37)
NON-UH HIE MCV: 92.6 FL (ref 80–100)
NON-UH HIE MONO %: 7.9 %
NON-UH HIE MONO COUNT: 0.55 X1000 (ref 0.1–1)
NON-UH HIE MPV: 7.6 FL (ref 7.4–10.4)
NON-UH HIE MUCOUS, U: NORMAL
NON-UH HIE NA: 143 MMOL/L (ref 135–145)
NON-UH HIE NEUTROPHIL %: 74.2 %
NON-UH HIE NEUTROPHIL COUNT (ANC): 5.17 X1000 (ref 1.4–8.8)
NON-UH HIE NITRITE, U: NEGATIVE
NON-UH HIE NUCLEATED RBC: 0 /100WBC
NON-UH HIE PH, U: 5 (ref 4.5–8)
NON-UH HIE PLATELET FUNCTION (ASPIRIN): 604 ARU
NON-UH HIE PLATELET: 264 X10 (ref 150–450)
NON-UH HIE PROTEIN, U: NEGATIVE
NON-UH HIE PROTIME PATIENT: 11.1 SECONDS (ref 9.8–12.8)
NON-UH HIE RBC/HPF, U: <1 #/HPF (ref 0–3)
NON-UH HIE RBC: 3.89 X10 (ref 4.2–5.4)
NON-UH HIE RDW: 13.5 % (ref 11.5–14.5)
NON-UH HIE SPECIFIC GRAVITY, U: 1.01 (ref 1–1.03)
NON-UH HIE SQUAMOUS EPITHELIAL CELLS, U: <1 #/HPF
NON-UH HIE U MICRO: NORMAL
NON-UH HIE UROBILINOGEN QUAL, U: NORMAL
NON-UH HIE WBC/HPF, U: <1 #/HPF (ref 0–5)
NON-UH HIE WBC: 7 X10 (ref 4.5–11)

## 2024-03-13 RX ORDER — TIZANIDINE 4 MG/1
4 TABLET ORAL EVERY 8 HOURS PRN
Status: ON HOLD | COMMUNITY
End: 2024-03-15

## 2024-03-13 RX ORDER — TOPIRAMATE 25 MG/1
1 TABLET ORAL 2 TIMES DAILY
COMMUNITY
Start: 2023-08-30

## 2024-03-13 RX ORDER — ACETAMINOPHEN 500 MG
1 TABLET ORAL DAILY
Status: ON HOLD | COMMUNITY
End: 2024-03-15

## 2024-03-14 NOTE — H&P
dysfunction 2 out of 3 short-term memory gait dysfunction using a cane and holding onto her son she will stop all blood  thinners such as aspirin Advil Krill oil with plan for surgery for normal pressure hydrocephalus with ventriculoperitoneal shunting  preoperative stereotactic planning and work operative microdissection placement of bur hole no major surgeries in the right upper  quadrant on my exam.  Assessments  1. NPH (normal pressure hydrocephalus) - G91.2 (Primary)  2. Cervicalgia - M54.2.  Examination  General Examination:   General Appearance: Patient is well developed, well nourished  Cardiovascular: regular rate and rhythm, no carotid bruit.  Gloria SINGH : 1950 (72 yo F) Acc No. [id number] DOS: 2024  Gloria SINGH  73 Y old Female, : 1950  [address] [place] [id number]  Home: [phone]  Provider: Sumit Shelton  Telephone  Encounter  Answered by Sumit Shelton Date: 2024  Time: 02:13 PM  Provider: Sumit Shelton 2024  Note generated by [employer] EMR/PM Software ([email or web address])  3/14/24, 2:14 PM Print Preview  2/3  Lungs: clear to auscultation  Abdomen: soft, nontender, nondistended  Musculoskeletal: gait and station normal  Motor: arms, legs normal strength  Strength: arms, legs, symmetric without atrophy or abnormal movements  Neurological Examination:  Oriented to person, place and time  Recent and remote memory normal  Attention span-concentration normal  Language fluent speech  Fund of knowledge full  Vision normal, visual fields full  Pupils equally reactive to light and extraocular movements intact  Facial sensation and symmetry and motor function face normal, corneal reflex intact  Hearing equal to bilateral finger rub  Palatal movement symmetric  Shoulder shrug normal  Tongue protrusion midline  Sensation equal in arms and legs  Deep tendon reflexes symmetric in arms and legs, no pathologic reflexes noted  Coordination normal in arms

## 2024-03-15 ENCOUNTER — APPOINTMENT (OUTPATIENT)
Dept: GENERAL RADIOLOGY | Age: 74
DRG: 033 | End: 2024-03-15
Attending: NEUROLOGICAL SURGERY
Payer: COMMERCIAL

## 2024-03-15 ENCOUNTER — ANESTHESIA EVENT (OUTPATIENT)
Dept: OPERATING ROOM | Age: 74
End: 2024-03-15
Payer: COMMERCIAL

## 2024-03-15 ENCOUNTER — ANESTHESIA (OUTPATIENT)
Dept: OPERATING ROOM | Age: 74
End: 2024-03-15
Payer: COMMERCIAL

## 2024-03-15 ENCOUNTER — HOSPITAL ENCOUNTER (INPATIENT)
Age: 74
LOS: 5 days | Discharge: HOME | DRG: 033 | End: 2024-03-20
Attending: NEUROLOGICAL SURGERY | Admitting: NEUROLOGICAL SURGERY
Payer: COMMERCIAL

## 2024-03-15 DIAGNOSIS — G91.2 NPH (NORMAL PRESSURE HYDROCEPHALUS) (HCC): ICD-10-CM

## 2024-03-15 LAB
ABO + RH BLD: NORMAL
BLD GP AB SCN SERPL QL: NORMAL

## 2024-03-15 PROCEDURE — 6370000000 HC RX 637 (ALT 250 FOR IP): Performed by: NEUROLOGICAL SURGERY

## 2024-03-15 PROCEDURE — 6360000002 HC RX W HCPCS: Performed by: NEUROLOGICAL SURGERY

## 2024-03-15 PROCEDURE — 86900 BLOOD TYPING SEROLOGIC ABO: CPT

## 2024-03-15 PROCEDURE — 6360000002 HC RX W HCPCS: Performed by: NURSE ANESTHETIST, CERTIFIED REGISTERED

## 2024-03-15 PROCEDURE — 00160J6 BYPASS CEREBRAL VENTRICLE TO PERITONEAL CAVITY WITH SYNTHETIC SUBSTITUTE, OPEN APPROACH: ICD-10-PCS | Performed by: NEUROLOGICAL SURGERY

## 2024-03-15 PROCEDURE — 2580000003 HC RX 258: Performed by: STUDENT IN AN ORGANIZED HEALTH CARE EDUCATION/TRAINING PROGRAM

## 2024-03-15 PROCEDURE — 2580000003 HC RX 258: Performed by: NEUROLOGICAL SURGERY

## 2024-03-15 PROCEDURE — 3700000000 HC ANESTHESIA ATTENDED CARE: Performed by: NEUROLOGICAL SURGERY

## 2024-03-15 PROCEDURE — 86901 BLOOD TYPING SEROLOGIC RH(D): CPT

## 2024-03-15 PROCEDURE — 6370000000 HC RX 637 (ALT 250 FOR IP): Performed by: STUDENT IN AN ORGANIZED HEALTH CARE EDUCATION/TRAINING PROGRAM

## 2024-03-15 PROCEDURE — 99223 1ST HOSP IP/OBS HIGH 75: CPT | Performed by: INTERNAL MEDICINE

## 2024-03-15 PROCEDURE — 2000000000 HC ICU R&B

## 2024-03-15 PROCEDURE — C1889 IMPLANT/INSERT DEVICE, NOC: HCPCS | Performed by: NEUROLOGICAL SURGERY

## 2024-03-15 PROCEDURE — 2709999900 HC NON-CHARGEABLE SUPPLY: Performed by: NEUROLOGICAL SURGERY

## 2024-03-15 PROCEDURE — C1729 CATH, DRAINAGE: HCPCS | Performed by: NEUROLOGICAL SURGERY

## 2024-03-15 PROCEDURE — 2720000010 HC SURG SUPPLY STERILE: Performed by: NEUROLOGICAL SURGERY

## 2024-03-15 PROCEDURE — 88112 CYTOPATH CELL ENHANCE TECH: CPT

## 2024-03-15 PROCEDURE — 2500000003 HC RX 250 WO HCPCS: Performed by: NURSE ANESTHETIST, CERTIFIED REGISTERED

## 2024-03-15 PROCEDURE — 94150 VITAL CAPACITY TEST: CPT

## 2024-03-15 PROCEDURE — 86850 RBC ANTIBODY SCREEN: CPT

## 2024-03-15 PROCEDURE — 3600000003 HC SURGERY LEVEL 3 BASE: Performed by: NEUROLOGICAL SURGERY

## 2024-03-15 PROCEDURE — C1713 ANCHOR/SCREW BN/BN,TIS/BN: HCPCS | Performed by: NEUROLOGICAL SURGERY

## 2024-03-15 PROCEDURE — 3600000013 HC SURGERY LEVEL 3 ADDTL 15MIN: Performed by: NEUROLOGICAL SURGERY

## 2024-03-15 PROCEDURE — 2500000003 HC RX 250 WO HCPCS: Performed by: NEUROLOGICAL SURGERY

## 2024-03-15 PROCEDURE — 3700000001 HC ADD 15 MINUTES (ANESTHESIA): Performed by: NEUROLOGICAL SURGERY

## 2024-03-15 RX ORDER — OXYBUTYNIN CHLORIDE 5 MG/1
10 TABLET, EXTENDED RELEASE ORAL DAILY
Status: DISCONTINUED | OUTPATIENT
Start: 2024-03-15 | End: 2024-03-20 | Stop reason: HOSPADM

## 2024-03-15 RX ORDER — SODIUM CHLORIDE 9 MG/ML
INJECTION, SOLUTION INTRAVENOUS PRN
Status: DISCONTINUED | OUTPATIENT
Start: 2024-03-15 | End: 2024-03-20 | Stop reason: HOSPADM

## 2024-03-15 RX ORDER — FERROUS SULFATE 325(65) MG
325 TABLET ORAL 2 TIMES DAILY WITH MEALS
Status: DISCONTINUED | OUTPATIENT
Start: 2024-03-15 | End: 2024-03-20 | Stop reason: HOSPADM

## 2024-03-15 RX ORDER — PROCHLORPERAZINE EDISYLATE 5 MG/ML
5 INJECTION INTRAMUSCULAR; INTRAVENOUS
Status: ACTIVE | OUTPATIENT
Start: 2024-03-15 | End: 2024-03-16

## 2024-03-15 RX ORDER — LEVOTHYROXINE SODIUM 0.05 MG/1
50 TABLET ORAL DAILY
Status: DISCONTINUED | OUTPATIENT
Start: 2024-03-15 | End: 2024-03-20 | Stop reason: HOSPADM

## 2024-03-15 RX ORDER — MULTIVITAMIN WITH IRON
1 TABLET ORAL DAILY
Status: DISCONTINUED | OUTPATIENT
Start: 2024-03-15 | End: 2024-03-20 | Stop reason: HOSPADM

## 2024-03-15 RX ORDER — DEXAMETHASONE SODIUM PHOSPHATE 4 MG/ML
INJECTION, SOLUTION INTRA-ARTICULAR; INTRALESIONAL; INTRAMUSCULAR; INTRAVENOUS; SOFT TISSUE PRN
Status: DISCONTINUED | OUTPATIENT
Start: 2024-03-15 | End: 2024-03-15 | Stop reason: SDUPTHER

## 2024-03-15 RX ORDER — GINSENG 100 MG
CAPSULE ORAL PRN
Status: DISCONTINUED | OUTPATIENT
Start: 2024-03-15 | End: 2024-03-15 | Stop reason: ALTCHOICE

## 2024-03-15 RX ORDER — SCOLOPAMINE TRANSDERMAL SYSTEM 1 MG/1
1 PATCH, EXTENDED RELEASE TRANSDERMAL
Status: DISCONTINUED | OUTPATIENT
Start: 2024-03-15 | End: 2024-03-15 | Stop reason: HOSPADM

## 2024-03-15 RX ORDER — SERTRALINE HYDROCHLORIDE 25 MG/1
25 TABLET, FILM COATED ORAL DAILY
Status: DISCONTINUED | OUTPATIENT
Start: 2024-03-15 | End: 2024-03-20 | Stop reason: HOSPADM

## 2024-03-15 RX ORDER — SODIUM CHLORIDE 0.9 % (FLUSH) 0.9 %
5-40 SYRINGE (ML) INJECTION PRN
Status: DISCONTINUED | OUTPATIENT
Start: 2024-03-15 | End: 2024-03-15 | Stop reason: HOSPADM

## 2024-03-15 RX ORDER — ACETAMINOPHEN 325 MG/1
650 TABLET ORAL EVERY 6 HOURS
Status: DISCONTINUED | OUTPATIENT
Start: 2024-03-15 | End: 2024-03-20 | Stop reason: HOSPADM

## 2024-03-15 RX ORDER — FENTANYL CITRATE 50 UG/ML
INJECTION, SOLUTION INTRAMUSCULAR; INTRAVENOUS PRN
Status: DISCONTINUED | OUTPATIENT
Start: 2024-03-15 | End: 2024-03-15 | Stop reason: SDUPTHER

## 2024-03-15 RX ORDER — SODIUM CHLORIDE 9 MG/ML
INJECTION, SOLUTION INTRAVENOUS PRN
Status: DISCONTINUED | OUTPATIENT
Start: 2024-03-15 | End: 2024-03-15 | Stop reason: HOSPADM

## 2024-03-15 RX ORDER — ENOXAPARIN SODIUM 100 MG/ML
40 INJECTION SUBCUTANEOUS DAILY
Status: DISCONTINUED | OUTPATIENT
Start: 2024-03-16 | End: 2024-03-16

## 2024-03-15 RX ORDER — SODIUM CHLORIDE 0.9 % (FLUSH) 0.9 %
5-40 SYRINGE (ML) INJECTION PRN
Status: DISCONTINUED | OUTPATIENT
Start: 2024-03-15 | End: 2024-03-20 | Stop reason: HOSPADM

## 2024-03-15 RX ORDER — MEPERIDINE HYDROCHLORIDE 25 MG/ML
12.5 INJECTION INTRAMUSCULAR; INTRAVENOUS; SUBCUTANEOUS EVERY 5 MIN PRN
Status: DISCONTINUED | OUTPATIENT
Start: 2024-03-15 | End: 2024-03-20 | Stop reason: HOSPADM

## 2024-03-15 RX ORDER — ROCURONIUM BROMIDE 10 MG/ML
INJECTION, SOLUTION INTRAVENOUS PRN
Status: DISCONTINUED | OUTPATIENT
Start: 2024-03-15 | End: 2024-03-15 | Stop reason: SDUPTHER

## 2024-03-15 RX ORDER — OXYCODONE HYDROCHLORIDE 5 MG/1
10 TABLET ORAL PRN
Status: COMPLETED | OUTPATIENT
Start: 2024-03-15 | End: 2024-03-15

## 2024-03-15 RX ORDER — NALOXONE HYDROCHLORIDE 0.4 MG/ML
INJECTION, SOLUTION INTRAMUSCULAR; INTRAVENOUS; SUBCUTANEOUS PRN
Status: DISCONTINUED | OUTPATIENT
Start: 2024-03-15 | End: 2024-03-20 | Stop reason: HOSPADM

## 2024-03-15 RX ORDER — LIDOCAINE HYDROCHLORIDE 10 MG/ML
1 INJECTION, SOLUTION EPIDURAL; INFILTRATION; INTRACAUDAL; PERINEURAL
Status: DISCONTINUED | OUTPATIENT
Start: 2024-03-15 | End: 2024-03-15 | Stop reason: HOSPADM

## 2024-03-15 RX ORDER — CITALOPRAM HYDROBROMIDE 10 MG/1
10 TABLET ORAL DAILY
Status: DISCONTINUED | OUTPATIENT
Start: 2024-03-15 | End: 2024-03-20 | Stop reason: HOSPADM

## 2024-03-15 RX ORDER — HYDRALAZINE HYDROCHLORIDE 20 MG/ML
INJECTION INTRAMUSCULAR; INTRAVENOUS PRN
Status: DISCONTINUED | OUTPATIENT
Start: 2024-03-15 | End: 2024-03-15 | Stop reason: SDUPTHER

## 2024-03-15 RX ORDER — ONDANSETRON 2 MG/ML
4 INJECTION INTRAMUSCULAR; INTRAVENOUS
Status: ACTIVE | OUTPATIENT
Start: 2024-03-15 | End: 2024-03-16

## 2024-03-15 RX ORDER — SODIUM CHLORIDE, SODIUM LACTATE, POTASSIUM CHLORIDE, CALCIUM CHLORIDE 600; 310; 30; 20 MG/100ML; MG/100ML; MG/100ML; MG/100ML
INJECTION, SOLUTION INTRAVENOUS CONTINUOUS
Status: DISCONTINUED | OUTPATIENT
Start: 2024-03-15 | End: 2024-03-15

## 2024-03-15 RX ORDER — SODIUM CHLORIDE 0.9 % (FLUSH) 0.9 %
5-40 SYRINGE (ML) INJECTION EVERY 12 HOURS SCHEDULED
Status: DISCONTINUED | OUTPATIENT
Start: 2024-03-15 | End: 2024-03-20 | Stop reason: HOSPADM

## 2024-03-15 RX ORDER — PRAVASTATIN SODIUM 10 MG
20 TABLET ORAL DAILY
Status: DISCONTINUED | OUTPATIENT
Start: 2024-03-15 | End: 2024-03-20 | Stop reason: HOSPADM

## 2024-03-15 RX ORDER — LISINOPRIL 10 MG/1
10 TABLET ORAL DAILY
Status: DISCONTINUED | OUTPATIENT
Start: 2024-03-15 | End: 2024-03-20 | Stop reason: HOSPADM

## 2024-03-15 RX ORDER — SODIUM CHLORIDE, SODIUM LACTATE, POTASSIUM CHLORIDE, CALCIUM CHLORIDE 600; 310; 30; 20 MG/100ML; MG/100ML; MG/100ML; MG/100ML
INJECTION, SOLUTION INTRAVENOUS CONTINUOUS
Status: DISCONTINUED | OUTPATIENT
Start: 2024-03-15 | End: 2024-03-20 | Stop reason: HOSPADM

## 2024-03-15 RX ORDER — TOPIRAMATE 25 MG/1
25 TABLET ORAL 2 TIMES DAILY
Status: DISCONTINUED | OUTPATIENT
Start: 2024-03-15 | End: 2024-03-20 | Stop reason: HOSPADM

## 2024-03-15 RX ORDER — LIDOCAINE HYDROCHLORIDE 10 MG/ML
INJECTION, SOLUTION EPIDURAL; INFILTRATION; INTRACAUDAL; PERINEURAL PRN
Status: DISCONTINUED | OUTPATIENT
Start: 2024-03-15 | End: 2024-03-15 | Stop reason: SDUPTHER

## 2024-03-15 RX ORDER — PROPOFOL 10 MG/ML
INJECTION, EMULSION INTRAVENOUS PRN
Status: DISCONTINUED | OUTPATIENT
Start: 2024-03-15 | End: 2024-03-15 | Stop reason: SDUPTHER

## 2024-03-15 RX ORDER — BISACODYL 10 MG
10 SUPPOSITORY, RECTAL RECTAL DAILY PRN
Status: DISCONTINUED | OUTPATIENT
Start: 2024-03-15 | End: 2024-03-20 | Stop reason: HOSPADM

## 2024-03-15 RX ORDER — CITALOPRAM HYDROBROMIDE 10 MG/1
20 TABLET ORAL DAILY
Status: DISCONTINUED | OUTPATIENT
Start: 2024-03-15 | End: 2024-03-20 | Stop reason: HOSPADM

## 2024-03-15 RX ORDER — FENTANYL CITRATE 0.05 MG/ML
25 INJECTION, SOLUTION INTRAMUSCULAR; INTRAVENOUS EVERY 5 MIN PRN
Status: DISCONTINUED | OUTPATIENT
Start: 2024-03-15 | End: 2024-03-20 | Stop reason: HOSPADM

## 2024-03-15 RX ORDER — OXYCODONE HYDROCHLORIDE 5 MG/1
5 TABLET ORAL PRN
Status: COMPLETED | OUTPATIENT
Start: 2024-03-15 | End: 2024-03-15

## 2024-03-15 RX ORDER — LABETALOL HYDROCHLORIDE 5 MG/ML
10 INJECTION, SOLUTION INTRAVENOUS
Status: DISCONTINUED | OUTPATIENT
Start: 2024-03-15 | End: 2024-03-20 | Stop reason: HOSPADM

## 2024-03-15 RX ORDER — FENTANYL CITRATE 0.05 MG/ML
50 INJECTION, SOLUTION INTRAMUSCULAR; INTRAVENOUS EVERY 5 MIN PRN
Status: DISCONTINUED | OUTPATIENT
Start: 2024-03-15 | End: 2024-03-20 | Stop reason: HOSPADM

## 2024-03-15 RX ORDER — HYDRALAZINE HYDROCHLORIDE 20 MG/ML
10 INJECTION INTRAMUSCULAR; INTRAVENOUS
Status: DISCONTINUED | OUTPATIENT
Start: 2024-03-15 | End: 2024-03-20 | Stop reason: HOSPADM

## 2024-03-15 RX ORDER — MIDAZOLAM HYDROCHLORIDE 1 MG/ML
INJECTION INTRAMUSCULAR; INTRAVENOUS PRN
Status: DISCONTINUED | OUTPATIENT
Start: 2024-03-15 | End: 2024-03-15 | Stop reason: SDUPTHER

## 2024-03-15 RX ORDER — DIPHENHYDRAMINE HYDROCHLORIDE 50 MG/ML
12.5 INJECTION INTRAMUSCULAR; INTRAVENOUS
Status: ACTIVE | OUTPATIENT
Start: 2024-03-15 | End: 2024-03-16

## 2024-03-15 RX ORDER — ONDANSETRON 2 MG/ML
INJECTION INTRAMUSCULAR; INTRAVENOUS PRN
Status: DISCONTINUED | OUTPATIENT
Start: 2024-03-15 | End: 2024-03-15 | Stop reason: SDUPTHER

## 2024-03-15 RX ORDER — OXYCODONE HYDROCHLORIDE 5 MG/1
5 TABLET ORAL EVERY 4 HOURS PRN
Status: DISCONTINUED | OUTPATIENT
Start: 2024-03-15 | End: 2024-03-20 | Stop reason: HOSPADM

## 2024-03-15 RX ORDER — SODIUM CHLORIDE 0.9 % (FLUSH) 0.9 %
5-40 SYRINGE (ML) INJECTION EVERY 12 HOURS SCHEDULED
Status: DISCONTINUED | OUTPATIENT
Start: 2024-03-15 | End: 2024-03-15 | Stop reason: HOSPADM

## 2024-03-15 RX ORDER — SODIUM CHLORIDE 9 MG/ML
INJECTION, SOLUTION INTRAVENOUS CONTINUOUS
Status: DISCONTINUED | OUTPATIENT
Start: 2024-03-15 | End: 2024-03-16

## 2024-03-15 RX ADMIN — CEFAZOLIN 2000 MG: 2 INJECTION, POWDER, FOR SOLUTION INTRAMUSCULAR; INTRAVENOUS at 07:45

## 2024-03-15 RX ADMIN — ONDANSETRON 4 MG: 2 INJECTION INTRAMUSCULAR; INTRAVENOUS at 07:46

## 2024-03-15 RX ADMIN — CEFAZOLIN 2000 MG: 2 INJECTION, POWDER, FOR SOLUTION INTRAMUSCULAR; INTRAVENOUS at 16:57

## 2024-03-15 RX ADMIN — PROPOFOL 150 MG: 10 INJECTION, EMULSION INTRAVENOUS at 07:34

## 2024-03-15 RX ADMIN — Medication 10 ML: at 21:14

## 2024-03-15 RX ADMIN — HYDRALAZINE HYDROCHLORIDE 6 MG: 20 INJECTION INTRAMUSCULAR; INTRAVENOUS at 08:34

## 2024-03-15 RX ADMIN — ROCURONIUM BROMIDE 50 MG: 10 INJECTION, SOLUTION INTRAVENOUS at 07:34

## 2024-03-15 RX ADMIN — CITALOPRAM HYDROBROMIDE 20 MG: 10 TABLET ORAL at 21:15

## 2024-03-15 RX ADMIN — LEVOTHYROXINE SODIUM 50 MCG: 0.03 TABLET ORAL at 11:37

## 2024-03-15 RX ADMIN — HYDRALAZINE HYDROCHLORIDE 8 MG: 20 INJECTION INTRAMUSCULAR; INTRAVENOUS at 10:02

## 2024-03-15 RX ADMIN — PROPOFOL 100 MG: 10 INJECTION, EMULSION INTRAVENOUS at 08:35

## 2024-03-15 RX ADMIN — FERROUS SULFATE TAB 325 MG (65 MG ELEMENTAL FE) 325 MG: 325 (65 FE) TAB at 11:37

## 2024-03-15 RX ADMIN — MIDAZOLAM HYDROCHLORIDE 2 MG: 1 INJECTION, SOLUTION INTRAMUSCULAR; INTRAVENOUS at 07:29

## 2024-03-15 RX ADMIN — SERTRALINE HYDROCHLORIDE 25 MG: 25 TABLET ORAL at 11:37

## 2024-03-15 RX ADMIN — CITALOPRAM HYDROBROMIDE 10 MG: 10 TABLET ORAL at 21:15

## 2024-03-15 RX ADMIN — CEFAZOLIN 2000 MG: 2 INJECTION, POWDER, FOR SOLUTION INTRAMUSCULAR; INTRAVENOUS at 23:26

## 2024-03-15 RX ADMIN — OXYBUTYNIN CHLORIDE 10 MG: 5 TABLET, EXTENDED RELEASE ORAL at 11:37

## 2024-03-15 RX ADMIN — OXYCODONE HYDROCHLORIDE 10 MG: 5 TABLET ORAL at 11:37

## 2024-03-15 RX ADMIN — LISINOPRIL 10 MG: 10 TABLET ORAL at 11:37

## 2024-03-15 RX ADMIN — HYDRALAZINE HYDROCHLORIDE 6 MG: 20 INJECTION INTRAMUSCULAR; INTRAVENOUS at 09:44

## 2024-03-15 RX ADMIN — TOPIRAMATE 25 MG: 25 TABLET, FILM COATED ORAL at 21:11

## 2024-03-15 RX ADMIN — SODIUM CHLORIDE, POTASSIUM CHLORIDE, SODIUM LACTATE AND CALCIUM CHLORIDE: 600; 310; 30; 20 INJECTION, SOLUTION INTRAVENOUS at 09:02

## 2024-03-15 RX ADMIN — FERROUS SULFATE TAB 325 MG (65 MG ELEMENTAL FE) 325 MG: 325 (65 FE) TAB at 16:56

## 2024-03-15 RX ADMIN — FENTANYL CITRATE 100 MCG: 50 INJECTION, SOLUTION INTRAMUSCULAR; INTRAVENOUS at 08:56

## 2024-03-15 RX ADMIN — ACETAMINOPHEN 325MG 650 MG: 325 TABLET ORAL at 11:37

## 2024-03-15 RX ADMIN — ACETAMINOPHEN 325MG 650 MG: 325 TABLET ORAL at 16:56

## 2024-03-15 RX ADMIN — THERA TABS 1 TABLET: TAB at 11:37

## 2024-03-15 RX ADMIN — PRAVASTATIN SODIUM 20 MG: 10 TABLET ORAL at 21:11

## 2024-03-15 RX ADMIN — ROCURONIUM BROMIDE 25 MG: 10 INJECTION, SOLUTION INTRAVENOUS at 08:40

## 2024-03-15 RX ADMIN — DEXAMETHASONE SODIUM PHOSPHATE 4 MG: 4 INJECTION INTRA-ARTICULAR; INTRALESIONAL; INTRAMUSCULAR; INTRAVENOUS; SOFT TISSUE at 07:46

## 2024-03-15 RX ADMIN — SODIUM CHLORIDE, POTASSIUM CHLORIDE, SODIUM LACTATE AND CALCIUM CHLORIDE: 600; 310; 30; 20 INJECTION, SOLUTION INTRAVENOUS at 07:05

## 2024-03-15 RX ADMIN — LIDOCAINE HYDROCHLORIDE 25 MG: 10 INJECTION, SOLUTION EPIDURAL; INFILTRATION; INTRACAUDAL; PERINEURAL at 07:34

## 2024-03-15 RX ADMIN — SODIUM CHLORIDE, POTASSIUM CHLORIDE, SODIUM LACTATE AND CALCIUM CHLORIDE: 600; 310; 30; 20 INJECTION, SOLUTION INTRAVENOUS at 07:32

## 2024-03-15 RX ADMIN — FENTANYL CITRATE 100 MCG: 50 INJECTION, SOLUTION INTRAMUSCULAR; INTRAVENOUS at 07:34

## 2024-03-15 RX ADMIN — ACETAMINOPHEN 325MG 650 MG: 325 TABLET ORAL at 23:21

## 2024-03-15 RX ADMIN — PROPOFOL 50 MG: 10 INJECTION, EMULSION INTRAVENOUS at 08:57

## 2024-03-15 RX ADMIN — TOPIRAMATE 25 MG: 25 TABLET, FILM COATED ORAL at 13:42

## 2024-03-15 ASSESSMENT — PAIN DESCRIPTION - LOCATION
LOCATION: NECK
LOCATION: NECK
LOCATION: HEAD

## 2024-03-15 ASSESSMENT — PAIN DESCRIPTION - DESCRIPTORS: DESCRIPTORS: ACHING

## 2024-03-15 ASSESSMENT — PAIN SCALES - GENERAL
PAINLEVEL_OUTOF10: 5
PAINLEVEL_OUTOF10: 5

## 2024-03-15 ASSESSMENT — PAIN DESCRIPTION - PAIN TYPE: TYPE: SURGICAL PAIN

## 2024-03-15 NOTE — CONSULTS
Pulmonary and Critical Care Medicine  Consult Note  Encounter Date: 3/15/2024 11:29 AM    Ms. Gloria Abbott is a 73 y.o. female  : 1950  Requesting Provider: Sumit Shelton MD    Reason for request: ICU management            HISTORY OF PRESENT ILLNESS:    Patient is 73 y.o. presents with post  shunt placement, she is awake and alert, has no complaint, no tingling or numbness, no focal neurodeficit, no headache, no blurry vision or double vision.  Patient has normal pressure hydrocephalus, she underwent shunt placement today          Past Medical History:        Diagnosis Date    Cancer (HCC)     endometrial and uterine    Depression     Hyperlipidemia     Hypothyroid     MDD (major depressive disorder)     NPH (normal pressure hydrocephalus) (HCC)     Protruded lumbar disc     L4/L5    Stress     Vitamin D deficiency        Past Surgical History:        Procedure Laterality Date    ANKLE SURGERY      COLONOSCOPY      FOOT SURGERY      HIP SURGERY Right 2023    RIGHT HIP PINNING AND LEFT KNEE STERIOD INJECTION performed by Elliot Garcia MD at [facility] OR    LASIK Bilateral     SINUS SURGERY  07/10/2012    TOTAL KNEE ARTHROPLASTY Left 2023    Left total knee arthroplasty,TagMan Triathlon Total Knee System,Choice with adductor canal block preop with Nhan 23 at 10:30 am and lab work for 23 at 1:00 pm performed by Elliot Garcia MD at [facility] OR       Social History:     reports that she has never smoked. She has never used smokeless tobacco. She reports that she does not drink alcohol and does not use drugs.    Family History:       Problem Relation Age of Onset    Anemia Mother     Heart Disease Mother     Depression Mother     High Blood Pressure Father        Allergies:  Patient has no known allergies.        MEDICATIONS during current hospitalization:    Continuous Infusions:   lactated ringers IV soln      sodium chloride      sodium chloride      sodium chloride

## 2024-03-15 NOTE — ANESTHESIA POSTPROCEDURE EVALUATION
Department of Anesthesiology  Postprocedure Note    Patient: Gloria Abbott  MRN: [id number]  YOB: 1950  Date of evaluation: 3/15/2024    Procedure Summary       Date: 03/15/24 Room / Location: [address]    Anesthesia Start: 0730 Anesthesia Stop: 1019    Procedure: RIGHT  SHUNT CREATION AND INDICATED PROCEDURES, MICRODISSECTION, PREOPERATIVE STEROTACTIC PLANNING AND WORK (Head) Diagnosis:       NPH (normal pressure hydrocephalus) (HCC)      (NPH (normal pressure hydrocephalus) (HCC) [G91.2])    Surgeons: Sumit Shelton MD Responsible Provider: Rusty Azevedo MD    Anesthesia Type: general ASA Status: 3            Anesthesia Type: No value filed.    Bc Phase I: Bc Score: 10    Bc Phase II:      Anesthesia Post Evaluation    Patient location during evaluation: ICU  Patient participation: complete - patient participated  Level of consciousness: awake  Airway patency: patent  Nausea & Vomiting: no nausea and no vomiting  Cardiovascular status: hemodynamically stable  Respiratory status: acceptable  Hydration status: euvolemic  Pain management: adequate        No notable events documented.

## 2024-03-15 NOTE — ACP (ADVANCE CARE PLANNING)
Advance Care Planning   Healthcare Decision Maker:    Primary Decision Maker: Harshad Abbott - Child - [phone]    Primary Decision Maker: Sabine Vazquez - Child - [phone]    Click here to complete Healthcare Decision Makers including selection of the Healthcare Decision Maker Relationship (ie \"Primary\").          Info from patient at bedside

## 2024-03-15 NOTE — CARE COORDINATION
Case Management Assessment  Initial Evaluation    Date/Time of Evaluation: 3/15/2024 3:18 PM  Assessment Completed by: Agustina Peña    If patient is discharged prior to next notation, then this note serves as note for discharge by case management.    Patient Name: Gloria Abbott                   YOB: 1950  Diagnosis: NPH (normal pressure hydrocephalus) (HCC) [G91.2]                   Date / Time: 3/15/2024  5:45 AM    Patient Admission Status: Inpatient   Readmission Risk (Low < 19, Mod (19-27), High > 27): Readmission Risk Score: 6.6    Current PCP: Marce Giraldo PA  PCP verified by CM? Yes    Chart Reviewed: Yes      History Provided by: Patient, Child/Family  Patient Orientation: Alert and Oriented, Person, Place, Situation, Self    Patient Cognition: Alert    Hospitalization in the last 30 days (Readmission):  No    If yes, Readmission Assessment in CM Navigator will be completed.    Advance Directives:      Code Status: Full Code   Patient's Primary Decision Maker is: Legal Next of Kin    Primary Decision Maker: Harshad Abbott - Child - [phone]    Primary Decision Maker: Sabine Vazquez - Child - [phone]    Discharge Planning:    Patient lives with:   Type of Home:    Primary Care Giver: Self  Patient Support Systems include: Children   Current Financial resources:    Current community resources:    Current services prior to admission:              Current DME:              Type of Home Care services:       ADLS  Prior functional level: Assistance with the following:, Cooking, Housework, Shopping  Current functional level: Other (see comment) (Pt will need PT/OT eval/new post op today)    PT AM-PAC:   /24  OT AM-PAC:   /24    Family can provide assistance at DC: Yes  Would you like Case Management to discuss the discharge plan with any other family members/significant others, and if so, who? Yes (Son Harshad)  Plans to Return to Present Housing: Unknown at present  Other

## 2024-03-15 NOTE — ANESTHESIA PRE PROCEDURE
reviewed         Beta Blocker:  Not on Beta Blocker      ROS comment: Feb 2023 ECHO:   Left ventricular ejection fraction is visually estimated at 60-65%.   Normal left ventricular size and function.   E/A flow reversal noted. Suggestive of diastolic dysfunction.   Mild-to-moderate aortic regurgitation is noted.   Mild-to-moderate tricuspid regurgitation .       Neuro/Psych:   Negative Neuro/Psych ROS  (+) psychiatric history:depression/anxiety             GI/Hepatic/Renal: Neg GI/Hepatic/Renal ROS            Endo/Other: Negative Endo/Other ROS   (+) hypothyroidism::., malignancy/cancer.          Pt had PAT visit.       Abdominal:             Vascular: negative vascular ROS.         Other Findings:             Anesthesia Plan      general     ASA 3     (ETT)  Induction: intravenous.    MIPS: Postoperative opioids intended and Prophylactic antiemetics administered.  Anesthetic plan and risks discussed with patient.      Plan discussed with CRNA and CAA.    Attending anesthesiologist reviewed and agrees with Pre Eval content                Rusty Azevedo MD   3/15/2024

## 2024-03-15 NOTE — CONSULTS
Pulmonary and Critical Care Medicine  Consult Note  Encounter Date: 3/15/2024 10:30 AM    Ms. Gloria Abbott is a 73 y.o. female  : 1950  Requesting Provider: Sumit Shelton MD   Reason for request: ICU management            HISTORY OF PRESENT ILLNESS:    Gloria Abbott is a 73 y.o., female who has a past medical history of  has a past medical history of Cancer (HCC), Depression, Hyperlipidemia, Hypothyroid, MDD (major depressive disorder), NPH (normal pressure hydrocephalus) (HCC), Protruded lumbar disc, Stress, and Vitamin D deficiency. that is hospitalized for hydrocephalus    HPI   Patient is recovering in ICU after having right  shunt placement for hydrocephalus.  Patient is alert and oriented x 3.  She states about a year ago she fell and hit the back of her head and they have been monitoring her pressures.  Patient states she also had a fall in February and surgery for her hip and then had bilateral knee replacements in May of last year.  Since this time she has had gait and balance issues.  She uses a cane to get around at home and over the course of the last month she has been more unsteady and using furniture and her son to get up and move around.  She lives with her son.  Patient is complaining of a headache but states she was not having headaches prior.  She denies blurred vision denies shortness of breath, denies chest pain, denies numbness or tingling in her extremities.  She is able to move all extremities denies any blurred vision.  She is currently on room air her O2 sats are 97%.  Past Medical History:        Diagnosis Date    Cancer (HCC)     endometrial and uterine    Depression     Hyperlipidemia     Hypothyroid     MDD (major depressive disorder)     NPH (normal pressure hydrocephalus) (HCC)     Protruded lumbar disc     L4/L5    Stress     Vitamin D deficiency        Past Surgical History:        Procedure Laterality Date    ANKLE SURGERY      COLONOSCOPY      FOOT SURGERY

## 2024-03-15 NOTE — OP NOTE
Operative Note      Patient: Gloria Abbott  YOB: 1950  MRN: [id number]    Date of Procedure: 3/15/2024    Pre-Op Diagnosis Codes:     * NPH (normal pressure hydrocephalus) ([facility]) [G91.2]    Post-Op Diagnosis: Same       Procedure(s):  RIGHT  SHUNT CREATION AND INDICATED PROCEDURES, MICRODISSECTION, PREOPERATIVE STEROTACTIC PLANNING AND WORK    Surgeon(s):  Sumit Shelton MD    Assistant:   * No surgical staff found *    Anesthesia: General    Estimated Blood Loss (mL): less than 50     Complications: None    Specimens:   ID Type Source Tests Collected by Time Destination   A : CSF  shunt placement Body Fluid CSF SURGICAL PATHOLOGY Sumit Shelton MD 3/15/2024 0922        Implants:  * No implants in log *      Drains: * No LDAs found *    Findings: none     Risks, benefits and alternatives to surgery were discussed:  Risks including bleeding, infection, temporary or permanent numbness, tingling, weakness, paralysis, bowel or bladder dysfunction, leaking of cerebrospinal fluid leading to meningitis, no pain improvement or worsening of pain, future staged surgery, seizure, stroke, blindness, heart attack, blood clot in legs, pulmonary embolism, coma, death, or other. Risk of recurrent or residual symptoms and scar tissue formation which cannot be improved by further surgery. Benefit to include neurologic stabilization to that there is no further loss of function but without promise or guarantee of any improvement as any function already lost may be irreversible from longstanding injury or an injury that was too severe to allow recovery. The goal of surgery would be to prevent you from becoming any worse and losing even more neurological function, such as weakness, numbness or worse pain. Alternatives to be exhausted prior to surgery may include: medication, therapy, interventional pain management, chiropractic, acupuncture, massage therapy.      Detailed Description of Procedure:   Operative

## 2024-03-16 ENCOUNTER — APPOINTMENT (OUTPATIENT)
Dept: CT IMAGING | Age: 74
DRG: 033 | End: 2024-03-16
Attending: NEUROLOGICAL SURGERY
Payer: COMMERCIAL

## 2024-03-16 LAB
ANION GAP SERPL CALCULATED.3IONS-SCNC: 8 MEQ/L (ref 9–15)
BUN SERPL-MCNC: 18 MG/DL (ref 8–23)
CALCIUM SERPL-MCNC: 8.1 MG/DL (ref 8.5–9.9)
CHLORIDE SERPL-SCNC: 106 MEQ/L (ref 95–107)
CO2 SERPL-SCNC: 25 MEQ/L (ref 20–31)
CREAT SERPL-MCNC: 0.94 MG/DL (ref 0.5–0.9)
ERYTHROCYTE [DISTWIDTH] IN BLOOD BY AUTOMATED COUNT: 13.8 % (ref 11.5–14.5)
GLUCOSE SERPL-MCNC: 89 MG/DL (ref 70–99)
HCT VFR BLD AUTO: 30.9 % (ref 37–47)
HGB BLD-MCNC: 9.6 G/DL (ref 12–16)
MAGNESIUM SERPL-MCNC: 2 MG/DL (ref 1.7–2.4)
MCH RBC QN AUTO: 30.5 PG (ref 27–31.3)
MCHC RBC AUTO-ENTMCNC: 31.1 % (ref 33–37)
MCV RBC AUTO: 98.1 FL (ref 79.4–94.8)
PHOSPHATE SERPL-MCNC: 2.9 MG/DL (ref 2.3–4.8)
PLATELET # BLD AUTO: 210 K/UL (ref 130–400)
POTASSIUM SERPL-SCNC: 3.8 MEQ/L (ref 3.4–4.9)
RBC # BLD AUTO: 3.15 M/UL (ref 4.2–5.4)
SODIUM SERPL-SCNC: 139 MEQ/L (ref 135–144)
WBC # BLD AUTO: 8.8 K/UL (ref 4.8–10.8)

## 2024-03-16 PROCEDURE — 6370000000 HC RX 637 (ALT 250 FOR IP): Performed by: NEUROLOGICAL SURGERY

## 2024-03-16 PROCEDURE — 2580000003 HC RX 258: Performed by: STUDENT IN AN ORGANIZED HEALTH CARE EDUCATION/TRAINING PROGRAM

## 2024-03-16 PROCEDURE — 2580000003 HC RX 258: Performed by: NEUROLOGICAL SURGERY

## 2024-03-16 PROCEDURE — 1210000000 HC MED SURG R&B

## 2024-03-16 PROCEDURE — 2580000003 HC RX 258: Performed by: INTERNAL MEDICINE

## 2024-03-16 PROCEDURE — 97162 PT EVAL MOD COMPLEX 30 MIN: CPT

## 2024-03-16 PROCEDURE — 85027 COMPLETE CBC AUTOMATED: CPT

## 2024-03-16 PROCEDURE — 99232 SBSQ HOSP IP/OBS MODERATE 35: CPT | Performed by: INTERNAL MEDICINE

## 2024-03-16 PROCEDURE — 36415 COLL VENOUS BLD VENIPUNCTURE: CPT

## 2024-03-16 PROCEDURE — 80048 BASIC METABOLIC PNL TOTAL CA: CPT

## 2024-03-16 PROCEDURE — 70450 CT HEAD/BRAIN W/O DYE: CPT

## 2024-03-16 PROCEDURE — 83735 ASSAY OF MAGNESIUM: CPT

## 2024-03-16 PROCEDURE — 84100 ASSAY OF PHOSPHORUS: CPT

## 2024-03-16 RX ORDER — SODIUM CHLORIDE 9 MG/ML
INJECTION, SOLUTION INTRAVENOUS CONTINUOUS
Status: DISPENSED | OUTPATIENT
Start: 2024-03-16 | End: 2024-03-17

## 2024-03-16 RX ORDER — ENOXAPARIN SODIUM 100 MG/ML
30 INJECTION SUBCUTANEOUS DAILY
Status: DISCONTINUED | OUTPATIENT
Start: 2024-03-17 | End: 2024-03-20 | Stop reason: HOSPADM

## 2024-03-16 RX ADMIN — ACETAMINOPHEN 325MG 650 MG: 325 TABLET ORAL at 05:33

## 2024-03-16 RX ADMIN — THERA TABS 1 TABLET: TAB at 05:36

## 2024-03-16 RX ADMIN — LEVOTHYROXINE SODIUM 50 MCG: 0.03 TABLET ORAL at 05:36

## 2024-03-16 RX ADMIN — FERROUS SULFATE TAB 325 MG (65 MG ELEMENTAL FE) 325 MG: 325 (65 FE) TAB at 07:48

## 2024-03-16 RX ADMIN — ACETAMINOPHEN 325MG 650 MG: 325 TABLET ORAL at 17:21

## 2024-03-16 RX ADMIN — OXYCODONE 5 MG: 5 TABLET ORAL at 07:47

## 2024-03-16 RX ADMIN — Medication 5 ML: at 21:00

## 2024-03-16 RX ADMIN — SERTRALINE HYDROCHLORIDE 25 MG: 25 TABLET ORAL at 07:48

## 2024-03-16 RX ADMIN — LISINOPRIL 10 MG: 10 TABLET ORAL at 07:48

## 2024-03-16 RX ADMIN — TOPIRAMATE 25 MG: 25 TABLET, FILM COATED ORAL at 07:48

## 2024-03-16 RX ADMIN — BISACODYL 10 MG: 10 SUPPOSITORY RECTAL at 17:22

## 2024-03-16 RX ADMIN — OXYBUTYNIN CHLORIDE 10 MG: 5 TABLET, EXTENDED RELEASE ORAL at 07:48

## 2024-03-16 RX ADMIN — SODIUM CHLORIDE: 9 INJECTION, SOLUTION INTRAVENOUS at 03:36

## 2024-03-16 RX ADMIN — PRAVASTATIN SODIUM 20 MG: 10 TABLET ORAL at 21:16

## 2024-03-16 RX ADMIN — ACETAMINOPHEN 325MG 650 MG: 325 TABLET ORAL at 10:17

## 2024-03-16 RX ADMIN — TOPIRAMATE 25 MG: 25 TABLET, FILM COATED ORAL at 21:17

## 2024-03-16 RX ADMIN — FERROUS SULFATE TAB 325 MG (65 MG ELEMENTAL FE) 325 MG: 325 (65 FE) TAB at 17:21

## 2024-03-16 RX ADMIN — ACETAMINOPHEN 325MG 650 MG: 325 TABLET ORAL at 21:16

## 2024-03-16 RX ADMIN — SODIUM CHLORIDE: 9 INJECTION, SOLUTION INTRAVENOUS at 11:22

## 2024-03-16 ASSESSMENT — PAIN SCALES - GENERAL
PAINLEVEL_OUTOF10: 4
PAINLEVEL_OUTOF10: 5
PAINLEVEL_OUTOF10: 5
PAINLEVEL_OUTOF10: 0
PAINLEVEL_OUTOF10: 3
PAINLEVEL_OUTOF10: 3
PAINLEVEL_OUTOF10: 6
PAINLEVEL_OUTOF10: 5
PAINLEVEL_OUTOF10: 3
PAINLEVEL_OUTOF10: 5

## 2024-03-16 ASSESSMENT — PAIN DESCRIPTION - LOCATION
LOCATION: HEAD;NECK

## 2024-03-16 ASSESSMENT — PAIN DESCRIPTION - DESCRIPTORS: DESCRIPTORS: DISCOMFORT

## 2024-03-16 NOTE — PLAN OF CARE
Problem: Discharge Planning  Goal: Discharge to home or other facility with appropriate resources  Outcome: Progressing  Flowsheets (Taken 3/15/2024 2000)  Discharge to home or other facility with appropriate resources: Identify barriers to discharge with patient and caregiver     Problem: Discharge Planning  Goal: Discharge to home or other facility with appropriate resources  Outcome: Progressing  Flowsheets (Taken 3/15/2024 2000)  Discharge to home or other facility with appropriate resources: Identify barriers to discharge with patient and caregiver     Problem: Pain  Goal: Verbalizes/displays adequate comfort level or baseline comfort level  Outcome: Progressing  Flowsheets (Taken 3/15/2024 2000)  Verbalizes/displays adequate comfort level or baseline comfort level: Assess pain using appropriate pain scale     Problem: Safety - Adult  Goal: Free from fall injury  Outcome: Progressing     Problem: ABCDS Injury Assessment  Goal: Absence of physical injury  Outcome: Progressing     Problem: Skin/Tissue Integrity  Goal: Absence of new skin breakdown  Description: 1.  Monitor for areas of redness and/or skin breakdown  2.  Assess vascular access sites hourly  3.  Every 4-6 hours minimum:  Change oxygen saturation probe site  4.  Every 4-6 hours:  If on nasal continuous positive airway pressure, respiratory therapy assess nares and determine need for appliance change or resting period.  Outcome: Progressing

## 2024-03-17 PROCEDURE — 1210000000 HC MED SURG R&B

## 2024-03-17 PROCEDURE — 6370000000 HC RX 637 (ALT 250 FOR IP): Performed by: NEUROLOGICAL SURGERY

## 2024-03-17 PROCEDURE — 6360000002 HC RX W HCPCS: Performed by: NEUROLOGICAL SURGERY

## 2024-03-17 RX ADMIN — THERA TABS 1 TABLET: TAB at 06:21

## 2024-03-17 RX ADMIN — OXYCODONE 5 MG: 5 TABLET ORAL at 21:45

## 2024-03-17 RX ADMIN — LEVOTHYROXINE SODIUM 50 MCG: 0.03 TABLET ORAL at 07:51

## 2024-03-17 RX ADMIN — LISINOPRIL 10 MG: 10 TABLET ORAL at 07:52

## 2024-03-17 RX ADMIN — FERROUS SULFATE TAB 325 MG (65 MG ELEMENTAL FE) 325 MG: 325 (65 FE) TAB at 16:23

## 2024-03-17 RX ADMIN — ACETAMINOPHEN 325MG 650 MG: 325 TABLET ORAL at 06:22

## 2024-03-17 RX ADMIN — ENOXAPARIN SODIUM 30 MG: 100 INJECTION SUBCUTANEOUS at 07:52

## 2024-03-17 RX ADMIN — TOPIRAMATE 25 MG: 25 TABLET, FILM COATED ORAL at 21:46

## 2024-03-17 RX ADMIN — ACETAMINOPHEN 325MG 650 MG: 325 TABLET ORAL at 10:23

## 2024-03-17 RX ADMIN — PRAVASTATIN SODIUM 20 MG: 10 TABLET ORAL at 21:45

## 2024-03-17 RX ADMIN — BISACODYL 10 MG: 10 SUPPOSITORY RECTAL at 21:46

## 2024-03-17 RX ADMIN — ACETAMINOPHEN 325MG 650 MG: 325 TABLET ORAL at 16:23

## 2024-03-17 RX ADMIN — CITALOPRAM HYDROBROMIDE 20 MG: 10 TABLET ORAL at 21:45

## 2024-03-17 RX ADMIN — SERTRALINE HYDROCHLORIDE 25 MG: 25 TABLET ORAL at 07:51

## 2024-03-17 RX ADMIN — FERROUS SULFATE TAB 325 MG (65 MG ELEMENTAL FE) 325 MG: 325 (65 FE) TAB at 07:52

## 2024-03-17 RX ADMIN — TOPIRAMATE 25 MG: 25 TABLET, FILM COATED ORAL at 07:51

## 2024-03-17 RX ADMIN — OXYBUTYNIN CHLORIDE 10 MG: 5 TABLET, EXTENDED RELEASE ORAL at 07:51

## 2024-03-17 RX ADMIN — CITALOPRAM HYDROBROMIDE 10 MG: 10 TABLET ORAL at 21:45

## 2024-03-17 ASSESSMENT — PAIN SCALES - GENERAL
PAINLEVEL_OUTOF10: 3
PAINLEVEL_OUTOF10: 4
PAINLEVEL_OUTOF10: 5
PAINLEVEL_OUTOF10: 3
PAINLEVEL_OUTOF10: 4
PAINLEVEL_OUTOF10: 4

## 2024-03-17 ASSESSMENT — PAIN DESCRIPTION - DESCRIPTORS
DESCRIPTORS: ACHING

## 2024-03-17 ASSESSMENT — PAIN DESCRIPTION - ONSET: ONSET: ON-GOING

## 2024-03-17 ASSESSMENT — PAIN DESCRIPTION - ORIENTATION
ORIENTATION: RIGHT

## 2024-03-17 ASSESSMENT — PAIN DESCRIPTION - LOCATION
LOCATION: NECK

## 2024-03-17 ASSESSMENT — PAIN DESCRIPTION - PAIN TYPE: TYPE: SURGICAL PAIN

## 2024-03-17 ASSESSMENT — PAIN DESCRIPTION - FREQUENCY: FREQUENCY: CONTINUOUS

## 2024-03-17 NOTE — PLAN OF CARE
Problem: Pain  Goal: Verbalizes/displays adequate comfort level or baseline comfort level  Outcome: Progressing     Problem: Safety - Adult  Goal: Free from fall injury  Outcome: Progressing     Problem: ABCDS Injury Assessment  Goal: Absence of physical injury  Outcome: Progressing     Problem: Skin/Tissue Integrity  Goal: Absence of new skin breakdown  Description: 1.  Monitor for areas of redness and/or skin breakdown  2.  Assess vascular access sites hourly  3.  Every 4-6 hours minimum:  Change oxygen saturation probe site  4.  Every 4-6 hours:  If on nasal continuous positive airway pressure, respiratory therapy assess nares and determine need for appliance change or resting period.  Outcome: Progressing     Problem: Skin/Tissue Integrity - Adult  Goal: Incisions, wounds, or drain sites healing without S/S of infection  Outcome: Progressing     Problem: Musculoskeletal - Adult  Goal: Return mobility to safest level of function  Outcome: Progressing     Problem: Gastrointestinal - Adult  Goal: Maintains adequate nutritional intake  Outcome: Progressing

## 2024-03-18 PROCEDURE — 97535 SELF CARE MNGMENT TRAINING: CPT

## 2024-03-18 PROCEDURE — 6360000002 HC RX W HCPCS: Performed by: NEUROLOGICAL SURGERY

## 2024-03-18 PROCEDURE — 6370000000 HC RX 637 (ALT 250 FOR IP): Performed by: NEUROLOGICAL SURGERY

## 2024-03-18 PROCEDURE — 1210000000 HC MED SURG R&B

## 2024-03-18 PROCEDURE — 97116 GAIT TRAINING THERAPY: CPT

## 2024-03-18 PROCEDURE — 97166 OT EVAL MOD COMPLEX 45 MIN: CPT

## 2024-03-18 RX ADMIN — ACETAMINOPHEN 325MG 650 MG: 325 TABLET ORAL at 08:48

## 2024-03-18 RX ADMIN — PRAVASTATIN SODIUM 20 MG: 10 TABLET ORAL at 21:44

## 2024-03-18 RX ADMIN — CITALOPRAM HYDROBROMIDE 10 MG: 10 TABLET ORAL at 21:45

## 2024-03-18 RX ADMIN — ACETAMINOPHEN 325MG 650 MG: 325 TABLET ORAL at 05:00

## 2024-03-18 RX ADMIN — TOPIRAMATE 25 MG: 25 TABLET, FILM COATED ORAL at 21:43

## 2024-03-18 RX ADMIN — FERROUS SULFATE TAB 325 MG (65 MG ELEMENTAL FE) 325 MG: 325 (65 FE) TAB at 16:31

## 2024-03-18 RX ADMIN — LISINOPRIL 10 MG: 10 TABLET ORAL at 08:48

## 2024-03-18 RX ADMIN — ACETAMINOPHEN 325MG 650 MG: 325 TABLET ORAL at 16:31

## 2024-03-18 RX ADMIN — OXYCODONE 5 MG: 5 TABLET ORAL at 21:44

## 2024-03-18 RX ADMIN — OXYCODONE 5 MG: 5 TABLET ORAL at 08:48

## 2024-03-18 RX ADMIN — FERROUS SULFATE TAB 325 MG (65 MG ELEMENTAL FE) 325 MG: 325 (65 FE) TAB at 08:48

## 2024-03-18 RX ADMIN — ACETAMINOPHEN 325MG 650 MG: 325 TABLET ORAL at 21:43

## 2024-03-18 RX ADMIN — TOPIRAMATE 25 MG: 25 TABLET, FILM COATED ORAL at 08:48

## 2024-03-18 RX ADMIN — OXYCODONE 5 MG: 5 TABLET ORAL at 13:05

## 2024-03-18 RX ADMIN — CITALOPRAM HYDROBROMIDE 20 MG: 10 TABLET ORAL at 21:44

## 2024-03-18 RX ADMIN — THERA TABS 1 TABLET: TAB at 08:48

## 2024-03-18 RX ADMIN — ENOXAPARIN SODIUM 30 MG: 100 INJECTION SUBCUTANEOUS at 08:49

## 2024-03-18 RX ADMIN — OXYBUTYNIN CHLORIDE 10 MG: 5 TABLET, EXTENDED RELEASE ORAL at 08:48

## 2024-03-18 RX ADMIN — SERTRALINE HYDROCHLORIDE 25 MG: 25 TABLET ORAL at 08:48

## 2024-03-18 RX ADMIN — LEVOTHYROXINE SODIUM 50 MCG: 0.03 TABLET ORAL at 08:48

## 2024-03-18 ASSESSMENT — PAIN DESCRIPTION - LOCATION
LOCATION: HEAD
LOCATION: HEAD;NECK
LOCATION: HEAD
LOCATION: HEAD;NECK

## 2024-03-18 ASSESSMENT — PAIN DESCRIPTION - DESCRIPTORS
DESCRIPTORS: ACHING

## 2024-03-18 ASSESSMENT — PAIN DESCRIPTION - PAIN TYPE
TYPE: SURGICAL PAIN

## 2024-03-18 ASSESSMENT — PAIN SCALES - GENERAL
PAINLEVEL_OUTOF10: 5
PAINLEVEL_OUTOF10: 5
PAINLEVEL_OUTOF10: 4
PAINLEVEL_OUTOF10: 3
PAINLEVEL_OUTOF10: 6
PAINLEVEL_OUTOF10: 4

## 2024-03-18 NOTE — CARE COORDINATION
[employer] benefits obtained.  Clinicals faxed to [facility] for their review.  Awaiting their decision.

## 2024-03-18 NOTE — PLAN OF CARE
Problem: Discharge Planning  Goal: Discharge to home or other facility with appropriate resources  3/18/2024 0952 by Miles Paredes RN  Outcome: Progressing  3/18/2024 0055 by Herlinda Munguia RN  Outcome: Progressing     Problem: Pain  Goal: Verbalizes/displays adequate comfort level or baseline comfort level  3/18/2024 0952 by Miles Paredes RN  Outcome: Progressing  3/18/2024 0055 by Herlinda Munguia RN  Outcome: Progressing     Problem: Safety - Adult  Goal: Free from fall injury  3/18/2024 0952 by Miles Paredes RN  Outcome: Progressing  3/18/2024 0055 by Herlinda Munguia RN  Outcome: Progressing     Problem: ABCDS Injury Assessment  Goal: Absence of physical injury  3/18/2024 0952 by Miles Paredes RN  Outcome: Progressing  3/18/2024 0055 by Herlinda Munguia RN  Outcome: Progressing     Problem: Skin/Tissue Integrity  Goal: Absence of new skin breakdown  Description: 1.  Monitor for areas of redness and/or skin breakdown  2.  Assess vascular access sites hourly  3.  Every 4-6 hours minimum:  Change oxygen saturation probe site  4.  Every 4-6 hours:  If on nasal continuous positive airway pressure, respiratory therapy assess nares and determine need for appliance change or resting period.  3/18/2024 0952 by Miles Paredes RN  Outcome: Progressing  3/18/2024 0055 by Herlinda Munguia RN  Outcome: Progressing     Problem: Skin/Tissue Integrity - Adult  Goal: Incisions, wounds, or drain sites healing without S/S of infection  3/18/2024 0952 by Miles Paredes RN  Outcome: Progressing  3/18/2024 0055 by Herlinda Munguia RN  Outcome: Progressing     Problem: Musculoskeletal - Adult  Goal: Return mobility to safest level of function  3/18/2024 0952 by Miles Paredes RN  Outcome: Progressing  3/18/2024 0055 by Herlinda Munguia RN  Outcome: Progressing     Problem: Gastrointestinal - Adult  Goal: Maintains adequate nutritional intake  3/18/2024 0952 by Miles Paredes RN  Outcome:

## 2024-03-18 NOTE — PLAN OF CARE
See OT evaluation for all goals and OT POC. Electronically signed by Keysha Temple OTR/L on 3/18/2024 at 1:15 PM

## 2024-03-18 NOTE — PLAN OF CARE
Problem: Discharge Planning  Goal: Discharge to home or other facility with appropriate resources  Outcome: Progressing     Problem: Pain  Goal: Verbalizes/displays adequate comfort level or baseline comfort level  Outcome: Progressing     Problem: Safety - Adult  Goal: Free from fall injury  Outcome: Progressing     Problem: ABCDS Injury Assessment  Goal: Absence of physical injury  Outcome: Progressing     Problem: Skin/Tissue Integrity  Goal: Absence of new skin breakdown  Description: 1.  Monitor for areas of redness and/or skin breakdown  2.  Assess vascular access sites hourly  3.  Every 4-6 hours minimum:  Change oxygen saturation probe site  4.  Every 4-6 hours:  If on nasal continuous positive airway pressure, respiratory therapy assess nares and determine need for appliance change or resting period.  Outcome: Progressing     Problem: Skin/Tissue Integrity - Adult  Goal: Incisions, wounds, or drain sites healing without S/S of infection  Outcome: Progressing     Problem: Musculoskeletal - Adult  Goal: Return mobility to safest level of function  Outcome: Progressing     Problem: Gastrointestinal - Adult  Goal: Maintains adequate nutritional intake  Outcome: Progressing

## 2024-03-18 NOTE — DISCHARGE INSTR - COC
Continuity of Care Form    Patient Name: Gloria Abbott   :  1950  MRN:  [id number]    Admit date:  3/15/2024  Discharge date:  ***    Code Status Order: Full Code   Advance Directives:   Advance Care Flowsheet Documentation       Date/Time Healthcare Directive Type of Healthcare Directive Copy in Chart Healthcare Agent Appointed Healthcare Agent's Name Healthcare Agent's Phone Number    03/15/24 0614 No, patient does not have an advance directive for healthcare treatment -- -- -- -- --            Admitting Physician:  Sumit Shelton MD  PCP: Marce Giraldo PA    Discharging Nurse: ***  Discharging Hospital Unit/Room#: W271/W271-01  Discharging Unit Phone Number: ***    Emergency Contact:   Extended Emergency Contact Information  Primary Emergency Contact: ScarHarshad  Address: [address] [place] [address] [facility]  Home Phone: [phone]  Mobile Phone: [phone]  Relation: Child  Secondary Emergency Contact: Sabine Vazquez  Address: [facility] [place] [address] [facility]  Home Phone: [phone]  Mobile Phone: [phone]  Relation: Child    Past Surgical History:  Past Surgical History:   Procedure Laterality Date    ANKLE SURGERY      COLONOSCOPY      FOOT SURGERY  2001    HIP SURGERY Right 2023    RIGHT HIP PINNING AND LEFT KNEE STERIOD INJECTION performed by Elliot Garcia MD at [facility] OR    LASIK Bilateral     SINUS SURGERY  07/10/2012    TOTAL KNEE ARTHROPLASTY Left 2023    Left total knee arthroplasty,Juan C Triathlon Total Knee System,Choice with adductor canal block preop with Nhan 23 at 10:30 am and lab work for 23 at 1:00 pm performed by Elliot Garcia MD at [facility] OR    VENTRICULOPERITONEAL SHUNT N/A 3/15/2024    RIGHT  SHUNT CREATION AND INDICATED PROCEDURES, MICRODISSECTION, PREOPERATIVE STEROTACTIC PLANNING AND WORK performed by Sumit Shelton MD at [facility] OR       Immunization History:

## 2024-03-18 NOTE — CARE COORDINATION
PERLA SPOKE WITH LOUIE AT [facility] AND SHE CONFIRMED THAT THEY HAVE ACCEPTED THE PT AND WILL START THE PRECERT TODAY.

## 2024-03-19 PROCEDURE — 6370000000 HC RX 637 (ALT 250 FOR IP): Performed by: NEUROLOGICAL SURGERY

## 2024-03-19 PROCEDURE — 6360000002 HC RX W HCPCS: Performed by: NEUROLOGICAL SURGERY

## 2024-03-19 PROCEDURE — 97116 GAIT TRAINING THERAPY: CPT

## 2024-03-19 PROCEDURE — 1210000000 HC MED SURG R&B

## 2024-03-19 RX ADMIN — FERROUS SULFATE TAB 325 MG (65 MG ELEMENTAL FE) 325 MG: 325 (65 FE) TAB at 15:03

## 2024-03-19 RX ADMIN — SERTRALINE HYDROCHLORIDE 25 MG: 25 TABLET ORAL at 08:12

## 2024-03-19 RX ADMIN — THERA TABS 1 TABLET: TAB at 06:02

## 2024-03-19 RX ADMIN — TOPIRAMATE 25 MG: 25 TABLET, FILM COATED ORAL at 08:12

## 2024-03-19 RX ADMIN — OXYCODONE 5 MG: 5 TABLET ORAL at 10:50

## 2024-03-19 RX ADMIN — OXYBUTYNIN CHLORIDE 10 MG: 5 TABLET, EXTENDED RELEASE ORAL at 08:10

## 2024-03-19 RX ADMIN — TOPIRAMATE 25 MG: 25 TABLET, FILM COATED ORAL at 20:00

## 2024-03-19 RX ADMIN — CITALOPRAM HYDROBROMIDE 20 MG: 10 TABLET ORAL at 20:00

## 2024-03-19 RX ADMIN — CITALOPRAM HYDROBROMIDE 10 MG: 10 TABLET ORAL at 20:00

## 2024-03-19 RX ADMIN — OXYCODONE 5 MG: 5 TABLET ORAL at 06:37

## 2024-03-19 RX ADMIN — ACETAMINOPHEN 325MG 650 MG: 325 TABLET ORAL at 21:01

## 2024-03-19 RX ADMIN — ENOXAPARIN SODIUM 30 MG: 100 INJECTION SUBCUTANEOUS at 08:10

## 2024-03-19 RX ADMIN — ACETAMINOPHEN 325MG 650 MG: 325 TABLET ORAL at 10:48

## 2024-03-19 RX ADMIN — PRAVASTATIN SODIUM 20 MG: 10 TABLET ORAL at 20:01

## 2024-03-19 RX ADMIN — ACETAMINOPHEN 325MG 650 MG: 325 TABLET ORAL at 15:03

## 2024-03-19 RX ADMIN — LISINOPRIL 10 MG: 10 TABLET ORAL at 08:12

## 2024-03-19 RX ADMIN — LEVOTHYROXINE SODIUM 50 MCG: 0.03 TABLET ORAL at 08:11

## 2024-03-19 RX ADMIN — ACETAMINOPHEN 325MG 650 MG: 325 TABLET ORAL at 06:02

## 2024-03-19 RX ADMIN — FERROUS SULFATE TAB 325 MG (65 MG ELEMENTAL FE) 325 MG: 325 (65 FE) TAB at 08:11

## 2024-03-19 ASSESSMENT — PAIN DESCRIPTION - ORIENTATION
ORIENTATION: RIGHT;LEFT
ORIENTATION: RIGHT
ORIENTATION: RIGHT
ORIENTATION: RIGHT;LEFT
ORIENTATION: RIGHT

## 2024-03-19 ASSESSMENT — PAIN DESCRIPTION - LOCATION
LOCATION: EAR;NECK
LOCATION: NECK
LOCATION: NECK
LOCATION: EAR;NECK
LOCATION: NECK

## 2024-03-19 ASSESSMENT — PAIN DESCRIPTION - DESCRIPTORS
DESCRIPTORS: ACHING;SORE
DESCRIPTORS: SORE

## 2024-03-19 ASSESSMENT — PAIN SCALES - GENERAL
PAINLEVEL_OUTOF10: 6
PAINLEVEL_OUTOF10: 3
PAINLEVEL_OUTOF10: 5
PAINLEVEL_OUTOF10: 6
PAINLEVEL_OUTOF10: 4
PAINLEVEL_OUTOF10: 3
PAINLEVEL_OUTOF10: 5
PAINLEVEL_OUTOF10: 5

## 2024-03-19 ASSESSMENT — PAIN DESCRIPTION - PAIN TYPE: TYPE: SURGICAL PAIN

## 2024-03-19 ASSESSMENT — PAIN DESCRIPTION - FREQUENCY: FREQUENCY: CONTINUOUS

## 2024-03-19 ASSESSMENT — PAIN DESCRIPTION - ONSET: ONSET: ON-GOING

## 2024-03-19 NOTE — CARE COORDINATION
STILL AWAITING PRECERT APPROVAL FOR THE PT TO TRANSFER TO [facility].      Lsw spoke with Mai at [facility] at 3:30pm.  As of this time no approval from insurance per Mai.

## 2024-03-19 NOTE — DISCHARGE SUMMARY
masses, no organomegaly   Genitalia:    Normal female without lesion, discharge or tenderness   Rectal:    Normal tone ;guaiac negative stool   Extremities:   Extremities normal, atraumatic, no cyanosis or edema   Pulses:   2+ and symmetric all extremities   Skin:   Skin color, texture, turgor normal, no rashes or lesions   Lymph nodes:   Cervical, supraclavicular, and axillary nodes normal   Neurologic:   CNII-XII intact, normal strength, sensation and reflexes     throughout       Disposition: SNF    In process/preliminary results:  Outstanding Order Results       No orders found from 2/15/2024 to 3/16/2024.            Patient Instructions:   Current Discharge Medication List        CONTINUE these medications which have NOT CHANGED    Details   topiramate (TOPAMAX) 25 MG tablet Take 1 tablet by mouth 2 times daily      levothyroxine (SYNTHROID) 50 MCG tablet Take 1 tablet by mouth daily  Qty: 90 tablet, Refills: 0    Associated Diagnoses: Hypothyroidism, unspecified type      oxyBUTYnin (DITROPAN-XL) 10 MG extended release tablet Take 1 tablet by mouth daily  Qty: 90 tablet, Refills: 0    Associated Diagnoses: Urge incontinence of urine      pravastatin (PRAVACHOL) 20 MG tablet Take 1 tablet by mouth daily  Qty: 90 tablet, Refills: 1    Associated Diagnoses: Mixed hyperlipidemia      sertraline (ZOLOFT) 25 MG tablet Take 1 tablet by mouth daily  Qty: 30 tablet, Refills: 5    Associated Diagnoses: Depression, unspecified depression type      !! citalopram (CELEXA) 20 MG tablet Take 1 tablet by mouth daily Indications: Depression Give with 10mg tab to =30mg  Qty: 90 tablet, Refills: 1    Associated Diagnoses: Major depressive disorder, recurrent episode, moderate (HCC)      !! citalopram (CELEXA) 10 MG tablet TAKE 1 TABLET BY MOUTH ONCE DAILY WITH THE 20MG TABLET  Qty: 90 tablet, Refills: 1    Associated Diagnoses: Major depressive disorder, recurrent episode, moderate (HCC)      lisinopril (PRINIVIL;ZESTRIL) 10 MG

## 2024-03-20 VITALS
BODY MASS INDEX: 35.34 KG/M2 | WEIGHT: 180 LBS | HEIGHT: 60 IN | DIASTOLIC BLOOD PRESSURE: 63 MMHG | SYSTOLIC BLOOD PRESSURE: 118 MMHG | HEART RATE: 60 BPM | RESPIRATION RATE: 18 BRPM | OXYGEN SATURATION: 97 % | TEMPERATURE: 98.4 F

## 2024-03-20 PROCEDURE — 97116 GAIT TRAINING THERAPY: CPT

## 2024-03-20 PROCEDURE — 6360000002 HC RX W HCPCS: Performed by: NEUROLOGICAL SURGERY

## 2024-03-20 PROCEDURE — 6370000000 HC RX 637 (ALT 250 FOR IP): Performed by: NEUROLOGICAL SURGERY

## 2024-03-20 RX ADMIN — OXYCODONE 5 MG: 5 TABLET ORAL at 16:16

## 2024-03-20 RX ADMIN — ACETAMINOPHEN 325MG 650 MG: 325 TABLET ORAL at 11:05

## 2024-03-20 RX ADMIN — ACETAMINOPHEN 325MG 650 MG: 325 TABLET ORAL at 16:16

## 2024-03-20 RX ADMIN — LISINOPRIL 10 MG: 10 TABLET ORAL at 08:54

## 2024-03-20 RX ADMIN — LEVOTHYROXINE SODIUM 50 MCG: 0.03 TABLET ORAL at 08:54

## 2024-03-20 RX ADMIN — FERROUS SULFATE TAB 325 MG (65 MG ELEMENTAL FE) 325 MG: 325 (65 FE) TAB at 16:17

## 2024-03-20 RX ADMIN — TOPIRAMATE 25 MG: 25 TABLET, FILM COATED ORAL at 08:54

## 2024-03-20 RX ADMIN — OXYBUTYNIN CHLORIDE 10 MG: 5 TABLET, EXTENDED RELEASE ORAL at 08:52

## 2024-03-20 RX ADMIN — OXYCODONE 5 MG: 5 TABLET ORAL at 08:53

## 2024-03-20 RX ADMIN — FERROUS SULFATE TAB 325 MG (65 MG ELEMENTAL FE) 325 MG: 325 (65 FE) TAB at 08:49

## 2024-03-20 RX ADMIN — SERTRALINE HYDROCHLORIDE 25 MG: 25 TABLET ORAL at 08:54

## 2024-03-20 RX ADMIN — ENOXAPARIN SODIUM 30 MG: 100 INJECTION SUBCUTANEOUS at 08:54

## 2024-03-20 RX ADMIN — ACETAMINOPHEN 325MG 650 MG: 325 TABLET ORAL at 03:07

## 2024-03-20 RX ADMIN — THERA TABS 1 TABLET: TAB at 06:03

## 2024-03-20 ASSESSMENT — PAIN SCALES - GENERAL
PAINLEVEL_OUTOF10: 5

## 2024-03-20 ASSESSMENT — PAIN DESCRIPTION - DESCRIPTORS
DESCRIPTORS: ACHING
DESCRIPTORS: TENDER;SORE
DESCRIPTORS: SORE

## 2024-03-20 ASSESSMENT — PAIN DESCRIPTION - LOCATION
LOCATION: NECK
LOCATION: EAR;NECK
LOCATION: NECK

## 2024-03-20 ASSESSMENT — PAIN DESCRIPTION - ORIENTATION
ORIENTATION: RIGHT;LEFT
ORIENTATION: RIGHT
ORIENTATION: RIGHT

## 2024-03-20 NOTE — CARE COORDINATION
MET WITH PATIENT IN ROOM TO DISCUSS D/C PLAN. PATIENT PLANS TO RETURN HOME WITH HER SON. SHE DECLINED [facility].

## 2024-03-20 NOTE — ADT AUTH CERT
[facility]  MLOZ 2W ORTHO TELE  [address]  [facility] [address]  NPI: [id number]  Tax ID: [id number]        Auth number:[id number]  [id number] - (Medicare Managed)            Return Contact Information:  Sapna Chaves  Phone: [phone]  Fax: [phone]    Patient Demographics    Name Patient ID SSN Gender Identity Birth Date   Gloria Abbott [id number]  Female [birth year] (73 yrs)     Address Phone Email Employer    [address] [place] [address] [phone] (M)  [phone] (H) [email or web address] [address] Race Occupation Emp Status    -- White (non-) -- Retired      Reg Status PCP Date Last Verified Next Review Date    Verified Marce Giraldo PA  [phone] 03/15/24 04/14/24      Admission Date Discharge Date Admitting Provider     03/15/24 -- Sumit Shelton MD       Marital Status Jew       Anabaptist        Emergency Contact 1 Emergency Contact 2   Harshad Scar (C)  [address] [place] [address]  [facility]  [phone] (H)  [phone] (M) Sabine Vazquez (C)  [address] [place] [address] [place] [address]  [phone] (H)  [phone] (M)     Physician Progress Notes  Notes from 03/17/24 through 03/20/24  Progress Notes by Sumit Shelton MD at 3/18/2024  5:13 PM    Author: Sumit Shelton MD Service: Neurosurgery Author Type: Physician   Filed: 3/18/2024  5:14 PM Date of Service: 3/18/2024  5:13 PM Status: Signed   : Sumit Shelton MD (Physician)   Pod3  shunt nph  Balance better walked with therapy today 40 ft x2  Social work dispo snf in am possible  Ok to shower and get incision wet. D/w rn  Awake alert fluent speech case x 4 to commands      Electronically signed by Sumit Shelton MD on 3/18/2024  5:14 PM     Progress Notes by Sumit Shelton MD at 3/17/2024 10:53 AM    Author: Sumit Shelton MD Service: Neurosurgery Author Type: Physician   Filed: 3/17/2024 10:54 AM Date of Service:

## 2024-03-20 NOTE — CARE COORDINATION
LSW SPOKE WITH THE PT AND THEN SPOKE WITH HER SON VIA PHONE TO EXPLAIN THAT WE HAVE PRECERT APPROVAL FOR HER TO GO TO [facility] FOR SKILLED TIME BUT SHE DID REALLY WELL WITH THERAPY AND MAY DECIDE TO GO HOME.  SON IS ON HIS WAY HERE TO DISCUSS PLAN WITH PT AND MAKE FINAL DECISION.    PT AND SON HAVE DECIDED TO GO HOME AND NOT TO [facility].

## 2024-03-20 NOTE — CARE COORDINATION
I notified Andie DUNCAN that we received approval for tx to [facility].  She is aware that the patient is ambulating 120' SBA, and will s/w patient to see if she is still interested in [facility] admission.

## 2024-03-21 ENCOUNTER — TELEPHONE (OUTPATIENT)
Dept: INTERNAL MEDICINE | Age: 74
End: 2024-03-21

## 2024-03-21 NOTE — PROGRESS NOTES
0800 Assessment complete. Patient alert and oriented x 4. Neurological assessment unremarkable. Reports ongoing pain 5/10 in head and neck. Medicated with PRN oxycodone. NSR on telemetry. BP stable. On room air with no SOB. Incision to abdomen and behind right ear closed with surgical glue. Edges well approximated, no drainage. Dressing to head clean, dry, and intact.    0830 Patient to radiology and back for CT head. Patient eating and drinking well.    1030 Patient up to recliner. Reported dizziness upon sitting at side of bed, resolved once in chair. Ambulated a few feet using wheeled walker and tolerated well. Kerry care completed for incontinence of urine. Dr. Shelton to bedside and updated.    1115 Patient's BP 84/35. Patient roused, rechecked at 102/40 (MAP 58). Discussed with intensivist and orders received. Patient for transfer out of ICU - will ensure BP stable before transferring.    1130 Dr. Shelton updated on BP. Patient ok for transfer once BP stable. IV fluids infusing per MAR.    1200 Patient remains up in recliner. Neurological assessment unchanged and unremarkable. BP remains borderline low. IV fluids infusing.    1245 Patient standing in room with walker and therapist. Denies dizziness at this time. BP stable while standing. Report called and patient pending transfer to [facility]  
D/w rn walked 150ft today w PT, was accepted to [facility] but she elected to dc home w son.   
DVT / VTE PROPHYLAXIS EVALUATION    Recent Labs     03/16/24  0426   BUN 18   CREATININE 0.94*      HGB 9.6*   HCT 30.9*     ADMITTING DX OR CHIEF COMPLAINT? NPH  WARFARIN? DOAC'S? no  ANY APPARENT BLEEDING? no  SCHEDULED SURGERY? no     If yes to following, excluded from auto adjustment in Table 1 of policy - please contact provider with recommendations as appropriate.  Include condition/exception in scratch notes. Yes No   Trauma Service or Ortho Surgery []  [x]    COVID []  [x]    Pregnancy []  [x]        Current order:  Enoxaparin 30 mg SUBQ once daily      Height: 152.4 cm (5'), Weight - Scale: 81.6 kg (180 lb)  Estimated Creatinine Clearance: 49 mL/min (A) (based on SCr of 0.94 mg/dL (H)).    Plan:  Pharmacologic VTE prophylaxis modified based on patient renal function per [facility]/P&T approved protocol     Patient Weight (kg)      50.9 and below .9 101-150.9 151-174.9 175 or greater   Estimated   CrCl  (ml/min) 30 or greater []   30 mg   SUBQ daily   [x]   40 mg   SUBQ daily (or 30 mg BID for orthopedic cases) []  30 mg SUBQ   BID  []  40 mg   SUBQ   BID []  60mg SUBQ BID    15-29.9 []  UFH 5000   units SUBQ BID []  30 mg   SUBQ daily [] 30 mg SUBQ   daily []  40 mg SUBQ   daily [] 60 mg SUBQ   daily    Less than 15 or dialysis []  UFH 5000   units SUBQ BID [] UFH 5000 units SUBQ TID []  UFH 7500   units   SUBQ TID          
[facility]  Occupational Therapy        NAME:  Gloria A Scar  ROOM: W271/W271-01  :  1950  DATE: 3/17/2024    Attempted to see Gloria VALENZUELA Abbott on this date for:   [x]  Initial Evaluation   []  Treatment       Patient was unable to be seen due to:   [] Off unit for testing/procedure    [x] Patient refused, stating \" I am very tired. Can I try another time?\"   [] Therapy on hold due to     [] Nursing deferred due to    [] Other:        Electronically signed by STEPHANIE Verde on 3/17/24 at 11:30 AM EDT  
Patient transported to ICU with OR RN and CRNA . No significant events, patient transferred to ICU bed. Bedside report given to Bhavin RN in ICU and placed on monitors. No further questions  
Physical Therapy  Facility/Department: [facility] MED SURG W271/W271-01  Physical Therapy Discharge      NAME: Gloria Abbott    : 1950 (73 y.o.)  MRN: [id number]    Account: [id number]  Gender: female      Patient has been discharged from acute care hospital. DC patient from current PT program.      Electronically signed by Mela Nelson PT on 3/21/24 at 2:18 PM EDT      
Physical Therapy Med Surg Daily Treatment Note  Facility/Department: [address] ORTHO TELE  Room: [facility]       NAME: Gloria Abbott  : 1950 (73 y.o.)  MRN: [id number]  CODE STATUS: Full Code    Date of Service: 3/20/2024    Patient Diagnosis(es): NPH (normal pressure hydrocephalus) ([facility]) [G91.2]   No chief complaint on file.    Patient Active Problem List    Diagnosis Date Noted    Acute post-operative pain 2023    Acute blood loss anemia 2023    Subcapital fracture of femur, right, closed, initial encounter ([facility]) 2023    Acute pain due to trauma 2023    Closed right hip fracture, initial encounter ([facility]) 2023    Dizziness 2023    NPH (normal pressure hydrocephalus) ([facility]) 03/15/2024    Status post right knee replacement 05/10/2023    Status post left knee replacement 05/10/2023    Status post total knee replacement, left 2023    Osteoarthritis of left knee 2023    COVID-19 2021    Osteopenia 2020    History of uterine cancer 2018    History of endometrial cancer 2018    Postmenopausal bleeding 2018    Thickened endometrium 2018    Other joint derangement, not elsewhere classified, ankle and foot 2015    Major depressive disorder, recurrent episode, moderate (HCC) 2015    Hypothyroid 2015    Maisonneuve fracture of right fibula 2015    Sprain of ankle, deltoid ligament 2015    Hyperlipidemia     Depression     Stress     Vitamin D deficiency         Past Medical History:   Diagnosis Date    Cancer (HCC)     endometrial and uterine    Depression     Hyperlipidemia     Hypothyroid     MDD (major depressive disorder)     NPH (normal pressure hydrocephalus) ([facility])     Protruded lumbar disc     L4/L5    Stress     Vitamin D deficiency      Past Surgical History:   Procedure Laterality Date    ANKLE SURGERY      COLONOSCOPY      FOOT SURGERY  2001    HIP SURGERY Right 2023    RIGHT HIP 
Physical Therapy Med Surg Daily Treatment Note  Facility/Department: [address] ORTHO TELE  Room: [facility]       NAME: Gloria Abbott  : 1950 (73 y.o.)  MRN: [id number]  CODE STATUS: Full Code    Date of Service: 3/19/2024    Patient Diagnosis(es): NPH (normal pressure hydrocephalus) ([facility]) [G91.2]   No chief complaint on file.    Patient Active Problem List    Diagnosis Date Noted    Acute post-operative pain 2023    Acute blood loss anemia 2023    Subcapital fracture of femur, right, closed, initial encounter ([facility]) 2023    Acute pain due to trauma 2023    Closed right hip fracture, initial encounter ([facility]) 2023    Dizziness 2023    NPH (normal pressure hydrocephalus) ([facility]) 03/15/2024    Status post right knee replacement 05/10/2023    Status post left knee replacement 05/10/2023    Status post total knee replacement, left 2023    Osteoarthritis of left knee 2023    COVID-19 2021    Osteopenia 2020    History of uterine cancer 2018    History of endometrial cancer 2018    Postmenopausal bleeding 2018    Thickened endometrium 2018    Other joint derangement, not elsewhere classified, ankle and foot 2015    Major depressive disorder, recurrent episode, moderate (HCC) 2015    Hypothyroid 2015    Maisonneuve fracture of right fibula 2015    Sprain of ankle, deltoid ligament 2015    Hyperlipidemia     Depression     Stress     Vitamin D deficiency         Past Medical History:   Diagnosis Date    Cancer (HCC)     endometrial and uterine    Depression     Hyperlipidemia     Hypothyroid     MDD (major depressive disorder)     NPH (normal pressure hydrocephalus) ([facility])     Protruded lumbar disc     L4/L5    Stress     Vitamin D deficiency      Past Surgical History:   Procedure Laterality Date    ANKLE SURGERY      COLONOSCOPY      FOOT SURGERY  2001    HIP SURGERY Right 2023    RIGHT HIP 
Physical Therapy Med Surg Initial Assessment  Facility/Department: [facility] ICU  Room: [place] [address]       NAME: Gloria Abbott  : 1950 (73 y.o.)  MRN: [id number]  CODE STATUS: Full Code    Date of Service: 3/16/2024    Patient Diagnosis(es): NPH (normal pressure hydrocephalus) ([facility]) [G91.2]   No chief complaint on file.    Patient Active Problem List    Diagnosis Date Noted    Acute post-operative pain 2023    Acute blood loss anemia 2023    Subcapital fracture of femur, right, closed, initial encounter ([facility]) 2023    Acute pain due to trauma 2023    Closed right hip fracture, initial encounter ([facility]) 2023    Dizziness 2023    NPH (normal pressure hydrocephalus) ([facility]) 03/15/2024    Status post right knee replacement 05/10/2023    Status post left knee replacement 05/10/2023    Status post total knee replacement, left 2023    Osteoarthritis of left knee 2023    COVID-19 2021    Osteopenia 2020    History of uterine cancer 2018    History of endometrial cancer 2018    Postmenopausal bleeding 2018    Thickened endometrium 2018    Other joint derangement, not elsewhere classified, ankle and foot 2015    Major depressive disorder, recurrent episode, moderate ([facility]) 2015    Hypothyroid 2015    Maisonneuve fracture of right fibula 2015    Sprain of ankle, deltoid ligament 2015    Hyperlipidemia     Depression     Stress     Vitamin D deficiency         Past Medical History:   Diagnosis Date    Cancer (HCC)     endometrial and uterine    Depression     Hyperlipidemia     Hypothyroid     MDD (major depressive disorder)     NPH (normal pressure hydrocephalus) ([facility])     Protruded lumbar disc     L4/L5    Stress     Vitamin D deficiency      Past Surgical History:   Procedure Laterality Date    ANKLE SURGERY      COLONOSCOPY      FOOT SURGERY      HIP SURGERY Right 2023    RIGHT HIP PINNING AND LEFT 
Pod1 r vps for nph  Doing well awake alert sitting in chair d/w rn  Heplock ivf katie po  Urinating  Pt ot social work dispo  Ok for floor from neurosurgery follows commands x4   Postop ct stable  shunt implant  Short term memory 2/3 can spell world 5/5 backwards  Preop urinary incontinence >1yr and balance difficulty walking    
Pod2 r vps nph  Pulled out iv and took off dressing r frontal, clean dry intact  Opens eyes to voice, fluent speech case x 4 to commands  D/w rn pt seen and examined neuro exam stable today from yesterday  Pt ot isabel dispo ok dc from ns when medically stable, will follow  
Pod3  shunt nph  Balance better walked with therapy today 40 ft x2  Social work dispo snf in am possible  Ok to shower and get incision wet. D/w rn  Awake alert fluent speech case x 4 to commands  
Spiritual Care Services     Summary of Visit:      Patient appeared tired, pt was alert and oriented, Pt son at bedside    pt draws strength from he syeda, prayed for the patient before leaving the room   Pt has no HCPOA    Encounter Summary  Encounter Overview/Reason : Initial Encounter  Service Provided For:: Patient and family together  Referral/Consult From:: Rounding  Support System: Children, Family members  Complexity of Encounter: Moderate  Begin Time: 1530  End Time : 1545  Total Time Calculated: 15 min        Spiritual/Emotional needs  Type: Spiritual Support                      Spiritual Assessment/Intervention/Outcomes:    Assessment: Concerns with suffering    Intervention: Sustaining Presence/Ministry of presence, Prayer (assurance of)/Hodges, Active listening    Outcome: Receptive, Comfort      Care Plan:    Plan and Referrals  Plan/Referrals: Continue to visit, (comment), Continue Support (comment)          Spiritual Care Services   Electronically signed by Chaplain Cheikh on 3/15/2024 at 3:51 PM.    To reach a  for emotional and spiritual support, place an EPIC consult request.   If a  is needed immediately, dial “0” and ask to page the on-call .   
PINNING AND LEFT KNEE STERIOD INJECTION performed by Elliot Garcia MD at [facility] OR    LASIK Bilateral     SINUS SURGERY  07/10/2012    TOTAL KNEE ARTHROPLASTY Left 05/08/2023    Left total knee arthroplasty,Juan C Triathlon Total Knee System,Choice with adductor canal block preop with Nhan 4/20/23 at 10:30 am and lab work for 4/14/23 at 1:00 pm performed by Elliot Garcia MD at [facility] OR    VENTRICULOPERITONEAL SHUNT N/A 3/15/2024    RIGHT  SHUNT CREATION AND INDICATED PROCEDURES, MICRODISSECTION, PREOPERATIVE STEROTACTIC PLANNING AND WORK performed by Sumit Shelton MD at [facility] OR            Restrictions:fall risk.        SUBJECTIVE:   Subjective: \"I was just hoping my head would stop hurting like this\"    Pain  Pain: 8/10 head pressure/pain    OBJECTIVE:   Orientation  Overall Orientation Status: Within Functional Limits    Bed Mobility Training  Bed Mobility Training: Yes  Overall Level of Assistance: Stand-by assistance  Rolling: Stand-by assistance  Supine to Sit: Stand-by assistance  Scooting: Stand-by assistance    Transfer Training  Transfer Training: Yes  Overall Level of Assistance: Stand-by assistance;Minimum assistance  Interventions: Safety awareness training  Sit to Stand: Stand-by assistance;Minimum assistance  Stand to Sit: Stand-by assistance;Minimum assistance    Gait Training: Yes  Overall Level of Assistance: Stand-by assistance;Minimum assistance  Distance (ft): 40 Feet x 2 with turns  Assistive Device: Walker, rolling  Interventions: Tactile cues;Verbal cues  Base of Support: Widened  Speed/Mary Jo: Fluctuations  Gait Abnormalities: Decreased step clearance  Right Side Weight Bearing: As tolerated  Left Side Weight Bearing: As tolerated       Static standing with ww sba.                                  ASSESSMENT pt needed time to process each task at hand. Pt states she felt good walking this am and being up. Pt reports having a son at home that is able to do her laundry as it is in her 
can be transferred out of the ICU.  Orders have been placed by neurosurgery already.  Pulmonary will need to be consulted on the floor if warranted.    Electronically signed by SHANI RUIZ DO on 3/16/2024 at 10:47 AM    
Decreased ADL status, Decreased strength, Decreased endurance, Decreased balance, Decreased high-level IADLs, Decreased safe awareness  Prognosis: Good  Discharge Recommendations: Continue to assess pending progress  Decision Making: Medium Complexity     History: Pt's medical history is moderately complex  Exam: Pt has 7 performance deficits  Assistance / Modification: Pt requires mod A    AMPAC (Six Click) Self care Score   How much help is needed for putting on and taking off regular lower body clothing?: A Little  How much help is needed for bathing (which includes washing, rinsing, drying)?: A Little  How much help is needed for toileting (which includes using toilet, bedpan, or urinal)?: A Little  How much help is needed for putting on and taking off regular upper body clothing?: A Little  How much help is needed for taking care of personal grooming?: A Little  How much help for eating meals?: A Little  AM-[facility] Inpatient Daily Activity Raw Score: 18  AM-[facility] Inpatient ADL T-Scale Score : 38.66  ADL Inpatient CMS 0-100% Score: 46.65    Therapy key for assistance levels -   Independent/Mod I = Pt. is able to perform task with no assistance but may require a device   Stand by assistance = Pt. does not perform task at an independent level but does not need physical assistance, requires verbal cues  Minimal, Moderate, Maximal Assistance = Pt. requires physical assistance (25%, 50%, 75% assist from helper) for task but is able to actively participate in task   Dependent = Pt. requires total assistance with task and is not able to actively participate with task completion     Plan:  Occupational Therapy Plan  Times Per Week: 1-4x  Therapy Duration:  (Length of acute stay)  Current Treatment Recommendations: Strengthening, Functional mobility training, Balance training, Endurance training, Neuromuscular re-education, Pain management, Safety education & training, Self-Care / ADL, Patient/Caregiver education & training,

## 2024-03-21 NOTE — TELEPHONE ENCOUNTER
Care Transitions Initial Follow Up Call    Outreach made within 2 business days of discharge: Yes    Patient: Gloria Abbott Patient : 1950   MRN: [id number]  Reason for Admission: There are no discharge diagnoses documented for the most recent discharge.  Discharge Date: 3/20/24       Spoke with: ANGELA X1    Discharge department/facility: [facility]    TCM Interactive Patient Contact:    Scheduled appointment with PCP within 7-14 days    Follow Up  Future Appointments   Date Time Provider Department Center   2024 11:00 AM Elliot Garcia MD [facility] Kathryn Dean MA

## 2024-03-21 NOTE — TELEPHONE ENCOUNTER
Care Transitions Initial Follow Up Call    Outreach made within 2 business days of discharge: Yes    Patient: Gloria Abbott Patient : 1950   MRN: [id number]  Reason for Admission: There are no discharge diagnoses documented for the most recent discharge.  Discharge Date: 3/20/24       Spoke with: GLORIA    Discharge department/facility: [facility]    TCM Interactive Patient Contact:  Was patient able to fill all prescriptions: No: NO NEW  medication  Was patient instructed to bring all medications to the follow-up visit: No:   Is patient taking all medications as directed in the discharge summary? NoNO NEW  medication  Does patient understand their discharge instructions: Yes  Does patient have questions or concerns that need addressed prior to 7-14 day follow up office visit: yes - WHAT SHOULD SHE TAKE FOR PAIN    Scheduled appointment with PCP within 7-14 days    Follow Up  Future Appointments   Date Time Provider Department Center   2024 11:00 AM Elliot Garcia MD [facility] Kathryn Dean, MA

## 2024-03-26 ENCOUNTER — OFFICE VISIT (OUTPATIENT)
Dept: INTERNAL MEDICINE | Age: 74
End: 2024-03-26
Payer: COMMERCIAL

## 2024-03-26 VITALS
DIASTOLIC BLOOD PRESSURE: 64 MMHG | BODY MASS INDEX: 33.89 KG/M2 | HEIGHT: 60 IN | TEMPERATURE: 97.5 F | OXYGEN SATURATION: 98 % | SYSTOLIC BLOOD PRESSURE: 132 MMHG | HEART RATE: 66 BPM | WEIGHT: 172.6 LBS

## 2024-03-26 DIAGNOSIS — G91.2 NORMAL PRESSURE HYDROCEPHALUS (HCC): Primary | ICD-10-CM

## 2024-03-26 DIAGNOSIS — M54.2 CERVICALGIA: ICD-10-CM

## 2024-03-26 DIAGNOSIS — Z09 HOSPITAL DISCHARGE FOLLOW-UP: ICD-10-CM

## 2024-03-26 DIAGNOSIS — R09.81 NASAL CONGESTION: ICD-10-CM

## 2024-03-26 PROCEDURE — 99214 OFFICE O/P EST MOD 30 MIN: CPT | Performed by: PHYSICIAN ASSISTANT

## 2024-03-26 PROCEDURE — 1123F ACP DISCUSS/DSCN MKR DOCD: CPT | Performed by: PHYSICIAN ASSISTANT

## 2024-03-26 RX ORDER — TIZANIDINE 2 MG/1
2 TABLET ORAL 3 TIMES DAILY PRN
Qty: 30 TABLET | Refills: 0 | Status: SHIPPED | OUTPATIENT
Start: 2024-03-26

## 2024-03-26 ASSESSMENT — PATIENT HEALTH QUESTIONNAIRE - PHQ9
9. THOUGHTS THAT YOU WOULD BE BETTER OFF DEAD, OR OF HURTING YOURSELF: NOT AT ALL
3. TROUBLE FALLING OR STAYING ASLEEP: NOT AT ALL
10. IF YOU CHECKED OFF ANY PROBLEMS, HOW DIFFICULT HAVE THESE PROBLEMS MADE IT FOR YOU TO DO YOUR WORK, TAKE CARE OF THINGS AT HOME, OR GET ALONG WITH OTHER PEOPLE: SOMEWHAT DIFFICULT
8. MOVING OR SPEAKING SO SLOWLY THAT OTHER PEOPLE COULD HAVE NOTICED. OR THE OPPOSITE, BEING SO FIGETY OR RESTLESS THAT YOU HAVE BEEN MOVING AROUND A LOT MORE THAN USUAL: NOT AT ALL
2. FEELING DOWN, DEPRESSED OR HOPELESS: NEARLY EVERY DAY
7. TROUBLE CONCENTRATING ON THINGS, SUCH AS READING THE NEWSPAPER OR WATCHING TELEVISION: NOT AT ALL
4. FEELING TIRED OR HAVING LITTLE ENERGY: NEARLY EVERY DAY
6. FEELING BAD ABOUT YOURSELF - OR THAT YOU ARE A FAILURE OR HAVE LET YOURSELF OR YOUR FAMILY DOWN: SEVERAL DAYS
1. LITTLE INTEREST OR PLEASURE IN DOING THINGS: NEARLY EVERY DAY
SUM OF ALL RESPONSES TO PHQ QUESTIONS 1-9: 10
SUM OF ALL RESPONSES TO PHQ QUESTIONS 1-9: 10
5. POOR APPETITE OR OVEREATING: NOT AT ALL
SUM OF ALL RESPONSES TO PHQ9 QUESTIONS 1 & 2: 6
SUM OF ALL RESPONSES TO PHQ QUESTIONS 1-9: 10
SUM OF ALL RESPONSES TO PHQ QUESTIONS 1-9: 10

## 2024-03-26 NOTE — PROGRESS NOTES
Pupils are equal, round, and reactive to light.   Cardiovascular:      Rate and Rhythm: Normal rate.   Abdominal:      General: Bowel sounds are normal.      Palpations: Abdomen is soft.   Skin:     General: Skin is warm.   Neurological:      General: No focal deficit present.      Mental Status: She is alert.   Psychiatric:         Mood and Affect: Mood normal.         Behavior: Behavior normal.         Thought Content: Thought content normal.         Vitals:    03/26/24 1102   BP: 132/64   Site: Right Upper Arm   Position: Sitting   Cuff Size: Small Adult   Pulse: 66   Temp: 97.5 °F (36.4 °C)   SpO2: 98%   Weight: 78.3 kg (172 lb 9.6 oz)   Height: 1.524 m (5')                 An electronic signature was used to authenticate this note.    --OMAR Tian    (E4) spontaneous

## 2024-04-02 ASSESSMENT — ENCOUNTER SYMPTOMS
RHINORRHEA: 0
SINUS PRESSURE: 1
SINUS PAIN: 0
COUGH: 0
SHORTNESS OF BREATH: 0

## 2024-04-04 ENCOUNTER — HOSPITAL ENCOUNTER (OUTPATIENT)
Age: 74
Discharge: HOME | End: 2024-04-06
Payer: COMMERCIAL

## 2024-04-04 ENCOUNTER — HOSPITAL ENCOUNTER (OUTPATIENT)
Dept: GENERAL RADIOLOGY | Age: 74
Discharge: HOME | End: 2024-04-06
Payer: COMMERCIAL

## 2024-04-04 DIAGNOSIS — M54.2 CERVICALGIA: ICD-10-CM

## 2024-04-04 PROCEDURE — 72050 X-RAY EXAM NECK SPINE 4/5VWS: CPT

## 2024-04-24 ENCOUNTER — HOSPITAL ENCOUNTER (OUTPATIENT)
Dept: CT IMAGING | Age: 74
Discharge: HOME | End: 2024-04-26
Attending: NEUROLOGICAL SURGERY
Payer: COMMERCIAL

## 2024-04-24 DIAGNOSIS — G91.2 NPH (NORMAL PRESSURE HYDROCEPHALUS) (HCC): ICD-10-CM

## 2024-04-24 PROCEDURE — 70450 CT HEAD/BRAIN W/O DYE: CPT

## 2024-05-01 ENCOUNTER — HOSPITAL ENCOUNTER (OUTPATIENT)
Age: 74
Discharge: HOME | End: 2024-05-03
Payer: COMMERCIAL

## 2024-05-01 ENCOUNTER — HOSPITAL ENCOUNTER (OUTPATIENT)
Dept: GENERAL RADIOLOGY | Age: 74
Discharge: HOME | End: 2024-05-03
Payer: COMMERCIAL

## 2024-05-01 DIAGNOSIS — G91.2 NPH (NORMAL PRESSURE HYDROCEPHALUS) (HCC): ICD-10-CM

## 2024-05-01 PROCEDURE — 70360 X-RAY EXAM OF NECK: CPT

## 2024-05-13 DIAGNOSIS — E03.9 HYPOTHYROIDISM, UNSPECIFIED TYPE: ICD-10-CM

## 2024-05-13 NOTE — TELEPHONE ENCOUNTER
Patient is requesting a refill on LEVOTHYROXINE 50 MCG tablet, please. Patient stated she is completely out.    Thank you

## 2024-05-13 NOTE — TELEPHONE ENCOUNTER
Comments:     Last Office Visit (last PCP visit):   3/26/2024    Next Visit Date:  Future Appointments   Date Time Provider Department Center   5/29/2024 11:00 AM Elliot Garcia MD [facility]       **If hasn't been seen in over a year OR hasn't followed up according to last diabetes/ADHD visit, make appointment for patient before sending refill to provider.    Rx requested:  Requested Prescriptions     Pending Prescriptions Disp Refills    levothyroxine (SYNTHROID) 50 MCG tablet 90 tablet 0     Sig: Take 1 tablet by mouth daily

## 2024-05-16 RX ORDER — LEVOTHYROXINE SODIUM 0.05 MG/1
50 TABLET ORAL DAILY
Qty: 90 TABLET | Refills: 0 | Status: SHIPPED | OUTPATIENT
Start: 2024-05-16

## 2024-05-24 DIAGNOSIS — F32.A DEPRESSION, UNSPECIFIED DEPRESSION TYPE: ICD-10-CM

## 2024-05-24 RX ORDER — SERTRALINE HYDROCHLORIDE 25 MG/1
25 TABLET, FILM COATED ORAL DAILY
Qty: 90 TABLET | Refills: 1 | Status: SHIPPED | OUTPATIENT
Start: 2024-05-24

## 2024-05-24 NOTE — TELEPHONE ENCOUNTER
Comments:     Last Office Visit (last PCP visit):   3/26/2024    Next Visit Date:  Future Appointments   Date Time Provider Department Center   5/29/2024 11:00 AM Elliot Garcia MD [facility]       **If hasn't been seen in over a year OR hasn't followed up according to last diabetes/ADHD visit, make appointment for patient before sending refill to provider.    Rx requested:  Requested Prescriptions     Pending Prescriptions Disp Refills    sertraline (ZOLOFT) 25 MG tablet [Pharmacy Med Name: sertraline 25 mg tablet] 30 tablet 5     Sig: Take 1 tablet by mouth daily

## 2024-05-28 NOTE — PROGRESS NOTES
Narrative Referring Provider:   Elliot Garcia Jr      PCP:   Marce Giraldo PA    ============================  IMPRESSION/PLAN:  ============================  Gloria Abbott is s/p left Total knee replacement completed on 2023.     IMPRESSION:  The knee itself is doing well, but the patient had to have an intracranial shunt point and not too long after her knee replacement.  She has some dizziness.    PLAN:   I am going to get the patient into therapy for some balance training.  She has an appointment scheduled with ENT.  Routine follow-up.    Patient Reassurance: Normal post-operative course discussed with patient.  Patient reassured and supported. All questions answered.    Follow up 1 year X-Rays Needed    Patient presents today for a 1 year post-op visit    Status post op:  left Total knee replacement     BMI: There is no height or weight on file to calculate BMI.    Post-operative recovery was complicated by uneventful/none.    Patient rates their condition as improving.     Does the patient still experience pain? 2/10 pain, aching intermittently    Post Op discharge patient location: in home.   Functional Assessment is as follows: Therapy is completed  Functional difficulties: None.  Pain Medication: None  Currently Ambulating with: a cane due to the dizziness    =================================  EXAM: POST OP KNEE  =================================  Left Post-Operative Knee    Ambulates with a normal gait, but unsteady due to dizziness.    SKIN: Appropriate postop appearance, No evidence of erythema, warmth, discharge or drainage and Incision clean/dry/intact.    Range of motion is 0 degrees in extension and   120 degrees of flexion.    Extension La degrees    Pain with ROM: No    There is minimal effusion.     Mal-alignment: No    Outpatient    Neurovascular Status: Sensation Intact, Moves foot and ankle up & down, 2+ dorsalis pedis and negative homans sign    Stability:Varus/Valgus- Yes,

## 2024-05-29 ENCOUNTER — OFFICE VISIT (OUTPATIENT)
Dept: ORTHOPEDIC SURGERY | Age: 74
End: 2024-05-29
Payer: COMMERCIAL

## 2024-05-29 ENCOUNTER — HOSPITAL ENCOUNTER (OUTPATIENT)
Dept: GENERAL RADIOLOGY | Age: 74
Discharge: HOME | End: 2024-05-31
Payer: COMMERCIAL

## 2024-05-29 VITALS
OXYGEN SATURATION: 95 % | HEART RATE: 70 BPM | BODY MASS INDEX: 33.77 KG/M2 | HEIGHT: 60 IN | TEMPERATURE: 98.4 F | WEIGHT: 172 LBS

## 2024-05-29 DIAGNOSIS — Z96.652 PRESENCE OF TOTAL KNEE JOINT PROSTHESIS, LEFT: Primary | ICD-10-CM

## 2024-05-29 DIAGNOSIS — R26.89 BALANCE DISORDER: ICD-10-CM

## 2024-05-29 DIAGNOSIS — Z96.652 PRESENCE OF TOTAL KNEE JOINT PROSTHESIS, LEFT: ICD-10-CM

## 2024-05-29 PROCEDURE — 1123F ACP DISCUSS/DSCN MKR DOCD: CPT | Performed by: ORTHOPAEDIC SURGERY

## 2024-05-29 PROCEDURE — 99213 OFFICE O/P EST LOW 20 MIN: CPT | Performed by: ORTHOPAEDIC SURGERY

## 2024-05-29 PROCEDURE — 73562 X-RAY EXAM OF KNEE 3: CPT | Performed by: ORTHOPAEDIC SURGERY

## 2024-05-29 PROCEDURE — 73562 X-RAY EXAM OF KNEE 3: CPT

## 2024-06-23 DIAGNOSIS — F33.1 MAJOR DEPRESSIVE DISORDER, RECURRENT EPISODE, MODERATE (HCC): ICD-10-CM

## 2024-06-25 RX ORDER — CITALOPRAM HYDROBROMIDE 10 MG/1
TABLET ORAL
Qty: 90 TABLET | Refills: 1 | Status: SHIPPED | OUTPATIENT
Start: 2024-06-25

## 2024-07-01 ENCOUNTER — CLINICAL SUPPORT (OUTPATIENT)
Dept: AUDIOLOGY | Facility: CLINIC | Age: 74
End: 2024-07-01
Payer: COMMERCIAL

## 2024-07-01 ENCOUNTER — APPOINTMENT (OUTPATIENT)
Dept: OTOLARYNGOLOGY | Facility: CLINIC | Age: 74
End: 2024-07-01
Payer: COMMERCIAL

## 2024-07-01 DIAGNOSIS — R42 DIZZINESS AND GIDDINESS: ICD-10-CM

## 2024-07-01 DIAGNOSIS — H90.3 SENSORY HEARING LOSS, BILATERAL: ICD-10-CM

## 2024-07-01 DIAGNOSIS — H90.3 BILATERAL SENSORINEURAL HEARING LOSS: Primary | ICD-10-CM

## 2024-07-01 DIAGNOSIS — J32.9 CHRONIC SINUSITIS, UNSPECIFIED LOCATION: Primary | ICD-10-CM

## 2024-07-01 PROCEDURE — 99213 OFFICE O/P EST LOW 20 MIN: CPT | Performed by: OTOLARYNGOLOGY

## 2024-07-01 PROCEDURE — 1159F MED LIST DOCD IN RCRD: CPT | Performed by: OTOLARYNGOLOGY

## 2024-07-01 PROCEDURE — 1160F RVW MEDS BY RX/DR IN RCRD: CPT | Performed by: OTOLARYNGOLOGY

## 2024-07-01 PROCEDURE — 92557 COMPREHENSIVE HEARING TEST: CPT | Performed by: AUDIOLOGIST

## 2024-07-01 PROCEDURE — 92567 TYMPANOMETRY: CPT | Performed by: AUDIOLOGIST

## 2024-07-01 NOTE — PROGRESS NOTES
Ms. Dong, age 73, was seen today for a hearing evaluation during her ENT visit with Dr. Ramirez.  She reported concern for bilateral ear pressure/fullness and possible decline in her hearing as well as sinus issues.  Additionally, she reported dizziness which is described as an imbalance sensation.      Results:  Otoscopy revealed clear ear canals and tympanic membranes were visualized bilaterally.  Tympanometry revealed normal, Type A tympanograms, indicating normal ear canal volume, peak pressure and compliance bilaterally.   Audiometric thresholds revealed a mild sloping to moderate sensorineural hearing loss bilaterally.  Word recognition scores were excellent bilaterally.  Hearing levels have declined slightly 8267-6070 Hz bilaterally as compared to her last evaluation August 2023.    Recommendations:  Follow-up with PCP, Sharon Quiñonez PA-C, as medically directed.  Follow-up with ENT, Dr. Ramirez, as medically directed.  Consider possible binaural amplification.  Retest hearing annually to monitor.

## 2024-07-01 NOTE — PROGRESS NOTES
Babs Dong is a 73 y.o. year old female patient with Ear Pressure, BALANCE ISSUES, and Neck Pain     Patient presents to the office today for assessment of left-sided facial pain and pressure.  She has also reported bilateral ear pressure fullness with intermittent issues with dizziness that she describes as imbalance but no true room spinning.  The patient in the last year has hip surgery due to fracture as well as knee replacement in addition to placement of a  shunt secondary to hydrocephalus.  She had incidental finding of near complete opacification of the left maxillary sinus on CT imaging of the brain.  She complains of recurrent sinusitis with prior history of endoscopic sinus surgery for fungal ball.  She is here today for further assessment.    Review of Systems   All other systems reviewed and are negative.        Physical Exam:   General appearance: No acute distress. Normal facies. Symmetric facial movement. No gross lesions of the face are noted.  The external ear structures appear normal. The ear canals patent and the tympanic membranes are intact without evidence of air-fluid levels, retraction, or congenital defects.  Anterior rhinoscopy notes essentially a midline nasal septum. Examination is noted for normal healthy mucosal membranes without any evidence of lesions, polyps, or exudate. The tongue is normally mobile. There are no lesions on the gingiva, buccal, or oral mucosa. There are no oral cavity masses.  The neck is negative for mass lymphadenopathy. The trachea and parotid are clear. The thyroid bed is grossly unremarkable. The salivary gland structures are grossly unremarkable.  Audiogram demonstrates bilateral sensorineural hearing loss with mild to moderate sloping loss.      Assessment/Plan   1.  Bilateral sensorineural hearing loss  2.  Dizziness  3.  Chronic sinusitis    Patient seen in the office today for assessment of multiple complaints.  In regards to ears the patient has  symmetric bilateral loss.  In regards to dizziness this may be multifactorial and I do not suspect true ENT dizziness/positional vertigo with the patient's symptoms of imbalance but no true room spinning sensation.  Patient at this time is also experiencing ear pain pressure with facial pain with chronic sinusitis will be set up for dedicated CT of the sinuses.  We will see her back following to review.

## 2024-07-02 ENCOUNTER — HOSPITAL ENCOUNTER (OUTPATIENT)
Dept: PHYSICAL THERAPY | Age: 74
Setting detail: THERAPIES SERIES
Discharge: HOME | End: 2024-07-02
Attending: ORTHOPAEDIC SURGERY
Payer: COMMERCIAL

## 2024-07-02 PROCEDURE — 97163 PT EVAL HIGH COMPLEX 45 MIN: CPT

## 2024-07-02 PROCEDURE — 97530 THERAPEUTIC ACTIVITIES: CPT

## 2024-07-02 PROCEDURE — 97112 NEUROMUSCULAR REEDUCATION: CPT

## 2024-07-02 ASSESSMENT — PAIN DESCRIPTION - DESCRIPTORS: DESCRIPTORS: PRESSURE;ACHING;TIGHTNESS

## 2024-07-02 ASSESSMENT — PAIN SCALES - GENERAL: PAINLEVEL_OUTOF10: 5

## 2024-07-02 ASSESSMENT — PAIN DESCRIPTION - LOCATION: LOCATION: HEAD;NECK

## 2024-07-02 NOTE — PROGRESS NOTES
Physical Therapy: Initial Evaluation    Patient: Gloria Abbott (73 y.o. female)   Examination Date: 2024  Plan of Care Certification Period: 2024 to 24      :  1950 ;    Confirmed: Yes MRN: [id number]  CSN: [id number]   Insurance: Payor: [employer] HEALTH PLAN / Plan: [employer] HEALTH PLANS / Product Type: *No Product type* /   Insurance ID: [id number] - (Medicare Managed) Secondary Insurance (if applicable):    Referring Physician: Elliot Garcia MD Dr. George   PCP: Marce Giraldo PA Visits to Date/Visits Approved: 1 / 10    No Show/Cancelled Appts:      Medical Diagnosis: Presence of total knee joint prosthesis, left [Z96.652]  Balance disorder [R26.89] S/P L TKA, balance disorder  Treatment Diagnosis: balance and vestibular impairment; history of L TKA     PERTINENT MEDICAL HISTORY           Medical History:     Past Medical History:   Diagnosis Date    Cancer (HCC)     endometrial and uterine    Depression     Hyperlipidemia     Hypothyroid     MDD (major depressive disorder)     NPH (normal pressure hydrocephalus) (HCC)     Protruded lumbar disc     L4/L5    Stress     Vitamin D deficiency      Surgical History:   Past Surgical History:   Procedure Laterality Date    ANKLE SURGERY      COLONOSCOPY      FOOT SURGERY      HIP SURGERY Right 2023    RIGHT HIP PINNING AND LEFT KNEE STERIOD INJECTION performed by Elliot Garcia MD at [facility] [place] OR    LASIK Bilateral     SINUS SURGERY  07/10/2012    TOTAL KNEE ARTHROPLASTY Left 2023    Left total knee arthroplasty,[employer] Triathlon Total Knee System,Choice with adductor canal block preop with Nhan 23 at 10:30 am and lab work for 23 at 1:00 pm performed by Elliot Garcia MD at [facility] OR    VENTRICULOPERITONEAL SHUNT N/A 3/15/2024    RIGHT  SHUNT CREATION AND INDICATED PROCEDURES, MICRODISSECTION, PREOPERATIVE STEROTACTIC PLANNING AND WORK performed by Sumit Shelton MD at [facility] [place] OR       Medications:   Current

## 2024-07-08 ENCOUNTER — HOSPITAL ENCOUNTER (OUTPATIENT)
Dept: PHYSICAL THERAPY | Age: 74
Setting detail: THERAPIES SERIES
Discharge: HOME | End: 2024-07-08
Attending: ORTHOPAEDIC SURGERY
Payer: COMMERCIAL

## 2024-07-08 PROCEDURE — 97140 MANUAL THERAPY 1/> REGIONS: CPT

## 2024-07-08 PROCEDURE — 97110 THERAPEUTIC EXERCISES: CPT

## 2024-07-08 PROCEDURE — 97112 NEUROMUSCULAR REEDUCATION: CPT

## 2024-07-08 ASSESSMENT — PAIN DESCRIPTION - LOCATION: LOCATION: NECK

## 2024-07-08 ASSESSMENT — PAIN SCALES - GENERAL: PAINLEVEL_OUTOF10: 5

## 2024-07-08 NOTE — PROGRESS NOTES
Physical Therapy: Daily Note   Patient: Gloria Abbott (73 y.o. female)   Examination Date: 2024  Plan of Care/Certification Expiration Date: 24    Progress Note Counter: Total knee replacement on 23     :  1950 # of Visits since SOC:   2   MRN: [id number]  CSN: [id number] Start of Care Date:   2024   Insurance: Payor: [employer] HEALTH PLAN / Plan: [employer] HEALTH PLANS / Product Type: *No Product type* /   Insurance ID: [id number] - (Medicare Managed) Secondary Insurance (if applicable):    Referring Physician: Elliot Garcia MD     PCP: Marce Giraldo PA Visits to Date/Visits Approved: 2 / 10    No Show/Cancelled Appts: 0      Medical Diagnosis: No admission diagnoses are documented for this encounter.    Treatment Diagnosis: balance and vestibular impairment; history of L TKA        SUBJECTIVE EXAMINATION   Pain Level: Pain Screening  Patient Currently in Pain: Yes  Pain Assessment: 0-10  Pain Level: 5  Pain Location: Neck    Patient Comments: Subjective: CT scan on Friday of sinuses. Pt reports less dizziness when laying down cont to complain feeling off balance while walking, feel \"sleepy all the time\"    HEP Compliance: Good        OBJECTIVE EXAMINATION   Restrictions:  Restrictions/Precautions: Fall Risk (per clinical judgement)   No data recorded No data recorded      TREATMENT     Exercises:      Treatment Reasoning    Exercise 1: cerv retraction x 10H 3- max vcs and tactile cues- not added to HEP yet  Exercise 2: *cervical rotation x 5 H 3- vcs to stay within painfree range  Exercise 3: *cerv SB x 5 H 3-5  Exercise 4: *scap retraction x 10 H 3-5  Exercise 5: *Cap Ds x 10  Exercise 6: quad set x 10 H 5 L  Exercise 7: SLR with QS  L x 10 H 1-2  Exercise 8: Heel slide wthi sheet x 10 H 5                          Neuromuscular Re-Education: (CPT 27947) Treatment Reasoning    Neuromuscular Re-education (CPT 47475)  Neuro Re-Ed Exercise 1: Canalith testing: Neg Mariposa Hallpike R, Neg

## 2024-07-10 ENCOUNTER — HOSPITAL ENCOUNTER (OUTPATIENT)
Dept: PHYSICAL THERAPY | Age: 74
Setting detail: THERAPIES SERIES
Discharge: HOME | End: 2024-07-10
Attending: ORTHOPAEDIC SURGERY
Payer: COMMERCIAL

## 2024-07-10 PROCEDURE — 97110 THERAPEUTIC EXERCISES: CPT

## 2024-07-10 PROCEDURE — 97140 MANUAL THERAPY 1/> REGIONS: CPT

## 2024-07-10 NOTE — PROGRESS NOTES
greater , L rot 45 deg or greater , bilat SB 25 deg or greater without increased pain and without dizziness       Improve bialt LE strength 4 to 4+/5 or greater to improve ambulation and ability to perfrom ADLs       Pt amb 350 feet or greater with cane or without device demonstrating improved upright posture, near equal step length good balance, maintaining straight pathway with 1-2 or less episodes of mild veering       Improve SLS R and L 7 sec or greater without UE support to improve overall balance       Improve Mod LEFS from 39% day of IE to less than 20% day of IE                                            TREATMENT PLAN   Plan Frequency: 1-2x per wk  Plan weeks: 4-6  Current Treatment Recommendations: Strengthening, ROM, Balance training, Functional mobility training, Home exercise program, Gait training, Vestibular rehab, Therapeutic activities, Safety education & training, Neuromuscular re-education           Therapy Time  Individual Time In:    1030     Individual Time Out:  1120  Minutes:  50        Electronically signed by Daniela Rivers, PT  on 7/10/2024 at 1:21 PM   POC NOTE

## 2024-07-12 ENCOUNTER — HOSPITAL ENCOUNTER (OUTPATIENT)
Dept: RADIOLOGY | Facility: HOSPITAL | Age: 74
Discharge: HOME | End: 2024-07-12
Payer: COMMERCIAL

## 2024-07-12 DIAGNOSIS — J32.9 CHRONIC SINUSITIS, UNSPECIFIED LOCATION: ICD-10-CM

## 2024-07-12 PROCEDURE — 70486 CT MAXILLOFACIAL W/O DYE: CPT

## 2024-07-15 ENCOUNTER — HOSPITAL ENCOUNTER (OUTPATIENT)
Dept: PHYSICAL THERAPY | Age: 74
Setting detail: THERAPIES SERIES
Discharge: HOME | End: 2024-07-15
Attending: ORTHOPAEDIC SURGERY
Payer: COMMERCIAL

## 2024-07-15 PROCEDURE — 97110 THERAPEUTIC EXERCISES: CPT

## 2024-07-15 PROCEDURE — 97140 MANUAL THERAPY 1/> REGIONS: CPT

## 2024-07-15 ASSESSMENT — PAIN SCALES - GENERAL: PAINLEVEL_OUTOF10: 5

## 2024-07-15 ASSESSMENT — PAIN DESCRIPTION - DESCRIPTORS: DESCRIPTORS: ACHING;TIGHTNESS

## 2024-07-15 ASSESSMENT — PAIN DESCRIPTION - LOCATION: LOCATION: NECK

## 2024-07-15 NOTE — PROGRESS NOTES
Physical Therapy: Daily Note   Patient: Gloria Abbott (73 y.o. female)   Examination Date: 07/15/2024  Plan of Care/Certification Expiration Date: 24    Progress Note Counter: Total knee replacement on 23     :  1950 # of Visits since SOC:   4   MRN: [id number]  CSN: [id number] Start of Care Date:   2024   Insurance: Payor: [employer] / Plan: [employer] PLANS / Product Type: *No Product type* /   Insurance ID: [id number] - (Medicare Managed) Secondary Insurance (if applicable):    Referring Physician: Elliot Garcia MD Dr. George   PCP: Marce Giraldo PA Visits to Date/Visits Approved: 4 / 10    No Show/Cancelled Appts: 0 / 0     Medical Diagnosis: No admission diagnoses are documented for this encounter. S/P L TKA, balance disorder  Treatment Diagnosis: balance and vestibular impairment; history of L TKA        SUBJECTIVE EXAMINATION   Pain Level: Pain Screening  Patient Currently in Pain: Yes  Pain Assessment: 0-10  Pain Level: 5 (5/10 with movement)  Pain Location: Neck  Pain Descriptors: Aching, Tightness    Patient Comments: Subjective: Pt reports head feeling full today. Pt reports currently 3-4/10 at rest 5/10 with movement. Pt reports feeling really good yesterday and was more active than usual.    HEP Compliance: Good        OBJECTIVE EXAMINATION   Restrictions:  Restrictions/Precautions: Fall Risk (per clinical judgement)   No data recorded No data recorded      TREATMENT     Exercises:      Treatment Reasoning    Exercise 1: cerv retraction x 10 H 3- max vcs and tactile cues (last 5 reps with fingertips guiding chin to improve amount of movement and form)  Exercise 2: *cervical rotation x 5 H 3- vcs to stay within painfree range  Exercise 3: *cerv SB x 5 H 3-5  Exercise 4: *scap retraction x 10 H 3-5  Exercise 5: *Cap Ds x 10  Exercise 6: quad set x 10 H 5 L  Exercise 7: SLR with QS  L x 10 H 1-2  Exercise 8: Heel slide wthi sheet x 10 H 5 (5 without sheet, 5 with

## 2024-07-16 DIAGNOSIS — E78.2 MIXED HYPERLIPIDEMIA: ICD-10-CM

## 2024-07-16 NOTE — TELEPHONE ENCOUNTER
Comments:     Last Office Visit (last PCP visit):   3/26/2024    Next Visit Date:  Future Appointments   Date Time Provider Department Center   7/17/2024 10:30 AM [facility] [place]   7/23/2024 11:15 AM Daniela Delarosa PT [facility]   [facility] [place]   7/25/2024 10:30 AM Daniela Delarosa PT [facility]  [facility]       **If hasn't been seen in over a year OR hasn't followed up according to last diabetes/ADHD visit, make appointment for patient before sending refill to provider.    Rx requested:  Requested Prescriptions     Pending Prescriptions Disp Refills    pravastatin (PRAVACHOL) 20 MG tablet [Pharmacy Med Name: pravastatin 20 mg tablet] 90 tablet 1     Sig: Take 1 tablet by mouth once daily

## 2024-07-17 ENCOUNTER — HOSPITAL ENCOUNTER (OUTPATIENT)
Dept: PHYSICAL THERAPY | Age: 74
Setting detail: THERAPIES SERIES
Discharge: HOME | End: 2024-07-17
Attending: ORTHOPAEDIC SURGERY
Payer: COMMERCIAL

## 2024-07-17 PROCEDURE — 97140 MANUAL THERAPY 1/> REGIONS: CPT

## 2024-07-17 PROCEDURE — 97112 NEUROMUSCULAR REEDUCATION: CPT

## 2024-07-17 PROCEDURE — 97110 THERAPEUTIC EXERCISES: CPT

## 2024-07-17 ASSESSMENT — PAIN DESCRIPTION - LOCATION: LOCATION: NECK

## 2024-07-17 ASSESSMENT — PAIN SCALES - GENERAL: PAINLEVEL_OUTOF10: 3

## 2024-07-17 ASSESSMENT — PAIN DESCRIPTION - DESCRIPTORS: DESCRIPTORS: ACHING;TIGHTNESS

## 2024-07-17 NOTE — PROGRESS NOTES
spine persists with L rotation more resrticted than R. Progressed balance exs and gait with gaze stab incorportating head turns. Tolerated treatment well without any increase in pain or dizziness. Manual therapy at end of session to address neck pain and limited mobility. Pt has followup appt with ENT next Monday to discuss CT scan results.  Body Structures, Functions, Activity Limitations Requiring Skilled Therapeutic Intervention: Decreased functional mobility , Decreased posture, Decreased ROM, Decreased balance, Decreased strength, Vestibular Impairment      Activity Tolerance: Patient tolerated treatment well    Therapy Prognosis: Good       GOALS   Patient Goals : Get rid of dizziness and improve balance  Short Term Goals Completed by 2 wks Current Status Goal Status   Pt report any improvement in dizziness when laying down in bed and getting out of bed       Pt demonstrate I with HEP and report compliance with HEP 4/7 days per wk or more frequently       Improve cervical spine AROM rotation 5 deg or greater        Neg Canalith positional testing      Imrpove SLS R and L 3 sec or more to improve overall balance                                                   Long Term Goals Completed by   Current Status Goal Status   Improve AROM L knee 1- 115 deg or greater       Improve cervical spine AROM R rotation 55 deg or greater , L rot 45 deg or greater , bilat SB 25 deg or greater without increased pain and without dizziness       Improve bialt LE strength 4 to 4+/5 or greater to improve ambulation and ability to perfrom ADLs       Pt amb 350 feet or greater with cane or without device demonstrating improved upright posture, near equal step length good balance, maintaining straight pathway with 1-2 or less episodes of mild veering       Improve SLS R and L 7 sec or greater without UE support to improve overall balance       Improve Mod LEFS from 39% day of IE to less than 20% day of IE

## 2024-07-18 RX ORDER — PRAVASTATIN SODIUM 20 MG
20 TABLET ORAL DAILY
Qty: 90 TABLET | Refills: 1 | Status: SHIPPED | OUTPATIENT
Start: 2024-07-18

## 2024-07-22 ENCOUNTER — APPOINTMENT (OUTPATIENT)
Dept: OTOLARYNGOLOGY | Facility: CLINIC | Age: 74
End: 2024-07-22
Payer: COMMERCIAL

## 2024-07-22 DIAGNOSIS — J32.0 CHRONIC LEFT MAXILLARY SINUSITIS: Primary | ICD-10-CM

## 2024-07-22 PROCEDURE — 1159F MED LIST DOCD IN RCRD: CPT | Performed by: OTOLARYNGOLOGY

## 2024-07-22 PROCEDURE — 1160F RVW MEDS BY RX/DR IN RCRD: CPT | Performed by: OTOLARYNGOLOGY

## 2024-07-22 PROCEDURE — 99214 OFFICE O/P EST MOD 30 MIN: CPT | Performed by: OTOLARYNGOLOGY

## 2024-07-22 NOTE — PROGRESS NOTES
Patient returns.  Seeing her back today with her completed CT scan which clearly shows evidence of chronic left maxillary rhinosinusitis with possible fungal ball.  She did remind me today that I previously operated on her for fungal ball many years ago.  She denies any other worrisome ENT symptoms or signs.  She was doing like to get better.  There have been no significant changes in past medical or past surgical histories except as mentioned.    Physical exam:  No acute distress.  The external ear structures appear normal. The ear canals patent and the tympanic membranes are intact without evidence of air-fluid levels, retraction, or congenital defects.  Anterior rhinoscopy notes essentially a midline nasal septum. Examination is noted for normal healthy mucosal membranes without any evidence of lesions, polyps, or exudate. The tongue is normally mobile. There are no lesions on the gingiva, buccal, or oral mucosa. There are no oral cavity masses.  The neck is negative for mass lymphadenopathy. The trachea and parotid are clear. The thyroid bed is grossly unremarkable. The salivary gland structures are grossly unremarkable.    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  Detailed discussion with review of risks and benefits.  She appears to understand and agrees to proceed.

## 2024-07-23 ENCOUNTER — HOSPITAL ENCOUNTER (OUTPATIENT)
Dept: PHYSICAL THERAPY | Age: 74
Setting detail: THERAPIES SERIES
Discharge: HOME | End: 2024-07-23
Attending: ORTHOPAEDIC SURGERY
Payer: COMMERCIAL

## 2024-07-23 PROCEDURE — 97112 NEUROMUSCULAR REEDUCATION: CPT

## 2024-07-23 PROCEDURE — 97140 MANUAL THERAPY 1/> REGIONS: CPT

## 2024-07-23 PROCEDURE — 97110 THERAPEUTIC EXERCISES: CPT

## 2024-07-23 NOTE — PROGRESS NOTES
Physical Therapy: Daily Note   Patient: Gloria Abbott (73 y.o. female)   Examination Date: 2024  Plan of Care/Certification Expiration Date: 24    Progress Note Counter: Total knee replacement on 23     :  1950 # of Visits since SOC:   6   MRN: [id number]  CSN: [id number] Start of Care Date:   2024   Insurance: Payor: [employer] HEALTH PLAN / Plan: [employer] HEALTH PLANS / Product Type: *No Product type* /   Insurance ID: [id number] - (Medicare Managed) Secondary Insurance (if applicable):    Referring Physician: Elliot Garcia MD Dr. George   PCP: Marce Giraldo PA Visits to Date/Visits Approved: 6 / 10    No Show/Cancelled Appts: 0 / 0     Medical Diagnosis: No admission diagnoses are documented for this encounter. S/P L TKA, balance disorder  Treatment Diagnosis: balance and vestibular impairment; history of L TKA        SUBJECTIVE EXAMINATION   Pain Level: Pain Screening  Patient Currently in Pain: Yes  Pain Assessment: 0-10  Pain Level: 4    Patient Comments: Subjective: Pt reports appt with ENT and ENT recommended sinus surgery- old fracture orbital bone causing sinus problems. Pt reports more neck pain and stiffness  today 5/10 Pt reports doing neck exs while waiting at ENT office and felt dizzy standing up dizziness described as \"off balance\"    HEP Compliance: Good        OBJECTIVE EXAMINATION   Restrictions:  Restrictions/Precautions: Fall Risk (per clinical judgement)   No data recorded No data recorded        TREATMENT     Exercises:      Treatment Reasoning    Exercise 1: cerv retraction x 10 H 3- max vcs and tactile cues (fingertips guiding chin to improve amount of movement and form)  Exercise 2: *cervical rotation x 5 H 3- vcs to stay within painfree range  Exercise 3: *cerv SB x 5 H 3-5  Exercise 4: *scap retraction x 10 H 3-5  Exercise 5: *Cap Ds x 10- vcs for slower pace  Exercise 9: heel toe raises standing min UE support x 10  Exercise 10: marching x 10 alternating H

## 2024-07-25 ENCOUNTER — HOSPITAL ENCOUNTER (OUTPATIENT)
Dept: PHYSICAL THERAPY | Age: 74
Setting detail: THERAPIES SERIES
Discharge: HOME | End: 2024-07-25
Attending: ORTHOPAEDIC SURGERY
Payer: COMMERCIAL

## 2024-07-25 PROCEDURE — 97140 MANUAL THERAPY 1/> REGIONS: CPT

## 2024-07-25 PROCEDURE — 97110 THERAPEUTIC EXERCISES: CPT

## 2024-07-25 NOTE — PROGRESS NOTES
Physical Therapy: Daily Note   Patient: Gloria Abbott (73 y.o. female)   Examination Date: 2024  Plan of Care/Certification Expiration Date: 24    Progress Note Counter: Total knee replacement on 23     :  1950 # of Visits since SOC:   7   MRN: [id number]  CSN: [id number] Start of Care Date:   2024   Insurance: Payor: [employer] HEALTH PLAN / Plan: [employer] HEALTH PLANS / Product Type: *No Product type* /   Insurance ID: [id number] - (Medicare Managed) Secondary Insurance (if applicable):    Referring Physician: Elliot Garcia MD     PCP: Marce Giraldo PA Visits to Date/Visits Approved: 7 / 10    No Show/Cancelled Appts: 0 / 0     Medical Diagnosis: No admission diagnoses are documented for this encounter. S/P L TKA, balance disorder  Treatment Diagnosis: balance and vestibular impairment; history of L TKA        SUBJECTIVE EXAMINATION   Pain Level:  4-5/10 neck pain with movement    Patient Comments: Subjective: Pt reports overall improvement regarding balalnce and dizziness, however no real change regarding neck pain, still painful with movement 0 pain at rest. Pt has been using stationary \"peddal\"    HEP Compliance: Good        OBJECTIVE EXAMINATION   Restrictions:  Restrictions/Precautions: Fall Risk (per clinical judgement)   No data recorded No data recorded        TREATMENT     Exercises:      Treatment Reasoning    Exercise 1: cerv retraction x 10 H 3- max vcs and tactile cues (fingertips guiding chin to improve amount of movement and form)  Exercise 2: *cervical rotation x 5 H 3- vcs to stay within painfree range (hand on forehead guiding and assisting with gentle OP)  Exercise 3: *cerv SB x 5 H 3-5 hand on forehead guiding and assisting with gentle OP)  Exercise 4: *scap retraction x 10 H 3-5  Exercise 5: *Cap Ds x 10- vcs for slower pace  Exercise 6: quad set x 10 H 5 L  Exercise 7: SLR with QS  L x 5 H 1-2  Exercise 9: heel toe raises standing min UE support x 10  Exercise 10:

## 2024-07-30 ENCOUNTER — HOSPITAL ENCOUNTER (OUTPATIENT)
Dept: PHYSICAL THERAPY | Age: 74
Setting detail: THERAPIES SERIES
Discharge: HOME | End: 2024-07-30
Attending: ORTHOPAEDIC SURGERY
Payer: COMMERCIAL

## 2024-07-30 PROCEDURE — 97530 THERAPEUTIC ACTIVITIES: CPT

## 2024-07-30 PROCEDURE — 97110 THERAPEUTIC EXERCISES: CPT

## 2024-07-30 ASSESSMENT — PAIN SCALES - GENERAL: PAINLEVEL_OUTOF10: 5

## 2024-07-30 NOTE — PROGRESS NOTES
Physical Therapy: Monthly Recheck/ Discharge Summary   Patient: Gloria Abbott (73 y.o. female)   Examination Date: 2024  Plan of Care/Certification Expiration Date: 24    Progress Note Counter: Total knee replacement on 23     :  1950 # of Visits since SOC:   8   MRN: [id number]  CSN: [id number] Start of Care Date:   2024   Insurance: Payor: [employer] HEALTH PLAN / Plan: [employer] PLANS / Product Type: *No Product type* /   Insurance ID: [id number] - (Medicare Managed) Secondary Insurance (if applicable):    Referring Physician: Elliot Garcia MD Dr. George   PCP: Marce Giraldo PA Visits to Date/Visits Approved: 8 / 10    No Show/Cancelled Appts: 0 / 0     Medical Diagnosis: No admission diagnoses are documented for this encounter. S/P L TKA, balance disorder  Treatment Diagnosis: balance and vestibular impairment; history of L TKA        SUBJECTIVE EXAMINATION   Pain Level: Pain Screening  Patient Currently in Pain: Yes  Pain Assessment: 0-10  Pain Level: 5 (0 at rest 5 with movement)    Patient Comments: Subjective: Pt reports surgery scheduled for Aug 6th, pre-op visit tomorrow. Pt reports near 100% impromoent since initating therapy regarding dizziness improved balance feeling more steady hardly using cane at home anymore, using cane still outdoors, no change in neck pain or stiffness, still reporting 5/10 neck pain on ave    HEP Compliance: Good        OBJECTIVE EXAMINATION   Restrictions:  Restrictions/Precautions: Fall Risk (per clinical judgement)   No data recorded No data recorded      Cervical Assessment     AROM Cervical Spine   Cervical flexion:  (WFL)  Cervical extension: 35  Cervical right lateral: 23  Cervical left lateral: 20  Cervical right rotation: 45 (ERP)  Cervical left rotation: 39 (ERP)           Ambulation/Gait (if applicable):   Ambulation  Surface: Level tile  Device: Single point cane, No Device  Quality of Gait: slow pace, near equal step length using

## 2024-07-31 ENCOUNTER — HOSPITAL ENCOUNTER (OUTPATIENT)
Dept: PREADMISSION TESTING | Age: 74
Discharge: HOME | End: 2024-08-04
Payer: COMMERCIAL

## 2024-07-31 VITALS
RESPIRATION RATE: 20 BRPM | WEIGHT: 173 LBS | HEART RATE: 61 BPM | SYSTOLIC BLOOD PRESSURE: 156 MMHG | TEMPERATURE: 99.4 F | DIASTOLIC BLOOD PRESSURE: 71 MMHG | HEIGHT: 64 IN | OXYGEN SATURATION: 98 % | BODY MASS INDEX: 29.53 KG/M2

## 2024-07-31 PROBLEM — Z96.651 STATUS POST RIGHT KNEE REPLACEMENT: Status: RESOLVED | Noted: 2023-05-10 | Resolved: 2024-07-31

## 2024-07-31 PROBLEM — G56.03 CARPAL TUNNEL SYNDROME, BILATERAL: Status: ACTIVE | Noted: 2023-08-30

## 2024-07-31 PROBLEM — N95.0 POSTMENOPAUSAL BLEEDING: Status: RESOLVED | Noted: 2018-04-12 | Resolved: 2024-07-31

## 2024-07-31 PROBLEM — J32.0 CHRONIC LEFT MAXILLARY SINUSITIS: Status: ACTIVE | Noted: 2024-07-22

## 2024-07-31 PROBLEM — H90.3 BILATERAL HIGH FREQUENCY SENSORINEURAL HEARING LOSS: Status: ACTIVE | Noted: 2024-07-01

## 2024-07-31 PROBLEM — R93.89 THICKENED ENDOMETRIUM: Status: RESOLVED | Noted: 2018-04-12 | Resolved: 2024-07-31

## 2024-07-31 LAB
ANION GAP SERPL CALCULATED.3IONS-SCNC: 10 MEQ/L (ref 9–15)
BUN SERPL-MCNC: 22 MG/DL (ref 8–23)
CALCIUM SERPL-MCNC: 8.9 MG/DL (ref 8.5–9.9)
CHLORIDE SERPL-SCNC: 105 MEQ/L (ref 95–107)
CO2 SERPL-SCNC: 25 MEQ/L (ref 20–31)
CREAT SERPL-MCNC: 0.94 MG/DL (ref 0.5–0.9)
ERYTHROCYTE [DISTWIDTH] IN BLOOD BY AUTOMATED COUNT: 12.8 % (ref 11.5–14.5)
GLUCOSE SERPL-MCNC: 77 MG/DL (ref 70–99)
HCT VFR BLD AUTO: 35.5 % (ref 37–47)
HGB BLD-MCNC: 11.4 G/DL (ref 12–16)
MCH RBC QN AUTO: 30.9 PG (ref 27–31.3)
MCHC RBC AUTO-ENTMCNC: 32.1 % (ref 33–37)
MCV RBC AUTO: 96.2 FL (ref 79.4–94.8)
PLATELET # BLD AUTO: 259 K/UL (ref 130–400)
POTASSIUM SERPL-SCNC: 4.3 MEQ/L (ref 3.4–4.9)
RBC # BLD AUTO: 3.69 M/UL (ref 4.2–5.4)
SODIUM SERPL-SCNC: 140 MEQ/L (ref 135–144)
WBC # BLD AUTO: 6.8 K/UL (ref 4.8–10.8)

## 2024-07-31 PROCEDURE — 80048 BASIC METABOLIC PNL TOTAL CA: CPT

## 2024-07-31 PROCEDURE — 85027 COMPLETE CBC AUTOMATED: CPT

## 2024-07-31 RX ORDER — LIDOCAINE HYDROCHLORIDE 10 MG/ML
1 INJECTION, SOLUTION EPIDURAL; INFILTRATION; INTRACAUDAL; PERINEURAL
Status: CANCELLED | OUTPATIENT
Start: 2024-08-06 | End: 2024-08-07

## 2024-07-31 RX ORDER — SODIUM CHLORIDE 0.9 % (FLUSH) 0.9 %
5-40 SYRINGE (ML) INJECTION EVERY 12 HOURS SCHEDULED
Status: CANCELLED | OUTPATIENT
Start: 2024-08-06

## 2024-07-31 RX ORDER — SODIUM CHLORIDE 0.9 % (FLUSH) 0.9 %
5-40 SYRINGE (ML) INJECTION PRN
Status: CANCELLED | OUTPATIENT
Start: 2024-08-06

## 2024-07-31 RX ORDER — SODIUM CHLORIDE 9 MG/ML
INJECTION, SOLUTION INTRAVENOUS PRN
Status: CANCELLED | OUTPATIENT
Start: 2024-08-06

## 2024-07-31 NOTE — H&P
light-headedness, numbness and headaches.   Hematological:  Negative for adenopathy. Does not bruise/bleed easily.   Psychiatric/Behavioral:  Negative for agitation, confusion, sleep disturbance and suicidal ideas. The patient is not nervous/anxious.         OBJECTIVE  PHYSICAL EXAM:   Physical Exam  Vitals reviewed.   Constitutional:       General: She is not in acute distress.     Appearance: Normal appearance. She is not ill-appearing, toxic-appearing or diaphoretic.   HENT:      Head: Normocephalic.      Right Ear: Tympanic membrane, ear canal and external ear normal. There is no impacted cerumen.      Left Ear: Tympanic membrane, ear canal and external ear normal. There is no impacted cerumen.      Nose: Nose normal. No congestion or rhinorrhea.      Mouth/Throat:      Mouth: Mucous membranes are moist.      Pharynx: Oropharynx is clear. No oropharyngeal exudate or posterior oropharyngeal erythema.      Comments: Upper partial (right side)  Eyes:      General:         Right eye: No discharge.         Left eye: No discharge.      Extraocular Movements: Extraocular movements intact.      Conjunctiva/sclera: Conjunctivae normal.      Pupils: Pupils are equal, round, and reactive to light.   Cardiovascular:      Rate and Rhythm: Normal rate and regular rhythm.      Pulses: Normal pulses.      Heart sounds: Normal heart sounds.   Pulmonary:      Effort: Pulmonary effort is normal. No respiratory distress.      Breath sounds: Normal breath sounds. No stridor. No wheezing, rhonchi or rales.   Abdominal:      General: Abdomen is flat. Bowel sounds are normal. There is no distension.      Palpations: Abdomen is soft.      Tenderness: There is no abdominal tenderness. There is no guarding.   Genitourinary:     Comments: Deferred  Musculoskeletal:         General: Normal range of motion.      Cervical back: Normal range of motion. No tenderness.      Right lower leg: No edema.      Left lower leg: No edema.

## 2024-08-06 ENCOUNTER — ANESTHESIA EVENT (OUTPATIENT)
Dept: OPERATING ROOM | Age: 74
End: 2024-08-06
Payer: COMMERCIAL

## 2024-08-06 ENCOUNTER — HOSPITAL ENCOUNTER (OUTPATIENT)
Age: 74
Setting detail: OUTPATIENT SURGERY
Discharge: HOME | End: 2024-08-06
Attending: OTOLARYNGOLOGY | Admitting: OTOLARYNGOLOGY
Payer: COMMERCIAL

## 2024-08-06 ENCOUNTER — ANESTHESIA (OUTPATIENT)
Dept: OPERATING ROOM | Age: 74
End: 2024-08-06
Payer: COMMERCIAL

## 2024-08-06 VITALS
DIASTOLIC BLOOD PRESSURE: 68 MMHG | SYSTOLIC BLOOD PRESSURE: 153 MMHG | HEART RATE: 75 BPM | WEIGHT: 173 LBS | BODY MASS INDEX: 29.53 KG/M2 | HEIGHT: 64 IN | RESPIRATION RATE: 16 BRPM | TEMPERATURE: 98 F | OXYGEN SATURATION: 96 %

## 2024-08-06 DIAGNOSIS — J32.0 CHRONIC MAXILLARY SINUSITIS: ICD-10-CM

## 2024-08-06 PROCEDURE — 3600000004 HC SURGERY LEVEL 4 BASE: Performed by: OTOLARYNGOLOGY

## 2024-08-06 PROCEDURE — 6370000000 HC RX 637 (ALT 250 FOR IP)

## 2024-08-06 PROCEDURE — 7100000001 HC PACU RECOVERY - ADDTL 15 MIN: Performed by: OTOLARYNGOLOGY

## 2024-08-06 PROCEDURE — 2709999900 HC NON-CHARGEABLE SUPPLY: Performed by: OTOLARYNGOLOGY

## 2024-08-06 PROCEDURE — 3700000000 HC ANESTHESIA ATTENDED CARE: Performed by: OTOLARYNGOLOGY

## 2024-08-06 PROCEDURE — 6360000002 HC RX W HCPCS: Performed by: NURSE ANESTHETIST, CERTIFIED REGISTERED

## 2024-08-06 PROCEDURE — 2500000003 HC RX 250 WO HCPCS: Performed by: NURSE ANESTHETIST, CERTIFIED REGISTERED

## 2024-08-06 PROCEDURE — 88312 SPECIAL STAINS GROUP 1: CPT

## 2024-08-06 PROCEDURE — 88305 TISSUE EXAM BY PATHOLOGIST: CPT

## 2024-08-06 PROCEDURE — 7100000000 HC PACU RECOVERY - FIRST 15 MIN: Performed by: OTOLARYNGOLOGY

## 2024-08-06 PROCEDURE — 3700000001 HC ADD 15 MINUTES (ANESTHESIA): Performed by: OTOLARYNGOLOGY

## 2024-08-06 PROCEDURE — 6360000002 HC RX W HCPCS: Performed by: STUDENT IN AN ORGANIZED HEALTH CARE EDUCATION/TRAINING PROGRAM

## 2024-08-06 PROCEDURE — 2580000003 HC RX 258: Performed by: OTOLARYNGOLOGY

## 2024-08-06 PROCEDURE — 7100000010 HC PHASE II RECOVERY - FIRST 15 MIN: Performed by: OTOLARYNGOLOGY

## 2024-08-06 PROCEDURE — 6370000000 HC RX 637 (ALT 250 FOR IP): Performed by: OTOLARYNGOLOGY

## 2024-08-06 PROCEDURE — 6370000000 HC RX 637 (ALT 250 FOR IP): Performed by: NURSE ANESTHETIST, CERTIFIED REGISTERED

## 2024-08-06 PROCEDURE — 87186 SC STD MICRODIL/AGAR DIL: CPT

## 2024-08-06 PROCEDURE — 2500000003 HC RX 250 WO HCPCS: Performed by: OTOLARYNGOLOGY

## 2024-08-06 PROCEDURE — 87075 CULTR BACTERIA EXCEPT BLOOD: CPT

## 2024-08-06 PROCEDURE — 3600000014 HC SURGERY LEVEL 4 ADDTL 15MIN: Performed by: OTOLARYNGOLOGY

## 2024-08-06 PROCEDURE — 7100000011 HC PHASE II RECOVERY - ADDTL 15 MIN: Performed by: OTOLARYNGOLOGY

## 2024-08-06 PROCEDURE — 87070 CULTURE OTHR SPECIMN AEROBIC: CPT

## 2024-08-06 PROCEDURE — 6370000000 HC RX 637 (ALT 250 FOR IP): Performed by: STUDENT IN AN ORGANIZED HEALTH CARE EDUCATION/TRAINING PROGRAM

## 2024-08-06 PROCEDURE — A4217 STERILE WATER/SALINE, 500 ML: HCPCS | Performed by: OTOLARYNGOLOGY

## 2024-08-06 PROCEDURE — 2720000010 HC SURG SUPPLY STERILE: Performed by: OTOLARYNGOLOGY

## 2024-08-06 RX ORDER — SCOLOPAMINE TRANSDERMAL SYSTEM 1 MG/1
PATCH, EXTENDED RELEASE TRANSDERMAL PRN
Status: DISCONTINUED | OUTPATIENT
Start: 2024-08-06 | End: 2024-08-06 | Stop reason: SDUPTHER

## 2024-08-06 RX ORDER — SODIUM CHLORIDE 0.9 % (FLUSH) 0.9 %
5-40 SYRINGE (ML) INJECTION PRN
Status: DISCONTINUED | OUTPATIENT
Start: 2024-08-06 | End: 2024-08-06 | Stop reason: HOSPADM

## 2024-08-06 RX ORDER — FENTANYL CITRATE 50 UG/ML
INJECTION, SOLUTION INTRAMUSCULAR; INTRAVENOUS PRN
Status: DISCONTINUED | OUTPATIENT
Start: 2024-08-06 | End: 2024-08-06 | Stop reason: SDUPTHER

## 2024-08-06 RX ORDER — SCOLOPAMINE TRANSDERMAL SYSTEM 1 MG/1
1 PATCH, EXTENDED RELEASE TRANSDERMAL
Status: DISCONTINUED | OUTPATIENT
Start: 2024-08-06 | End: 2024-08-06 | Stop reason: HOSPADM

## 2024-08-06 RX ORDER — SODIUM CHLORIDE 0.9 % (FLUSH) 0.9 %
5-40 SYRINGE (ML) INJECTION EVERY 12 HOURS SCHEDULED
Status: DISCONTINUED | OUTPATIENT
Start: 2024-08-06 | End: 2024-08-06 | Stop reason: HOSPADM

## 2024-08-06 RX ORDER — OXYMETAZOLINE HYDROCHLORIDE 0.05 G/100ML
SPRAY NASAL PRN
Status: DISCONTINUED | OUTPATIENT
Start: 2024-08-06 | End: 2024-08-06 | Stop reason: ALTCHOICE

## 2024-08-06 RX ORDER — SODIUM CHLORIDE 9 MG/ML
INJECTION, SOLUTION INTRAVENOUS PRN
Status: DISCONTINUED | OUTPATIENT
Start: 2024-08-06 | End: 2024-08-06 | Stop reason: HOSPADM

## 2024-08-06 RX ORDER — METOCLOPRAMIDE HYDROCHLORIDE 5 MG/ML
10 INJECTION INTRAMUSCULAR; INTRAVENOUS
Status: DISCONTINUED | OUTPATIENT
Start: 2024-08-06 | End: 2024-08-06 | Stop reason: HOSPADM

## 2024-08-06 RX ORDER — FENTANYL CITRATE 0.05 MG/ML
25 INJECTION, SOLUTION INTRAMUSCULAR; INTRAVENOUS EVERY 5 MIN PRN
Status: DISCONTINUED | OUTPATIENT
Start: 2024-08-06 | End: 2024-08-06 | Stop reason: HOSPADM

## 2024-08-06 RX ORDER — NALOXONE HYDROCHLORIDE 0.4 MG/ML
INJECTION, SOLUTION INTRAMUSCULAR; INTRAVENOUS; SUBCUTANEOUS PRN
Status: DISCONTINUED | OUTPATIENT
Start: 2024-08-06 | End: 2024-08-06 | Stop reason: HOSPADM

## 2024-08-06 RX ORDER — LABETALOL HYDROCHLORIDE 5 MG/ML
10 INJECTION, SOLUTION INTRAVENOUS ONCE
Status: COMPLETED | OUTPATIENT
Start: 2024-08-06 | End: 2024-08-06

## 2024-08-06 RX ORDER — ONDANSETRON 2 MG/ML
4 INJECTION INTRAMUSCULAR; INTRAVENOUS
Status: DISCONTINUED | OUTPATIENT
Start: 2024-08-06 | End: 2024-08-06 | Stop reason: HOSPADM

## 2024-08-06 RX ORDER — OXYCODONE HYDROCHLORIDE 5 MG/1
5 TABLET ORAL
Status: COMPLETED | OUTPATIENT
Start: 2024-08-06 | End: 2024-08-06

## 2024-08-06 RX ORDER — SODIUM CHLORIDE, SODIUM LACTATE, POTASSIUM CHLORIDE, CALCIUM CHLORIDE 600; 310; 30; 20 MG/100ML; MG/100ML; MG/100ML; MG/100ML
INJECTION, SOLUTION INTRAVENOUS CONTINUOUS
Status: DISCONTINUED | OUTPATIENT
Start: 2024-08-06 | End: 2024-08-06 | Stop reason: HOSPADM

## 2024-08-06 RX ORDER — ONDANSETRON 2 MG/ML
INJECTION INTRAMUSCULAR; INTRAVENOUS PRN
Status: DISCONTINUED | OUTPATIENT
Start: 2024-08-06 | End: 2024-08-06 | Stop reason: SDUPTHER

## 2024-08-06 RX ORDER — MAGNESIUM HYDROXIDE 1200 MG/15ML
LIQUID ORAL CONTINUOUS PRN
Status: COMPLETED | OUTPATIENT
Start: 2024-08-06 | End: 2024-08-06

## 2024-08-06 RX ORDER — PROPOFOL 10 MG/ML
INJECTION, EMULSION INTRAVENOUS PRN
Status: DISCONTINUED | OUTPATIENT
Start: 2024-08-06 | End: 2024-08-06 | Stop reason: SDUPTHER

## 2024-08-06 RX ORDER — LIDOCAINE HYDROCHLORIDE AND EPINEPHRINE 10; 10 MG/ML; UG/ML
INJECTION, SOLUTION INFILTRATION; PERINEURAL PRN
Status: DISCONTINUED | OUTPATIENT
Start: 2024-08-06 | End: 2024-08-06 | Stop reason: ALTCHOICE

## 2024-08-06 RX ORDER — LIDOCAINE HYDROCHLORIDE 10 MG/ML
INJECTION, SOLUTION EPIDURAL; INFILTRATION; INTRACAUDAL; PERINEURAL PRN
Status: DISCONTINUED | OUTPATIENT
Start: 2024-08-06 | End: 2024-08-06 | Stop reason: SDUPTHER

## 2024-08-06 RX ORDER — DEXAMETHASONE SODIUM PHOSPHATE 10 MG/ML
INJECTION INTRAMUSCULAR; INTRAVENOUS PRN
Status: DISCONTINUED | OUTPATIENT
Start: 2024-08-06 | End: 2024-08-06 | Stop reason: SDUPTHER

## 2024-08-06 RX ORDER — HYDRALAZINE HYDROCHLORIDE 20 MG/ML
10 INJECTION INTRAMUSCULAR; INTRAVENOUS ONCE
Status: COMPLETED | OUTPATIENT
Start: 2024-08-06 | End: 2024-08-06

## 2024-08-06 RX ORDER — MIDAZOLAM HYDROCHLORIDE 1 MG/ML
INJECTION INTRAMUSCULAR; INTRAVENOUS PRN
Status: DISCONTINUED | OUTPATIENT
Start: 2024-08-06 | End: 2024-08-06 | Stop reason: SDUPTHER

## 2024-08-06 RX ORDER — LIDOCAINE HYDROCHLORIDE 10 MG/ML
1 INJECTION, SOLUTION EPIDURAL; INFILTRATION; INTRACAUDAL; PERINEURAL
Status: DISCONTINUED | OUTPATIENT
Start: 2024-08-06 | End: 2024-08-06 | Stop reason: HOSPADM

## 2024-08-06 RX ORDER — ROCURONIUM BROMIDE 10 MG/ML
INJECTION, SOLUTION INTRAVENOUS PRN
Status: DISCONTINUED | OUTPATIENT
Start: 2024-08-06 | End: 2024-08-06 | Stop reason: SDUPTHER

## 2024-08-06 RX ORDER — ACETAMINOPHEN 325 MG/1
650 TABLET ORAL
Status: DISCONTINUED | OUTPATIENT
Start: 2024-08-06 | End: 2024-08-06 | Stop reason: HOSPADM

## 2024-08-06 RX ADMIN — ONDANSETRON 4 MG: 2 INJECTION INTRAMUSCULAR; INTRAVENOUS at 09:52

## 2024-08-06 RX ADMIN — SCOPALAMINE 1 PATCH: 1 PATCH, EXTENDED RELEASE TRANSDERMAL at 09:42

## 2024-08-06 RX ADMIN — PROPOFOL 150 MG: 10 INJECTION, EMULSION INTRAVENOUS at 09:44

## 2024-08-06 RX ADMIN — ROCURONIUM BROMIDE 35 MG: 10 INJECTION, SOLUTION INTRAVENOUS at 09:44

## 2024-08-06 RX ADMIN — LIDOCAINE HYDROCHLORIDE 25 MG: 10 INJECTION, SOLUTION EPIDURAL; INFILTRATION; INTRACAUDAL; PERINEURAL at 09:44

## 2024-08-06 RX ADMIN — HYDRALAZINE HYDROCHLORIDE 10 MG: 20 INJECTION INTRAMUSCULAR; INTRAVENOUS at 12:30

## 2024-08-06 RX ADMIN — FENTANYL CITRATE 100 MCG: 50 INJECTION, SOLUTION INTRAMUSCULAR; INTRAVENOUS at 09:44

## 2024-08-06 RX ADMIN — OXYCODONE 5 MG: 5 TABLET ORAL at 12:13

## 2024-08-06 RX ADMIN — MIDAZOLAM HYDROCHLORIDE 2 MG: 1 INJECTION, SOLUTION INTRAMUSCULAR; INTRAVENOUS at 09:40

## 2024-08-06 RX ADMIN — LABETALOL HYDROCHLORIDE 10 MG: 5 INJECTION INTRAVENOUS at 11:34

## 2024-08-06 RX ADMIN — SUGAMMADEX 200 MG: 100 INJECTION, SOLUTION INTRAVENOUS at 10:22

## 2024-08-06 RX ADMIN — DEXAMETHASONE SODIUM PHOSPHATE 10 MG: 10 INJECTION INTRAMUSCULAR; INTRAVENOUS at 09:52

## 2024-08-06 ASSESSMENT — PAIN DESCRIPTION - LOCATION
LOCATION: THROAT

## 2024-08-06 ASSESSMENT — PAIN DESCRIPTION - DESCRIPTORS
DESCRIPTORS: ACHING;SORE
DESCRIPTORS: ACHING
DESCRIPTORS: ACHING

## 2024-08-06 ASSESSMENT — PAIN SCALES - GENERAL
PAINLEVEL_OUTOF10: 3
PAINLEVEL_OUTOF10: 4
PAINLEVEL_OUTOF10: 3
PAINLEVEL_OUTOF10: 3
PAINLEVEL_OUTOF10: 5
PAINLEVEL_OUTOF10: 3

## 2024-08-06 ASSESSMENT — PAIN DESCRIPTION - PAIN TYPE
TYPE: SURGICAL PAIN;ACUTE PAIN
TYPE: SURGICAL PAIN
TYPE: SURGICAL PAIN

## 2024-08-06 ASSESSMENT — PAIN - FUNCTIONAL ASSESSMENT: PAIN_FUNCTIONAL_ASSESSMENT: 0-10

## 2024-08-06 NOTE — ANESTHESIA PRE PROCEDURE
Date of last solid food consumption: 08/05/24    BMI:   Wt Readings from Last 3 Encounters:   08/06/24 78.5 kg (173 lb)   07/31/24 78.5 kg (173 lb)   05/29/24 78 kg (172 lb)     Body mass index is 29.46 kg/m².    CBC:   Lab Results   Component Value Date/Time    WBC 6.8 07/31/2024 11:06 AM    RBC 3.69 07/31/2024 11:06 AM    HGB 11.4 07/31/2024 11:06 AM    HCT 35.5 07/31/2024 11:06 AM    MCV 96.2 07/31/2024 11:06 AM    RDW 12.8 07/31/2024 11:06 AM     07/31/2024 11:06 AM       CMP:   Lab Results   Component Value Date/Time     07/31/2024 11:04 AM    K 4.3 07/31/2024 11:04 AM    K 4.6 05/18/2023 06:23 AM     07/31/2024 11:04 AM    CO2 25 07/31/2024 11:04 AM    BUN 22 07/31/2024 11:04 AM    CREATININE 0.94 07/31/2024 11:04 AM    GFRAA >60.0 05/13/2022 10:28 AM    LABGLOM 63.0 07/31/2024 11:04 AM    LABGLOM >60.0 03/16/2024 04:26 AM    LABGLOM 61 02/10/2023 12:00 AM    GLUCOSE 77 07/31/2024 11:04 AM    CALCIUM 8.9 07/31/2024 11:04 AM    BILITOT 0.4 02/01/2023 04:55 PM    ALKPHOS 77 02/01/2023 04:55 PM    AST 24 02/01/2023 04:55 PM    ALT 17 02/01/2023 04:55 PM       POC Tests: No results for input(s): \"POCGLU\", \"POCNA\", \"POCK\", \"POCCL\", \"POCBUN\", \"POCHEMO\", \"POCHCT\" in the last 72 hours.    Coags:   Lab Results   Component Value Date/Time    PROTIME 12.6 04/14/2023 01:13 PM    INR 0.9 04/14/2023 01:13 PM    APTT 32.2 09/05/2021 07:38 AM       HCG (If Applicable): No results found for: \"PREGTESTUR\", \"PREGSERUM\", \"HCG\", \"HCGQUANT\"     ABGs: No results found for: \"PHART\", \"PO2ART\", \"CJC9JPA\", \"KIN0OOG\", \"BEART\", \"G7NATJKA\"     Type & Screen (If Applicable):  No results found for: \"LABABO\"    Drug/Infectious Status (If Applicable):  No results found for: \"HIV\", \"HEPCAB\"    COVID-19 Screening (If Applicable):   Lab Results   Component Value Date/Time    COVID19 DETECTED 09/03/2021 03:30 PM    COVID19 DETECTED 09/01/2021 07:25 PM           Anesthesia Evaluation     history of anesthetic complications:

## 2024-08-06 NOTE — OP NOTE
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    OPERATION:  Left transnasal endoscopic maxillary antrostomy with removal of tissue and irrigation with peroxide.    ANESTHESIA:  General.    INDICATIONS:  A 73-year-old female with significant history of having a fungal ball, left side, status post previous surgery on the left side many years ago for similar.  Now taken to surgery for revision intervention.    DESCRIPTION OF PROCEDURE:  The patient was taken to the operating room and administered general anesthesia, with appropriate prep, drape, and time-out performed.  Nasal cavity was sprayed preoperatively as well as intraoperatively with Afrin.  The middle turbinate was identified.  It was infiltrated with lidocaine 1%-epinephrine 1:100,000.  The same solution was instilled into the lateral nasal wall.  The patient had the middle turbinate gently medialized and Afrin pledget placed for 5 minutes.  Pledget removed and then uncinate process further taken down with creation of a much wider antrostomy with evacuation of fungal ball and purulence.  The patient had extensive irrigation then performed with the curved suction with peroxide.  We did roughly 250 mL of peroxide irrigation into that sinus.  The patient had that sinus copiously irrigated initially with peroxide, then followed by saline.  Upon completion of same, it looked dramatically better.  Fibrillar was placed at the level of middle turbinate to keep it medialized.  The patient had Afrin pledget

## 2024-08-06 NOTE — BRIEF OP NOTE
Brief Postoperative Note      Patient: Gloria Abbott  YOB: 1950  MRN: [id number]    Date of Procedure: 8/6/2024    Pre-Op Diagnosis Codes:     * Chronic maxillary sinusitis [J32.0]    Post-Op Diagnosis: Same       Procedure(s):  ENDOSCOPIC MAXILLARY ANTROSTOMIES 30 MIN    Surgeon(s):  Ryan Zepeda MD    Assistant:  * No surgical staff found *    Anesthesia: General    Estimated Blood Loss (mL): Minimal    Complications: None    Specimens:   ID Type Source Tests Collected by Time Destination   1 : sinonasal contents Tissue Nose CULTURE, SURGICAL Ryan Zepeda MD 8/6/2024 1013    A : sinonasal contents Tissue Nose SURGICAL PATHOLOGY Ryan Zepeda MD 8/6/2024 1001        Implants:  * No implants in log *      Drains: * No LDAs found *    Findings:  Infection Present At Time Of Surgery (PATOS) (choose all levels that have infection present):  - Superficial Infection (skin/subcutaneous) present as evidenced by purulent fluid  Other Findings:     Electronically signed by Ryan Zepeda MD on 8/6/2024 at 10:28 AM

## 2024-08-06 NOTE — ANESTHESIA POSTPROCEDURE EVALUATION
Department of Anesthesiology  Postprocedure Note    Patient: Gloria Abbott  MRN: [id number]  YOB: 1950  Date of evaluation: 8/6/2024    Procedure Summary       Date: 08/06/24 Room / Location: [address]    Anesthesia Start: 0942 Anesthesia Stop: 1034    Procedure: ENDOSCOPIC MAXILLARY ANTROSTOMIES 30 MIN (Bilateral) Diagnosis:       Chronic maxillary sinusitis      (Chronic maxillary sinusitis [J32.0])    Surgeons: Ryan Zepeda MD Responsible Provider: Tabitha Sharif MD    Anesthesia Type: general ASA Status: 3            Anesthesia Type: No value filed.    Bc Phase I: Bc Score: 10    Bc Phase II:      Anesthesia Post Evaluation    Patient location during evaluation: bedside  Patient participation: complete - patient participated  Level of consciousness: awake  Airway patency: patent  Nausea & Vomiting: no nausea and no vomiting  Cardiovascular status: hemodynamically stable  Respiratory status: acceptable  Hydration status: euvolemic  Pain management: adequate        No notable events documented.

## 2024-08-06 NOTE — FLOWSHEET NOTE
Spoke to Dr Sharif when pts blood pressure was in the 190's/ Labetolol 10 mg ordered. ,But pts bp dropped to the 160's. I did not treat at that time. Bp started to elevate again to 188. Labetolol 10 mg iv given @ 1135. RH

## 2024-08-06 NOTE — PROGRESS NOTES
1155 - spoke with Dr. Zepeda - stated to remove cottonoid prior to discharge.     1200 - Dr. Zepeda at bedside talking with patient. Stated not to blow nose and use salt water spray.     1205 - Spoke with Dr. Sharif regarding patient's BP - stated would place order for Hydralazine     1220 - Discharge instructions provided to patient and friend. Administration time of Oxycodone written on discharge paperwork. No further questions or concerns at this time. Supplies given to patient for nasal drip pad if needed - education provided - no further questions or concerns at this time.      1245 - Spoke with Dr. Sharif regarding patient's BP - 144/65 - stated okay for patient to discharge at this time.     1250 - Patient getting dressed with x1 assist.     1300 - Patient discharged via wheelchair to car with friend.

## 2024-08-06 NOTE — DISCHARGE INSTRUCTIONS
Please call Dr. Zepeda's office with any questions or concerns. Refrain from blowing nose. Use salt water spray.

## 2024-08-08 DIAGNOSIS — N39.41 URGE INCONTINENCE OF URINE: ICD-10-CM

## 2024-08-08 DIAGNOSIS — E03.9 HYPOTHYROIDISM, UNSPECIFIED TYPE: ICD-10-CM

## 2024-08-08 RX ORDER — OXYBUTYNIN CHLORIDE 10 MG/1
10 TABLET, EXTENDED RELEASE ORAL DAILY
Qty: 90 TABLET | Refills: 0 | Status: SHIPPED | OUTPATIENT
Start: 2024-08-08

## 2024-08-08 RX ORDER — LEVOTHYROXINE SODIUM 0.05 MG/1
50 TABLET ORAL DAILY
Qty: 90 TABLET | Refills: 0 | Status: SHIPPED | OUTPATIENT
Start: 2024-08-08

## 2024-08-08 NOTE — TELEPHONE ENCOUNTER
Comments:     Last Office Visit (last PCP visit):   3/26/2024    Next Visit Date:  No future appointments.    **If hasn't been seen in over a year OR hasn't followed up according to last diabetes/ADHD visit, make appointment for patient before sending refill to provider.    Rx requested:  Requested Prescriptions     Pending Prescriptions Disp Refills    oxyBUTYnin (DITROPAN-XL) 10 MG extended release tablet [Pharmacy Med Name: oxybutynin chloride ER 10 mg tablet,extended release 24 hr] 90 tablet 1     Sig: Take 1 tablet by mouth daily

## 2024-08-09 DIAGNOSIS — J32.9 CHRONIC SINUSITIS, UNSPECIFIED LOCATION: Primary | ICD-10-CM

## 2024-08-09 RX ORDER — PREDNISONE 20 MG/1
TABLET ORAL
Qty: 8 TABLET | Refills: 0 | Status: SHIPPED | OUTPATIENT
Start: 2024-08-09

## 2024-08-09 RX ORDER — DOXYCYCLINE 100 MG/1
100 TABLET ORAL 2 TIMES DAILY
Qty: 20 TABLET | Refills: 0 | Status: SHIPPED | OUTPATIENT
Start: 2024-08-09 | End: 2024-08-19

## 2024-08-11 LAB
CULTURE SURGICAL: ABNORMAL
ORGANISM: ABNORMAL
ORGANISM: ABNORMAL

## 2024-08-19 ENCOUNTER — APPOINTMENT (OUTPATIENT)
Dept: OTOLARYNGOLOGY | Facility: CLINIC | Age: 74
End: 2024-08-19
Payer: COMMERCIAL

## 2024-08-19 DIAGNOSIS — J32.0 CHRONIC LEFT MAXILLARY SINUSITIS: Primary | ICD-10-CM

## 2024-08-19 PROCEDURE — 31237 NSL/SINS NDSC SURG BX POLYPC: CPT | Performed by: OTOLARYNGOLOGY

## 2024-08-19 NOTE — PROGRESS NOTES
The patient returns.  We are seeing her back today following uneventful revision left-sided endoscopic max antrostomy with fungal ball debris identified at the time.  Copious irrigation with peroxide was performed.  Now here for first postop visit.  Still having some drainage.  All remaining inquiry is otherwise clear.  Emotional time for her as it is the anniversary of her 's passing and their actual wedding anniversary.  There have been no significant changes in past medical or past surgical histories except as mentioned.    Procedure:  After topical anesthesia patient underwent dedicated endoscopic debridement left side.  I did remove residual fibrillar and do note that there is still some crust formation in the sinus but this should improve in time.  The areas debrided as completely and safely as possible and as tolerated.    Assessment and plan:  Status post revision of the left-sided endoscopic maxillary sinus surgery.  Continue with irrigation.  Recheck 1 week, sooner with issue.  All questions were answered in this regard accordingly.

## 2024-08-26 ENCOUNTER — APPOINTMENT (OUTPATIENT)
Dept: OTOLARYNGOLOGY | Facility: CLINIC | Age: 74
End: 2024-08-26
Payer: COMMERCIAL

## 2024-08-26 DIAGNOSIS — J32.9 CHRONIC SINUSITIS, UNSPECIFIED LOCATION: Primary | ICD-10-CM

## 2024-08-26 PROCEDURE — 31231 NASAL ENDOSCOPY DX: CPT | Performed by: OTOLARYNGOLOGY

## 2024-08-26 PROCEDURE — 1160F RVW MEDS BY RX/DR IN RCRD: CPT | Performed by: OTOLARYNGOLOGY

## 2024-08-26 PROCEDURE — 1159F MED LIST DOCD IN RCRD: CPT | Performed by: OTOLARYNGOLOGY

## 2024-08-26 RX ORDER — PREDNISONE 20 MG/1
TABLET ORAL
Qty: 8 TABLET | Refills: 0 | Status: SHIPPED | OUTPATIENT
Start: 2024-08-26

## 2024-08-26 RX ORDER — DOXYCYCLINE HYCLATE 100 MG
100 TABLET ORAL 2 TIMES DAILY
Qty: 28 TABLET | Refills: 0 | Status: SHIPPED | OUTPATIENT
Start: 2024-08-26 | End: 2024-09-09

## 2024-08-26 NOTE — PROGRESS NOTES
The patient returns.  We are seeing her back today follow-up check on left sinuses status post uneventful left-sided endoscopic revision maxillary t antrostomy for fungal ball debris etc.  Slowly doing better but still not perfect.  Still having evident drainage issues some cough and some pressure etc.  There have been no significant changes in past medical or past surgical histories except as mentioned.    Exam:  After topical anesthesia, patient underwent dedicated nasal endoscopy left side.  This shows everything to be healing appropriately.  Prominent crust formation removed uneventfully.    Assessment and plan:  Status post revision endoscopic sinus surgery left side.  Fungal ball noted.  Still showing signs and symptoms of infection and as such we will cover with course of antibiotics and prednisone short term taper.  Recheck with me in 2 weeks.

## 2024-09-04 DIAGNOSIS — F33.1 MAJOR DEPRESSIVE DISORDER, RECURRENT EPISODE, MODERATE (HCC): ICD-10-CM

## 2024-09-04 RX ORDER — CITALOPRAM HYDROBROMIDE 20 MG/1
20 TABLET ORAL DAILY
Qty: 90 TABLET | Refills: 1 | Status: CANCELLED | OUTPATIENT
Start: 2024-09-04

## 2024-09-04 RX ORDER — CITALOPRAM HYDROBROMIDE 10 MG/1
TABLET ORAL
Qty: 90 TABLET | Refills: 1 | Status: CANCELLED | OUTPATIENT
Start: 2024-09-04

## 2024-09-06 ENCOUNTER — OFFICE VISIT (OUTPATIENT)
Dept: INTERNAL MEDICINE | Age: 74
End: 2024-09-06
Payer: COMMERCIAL

## 2024-09-06 VITALS
BODY MASS INDEX: 28.85 KG/M2 | OXYGEN SATURATION: 97 % | SYSTOLIC BLOOD PRESSURE: 114 MMHG | HEART RATE: 76 BPM | TEMPERATURE: 97.3 F | WEIGHT: 169 LBS | DIASTOLIC BLOOD PRESSURE: 64 MMHG | HEIGHT: 64 IN

## 2024-09-06 DIAGNOSIS — Z00.00 MEDICARE ANNUAL WELLNESS VISIT, SUBSEQUENT: Primary | ICD-10-CM

## 2024-09-06 DIAGNOSIS — F33.1 MAJOR DEPRESSIVE DISORDER, RECURRENT EPISODE, MODERATE (HCC): ICD-10-CM

## 2024-09-06 DIAGNOSIS — E78.2 MIXED HYPERLIPIDEMIA: ICD-10-CM

## 2024-09-06 DIAGNOSIS — R14.3 EXCESSIVE GAS: ICD-10-CM

## 2024-09-06 DIAGNOSIS — E03.9 HYPOTHYROIDISM, UNSPECIFIED TYPE: ICD-10-CM

## 2024-09-06 DIAGNOSIS — Z12.31 ENCOUNTER FOR SCREENING MAMMOGRAM FOR MALIGNANT NEOPLASM OF BREAST: ICD-10-CM

## 2024-09-06 DIAGNOSIS — N39.41 URGE INCONTINENCE OF URINE: ICD-10-CM

## 2024-09-06 PROCEDURE — 99214 OFFICE O/P EST MOD 30 MIN: CPT | Performed by: PHYSICIAN ASSISTANT

## 2024-09-06 PROCEDURE — 1123F ACP DISCUSS/DSCN MKR DOCD: CPT | Performed by: PHYSICIAN ASSISTANT

## 2024-09-06 PROCEDURE — G0439 PPPS, SUBSEQ VISIT: HCPCS | Performed by: PHYSICIAN ASSISTANT

## 2024-09-06 RX ORDER — LEVOTHYROXINE SODIUM 50 UG/1
50 TABLET ORAL DAILY
Qty: 90 TABLET | Refills: 0 | Status: SHIPPED | OUTPATIENT
Start: 2024-09-06

## 2024-09-06 RX ORDER — GUAIFENESIN 400 MG/1
400 TABLET ORAL 4 TIMES DAILY PRN
COMMUNITY

## 2024-09-06 RX ORDER — CITALOPRAM HYDROBROMIDE 10 MG/1
TABLET ORAL
Qty: 90 TABLET | Refills: 1 | Status: SHIPPED | OUTPATIENT
Start: 2024-09-06

## 2024-09-06 RX ORDER — SERTRALINE HYDROCHLORIDE 25 MG/1
25 TABLET, FILM COATED ORAL DAILY
Qty: 90 TABLET | Refills: 1 | Status: SHIPPED | OUTPATIENT
Start: 2024-09-06

## 2024-09-06 RX ORDER — OXYBUTYNIN CHLORIDE 10 MG/1
10 TABLET, EXTENDED RELEASE ORAL DAILY
Qty: 90 TABLET | Refills: 0 | Status: SHIPPED | OUTPATIENT
Start: 2024-09-06

## 2024-09-06 RX ORDER — CITALOPRAM HYDROBROMIDE 20 MG/1
20 TABLET ORAL DAILY
Qty: 90 TABLET | Refills: 1 | Status: SHIPPED | OUTPATIENT
Start: 2024-09-06

## 2024-09-06 RX ORDER — DOXYCYCLINE 100 MG/1
100 CAPSULE ORAL 2 TIMES DAILY
COMMUNITY
Start: 2024-08-26

## 2024-09-06 RX ORDER — PRAVASTATIN SODIUM 20 MG
20 TABLET ORAL DAILY
Qty: 90 TABLET | Refills: 1 | Status: SHIPPED | OUTPATIENT
Start: 2024-09-06

## 2024-09-06 SDOH — ECONOMIC STABILITY: FOOD INSECURITY: WITHIN THE PAST 12 MONTHS, YOU WORRIED THAT YOUR FOOD WOULD RUN OUT BEFORE YOU GOT MONEY TO BUY MORE.: NEVER TRUE

## 2024-09-06 SDOH — ECONOMIC STABILITY: FOOD INSECURITY: WITHIN THE PAST 12 MONTHS, THE FOOD YOU BOUGHT JUST DIDN'T LAST AND YOU DIDN'T HAVE MONEY TO GET MORE.: NEVER TRUE

## 2024-09-06 SDOH — ECONOMIC STABILITY: INCOME INSECURITY: HOW HARD IS IT FOR YOU TO PAY FOR THE VERY BASICS LIKE FOOD, HOUSING, MEDICAL CARE, AND HEATING?: NOT HARD AT ALL

## 2024-09-06 ASSESSMENT — PATIENT HEALTH QUESTIONNAIRE - PHQ9
SUM OF ALL RESPONSES TO PHQ QUESTIONS 1-9: 6
SUM OF ALL RESPONSES TO PHQ QUESTIONS 1-9: 6
1. LITTLE INTEREST OR PLEASURE IN DOING THINGS: SEVERAL DAYS
7. TROUBLE CONCENTRATING ON THINGS, SUCH AS READING THE NEWSPAPER OR WATCHING TELEVISION: NOT AT ALL
4. FEELING TIRED OR HAVING LITTLE ENERGY: MORE THAN HALF THE DAYS
SUM OF ALL RESPONSES TO PHQ9 QUESTIONS 1 & 2: 2
SUM OF ALL RESPONSES TO PHQ QUESTIONS 1-9: 6
9. THOUGHTS THAT YOU WOULD BE BETTER OFF DEAD, OR OF HURTING YOURSELF: NOT AT ALL
3. TROUBLE FALLING OR STAYING ASLEEP: SEVERAL DAYS
SUM OF ALL RESPONSES TO PHQ QUESTIONS 1-9: 6
2. FEELING DOWN, DEPRESSED OR HOPELESS: SEVERAL DAYS
6. FEELING BAD ABOUT YOURSELF - OR THAT YOU ARE A FAILURE OR HAVE LET YOURSELF OR YOUR FAMILY DOWN: SEVERAL DAYS
5. POOR APPETITE OR OVEREATING: NOT AT ALL
8. MOVING OR SPEAKING SO SLOWLY THAT OTHER PEOPLE COULD HAVE NOTICED. OR THE OPPOSITE, BEING SO FIGETY OR RESTLESS THAT YOU HAVE BEEN MOVING AROUND A LOT MORE THAN USUAL: NOT AT ALL
10. IF YOU CHECKED OFF ANY PROBLEMS, HOW DIFFICULT HAVE THESE PROBLEMS MADE IT FOR YOU TO DO YOUR WORK, TAKE CARE OF THINGS AT HOME, OR GET ALONG WITH OTHER PEOPLE: SOMEWHAT DIFFICULT

## 2024-09-06 ASSESSMENT — LIFESTYLE VARIABLES
HOW MANY STANDARD DRINKS CONTAINING ALCOHOL DO YOU HAVE ON A TYPICAL DAY: PATIENT DOES NOT DRINK
HOW OFTEN DO YOU HAVE A DRINK CONTAINING ALCOHOL: NEVER

## 2024-09-09 ENCOUNTER — APPOINTMENT (OUTPATIENT)
Dept: OTOLARYNGOLOGY | Facility: CLINIC | Age: 74
End: 2024-09-09
Payer: COMMERCIAL

## 2024-09-09 DIAGNOSIS — J32.0 CHRONIC LEFT MAXILLARY SINUSITIS: Primary | ICD-10-CM

## 2024-09-09 DIAGNOSIS — E03.9 HYPOTHYROIDISM, UNSPECIFIED TYPE: ICD-10-CM

## 2024-09-09 DIAGNOSIS — E78.2 MIXED HYPERLIPIDEMIA: ICD-10-CM

## 2024-09-09 DIAGNOSIS — Z00.00 MEDICARE ANNUAL WELLNESS VISIT, SUBSEQUENT: ICD-10-CM

## 2024-09-09 LAB
ALBUMIN SERPL-MCNC: 4.1 G/DL (ref 3.5–4.6)
ALP SERPL-CCNC: 96 U/L (ref 40–130)
ALT SERPL-CCNC: 27 U/L (ref 0–33)
ANION GAP SERPL CALCULATED.3IONS-SCNC: 11 MEQ/L (ref 9–15)
AST SERPL-CCNC: 27 U/L (ref 0–35)
BASOPHILS # BLD: 0.1 K/UL (ref 0–0.2)
BASOPHILS NFR BLD: 0.7 %
BILIRUB SERPL-MCNC: <0.2 MG/DL (ref 0.2–0.7)
BUN SERPL-MCNC: 25 MG/DL (ref 8–23)
CALCIUM SERPL-MCNC: 9.1 MG/DL (ref 8.5–9.9)
CHLORIDE SERPL-SCNC: 108 MEQ/L (ref 95–107)
CHOLEST SERPL-MCNC: 228 MG/DL (ref 0–199)
CO2 SERPL-SCNC: 25 MEQ/L (ref 20–31)
CREAT SERPL-MCNC: 0.99 MG/DL (ref 0.5–0.9)
EOSINOPHIL # BLD: 0.2 K/UL (ref 0–0.7)
EOSINOPHIL NFR BLD: 2.3 %
ERYTHROCYTE [DISTWIDTH] IN BLOOD BY AUTOMATED COUNT: 14 % (ref 11.5–14.5)
GLOBULIN SER CALC-MCNC: 2.3 G/DL (ref 2.3–3.5)
GLUCOSE SERPL-MCNC: 85 MG/DL (ref 70–99)
HCT VFR BLD AUTO: 37.3 % (ref 37–47)
HDLC SERPL-MCNC: 64 MG/DL (ref 40–59)
HGB BLD-MCNC: 11.6 G/DL (ref 12–16)
LDLC SERPL CALC-MCNC: 133 MG/DL (ref 0–129)
LYMPHOCYTES # BLD: 1.4 K/UL (ref 1–4.8)
LYMPHOCYTES NFR BLD: 19.7 %
MCH RBC QN AUTO: 30.7 PG (ref 27–31.3)
MCHC RBC AUTO-ENTMCNC: 31.1 % (ref 33–37)
MCV RBC AUTO: 98.7 FL (ref 79.4–94.8)
MONOCYTES # BLD: 0.5 K/UL (ref 0.2–0.8)
MONOCYTES NFR BLD: 7.6 %
NEUTROPHILS # BLD: 4.7 K/UL (ref 1.4–6.5)
NEUTS SEG NFR BLD: 69 %
PLATELET # BLD AUTO: 298 K/UL (ref 130–400)
POTASSIUM SERPL-SCNC: 4 MEQ/L (ref 3.4–4.9)
PROT SERPL-MCNC: 6.4 G/DL (ref 6.3–8)
RBC # BLD AUTO: 3.78 M/UL (ref 4.2–5.4)
SODIUM SERPL-SCNC: 144 MEQ/L (ref 135–144)
T4 FREE SERPL-MCNC: 1 NG/DL (ref 0.84–1.68)
TRIGL SERPL-MCNC: 154 MG/DL (ref 0–150)
TSH SERPL-MCNC: 1.11 UIU/ML (ref 0.44–3.86)
WBC # BLD AUTO: 6.9 K/UL (ref 4.8–10.8)

## 2024-09-09 PROCEDURE — 31231 NASAL ENDOSCOPY DX: CPT | Performed by: OTOLARYNGOLOGY

## 2024-09-09 NOTE — PROGRESS NOTES
Patient returns.  Seeing her back today follow-up check status post uneventful left-sided endoscopic sinus surgery for fungal ball.  Unfortunately still having a lot of drainage.  Denies any jeffrey pain.  All remaining ENT inquiry is clear.    Procedure:  After topical anesthesia, patient underwent dedicated nasal endoscopy with the flexible scope on the left-hand side revealing distinct closure of the previously created antrostomy site down to roughly 15% of what it was prior.  There is clearly evidence of mucopurulent drainage in the sinus.    Assessment and plan:  Status post left maxillary antrostomy now with clear evidence of reduction in the size of the antrostomy purulent debris within the sinus.  We will take her back to surgery for revision procedure.  I just do not think this is any heal without getting in there and reestablishing better aeration and ventilation and subsequent irrigation etc.  Detailed discussion with her regarding this procedure and she already understands risk and benefits as previously relayed.  We will see her in the near future.

## 2024-09-16 ENCOUNTER — ANESTHESIA EVENT (OUTPATIENT)
Dept: OPERATING ROOM | Age: 74
End: 2024-09-16
Payer: COMMERCIAL

## 2024-09-17 ENCOUNTER — HOSPITAL ENCOUNTER (OUTPATIENT)
Age: 74
Setting detail: OUTPATIENT SURGERY
Discharge: HOME | End: 2024-09-17
Attending: OTOLARYNGOLOGY | Admitting: OTOLARYNGOLOGY
Payer: COMMERCIAL

## 2024-09-17 ENCOUNTER — ANESTHESIA (OUTPATIENT)
Dept: OPERATING ROOM | Age: 74
End: 2024-09-17
Payer: COMMERCIAL

## 2024-09-17 VITALS
OXYGEN SATURATION: 97 % | DIASTOLIC BLOOD PRESSURE: 68 MMHG | SYSTOLIC BLOOD PRESSURE: 152 MMHG | WEIGHT: 173 LBS | TEMPERATURE: 97.9 F | HEART RATE: 68 BPM | RESPIRATION RATE: 16 BRPM | BODY MASS INDEX: 29.53 KG/M2 | HEIGHT: 64 IN

## 2024-09-17 DIAGNOSIS — J32.0 CHRONIC MAXILLARY SINUSITIS: ICD-10-CM

## 2024-09-17 PROCEDURE — 3600000004 HC SURGERY LEVEL 4 BASE: Performed by: OTOLARYNGOLOGY

## 2024-09-17 PROCEDURE — 7100000010 HC PHASE II RECOVERY - FIRST 15 MIN: Performed by: OTOLARYNGOLOGY

## 2024-09-17 PROCEDURE — 88305 TISSUE EXAM BY PATHOLOGIST: CPT

## 2024-09-17 PROCEDURE — 87185 SC STD ENZYME DETCJ PER NZM: CPT

## 2024-09-17 PROCEDURE — 3600000014 HC SURGERY LEVEL 4 ADDTL 15MIN: Performed by: OTOLARYNGOLOGY

## 2024-09-17 PROCEDURE — 87077 CULTURE AEROBIC IDENTIFY: CPT

## 2024-09-17 PROCEDURE — 2500000003 HC RX 250 WO HCPCS: Performed by: OTOLARYNGOLOGY

## 2024-09-17 PROCEDURE — 87176 TISSUE HOMOGENIZATION CULTR: CPT

## 2024-09-17 PROCEDURE — A4217 STERILE WATER/SALINE, 500 ML: HCPCS | Performed by: OTOLARYNGOLOGY

## 2024-09-17 PROCEDURE — 6360000002 HC RX W HCPCS: Performed by: STUDENT IN AN ORGANIZED HEALTH CARE EDUCATION/TRAINING PROGRAM

## 2024-09-17 PROCEDURE — 2720000010 HC SURG SUPPLY STERILE: Performed by: OTOLARYNGOLOGY

## 2024-09-17 PROCEDURE — 2709999900 HC NON-CHARGEABLE SUPPLY: Performed by: OTOLARYNGOLOGY

## 2024-09-17 PROCEDURE — 88312 SPECIAL STAINS GROUP 1: CPT

## 2024-09-17 PROCEDURE — 7100000000 HC PACU RECOVERY - FIRST 15 MIN: Performed by: OTOLARYNGOLOGY

## 2024-09-17 PROCEDURE — 2500000003 HC RX 250 WO HCPCS: Performed by: NURSE ANESTHETIST, CERTIFIED REGISTERED

## 2024-09-17 PROCEDURE — 6360000002 HC RX W HCPCS: Performed by: NURSE ANESTHETIST, CERTIFIED REGISTERED

## 2024-09-17 PROCEDURE — 87186 SC STD MICRODIL/AGAR DIL: CPT

## 2024-09-17 PROCEDURE — 3700000000 HC ANESTHESIA ATTENDED CARE: Performed by: OTOLARYNGOLOGY

## 2024-09-17 PROCEDURE — 7100000011 HC PHASE II RECOVERY - ADDTL 15 MIN: Performed by: OTOLARYNGOLOGY

## 2024-09-17 PROCEDURE — 87076 CULTURE ANAEROBE IDENT EACH: CPT

## 2024-09-17 PROCEDURE — 87070 CULTURE OTHR SPECIMN AEROBIC: CPT

## 2024-09-17 PROCEDURE — 2580000003 HC RX 258: Performed by: STUDENT IN AN ORGANIZED HEALTH CARE EDUCATION/TRAINING PROGRAM

## 2024-09-17 PROCEDURE — 6370000000 HC RX 637 (ALT 250 FOR IP): Performed by: STUDENT IN AN ORGANIZED HEALTH CARE EDUCATION/TRAINING PROGRAM

## 2024-09-17 PROCEDURE — 6370000000 HC RX 637 (ALT 250 FOR IP)

## 2024-09-17 PROCEDURE — 7100000001 HC PACU RECOVERY - ADDTL 15 MIN: Performed by: OTOLARYNGOLOGY

## 2024-09-17 PROCEDURE — 87075 CULTR BACTERIA EXCEPT BLOOD: CPT

## 2024-09-17 PROCEDURE — 2580000003 HC RX 258: Performed by: OTOLARYNGOLOGY

## 2024-09-17 PROCEDURE — 3700000001 HC ADD 15 MINUTES (ANESTHESIA): Performed by: OTOLARYNGOLOGY

## 2024-09-17 PROCEDURE — 6370000000 HC RX 637 (ALT 250 FOR IP): Performed by: OTOLARYNGOLOGY

## 2024-09-17 RX ORDER — LIDOCAINE HYDROCHLORIDE AND EPINEPHRINE 10; 10 MG/ML; UG/ML
INJECTION, SOLUTION INFILTRATION; PERINEURAL PRN
Status: DISCONTINUED | OUTPATIENT
Start: 2024-09-17 | End: 2024-09-17 | Stop reason: ALTCHOICE

## 2024-09-17 RX ORDER — DIPHENHYDRAMINE HYDROCHLORIDE 50 MG/ML
12.5 INJECTION INTRAMUSCULAR; INTRAVENOUS
Status: DISCONTINUED | OUTPATIENT
Start: 2024-09-17 | End: 2024-09-17 | Stop reason: HOSPADM

## 2024-09-17 RX ORDER — SODIUM CHLORIDE 9 MG/ML
INJECTION, SOLUTION INTRAVENOUS PRN
Status: DISCONTINUED | OUTPATIENT
Start: 2024-09-17 | End: 2024-09-17 | Stop reason: HOSPADM

## 2024-09-17 RX ORDER — SODIUM CHLORIDE 0.9 % (FLUSH) 0.9 %
5-40 SYRINGE (ML) INJECTION EVERY 12 HOURS SCHEDULED
Status: DISCONTINUED | OUTPATIENT
Start: 2024-09-17 | End: 2024-09-17 | Stop reason: HOSPADM

## 2024-09-17 RX ORDER — SODIUM CHLORIDE 9 MG/ML
INJECTION, SOLUTION INTRAVENOUS CONTINUOUS PRN
Status: DISCONTINUED | OUTPATIENT
Start: 2024-09-17 | End: 2024-09-17 | Stop reason: HOSPADM

## 2024-09-17 RX ORDER — SODIUM CHLORIDE, SODIUM LACTATE, POTASSIUM CHLORIDE, CALCIUM CHLORIDE 600; 310; 30; 20 MG/100ML; MG/100ML; MG/100ML; MG/100ML
INJECTION, SOLUTION INTRAVENOUS CONTINUOUS
Status: DISCONTINUED | OUTPATIENT
Start: 2024-09-17 | End: 2024-09-17 | Stop reason: HOSPADM

## 2024-09-17 RX ORDER — GINSENG 100 MG
CAPSULE ORAL PRN
Status: DISCONTINUED | OUTPATIENT
Start: 2024-09-17 | End: 2024-09-17 | Stop reason: HOSPADM

## 2024-09-17 RX ORDER — SODIUM CHLORIDE 0.9 % (FLUSH) 0.9 %
5-40 SYRINGE (ML) INJECTION PRN
Status: DISCONTINUED | OUTPATIENT
Start: 2024-09-17 | End: 2024-09-17 | Stop reason: HOSPADM

## 2024-09-17 RX ORDER — NALOXONE HYDROCHLORIDE 0.4 MG/ML
INJECTION, SOLUTION INTRAMUSCULAR; INTRAVENOUS; SUBCUTANEOUS PRN
Status: DISCONTINUED | OUTPATIENT
Start: 2024-09-17 | End: 2024-09-17 | Stop reason: HOSPADM

## 2024-09-17 RX ORDER — DEXAMETHASONE SODIUM PHOSPHATE 10 MG/ML
INJECTION INTRAMUSCULAR; INTRAVENOUS
Status: DISCONTINUED | OUTPATIENT
Start: 2024-09-17 | End: 2024-09-17 | Stop reason: SDUPTHER

## 2024-09-17 RX ORDER — ONDANSETRON 2 MG/ML
4 INJECTION INTRAMUSCULAR; INTRAVENOUS
Status: DISCONTINUED | OUTPATIENT
Start: 2024-09-17 | End: 2024-09-17 | Stop reason: HOSPADM

## 2024-09-17 RX ORDER — ESMOLOL HYDROCHLORIDE 10 MG/ML
INJECTION INTRAVENOUS
Status: DISCONTINUED | OUTPATIENT
Start: 2024-09-17 | End: 2024-09-17 | Stop reason: SDUPTHER

## 2024-09-17 RX ORDER — LIDOCAINE HYDROCHLORIDE 10 MG/ML
INJECTION, SOLUTION EPIDURAL; INFILTRATION; INTRACAUDAL; PERINEURAL
Status: DISCONTINUED | OUTPATIENT
Start: 2024-09-17 | End: 2024-09-17 | Stop reason: SDUPTHER

## 2024-09-17 RX ORDER — OXYMETAZOLINE HYDROCHLORIDE 0.05 G/100ML
SPRAY NASAL PRN
Status: DISCONTINUED | OUTPATIENT
Start: 2024-09-17 | End: 2024-09-17 | Stop reason: HOSPADM

## 2024-09-17 RX ORDER — MAGNESIUM HYDROXIDE 1200 MG/15ML
LIQUID ORAL CONTINUOUS PRN
Status: DISCONTINUED | OUTPATIENT
Start: 2024-09-17 | End: 2024-09-17 | Stop reason: HOSPADM

## 2024-09-17 RX ORDER — OXYCODONE HYDROCHLORIDE 5 MG/1
5 TABLET ORAL PRN
Status: COMPLETED | OUTPATIENT
Start: 2024-09-17 | End: 2024-09-17

## 2024-09-17 RX ORDER — MIDAZOLAM HYDROCHLORIDE 1 MG/ML
INJECTION INTRAMUSCULAR; INTRAVENOUS
Status: DISCONTINUED | OUTPATIENT
Start: 2024-09-17 | End: 2024-09-17 | Stop reason: SDUPTHER

## 2024-09-17 RX ORDER — FENTANYL CITRATE 0.05 MG/ML
50 INJECTION, SOLUTION INTRAMUSCULAR; INTRAVENOUS EVERY 5 MIN PRN
Status: DISCONTINUED | OUTPATIENT
Start: 2024-09-17 | End: 2024-09-17 | Stop reason: HOSPADM

## 2024-09-17 RX ORDER — PROPOFOL 10 MG/ML
INJECTION, EMULSION INTRAVENOUS
Status: DISCONTINUED | OUTPATIENT
Start: 2024-09-17 | End: 2024-09-17 | Stop reason: SDUPTHER

## 2024-09-17 RX ORDER — ROCURONIUM BROMIDE 10 MG/ML
INJECTION, SOLUTION INTRAVENOUS
Status: DISCONTINUED | OUTPATIENT
Start: 2024-09-17 | End: 2024-09-17 | Stop reason: SDUPTHER

## 2024-09-17 RX ORDER — OXYCODONE HYDROCHLORIDE 5 MG/1
5 TABLET ORAL ONCE
Status: COMPLETED | OUTPATIENT
Start: 2024-09-17 | End: 2024-09-17

## 2024-09-17 RX ORDER — OXYCODONE HYDROCHLORIDE 5 MG/1
10 TABLET ORAL PRN
Status: COMPLETED | OUTPATIENT
Start: 2024-09-17 | End: 2024-09-17

## 2024-09-17 RX ORDER — SCOLOPAMINE TRANSDERMAL SYSTEM 1 MG/1
1 PATCH, EXTENDED RELEASE TRANSDERMAL
Status: DISCONTINUED | OUTPATIENT
Start: 2024-09-17 | End: 2024-09-17 | Stop reason: HOSPADM

## 2024-09-17 RX ORDER — HYDRALAZINE HYDROCHLORIDE 20 MG/ML
10 INJECTION INTRAMUSCULAR; INTRAVENOUS
Status: DISCONTINUED | OUTPATIENT
Start: 2024-09-17 | End: 2024-09-17 | Stop reason: HOSPADM

## 2024-09-17 RX ORDER — LABETALOL HYDROCHLORIDE 5 MG/ML
10 INJECTION, SOLUTION INTRAVENOUS
Status: DISCONTINUED | OUTPATIENT
Start: 2024-09-17 | End: 2024-09-17 | Stop reason: HOSPADM

## 2024-09-17 RX ORDER — FENTANYL CITRATE 50 UG/ML
INJECTION, SOLUTION INTRAMUSCULAR; INTRAVENOUS
Status: DISCONTINUED | OUTPATIENT
Start: 2024-09-17 | End: 2024-09-17 | Stop reason: SDUPTHER

## 2024-09-17 RX ORDER — FENTANYL CITRATE 0.05 MG/ML
25 INJECTION, SOLUTION INTRAMUSCULAR; INTRAVENOUS EVERY 5 MIN PRN
Status: DISCONTINUED | OUTPATIENT
Start: 2024-09-17 | End: 2024-09-17 | Stop reason: HOSPADM

## 2024-09-17 RX ORDER — ONDANSETRON 2 MG/ML
INJECTION INTRAMUSCULAR; INTRAVENOUS
Status: DISCONTINUED | OUTPATIENT
Start: 2024-09-17 | End: 2024-09-17 | Stop reason: SDUPTHER

## 2024-09-17 RX ORDER — LIDOCAINE HYDROCHLORIDE 10 MG/ML
1 INJECTION, SOLUTION EPIDURAL; INFILTRATION; INTRACAUDAL; PERINEURAL
Status: DISCONTINUED | OUTPATIENT
Start: 2024-09-17 | End: 2024-09-17 | Stop reason: HOSPADM

## 2024-09-17 RX ORDER — PROCHLORPERAZINE EDISYLATE 5 MG/ML
5 INJECTION INTRAMUSCULAR; INTRAVENOUS
Status: DISCONTINUED | OUTPATIENT
Start: 2024-09-17 | End: 2024-09-17 | Stop reason: HOSPADM

## 2024-09-17 RX ADMIN — FENTANYL CITRATE 100 MCG: 50 INJECTION INTRAMUSCULAR; INTRAVENOUS at 09:31

## 2024-09-17 RX ADMIN — FENTANYL CITRATE 25 MCG: 0.05 INJECTION, SOLUTION INTRAMUSCULAR; INTRAVENOUS at 10:42

## 2024-09-17 RX ADMIN — OXYCODONE HYDROCHLORIDE 5 MG: 5 TABLET ORAL at 11:31

## 2024-09-17 RX ADMIN — SUGAMMADEX 200 MG: 100 INJECTION, SOLUTION INTRAVENOUS at 10:18

## 2024-09-17 RX ADMIN — ROCURONIUM BROMIDE 30 MG: 10 INJECTION, SOLUTION INTRAVENOUS at 09:31

## 2024-09-17 RX ADMIN — FENTANYL CITRATE 80 MCG: 50 INJECTION INTRAMUSCULAR; INTRAVENOUS at 09:50

## 2024-09-17 RX ADMIN — ONDANSETRON 4 MG: 2 INJECTION INTRAMUSCULAR; INTRAVENOUS at 09:35

## 2024-09-17 RX ADMIN — LIDOCAINE HYDROCHLORIDE 25 MG: 10 INJECTION, SOLUTION EPIDURAL; INFILTRATION; INTRACAUDAL; PERINEURAL at 09:31

## 2024-09-17 RX ADMIN — FENTANYL CITRATE 25 MCG: 0.05 INJECTION, SOLUTION INTRAMUSCULAR; INTRAVENOUS at 10:50

## 2024-09-17 RX ADMIN — OXYCODONE HYDROCHLORIDE 5 MG: 5 TABLET ORAL at 12:14

## 2024-09-17 RX ADMIN — FENTANYL CITRATE 25 MCG: 0.05 INJECTION, SOLUTION INTRAMUSCULAR; INTRAVENOUS at 11:01

## 2024-09-17 RX ADMIN — DEXAMETHASONE SODIUM PHOSPHATE 4 MG: 10 INJECTION INTRAMUSCULAR; INTRAVENOUS at 09:35

## 2024-09-17 RX ADMIN — SODIUM CHLORIDE, POTASSIUM CHLORIDE, SODIUM LACTATE AND CALCIUM CHLORIDE: 600; 310; 30; 20 INJECTION, SOLUTION INTRAVENOUS at 08:50

## 2024-09-17 RX ADMIN — ESMOLOL HYDROCHLORIDE 30 MG: 10 INJECTION, SOLUTION INTRAVENOUS at 10:04

## 2024-09-17 RX ADMIN — PROPOFOL 130 MG: 10 INJECTION, EMULSION INTRAVENOUS at 09:31

## 2024-09-17 RX ADMIN — MIDAZOLAM HYDROCHLORIDE 2 MG: 1 INJECTION, SOLUTION INTRAMUSCULAR; INTRAVENOUS at 09:25

## 2024-09-17 RX ADMIN — SODIUM CHLORIDE, POTASSIUM CHLORIDE, SODIUM LACTATE AND CALCIUM CHLORIDE: 600; 310; 30; 20 INJECTION, SOLUTION INTRAVENOUS at 09:28

## 2024-09-17 ASSESSMENT — PAIN SCALES - GENERAL
PAINLEVEL_OUTOF10: 5
PAINLEVEL_OUTOF10: 5
PAINLEVEL_OUTOF10: 7
PAINLEVEL_OUTOF10: 3
PAINLEVEL_OUTOF10: 5
PAINLEVEL_OUTOF10: 7
PAINLEVEL_OUTOF10: 5

## 2024-09-17 ASSESSMENT — PAIN DESCRIPTION - DESCRIPTORS
DESCRIPTORS: SORE
DESCRIPTORS: PRESSURE
DESCRIPTORS: SORE

## 2024-09-17 ASSESSMENT — PAIN - FUNCTIONAL ASSESSMENT: PAIN_FUNCTIONAL_ASSESSMENT: ACTIVITIES ARE NOT PREVENTED

## 2024-09-17 ASSESSMENT — PAIN DESCRIPTION - LOCATION
LOCATION: THROAT;NOSE
LOCATION: NOSE
LOCATION: NOSE
LOCATION: FACE

## 2024-09-22 LAB
CULTURE SURGICAL: ABNORMAL
ORGANISM: ABNORMAL

## 2024-10-02 ENCOUNTER — APPOINTMENT (OUTPATIENT)
Dept: OTOLARYNGOLOGY | Facility: CLINIC | Age: 74
End: 2024-10-02
Payer: COMMERCIAL

## 2024-10-09 ENCOUNTER — APPOINTMENT (OUTPATIENT)
Dept: OTOLARYNGOLOGY | Facility: CLINIC | Age: 74
End: 2024-10-09
Payer: COMMERCIAL

## 2024-10-09 DIAGNOSIS — J32.0 CHRONIC LEFT MAXILLARY SINUSITIS: Primary | ICD-10-CM

## 2024-10-09 PROCEDURE — 31231 NASAL ENDOSCOPY DX: CPT | Performed by: OTOLARYNGOLOGY

## 2024-10-09 NOTE — PROGRESS NOTES
The patient returns.  Seeing her back today following revision endoscopic sinus surgery.  She is doing very well.  Slightly some slight congestion in the sinuses and ears but nothing dramatic.  She did get 1 bit of probable fibrillar remaining out of the nasal cavity on the left-hand side.  All remaining head neck inquiry clear.    Procedure: After topical anesthesia patient underwent dedicated nasal endoscopy for the left side revealing excellent antrostomy site.  We see nothing worrisome.  No sign of any residual infection or inflammation.  Patient tolerated well.    Assessment and plan:  Doing very well following revision left-sided endoscopic sinus surgery for fungal ball etc.  Favor observation.  Dramatic improvement noted.  Recheck with me depending how things fare.  All questions were answered in this regard accordingly.

## 2024-10-16 ENCOUNTER — OFFICE VISIT (OUTPATIENT)
Dept: GASTROENTEROLOGY | Age: 74
End: 2024-10-16
Payer: COMMERCIAL

## 2024-10-16 ENCOUNTER — PREP FOR PROCEDURE (OUTPATIENT)
Dept: GASTROENTEROLOGY | Age: 74
End: 2024-10-16

## 2024-10-16 VITALS
BODY MASS INDEX: 22.02 KG/M2 | DIASTOLIC BLOOD PRESSURE: 75 MMHG | OXYGEN SATURATION: 98 % | SYSTOLIC BLOOD PRESSURE: 145 MMHG | HEART RATE: 92 BPM | HEIGHT: 66 IN | WEIGHT: 137 LBS

## 2024-10-16 DIAGNOSIS — R13.19 ESOPHAGEAL DYSPHAGIA: ICD-10-CM

## 2024-10-16 DIAGNOSIS — R19.4 ALTERED BOWEL HABITS: ICD-10-CM

## 2024-10-16 DIAGNOSIS — R19.4 ALTERED BOWEL HABITS: Primary | ICD-10-CM

## 2024-10-16 PROCEDURE — 1123F ACP DISCUSS/DSCN MKR DOCD: CPT | Performed by: SPECIALIST

## 2024-10-16 PROCEDURE — 99203 OFFICE O/P NEW LOW 30 MIN: CPT | Performed by: SPECIALIST

## 2024-10-16 RX ORDER — POLYETHYLENE GLYCOL 3350, SODIUM CHLORIDE, SODIUM BICARBONATE, POTASSIUM CHLORIDE 420; 11.2; 5.72; 1.48 G/4L; G/4L; G/4L; G/4L
4000 POWDER, FOR SOLUTION ORAL ONCE
Qty: 4000 ML | Refills: 0 | Status: SHIPPED | OUTPATIENT
Start: 2024-10-16 | End: 2024-10-16

## 2024-10-16 ASSESSMENT — ENCOUNTER SYMPTOMS
ANAL BLEEDING: 0
EYES NEGATIVE: 1
NAUSEA: 0
VOMITING: 0
RECTAL PAIN: 0
ABDOMINAL PAIN: 0
CONSTIPATION: 1
BACK PAIN: 1
BLOOD IN STOOL: 0
RESPIRATORY NEGATIVE: 1
DIARRHEA: 0
ABDOMINAL DISTENTION: 0

## 2024-10-16 NOTE — PROGRESS NOTES
Gastroenterology Clinic Visit    Gloria Abbott  [id number]  Chief Complaint   Patient presents with    New Patient     Excessive gas.        HPI: 73 y.o. female presents to the clinic with history of bloating and excess flatulence since last few years.  Patient also reports occasional sensation of dysphagia with food getting hung up in the lower chest and has to vomit.  Very occasionally experience retrosternal burning sensation no history of hematemesis or melena or rectal bleed.  It seems patient has been constipated.  Patient has intolerance to dairy products especially ice cream which causes bloating and excess gas.  Weight is stable.  Patient has uterine cancer and had total hysterectomy.  Last colonoscopy was several years ago.  No family history of colorectal cancer.  Social history does not smoke does not drink alcohol.  Had tried over-the-counter gas relief medication with some improvement    Review of Systems   Constitutional: Negative.    HENT: Negative.     Eyes: Negative.    Respiratory: Negative.     Cardiovascular: Negative.    Gastrointestinal:  Positive for constipation. Negative for abdominal distention, abdominal pain, anal bleeding, blood in stool, diarrhea, nausea, rectal pain and vomiting.        Occasional dysphagia and flatulence   Endocrine: Negative.         Hypothyroidism   Genitourinary: Negative.    Musculoskeletal:  Positive for back pain.   Skin: Negative.    Allergic/Immunologic: Negative for food allergies.   Neurological: Negative.         History of hydrocephalus status post shunting   Hematological: Negative.    Psychiatric/Behavioral: Negative.          On antidepressants.        Past Medical History:   Diagnosis Date    Arthritis     Cancer (HCC)     endometrial and uterine    Depression     Hyperlipidemia     Hypertension     Hypothyroid     MDD (major depressive disorder)     NPH (normal pressure hydrocephalus) (HCC)     PONV (postoperative nausea and vomiting)     Nausea

## 2024-10-17 RX ORDER — SODIUM CHLORIDE 0.9 % (FLUSH) 0.9 %
5-40 SYRINGE (ML) INJECTION PRN
Status: CANCELLED | OUTPATIENT
Start: 2024-10-17

## 2024-10-17 RX ORDER — SODIUM CHLORIDE 9 MG/ML
INJECTION, SOLUTION INTRAVENOUS CONTINUOUS
Status: CANCELLED | OUTPATIENT
Start: 2024-10-17

## 2024-10-17 RX ORDER — SODIUM CHLORIDE 9 MG/ML
INJECTION, SOLUTION INTRAVENOUS PRN
Status: CANCELLED | OUTPATIENT
Start: 2024-10-17

## 2024-10-17 RX ORDER — SODIUM CHLORIDE 0.9 % (FLUSH) 0.9 %
5-40 SYRINGE (ML) INJECTION EVERY 12 HOURS SCHEDULED
Status: CANCELLED | OUTPATIENT
Start: 2024-10-17

## 2024-11-14 ENCOUNTER — HOSPITAL ENCOUNTER (OUTPATIENT)
Dept: WOMENS IMAGING | Age: 74
Discharge: HOME | End: 2024-11-16
Payer: COMMERCIAL

## 2024-11-14 DIAGNOSIS — Z12.31 ENCOUNTER FOR SCREENING MAMMOGRAM FOR MALIGNANT NEOPLASM OF BREAST: ICD-10-CM

## 2024-11-14 DIAGNOSIS — E03.9 HYPOTHYROIDISM, UNSPECIFIED TYPE: ICD-10-CM

## 2024-11-14 PROCEDURE — 77063 BREAST TOMOSYNTHESIS BI: CPT

## 2024-11-14 RX ORDER — LEVOTHYROXINE SODIUM 50 UG/1
50 TABLET ORAL DAILY
Qty: 90 TABLET | Refills: 0 | Status: SHIPPED | OUTPATIENT
Start: 2024-11-14

## 2024-11-14 NOTE — TELEPHONE ENCOUNTER
Comments:     Last Office Visit (last PCP visit):       Next Visit Date:  Future Appointments   Date Time Provider Department Center   11/18/2024  1:30 PM Xiomara Pappas MD [facility] E[facility] DEP       **If hasn't been seen in over a year OR hasn't followed up according to last diabetes/ADHD visit, make appointment for patient before sending refill to provider.    Rx requested:  Requested Prescriptions     Pending Prescriptions Disp Refills    levothyroxine (SYNTHROID) 50 MCG tablet 90 tablet 0     Sig: Take 1 tablet by mouth daily

## 2024-11-19 ENCOUNTER — ANESTHESIA (OUTPATIENT)
Dept: ENDOSCOPY | Age: 74
End: 2024-11-19
Payer: COMMERCIAL

## 2024-11-19 ENCOUNTER — ANESTHESIA EVENT (OUTPATIENT)
Dept: ENDOSCOPY | Age: 74
End: 2024-11-19
Payer: COMMERCIAL

## 2024-11-19 ENCOUNTER — HOSPITAL ENCOUNTER (OUTPATIENT)
Age: 74
Setting detail: OUTPATIENT SURGERY
Discharge: HOME | End: 2024-11-19
Attending: SPECIALIST | Admitting: SPECIALIST
Payer: COMMERCIAL

## 2024-11-19 VITALS
HEIGHT: 64 IN | TEMPERATURE: 98.2 F | RESPIRATION RATE: 18 BRPM | OXYGEN SATURATION: 96 % | WEIGHT: 168 LBS | HEART RATE: 88 BPM | DIASTOLIC BLOOD PRESSURE: 76 MMHG | BODY MASS INDEX: 28.68 KG/M2 | SYSTOLIC BLOOD PRESSURE: 136 MMHG

## 2024-11-19 DIAGNOSIS — R19.4 ALTERED BOWEL HABITS: ICD-10-CM

## 2024-11-19 PROCEDURE — 3700000001 HC ADD 15 MINUTES (ANESTHESIA): Performed by: SPECIALIST

## 2024-11-19 PROCEDURE — C1769 GUIDE WIRE: HCPCS | Performed by: SPECIALIST

## 2024-11-19 PROCEDURE — 7100000010 HC PHASE II RECOVERY - FIRST 15 MIN: Performed by: SPECIALIST

## 2024-11-19 PROCEDURE — 2709999900 HC NON-CHARGEABLE SUPPLY: Performed by: SPECIALIST

## 2024-11-19 PROCEDURE — 6360000002 HC RX W HCPCS: Performed by: NURSE ANESTHETIST, CERTIFIED REGISTERED

## 2024-11-19 PROCEDURE — 88305 TISSUE EXAM BY PATHOLOGIST: CPT

## 2024-11-19 PROCEDURE — 2580000003 HC RX 258: Performed by: SPECIALIST

## 2024-11-19 PROCEDURE — 3609027000 HC COLONOSCOPY: Performed by: SPECIALIST

## 2024-11-19 PROCEDURE — 3609017100 HC EGD: Performed by: SPECIALIST

## 2024-11-19 PROCEDURE — 3700000000 HC ANESTHESIA ATTENDED CARE: Performed by: SPECIALIST

## 2024-11-19 RX ORDER — SODIUM CHLORIDE 9 MG/ML
INJECTION, SOLUTION INTRAVENOUS CONTINUOUS
Status: DISCONTINUED | OUTPATIENT
Start: 2024-11-19 | End: 2024-11-19 | Stop reason: HOSPADM

## 2024-11-19 RX ORDER — SODIUM CHLORIDE 0.9 % (FLUSH) 0.9 %
5-40 SYRINGE (ML) INJECTION EVERY 12 HOURS SCHEDULED
Status: DISCONTINUED | OUTPATIENT
Start: 2024-11-19 | End: 2024-11-19 | Stop reason: HOSPADM

## 2024-11-19 RX ORDER — SODIUM CHLORIDE 0.9 % (FLUSH) 0.9 %
5-40 SYRINGE (ML) INJECTION PRN
Status: DISCONTINUED | OUTPATIENT
Start: 2024-11-19 | End: 2024-11-19 | Stop reason: HOSPADM

## 2024-11-19 RX ORDER — SODIUM CHLORIDE 9 MG/ML
INJECTION, SOLUTION INTRAVENOUS PRN
Status: DISCONTINUED | OUTPATIENT
Start: 2024-11-19 | End: 2024-11-19 | Stop reason: HOSPADM

## 2024-11-19 RX ORDER — PROPOFOL 10 MG/ML
INJECTION, EMULSION INTRAVENOUS
Status: DISCONTINUED | OUTPATIENT
Start: 2024-11-19 | End: 2024-11-19 | Stop reason: SDUPTHER

## 2024-11-19 RX ADMIN — PROPOFOL 400 MG: 10 INJECTION, EMULSION INTRAVENOUS at 09:01

## 2024-11-19 ASSESSMENT — PAIN DESCRIPTION - DESCRIPTORS: DESCRIPTORS: ACHING

## 2024-11-19 ASSESSMENT — PAIN - FUNCTIONAL ASSESSMENT: PAIN_FUNCTIONAL_ASSESSMENT: 0-10

## 2024-11-19 NOTE — ANESTHESIA PRE PROCEDURE
Department of Anesthesiology  Preprocedure Note       Name:  Gloria Abbott   Age:  73 y.o.  :  1950                                          MRN:  [id number]         Date:  2024      Surgeon: Surgeon(s):  Amalia Lopez MD    Procedure: Procedure(s):  COLONOSCOPY DIAGNOSTIC  ESOPHAGOGASTRODUODENOSCOPY DIAGNOSTIC ONLY    Medications prior to admission:   Prior to Admission medications    Medication Sig Start Date End Date Taking? Authorizing Provider   levothyroxine (SYNTHROID) 50 MCG tablet Take 1 tablet by mouth daily 24  Yes Xiomara Pappas MD   citalopram (CELEXA) 20 MG tablet Take 1 tablet by mouth daily Indications: Depression Give with 10mg tab to =30mg 24  Yes Marce Giraldo PA   sertraline (ZOLOFT) 25 MG tablet Take 1 tablet by mouth daily 24  Yes Marce Giraldo PA   citalopram (CELEXA) 10 MG tablet TAKE 1 TABLET BY MOUTH ONCE DAILY WITH THE 20MG TABLET 24  Yes Marce Giraldo PA   pravastatin (PRAVACHOL) 20 MG tablet Take 1 tablet by mouth daily 24  Yes Marce Giraldo PA   oxyBUTYnin (DITROPAN-XL) 10 MG extended release tablet Take 1 tablet by mouth daily 24  Yes Marce Giraldo PA   tiZANidine (ZANAFLEX) 2 MG tablet Take 1 tablet by mouth 3 times daily as needed (muscle pain)  Patient taking differently: Take 2 tablets by mouth 3 times daily as needed (muscle pain) 1/2 to 1 tablet by mouth at night 3/26/24  Yes Marce Giraldo PA   topiramate (TOPAMAX) 25 MG tablet Take 1 tablet by mouth daily 23  Yes Cassandra Alvarado MD   Multiple Vitamin (MULTIVITAMIN ADULT PO) Take 1 tablet by mouth Daily Indications: Nutritional Support   Yes Provider, Historical, MD   Handicap Placard MISC by Does not apply route 3/30/2023-3/30/2025 3/30/23   Elliot Garcia MD       Current medications:    Current Facility-Administered Medications   Medication Dose Route Frequency Provider Last Rate Last Admin    0.9 % sodium chloride infusion

## 2024-11-19 NOTE — H&P
Patient Name: Gloria Abbott  : 1950  MRN: [id number]  DATE: 24      ENDOSCOPY  History and Physical    Procedure:    [x] Diagnostic Colonoscopy       [] Screening Colonoscopy  [x] EGD      [] ERCP      [] EUS       [] Other    [x] Previous office notes/History and Physical reviewed from the patients chart. Please see EMR for further details of HPI. I have examined the patient's status immediately prior to the procedure and:      Indications/HPI:    []Abdominal Pain  []Cancer- GI/Lung  []Fhx of colon CA/polyps  []History of Polyps  []Lopez’s   []Melena  []Abnormal Imaging  []Dysphagia    []Persistent Pneumonia  []Anemia  []Food Impaction  []History of Polyps  []GI Bleed  []Pulmonary nodule/Mass  []Change in bowel habits []Heartburn/Reflux  []Rectal Bleed (BRBPR)  []Chest Pain - Non Cardiac []Heme (+) Stoo  l[]Ulcers  []Constipation  []Hemoptysis   []Varices  []Diarrhea  []Hypoxemia  []Nausea/Vomiting  []Screening   []Crohns/Colitis  []Other: Dysphagia and altered bowel habit    Anesthesia:   [x] MAC [] Moderate Sedation   [] General   [] None     ROS: 12 pt Review of Symptoms was negative unless mentioned above    Medications:   Prior to Admission medications    Medication Sig Start Date End Date Taking? Authorizing Provider   levothyroxine (SYNTHROID) 50 MCG tablet Take 1 tablet by mouth daily 24  Yes Xiomara Pappas MD   citalopram (CELEXA) 20 MG tablet Take 1 tablet by mouth daily Indications: Depression Give with 10mg tab to =30mg 24  Yes Marce Giraldo PA   sertraline (ZOLOFT) 25 MG tablet Take 1 tablet by mouth daily 24  Yes Marce Giraldo PA   citalopram (CELEXA) 10 MG tablet TAKE 1 TABLET BY MOUTH ONCE DAILY WITH THE 20MG TABLET 24  Yes Marce Giraldo PA   pravastatin (PRAVACHOL) 20 MG tablet Take 1 tablet by mouth daily 24  Yes Marce Giraldo PA   oxyBUTYnin (DITROPAN-XL) 10 MG extended release tablet Take 1 tablet by mouth daily 24  Yes

## 2024-11-22 ENCOUNTER — OFFICE VISIT (OUTPATIENT)
Dept: INTERNAL MEDICINE | Age: 74
End: 2024-11-22

## 2024-11-22 VITALS
OXYGEN SATURATION: 98 % | HEIGHT: 64 IN | DIASTOLIC BLOOD PRESSURE: 60 MMHG | WEIGHT: 169 LBS | SYSTOLIC BLOOD PRESSURE: 124 MMHG | RESPIRATION RATE: 16 BRPM | HEART RATE: 70 BPM | BODY MASS INDEX: 28.85 KG/M2

## 2024-11-22 DIAGNOSIS — J30.9 ALLERGIC RHINITIS, UNSPECIFIED SEASONALITY, UNSPECIFIED TRIGGER: ICD-10-CM

## 2024-11-22 DIAGNOSIS — E03.9 HYPOTHYROIDISM, UNSPECIFIED TYPE: Primary | ICD-10-CM

## 2024-11-22 DIAGNOSIS — Z23 NEED FOR VACCINATION: ICD-10-CM

## 2024-11-22 DIAGNOSIS — K59.00 CONSTIPATION, UNSPECIFIED CONSTIPATION TYPE: ICD-10-CM

## 2024-11-22 DIAGNOSIS — N39.41 URGE INCONTINENCE OF URINE: ICD-10-CM

## 2024-11-22 DIAGNOSIS — E78.2 MIXED HYPERLIPIDEMIA: ICD-10-CM

## 2024-11-22 RX ORDER — GINSENG 100 MG
50 CAPSULE ORAL DAILY
COMMUNITY

## 2024-11-22 RX ORDER — POTASSIUM GLUCONATE 2 MEQ
1 TABLET ORAL DAILY
COMMUNITY

## 2024-11-22 RX ORDER — BACILLUS COAGULANS/INULIN 1B-250 MG
1 CAPSULE ORAL DAILY
COMMUNITY

## 2024-11-22 RX ORDER — MAGNESIUM 30 MG
30 TABLET ORAL DAILY
COMMUNITY

## 2024-11-22 RX ORDER — VIT C/B6/B5/MAGNESIUM/HERB 173 50-5-6-5MG
1 CAPSULE ORAL DAILY
COMMUNITY

## 2024-11-22 RX ORDER — PRAVASTATIN SODIUM 20 MG
20 TABLET ORAL DAILY
Qty: 90 TABLET | Refills: 3 | Status: SHIPPED | OUTPATIENT
Start: 2024-11-22

## 2024-11-22 RX ORDER — CETIRIZINE HYDROCHLORIDE 10 MG/1
10 TABLET ORAL DAILY
Qty: 90 TABLET | Refills: 3 | Status: SHIPPED | OUTPATIENT
Start: 2024-11-22

## 2024-11-22 RX ORDER — LEVOTHYROXINE SODIUM 50 UG/1
50 TABLET ORAL DAILY
Qty: 90 TABLET | Refills: 3 | Status: SHIPPED | OUTPATIENT
Start: 2024-11-22

## 2024-11-22 RX ORDER — OXYBUTYNIN CHLORIDE 10 MG/1
10 TABLET, EXTENDED RELEASE ORAL DAILY
Qty: 90 TABLET | Refills: 3 | Status: SHIPPED | OUTPATIENT
Start: 2024-11-22

## 2024-11-22 RX ORDER — GLUCOSAMINE/D3/BOSWELLIA SERRA 1500MG-400
1 TABLET ORAL DAILY
COMMUNITY

## 2024-11-22 NOTE — PROGRESS NOTES
After obtaining consent, and per orders of Dr. Pappas, injection of flu vaccine given in Left deltoid by AMY HELLER MA. Patient instructed to remain in clinic for 20 minutes afterwards, and to report any adverse reaction to me immediately.    
normal.      Palpations: Abdomen is soft.   Musculoskeletal:      Right lower leg: No edema.      Left lower leg: No edema.   Skin:     General: Skin is warm and dry.   Neurological:      Mental Status: She is alert.       Xiomara Pappas MD  11/22/24  2:03 PM EST

## 2024-11-30 PROBLEM — K59.00 CONSTIPATION: Status: ACTIVE | Noted: 2024-11-30

## 2024-11-30 PROBLEM — J30.9 ALLERGIC RHINITIS: Status: ACTIVE | Noted: 2024-11-30

## 2024-11-30 PROBLEM — N39.41 URGE INCONTINENCE OF URINE: Status: ACTIVE | Noted: 2024-11-30

## 2024-11-30 ASSESSMENT — ENCOUNTER SYMPTOMS
ABDOMINAL PAIN: 0
CONSTIPATION: 1
SHORTNESS OF BREATH: 0

## 2025-01-07 DIAGNOSIS — J32.0 CHRONIC LEFT MAXILLARY SINUSITIS: Primary | ICD-10-CM

## 2025-01-07 RX ORDER — DOXYCYCLINE HYCLATE 100 MG
100 TABLET ORAL 2 TIMES DAILY
Qty: 28 TABLET | Refills: 0 | Status: SHIPPED | OUTPATIENT
Start: 2025-01-07 | End: 2025-01-21

## 2025-01-10 DIAGNOSIS — F33.1 MAJOR DEPRESSIVE DISORDER, RECURRENT EPISODE, MODERATE (HCC): ICD-10-CM

## 2025-01-10 NOTE — TELEPHONE ENCOUNTER
Comments:     Last Office Visit (last PCP visit):   11/22/2024    Next Visit Date:  Future Appointments   Date Time Provider Department Center   5/22/2025  1:00 PM Xiomara Pappas MD [facility] DEP       **If hasn't been seen in over a year OR hasn't followed up according to last diabetes/ADHD visit, make appointment for patient before sending refill to provider.    Rx requested:  Requested Prescriptions     Pending Prescriptions Disp Refills    citalopram (CELEXA) 10 MG tablet 90 tablet 1     Sig: TAKE 1 TABLET BY MOUTH ONCE DAILY WITH THE 20MG TABLET

## 2025-01-13 RX ORDER — CITALOPRAM HYDROBROMIDE 10 MG/1
TABLET ORAL
Qty: 90 TABLET | Refills: 1 | Status: SHIPPED | OUTPATIENT
Start: 2025-01-13

## 2025-02-05 ENCOUNTER — CLINICAL SUPPORT (OUTPATIENT)
Dept: AUDIOLOGY | Facility: CLINIC | Age: 75
End: 2025-02-05
Payer: COMMERCIAL

## 2025-02-05 ENCOUNTER — OFFICE VISIT (OUTPATIENT)
Dept: OTOLARYNGOLOGY | Facility: CLINIC | Age: 75
End: 2025-02-05
Payer: COMMERCIAL

## 2025-02-05 DIAGNOSIS — H93.8X3 EAR FULLNESS, BILATERAL: Primary | ICD-10-CM

## 2025-02-05 DIAGNOSIS — H90.3 BILATERAL SENSORINEURAL HEARING LOSS: Primary | ICD-10-CM

## 2025-02-05 DIAGNOSIS — J32.0 CHRONIC LEFT MAXILLARY SINUSITIS: ICD-10-CM

## 2025-02-05 DIAGNOSIS — H90.3 BILATERAL SENSORINEURAL HEARING LOSS: ICD-10-CM

## 2025-02-05 PROCEDURE — 92557 COMPREHENSIVE HEARING TEST: CPT | Performed by: AUDIOLOGIST

## 2025-02-05 PROCEDURE — 99213 OFFICE O/P EST LOW 20 MIN: CPT | Performed by: OTOLARYNGOLOGY

## 2025-02-05 PROCEDURE — 92567 TYMPANOMETRY: CPT | Performed by: AUDIOLOGIST

## 2025-02-05 NOTE — PROGRESS NOTES
The patient returns.  We are seeing her today for evaluation of plugged ear sensation.  This is bilateral.  Remaining ENT inquiry is otherwise stable.  Still having some drainage/postnasal drip but no evidence of any otherwise more concerning.  No absolute sign of infection.  There have been no significant changes in past medical or past surgical histories except as mentioned.    Exam:  Bilateral cerumen is removed upon removal ears are normal.  Nasal exam is clear anteriorly. The septum is relatively straight and the nasal mucosa is grossly unremarkable without evidence of any worrisome infection or polyp or mass. The oral cavity and oropharynx are unremarkable. There is no evidence of any gross lesion or mucosal irregularity throughout the structures. The neck is negative for mass or lymphadenopathy. The trachea and parotid region are clear free of obvious mass or tumor. Facial structures are grossly otherwise unremarkable as well.    Assessment and plan:  Ear fullness sensation with the ears otherwise look good except for cerumen impaction.  We did check her hearing which shows nothing worrisome.  Symmetric high-frequency loss down to 70 dB noted.  Presbycusis findings suspected.  Excellent word discrimination and normal tympanometry.  Favor observation with reassurance.  Utilize antihistamines as warranted.  All questions were answered in this regard accordingly.

## 2025-02-05 NOTE — PROGRESS NOTES
Ms. Dong, age 74, was seen today for a hearing evaluation during her ENT follow up with Dr. aRmirez.  She reported concern for a bilateral ear plugged sensation which has been ongoing for one month.  She was prescribed Doxycycline but reports her ears did not improve after taking.    Results:  Otoscopy revealed mild cerumen in her right ear and moderate cerumen in her right ear.  Tympanometry revealed normal, Type A tympanograms, indicating normal ear canal volume, peak pressure and compliance bilaterally.  Audiometric thresholds revealed a mild sloping to moderately severe sensorineural hearing loss in her right ear and a mild sloping to severe sensorineural hearing loss in her left ear.  Word recognition scores were excellent bilaterally.  As compared to her last evaluation in 2023, hearing has declined slightly overall bilaterally.    Recommendations:  Follow-up with PCP, Sharon Quiñonez PA-C, as medically directed.  Follow-up with ENT, Dr. Ramirez, as medically directed.  Retest hearing annually to monitor.

## 2025-02-10 DIAGNOSIS — E03.9 HYPOTHYROIDISM, UNSPECIFIED TYPE: ICD-10-CM

## 2025-02-10 DIAGNOSIS — E78.2 MIXED HYPERLIPIDEMIA: ICD-10-CM

## 2025-02-14 RX ORDER — LEVOTHYROXINE SODIUM 50 UG/1
50 TABLET ORAL DAILY
Qty: 90 TABLET | Refills: 3 | Status: SHIPPED | OUTPATIENT
Start: 2025-02-14

## 2025-02-14 RX ORDER — PRAVASTATIN SODIUM 20 MG
20 TABLET ORAL DAILY
Qty: 90 TABLET | Refills: 3 | Status: SHIPPED | OUTPATIENT
Start: 2025-02-14

## 2025-02-14 NOTE — ANESTHESIA POSTPROCEDURE EVALUATION
Department of Anesthesiology  Postprocedure Note    Patient: Gloria Abbott  MRN: [id number]  YOB: 1950  Date of evaluation: 11/19/2024    Procedure Summary       Date: 11/19/24 Room / Location: [facility] OR 01 / [facility]    Anesthesia Start: 0858 Anesthesia Stop: 0941    Procedures:       COLONOSCOPY DIAGNOSTIC      ESOPHAGOGASTRODUODENOSCOPY DIAGNOSTIC ONLY with biopsies and esophageal dilation Diagnosis:       Altered bowel habits      (Altered bowel habits [R19.4])    Surgeons: Amalia Lopez MD Responsible Provider: Claire Garcia APRN - CRNA    Anesthesia Type: MAC ASA Status: 3            Anesthesia Type: No value filed.    Bc Phase I: Bc Score: 10    Bc Phase II:      Anesthesia Post Evaluation    Patient location during evaluation: bedside  Patient participation: complete - patient participated  Level of consciousness: awake and awake and alert  Pain score: 0  Airway patency: patent  Nausea & Vomiting: no nausea and no vomiting  Cardiovascular status: blood pressure returned to baseline and hemodynamically stable  Respiratory status: acceptable  Hydration status: euvolemic  Pain management: adequate        No notable events documented.   Transurethral resection of bladder tumor

## 2025-03-03 DIAGNOSIS — N39.41 URGE INCONTINENCE OF URINE: ICD-10-CM

## 2025-03-03 DIAGNOSIS — F33.1 MAJOR DEPRESSIVE DISORDER, RECURRENT EPISODE, MODERATE (HCC): ICD-10-CM

## 2025-03-03 RX ORDER — OXYBUTYNIN CHLORIDE 10 MG/1
10 TABLET, EXTENDED RELEASE ORAL DAILY
Qty: 90 TABLET | Refills: 3 | Status: CANCELLED | OUTPATIENT
Start: 2025-03-03

## 2025-03-03 NOTE — TELEPHONE ENCOUNTER
Comments:     Last Office Visit (last PCP visit):   11/22/2024    Next Visit Date:  Future Appointments   Date Time Provider Department Center   5/22/2025  1:00 PM Xiomara Pappas MD [facility] E[facility] DEP       **If hasn't been seen in over a year OR hasn't followed up according to last diabetes/ADHD visit, make appointment for patient before sending refill to provider.    Rx requested:  Requested Prescriptions     Pending Prescriptions Disp Refills    citalopram (CELEXA) 20 MG tablet 90 tablet 1     Sig: Take 1 tablet by mouth daily Indications: Depression Give with 10mg tab to =30mg    oxyBUTYnin (DITROPAN-XL) 10 MG extended release tablet 90 tablet 3     Sig: Take 1 tablet by mouth daily

## 2025-03-06 RX ORDER — CITALOPRAM HYDROBROMIDE 20 MG/1
20 TABLET ORAL DAILY
Qty: 90 TABLET | Refills: 1 | Status: SHIPPED | OUTPATIENT
Start: 2025-03-06

## 2025-03-16 ENCOUNTER — PREP FOR PROCEDURE (OUTPATIENT)
Dept: INTERNAL MEDICINE CLINIC | Age: 75
End: 2025-03-16

## 2025-03-16 ENCOUNTER — HOSPITAL ENCOUNTER (EMERGENCY)
Age: 75
Discharge: ANOTHER HOSPITAL | End: 2025-03-16
Attending: EMERGENCY MEDICINE
Payer: COMMERCIAL

## 2025-03-16 ENCOUNTER — HOSPITAL ENCOUNTER (INPATIENT)
Age: 75
LOS: 4 days | Discharge: NURSING, REHAB OR LONG-TERM CARE | DRG: 031 | End: 2025-03-20
Attending: INTERNAL MEDICINE | Admitting: INTERNAL MEDICINE
Payer: COMMERCIAL

## 2025-03-16 ENCOUNTER — APPOINTMENT (OUTPATIENT)
Dept: CT IMAGING | Age: 75
End: 2025-03-16
Payer: COMMERCIAL

## 2025-03-16 VITALS
OXYGEN SATURATION: 98 % | DIASTOLIC BLOOD PRESSURE: 71 MMHG | WEIGHT: 178 LBS | SYSTOLIC BLOOD PRESSURE: 141 MMHG | HEART RATE: 77 BPM | BODY MASS INDEX: 30.39 KG/M2 | HEIGHT: 64 IN | RESPIRATION RATE: 16 BRPM | TEMPERATURE: 99.2 F

## 2025-03-16 DIAGNOSIS — T85.618A SHUNT MALFUNCTION, INITIAL ENCOUNTER: ICD-10-CM

## 2025-03-16 DIAGNOSIS — T85.618A SHUNT MALFUNCTION, INITIAL ENCOUNTER: Primary | ICD-10-CM

## 2025-03-16 PROBLEM — G31.9 CEREBRAL VENTRICULOMEGALY DUE TO BRAIN ATROPHY: Status: ACTIVE | Noted: 2025-03-16

## 2025-03-16 PROBLEM — G91.9 HYDROCEPHALUS: Status: ACTIVE | Noted: 2025-03-16

## 2025-03-16 PROBLEM — T85.09XA: Status: ACTIVE | Noted: 2025-03-16

## 2025-03-16 PROBLEM — G91.9 HYDROCEPHALUS IN ADULT (HCC): Status: ACTIVE | Noted: 2025-03-16

## 2025-03-16 PROBLEM — T85.730A: Status: ACTIVE | Noted: 2025-03-16

## 2025-03-16 LAB
ALBUMIN SERPL-MCNC: 4.5 G/DL (ref 3.5–4.6)
ALP SERPL-CCNC: 122 U/L (ref 40–130)
ALT SERPL-CCNC: 32 U/L (ref 0–33)
ANION GAP SERPL CALCULATED.3IONS-SCNC: 12 MEQ/L (ref 9–15)
AST SERPL-CCNC: 36 U/L (ref 0–35)
BASOPHILS # BLD: 0.1 K/UL (ref 0–0.1)
BASOPHILS NFR BLD: 0.6 % (ref 0.1–1.2)
BILIRUB SERPL-MCNC: <0.2 MG/DL (ref 0.2–0.7)
BUN SERPL-MCNC: 17 MG/DL (ref 8–23)
CALCIUM SERPL-MCNC: 9.4 MG/DL (ref 8.5–9.9)
CHLORIDE SERPL-SCNC: 100 MEQ/L (ref 95–107)
CO2 SERPL-SCNC: 24 MEQ/L (ref 20–31)
CREAT SERPL-MCNC: 0.9 MG/DL (ref 0.5–0.9)
EOSINOPHIL # BLD: 0 K/UL (ref 0–0.4)
EOSINOPHIL NFR BLD: 0.1 % (ref 0.7–5.8)
ERYTHROCYTE [DISTWIDTH] IN BLOOD BY AUTOMATED COUNT: 13.2 % (ref 11.7–14.4)
GLOBULIN SER CALC-MCNC: 3.1 G/DL (ref 2.3–3.5)
GLUCOSE SERPL-MCNC: 131 MG/DL (ref 70–99)
HCT VFR BLD AUTO: 36.4 % (ref 37–47)
HGB BLD-MCNC: 11.8 G/DL (ref 11.2–15.7)
IMM GRANULOCYTES # BLD: 0 K/UL
IMM GRANULOCYTES NFR BLD: 0.3 %
LYMPHOCYTES # BLD: 0.6 K/UL (ref 1.2–3.7)
LYMPHOCYTES NFR BLD: 6.9 %
MCH RBC QN AUTO: 30.3 PG (ref 25.6–32.2)
MCHC RBC AUTO-ENTMCNC: 32.4 % (ref 32.2–35.5)
MCV RBC AUTO: 93.6 FL (ref 79.4–94.8)
MONOCYTES # BLD: 0.5 K/UL (ref 0.2–0.9)
MONOCYTES NFR BLD: 5.3 % (ref 4.7–12.5)
NEUTROPHILS # BLD: 7.6 K/UL (ref 1.6–6.1)
NEUTS SEG NFR BLD: 86.8 % (ref 34–71.1)
PLATELET # BLD AUTO: 293 K/UL (ref 182–369)
POTASSIUM SERPL-SCNC: 4.2 MEQ/L (ref 3.4–4.9)
PROT SERPL-MCNC: 7.6 G/DL (ref 6.3–8)
RBC # BLD AUTO: 3.89 M/UL (ref 3.93–5.22)
SODIUM SERPL-SCNC: 136 MEQ/L (ref 135–144)
WBC # BLD AUTO: 8.8 K/UL (ref 4–10)

## 2025-03-16 PROCEDURE — 2500000003 HC RX 250 WO HCPCS: Performed by: EMERGENCY MEDICINE

## 2025-03-16 PROCEDURE — 99285 EMERGENCY DEPT VISIT HI MDM: CPT

## 2025-03-16 PROCEDURE — 70450 CT HEAD/BRAIN W/O DYE: CPT

## 2025-03-16 PROCEDURE — 80053 COMPREHEN METABOLIC PANEL: CPT

## 2025-03-16 PROCEDURE — 96365 THER/PROPH/DIAG IV INF INIT: CPT

## 2025-03-16 PROCEDURE — 6370000000 HC RX 637 (ALT 250 FOR IP): Performed by: INTERNAL MEDICINE

## 2025-03-16 PROCEDURE — 1210000000 HC MED SURG R&B

## 2025-03-16 PROCEDURE — 2500000003 HC RX 250 WO HCPCS: Performed by: INTERNAL MEDICINE

## 2025-03-16 PROCEDURE — 6360000002 HC RX W HCPCS: Performed by: EMERGENCY MEDICINE

## 2025-03-16 PROCEDURE — 96375 TX/PRO/DX INJ NEW DRUG ADDON: CPT

## 2025-03-16 PROCEDURE — 2580000003 HC RX 258: Performed by: EMERGENCY MEDICINE

## 2025-03-16 PROCEDURE — 99222 1ST HOSP IP/OBS MODERATE 55: CPT | Performed by: NEUROLOGICAL SURGERY

## 2025-03-16 PROCEDURE — 85025 COMPLETE CBC W/AUTO DIFF WBC: CPT

## 2025-03-16 PROCEDURE — 36415 COLL VENOUS BLD VENIPUNCTURE: CPT

## 2025-03-16 RX ORDER — OMEGA-3-ACID ETHYL ESTERS 1 G/1
1 CAPSULE, LIQUID FILLED ORAL DAILY
Status: DISCONTINUED | OUTPATIENT
Start: 2025-03-17 | End: 2025-03-20 | Stop reason: HOSPADM

## 2025-03-16 RX ORDER — SERTRALINE HYDROCHLORIDE 25 MG/1
25 TABLET, FILM COATED ORAL DAILY
COMMUNITY

## 2025-03-16 RX ORDER — LEVOTHYROXINE SODIUM 50 UG/1
50 TABLET ORAL DAILY
Status: DISCONTINUED | OUTPATIENT
Start: 2025-03-17 | End: 2025-03-20 | Stop reason: HOSPADM

## 2025-03-16 RX ORDER — GLUCOSAMINE/D3/BOSWELLIA SERRA 1500MG-400
1 TABLET ORAL DAILY
Status: DISCONTINUED | OUTPATIENT
Start: 2025-03-17 | End: 2025-03-16 | Stop reason: RX

## 2025-03-16 RX ORDER — MAGNESIUM 30 MG
30 TABLET ORAL DAILY
Status: DISCONTINUED | OUTPATIENT
Start: 2025-03-17 | End: 2025-03-16 | Stop reason: RX

## 2025-03-16 RX ORDER — LACTOBACILLUS RHAMNOSUS GG 10B CELL
1 CAPSULE ORAL DAILY
Status: DISCONTINUED | OUTPATIENT
Start: 2025-03-17 | End: 2025-03-20 | Stop reason: HOSPADM

## 2025-03-16 RX ORDER — SERTRALINE HYDROCHLORIDE 25 MG/1
25 TABLET, FILM COATED ORAL DAILY
Status: DISCONTINUED | OUTPATIENT
Start: 2025-03-17 | End: 2025-03-20 | Stop reason: HOSPADM

## 2025-03-16 RX ORDER — ONDANSETRON 4 MG/1
4 TABLET, ORALLY DISINTEGRATING ORAL EVERY 8 HOURS PRN
Status: DISCONTINUED | OUTPATIENT
Start: 2025-03-16 | End: 2025-03-20 | Stop reason: HOSPADM

## 2025-03-16 RX ORDER — SODIUM CHLORIDE 0.9 % (FLUSH) 0.9 %
5-40 SYRINGE (ML) INJECTION PRN
Status: DISCONTINUED | OUTPATIENT
Start: 2025-03-16 | End: 2025-03-20

## 2025-03-16 RX ORDER — ONDANSETRON 2 MG/ML
4 INJECTION INTRAMUSCULAR; INTRAVENOUS ONCE
Status: COMPLETED | OUTPATIENT
Start: 2025-03-16 | End: 2025-03-16

## 2025-03-16 RX ORDER — ACETAMINOPHEN 325 MG/1
650 TABLET ORAL EVERY 6 HOURS PRN
Status: DISCONTINUED | OUTPATIENT
Start: 2025-03-16 | End: 2025-03-20 | Stop reason: HOSPADM

## 2025-03-16 RX ORDER — PRAVASTATIN SODIUM 10 MG
20 TABLET ORAL DAILY
Status: DISCONTINUED | OUTPATIENT
Start: 2025-03-17 | End: 2025-03-20 | Stop reason: HOSPADM

## 2025-03-16 RX ORDER — SODIUM CHLORIDE 9 MG/ML
INJECTION, SOLUTION INTRAVENOUS PRN
Status: DISCONTINUED | OUTPATIENT
Start: 2025-03-16 | End: 2025-03-20

## 2025-03-16 RX ORDER — POTASSIUM CHLORIDE 1500 MG/1
40 TABLET, EXTENDED RELEASE ORAL PRN
Status: DISCONTINUED | OUTPATIENT
Start: 2025-03-16 | End: 2025-03-20

## 2025-03-16 RX ORDER — GINSENG 100 MG
50 CAPSULE ORAL DAILY
Status: DISCONTINUED | OUTPATIENT
Start: 2025-03-17 | End: 2025-03-16 | Stop reason: RX

## 2025-03-16 RX ORDER — HYDROMORPHONE HYDROCHLORIDE 1 MG/ML
0.5 INJECTION, SOLUTION INTRAMUSCULAR; INTRAVENOUS; SUBCUTANEOUS ONCE
Status: COMPLETED | OUTPATIENT
Start: 2025-03-16 | End: 2025-03-16

## 2025-03-16 RX ORDER — TOPIRAMATE 25 MG/1
25 TABLET, FILM COATED ORAL DAILY
Status: DISCONTINUED | OUTPATIENT
Start: 2025-03-17 | End: 2025-03-20 | Stop reason: HOSPADM

## 2025-03-16 RX ORDER — CETIRIZINE HYDROCHLORIDE 10 MG/1
10 TABLET ORAL DAILY
Status: DISCONTINUED | OUTPATIENT
Start: 2025-03-16 | End: 2025-03-20 | Stop reason: HOSPADM

## 2025-03-16 RX ORDER — MORPHINE SULFATE 4 MG/ML
4 INJECTION INTRAVENOUS ONCE
Refills: 0 | Status: COMPLETED | OUTPATIENT
Start: 2025-03-16 | End: 2025-03-16

## 2025-03-16 RX ORDER — OXYBUTYNIN CHLORIDE 5 MG/1
10 TABLET ORAL DAILY
Status: DISCONTINUED | OUTPATIENT
Start: 2025-03-17 | End: 2025-03-20 | Stop reason: HOSPADM

## 2025-03-16 RX ORDER — TIZANIDINE 2 MG/1
2 TABLET ORAL 3 TIMES DAILY PRN
Status: DISCONTINUED | OUTPATIENT
Start: 2025-03-16 | End: 2025-03-20 | Stop reason: HOSPADM

## 2025-03-16 RX ORDER — POTASSIUM CHLORIDE 7.45 MG/ML
10 INJECTION INTRAVENOUS PRN
Status: DISCONTINUED | OUTPATIENT
Start: 2025-03-16 | End: 2025-03-20

## 2025-03-16 RX ORDER — POLYETHYLENE GLYCOL 3350 17 G/17G
17 POWDER, FOR SOLUTION ORAL DAILY PRN
Status: DISCONTINUED | OUTPATIENT
Start: 2025-03-16 | End: 2025-03-20 | Stop reason: HOSPADM

## 2025-03-16 RX ORDER — CITALOPRAM HYDROBROMIDE 20 MG/1
30 TABLET ORAL DAILY
Status: DISCONTINUED | OUTPATIENT
Start: 2025-03-16 | End: 2025-03-20 | Stop reason: HOSPADM

## 2025-03-16 RX ORDER — ONDANSETRON 2 MG/ML
4 INJECTION INTRAMUSCULAR; INTRAVENOUS EVERY 6 HOURS PRN
Status: DISCONTINUED | OUTPATIENT
Start: 2025-03-16 | End: 2025-03-20 | Stop reason: HOSPADM

## 2025-03-16 RX ORDER — MAGNESIUM SULFATE IN WATER 40 MG/ML
2000 INJECTION, SOLUTION INTRAVENOUS PRN
Status: DISCONTINUED | OUTPATIENT
Start: 2025-03-16 | End: 2025-03-20

## 2025-03-16 RX ORDER — VIT C/B6/B5/MAGNESIUM/HERB 173 50-5-6-5MG
500 CAPSULE ORAL DAILY
Status: DISCONTINUED | OUTPATIENT
Start: 2025-03-17 | End: 2025-03-16 | Stop reason: RX

## 2025-03-16 RX ORDER — SODIUM CHLORIDE 0.9 % (FLUSH) 0.9 %
5-40 SYRINGE (ML) INJECTION EVERY 12 HOURS SCHEDULED
Status: DISCONTINUED | OUTPATIENT
Start: 2025-03-16 | End: 2025-03-20

## 2025-03-16 RX ORDER — SODIUM CHLORIDE 9 MG/ML
INJECTION, SOLUTION INTRAVENOUS CONTINUOUS
Status: DISPENSED | OUTPATIENT
Start: 2025-03-17 | End: 2025-03-17

## 2025-03-16 RX ORDER — ACETAMINOPHEN 650 MG/1
650 SUPPOSITORY RECTAL EVERY 6 HOURS PRN
Status: DISCONTINUED | OUTPATIENT
Start: 2025-03-16 | End: 2025-03-20 | Stop reason: HOSPADM

## 2025-03-16 RX ADMIN — ONDANSETRON 4 MG: 2 INJECTION, SOLUTION INTRAMUSCULAR; INTRAVENOUS at 11:04

## 2025-03-16 RX ADMIN — CITALOPRAM HYDROBROMIDE 30 MG: 20 TABLET ORAL at 17:54

## 2025-03-16 RX ADMIN — CEFAZOLIN 1000 MG: 1 INJECTION, POWDER, FOR SOLUTION INTRAMUSCULAR; INTRAVENOUS at 11:12

## 2025-03-16 RX ADMIN — HYDROMORPHONE HYDROCHLORIDE 0.5 MG: 1 INJECTION, SOLUTION INTRAMUSCULAR; INTRAVENOUS; SUBCUTANEOUS at 14:36

## 2025-03-16 RX ADMIN — MORPHINE SULFATE 4 MG: 4 INJECTION INTRAVENOUS at 11:08

## 2025-03-16 RX ADMIN — SODIUM CHLORIDE, PRESERVATIVE FREE 10 ML: 5 INJECTION INTRAVENOUS at 20:51

## 2025-03-16 RX ADMIN — ACETAMINOPHEN 650 MG: 325 TABLET ORAL at 20:50

## 2025-03-16 RX ADMIN — CETIRIZINE HYDROCHLORIDE 10 MG: 10 TABLET, FILM COATED ORAL at 17:54

## 2025-03-16 ASSESSMENT — PAIN SCALES - GENERAL: PAINLEVEL_OUTOF10: 4

## 2025-03-16 ASSESSMENT — PAIN - FUNCTIONAL ASSESSMENT: PAIN_FUNCTIONAL_ASSESSMENT: 0-10

## 2025-03-16 ASSESSMENT — PAIN DESCRIPTION - LOCATION: LOCATION: HEAD

## 2025-03-16 NOTE — ED NOTES
Pt placed on 2lnc for shallow breathing and spo2 85%. Pt easily opens eyes to voice answers all question appropriately.

## 2025-03-16 NOTE — H&P
Hospital Medicine  History and Physical    Patient:  Gloria Abbott  MRN: [id number]    CHIEF COMPLAINT:  No chief complaint on file.      History Obtained From:  Patient, EMR  Primary Care Physician: Xiomara Pappas MD    HISTORY OF PRESENT ILLNESS:   74-year-old female with history of NPH for which she had  shunt surgery March 2024 with Dr. Shelton presented to [facility] in [place] from home after picking at a scab on her scalp overnight and waking up with a portion of the shunt laying on the pillow.  CT scan showed increased dilation of the lateral and third ventricles concerning for worsening hydrocephalus.    Patient mitts to some headache and dizziness.  Arms and legs appear to be working fine.  At baseline she is ambulatory with a cane.  No chest pain, dyspnea, change in bowels urination.  She will intermittently get some abdominal discomfort.     This case was signed out to me by my colleague who told me that the neurosurgeon does not want any further antibiotics at this time but does want an infectious disease consult.    Past Medical History:      Diagnosis Date    ADHD (attention deficit hyperactivity disorder)     Arthritis     Cancer (HCC)     endometrial and uterine    Depression     Hyperlipidemia     Hypertension     Hypothyroid     MDD (major depressive disorder)     NPH (normal pressure hydrocephalus) ([facility])     PONV (postoperative nausea and vomiting)     Nausea    Protruded lumbar disc     L4/L5    Stress     Vitamin D deficiency        Past Surgical History:      Procedure Laterality Date    ANKLE SURGERY Right 2001    COLONOSCOPY      COLONOSCOPY N/A 11/19/2024    COLONOSCOPY DIAGNOSTIC performed by Amalia Lopez MD at [facility]    FOOT SURGERY Right 2001    HIP SURGERY Right 02/02/2023    RIGHT HIP PINNING AND LEFT KNEE STERIOD INJECTION performed by Elliot Garcia MD at [facility] OR    HYSTERECTOMY (CERVIX STATUS UNKNOWN)      LASIK Bilateral     SINUS ENDOSCOPY Bilateral  emergency medical condition->Emergency Medical Condition (MA) What reading provider will be dictating this exam?->CRC FINDINGS: BRAIN/VENTRICLES: There is no acute intracranial hemorrhage, mass effect or midline shift.  The right frontal ventriculostomy has been removed.  There are prominent gas fluid levels within the frontal sinuses bilaterally.  The lateral ventricles and 3rd ventricles are distended when compared with the study 04/24/2024.  4th ventricle appears within the normal range.  There is a tiny volume of pneumocephalus in the posterior fossa, right lateral ventricle, suprasellar cistern as well as along the interhemispheric fissure anteriorly. No abnormal extra-axial fluid collection.  There is periventricular and subcortical low density concerning for microangiopathy.  The gray-white differentiation is maintained otherwise without evidence of an acute infarct. There is no evidence of hydrocephalus. ORBITS: The visualized portion of the orbits demonstrate no acute abnormality. SINUSES: The visualized paranasal sinuses demonstrate complete opacification of the left maxillary sinus compatible with chronic sinusitis.  Mastoid air cells demonstrate no acute abnormality. SOFT TISSUES/SKULL:  No acute abnormality of the visualized skull or soft tissues.     1.  There is increased dilation of the lateral and 3rd ventricles concerning for worsening hydrocephalus 2.  With increased pneumocephalus 3.  Signs of deep white matter small vessel disease 4.  Chronic appearing left maxillary sinusitis           Assessment and Plan     1.  NPH with shunt dysfunction:  -N.p.o. after midnight.  Neurosurgery consulted by ED  -ID, neurology to follow  -PT/OT after surgery    Depression    SCDs  Full code    Bhupinder Thomas DO  Admitting Hospitalist

## 2025-03-16 NOTE — ED NOTES
GILDA Castro, from [facility] calls with Dr. Jesus on the line to speak with Dr. Conde.

## 2025-03-16 NOTE — ED NOTES
Patient Accepted at [facility]  Bed Assignment: 270  Report #: [id number]  Transportation: [facility]

## 2025-03-16 NOTE — ED PROVIDER NOTES
[facility] EMERGENCY DEPARTMENT  eMERGENCY dEPARTMENT eNCOUnter      Pt Name: Gloria Abbott  MRN: [id number]  Birthdate 1950  Date of evaluation: 3/16/2025  Provider: Sumit Conde MD    CHIEF COMPLAINT       Chief Complaint   Patient presents with    shunt     Pt was picking at head and shunt was pulled out from top of head last night         HISTORY OF PRESENT ILLNESS   (Location/Symptom, Timing/Onset,Context/Setting, Quality, Duration, Modifying Factors, Severity)  Note limiting factors.   Gloria Abbott is a 74 y.o. female who presents to the emergency department with past history of ADHD, depression, hypertension, hypothyroid and normal pressure hydrocephalus.  1 year ago patient had shunt placed for normal pressure hydrocephalus been doing well with it.  Overnight she was picking at a scab on her scalp and then woke up this morning with a portion of the shunt laying on the pillow.  She has approximately 7 cm end of the shunt in a baggy that she brings in.  This is the portion that went down through the skull to drain the cerebral spinal fluid.  She has a mild to moderate headache at 4-10 and slight nausea.  No associated fever.  Again the shunt fell out sometime in the past 8 hours.    HPI    NursingNotes were reviewed.    REVIEW OF SYSTEMS    (2-9 systems for level 4, 10 or more for level 5)     Review of Systems   Constitutional:  Negative for chills and diaphoresis.   HENT:  Negative for congestion, ear pain, mouth sores and sore throat.    Eyes:  Negative for photophobia and discharge.   Respiratory:  Negative for cough, chest tightness, shortness of breath and wheezing.    Cardiovascular:  Negative for chest pain and palpitations.   Gastrointestinal:  Negative for abdominal distention, abdominal pain and vomiting.   Endocrine: Negative for cold intolerance.   Genitourinary:  Negative for difficulty urinating.   Musculoskeletal:  Negative for arthralgias.   Skin:  Negative for pallor and rash.

## 2025-03-16 NOTE — ED TRIAGE NOTES
Pt a/o x 3 skin pink w/d resp non labored. Pt reports she was picking at scab on top of head and  shut broke and fell out.pt c/o headache, dizziness and nausea. Per son pt slow to move and decision making with delays.o/e pt has hole in top of head with un-healed concave area in skin. Pt had shunt placed d/t traumatic fall with swelling of brain. Shunt has been placed x 6 months.

## 2025-03-17 ENCOUNTER — ANESTHESIA (OUTPATIENT)
Dept: OPERATING ROOM | Age: 75
DRG: 031 | End: 2025-03-17
Payer: COMMERCIAL

## 2025-03-17 ENCOUNTER — APPOINTMENT (OUTPATIENT)
Dept: GENERAL RADIOLOGY | Age: 75
DRG: 031 | End: 2025-03-17
Attending: INTERNAL MEDICINE
Payer: COMMERCIAL

## 2025-03-17 ENCOUNTER — ANESTHESIA EVENT (OUTPATIENT)
Dept: OPERATING ROOM | Age: 75
DRG: 031 | End: 2025-03-17
Payer: COMMERCIAL

## 2025-03-17 DIAGNOSIS — G31.9 VENTRICULAR ENLARGEMENT DUE TO BRAIN ATROPHY: Primary | ICD-10-CM

## 2025-03-17 PROBLEM — G91.9 HYDROCEPHALUS: Status: RESOLVED | Noted: 2025-03-16 | Resolved: 2025-03-17

## 2025-03-17 PROBLEM — T85.618A SHUNT MALFUNCTION: Status: ACTIVE | Noted: 2025-03-17

## 2025-03-17 PROBLEM — G91.9 HYDROCEPHALUS IN ADULT (HCC): Status: RESOLVED | Noted: 2025-03-16 | Resolved: 2025-03-17

## 2025-03-17 LAB
ANION GAP SERPL CALCULATED.3IONS-SCNC: 8 MEQ/L (ref 9–15)
BASOPHILS # BLD: 0 K/UL (ref 0–0.2)
BASOPHILS NFR BLD: 0.3 %
BUN SERPL-MCNC: 14 MG/DL (ref 8–23)
CALCIUM SERPL-MCNC: 8.4 MG/DL (ref 8.5–9.9)
CHLORIDE SERPL-SCNC: 101 MEQ/L (ref 95–107)
CO2 SERPL-SCNC: 27 MEQ/L (ref 20–31)
CREAT SERPL-MCNC: 0.81 MG/DL (ref 0.5–0.9)
CRP SERPL HS-MCNC: 13.8 MG/L (ref 0–5)
EOSINOPHIL # BLD: 0 K/UL (ref 0–0.7)
EOSINOPHIL NFR BLD: 0.5 %
ERYTHROCYTE [DISTWIDTH] IN BLOOD BY AUTOMATED COUNT: 13.1 % (ref 11.5–14.5)
GLUCOSE SERPL-MCNC: 97 MG/DL (ref 70–99)
HCT VFR BLD AUTO: 31.3 % (ref 37–47)
HGB BLD-MCNC: 10.3 G/DL (ref 12–16)
LYMPHOCYTES # BLD: 1.6 K/UL (ref 1–4.8)
LYMPHOCYTES NFR BLD: 18.8 %
MCH RBC QN AUTO: 30.8 PG (ref 27–31.3)
MCHC RBC AUTO-ENTMCNC: 32.9 % (ref 33–37)
MCV RBC AUTO: 93.7 FL (ref 79.4–94.8)
MONOCYTES # BLD: 1 K/UL (ref 0.2–0.8)
MONOCYTES NFR BLD: 11.8 %
NEUTROPHILS # BLD: 5.9 K/UL (ref 1.4–6.5)
NEUTS SEG NFR BLD: 68.3 %
PLATELET # BLD AUTO: 265 K/UL (ref 130–400)
POTASSIUM SERPL-SCNC: 4.2 MEQ/L (ref 3.4–4.9)
RBC # BLD AUTO: 3.34 M/UL (ref 4.2–5.4)
SODIUM SERPL-SCNC: 136 MEQ/L (ref 135–144)
WBC # BLD AUTO: 8.6 K/UL (ref 4.8–10.8)

## 2025-03-17 PROCEDURE — 1210000000 HC MED SURG R&B

## 2025-03-17 PROCEDURE — 2500000003 HC RX 250 WO HCPCS: Performed by: ANESTHESIOLOGY

## 2025-03-17 PROCEDURE — 7100000000 HC PACU RECOVERY - FIRST 15 MIN: Performed by: NEUROLOGICAL SURGERY

## 2025-03-17 PROCEDURE — 36415 COLL VENOUS BLD VENIPUNCTURE: CPT

## 2025-03-17 PROCEDURE — 87075 CULTR BACTERIA EXCEPT BLOOD: CPT

## 2025-03-17 PROCEDURE — 0JB00ZZ EXCISION OF SCALP SUBCUTANEOUS TISSUE AND FASCIA, OPEN APPROACH: ICD-10-PCS | Performed by: NEUROLOGICAL SURGERY

## 2025-03-17 PROCEDURE — 6370000000 HC RX 637 (ALT 250 FOR IP): Performed by: NEUROLOGICAL SURGERY

## 2025-03-17 PROCEDURE — 99232 SBSQ HOSP IP/OBS MODERATE 35: CPT | Performed by: NEUROLOGICAL SURGERY

## 2025-03-17 PROCEDURE — 2580000003 HC RX 258: Performed by: NEUROLOGICAL SURGERY

## 2025-03-17 PROCEDURE — 99222 1ST HOSP IP/OBS MODERATE 55: CPT | Performed by: INTERNAL MEDICINE

## 2025-03-17 PROCEDURE — 88305 TISSUE EXAM BY PATHOLOGIST: CPT

## 2025-03-17 PROCEDURE — 2500000003 HC RX 250 WO HCPCS: Performed by: INTERNAL MEDICINE

## 2025-03-17 PROCEDURE — 87106 FUNGI IDENTIFICATION YEAST: CPT

## 2025-03-17 PROCEDURE — 87040 BLOOD CULTURE FOR BACTERIA: CPT

## 2025-03-17 PROCEDURE — APPSS45 APP SPLIT SHARED TIME 31-45 MINUTES: Performed by: NURSE PRACTITIONER

## 2025-03-17 PROCEDURE — 6360000002 HC RX W HCPCS: Performed by: NURSE ANESTHETIST, CERTIFIED REGISTERED

## 2025-03-17 PROCEDURE — 2500000003 HC RX 250 WO HCPCS: Performed by: NEUROLOGICAL SURGERY

## 2025-03-17 PROCEDURE — 3700000001 HC ADD 15 MINUTES (ANESTHESIA): Performed by: NEUROLOGICAL SURGERY

## 2025-03-17 PROCEDURE — 80048 BASIC METABOLIC PNL TOTAL CA: CPT

## 2025-03-17 PROCEDURE — 86140 C-REACTIVE PROTEIN: CPT

## 2025-03-17 PROCEDURE — 6360000002 HC RX W HCPCS: Performed by: NEUROLOGICAL SURGERY

## 2025-03-17 PROCEDURE — 00P60JZ REMOVAL OF SYNTHETIC SUBSTITUTE FROM CEREBRAL VENTRICLE, OPEN APPROACH: ICD-10-PCS | Performed by: NEUROLOGICAL SURGERY

## 2025-03-17 PROCEDURE — 87070 CULTURE OTHR SPECIMN AEROBIC: CPT

## 2025-03-17 PROCEDURE — 62256 REMOVE BRAIN CAVITY SHUNT: CPT | Performed by: NEUROLOGICAL SURGERY

## 2025-03-17 PROCEDURE — 2709999900 HC NON-CHARGEABLE SUPPLY: Performed by: NEUROLOGICAL SURGERY

## 2025-03-17 PROCEDURE — 3700000000 HC ANESTHESIA ATTENDED CARE: Performed by: NEUROLOGICAL SURGERY

## 2025-03-17 PROCEDURE — 6370000000 HC RX 637 (ALT 250 FOR IP): Performed by: INTERNAL MEDICINE

## 2025-03-17 PROCEDURE — 2580000003 HC RX 258: Performed by: INTERNAL MEDICINE

## 2025-03-17 PROCEDURE — 3600000004 HC SURGERY LEVEL 4 BASE: Performed by: NEUROLOGICAL SURGERY

## 2025-03-17 PROCEDURE — 2500000003 HC RX 250 WO HCPCS: Performed by: NURSE ANESTHETIST, CERTIFIED REGISTERED

## 2025-03-17 PROCEDURE — 7100000001 HC PACU RECOVERY - ADDTL 15 MIN: Performed by: NEUROLOGICAL SURGERY

## 2025-03-17 PROCEDURE — 02HV33Z INSERTION OF INFUSION DEVICE INTO SUPERIOR VENA CAVA, PERCUTANEOUS APPROACH: ICD-10-PCS | Performed by: NEUROLOGICAL SURGERY

## 2025-03-17 PROCEDURE — 99223 1ST HOSP IP/OBS HIGH 75: CPT | Performed by: PSYCHIATRY & NEUROLOGY

## 2025-03-17 PROCEDURE — 2700000000 HC OXYGEN THERAPY PER DAY

## 2025-03-17 PROCEDURE — 6360000002 HC RX W HCPCS: Performed by: ANESTHESIOLOGY

## 2025-03-17 PROCEDURE — 3600000014 HC SURGERY LEVEL 4 ADDTL 15MIN: Performed by: NEUROLOGICAL SURGERY

## 2025-03-17 PROCEDURE — 85025 COMPLETE CBC W/AUTO DIFF WBC: CPT

## 2025-03-17 RX ORDER — SODIUM CHLORIDE 9 MG/ML
INJECTION, SOLUTION INTRAVENOUS CONTINUOUS
Status: DISCONTINUED | OUTPATIENT
Start: 2025-03-17 | End: 2025-03-20

## 2025-03-17 RX ORDER — OXYCODONE HYDROCHLORIDE 5 MG/1
5 TABLET ORAL
Status: DISCONTINUED | OUTPATIENT
Start: 2025-03-17 | End: 2025-03-17 | Stop reason: HOSPADM

## 2025-03-17 RX ORDER — SODIUM CHLORIDE 0.9 % (FLUSH) 0.9 %
5-40 SYRINGE (ML) INJECTION EVERY 12 HOURS SCHEDULED
Status: DISCONTINUED | OUTPATIENT
Start: 2025-03-17 | End: 2025-03-20 | Stop reason: HOSPADM

## 2025-03-17 RX ORDER — GINSENG 100 MG
CAPSULE ORAL PRN
Status: DISCONTINUED | OUTPATIENT
Start: 2025-03-17 | End: 2025-03-17 | Stop reason: HOSPADM

## 2025-03-17 RX ORDER — HYDRALAZINE HYDROCHLORIDE 20 MG/ML
10 INJECTION INTRAMUSCULAR; INTRAVENOUS
Status: DISCONTINUED | OUTPATIENT
Start: 2025-03-17 | End: 2025-03-20

## 2025-03-17 RX ORDER — DIPHENHYDRAMINE HYDROCHLORIDE 50 MG/ML
12.5 INJECTION, SOLUTION INTRAMUSCULAR; INTRAVENOUS
Status: DISCONTINUED | OUTPATIENT
Start: 2025-03-17 | End: 2025-03-17 | Stop reason: HOSPADM

## 2025-03-17 RX ORDER — MAGNESIUM HYDROXIDE 1200 MG/15ML
LIQUID ORAL CONTINUOUS PRN
Status: DISCONTINUED | OUTPATIENT
Start: 2025-03-17 | End: 2025-03-17 | Stop reason: HOSPADM

## 2025-03-17 RX ORDER — NEOMYCIN/BACITRACIN/POLYMYXINB 3.5-400-5K
OINTMENT (GRAM) TOPICAL 2 TIMES DAILY
Status: DISCONTINUED | OUTPATIENT
Start: 2025-03-17 | End: 2025-03-20 | Stop reason: HOSPADM

## 2025-03-17 RX ORDER — FENTANYL CITRATE 50 UG/ML
INJECTION, SOLUTION INTRAMUSCULAR; INTRAVENOUS
Status: DISCONTINUED | OUTPATIENT
Start: 2025-03-17 | End: 2025-03-17 | Stop reason: SDUPTHER

## 2025-03-17 RX ORDER — SODIUM CHLORIDE 9 MG/ML
INJECTION, SOLUTION INTRAVENOUS PRN
Status: DISCONTINUED | OUTPATIENT
Start: 2025-03-17 | End: 2025-03-20 | Stop reason: HOSPADM

## 2025-03-17 RX ORDER — MEPERIDINE HYDROCHLORIDE 25 MG/ML
12.5 INJECTION INTRAMUSCULAR; INTRAVENOUS; SUBCUTANEOUS
Status: DISCONTINUED | OUTPATIENT
Start: 2025-03-17 | End: 2025-03-17 | Stop reason: HOSPADM

## 2025-03-17 RX ORDER — ONDANSETRON 2 MG/ML
INJECTION INTRAMUSCULAR; INTRAVENOUS
Status: DISCONTINUED | OUTPATIENT
Start: 2025-03-17 | End: 2025-03-17 | Stop reason: SDUPTHER

## 2025-03-17 RX ORDER — SODIUM CHLORIDE 9 MG/ML
INJECTION, SOLUTION INTRAVENOUS PRN
Status: DISCONTINUED | OUTPATIENT
Start: 2025-03-17 | End: 2025-03-17 | Stop reason: HOSPADM

## 2025-03-17 RX ORDER — SODIUM CHLORIDE 0.9 % (FLUSH) 0.9 %
5-40 SYRINGE (ML) INJECTION PRN
Status: DISCONTINUED | OUTPATIENT
Start: 2025-03-17 | End: 2025-03-17 | Stop reason: HOSPADM

## 2025-03-17 RX ORDER — LIDOCAINE HYDROCHLORIDE 10 MG/ML
INJECTION, SOLUTION EPIDURAL; INFILTRATION; INTRACAUDAL; PERINEURAL
Status: DISCONTINUED | OUTPATIENT
Start: 2025-03-17 | End: 2025-03-17 | Stop reason: SDUPTHER

## 2025-03-17 RX ORDER — NALOXONE HYDROCHLORIDE 0.4 MG/ML
INJECTION, SOLUTION INTRAMUSCULAR; INTRAVENOUS; SUBCUTANEOUS PRN
Status: DISCONTINUED | OUTPATIENT
Start: 2025-03-17 | End: 2025-03-17 | Stop reason: HOSPADM

## 2025-03-17 RX ORDER — DEXAMETHASONE SODIUM PHOSPHATE 10 MG/ML
INJECTION, SOLUTION INTRA-ARTICULAR; INTRALESIONAL; INTRAMUSCULAR; INTRAVENOUS; SOFT TISSUE
Status: DISCONTINUED | OUTPATIENT
Start: 2025-03-17 | End: 2025-03-17 | Stop reason: SDUPTHER

## 2025-03-17 RX ORDER — SODIUM CHLORIDE 0.9 % (FLUSH) 0.9 %
5-40 SYRINGE (ML) INJECTION PRN
Status: DISCONTINUED | OUTPATIENT
Start: 2025-03-17 | End: 2025-03-20 | Stop reason: HOSPADM

## 2025-03-17 RX ORDER — ONDANSETRON 2 MG/ML
4 INJECTION INTRAMUSCULAR; INTRAVENOUS
Status: DISCONTINUED | OUTPATIENT
Start: 2025-03-17 | End: 2025-03-17 | Stop reason: HOSPADM

## 2025-03-17 RX ORDER — ONDANSETRON 2 MG/ML
4 INJECTION INTRAMUSCULAR; INTRAVENOUS EVERY 6 HOURS PRN
Status: DISCONTINUED | OUTPATIENT
Start: 2025-03-17 | End: 2025-03-20 | Stop reason: HOSPADM

## 2025-03-17 RX ORDER — PROPOFOL 10 MG/ML
INJECTION, EMULSION INTRAVENOUS
Status: DISCONTINUED | OUTPATIENT
Start: 2025-03-17 | End: 2025-03-17 | Stop reason: SDUPTHER

## 2025-03-17 RX ORDER — ROCURONIUM BROMIDE 10 MG/ML
INJECTION, SOLUTION INTRAVENOUS
Status: DISCONTINUED | OUTPATIENT
Start: 2025-03-17 | End: 2025-03-17 | Stop reason: SDUPTHER

## 2025-03-17 RX ORDER — SODIUM CHLORIDE 0.9 % (FLUSH) 0.9 %
5-40 SYRINGE (ML) INJECTION EVERY 12 HOURS SCHEDULED
Status: DISCONTINUED | OUTPATIENT
Start: 2025-03-17 | End: 2025-03-17 | Stop reason: HOSPADM

## 2025-03-17 RX ORDER — FENTANYL CITRATE 50 UG/ML
50 INJECTION, SOLUTION INTRAMUSCULAR; INTRAVENOUS EVERY 10 MIN PRN
Status: DISCONTINUED | OUTPATIENT
Start: 2025-03-17 | End: 2025-03-17 | Stop reason: HOSPADM

## 2025-03-17 RX ORDER — METOCLOPRAMIDE HYDROCHLORIDE 5 MG/ML
10 INJECTION INTRAMUSCULAR; INTRAVENOUS
Status: DISCONTINUED | OUTPATIENT
Start: 2025-03-17 | End: 2025-03-17 | Stop reason: HOSPADM

## 2025-03-17 RX ADMIN — SODIUM CHLORIDE: 0.9 INJECTION, SOLUTION INTRAVENOUS at 13:19

## 2025-03-17 RX ADMIN — VANCOMYCIN HYDROCHLORIDE 1750 MG: 1 INJECTION, POWDER, LYOPHILIZED, FOR SOLUTION INTRAVENOUS at 18:29

## 2025-03-17 RX ADMIN — ROCURONIUM BROMIDE 50 MG: 50 INJECTION INTRAVENOUS at 13:26

## 2025-03-17 RX ADMIN — PROPOFOL 100 MG: 10 INJECTION, EMULSION INTRAVENOUS at 14:25

## 2025-03-17 RX ADMIN — SERTRALINE HYDROCHLORIDE 25 MG: 25 TABLET ORAL at 10:50

## 2025-03-17 RX ADMIN — PHENYLEPHRINE HYDROCHLORIDE 100 MCG: 10 INJECTION INTRAVENOUS at 14:39

## 2025-03-17 RX ADMIN — OMEGA-3-ACID ETHYL ESTERS CAPSULES 1 CAPSULE: 1 CAPSULE, LIQUID FILLED ORAL at 20:35

## 2025-03-17 RX ADMIN — TOPIRAMATE 25 MG: 25 TABLET, FILM COATED ORAL at 10:50

## 2025-03-17 RX ADMIN — SODIUM CHLORIDE: 0.9 INJECTION, SOLUTION INTRAVENOUS at 18:26

## 2025-03-17 RX ADMIN — TIZANIDINE 2 MG: 2 TABLET ORAL at 20:35

## 2025-03-17 RX ADMIN — LIDOCAINE HYDROCHLORIDE 40 MG: 10 INJECTION, SOLUTION EPIDURAL; INFILTRATION; INTRACAUDAL; PERINEURAL at 13:26

## 2025-03-17 RX ADMIN — ONDANSETRON 4 MG: 2 INJECTION, SOLUTION INTRAMUSCULAR; INTRAVENOUS at 13:42

## 2025-03-17 RX ADMIN — PSYLLIUM HUSK 1 PACKET: 3.4 POWDER ORAL at 10:51

## 2025-03-17 RX ADMIN — ACETAMINOPHEN 650 MG: 325 TABLET ORAL at 18:26

## 2025-03-17 RX ADMIN — FENTANYL CITRATE 50 MCG: 50 INJECTION, SOLUTION INTRAMUSCULAR; INTRAVENOUS at 13:35

## 2025-03-17 RX ADMIN — SODIUM CHLORIDE, PRESERVATIVE FREE 10 ML: 5 INJECTION INTRAVENOUS at 11:03

## 2025-03-17 RX ADMIN — PHENYLEPHRINE HYDROCHLORIDE 100 MCG: 10 INJECTION INTRAVENOUS at 14:45

## 2025-03-17 RX ADMIN — WATER 2000 MG: 1 INJECTION INTRAMUSCULAR; INTRAVENOUS; SUBCUTANEOUS at 18:19

## 2025-03-17 RX ADMIN — ROCURONIUM BROMIDE 50 MG: 50 INJECTION INTRAVENOUS at 14:25

## 2025-03-17 RX ADMIN — ACETAMINOPHEN 650 MG: 325 TABLET ORAL at 10:50

## 2025-03-17 RX ADMIN — CITALOPRAM HYDROBROMIDE 30 MG: 20 TABLET ORAL at 10:49

## 2025-03-17 RX ADMIN — PRAVASTATIN SODIUM 20 MG: 10 TABLET ORAL at 10:50

## 2025-03-17 RX ADMIN — HYDRALAZINE HYDROCHLORIDE 10 MG: 20 INJECTION INTRAMUSCULAR; INTRAVENOUS at 16:00

## 2025-03-17 RX ADMIN — DEXAMETHASONE SODIUM PHOSPHATE 8 MG: 10 INJECTION INTRAMUSCULAR; INTRAVENOUS at 14:03

## 2025-03-17 RX ADMIN — LEVOTHYROXINE SODIUM 50 MCG: 0.05 TABLET ORAL at 10:51

## 2025-03-17 RX ADMIN — BACITRACIN ZINC, NEOMYCIN, POLYMYXIN B 1 G: 400; 3.5; 5 OINTMENT TOPICAL at 20:35

## 2025-03-17 RX ADMIN — OXYBUTYNIN CHLORIDE 10 MG: 5 TABLET ORAL at 10:50

## 2025-03-17 RX ADMIN — PHENYLEPHRINE HYDROCHLORIDE 100 MCG: 10 INJECTION INTRAVENOUS at 14:33

## 2025-03-17 RX ADMIN — CETIRIZINE HYDROCHLORIDE 10 MG: 10 TABLET, FILM COATED ORAL at 10:50

## 2025-03-17 RX ADMIN — PHENYLEPHRINE HYDROCHLORIDE 100 MCG: 10 INJECTION INTRAVENOUS at 13:55

## 2025-03-17 RX ADMIN — SODIUM CHLORIDE: 0.9 INJECTION, SOLUTION INTRAVENOUS at 14:15

## 2025-03-17 RX ADMIN — SUGAMMADEX 200 MG: 100 INJECTION, SOLUTION INTRAVENOUS at 15:23

## 2025-03-17 RX ADMIN — Medication 1 CAPSULE: at 20:35

## 2025-03-17 RX ADMIN — SODIUM CHLORIDE: 0.9 INJECTION, SOLUTION INTRAVENOUS at 00:41

## 2025-03-17 RX ADMIN — FENTANYL CITRATE 50 MCG: 50 INJECTION, SOLUTION INTRAMUSCULAR; INTRAVENOUS at 14:25

## 2025-03-17 RX ADMIN — PROPOFOL 100 MG: 10 INJECTION, EMULSION INTRAVENOUS at 13:26

## 2025-03-17 ASSESSMENT — ENCOUNTER SYMPTOMS
COUGH: 0
TROUBLE SWALLOWING: 0
SHORTNESS OF BREATH: 0
VOMITING: 0
WHEEZING: 0
NAUSEA: 0
COLOR CHANGE: 0
CHEST TIGHTNESS: 0

## 2025-03-17 ASSESSMENT — PAIN DESCRIPTION - DESCRIPTORS
DESCRIPTORS: ACHING
DESCRIPTORS: SORE
DESCRIPTORS: ACHING
DESCRIPTORS: ACHING

## 2025-03-17 ASSESSMENT — PAIN DESCRIPTION - LOCATION
LOCATION: HEAD
LOCATION: HEAD

## 2025-03-17 ASSESSMENT — PAIN SCALES - GENERAL
PAINLEVEL_OUTOF10: 3
PAINLEVEL_OUTOF10: 7
PAINLEVEL_OUTOF10: 2
PAINLEVEL_OUTOF10: 7
PAINLEVEL_OUTOF10: 8

## 2025-03-17 NOTE — PROGRESS NOTES
Temperature at 7 PM was 101.4. Patient was given tylenol. Recheck was 100.4. At 12:30 AM was 101.5. NP made aware. Stat blood cultures ordered.

## 2025-03-17 NOTE — OP NOTE
[facility]                   [address] [place] [address]                            OPERATIVE REPORT      PATIENT NAME: SHIRLEY SINGH             : 1950  MED REC NO: [id number]                        ROOM: MLOZ OR Pool  ACCOUNT NO: [id number]                       ADMIT DATE: 2025  PROVIDER: Marisela Larson MD      DATE OF PROCEDURE:  2025    SURGEON:  Marisela Larson MD    PREOPERATIVE DIAGNOSIS:  Malfunction of right ventriculoperitoneal shunt with infection.    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    OPERATIONS PERFORMED:  Removal of right ventriculoperitoneal shunt, debridement, irrigation, and primary closure.    INDICATIONS:  Malfunction of infected right  shunt.    DESCRIPTION OF PROCEDURE:  The patient was given general endotracheal anesthesia in the supine position.  Positioned on her side with the right side uppermost.  The head had already been clipped and close shaved in the area of the incision.  This area was then prepped and draped from the right frontal region posterior to the right ear, the neck, upper chest and abdomen.  The previous surgery scar could be seen on the abdomen.  The patient had a scab over the entry point of the mackenzie hole on the right frontal scalp and skull and had removed the scalp and at the same time, the patient had actually removed the intraventricular portion of the shunt.  The shunt disconnected from the most proximal portion of the valve.  It appeared the scalp had not healed to cover completely the junction of the ventricular shunt with the valve and only had a scab covering this area.  The scalp had actually appeared to grow into the mackenzie hole around the edges of the mackenzie hole.  The knife was used to make a small incision over the palpable valve.  With very gentle traction, the valve and the entire peritoneal shunt was completely removed.  The scalp was then

## 2025-03-17 NOTE — PROGRESS NOTES
25 1050    psyllium husk-aspartame (METAMUCIL FIBER) packet 1 packet  1 packet Oral Daily Bhupinder Thomas R, DO   1 packet at 25 1051     Facility-Administered Medications Ordered in Other Encounters   Medication Dose Route Frequency Provider Last Rate Last Admin    fentaNYL (SUBLIMAZE) injection   IntraVENous Once PRN Harshad Foster, APRN - CRNA   50 mcg at 25 1425    propofol infusion   IntraVENous Once PRN Harshad Foster, APRN - CRNA   100 mg at 25 1425    lidocaine PF 1 % injection   IntraVENous Once PRN CristianHarshad paul, APRN - CRNA   40 mg at 25 1326    rocuronium (ZEMURON) injection   IntraVENous Once PRN Harshad Foster, APRN - CRNA   50 mg at 25 1425    ondansetron (ZOFRAN) injection   IntraVENous Once PRN Harshad Foster, APRN - CRNA   4 mg at 25 1342    phenylephrine (KAREN-SYNEPHRINE) injection   IntraVENous Once PRN Harshad Foster, APRN - CRNA   100 mcg at 25 1445    sugammadex (BRIDION) 200 MG/2ML injection   IntraVENous Once PRN Viktoriya Butler MD   200 mg at 25 1523    dexAMETHasone (DECADRON) injection   IntraVENous Once PRN Viktoriya Butler MD   8 mg at 25 1403       Physical Examination:      Vitals:  BP (!) 148/67   Pulse 67   Temp (!) 100.5 °F (38.1 °C) (Temporal)   Resp 18   Ht 1.626 m (5' 4\")   Wt 82.2 kg (181 lb 4.8 oz)   LMP  (LMP Unknown)   SpO2 98%   BMI 31.12 kg/m²   Temp (24hrs), Av.3 °F (37.9 °C), Min:98.7 °F (37.1 °C), Max:101.5 °F (38.6 °C)      General appearance: Slow to respond but still answers questions, cooperative, no distress  Lungs: clear to auscultation bilaterally, normal effort  Heart: regular rate and rhythm, no murmur  Abdomen: soft, nontender, nondistended, bowel sounds present, no masses  Extremities: no edema, redness, tenderness in the calves. Cap refill <2s  Skin: no gross lesions, rashes    Data:     Labs:  Recent Labs     25  1101 25  0622   WBC 8.8 8.6   HGB 11.8 10.3*     265     Recent Labs     03/16/25  1101 03/17/25  0622    136   K 4.2 4.2    101   CO2 24 27   BUN 17 14   CREATININE 0.90 0.81   GLUCOSE 131* 97     Recent Labs     03/16/25  1101   AST 36*   ALT 32   BILITOT <0.2   ALKPHOS 122

## 2025-03-17 NOTE — ANESTHESIA POSTPROCEDURE EVALUATION
Department of Anesthesiology  Postprocedure Note    Patient: Gloria Abbott  MRN: [id number]  YOB: 1950  Date of evaluation: 3/17/2025    Procedure Summary       Date: 03/17/25 Room / Location: [address]    Anesthesia Start: 1319 Anesthesia Stop: 1546    Procedure: REMOVAL VENTRICULAR PERITONEAL SHUNT room 270 Diagnosis:       Shunt malfunction, initial encounter      (Shunt malfunction, initial encounter [T85.618A])    Surgeons: Marisela Larson MD Responsible Provider: Viktoriya Butler MD    Anesthesia Type: general ASA Status: 3            Anesthesia Type: No value filed.    Bc Phase I: Bc Score: 9    Bc Phase II:      Anesthesia Post Evaluation    Patient location during evaluation: PACU  Patient participation: complete - patient participated  Level of consciousness: awake and alert  Pain score: 0  Airway patency: patent  Nausea & Vomiting: no vomiting and no nausea  Cardiovascular status: hemodynamically stable  Respiratory status: face mask  Hydration status: stable  Pain management: adequate    No notable events documented.

## 2025-03-17 NOTE — ANESTHESIA PRE PROCEDURE
COLONOSCOPY DIAGNOSTIC performed by Amalia Lopez MD at [facility]   • FOOT SURGERY Right 2001   • HIP SURGERY Right 02/02/2023    RIGHT HIP PINNING AND LEFT KNEE STERIOD INJECTION performed by Elliot Garcia MD at [facility]   • HYSTERECTOMY (CERVIX STATUS UNKNOWN)     • LASIK Bilateral    • SINUS ENDOSCOPY Bilateral 08/06/2024    ENDOSCOPIC MAXILLARY ANTROSTOMIES 30 MIN performed by Ryan Zepeda MD at [facility]   • SINUS ENDOSCOPY Left 09/17/2024    REVISION MAXILLARY ANTROSTOMY (LEFT)  RECENT SURGERY performed by Ryan Zepeda MD at [facility]   • SINUS SURGERY  07/10/2012   • TOTAL KNEE ARTHROPLASTY Left 05/08/2023    Left total knee arthroplasty,[place] Triathlon Total Knee System,Choice with adductor canal block preop with Nhan 4/20/23 at 10:30 am and lab work for 4/14/23 at 1:00 pm performed by Elliot Garcia MD at [facility] OR   • UPPER GASTROINTESTINAL ENDOSCOPY N/A 11/19/2024    ESOPHAGOGASTRODUODENOSCOPY DIAGNOSTIC ONLY with biopsies and esophageal dilation performed by Amalia Lopez MD at [facility]   • VENTRICULOPERITONEAL SHUNT N/A 03/15/2024    RIGHT  SHUNT CREATION AND INDICATED PROCEDURES, MICRODISSECTION, PREOPERATIVE STEROTACTIC PLANNING AND WORK performed by Sumit Shelton MD at [facility]       Social History:    Social History     Tobacco Use   • Smoking status: Never   • Smokeless tobacco: Never   Substance Use Topics   • Alcohol use: No                                Counseling given: Not Answered      Vital Signs (Current):   Vitals:    03/16/25 2245 03/17/25 0030 03/17/25 0143 03/17/25 0804   BP:  (!) 155/65 (!) 157/72 (!) 163/74   Pulse:  76 76 69   Resp:   22 18   Temp: 100.4 °F (38 °C) (!) 101.5 °F (38.6 °C) 100.4 °F (38 °C) 99.5 °F (37.5 °C)   TempSrc:   Oral Oral   SpO2:  100% 98% 100%   Weight:       Height:                                                  BP Readings from Last 3 Encounters:   03/17/25 (!) 163/74   03/16/25 (!) 141/71   11/22/24 124/60       NPO Status:

## 2025-03-17 NOTE — CARE COORDINATION
Case Management Assessment  Initial Evaluation    Date/Time of Evaluation: 3/17/2025 10:44 AM  Assessment Completed by: Betty Blue RN    If patient is discharged prior to next notation, then this note serves as note for discharge by case management.    Patient Name: Gloria Abbott                   YOB: 1950  Diagnosis: Hydrocephalus (HCC) [G91.9]  Hydrocephalus in adult (HCC) [G91.9]                   Date / Time: 3/16/2025  4:42 PM    Patient Admission Status: Inpatient   Readmission Risk (Low < 19, Mod (19-27), High > 27): Readmission Risk Score: 10.4    Current PCP: Xiomara Pappas MD  PCP verified by CM? Yes    Chart Reviewed: Yes      History Provided by: Patient, Child/Family  Patient Orientation: Alert and Oriented, Person, Place, Situation, Self    Patient Cognition: Alert    Hospitalization in the last 30 days (Readmission):  No    If yes, Readmission Assessment in CM Navigator will be completed.    Advance Directives:      Code Status: Full Code   Patient's Primary Decision Maker is:      Primary Decision Maker: Harshad Abbott - Child - [phone]    Primary Decision Maker: Sabine Vazquez - Child - [phone]    Discharge Planning:    Patient lives with: Children Type of Home: House  Primary Care Giver: Self  Patient Support Systems include: Children   Current Financial resources:    Current community resources:    Current services prior to admission: None            Current DME:              Type of Home Care services:  OT    ADLS  Prior functional level: Independent in ADLs/IADLs  Current functional level: Independent in ADLs/IADLs, Assistance with the following:, Mobility (PT/OT PENDING)    PT AM-PAC:   /24  OT AM-PAC:   /24    Family can provide assistance at DC: Yes  Would you like Case Management to discuss the discharge plan with any other family members/significant others, and if so, who? Yes (SON ARSENIO IF NEEDED)  Plans to Return to Present Housing: Unknown at present  Other

## 2025-03-17 NOTE — DISCHARGE INSTRUCTIONS
Obtain CT head at [facility] few days prior to recheck appointment.  Order done as outpatient.    Recheck one month.    Questions call office .

## 2025-03-17 NOTE — PROGRESS NOTES
Pharmacy Note  Vancomycin Consult    Gloria Abbott is a 74 y.o. female started on Vancomycin for CNS infection; consult received from Dr. Nieves to manage therapy. Also receiving the following antibiotics: rocephin.    Hydrocephalus (HCC) [G91.9]  Hydrocephalus in adult (HCC) [G91.9]  Allergies: Patient has no known allergies.    Cultures  No results for input(s): \"BC\", \"BLOODCULT2\", \"MRSAC\", \"RESPCULTURE\", \"LABGRAM\", \"ORG\", \"CXSURG\", \"WNDABS\", \"LABANAE\", \"LABURIN\", \"SPNEUAGU\", \"HSVSRC\", \"FLUA\", \"FLUB\", \"AFBSMEAR\", \"CXST\", \"FUNGUSSMEAR\", \"LABLEGI\", \"VRECX\", \"CDIFPCR\", \"JFVNW092\", \"GIAAGS\", \"ROTA\", \"FECLEU\", \"COVID19\" in the last 720 hours.  Height: 162.6 cm (5' 4\"), Weight - Scale: 82.2 kg (181 lb 4.8 oz), Body mass index is 31.12 kg/m².     MRSA Nasal swab:N/a, does not meet criteria    Recent Labs     03/17/25  0622   CREATININE 0.81   Estimated Creatinine Clearance: 61 mL/min (based on SCr of 0.81 mg/dL).  .      Assessment/Plan:  Will initiate Vancomycin with a one time loading dose of 1750 mg x1, followed by 1000 mg IV every 12 hours. Data input into [employer] platform, predicted therapeutic . Level in 24-48 hours to further assess dosing. Timing of future trough levels may be adjusted based on culture results, renal function, and clinical response.    Thank you for the consult.  Will continue to follow.    DEN DislaD

## 2025-03-17 NOTE — ACP (ADVANCE CARE PLANNING)
Advance Care Planning   Healthcare Decision Maker:    Primary Decision Maker: Harshad Abbott - Child - [phone]    Primary Decision Maker: Sabine Vazquez - Child - [phone]    Click here to complete Healthcare Decision Makers including selection of the Healthcare Decision Maker Relationship (ie \"Primary\").  Today we documented Decision Maker(s) consistent with Legal Next of Kin hierarchy.

## 2025-03-17 NOTE — PROGRESS NOTES
Patient off floor to surgery. This writer gave verbal report to Carmelita VO in Short Stay. Carmelita VO made aware that Dr. Nieves requesting cx be colleted during surgery and atbx to be given after. Carmelita VO verbalized understanding.

## 2025-03-17 NOTE — PLAN OF CARE
Problem: Safety - Adult  Goal: Free from fall injury  3/17/2025 1135 by Isra Fang, RN  Outcome: Progressing  3/17/2025 0213 by Yana Avalos, RN  Outcome: Progressing     Problem: ABCDS Injury Assessment  Goal: Absence of physical injury  3/17/2025 0213 by Yana Avalos, RN  Outcome: Progressing

## 2025-03-17 NOTE — BRIEF OP NOTE
Brief Postoperative Note      Patient: Gloria Abbott  YOB: 1950  MRN: [id number]    Date of Procedure: 3/17/2025    Pre-Op Diagnosis Codes:      * Shunt malfunction, initial encounter [T85.618A]    Post-Op Diagnosis: Same       Procedure(s):  REMOVAL VENTRICULAR PERITONEAL SHUNT room 270  Incision debridement and drainage scalp wound    Surgeon(s):  Marisela Larson MD    Assistant:  Physician Assistant: Alfonzo Turner PA-C  Physician Assistant: Alfonzo Turner PA-C, provided necessary surgical assistance throughout the entire case. Given the nature of the disease process and the complexity of the procedure, the assistance consisted of positioning, retraction, use of instrumentation, closing of incision and other duties.    Anesthesia: General    Estimated Blood Loss (mL): Minimal    Complications: None        Findings:  Infection Present At Time Of Surgery (PATOS) (choose all levels that have infection present): Cultures obtained.  The shunt being a foreign body and exposed to the environment is infected.  No visible pus.    Other Findings: The scalp was not covering the shunt valve.    Electronically signed by MARISELA LARSON MD on 3/17/2025 at 12:19 PM

## 2025-03-17 NOTE — CONSULTS
Patient Name: Gloria Abbott  Admit Date: 3/16/2025  4:42 PM  MR #: [id number]  : 1950    Reason for consult: Failure right ventriculoperitoneal shunt with spontaneous removal  History of Presenting Illness and Review of Systems      Gloria Abbott is a 74 y.o. female on hospital day 0 .  History Of Present Illness: 3/16/2025 [facility] emergency room for accidental exposure removal ventricular shunt.    3/15/2024 right ventriculoperitoneal shunt Dr. Shelton  Son attested the shunt did improve patient's cognition and urinary incontinence present before the procedure.     History:       Past Medical History        Past Medical History:   Diagnosis Date    ADHD (attention deficit hyperactivity disorder)      Arthritis      Cancer (HCC)       endometrial and uterine    Depression      Hyperlipidemia      Hypertension      Hypothyroid      MDD (major depressive disorder)      NPH (normal pressure hydrocephalus) (HCC)      PONV (postoperative nausea and vomiting)       Nausea    Protruded lumbar disc       L4/L5    Stress      Vitamin D deficiency           Past Surgical History         Past Surgical History:   Procedure Laterality Date    ANKLE SURGERY Right     COLONOSCOPY        COLONOSCOPY N/A 2024     COLONOSCOPY DIAGNOSTIC performed by Amalia Lopez MD at [facility]    FOOT SURGERY Right     HIP SURGERY Right 2023     RIGHT HIP PINNING AND LEFT KNEE STERIOD INJECTION performed by Elliot Garcia MD at [facility] OR    HYSTERECTOMY (CERVIX STATUS UNKNOWN)        LASIK Bilateral      SINUS ENDOSCOPY Bilateral 2024     ENDOSCOPIC MAXILLARY ANTROSTOMIES 30 MIN performed by Ryan Zepeda MD at [facility] OR    SINUS ENDOSCOPY Left 2024     REVISION MAXILLARY ANTROSTOMY (LEFT)  RECENT SURGERY performed by Ryan Zepeda MD at [facility] OR    SINUS SURGERY   07/10/2012    TOTAL KNEE ARTHROPLASTY Left 2023     Left total knee arthroplasty,[place] Triathlon Total Knee System,Choice  (Non-Medical): No   Physical Activity: Inactive (9/6/2024)     Exercise Vital Sign      Days of Exercise per Week: 0 days      Minutes of Exercise per Session: 0 min   Stress: Not on file   Social Connections: Not on file   Intimate Partner Violence: Not on file   Housing Stability: Unknown (9/6/2024)     Housing Stability Vital Sign      Unable to Pay for Housing in the Last Year: No      Number of Times Moved in the Last Year: Not on file      Homeless in the Last Year: No         [] Unable to obtain due to ventilated and/ or neurologic status           Home Medications:      Prescriptions Prior to Admission   Not in a hospital admission.        Current Hospital Medications:      Scheduled Meds:  Scheduled Medications    ceFAZolin  1,000 mg IntraVENous Once    morphine  4 mg IntraVENous Once    ondansetron  4 mg IntraVENous Once         Continuous Infusions:  Infusions Meds        PRN Meds:.  PRN Medications        .  Infusions Meds            Allergies:      Allergies   No Known Allergies            REVIEW OF SYSTEMS    (2-9 systems for level 4, 10 or more for level 5)      Review of Systems   Constitutional:  Negative for fever.   HENT:  Negative for hearing loss.    Eyes:  Negative for pain.   Respiratory:  Negative for shortness of breath.    Gastrointestinal:  Negative for constipation and nausea.   Endocrine: Negative for heat intolerance.   Genitourinary:  Negative for difficulty urinating.   Musculoskeletal:  Negative for back pain and neck pain.   Skin:  Negative for rash.   Allergic/Immunologic: Negative for immunocompromised state.   Neurological:  Positive for headaches.   Hematological:  Does not bruise/bleed easily.   Psychiatric/Behavioral:  Negative for sleep disturbance.          Physical Exam   BP (!) 180/75   Pulse 94   Temp 99.2 °F (37.3 °C) (Oral)   Resp 18   Wt 80.7 kg (178 lb)   LMP  (LMP Unknown)   SpO2 92%   BMI 30.54 kg/m²      Body mass index is 30.54 kg/m².     Physical

## 2025-03-17 NOTE — CONSULTS
Infectious Diseases Inpatient Consult Note      Reason for Consult:   Possible  shunt infection  Requesting Physician:   Dr Thomas  Primary Care Physician:  Xiomara Pappas MD  History Obtained From:   Pt, EPIC    Admit Date: 3/16/2025  Hospital Day: 2      HISTORY OF PRESENT ILLNESS:  This is a 74 y.o. female with past medical history of normal pressure hydrocephalus status post  shunt placed 1 year ago, endometrial and uterine cancer with history of hysterectomy, degenerative disc disease, depression, hyperlipidemia, hypertension, hypothyroidism, was admitted to [facility]  from home yesterday through ER after she picked a scab over her scalp area at an old incision site and pulled part of her  shunt with the scab.  Patient started having drainage after  shunt fell off.  Patient started having headaches afterwards.  Patient has history of unsteady gait related to hydrocephalus and uses a cane at home.  She denies any fevers or chills.  No GI symptoms.  Started having fevers last night.  Blood cultures are pending.  Currently on no antibiotics.  Patient is scheduled for surgery today.     Past medical surgical and social history reviewed    Past Medical History:   Diagnosis Date    ADHD (attention deficit hyperactivity disorder)     Arthritis     Cancer (HCC)     endometrial and uterine    Depression     Hyperlipidemia     Hypertension     Hypothyroid     MDD (major depressive disorder)     NPH (normal pressure hydrocephalus) (HCC)     PONV (postoperative nausea and vomiting)     Nausea    Protruded lumbar disc     L4/L5    Stress     Vitamin D deficiency        Past Surgical History:   Procedure Laterality Date    ANKLE SURGERY Right 2001    COLONOSCOPY      COLONOSCOPY N/A 11/19/2024    COLONOSCOPY DIAGNOSTIC performed by Amalia Lopez MD at [facility]    FOOT SURGERY Right 2001    HIP SURGERY Right 02/02/2023    RIGHT HIP PINNING AND LEFT KNEE STERIOD INJECTION performed by Elliot Garcia MD at  post-operative pain    Acute blood loss anemia    Status post total knee replacement, left    Status post left knee replacement    Normal pressure hydrocephalus (HCC)    Chronic left maxillary sinusitis    Carpal tunnel syndrome, bilateral    Bilateral high frequency sensorineural hearing loss    Altered bowel habits    Urge incontinence of urine    Allergic rhinitis    Constipation    Ventricular enlargement due to brain atrophy    Infection of ventriculo-peritoneal shunt, initial encounter    Nonfunctioning ventriculoperitoneal shunt, initial encounter       PLAN:  Start IV vancomycin and Rocephin to be started after IntraOp cultures are collected as discussed with the nurse   I ordered CSF and catheter cultures to be collected IntraOp  Agree with surgical intervention as planned  Recommend IntraOp cultures  Follow-up blood cultures  Follow-up clinically  CRP today    Discussed with patient    Debra Nieves MD

## 2025-03-17 NOTE — CARE COORDINATION
IV BENEFIT REQUEST FORM    FAX FROM: [address] [id number]    REQUESTED BY: Electronically signed by Betty Blue RN on 3/17/2025 at 10:48 AM                                               RN/C3: PHONE: [phone])     DATE:/TIME OF REQUEST: 25  TIME: 10:48 AM      TO: [facility]      FAX TO: [phone]    PHONE: [phone]     THIS PATIENT HAS BEEN IDENTIFIED TO POSSIBLY NEED LONG TERM IV'S.    PLEASE CHECK INSURANCE COVERAGE FOR THE FOLLOWING PT/DRUGS.    PATIENT'S NAME: SHIRLEY SINGH                              [facility]   PATIENT'S : 1950  PATIENT ADDRESS: [address] [place] [address]  SSN:    ([id number])     PAYOR NAME:  Payor: [employer] / Plan: [employer] PLANS / Product Type: *No Product type* /     DRUG: (ROCEPHIN)                         DOSE: (2000MG)            FREQUENCY: Q (12) HR        __________ CHECK HERE IF PT HAS NO INSURANCE AND REQUESTING SELF PAY COST.    *IF [facility] IS NOT A PROVIDER FOR THIS PATIENT, PLEASE FORWARD INFO VIA FAX TO [facility] @ [phone],(PHONE NUMBER: [phone]) TO RUN BENEFIT VERIFICATION AND NOTIFY THE ABOVE C3 OF THIS PLAN.    (FAX FACE SHEET WITH DEMOGRAPHICS AND INSURANCE INFO WITH THIS FORM.)  PLEASE FAX BENEFIT INFO TO: THE [facility] [phone]    This message is intended only for the use of the individual or entity to which it is addressed and may contain information that is privileged, confidential, and exempt from disclosure under applicable law. If the reader of the notice is not intended recipient of the employee/agent responsible for delivering the message to the intended recipient, you are hereby notified than any dissemination, distribution or copying of this communication is strictly prohibited. Please contact the sender for further instructions on handling the

## 2025-03-18 ENCOUNTER — APPOINTMENT (OUTPATIENT)
Dept: CT IMAGING | Age: 75
DRG: 031 | End: 2025-03-18
Attending: NEUROLOGICAL SURGERY
Payer: COMMERCIAL

## 2025-03-18 LAB
ANION GAP SERPL CALCULATED.3IONS-SCNC: 10 MEQ/L (ref 9–15)
BASOPHILS # BLD: 0.1 K/UL (ref 0–0.2)
BASOPHILS NFR BLD: 0.4 %
BILIRUB UR QL STRIP: NEGATIVE
BUN SERPL-MCNC: 13 MG/DL (ref 8–23)
CALCIUM SERPL-MCNC: 8.1 MG/DL (ref 8.5–9.9)
CHLORIDE SERPL-SCNC: 99 MEQ/L (ref 95–107)
CLARITY UR: CLEAR
CO2 SERPL-SCNC: 23 MEQ/L (ref 20–31)
COLOR UR: YELLOW
CREAT SERPL-MCNC: 0.75 MG/DL (ref 0.5–0.9)
EOSINOPHIL # BLD: 0 K/UL (ref 0–0.7)
EOSINOPHIL NFR BLD: 0.1 %
ERYTHROCYTE [DISTWIDTH] IN BLOOD BY AUTOMATED COUNT: 12.8 % (ref 11.5–14.5)
GLUCOSE SERPL-MCNC: 101 MG/DL (ref 70–99)
GLUCOSE UR STRIP-MCNC: NEGATIVE MG/DL
HCT VFR BLD AUTO: 30.9 % (ref 37–47)
HGB BLD-MCNC: 10.4 G/DL (ref 12–16)
HGB UR QL STRIP: NEGATIVE
KETONES UR STRIP-MCNC: NEGATIVE MG/DL
LEUKOCYTE ESTERASE UR QL STRIP: NEGATIVE
LYMPHOCYTES # BLD: 0.8 K/UL (ref 1–4.8)
LYMPHOCYTES NFR BLD: 7 %
MCH RBC QN AUTO: 31 PG (ref 27–31.3)
MCHC RBC AUTO-ENTMCNC: 33.7 % (ref 33–37)
MCV RBC AUTO: 92 FL (ref 79.4–94.8)
MONOCYTES # BLD: 1.2 K/UL (ref 0.2–0.8)
MONOCYTES NFR BLD: 9.8 %
NEUTROPHILS # BLD: 9.7 K/UL (ref 1.4–6.5)
NEUTS SEG NFR BLD: 82.4 %
NITRITE UR QL STRIP: NEGATIVE
PH UR STRIP: 5.5 [PH] (ref 5–9)
PLATELET # BLD AUTO: 284 K/UL (ref 130–400)
POTASSIUM SERPL-SCNC: 4.2 MEQ/L (ref 3.4–4.9)
PROT UR STRIP-MCNC: NEGATIVE MG/DL
RBC # BLD AUTO: 3.36 M/UL (ref 4.2–5.4)
SODIUM SERPL-SCNC: 132 MEQ/L (ref 135–144)
SP GR UR STRIP: 1.02 (ref 1–1.03)
URINE REFLEX TO CULTURE: NORMAL
UROBILINOGEN UR STRIP-ACNC: 0.2 E.U./DL
WBC # BLD AUTO: 11.8 K/UL (ref 4.8–10.8)

## 2025-03-18 PROCEDURE — 2700000000 HC OXYGEN THERAPY PER DAY

## 2025-03-18 PROCEDURE — 1210000000 HC MED SURG R&B

## 2025-03-18 PROCEDURE — 81003 URINALYSIS AUTO W/O SCOPE: CPT

## 2025-03-18 PROCEDURE — 80048 BASIC METABOLIC PNL TOTAL CA: CPT

## 2025-03-18 PROCEDURE — 6370000000 HC RX 637 (ALT 250 FOR IP): Performed by: NEUROLOGICAL SURGERY

## 2025-03-18 PROCEDURE — 97162 PT EVAL MOD COMPLEX 30 MIN: CPT

## 2025-03-18 PROCEDURE — 94150 VITAL CAPACITY TEST: CPT

## 2025-03-18 PROCEDURE — 2580000003 HC RX 258: Performed by: NEUROLOGICAL SURGERY

## 2025-03-18 PROCEDURE — 99232 SBSQ HOSP IP/OBS MODERATE 35: CPT | Performed by: PSYCHIATRY & NEUROLOGY

## 2025-03-18 PROCEDURE — 70450 CT HEAD/BRAIN W/O DYE: CPT

## 2025-03-18 PROCEDURE — 6370000000 HC RX 637 (ALT 250 FOR IP): Performed by: NURSE PRACTITIONER

## 2025-03-18 PROCEDURE — APPSS30 APP SPLIT SHARED TIME 16-30 MINUTES: Performed by: NURSE PRACTITIONER

## 2025-03-18 PROCEDURE — 85025 COMPLETE CBC W/AUTO DIFF WBC: CPT

## 2025-03-18 PROCEDURE — 51798 US URINE CAPACITY MEASURE: CPT

## 2025-03-18 PROCEDURE — 36415 COLL VENOUS BLD VENIPUNCTURE: CPT

## 2025-03-18 PROCEDURE — 2500000003 HC RX 250 WO HCPCS: Performed by: NEUROLOGICAL SURGERY

## 2025-03-18 PROCEDURE — 97166 OT EVAL MOD COMPLEX 45 MIN: CPT

## 2025-03-18 PROCEDURE — 99232 SBSQ HOSP IP/OBS MODERATE 35: CPT | Performed by: INTERNAL MEDICINE

## 2025-03-18 PROCEDURE — 6360000002 HC RX W HCPCS: Performed by: INTERNAL MEDICINE

## 2025-03-18 PROCEDURE — 6360000002 HC RX W HCPCS: Performed by: NEUROLOGICAL SURGERY

## 2025-03-18 RX ORDER — KETOROLAC TROMETHAMINE 15 MG/ML
15 INJECTION, SOLUTION INTRAMUSCULAR; INTRAVENOUS EVERY 8 HOURS
Status: COMPLETED | OUTPATIENT
Start: 2025-03-18 | End: 2025-03-19

## 2025-03-18 RX ORDER — BUTALBITAL, ACETAMINOPHEN AND CAFFEINE 300; 40; 50 MG/1; MG/1; MG/1
1 CAPSULE ORAL 3 TIMES DAILY
Status: DISCONTINUED | OUTPATIENT
Start: 2025-03-18 | End: 2025-03-20 | Stop reason: HOSPADM

## 2025-03-18 RX ADMIN — BACITRACIN ZINC, NEOMYCIN, POLYMYXIN B 1 G: 400; 3.5; 5 OINTMENT TOPICAL at 08:51

## 2025-03-18 RX ADMIN — BUTALBITA,ACETAMINOPHEN AND CAFFEINE 1 CAPSULE: 50; 300; 40 CAPSULE ORAL at 15:48

## 2025-03-18 RX ADMIN — SODIUM CHLORIDE: 0.9 INJECTION, SOLUTION INTRAVENOUS at 10:59

## 2025-03-18 RX ADMIN — PSYLLIUM HUSK 1 PACKET: 3.4 POWDER ORAL at 08:59

## 2025-03-18 RX ADMIN — CITALOPRAM HYDROBROMIDE 30 MG: 20 TABLET ORAL at 08:52

## 2025-03-18 RX ADMIN — KETOROLAC TROMETHAMINE 15 MG: 15 INJECTION, SOLUTION INTRAMUSCULAR; INTRAVENOUS at 12:15

## 2025-03-18 RX ADMIN — PRAVASTATIN SODIUM 20 MG: 10 TABLET ORAL at 08:52

## 2025-03-18 RX ADMIN — WATER 2000 MG: 1 INJECTION INTRAMUSCULAR; INTRAVENOUS; SUBCUTANEOUS at 06:10

## 2025-03-18 RX ADMIN — LEVOTHYROXINE SODIUM 50 MCG: 0.05 TABLET ORAL at 08:52

## 2025-03-18 RX ADMIN — ACETAMINOPHEN 650 MG: 325 TABLET ORAL at 02:23

## 2025-03-18 RX ADMIN — OXYBUTYNIN CHLORIDE 10 MG: 5 TABLET ORAL at 08:52

## 2025-03-18 RX ADMIN — SERTRALINE HYDROCHLORIDE 25 MG: 25 TABLET ORAL at 08:52

## 2025-03-18 RX ADMIN — VANCOMYCIN HYDROCHLORIDE 1000 MG: 1 INJECTION, POWDER, LYOPHILIZED, FOR SOLUTION INTRAVENOUS at 18:21

## 2025-03-18 RX ADMIN — Medication 1 CAPSULE: at 20:23

## 2025-03-18 RX ADMIN — TOPIRAMATE 25 MG: 25 TABLET, FILM COATED ORAL at 08:59

## 2025-03-18 RX ADMIN — CETIRIZINE HYDROCHLORIDE 10 MG: 10 TABLET, FILM COATED ORAL at 08:52

## 2025-03-18 RX ADMIN — ACETAMINOPHEN 650 MG: 325 TABLET ORAL at 08:51

## 2025-03-18 RX ADMIN — VANCOMYCIN HYDROCHLORIDE 1000 MG: 1 INJECTION, POWDER, LYOPHILIZED, FOR SOLUTION INTRAVENOUS at 06:11

## 2025-03-18 RX ADMIN — KETOROLAC TROMETHAMINE 15 MG: 15 INJECTION, SOLUTION INTRAMUSCULAR; INTRAVENOUS at 20:23

## 2025-03-18 RX ADMIN — BUTALBITA,ACETAMINOPHEN AND CAFFEINE 1 CAPSULE: 50; 300; 40 CAPSULE ORAL at 20:23

## 2025-03-18 RX ADMIN — OMEGA-3-ACID ETHYL ESTERS CAPSULES 1 CAPSULE: 1 CAPSULE, LIQUID FILLED ORAL at 20:23

## 2025-03-18 RX ADMIN — WATER 2000 MG: 1 INJECTION INTRAMUSCULAR; INTRAVENOUS; SUBCUTANEOUS at 18:10

## 2025-03-18 RX ADMIN — BACITRACIN ZINC, NEOMYCIN, POLYMYXIN B 1 G: 400; 3.5; 5 OINTMENT TOPICAL at 20:23

## 2025-03-18 ASSESSMENT — ENCOUNTER SYMPTOMS
NAUSEA: 0
CHEST TIGHTNESS: 0
WHEEZING: 0
TROUBLE SWALLOWING: 0
COLOR CHANGE: 0
VOMITING: 0
SHORTNESS OF BREATH: 0
COUGH: 0

## 2025-03-18 ASSESSMENT — PAIN SCALES - GENERAL
PAINLEVEL_OUTOF10: 8
PAINLEVEL_OUTOF10: 7
PAINLEVEL_OUTOF10: 5
PAINLEVEL_OUTOF10: 7
PAINLEVEL_OUTOF10: 6
PAINLEVEL_OUTOF10: 0
PAINLEVEL_OUTOF10: 8
PAINLEVEL_OUTOF10: 5

## 2025-03-18 ASSESSMENT — PAIN DESCRIPTION - DESCRIPTORS
DESCRIPTORS: THROBBING
DESCRIPTORS: ACHING
DESCRIPTORS: ACHING

## 2025-03-18 ASSESSMENT — PAIN DESCRIPTION - ORIENTATION: ORIENTATION: ANTERIOR

## 2025-03-18 ASSESSMENT — PAIN DESCRIPTION - LOCATION: LOCATION: HEAD

## 2025-03-18 NOTE — CARE COORDINATION
MET WITH PT AND SON AT BEDSIDE TO DISCUSS DC PLAN. IV CHECK = $101.12/WK  REVIEWED PT/OT EVALS AND RECOMMENDATIONS. PT AND SON FEEL SHE WILL LIKELY NEED SNF/REHAB PRIOR TO RETURNING HOME. FOC OFFERED, PT WOULD LIKE [facility]. REFERRAL CALLED TO [facility].

## 2025-03-18 NOTE — PROGRESS NOTES
Pharmacy Vancomycin Consult     Vancomycin Day: 2  Current Dosing: Vanco 1000mg IV q12h      Recent Labs     03/17/25  0622 03/18/25  0457   BUN 14 13   CREATININE 0.81 0.75   WBC 8.6 11.8*       Intake/Output Summary (Last 24 hours) at 3/18/2025 1039  Last data filed at 3/18/2025 0436  Gross per 24 hour   Intake 1700 ml   Output 1550 ml   Net 150 ml     Cultures  Recent Labs     03/17/25  0103   BC No Growth to date.  Any change in status will be called.   BLOODCULT2 No Growth to date.  Any change in status will be called.     Height: 162.6 cm (5' 4\"), Weight - Scale: 82.2 kg (181 lb 4.8 oz), Body mass index is 31.12 kg/m².    Estimated Creatinine Clearance: 66 mL/min (based on SCr of 0.75 mg/dL).  .    Trough:  No results for input(s): \"VANCOTROUGH\", \"VANCORANDOM\" in the last 72 hours.   Assessment/Plan:  Data input into [employer] platform, predicted therapeutic. Dosing predicted therapeutic . Level tomorrow am to further assess. Timing of future trough levels may be adjusted based on culture results, renal function, and clinical response.    Thank you,   Cate Kulkarni, [facility] PharmD

## 2025-03-18 NOTE — PLAN OF CARE
See OT evaluation for all goals and OT POC. Electronically signed by Keysha Temple OTR/L on 3/18/2025 at 10:36 AM

## 2025-03-18 NOTE — PROGRESS NOTES
[facility] Neurology Daily Progress Note  Name: Gloria Abbott  Age: 74 y.o.  Gender: female  CodeStatus: Full Code  Allergies: No Known Allergies    Chief Complaint:No chief complaint on file.    Primary Care Provider: Xiomara Pappas MD  InpatientTreatment Team: Treatment Team:   Bhupinder Thomas DO Hazen, Gale, MD Abbud, Rita A, MD Patel, MD Neo Chen Gale, MD Jones, Thirkeshia, Karen Guzman, Andie Sloan, Che [facility]  Admission Date: 3/16/2025      HPI   Pt seen and examined for neuro follow up.  Patient alert and oriented although somewhat slower to respond today.  Ongoing low-grade temp.  Ongoing headache that she rates 8 out of 10.  No focal deficits.  No seizure activity.  Patient seen and examined events as noted above.    Vitals:    03/18/25 1105   BP: (!) 145/65   Pulse: 72   Resp: 16   Temp: 100.2 °F (37.9 °C)   SpO2: 93%        Review of Systems   Constitutional:  Positive for fatigue and fever. Negative for appetite change and chills.   HENT:  Negative for hearing loss and trouble swallowing.    Eyes:  Negative for visual disturbance.   Respiratory:  Negative for cough, chest tightness, shortness of breath and wheezing.    Cardiovascular:  Negative for chest pain, palpitations and leg swelling.   Gastrointestinal:  Negative for nausea and vomiting.   Musculoskeletal:  Positive for gait problem.   Skin:  Negative for color change and rash.   Neurological:  Positive for headaches. Negative for dizziness, tremors, seizures, syncope, facial asymmetry, speech difficulty, weakness, light-headedness and numbness.   Psychiatric/Behavioral:  Positive for decreased concentration. Negative for agitation, confusion and hallucinations. The patient is not nervous/anxious.          Physical Exam  Vitals and nursing note reviewed.   Constitutional:       General: She is not in acute distress.     Appearance: She is ill-appearing. She is not diaphoretic.   HENT:      Head:  color flow Doppler was obtained of the carotid arteries.    COMPARISON:  None.    HISTORY:  ORDERING SYSTEM PROVIDED HISTORY: syncope  TECHNOLOGIST PROVIDED HISTORY:  Reason for exam:->syncope  What reading provider will be dictating this exam?->CRC    FINDINGS:    RIGHT:    There is carotid tortuosity.    Peak systolic velocities in the common carotid artery range from 101 cm/sec  to 118 cm/sec.    Peak systolic velocities in the internal carotid artery range from 78 cm/sec  to 127 cm/sec.    ICA/CCA peak systolic velocity ratio is 1.1.    Vertebral artery flow is antegrade.      LEFT:    There is carotid tortuosity.    Peak systolic velocities in the common carotid artery range from 110 cm/sec  to 135 cm/sec.    Peak systolic velocities in the internal carotid artery range from 96 cm/sec  to 171 cm/sec.    ICA/CCA peak systolic velocity ratio is 1.3.    Vertebral artery flow is antegrade.    Impression  The right internal carotid artery demonstrates 0-50% stenosis.    The left internal carotid artery demonstrates 0-50% stenosis.    Bilateral vertebral arteries are patent with flow in the normal direction.      Echo No results found for this or any previous visit.            Assessment/Plan:  3/17/2025:  Patient is a 74-year-old female with past medical history of ADHD, endometrial and uterine cancer, depression, hyperlipidemia, hypertension, hypothyroidism, depression, NPH with  shunt placed 3/15/2024 who presented to the emergency room on 3/16/2025 due to accidental removal of  shunt.  Patient had been picking a scab on the top of her head and woke up in the morning with a 7 cm portion of shunt on her pillow.  Patient subsequently developed drainage from the area and headaches.  No fevers.  No leukocytosis.  Blood cultures pending.  Vital signs in the emergency room 180/75, 94, 18, 99.2 °F, 92%.  Initial lab testing largely unremarkable  CT of the head showed increased dilation of the lateral and third

## 2025-03-18 NOTE — PROGRESS NOTES
Patient alert and oriented to self, place and situation. She is noted to have delayed responses but able to follow commands. Patient denies pain/headache at this time. Patient has a Temp of 100.8 and was given PRN Tylenol. Dr. Thomas made aware via [employer]. Call light within reach.

## 2025-03-18 NOTE — PROGRESS NOTES
Patient reassessed and she is lying in bed in supine position. C/O 8 out of 10 pain for headache. Patient temp decrease slightly from 100.8 to 100.2.. She continues to have delayed responses with questions. No numbness/tingling in all four extremities. This writer Perfect Serve Dr. Thomas to request stronger medication for headache. Awaiting reply. Anca ROGERS at bedside, made aware and will place new orders. Patient SPO2 at 93% on RA. Applied 1.5L of O2 per NC to assist with mentation. Safety precautions in place and call light within reach.

## 2025-03-18 NOTE — PROGRESS NOTES
Physical Therapy Med Surg Initial Assessment  Facility/Department: [address] ORTHO TELE  Room: [facility]       NAME: Gloria Abbott  : 1950 (74 y.o.)  MRN: [id number]  CODE STATUS: Full Code    Date of Service: 3/18/2025    Patient Diagnosis(es): Hydrocephalus (HCC) [G91.9]  Hydrocephalus in adult (HCC) [G91.9]   No chief complaint on file.    Patient Active Problem List    Diagnosis Date Noted    Acute post-operative pain 2023    Acute blood loss anemia 2023    Subcapital fracture of femur, right, closed, initial encounter ([facility]) 2023    Acute pain due to trauma 2023    Closed right hip fracture, initial encounter ([facility]) 2023    Dizziness 2023    Shunt malfunction 2025    Ventricular enlargement due to brain atrophy 2025    Infection of ventriculo-peritoneal shunt, initial encounter 2025    Nonfunctioning ventriculoperitoneal shunt, initial encounter 2025    Urge incontinence of urine 2024    Allergic rhinitis 2024    Constipation 2024    Altered bowel habits 10/16/2024    Chronic left maxillary sinusitis 2024    Bilateral high frequency sensorineural hearing loss 2024    Normal pressure hydrocephalus (HCC) 03/15/2024    Carpal tunnel syndrome, bilateral 2023    Status post left knee replacement 05/10/2023    Status post total knee replacement, left 2023    Osteoarthritis of left knee 2023    COVID-19 2021    Osteopenia 2020    History of uterine cancer 2018    History of endometrial cancer 2018    Other joint derangement, not elsewhere classified, ankle and foot 2015    Major depressive disorder, recurrent episode, moderate (HCC) 2015    Hypothyroid 2015    Maisonneuve fracture of right fibula 2015    Sprain of ankle, deltoid ligament 2015    Hyperlipidemia     Depression     Stress     Vitamin D deficiency         Past Medical History:   Diagnosis  Date    ADHD (attention deficit hyperactivity disorder)     Arthritis     Cancer (HCC)     endometrial and uterine    Depression     Hyperlipidemia     Hypertension     Hypothyroid     MDD (major depressive disorder)     NPH (normal pressure hydrocephalus) (HCC)     PONV (postoperative nausea and vomiting)     Nausea    Protruded lumbar disc     L4/L5    Stress     Vitamin D deficiency      Past Surgical History:   Procedure Laterality Date    ANKLE SURGERY Right 2001    COLONOSCOPY      COLONOSCOPY N/A 11/19/2024    COLONOSCOPY DIAGNOSTIC performed by Amalia Lopez MD at [facility]    FOOT SURGERY Right 2001    HIP SURGERY Right 02/02/2023    RIGHT HIP PINNING AND LEFT KNEE STERIOD INJECTION performed by Elliot Garcia MD at [facility] OR    HYSTERECTOMY (CERVIX STATUS UNKNOWN)      LASIK Bilateral     SHUNT REMOVAL N/A 3/17/2025    REMOVAL VENTRICULAR PERITONEAL SHUNT room 270 performed by Marisela Larson MD at [facility] OR    SINUS ENDOSCOPY Bilateral 08/06/2024    ENDOSCOPIC MAXILLARY ANTROSTOMIES 30 MIN performed by Ryan Zepeda MD at [facility] OR    SINUS ENDOSCOPY Left 09/17/2024    REVISION MAXILLARY ANTROSTOMY (LEFT)  RECENT SURGERY performed by Ryan Zepeda MD at [facility] OR    SINUS SURGERY  07/10/2012    TOTAL KNEE ARTHROPLASTY Left 05/08/2023    Left total knee arthroplasty,Tyler Triathlon Total Knee System,Choice with adductor canal block preop with Nhan 4/20/23 at 10:30 am and lab work for 4/14/23 at 1:00 pm performed by Elliot Garcia MD at [facility] OR    UPPER GASTROINTESTINAL ENDOSCOPY N/A 11/19/2024    ESOPHAGOGASTRODUODENOSCOPY DIAGNOSTIC ONLY with biopsies and esophageal dilation performed by Amalia Lopez MD at [facility]    VENTRICULOPERITONEAL SHUNT N/A 03/15/2024    RIGHT  SHUNT CREATION AND INDICATED PROCEDURES, MICRODISSECTION, PREOPERATIVE STEROTACTIC PLANNING AND WORK performed by Sumit Shelton MD at [facility] OR       Chart Reviewed: Yes  Patient assessed for rehabilitation services?:

## 2025-03-18 NOTE — PROGRESS NOTES
Postoperative day #1 removal right ventriculoperitoneal shunt debridement incision and drainage scalp wound.  Patient awake alert responsive.  Wound appears to be healing well.  Start mobilizing out of bed physical occupational therapies.  Wound cultures pending.

## 2025-03-18 NOTE — PROGRESS NOTES
Physician Progress Note    3/18/2025   4:56 PM    Name:  Gloria Abbott  MRN:    [id number]     IP Day: 2     Admit Date: 3/16/2025  4:42 PM  PCP: Xiomara Pappas MD    Code Status:  Full Code    Assessment and Plan:        1.  NPH with shunt dysfunction and infection: Prior to coming to hospital, patient picked a scab and in the morning realized her shunt was on the pillow  -S/p I&D and removal of shunt 3/17  -IV vancomycin and ceftriaxone per infectious disease.  Following cultures  -Neurology to follow  -PT/OT.  Shannon Reyes, [facility] once stable for discharge    2.  Fever due to above    3.  Headache due to above     Depression    SCDs  Full Code    Electronically signed by Bhupinder Thomas DO on 3/18/2025 at 4:56 PM    Subjective:     Going for surgery    Current Facility-Administered Medications   Medication Dose Route Frequency Provider Last Rate Last Admin    ketorolac (TORADOL) injection 15 mg  15 mg IntraVENous q8h Bhupinder Thomas DO   15 mg at 03/18/25 1215    butalbital-APAP-caffeine -40 MG per capsule 1 capsule  1 capsule Oral TID Anca Caputo, APRN - CNP   1 capsule at 03/18/25 1548    cefTRIAXone (ROCEPHIN) 2,000 mg in sterile water 20 mL IV syringe  2,000 mg IntraVENous Q12H Marisela Larson MD   2,000 mg at 03/18/25 0610    vancomycin (VANCOCIN) 1,000 mg in sodium chloride 0.9 % 250 mL (addEASE) IVPB  1,000 mg IntraVENous Q12H Marisela Larson MD   1,000 mg at 03/18/25 0611    vancomycin (VANCOCIN) intermittent dosing (placeholder)   Other RX Placeholder Marisela Larson MD        0.9 % sodium chloride infusion   IntraVENous Continuous Marisela Larson  mL/hr at 03/18/25 1059 New Bag at 03/18/25 1059    sodium chloride flush 0.9 % injection 5-40 mL  5-40 mL IntraVENous 2 times per day Marisela Larson MD        sodium chloride flush 0.9 % injection 5-40 mL  5-40 mL IntraVENous PRN Marisela Larson MD        0.9 % sodium chloride infusion   IntraVENous PRN Marisela Larson MD        ondansetron (ZOFRAN) injection 4

## 2025-03-18 NOTE — PROGRESS NOTES
Urine collected via [email or web address] and sent to lab as order. Patient increasingly alert more, continues with delayed responses and oriented X3. Patient remains afebrile and reports migraine is \"slight\" now and rates it a 3 out of 10. Care ongoing. Call light wihtin reach.

## 2025-03-18 NOTE — PROGRESS NOTES
Infectious Diseases Inpatient Progress Note          HISTORY OF PRESENT ILLNESS:  Follow up  shunt infection status post extraction on IV vancomycin and Rocephin, well tolerated.  Has persistent headache and neck stiffness.  Persistent fevers.  Persistent generalized weakness.   Denies any nausea vomiting.  No bowel movements today.   No abdominal pain  No leg swelling  No shortness of breath  No rash  Going down for CT of the hand  Current Medications:     cefTRIAXone (ROCEPHIN) IV  2,000 mg IntraVENous Q12H    vancomycin  1,000 mg IntraVENous Q12H    vancomycin (VANCOCIN) intermittent dosing (placeholder)   Other RX Placeholder    sodium chloride flush  5-40 mL IntraVENous 2 times per day    neomycin-bacitracin-polymyxin   Topical BID    sodium chloride flush  5-40 mL IntraVENous 2 times per day    lactobacillus  1 capsule Oral Daily    cetirizine  10 mg Oral Daily    citalopram  30 mg Oral Daily    omega-3 acid ethyl esters  1 capsule Oral Daily    levothyroxine  50 mcg Oral Daily    oxyBUTYnin  10 mg Oral Daily    pravastatin  20 mg Oral Daily    sertraline  25 mg Oral Daily    topiramate  25 mg Oral Daily    psyllium husk-aspartame  1 packet Oral Daily       Allergies:  Patient has no known allergies.      Review of Systems   Constitutional:  Positive for fever.   Neurological:  Positive for weakness and headaches.   Positive neck stiffness  14 system review is negative other than HPI    Physical Exam  Vitals:    03/18/25 0213 03/18/25 0221 03/18/25 0419 03/18/25 0707   BP: (!) 166/76   (!) 164/74   Pulse: 79 77  75   Resp: 16   18   Temp: (!) 101.8 °F (38.8 °C)  (!) 101.3 °F (38.5 °C) (!) 100.8 °F (38.2 °C)   TempSrc: Oral  Axillary Oral   SpO2: (!) 86% 96%  100%   Weight:       Height:         General Appearance: alert and oriented to person, place and time, well-developed and well-nourished, in no acute distress  Intact scalp incision with dressing  On room air  Skin: warm and dry, no rash.   Head:

## 2025-03-18 NOTE — PLAN OF CARE
Problem: Discharge Planning  Goal: Discharge to home or other facility with appropriate resources  Outcome: Progressing     Problem: Safety - Adult  Goal: Free from fall injury  3/17/2025 2249 by Herlinda Munguia, RN  Outcome: Progressing  3/17/2025 1135 by Isra Fang RN  Outcome: Progressing     Problem: ABCDS Injury Assessment  Goal: Absence of physical injury  Outcome: Progressing     Problem: Pain  Goal: Verbalizes/displays adequate comfort level or baseline comfort level  Outcome: Progressing     Problem: Neurosensory - Adult  Goal: Achieves stable or improved neurological status  Outcome: Progressing  Goal: Absence of seizures  Outcome: Progressing  Goal: Remains free of injury related to seizures activity  Outcome: Progressing  Goal: Achieves maximal functionality and self care  Outcome: Progressing     Problem: Skin/Tissue Integrity - Adult  Goal: Skin integrity remains intact  Outcome: Progressing  Goal: Incisions, wounds, or drain sites healing without S/S of infection  Outcome: Progressing  Goal: Oral mucous membranes remain intact  Outcome: Progressing     Problem: Musculoskeletal - Adult  Goal: Return mobility to safest level of function  Outcome: Progressing  Goal: Maintain proper alignment of affected body part  Outcome: Progressing  Goal: Return ADL status to a safe level of function  Outcome: Progressing

## 2025-03-18 NOTE — PROGRESS NOTES
[facility] OCCUPATIONAL THERAPY EVALUATION - ACUTE     NAME: Gloria Abbott  : 1950 (74 y.o.)  MRN: [id number]  CODE STATUS: Full Code  Room: W270/W270-01    Date of Service: 3/18/2025    Patient Diagnosis(es): Hydrocephalus (HCC) [G91.9]  Hydrocephalus in adult (HCC) [G91.9]   Patient Active Problem List    Diagnosis Date Noted    Acute post-operative pain 2023    Acute blood loss anemia 2023    Subcapital fracture of femur, right, closed, initial encounter ([facility]) 2023    Acute pain due to trauma 2023    Closed right hip fracture, initial encounter ([facility]) 2023    Dizziness 2023    Shunt malfunction 2025    Ventricular enlargement due to brain atrophy 2025    Infection of ventriculo-peritoneal shunt, initial encounter 2025    Nonfunctioning ventriculoperitoneal shunt, initial encounter 2025    Urge incontinence of urine 2024    Allergic rhinitis 2024    Constipation 2024    Altered bowel habits 10/16/2024    Chronic left maxillary sinusitis 2024    Bilateral high frequency sensorineural hearing loss 2024    Normal pressure hydrocephalus (HCC) 03/15/2024    Carpal tunnel syndrome, bilateral 2023    Status post left knee replacement 05/10/2023    Status post total knee replacement, left 2023    Osteoarthritis of left knee 2023    COVID-19 2021    Osteopenia 2020    History of uterine cancer 2018    History of endometrial cancer 2018    Other joint derangement, not elsewhere classified, ankle and foot 2015    Major depressive disorder, recurrent episode, moderate (HCC) 2015    Hypothyroid 2015    Maisonneuve fracture of right fibula 2015    Sprain of ankle, deltoid ligament 2015    Hyperlipidemia     Depression     Stress     Vitamin D deficiency       Shunt malfunction, initial encounter    Past Medical History:   Diagnosis Date    ADHD (attention  Solving: Assistance required to generate solutions;Assistance required to implement solutions  Insights: Decreased awareness of deficits  Initiation: Requires cues for some  Sequencing: Requires cues for some  Cognition Comment: Mild delayed responses    Perception Status:  Perception  Overall Perceptual Status: WFL    Vision and Hearing Status:  Vision  Vision: Impaired  Vision Exceptions: Wears glasses at all times   Hearing  Hearing: Within functional limits    GROSS ASSESSMENT AROM/PROM:  AROM: Within functional limits       ROM:   LUE AROM (degrees)  LUE AROM : WFL  Left Hand AROM (degrees)  Left Hand AROM: WFL  RUE AROM (degrees)  RUE AROM : WFL  Right Hand AROM (degrees)  Right Hand AROM: WFL    UE STRENGTH:  Strength: Generally decreased, functional    UE COORDINATION:  Coordination: Generally decreased, functional    UE TONE:  Tone: Normal    UE SENSATION:  Sensation: Intact    Hand Dominance:  Hand Dominance  Hand Dominance: Right    ADL Status:  ADL  Feeding: Setup  Grooming: Setup  UE Bathing: Minimal assistance  LE Bathing: Moderate assistance  UE Dressing: Minimal assistance  LE Dressing: Maximum assistance  Putting On/Taking Off Footwear: Maximum assistance  Toileting: Maximum assistance  Additional Comments: Simulated ADLs as above. Decreased balance and coordination, increased effort for all mobility. Decreased safety awareness, poor initiation  Toilet Transfers  Toilet - Technique: Stand step  Equipment Used: Standard bedside commode  Toilet Transfer: Minimal assistance  Toilet Transfers Comments: Decreased sequencing for stepping and initiating mobility    Functional Mobility:    Transfers  Sit to stand: Minimal assistance  Stand to sit: Minimal assistance  Transfer Comments: Decreased balance and endurance    Patient pivoted only to bedside commode  with Handheld assist at Min A level. Min-mod A, variable assistance. Decreased sequencing and initiation.     Bed Mobility  Bed mobility  Supine to

## 2025-03-19 ENCOUNTER — APPOINTMENT (OUTPATIENT)
Dept: INTERVENTIONAL RADIOLOGY/VASCULAR | Age: 75
DRG: 031 | End: 2025-03-19
Attending: INTERNAL MEDICINE
Payer: COMMERCIAL

## 2025-03-19 LAB
ANION GAP SERPL CALCULATED.3IONS-SCNC: 7 MEQ/L (ref 9–15)
BASOPHILS # BLD: 0 K/UL (ref 0–0.2)
BASOPHILS NFR BLD: 0.4 %
BUN SERPL-MCNC: 18 MG/DL (ref 8–23)
CALCIUM SERPL-MCNC: 7.9 MG/DL (ref 8.5–9.9)
CHLORIDE SERPL-SCNC: 103 MEQ/L (ref 95–107)
CO2 SERPL-SCNC: 25 MEQ/L (ref 20–31)
CREAT SERPL-MCNC: 0.85 MG/DL (ref 0.5–0.9)
EOSINOPHIL # BLD: 0.3 K/UL (ref 0–0.7)
EOSINOPHIL NFR BLD: 2.7 %
ERYTHROCYTE [DISTWIDTH] IN BLOOD BY AUTOMATED COUNT: 12.8 % (ref 11.5–14.5)
GLUCOSE SERPL-MCNC: 98 MG/DL (ref 70–99)
HCT VFR BLD AUTO: 28.8 % (ref 37–47)
HGB BLD-MCNC: 9.7 G/DL (ref 12–16)
LYMPHOCYTES # BLD: 1.1 K/UL (ref 1–4.8)
LYMPHOCYTES NFR BLD: 11.7 %
MCH RBC QN AUTO: 30.9 PG (ref 27–31.3)
MCHC RBC AUTO-ENTMCNC: 33.7 % (ref 33–37)
MCV RBC AUTO: 91.7 FL (ref 79.4–94.8)
MONOCYTES # BLD: 1.1 K/UL (ref 0.2–0.8)
MONOCYTES NFR BLD: 11 %
NEUTROPHILS # BLD: 7 K/UL (ref 1.4–6.5)
NEUTS SEG NFR BLD: 73.9 %
PLATELET # BLD AUTO: 245 K/UL (ref 130–400)
POTASSIUM SERPL-SCNC: 4 MEQ/L (ref 3.4–4.9)
RBC # BLD AUTO: 3.14 M/UL (ref 4.2–5.4)
SODIUM SERPL-SCNC: 135 MEQ/L (ref 135–144)
VANCOMYCIN SERPL-MCNC: 13.8 UG/ML (ref 10–40)
WBC # BLD AUTO: 9.5 K/UL (ref 4.8–10.8)

## 2025-03-19 PROCEDURE — APPSS15 APP SPLIT SHARED TIME 0-15 MINUTES: Performed by: NURSE PRACTITIONER

## 2025-03-19 PROCEDURE — 36415 COLL VENOUS BLD VENIPUNCTURE: CPT

## 2025-03-19 PROCEDURE — 97116 GAIT TRAINING THERAPY: CPT

## 2025-03-19 PROCEDURE — 6370000000 HC RX 637 (ALT 250 FOR IP): Performed by: NEUROLOGICAL SURGERY

## 2025-03-19 PROCEDURE — 05HY33Z INSERTION OF INFUSION DEVICE INTO UPPER VEIN, PERCUTANEOUS APPROACH: ICD-10-PCS | Performed by: INTERNAL MEDICINE

## 2025-03-19 PROCEDURE — 80048 BASIC METABOLIC PNL TOTAL CA: CPT

## 2025-03-19 PROCEDURE — 1210000000 HC MED SURG R&B

## 2025-03-19 PROCEDURE — 2500000003 HC RX 250 WO HCPCS: Performed by: INTERNAL MEDICINE

## 2025-03-19 PROCEDURE — 6360000002 HC RX W HCPCS: Performed by: INTERNAL MEDICINE

## 2025-03-19 PROCEDURE — 80202 ASSAY OF VANCOMYCIN: CPT

## 2025-03-19 PROCEDURE — C1769 GUIDE WIRE: HCPCS

## 2025-03-19 PROCEDURE — 6360000002 HC RX W HCPCS: Performed by: NEUROLOGICAL SURGERY

## 2025-03-19 PROCEDURE — 36573 INSJ PICC RS&I 5 YR+: CPT

## 2025-03-19 PROCEDURE — 2580000003 HC RX 258: Performed by: INTERNAL MEDICINE

## 2025-03-19 PROCEDURE — 99232 SBSQ HOSP IP/OBS MODERATE 35: CPT | Performed by: PSYCHIATRY & NEUROLOGY

## 2025-03-19 PROCEDURE — 36573 INSJ PICC RS&I 5 YR+: CPT | Performed by: RADIOLOGY

## 2025-03-19 PROCEDURE — 2500000003 HC RX 250 WO HCPCS: Performed by: NEUROLOGICAL SURGERY

## 2025-03-19 PROCEDURE — 99232 SBSQ HOSP IP/OBS MODERATE 35: CPT | Performed by: INTERNAL MEDICINE

## 2025-03-19 PROCEDURE — 2700000000 HC OXYGEN THERAPY PER DAY

## 2025-03-19 PROCEDURE — 85025 COMPLETE CBC W/AUTO DIFF WBC: CPT

## 2025-03-19 PROCEDURE — 6370000000 HC RX 637 (ALT 250 FOR IP): Performed by: NURSE PRACTITIONER

## 2025-03-19 RX ORDER — SODIUM CHLORIDE 0.9 % (FLUSH) 0.9 %
5-40 SYRINGE (ML) INJECTION PRN
Status: DISCONTINUED | OUTPATIENT
Start: 2025-03-19 | End: 2025-03-20

## 2025-03-19 RX ORDER — SODIUM CHLORIDE 9 MG/ML
250 INJECTION, SOLUTION INTRAVENOUS ONCE
Status: DISCONTINUED | OUTPATIENT
Start: 2025-03-19 | End: 2025-03-20

## 2025-03-19 RX ORDER — SODIUM CHLORIDE 9 MG/ML
INJECTION, SOLUTION INTRAVENOUS PRN
Status: DISCONTINUED | OUTPATIENT
Start: 2025-03-19 | End: 2025-03-20

## 2025-03-19 RX ORDER — LIDOCAINE HYDROCHLORIDE 20 MG/ML
5 INJECTION, SOLUTION INFILTRATION; PERINEURAL ONCE
Status: COMPLETED | OUTPATIENT
Start: 2025-03-19 | End: 2025-03-19

## 2025-03-19 RX ORDER — SODIUM CHLORIDE 0.9 % (FLUSH) 0.9 %
5-40 SYRINGE (ML) INJECTION EVERY 12 HOURS SCHEDULED
Status: DISCONTINUED | OUTPATIENT
Start: 2025-03-19 | End: 2025-03-20

## 2025-03-19 RX ADMIN — BUTALBITA,ACETAMINOPHEN AND CAFFEINE 1 CAPSULE: 50; 300; 40 CAPSULE ORAL at 20:04

## 2025-03-19 RX ADMIN — VANCOMYCIN HYDROCHLORIDE 750 MG: 750 INJECTION, POWDER, LYOPHILIZED, FOR SOLUTION INTRAVENOUS at 06:12

## 2025-03-19 RX ADMIN — BUTALBITA,ACETAMINOPHEN AND CAFFEINE 1 CAPSULE: 50; 300; 40 CAPSULE ORAL at 15:45

## 2025-03-19 RX ADMIN — PRAVASTATIN SODIUM 20 MG: 10 TABLET ORAL at 09:21

## 2025-03-19 RX ADMIN — SODIUM CHLORIDE, PRESERVATIVE FREE 10 ML: 5 INJECTION INTRAVENOUS at 09:22

## 2025-03-19 RX ADMIN — Medication 1 CAPSULE: at 20:04

## 2025-03-19 RX ADMIN — BUTALBITA,ACETAMINOPHEN AND CAFFEINE 1 CAPSULE: 50; 300; 40 CAPSULE ORAL at 09:21

## 2025-03-19 RX ADMIN — CETIRIZINE HYDROCHLORIDE 10 MG: 10 TABLET, FILM COATED ORAL at 09:21

## 2025-03-19 RX ADMIN — ACETAMINOPHEN 650 MG: 325 TABLET ORAL at 23:25

## 2025-03-19 RX ADMIN — WATER 2000 MG: 1 INJECTION INTRAMUSCULAR; INTRAVENOUS; SUBCUTANEOUS at 18:23

## 2025-03-19 RX ADMIN — TOPIRAMATE 25 MG: 25 TABLET, FILM COATED ORAL at 09:21

## 2025-03-19 RX ADMIN — OXYBUTYNIN CHLORIDE 10 MG: 5 TABLET ORAL at 09:21

## 2025-03-19 RX ADMIN — CITALOPRAM HYDROBROMIDE 30 MG: 20 TABLET ORAL at 09:20

## 2025-03-19 RX ADMIN — OMEGA-3-ACID ETHYL ESTERS CAPSULES 1 CAPSULE: 1 CAPSULE, LIQUID FILLED ORAL at 20:05

## 2025-03-19 RX ADMIN — LEVOTHYROXINE SODIUM 50 MCG: 0.05 TABLET ORAL at 09:21

## 2025-03-19 RX ADMIN — LIDOCAINE HYDROCHLORIDE 5 ML: 20 INJECTION, SOLUTION INFILTRATION; PERINEURAL at 10:46

## 2025-03-19 RX ADMIN — SERTRALINE HYDROCHLORIDE 25 MG: 25 TABLET ORAL at 09:21

## 2025-03-19 RX ADMIN — KETOROLAC TROMETHAMINE 15 MG: 15 INJECTION, SOLUTION INTRAMUSCULAR; INTRAVENOUS at 03:31

## 2025-03-19 RX ADMIN — BACITRACIN ZINC, NEOMYCIN, POLYMYXIN B 1 G: 400; 3.5; 5 OINTMENT TOPICAL at 20:05

## 2025-03-19 RX ADMIN — BACITRACIN ZINC, NEOMYCIN, POLYMYXIN B 1 G: 400; 3.5; 5 OINTMENT TOPICAL at 09:20

## 2025-03-19 RX ADMIN — SODIUM CHLORIDE, PRESERVATIVE FREE 10 ML: 5 INJECTION INTRAVENOUS at 20:05

## 2025-03-19 RX ADMIN — WATER 2000 MG: 1 INJECTION INTRAMUSCULAR; INTRAVENOUS; SUBCUTANEOUS at 06:09

## 2025-03-19 RX ADMIN — VANCOMYCIN HYDROCHLORIDE 750 MG: 750 INJECTION, POWDER, LYOPHILIZED, FOR SOLUTION INTRAVENOUS at 18:29

## 2025-03-19 ASSESSMENT — PAIN SCALES - GENERAL
PAINLEVEL_OUTOF10: 0
PAINLEVEL_OUTOF10: 7
PAINLEVEL_OUTOF10: 6

## 2025-03-19 ASSESSMENT — ENCOUNTER SYMPTOMS
COLOR CHANGE: 0
SHORTNESS OF BREATH: 0
VOMITING: 0
CHEST TIGHTNESS: 0
NAUSEA: 0
TROUBLE SWALLOWING: 0
COUGH: 0
WHEEZING: 0

## 2025-03-19 ASSESSMENT — PAIN DESCRIPTION - LOCATION
LOCATION: HEAD
LOCATION: HEAD

## 2025-03-19 NOTE — PROGRESS NOTES
Physical Therapy Missed Treatment   Facility/Department: [facility] MED SURG W270/W270-01    NAME: Gloria Abbott    : 1950 (74 y.o.)  MRN: [id number]    Account: [id number]  Gender: female    Chart reviewed, attempted PT at 10:37. Patient unavailable 2° to:    [] Hold per nsg request    [] Pt declined     [] Nsg notified   [] Other notified    [x] Pt.. off floor for test/procedure. PICC line. Will check back     [] Pt. Unavailable       Will attempt PT treatment again at earliest convenience.      Electronically signed by Terri Ndiaye PTA on 3/19/25 at 10:42 AM EDT

## 2025-03-19 NOTE — PROGRESS NOTES
Postoperative day #2.  Patient remains sleepy easily arousable responds to questions appropriately.  Moving extremities well.  Wound appears to be healing well.  Cultures pending.  3/18/2025 CT scan head shows continued pneumocephalus frontal horns.  Planning infectious disease evaluation treatment.  Plan reestablishment right ventriculoperitoneal shunt when appropriate.

## 2025-03-19 NOTE — CONSULTS
Interventional Radiology Vascular Access Team   Consult Note    MRN: [id number]   Patient: Gloria Abbott   : 1950   Age: 74 y.o.  Patient has no known allergies.        Patient is a 74 y.o. female admitted on 3/16/2025.   Vascular access team consulted due to long term antibiotics for infected  shunt and successfully placed PICC on 3/19/25.    Peripherally Inserted Central Catheter/ Midline Assessment:   PICC 25 Right Cephalic (Active)   Central Line Being Utilized Yes 25 1143   Criteria for Appropriate Use Long term IV/antibiotic administration 25 1143   Site Assessment Clean, dry & intact 25 1143   Phlebitis Assessment No symptoms 25 1143   Infiltration Assessment 0 25 1143   Extremity Circumference (cm) 33 cm 25 1057   External Catheter Length (cm) 0 cm 25 1057   Lumen #1 Color/Status Purple;Normal saline locked;Alcohol cap present 25 1143   Alcohol Cap Used Yes 25 1143   Dressing Type Transparent w/CHG gel;Securing device 25 1143   Dressing Status Clean, dry & intact 25 1143   Dressing Intervention New 25 1057             Electronically signed by Lanny Trevino RN on 3/19/25 at 12:57 PM EDT

## 2025-03-19 NOTE — PLAN OF CARE
function for planned discharge setting.  See evaluation for individualized goals.  3/18/2025 1053 by Heidi Pope, PT  Outcome: Progressing     Problem: Skin/Tissue Integrity  Goal: Skin integrity remains intact  Description: 1.  Monitor for areas of redness and/or skin breakdown  2.  Assess vascular access sites hourly  3.  Every 4-6 hours minimum:  Change oxygen saturation probe site  4.  Every 4-6 hours:  If on nasal continuous positive airway pressure, respiratory therapy assess nares and determine need for appliance change or resting period  Outcome: Progressing

## 2025-03-19 NOTE — CARE COORDINATION
Plan remain to [facility]. Referral was sent on 3/18/2025.   Per [facility] Supervisor, still reviewing. Will need pre-cert once accepted.     Electronically signed by PERLA Easton on 3/19/2025 at 8:38 AM    Per [facility], able to accept pt. Pre-cert started.   Pending pre-cert at this time.     Electronically signed by PERLA Easton on 3/19/2025 at 9:28 AM    Per [facility] Supervisor, auth approved through 3/26/2025.   Pending ID plan and will need PICC.     Electronically signed by PERLA Easton on 3/19/2025 at 10:54 AM

## 2025-03-19 NOTE — PROGRESS NOTES
Physical Therapy Med Surg Daily Treatment Note  Facility/Department: [address] ORTHO TELE  Room: [facility]       NAME: Gloria Abbott  : 1950 (74 y.o.)  MRN: [id number]  CODE STATUS: Full Code    Date of Service: 3/19/2025    Patient Diagnosis(es): Hydrocephalus (HCC) [G91.9]  Hydrocephalus in adult (HCC) [G91.9]   No chief complaint on file.    Patient Active Problem List    Diagnosis Date Noted    Acute post-operative pain 2023    Acute blood loss anemia 2023    Subcapital fracture of femur, right, closed, initial encounter ([facility]) 2023    Acute pain due to trauma 2023    Closed right hip fracture, initial encounter ([facility]) 2023    Dizziness 2023    Shunt malfunction 2025    Ventricular enlargement due to brain atrophy 2025    Infection of ventriculo-peritoneal shunt, initial encounter 2025    Nonfunctioning ventriculoperitoneal shunt, initial encounter 2025    Urge incontinence of urine 2024    Allergic rhinitis 2024    Constipation 2024    Altered bowel habits 10/16/2024    Chronic left maxillary sinusitis 2024    Bilateral high frequency sensorineural hearing loss 2024    Normal pressure hydrocephalus (HCC) 03/15/2024    Carpal tunnel syndrome, bilateral 2023    Status post left knee replacement 05/10/2023    Status post total knee replacement, left 2023    Osteoarthritis of left knee 2023    COVID-19 2021    Osteopenia 2020    History of uterine cancer 2018    History of endometrial cancer 2018    Other joint derangement, not elsewhere classified, ankle and foot 2015    Major depressive disorder, recurrent episode, moderate (HCC) 2015    Hypothyroid 2015    Maisonneuve fracture of right fibula 2015    Sprain of ankle, deltoid ligament 2015    Hyperlipidemia     Depression     Stress     Vitamin D deficiency         Past Medical History:

## 2025-03-19 NOTE — PROGRESS NOTES
citalopram (CELEXA) tablet 30 mg  30 mg Oral Daily Marisela Larson MD   30 mg at 25    omega-3 acid ethyl esters (LOVAZA) capsule 1 capsule  1 capsule Oral Daily Marisela Larson MD   1 capsule at 25    levothyroxine (SYNTHROID) tablet 50 mcg  50 mcg Oral Daily Marisela Larson MD   50 mcg at 25    oxyBUTYnin (DITROPAN) tablet 10 mg  10 mg Oral Daily Marisela Larson MD   10 mg at 25    pravastatin (PRAVACHOL) tablet 20 mg  20 mg Oral Daily Marisela Larson MD   20 mg at 25    sertraline (ZOLOFT) tablet 25 mg  25 mg Oral Daily Marisela Larson MD   25 mg at 25    tiZANidine (ZANAFLEX) tablet 2 mg  2 mg Oral TID PRN Marisela Larson MD   2 mg at 25    topiramate (TOPAMAX) tablet 25 mg  25 mg Oral Daily Marisela Larson MD   25 mg at 25    psyllium husk-aspartame (METAMUCIL FIBER) packet 1 packet  1 packet Oral Daily Marisela Larson MD   1 packet at 25 0859       Physical Examination:      Vitals:  BP (!) 174/70   Pulse 65   Temp 100.2 °F (37.9 °C) (Oral)   Resp 18   Ht 1.626 m (5' 4\")   Wt 82.2 kg (181 lb 4.8 oz)   LMP  (LMP Unknown)   SpO2 98%   BMI 31.12 kg/m²   Temp (24hrs), Av.3 °F (37.4 °C), Min:98.1 °F (36.7 °C), Max:100.2 °F (37.9 °C)      General appearance: cooperative, no distress  Lungs: clear to auscultation bilaterally, normal effort  Heart: regular rate and rhythm, no murmur  Abdomen: soft, nontender, nondistended, bowel sounds present, no masses  Extremities: no edema, redness, tenderness in the calves. Cap refill <2s  Skin: no gross lesions, rashes    Data:     Labs:  Recent Labs     25  0457 257   WBC 11.8* 9.5   HGB 10.4* 9.7*    245     Recent Labs     257 25   * 135   K 4.2 4.0   CL 99 103   CO2 23 25   BUN 13 18   CREATININE 0.75 0.85   GLUCOSE 101* 98     No results for input(s): \"AST\", \"ALT\", \"BILITOT\", \"ALKPHOS\" in the last 72 hours.    Invalid input(s): \"ALB\"

## 2025-03-19 NOTE — CASE COMMUNICATION
Authorization received for skilled admission with NRD 3/26/25. SS at [facility] notified. CM to follow.

## 2025-03-19 NOTE — PROGRESS NOTES
Pharmacy Vancomycin Consult     Vancomycin Day: 3  Current Dosin,000 mg IV every 12 hours    Temp max: 99.9 *F    Recent Labs     25  0457 25  0447   BUN 13 18       Recent Labs     25  0457 25  0447   CREATININE 0.75 0.85       Recent Labs     25  0457 25  0447   WBC 11.8* 9.5         Intake/Output Summary (Last 24 hours) at 3/19/2025 0530  Last data filed at 3/18/2025 1825  Gross per 24 hour   Intake 600 ml   Output 400 ml   Net 200 ml       Ht Readings from Last 1 Encounters:   25 1.626 m (5' 4\")        Wt Readings from Last 1 Encounters:   25 82.2 kg (181 lb 4.8 oz)         Body mass index is 31.12 kg/m².    Estimated Creatinine Clearance: 58 mL/min (based on SCr of 0.85 mg/dL).    Trough:   Component  Ref Range & Units 25   Vancomycin Rm  10.0 - 40.0 ug/mL 13.8       Assessment/Plan:  Vancomycin is supratherapeutic at current dose with AUC24,ss 604 mg/L.hr. Adjust vancomycin to 750 mg IV every 12 hours. Predicted AUC24,ss 457 mg/L.hr, Ctrough,ss 12.1 mg/L, PAUC 82 %, Pconc 0 %. Repeat random vancomycin level 3/20 with morning labs.    Heather Alcaraz, YayaD [facility] 3/19/2025 5:33 AM

## 2025-03-19 NOTE — PROGRESS NOTES
Rocephin with pharmacy following for Vanco dosing  Patient is agreeable to PICC line and IV antibiotics on discharge  Follow-up cultures and accordingly make decision about antibiotics on discharge  Follow-up clinically and accordingly make a decision if there is need to add antifungal treatment.  I suspect positive feels to be a contamination from skin rather than true pathogen       Discussed with patient     Debra Nieves MD

## 2025-03-19 NOTE — PROGRESS NOTES
atraumatic.   Eyes:      General: No visual field deficit.     Extraocular Movements: Extraocular movements intact.      Pupils: Pupils are equal, round, and reactive to light.   Cardiovascular:      Rate and Rhythm: Normal rate and regular rhythm.   Pulmonary:      Effort: Pulmonary effort is normal. No respiratory distress.      Breath sounds: Normal breath sounds.   Skin:     General: Skin is warm and dry.   Neurological:      Mental Status: She is alert and oriented to person, place, and time.      Cranial Nerves: No cranial nerve deficit, dysarthria or facial asymmetry.      Sensory: No sensory deficit.      Motor: No weakness, tremor, atrophy, abnormal muscle tone, seizure activity or pronator drift.      Coordination: Coordination normal.       Exam nonfocal        Medications:  Reviewed    Infusion Medications:    sodium chloride      sodium chloride      sodium chloride Stopped (03/18/25 9067)    sodium chloride      sodium chloride Stopped (03/17/25 9288)     Scheduled Medications:    vancomycin  750 mg IntraVENous Q12H    sodium chloride flush  5-40 mL IntraVENous 2 times per day    butalbital-APAP-caffeine  1 capsule Oral TID    cefTRIAXone (ROCEPHIN) IV  2,000 mg IntraVENous Q12H    vancomycin (VANCOCIN) intermittent dosing (placeholder)   Other RX Placeholder    sodium chloride flush  5-40 mL IntraVENous 2 times per day    neomycin-bacitracin-polymyxin   Topical BID    sodium chloride flush  5-40 mL IntraVENous 2 times per day    lactobacillus  1 capsule Oral Daily    cetirizine  10 mg Oral Daily    citalopram  30 mg Oral Daily    omega-3 acid ethyl esters  1 capsule Oral Daily    levothyroxine  50 mcg Oral Daily    oxyBUTYnin  10 mg Oral Daily    pravastatin  20 mg Oral Daily    sertraline  25 mg Oral Daily    topiramate  25 mg Oral Daily    psyllium husk-aspartame  1 packet Oral Daily     PRN Meds: sodium chloride flush, sodium chloride, sodium chloride flush, sodium chloride, ondansetron,  investigations, and am the sole provider of all clinical decisions on the neurological status of this patient.  Patient seen and examined events as noted above.  Hydrocephalus with worsening as patient does not have a shunt.  Surgical intervention may be recommended and neurosurgery aware.  Patient may have underlying ventriculitis given the fevers and if any shunt is likely to be put in CSF analysis may be done at that time.  60% time spent on evaluating patient    3/19/2025:  Hydrocephalus  Probable  shunt infection.   shunt removed on 3/17/2025  Infectious disease and neurosurgery following.  Patient continues on IV vancomycin and Rocephin.  Plans for PICC line placement.  CT scan with increasing hydrocephalus.  Will need replacement of  shunt.  To be determined by ID and neurosurgery.  Headache, improved with Fioricet.  Continue.  Discharge plan for [facility].   Collaborating physicians: Dr Nogueira    I have personally performed a face to face diagnostic evaluation on this patient, reviewed all data and investigations, and am the sole provider of all clinical decisions on the neurological status of this patient.  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  Neurosurgery aware of this.  Patient will require recent ones some of the infection clears as patient has continued hydrocephalus.  Patient's headaches is much improved with previous had.  60% time spent on evaluate patient      Audie Nogueira MD, STEVEN  Diplomate, American Board of Psychiatry & Neurology  Board Certified in Vascular Neurology  Board Certified in Neuromuscular Medicine  Certified in Neurorehabilitation       Electronically signed by AZAEL Burleson CNP on 3/19/2025 at 1:23 PM

## 2025-03-20 ENCOUNTER — HOSPITAL ENCOUNTER (INPATIENT)
Age: 75
LOS: 9 days | Discharge: NURSING, REHAB OR LONG-TERM CARE | End: 2025-03-29
Attending: INTERNAL MEDICINE | Admitting: INTERNAL MEDICINE
Payer: COMMERCIAL

## 2025-03-20 VITALS
HEIGHT: 64 IN | BODY MASS INDEX: 30.95 KG/M2 | OXYGEN SATURATION: 99 % | DIASTOLIC BLOOD PRESSURE: 63 MMHG | HEART RATE: 62 BPM | SYSTOLIC BLOOD PRESSURE: 167 MMHG | WEIGHT: 181.3 LBS | TEMPERATURE: 98.2 F | RESPIRATION RATE: 18 BRPM

## 2025-03-20 PROCEDURE — 2500000003 HC RX 250 WO HCPCS: Performed by: NEUROLOGICAL SURGERY

## 2025-03-20 PROCEDURE — 6360000002 HC RX W HCPCS: Performed by: NEUROLOGICAL SURGERY

## 2025-03-20 PROCEDURE — 2580000003 HC RX 258: Performed by: INTERNAL MEDICINE

## 2025-03-20 PROCEDURE — APPSS15 APP SPLIT SHARED TIME 0-15 MINUTES: Performed by: NURSE PRACTITIONER

## 2025-03-20 PROCEDURE — 6370000000 HC RX 637 (ALT 250 FOR IP): Performed by: INTERNAL MEDICINE

## 2025-03-20 PROCEDURE — 6370000000 HC RX 637 (ALT 250 FOR IP): Performed by: NURSE PRACTITIONER

## 2025-03-20 PROCEDURE — 99232 SBSQ HOSP IP/OBS MODERATE 35: CPT | Performed by: PSYCHIATRY & NEUROLOGY

## 2025-03-20 PROCEDURE — 1200000002 HC SEMI PRIVATE SWING BED

## 2025-03-20 PROCEDURE — 97535 SELF CARE MNGMENT TRAINING: CPT

## 2025-03-20 PROCEDURE — 99232 SBSQ HOSP IP/OBS MODERATE 35: CPT | Performed by: INTERNAL MEDICINE

## 2025-03-20 PROCEDURE — 6370000000 HC RX 637 (ALT 250 FOR IP): Performed by: NEUROLOGICAL SURGERY

## 2025-03-20 PROCEDURE — 6370000000 HC RX 637 (ALT 250 FOR IP)

## 2025-03-20 PROCEDURE — 2500000003 HC RX 250 WO HCPCS: Performed by: INTERNAL MEDICINE

## 2025-03-20 PROCEDURE — 99231 SBSQ HOSP IP/OBS SF/LOW 25: CPT | Performed by: NEUROLOGICAL SURGERY

## 2025-03-20 PROCEDURE — 6360000002 HC RX W HCPCS: Performed by: INTERNAL MEDICINE

## 2025-03-20 RX ORDER — LACTOBACILLUS RHAMNOSUS GG 10B CELL
1 CAPSULE ORAL DAILY
Status: CANCELLED | OUTPATIENT
Start: 2025-03-20

## 2025-03-20 RX ORDER — IBUPROFEN 600 MG/1
600 TABLET, FILM COATED ORAL EVERY 6 HOURS PRN
Status: DISCONTINUED | OUTPATIENT
Start: 2025-03-20 | End: 2025-03-20 | Stop reason: HOSPADM

## 2025-03-20 RX ORDER — ONDANSETRON 4 MG/1
4 TABLET, ORALLY DISINTEGRATING ORAL EVERY 8 HOURS PRN
Status: DISCONTINUED | OUTPATIENT
Start: 2025-03-20 | End: 2025-03-20 | Stop reason: SDUPTHER

## 2025-03-20 RX ORDER — OMEGA-3-ACID ETHYL ESTERS 1 G/1
1 CAPSULE, LIQUID FILLED ORAL DAILY
Status: DISCONTINUED | OUTPATIENT
Start: 2025-03-20 | End: 2025-03-29 | Stop reason: HOSPADM

## 2025-03-20 RX ORDER — SODIUM CHLORIDE 0.9 % (FLUSH) 0.9 %
5-40 SYRINGE (ML) INJECTION PRN
Status: DISCONTINUED | OUTPATIENT
Start: 2025-03-20 | End: 2025-03-29 | Stop reason: HOSPADM

## 2025-03-20 RX ORDER — ONDANSETRON 2 MG/ML
4 INJECTION INTRAMUSCULAR; INTRAVENOUS EVERY 6 HOURS PRN
Status: DISCONTINUED | OUTPATIENT
Start: 2025-03-20 | End: 2025-03-20 | Stop reason: SDUPTHER

## 2025-03-20 RX ORDER — LEVOTHYROXINE SODIUM 25 UG/1
50 TABLET ORAL DAILY
Status: DISCONTINUED | OUTPATIENT
Start: 2025-03-21 | End: 2025-03-29 | Stop reason: HOSPADM

## 2025-03-20 RX ORDER — CITALOPRAM HYDROBROMIDE 20 MG/1
30 TABLET ORAL DAILY
Status: CANCELLED | OUTPATIENT
Start: 2025-03-21

## 2025-03-20 RX ORDER — ONDANSETRON 2 MG/ML
4 INJECTION INTRAMUSCULAR; INTRAVENOUS EVERY 6 HOURS PRN
Status: DISCONTINUED | OUTPATIENT
Start: 2025-03-20 | End: 2025-03-29 | Stop reason: HOSPADM

## 2025-03-20 RX ORDER — SODIUM CHLORIDE 9 MG/ML
INJECTION, SOLUTION INTRAVENOUS PRN
Status: CANCELLED | OUTPATIENT
Start: 2025-03-20

## 2025-03-20 RX ORDER — ACETAMINOPHEN 650 MG/1
650 SUPPOSITORY RECTAL EVERY 6 HOURS PRN
Status: DISCONTINUED | OUTPATIENT
Start: 2025-03-20 | End: 2025-03-20 | Stop reason: SDUPTHER

## 2025-03-20 RX ORDER — NEOMYCIN/BACITRACIN/POLYMYXINB 3.5-400-5K
OINTMENT (GRAM) TOPICAL 2 TIMES DAILY
Status: CANCELLED | OUTPATIENT
Start: 2025-03-20

## 2025-03-20 RX ORDER — CETIRIZINE HYDROCHLORIDE 10 MG/1
10 TABLET ORAL DAILY
Status: DISCONTINUED | OUTPATIENT
Start: 2025-03-21 | End: 2025-03-29 | Stop reason: HOSPADM

## 2025-03-20 RX ORDER — TOPIRAMATE 25 MG/1
25 TABLET, FILM COATED ORAL DAILY
Status: DISCONTINUED | OUTPATIENT
Start: 2025-03-21 | End: 2025-03-29 | Stop reason: HOSPADM

## 2025-03-20 RX ORDER — CETIRIZINE HYDROCHLORIDE 10 MG/1
10 TABLET ORAL DAILY
Status: CANCELLED | OUTPATIENT
Start: 2025-03-21

## 2025-03-20 RX ORDER — POLYETHYLENE GLYCOL 3350 17 G/17G
17 POWDER, FOR SOLUTION ORAL DAILY PRN
Status: DISCONTINUED | OUTPATIENT
Start: 2025-03-20 | End: 2025-03-29 | Stop reason: HOSPADM

## 2025-03-20 RX ORDER — ACETAMINOPHEN 325 MG/1
650 TABLET ORAL EVERY 6 HOURS PRN
Status: DISCONTINUED | OUTPATIENT
Start: 2025-03-20 | End: 2025-03-29 | Stop reason: HOSPADM

## 2025-03-20 RX ORDER — NEOMYCIN/BACITRACIN/POLYMYXINB 3.5-400-5K
OINTMENT (GRAM) TOPICAL 2 TIMES DAILY
Status: DISCONTINUED | OUTPATIENT
Start: 2025-03-20 | End: 2025-03-29 | Stop reason: HOSPADM

## 2025-03-20 RX ORDER — SODIUM CHLORIDE 0.9 % (FLUSH) 0.9 %
5-40 SYRINGE (ML) INJECTION EVERY 12 HOURS SCHEDULED
Status: CANCELLED | OUTPATIENT
Start: 2025-03-20

## 2025-03-20 RX ORDER — LEVOTHYROXINE SODIUM 50 UG/1
50 TABLET ORAL DAILY
Status: CANCELLED | OUTPATIENT
Start: 2025-03-21

## 2025-03-20 RX ORDER — ACETAMINOPHEN 325 MG/1
650 TABLET ORAL EVERY 6 HOURS PRN
Status: DISCONTINUED | OUTPATIENT
Start: 2025-03-20 | End: 2025-03-20 | Stop reason: SDUPTHER

## 2025-03-20 RX ORDER — CITALOPRAM HYDROBROMIDE 20 MG/1
30 TABLET ORAL DAILY
Status: DISCONTINUED | OUTPATIENT
Start: 2025-03-21 | End: 2025-03-29 | Stop reason: HOSPADM

## 2025-03-20 RX ORDER — OMEGA-3-ACID ETHYL ESTERS 1 G/1
1 CAPSULE, LIQUID FILLED ORAL DAILY
Status: CANCELLED | OUTPATIENT
Start: 2025-03-20

## 2025-03-20 RX ORDER — ACETAMINOPHEN 325 MG/1
650 TABLET ORAL EVERY 6 HOURS PRN
Status: CANCELLED | OUTPATIENT
Start: 2025-03-20

## 2025-03-20 RX ORDER — FLUCONAZOLE 2 MG/ML
400 INJECTION, SOLUTION INTRAVENOUS EVERY 24 HOURS
Status: DISCONTINUED | OUTPATIENT
Start: 2025-03-20 | End: 2025-03-20

## 2025-03-20 RX ORDER — BUTALBITAL, ACETAMINOPHEN AND CAFFEINE 300; 40; 50 MG/1; MG/1; MG/1
1 CAPSULE ORAL 3 TIMES DAILY
Status: COMPLETED | OUTPATIENT
Start: 2025-03-20 | End: 2025-03-21

## 2025-03-20 RX ORDER — OXYBUTYNIN CHLORIDE 5 MG/1
10 TABLET ORAL DAILY
Status: DISCONTINUED | OUTPATIENT
Start: 2025-03-21 | End: 2025-03-29 | Stop reason: HOSPADM

## 2025-03-20 RX ORDER — IBUPROFEN 600 MG/1
600 TABLET, FILM COATED ORAL EVERY 6 HOURS PRN
Status: CANCELLED | OUTPATIENT
Start: 2025-03-20

## 2025-03-20 RX ORDER — OXYBUTYNIN CHLORIDE 5 MG/1
10 TABLET ORAL DAILY
Status: CANCELLED | OUTPATIENT
Start: 2025-03-21

## 2025-03-20 RX ORDER — POLYETHYLENE GLYCOL 3350 17 G/17G
17 POWDER, FOR SOLUTION ORAL DAILY PRN
Status: CANCELLED | OUTPATIENT
Start: 2025-03-20

## 2025-03-20 RX ORDER — SODIUM CHLORIDE 9 MG/ML
INJECTION, SOLUTION INTRAVENOUS PRN
Status: DISCONTINUED | OUTPATIENT
Start: 2025-03-20 | End: 2025-03-29 | Stop reason: HOSPADM

## 2025-03-20 RX ORDER — TIZANIDINE 2 MG/1
2 TABLET ORAL 3 TIMES DAILY PRN
Status: CANCELLED | OUTPATIENT
Start: 2025-03-20

## 2025-03-20 RX ORDER — SODIUM CHLORIDE 0.9 % (FLUSH) 0.9 %
5-40 SYRINGE (ML) INJECTION PRN
Status: CANCELLED | OUTPATIENT
Start: 2025-03-20

## 2025-03-20 RX ORDER — ACETAMINOPHEN 650 MG/1
650 SUPPOSITORY RECTAL EVERY 6 HOURS PRN
Status: CANCELLED | OUTPATIENT
Start: 2025-03-20

## 2025-03-20 RX ORDER — SERTRALINE HYDROCHLORIDE 25 MG/1
25 TABLET, FILM COATED ORAL DAILY
Status: CANCELLED | OUTPATIENT
Start: 2025-03-21

## 2025-03-20 RX ORDER — POLYETHYLENE GLYCOL 3350 17 G/17G
17 POWDER, FOR SOLUTION ORAL DAILY PRN
Status: DISCONTINUED | OUTPATIENT
Start: 2025-03-20 | End: 2025-03-20 | Stop reason: SDUPTHER

## 2025-03-20 RX ORDER — ONDANSETRON 4 MG/1
4 TABLET, ORALLY DISINTEGRATING ORAL EVERY 8 HOURS PRN
Status: CANCELLED | OUTPATIENT
Start: 2025-03-20

## 2025-03-20 RX ORDER — IBUPROFEN 400 MG/1
600 TABLET, FILM COATED ORAL EVERY 6 HOURS PRN
Status: DISCONTINUED | OUTPATIENT
Start: 2025-03-20 | End: 2025-03-29 | Stop reason: HOSPADM

## 2025-03-20 RX ORDER — PRAVASTATIN SODIUM 10 MG
20 TABLET ORAL DAILY
Status: CANCELLED | OUTPATIENT
Start: 2025-03-21

## 2025-03-20 RX ORDER — TIZANIDINE 2 MG/1
2 TABLET ORAL 3 TIMES DAILY PRN
Status: DISCONTINUED | OUTPATIENT
Start: 2025-03-20 | End: 2025-03-29 | Stop reason: HOSPADM

## 2025-03-20 RX ORDER — ONDANSETRON 4 MG/1
4 TABLET, ORALLY DISINTEGRATING ORAL EVERY 8 HOURS PRN
Status: DISCONTINUED | OUTPATIENT
Start: 2025-03-20 | End: 2025-03-29 | Stop reason: HOSPADM

## 2025-03-20 RX ORDER — PRAVASTATIN SODIUM 10 MG
20 TABLET ORAL NIGHTLY
Status: DISCONTINUED | OUTPATIENT
Start: 2025-03-21 | End: 2025-03-29 | Stop reason: HOSPADM

## 2025-03-20 RX ORDER — SODIUM CHLORIDE 0.9 % (FLUSH) 0.9 %
5-40 SYRINGE (ML) INJECTION EVERY 12 HOURS SCHEDULED
Status: DISCONTINUED | OUTPATIENT
Start: 2025-03-20 | End: 2025-03-29 | Stop reason: HOSPADM

## 2025-03-20 RX ORDER — LACTOBACILLUS RHAMNOSUS GG 10B CELL
1 CAPSULE ORAL DAILY
Status: DISCONTINUED | OUTPATIENT
Start: 2025-03-20 | End: 2025-03-29 | Stop reason: HOSPADM

## 2025-03-20 RX ORDER — BUTALBITAL, ACETAMINOPHEN AND CAFFEINE 300; 40; 50 MG/1; MG/1; MG/1
1 CAPSULE ORAL 3 TIMES DAILY
Status: CANCELLED | OUTPATIENT
Start: 2025-03-20 | End: 2025-03-21

## 2025-03-20 RX ORDER — ONDANSETRON 2 MG/ML
4 INJECTION INTRAMUSCULAR; INTRAVENOUS EVERY 6 HOURS PRN
Status: CANCELLED | OUTPATIENT
Start: 2025-03-20

## 2025-03-20 RX ORDER — TOPIRAMATE 25 MG/1
25 TABLET, FILM COATED ORAL DAILY
Status: CANCELLED | OUTPATIENT
Start: 2025-03-21

## 2025-03-20 RX ORDER — ACETAMINOPHEN 650 MG/1
650 SUPPOSITORY RECTAL EVERY 6 HOURS PRN
Status: DISCONTINUED | OUTPATIENT
Start: 2025-03-20 | End: 2025-03-29 | Stop reason: HOSPADM

## 2025-03-20 RX ADMIN — BUTALBITA,ACETAMINOPHEN AND CAFFEINE 1 CAPSULE: 50; 300; 40 CAPSULE ORAL at 14:12

## 2025-03-20 RX ADMIN — BUTALBITA,ACETAMINOPHEN AND CAFFEINE 1 CAPSULE: 50; 300; 40 CAPSULE ORAL at 08:30

## 2025-03-20 RX ADMIN — BACITRACIN ZINC, NEOMYCIN, POLYMYXIN B 1 G: 400; 3.5; 5 OINTMENT TOPICAL at 08:29

## 2025-03-20 RX ADMIN — WATER 2000 MG: 1 INJECTION INTRAMUSCULAR; INTRAVENOUS; SUBCUTANEOUS at 17:30

## 2025-03-20 RX ADMIN — TOPIRAMATE 25 MG: 25 TABLET, FILM COATED ORAL at 08:29

## 2025-03-20 RX ADMIN — OXYBUTYNIN CHLORIDE 10 MG: 5 TABLET ORAL at 08:29

## 2025-03-20 RX ADMIN — CITALOPRAM HYDROBROMIDE 30 MG: 20 TABLET ORAL at 08:46

## 2025-03-20 RX ADMIN — PSYLLIUM HUSK 1 PACKET: 3.4 POWDER ORAL at 08:28

## 2025-03-20 RX ADMIN — Medication 1 CAPSULE: at 20:10

## 2025-03-20 RX ADMIN — PRAVASTATIN SODIUM 20 MG: 10 TABLET ORAL at 08:30

## 2025-03-20 RX ADMIN — BUTALBITAL, ACETAMINOPHEN, AND CAFFEINE 1 CAPSULE: 50; 300; 40 CAPSULE ORAL at 20:10

## 2025-03-20 RX ADMIN — OMEGA-3-ACID ETHYL ESTERS CAPSULES 1 CAPSULE: 1 CAPSULE, LIQUID FILLED ORAL at 20:10

## 2025-03-20 RX ADMIN — BACITRACIN ZINC, NEOMYCIN, POLYMYXIN B SULFAT: 5000; 3.5; 4 OINTMENT TOPICAL at 20:12

## 2025-03-20 RX ADMIN — LEVOTHYROXINE SODIUM 50 MCG: 0.05 TABLET ORAL at 08:28

## 2025-03-20 RX ADMIN — SERTRALINE HYDROCHLORIDE 25 MG: 25 TABLET ORAL at 08:30

## 2025-03-20 RX ADMIN — SODIUM CHLORIDE, PRESERVATIVE FREE 10 ML: 5 INJECTION INTRAVENOUS at 08:47

## 2025-03-20 RX ADMIN — VANCOMYCIN HYDROCHLORIDE 750 MG: 750 INJECTION, POWDER, LYOPHILIZED, FOR SOLUTION INTRAVENOUS at 06:07

## 2025-03-20 RX ADMIN — SODIUM CHLORIDE, PRESERVATIVE FREE 10 ML: 5 INJECTION INTRAVENOUS at 08:46

## 2025-03-20 RX ADMIN — WATER 2000 MG: 1 INJECTION INTRAMUSCULAR; INTRAVENOUS; SUBCUTANEOUS at 06:02

## 2025-03-20 RX ADMIN — CETIRIZINE HYDROCHLORIDE 10 MG: 10 TABLET, FILM COATED ORAL at 08:30

## 2025-03-20 RX ADMIN — IBUPROFEN 600 MG: 600 TABLET, FILM COATED ORAL at 14:11

## 2025-03-20 RX ADMIN — SODIUM CHLORIDE, PRESERVATIVE FREE 10 ML: 5 INJECTION INTRAVENOUS at 20:11

## 2025-03-20 RX ADMIN — ACETAMINOPHEN 650 MG: 325 TABLET ORAL at 11:30

## 2025-03-20 ASSESSMENT — PAIN DESCRIPTION - LOCATION
LOCATION: HEAD
LOCATION: HEAD

## 2025-03-20 ASSESSMENT — ENCOUNTER SYMPTOMS
COLOR CHANGE: 0
NAUSEA: 0
COUGH: 0
CHEST TIGHTNESS: 0
SHORTNESS OF BREATH: 0
VOMITING: 0
WHEEZING: 0
TROUBLE SWALLOWING: 0

## 2025-03-20 ASSESSMENT — PAIN SCALES - GENERAL
PAINLEVEL_OUTOF10: 0
PAINLEVEL_OUTOF10: 8
PAINLEVEL_OUTOF10: 10

## 2025-03-20 ASSESSMENT — PAIN DESCRIPTION - ORIENTATION: ORIENTATION: MID

## 2025-03-20 ASSESSMENT — PAIN DESCRIPTION - DESCRIPTORS: DESCRIPTORS: ACHING

## 2025-03-20 NOTE — CARE COORDINATION
Plan remain to [facility]. Auth approved through 3/26/2025. Pending medical clearance at this time.     Electronically signed by PERLA Easton on 3/20/2025 at 8:27 AM    TRANSPORTATION SET UP [facility]ROUGH [facility]  TIME 3:30 PM.   UPDATED RN, PT, FACILITY AND SON, ELIZABETH.     Electronically signed by PERLA Easton on 3/20/2025 at 2:07 PM

## 2025-03-20 NOTE — PROGRESS NOTES
[facility] Neurology Daily Progress Note  Name: Gloria Abbott  Age: 74 y.o.  Gender: female  CodeStatus: Full Code  Allergies: No Known Allergies    Chief Complaint:No chief complaint on file.    Primary Care Provider: Xiomara Pappas MD  InpatientTreatment Team: Treatment Team:   Bhupinder Thomas DO Hazen, Gale, MD Abbud, Rita A, MD Patel, MD Neo Chen Gale, MD West, Robin M, RN Dewitt, Pamela J, RN Hancock, Tara L, Dacia Braxton RN Diaz Raices Tyler  Admission Date: 3/16/2025      HPI   Pt seen and examined for neuro follow up.  Patient alert and oriented x3. Ongoing low-grade temp. headache ongoing.  No focal deficits.  No seizure activity.  Patient seen and examined events as noted above.  Vitals:    03/20/25 0656   BP: (!) 171/69   Pulse: 69   Resp:    Temp: 99.5 °F (37.5 °C)   SpO2: 98%        Review of Systems   Constitutional:  Positive for fatigue and fever. Negative for appetite change and chills.   HENT:  Negative for hearing loss and trouble swallowing.    Eyes:  Negative for visual disturbance.   Respiratory:  Negative for cough, chest tightness, shortness of breath and wheezing.    Cardiovascular:  Negative for chest pain, palpitations and leg swelling.   Gastrointestinal:  Negative for nausea and vomiting.   Musculoskeletal:  Positive for gait problem.   Skin:  Negative for color change and rash.   Neurological:  Positive for headaches. Negative for dizziness, tremors, seizures, syncope, facial asymmetry, speech difficulty, weakness, light-headedness and numbness.   Psychiatric/Behavioral:  Positive for decreased concentration. Negative for agitation, confusion and hallucinations. The patient is not nervous/anxious.          Physical Exam  Vitals and nursing note reviewed.   Constitutional:       General: She is not in acute distress.     Appearance: She is ill-appearing. She is not diaphoretic.   HENT:      Head: Normocephalic and atraumatic.   Eyes:      General: No        Electronically signed by AZAEL Burleson CNP on 3/20/2025 at 12:24 PM

## 2025-03-20 NOTE — CARE COORDINATION
Per physician rounds pt is medically cleared pending ID final plane. Message to ID, awaiting response.   Dc plan remains [facility] and we have precert.   1350 Per ID ok for pt to discharge to [facility] with same antibiotics, updated bedside nurse.

## 2025-03-20 NOTE — DISCHARGE SUMMARY
[facility] Hospital Medicine Discharge Summary    Gloria Abbott  :  1950  MRN:  [id number]    Admit date:  3/16/2025  Discharge date:  3/20/2025    Admitting Physician:  Bhupinder Thomas DO  Primary Care Physician:  Xiomara Pappas MD    Discharge Diagnoses:    Principal Problem:    Infection of ventriculo-peritoneal shunt, initial encounter  Active Problems:    Normal pressure hydrocephalus (HCC)    Ventricular enlargement due to brain atrophy    Nonfunctioning ventriculoperitoneal shunt, initial encounter    Shunt malfunction  Resolved Problems:    * No resolved hospital problems. *    No chief complaint on file.    Condition: improved  Activity: no restrictions   Diet: regular  Disposition: [facility]  Functional Status: ambulatory with assistance    Significant Findings:     3/18 CT Head:  1. MODERATE HYDROCEPHALUS, slightly mildly INCREASED as of 2025  2. Small volume intraventricular hemorrhage.  3. Decreased intraventricular gas.  4. Chronic left maxillary sinusitis.       Hospital Course:   74-year-old female with NPH presented after she had picked a scab overnight and in the morning she realized her  shunt was on the pillow.  She was hospitalized and neurosurgery performed I&D and removal of shunt on 3/17.  She had fevers during the hospitalization was maintained on IV vancomycin and ceftriaxone per recommendation of infectious disease.    I discussed the surgical culture growing yeast with ID attending Dr. Nieves- this is felt to be a very superficial culture and most likely a contaminant.  Therefore we will not treat with any antifungal at this time.    She will continue her rehabilitation at [facility]      Exam on Discharge:   BP (!) 167/63   Pulse 62   Temp 98.2 °F (36.8 °C)   Resp 18   Ht 1.626 m (5' 4\")   Wt 82.2 kg (181 lb 4.8 oz)   LMP  (LMP Unknown)   SpO2 99%   BMI 31.12 kg/m²   General appearance: Tired appearing but  Marisela Larson MD    acetaminophen (TYLENOL) tablet 650 mg, 650 mg, Oral, Q6H PRN, 650 mg at 03/20/25 1130 **OR** acetaminophen (TYLENOL) suppository 650 mg, 650 mg, Rectal, Q6H PRN, Marisela Larson MD    lactobacillus (CULTURELLE) capsule 1 capsule, 1 capsule, Oral, Daily, Marisela Larson MD, 1 capsule at 03/19/25 2004    cetirizine (ZYRTEC) tablet 10 mg, 10 mg, Oral, Daily, Marisela Larson MD, 10 mg at 03/20/25 0830    citalopram (CELEXA) tablet 30 mg, 30 mg, Oral, Daily, Marisela Larson MD, 30 mg at 03/20/25 0846    omega-3 acid ethyl esters (LOVAZA) capsule 1 capsule, 1 capsule, Oral, Daily, Marisela Larson MD, 1 capsule at 03/19/25 2005    levothyroxine (SYNTHROID) tablet 50 mcg, 50 mcg, Oral, Daily, Marisela Larson MD, 50 mcg at 03/20/25 0828    oxyBUTYnin (DITROPAN) tablet 10 mg, 10 mg, Oral, Daily, Marisela Larson MD, 10 mg at 03/20/25 0829    pravastatin (PRAVACHOL) tablet 20 mg, 20 mg, Oral, Daily, Marisela Larson MD, 20 mg at 03/20/25 0830    sertraline (ZOLOFT) tablet 25 mg, 25 mg, Oral, Daily, Marisela Larson MD, 25 mg at 03/20/25 0830    tiZANidine (ZANAFLEX) tablet 2 mg, 2 mg, Oral, TID PRN, Marisela Larson MD, 2 mg at 03/17/25 2035    topiramate (TOPAMAX) tablet 25 mg, 25 mg, Oral, Daily, Marisela Larson MD, 25 mg at 03/20/25 0829    psyllium husk-aspartame (METAMUCIL FIBER) packet 1 packet, 1 packet, Oral, Daily, Marisela Larson MD, 1 packet at 03/20/25 0828    DC time 43 minutes    Signed:  Bhupinder Thomas DO  3/20/2025, 3:25 PM

## 2025-03-20 NOTE — PLAN OF CARE
Problem: Discharge Planning  Goal: Discharge to home or other facility with appropriate resources  3/19/2025 2105 by Lupe Daly RN  Outcome: Progressing  3/19/2025 1107 by Hina Villalobos RN  Outcome: Progressing     Problem: Safety - Adult  Goal: Free from fall injury  3/19/2025 2105 by Lupe Daly RN  Outcome: Progressing  3/19/2025 1107 by Hina Villalobos RN  Outcome: Progressing     Problem: ABCDS Injury Assessment  Goal: Absence of physical injury  3/19/2025 2105 by Lupe Daly RN  Outcome: Progressing  3/19/2025 1107 by Hina Villalobos RN  Outcome: Progressing     Problem: Pain  Goal: Verbalizes/displays adequate comfort level or baseline comfort level  3/19/2025 2105 by Lupe Daly RN  Outcome: Progressing  3/19/2025 1107 by Hina Villalobos RN  Outcome: Progressing     Problem: Neurosensory - Adult  Goal: Achieves stable or improved neurological status  3/19/2025 2105 by Lupe Daly RN  Outcome: Progressing  3/19/2025 1107 by Hina Villalobos RN  Outcome: Progressing  Goal: Absence of seizures  3/19/2025 2105 by Lupe Daly RN  Outcome: Progressing  3/19/2025 1107 by Hina Villalobos RN  Outcome: Progressing  Goal: Remains free of injury related to seizures activity  3/19/2025 2105 by Lupe Daly RN  Outcome: Progressing  3/19/2025 1107 by Hina Villalobos RN  Outcome: Progressing  Goal: Achieves maximal functionality and self care  3/19/2025 2105 by Lupe Daly RN  Outcome: Progressing  3/19/2025 1107 by Hina Villalobos RN  Outcome: Progressing     Problem: Skin/Tissue Integrity - Adult  Goal: Skin integrity remains intact  Description: 1.  Monitor for areas of redness and/or skin breakdown  2.  Assess vascular access sites hourly  3.  Every 4-6 hours minimum:  Change oxygen saturation probe site  4.  Every 4-6 hours:  If on nasal continuous positive airway pressure, respiratory therapy assess nares and determine need for appliance change or  resting period  3/19/2025 2105 by Lupe Daly RN  Outcome: Progressing  3/19/2025 1107 by Hina Villalobos RN  Outcome: Progressing  Flowsheets (Taken 3/19/2025 0920)  Skin Integrity Remains Intact: Monitor for areas of redness and/or skin breakdown  Goal: Incisions, wounds, or drain sites healing without S/S of infection  3/19/2025 2105 by Lupe Daly RN  Outcome: Progressing  3/19/2025 1107 by Hina Villalobos RN  Outcome: Progressing  Goal: Oral mucous membranes remain intact  3/19/2025 2105 by Lupe Daly RN  Outcome: Progressing  3/19/2025 1107 by Hina Villalobos RN  Outcome: Progressing     Problem: Musculoskeletal - Adult  Goal: Return mobility to safest level of function  3/19/2025 2105 by Lpue Daly RN  Outcome: Progressing  3/19/2025 1107 by Hina Villalobos RN  Outcome: Progressing  Goal: Maintain proper alignment of affected body part  3/19/2025 2105 by Lupe Daly RN  Outcome: Progressing  3/19/2025 1107 by Hina Villalobos RN  Outcome: Progressing  Goal: Return ADL status to a safe level of function  3/19/2025 2105 by Lupe Daly RN  Outcome: Progressing  3/19/2025 1107 by Hina Villalobos RN  Outcome: Progressing     Problem: Skin/Tissue Integrity  Goal: Skin integrity remains intact  Description: 1.  Monitor for areas of redness and/or skin breakdown  2.  Assess vascular access sites hourly  3.  Every 4-6 hours minimum:  Change oxygen saturation probe site  4.  Every 4-6 hours:  If on nasal continuous positive airway pressure, respiratory therapy assess nares and determine need for appliance change or resting period  3/19/2025 2105 by Lupe Daly RN  Outcome: Progressing  3/19/2025 1107 by Hina Villalobos RN  Outcome: Progressing  Flowsheets (Taken 3/19/2025 0920)  Skin Integrity Remains Intact: Monitor for areas of redness and/or skin breakdown

## 2025-03-20 NOTE — PROGRESS NOTES
Infectious Diseases Inpatient Progress Note          HISTORY OF PRESENT ILLNESS:  Follow up  shunt infection status post extraction on IV vancomycin and Rocephin, well tolerated.  Has persistent severe headache and increased neck stiffness and pain.  Persistent intermittent fevers.  Persistent generalized weakness, using a walker for ambulation  Positive photophobia.   Denies any nausea vomiting.  No bowel movements today.   No abdominal pain  No leg swelling  No shortness of breath  No rash    Current Medications:     fluconazole  400 mg IntraVENous Q24H    vancomycin  750 mg IntraVENous Q12H    butalbital-APAP-caffeine  1 capsule Oral TID    cefTRIAXone (ROCEPHIN) IV  2,000 mg IntraVENous Q12H    vancomycin (VANCOCIN) intermittent dosing (placeholder)   Other RX Placeholder    sodium chloride flush  5-40 mL IntraVENous 2 times per day    neomycin-bacitracin-polymyxin   Topical BID    lactobacillus  1 capsule Oral Daily    cetirizine  10 mg Oral Daily    citalopram  30 mg Oral Daily    omega-3 acid ethyl esters  1 capsule Oral Daily    levothyroxine  50 mcg Oral Daily    oxyBUTYnin  10 mg Oral Daily    pravastatin  20 mg Oral Daily    sertraline  25 mg Oral Daily    topiramate  25 mg Oral Daily    psyllium husk-aspartame  1 packet Oral Daily       Allergies:  Patient has no known allergies.      Review of Systems   Constitutional:  Positive for fever.   Neurological:  Positive for weakness and headaches.   Positive neck stiffness  14 system review is negative other than HPI    Physical Exam  Vitals:    03/19/25 1933 03/19/25 2322 03/20/25 0400 03/20/25 0656   BP: (!) 144/70 (!) 164/75 130/89 (!) 171/69   Pulse: 71 71 64 69   Resp: 16 16 18    Temp: 99.7 °F (37.6 °C) 100.4 °F (38 °C) 99.1 °F (37.3 °C) 99.5 °F (37.5 °C)   TempSrc: Oral Oral Oral Oral   SpO2: 94% 97% 99% 98%   Weight:       Height:       Tmax of 100  General Appearance: alert and oriented to person, place and time, well-developed and well-nourished,    CULTURE WOUND     Direct Exam:  MANY NEUTROPHILS  Direct Exam:  RARE GRAM POSITIVE RODS  Cult,Aerobe/Anaerobe:  NORMAL SKIN SERENA  Cult,Aerobe/Anaerobe:  No anaerobic organisms isolated at 2 days.         Organism Candida metapsilosis Abnormal  P   CULTURE WOUND     LIGHT GROWTH  Identification by MALDI-TOF P      Resulting Agency [facility]              Narrative  Performed by: [facility]  IMPRESSION:    Probable  shunt infection status post removal and primary closure with persistent fevers and headaches, photophobia  normal pressure hydrocephalus           PLAN:  Continue IV vancomycin and Rocephin   Add IV Diflucan  Follow-up clinically  CRP in am       Discussed with patient     Debra Nieves MD

## 2025-03-20 NOTE — PROGRESS NOTES
Physical Therapy Med Surg Daily Treatment Note  Facility/Department: [address] ORTHO TELE  Room: [facility]       NAME: Gloria Abbott  : 1950 (74 y.o.)  MRN: [id number]  CODE STATUS: Full Code    Date of Service: 3/20/2025    Patient Diagnosis(es): Hydrocephalus (HCC) [G91.9]  Hydrocephalus in adult (HCC) [G91.9]   No chief complaint on file.    Patient Active Problem List    Diagnosis Date Noted    Acute post-operative pain 2023    Acute blood loss anemia 2023    Subcapital fracture of femur, right, closed, initial encounter ([facility]) 2023    Acute pain due to trauma 2023    Closed right hip fracture, initial encounter ([facility]) 2023    Dizziness 2023    Shunt malfunction 2025    Ventricular enlargement due to brain atrophy 2025    Infection of ventriculo-peritoneal shunt, initial encounter 2025    Nonfunctioning ventriculoperitoneal shunt, initial encounter 2025    Urge incontinence of urine 2024    Allergic rhinitis 2024    Constipation 2024    Altered bowel habits 10/16/2024    Chronic left maxillary sinusitis 2024    Bilateral high frequency sensorineural hearing loss 2024    Normal pressure hydrocephalus (HCC) 03/15/2024    Carpal tunnel syndrome, bilateral 2023    Status post left knee replacement 05/10/2023    Status post total knee replacement, left 2023    Osteoarthritis of left knee 2023    COVID-19 2021    Osteopenia 2020    History of uterine cancer 2018    History of endometrial cancer 2018    Other joint derangement, not elsewhere classified, ankle and foot 2015    Major depressive disorder, recurrent episode, moderate (HCC) 2015    Hypothyroid 2015    Maisonneuve fracture of right fibula 2015    Sprain of ankle, deltoid ligament 2015    Hyperlipidemia     Depression     Stress     Vitamin D deficiency         Past Medical History:

## 2025-03-20 NOTE — PROGRESS NOTES
Gloria Abbott is a 74 y.o. female patient.  1. Shunt malfunction, initial encounter      Past Medical History:   Diagnosis Date    ADHD (attention deficit hyperactivity disorder)     Arthritis     Cancer (HCC)     endometrial and uterine    Depression     Hyperlipidemia     Hypertension     Hypothyroid     MDD (major depressive disorder)     NPH (normal pressure hydrocephalus) (HCC)     PONV (postoperative nausea and vomiting)     Nausea    Protruded lumbar disc     L4/L5    Stress     Vitamin D deficiency      Current Facility-Administered Medications   Medication Dose Route Frequency Provider Last Rate Last Admin    vancomycin (VANCOCIN) 750 mg in sodium chloride 0.9 % 250 mL IVPB  750 mg IntraVENous Q12H Debra Nieves MD   Stopped at 03/20/25 0847    sodium chloride flush 0.9 % injection 5-40 mL  5-40 mL IntraVENous 2 times per day Debra Nieves MD   10 mL at 03/20/25 0846    sodium chloride flush 0.9 % injection 5-40 mL  5-40 mL IntraVENous PRN Debra Nieves MD        0.9 % sodium chloride infusion   IntraVENous PRN Debra Nieves MD        0.9 % sodium chloride infusion  250 mL IntraVENous Once Debra Nieves MD        butalbital-APAP-caffeine -40 MG per capsule 1 capsule  1 capsule Oral TID Anca Caputo, APRN - CNP   1 capsule at 03/20/25 0830    cefTRIAXone (ROCEPHIN) 2,000 mg in sterile water 20 mL IV syringe  2,000 mg IntraVENous Q12H Marisela Larson MD   2,000 mg at 03/20/25 0602    vancomycin (VANCOCIN) intermittent dosing (placeholder)   Other RX Placeholder Marisela Larson MD        0.9 % sodium chloride infusion   IntraVENous Continuous Marisela Larson MD   Stopped at 03/18/25 2259    sodium chloride flush 0.9 % injection 5-40 mL  5-40 mL IntraVENous 2 times per day Marisela Larson MD   10 mL at 03/20/25 0847    sodium chloride flush 0.9 % injection 5-40 mL  5-40 mL IntraVENous PRN Marisela Larson MD        0.9 % sodium chloride infusion   IntraVENous PRN Marisela Larson MD        ondansetron (ZOFRAN)  (SYNTHROID) tablet 50 mcg  50 mcg Oral Daily Marisela Larson MD   50 mcg at 03/20/25 0828    oxyBUTYnin (DITROPAN) tablet 10 mg  10 mg Oral Daily Marisela Larson MD   10 mg at 03/20/25 0829    pravastatin (PRAVACHOL) tablet 20 mg  20 mg Oral Daily Marisela Larson MD   20 mg at 03/20/25 0830    sertraline (ZOLOFT) tablet 25 mg  25 mg Oral Daily Marisela Larson MD   25 mg at 03/20/25 0830    tiZANidine (ZANAFLEX) tablet 2 mg  2 mg Oral TID PRN Marisela Larson MD   2 mg at 03/17/25 2035    topiramate (TOPAMAX) tablet 25 mg  25 mg Oral Daily Marisela Larson MD   25 mg at 03/20/25 0829    psyllium husk-aspartame (METAMUCIL FIBER) packet 1 packet  1 packet Oral Daily Marisela Larson MD   1 packet at 03/20/25 0828     No Known Allergies  Principal Problem:    Infection of ventriculo-peritoneal shunt, initial encounter  Active Problems:    Normal pressure hydrocephalus (HCC)    Ventricular enlargement due to brain atrophy    Nonfunctioning ventriculoperitoneal shunt, initial encounter    Shunt malfunction  Resolved Problems:    * No resolved hospital problems. *    Blood pressure (!) 171/69, pulse 69, temperature 99.5 °F (37.5 °C), temperature source Oral, resp. rate 18, height 1.626 m (5' 4\"), weight 82.2 kg (181 lb 4.8 oz), SpO2 98%, not currently breastfeeding.    Subjective  Objective:  Vital signs: (most recent): Blood pressure (!) 171/69, pulse 69, temperature 99.5 °F (37.5 °C), temperature source Oral, resp. rate 18, height 1.626 m (5' 4\"), weight 82.2 kg (181 lb 4.8 oz), SpO2 98%, not currently breastfeeding.      Assessment & Plan  Patient remains awake and responsive.  Complaining of some neck pain headache.  Moving all extremities well.    Appreciate input Dr. Mcdowell infectious diseases.  Wound appears to be healing well.  Neurosurgery following.  MARISELA LARSON MD  3/20/2025

## 2025-03-21 ENCOUNTER — TELEPHONE (OUTPATIENT)
Dept: INTERNAL MEDICINE | Age: 75
End: 2025-03-21

## 2025-03-21 PROBLEM — G91.9 HYDROCEPHALUS: Status: ACTIVE | Noted: 2025-03-21

## 2025-03-21 LAB
ANION GAP SERPL CALCULATED.3IONS-SCNC: 10 MEQ/L (ref 9–15)
BASOPHILS # BLD: 0.1 K/UL (ref 0–0.1)
BASOPHILS NFR BLD: 0.8 % (ref 0.1–1.2)
BUN SERPL-MCNC: 16 MG/DL (ref 8–23)
CALCIUM SERPL-MCNC: 8.8 MG/DL (ref 8.5–9.9)
CHLORIDE SERPL-SCNC: 101 MEQ/L (ref 95–107)
CO2 SERPL-SCNC: 26 MEQ/L (ref 20–31)
CREAT SERPL-MCNC: 0.8 MG/DL (ref 0.5–0.9)
EOSINOPHIL # BLD: 0.3 K/UL (ref 0–0.4)
EOSINOPHIL NFR BLD: 3.6 % (ref 0.7–5.8)
ERYTHROCYTE [DISTWIDTH] IN BLOOD BY AUTOMATED COUNT: 12.7 % (ref 11.7–14.4)
GLUCOSE SERPL-MCNC: 106 MG/DL (ref 70–99)
HCT VFR BLD AUTO: 30.9 % (ref 37–47)
HGB BLD-MCNC: 10.4 G/DL (ref 11.2–15.7)
IMM GRANULOCYTES # BLD: 0 K/UL
IMM GRANULOCYTES NFR BLD: 0.5 %
LYMPHOCYTES # BLD: 0.9 K/UL (ref 1.2–3.7)
LYMPHOCYTES NFR BLD: 10.2 %
MCH RBC QN AUTO: 31 PG (ref 25.6–32.2)
MCHC RBC AUTO-ENTMCNC: 33.7 % (ref 32.2–35.5)
MCV RBC AUTO: 92.2 FL (ref 79.4–94.8)
MONOCYTES # BLD: 0.8 K/UL (ref 0.2–0.9)
MONOCYTES NFR BLD: 9 % (ref 4.7–12.5)
NEUTROPHILS # BLD: 6.6 K/UL (ref 1.6–6.1)
NEUTS SEG NFR BLD: 75.9 % (ref 34–71.1)
PLATELET # BLD AUTO: 200 K/UL (ref 182–369)
POTASSIUM SERPL-SCNC: 4.1 MEQ/L (ref 3.4–4.9)
RBC # BLD AUTO: 3.35 M/UL (ref 3.93–5.22)
SODIUM SERPL-SCNC: 137 MEQ/L (ref 135–144)
VANCOMYCIN SERPL-MCNC: 9.2 UG/ML (ref 10–40)
WBC # BLD AUTO: 8.6 K/UL (ref 4–10)

## 2025-03-21 PROCEDURE — 97166 OT EVAL MOD COMPLEX 45 MIN: CPT

## 2025-03-21 PROCEDURE — 97530 THERAPEUTIC ACTIVITIES: CPT

## 2025-03-21 PROCEDURE — 80202 ASSAY OF VANCOMYCIN: CPT

## 2025-03-21 PROCEDURE — 36592 COLLECT BLOOD FROM PICC: CPT

## 2025-03-21 PROCEDURE — 6360000002 HC RX W HCPCS: Performed by: INTERNAL MEDICINE

## 2025-03-21 PROCEDURE — 97110 THERAPEUTIC EXERCISES: CPT

## 2025-03-21 PROCEDURE — 2500000003 HC RX 250 WO HCPCS: Performed by: INTERNAL MEDICINE

## 2025-03-21 PROCEDURE — 36415 COLL VENOUS BLD VENIPUNCTURE: CPT

## 2025-03-21 PROCEDURE — 2580000003 HC RX 258: Performed by: INTERNAL MEDICINE

## 2025-03-21 PROCEDURE — 97162 PT EVAL MOD COMPLEX 30 MIN: CPT

## 2025-03-21 PROCEDURE — 6370000000 HC RX 637 (ALT 250 FOR IP): Performed by: INTERNAL MEDICINE

## 2025-03-21 PROCEDURE — 85025 COMPLETE CBC W/AUTO DIFF WBC: CPT

## 2025-03-21 PROCEDURE — 80048 BASIC METABOLIC PNL TOTAL CA: CPT

## 2025-03-21 PROCEDURE — 1200000002 HC SEMI PRIVATE SWING BED

## 2025-03-21 RX ORDER — CYCLOBENZAPRINE HCL 10 MG
10 TABLET ORAL 3 TIMES DAILY
Status: DISCONTINUED | OUTPATIENT
Start: 2025-03-21 | End: 2025-03-29 | Stop reason: HOSPADM

## 2025-03-21 RX ORDER — GABAPENTIN 100 MG/1
100 CAPSULE ORAL 3 TIMES DAILY
Status: DISCONTINUED | OUTPATIENT
Start: 2025-03-21 | End: 2025-03-29 | Stop reason: HOSPADM

## 2025-03-21 RX ORDER — FLUCONAZOLE 2 MG/ML
400 INJECTION, SOLUTION INTRAVENOUS EVERY 24 HOURS
Status: DISCONTINUED | OUTPATIENT
Start: 2025-03-21 | End: 2025-03-25

## 2025-03-21 RX ORDER — LIDOCAINE 4 G/G
1 PATCH TOPICAL DAILY
Status: DISCONTINUED | OUTPATIENT
Start: 2025-03-21 | End: 2025-03-29 | Stop reason: HOSPADM

## 2025-03-21 RX ORDER — POLYETHYLENE GLYCOL 3350 17 G/17G
17 POWDER, FOR SOLUTION ORAL 2 TIMES DAILY
Status: DISCONTINUED | OUTPATIENT
Start: 2025-03-21 | End: 2025-03-29 | Stop reason: HOSPADM

## 2025-03-21 RX ADMIN — GABAPENTIN 100 MG: 100 CAPSULE ORAL at 12:57

## 2025-03-21 RX ADMIN — Medication 1 CAPSULE: at 20:55

## 2025-03-21 RX ADMIN — PRAVASTATIN SODIUM 20 MG: 10 TABLET ORAL at 20:55

## 2025-03-21 RX ADMIN — POLYETHYLENE GLYCOL 3350 17 G: 17 POWDER, FOR SOLUTION ORAL at 20:55

## 2025-03-21 RX ADMIN — CETIRIZINE HYDROCHLORIDE 10 MG: 10 TABLET ORAL at 08:52

## 2025-03-21 RX ADMIN — POLYETHYLENE GLYCOL 3350 17 G: 17 POWDER, FOR SOLUTION ORAL at 12:57

## 2025-03-21 RX ADMIN — CYCLOBENZAPRINE 10 MG: 10 TABLET, FILM COATED ORAL at 12:56

## 2025-03-21 RX ADMIN — SODIUM CHLORIDE, PRESERVATIVE FREE 10 ML: 5 INJECTION INTRAVENOUS at 20:58

## 2025-03-21 RX ADMIN — PSYLLIUM HUSK 1 PACKET: 3.4 POWDER ORAL at 08:51

## 2025-03-21 RX ADMIN — OMEGA-3-ACID ETHYL ESTERS CAPSULES 1 CAPSULE: 1 CAPSULE, LIQUID FILLED ORAL at 20:55

## 2025-03-21 RX ADMIN — FLUCONAZOLE 400 MG: 400 INJECTION, SOLUTION INTRAVENOUS at 08:59

## 2025-03-21 RX ADMIN — BUTALBITAL, ACETAMINOPHEN, AND CAFFEINE 1 CAPSULE: 50; 300; 40 CAPSULE ORAL at 08:52

## 2025-03-21 RX ADMIN — VANCOMYCIN HYDROCHLORIDE 750 MG: 750 INJECTION, POWDER, LYOPHILIZED, FOR SOLUTION INTRAVENOUS at 18:13

## 2025-03-21 RX ADMIN — SODIUM CHLORIDE, PRESERVATIVE FREE 10 ML: 5 INJECTION INTRAVENOUS at 08:55

## 2025-03-21 RX ADMIN — LEVOTHYROXINE SODIUM 50 MCG: 0.03 TABLET ORAL at 08:51

## 2025-03-21 RX ADMIN — CEFTRIAXONE SODIUM 2000 MG: 2 INJECTION, POWDER, FOR SOLUTION INTRAMUSCULAR; INTRAVENOUS at 05:48

## 2025-03-21 RX ADMIN — TIZANIDINE 2 MG: 2 TABLET ORAL at 08:51

## 2025-03-21 RX ADMIN — CEFTRIAXONE SODIUM 2000 MG: 2 INJECTION, POWDER, FOR SOLUTION INTRAMUSCULAR; INTRAVENOUS at 17:39

## 2025-03-21 RX ADMIN — VANCOMYCIN HYDROCHLORIDE 750 MG: 750 INJECTION, POWDER, LYOPHILIZED, FOR SOLUTION INTRAVENOUS at 06:26

## 2025-03-21 RX ADMIN — GABAPENTIN 100 MG: 100 CAPSULE ORAL at 20:55

## 2025-03-21 RX ADMIN — CITALOPRAM HYDROBROMIDE 30 MG: 20 TABLET ORAL at 08:52

## 2025-03-21 RX ADMIN — TOPIRAMATE 25 MG: 25 TABLET, FILM COATED ORAL at 08:52

## 2025-03-21 RX ADMIN — BACITRACIN ZINC, NEOMYCIN, POLYMYXIN B SULFAT: 5000; 3.5; 4 OINTMENT TOPICAL at 08:51

## 2025-03-21 RX ADMIN — CYCLOBENZAPRINE 10 MG: 10 TABLET, FILM COATED ORAL at 20:55

## 2025-03-21 RX ADMIN — OXYBUTYNIN CHLORIDE 10 MG: 5 TABLET ORAL at 08:52

## 2025-03-21 RX ADMIN — SERTRALINE HYDROCHLORIDE 25 MG: 50 TABLET ORAL at 08:52

## 2025-03-21 RX ADMIN — BACITRACIN ZINC, NEOMYCIN, POLYMYXIN B SULFAT: 5000; 3.5; 4 OINTMENT TOPICAL at 20:56

## 2025-03-21 ASSESSMENT — PAIN SCALES - GENERAL: PAINLEVEL_OUTOF10: 9

## 2025-03-21 ASSESSMENT — PAIN DESCRIPTION - LOCATION: LOCATION: NECK

## 2025-03-21 NOTE — PLAN OF CARE
Problem: Discharge Planning  Goal: Discharge to home or other facility with appropriate resources  Outcome: Progressing     Problem: Safety - Adult  Goal: Free from fall injury  Outcome: Progressing     Problem: ABCDS Injury Assessment  Goal: Absence of physical injury  Outcome: Progressing     Problem: Skin/Tissue Integrity  Goal: Skin integrity remains intact  Description: 1.  Monitor for areas of redness and/or skin breakdown  2.  Assess vascular access sites hourly  3.  Every 4-6 hours minimum:  Change oxygen saturation probe site  4.  Every 4-6 hours:  If on nasal continuous positive airway pressure, respiratory therapy assess nares and determine need for appliance change or resting period  Outcome: Progressing     Problem: Skin/Tissue Integrity - Adult  Goal: Skin integrity remains intact  Description: 1.  Monitor for areas of redness and/or skin breakdown  2.  Assess vascular access sites hourly  3.  Every 4-6 hours minimum:  Change oxygen saturation probe site  4.  Every 4-6 hours:  If on nasal continuous positive airway pressure, respiratory therapy assess nares and determine need for appliance change or resting period  Outcome: Progressing  Goal: Incisions, wounds, or drain sites healing without S/S of infection  Outcome: Progressing  Goal: Oral mucous membranes remain intact  Outcome: Progressing     Problem: Musculoskeletal - Adult  Goal: Return mobility to safest level of function  Outcome: Progressing  Goal: Maintain proper alignment of affected body part  Outcome: Progressing  Goal: Return ADL status to a safe level of function  Outcome: Progressing     Problem: Gastrointestinal - Adult  Goal: Maintains adequate nutritional intake  Outcome: Progressing     Problem: Genitourinary - Adult  Goal: Absence of urinary retention  Outcome: Progressing     Problem: Infection - Adult  Goal: Absence of infection at discharge  Outcome: Progressing  Goal: Absence of infection during hospitalization  Outcome:

## 2025-03-21 NOTE — PLAN OF CARE
Problem: Discharge Planning  Goal: Discharge to home or other facility with appropriate resources  3/21/2025 0924 by Gerardo Young RN  Outcome: Progressing  3/20/2025 2156 by Sayra Castro RN  Outcome: Progressing     Problem: Safety - Adult  Goal: Free from fall injury  3/21/2025 0924 by Gerardo Young RN  Outcome: Progressing  3/20/2025 2156 by Sayra Castro RN  Outcome: Progressing     Problem: ABCDS Injury Assessment  Goal: Absence of physical injury  3/21/2025 0924 by Gerardo Young RN  Outcome: Progressing  3/20/2025 2156 by Sayra Castro RN  Outcome: Progressing     Problem: Skin/Tissue Integrity  Goal: Skin integrity remains intact  Description: 1.  Monitor for areas of redness and/or skin breakdown  2.  Assess vascular access sites hourly  3.  Every 4-6 hours minimum:  Change oxygen saturation probe site  4.  Every 4-6 hours:  If on nasal continuous positive airway pressure, respiratory therapy assess nares and determine need for appliance change or resting period  3/21/2025 0924 by Gerardo Young RN  Outcome: Progressing  3/20/2025 2156 by Sayra Castro RN  Outcome: Progressing     Problem: Skin/Tissue Integrity - Adult  Goal: Skin integrity remains intact  Description: 1.  Monitor for areas of redness and/or skin breakdown  2.  Assess vascular access sites hourly  3.  Every 4-6 hours minimum:  Change oxygen saturation probe site  4.  Every 4-6 hours:  If on nasal continuous positive airway pressure, respiratory therapy assess nares and determine need for appliance change or resting period  3/21/2025 0924 by Gerardo Young RN  Outcome: Progressing  3/20/2025 2156 by Sayra Castro RN  Outcome: Progressing  Goal: Incisions, wounds, or drain sites healing without S/S of infection  3/21/2025 0924 by Gerardo Young RN  Outcome: Progressing  3/20/2025 2156 by Sayra Castro RN  Outcome: Progressing  Goal: Oral mucous membranes remain intact  3/21/2025 0924 by Hannah

## 2025-03-21 NOTE — TELEPHONE ENCOUNTER
Care Transitions Initial Follow Up Call    Outreach made within 2 business days of discharge: Yes    Patient: Gloria Abbott Patient : 1950   MRN: [id number]  Reason for Admission: Infection of ventriculo-peritoneal shunt, initial encounter   Discharge Date: 3/20/25       Spoke with: ANGELA X1    Discharge department/facility: [facility]        Scheduled appointment with PCP within 7-14 days    Follow Up  Future Appointments   Date Time Provider Department Center   4/10/2025 11:15 AM Marisela Larson MD [facility]   2025  1:00 PM Xiomara Pappas MD [place] [facility] DEP   2025  1:00 PM Xiomara Pappas MD [place] [facility] DEP       Meme March, MA

## 2025-03-21 NOTE — PROGRESS NOTES
Comprehensive Nutrition Assessment    Type and Reason for Visit:  Initial, Consult    Nutrition Recommendations/Plan:   Continue regular diet as ordered  PO >75% meal  Adequate fluid intake for hydration     Malnutrition Assessment:  Malnutrition Status:  No malnutrition (03/21/25 1003)        Nutrition Assessment:    New admit to sub acute unit. Consult acknowledged. Pt reports appetite being \"fine\". Denies need for oral nutritional supplement at this time. Monitor PO intake.    Nutrition Related Findings:    Admit post op r/t hydrocepahlus. PMH includes depression, hypothyroid. Admit weight 3/20 185#, - weight trend up over past six months. Diet is regular.  PO intake noted at 50-75% meals during acute hospitalization. Meds reviewed; Labs 3/21 - no nutrition realted concerns. Skin - surgical incision noted. No edema noted per EMR. Pt groggy during assessment- minimal information obtained- pt denies chewing/swallowing difficulty, feeds self independently. No appetite changes noted. Pt uncertain of UBWR. Wound Type: Surgical Incision (head)       Current Nutrition Intake & Therapies:    Average Meal Intake: 51-75%  Average Supplements Intake: None Ordered  ADULT DIET; Regular    Anthropometric Measures:  Height: 162.6 cm (5' 4\")  Ideal Body Weight (IBW): 120 lbs (55 kg)    Admission Body Weight: 84 kg (185 lb 3 oz) (3/20/25)  Current Body Weight: 84 kg (185 lb 3 oz) (3/20/25), 154.3 % IBW. Weight Source: Bed scale  Current BMI (kg/m2): 31.8  Usual Body Weight: 76.7 kg (169 lb) (11/22/25;  9/16/24 173#. 3/26/24 172#.)     % Weight Change (Calculated): 9.6  BMI Categories: Obese Class 1 (BMI 30.0-34.9)    Estimated Daily Nutrient Needs:  Energy Requirements Based On: Kcal/kg  Weight Used for Energy Requirements: Current  Energy (kcal/day): 1260-1512kcal (15-18kcal/kg)  Weight Used for Protein Requirements: Ideal  Protein (g/day): 72gm-83gm (1.3-1.5gm/kg IBW)  Method Used for Fluid Requirements: 1 ml/kcal  Fluid

## 2025-03-21 NOTE — PLAN OF CARE
Nutrition Problem #1: Inadequate oral intake  Intervention: Food and/or Nutrient Delivery: Continue Current Diet

## 2025-03-21 NOTE — PROGRESS NOTES
[facility]   Pharmacy Pharmacokinetic Monitoring Service - Vancomycin    Consulting Provider: Dr Thomas (transfer from [facility])   Indication:   Central Nervous System Infection  Target Concentration: Goal AUC/VIPIN 400-600 mg*hr/L  Day of Therapy: 5  Additional Antimicrobials: Rocephin    Pertinent Laboratory Values:   Wt Readings from Last 1 Encounters:   03/20/25 84 kg (185 lb 3.2 oz)     Temp Readings from Last 1 Encounters:   03/21/25 98.9 °F (37.2 °C) (Oral)     Estimated Creatinine Clearance: 63 mL/min (based on SCr of 0.8 mg/dL).  Recent Labs     03/19/25  0447 03/21/25  0552 03/21/25  0602   CREATININE 0.85  --  0.80   BUN 18  --  16   WBC 9.5 8.6  --      Procalcitonin: n/a    Pertinent Cultures:  Culture Date Source Results          Microbiology:   No results for input(s): \"BC\" in the last 72 hours.     No results for input(s): \"BLOODCULT2\" in the last 72 hours.   03/17/2025  IntraOp note was reviewed  OPERATIONS PERFORMED: Removal of right ventriculoperitoneal shunt, debridement, irrigation, and primary closure.  By Dr. Larson  CRP was slightly elevated at 13.8  Preliminary culture with yeast.  I wonder if this is a superficial culture from the skin where yeast is a colonizer and not pathogenic  Urinalysis done yesterday was negative  CT of the head done yesterday with moderate hydrocephalus  Small volume intraventricular hemorrhage  Chronic left maxillary sinusitis  CULTURE WOUND        Abnormal   Direct Exam:  FEW NEUTROPHILS  Direct Exam:  NO ORGANISMS SEEN  Cult,Aerobe/Anaerobe:  NORMAL SKIN SERENA  Cult,Aerobe/Anaerobe:  No anaerobic organisms isolated at 3 days.  Performed at [facility] [address] [place] [id number]  ([phone]  P      Organism Yeast Abnormal  P   CULTURE WOUND RARE GROWTH P      Component  Ref Range & Units (hover)     CULTURE WOUND       Direct Exam:  MANY NEUTROPHILS  Direct Exam:  RARE GRAM POSITIVE RODS  Cult,Aerobe/Anaerobe:  NORMAL SKIN  SERENA  Cult,Aerobe/Anaerobe:  No anaerobic organisms isolated at 2 days.           Organism Candida metapsilosis Abnormal  P   CULTURE WOUND       LIGHT GROWTH  Identification by MALDI-TOF P      Resulting Agency [facility] -        MRSA Nasal Swab: N/A. Non-respiratory infection.    Recent vancomycin administrations                     vancomycin (VANCOCIN) 750 mg in sodium chloride 0.9 % 250 mL IVPB (mg) 750 mg New Bag 03/21/25 0626    vancomycin (VANCOCIN) 750 mg in sodium chloride 0.9 % 250 mL IVPB (mg) 750 mg New Bag 03/20/25 0607     750 mg New Bag 03/19/25 1829     750 mg New Bag  0612    vancomycin (VANCOCIN) 1,000 mg in sodium chloride 0.9 % 250 mL (addEASE) IVPB (mg) 1,000 mg Given 03/18/25 1821                    Assessment:  Date/Time Current Dose Concentration Timing of Concentration (h) AUC   03/21/25 0602 750 mg IV q12h (missed dose 3/20/25 1830 during transfer to facility) 9.2 22 467   Note: Serum concentrations collected for AUC dosing may appear elevated if collected in close proximity to the dose administered, this is not necessarily an indication of toxicity    Plan:  Current dosing regimen is therapeutic (note missed dose 3/20 during transfer resulting in lower level) insight rx updated, AUC returned to therapeutic level  Continue current dose  Repeat vancomycin concentration ordered for 3/23 @ 0500   Pharmacy will continue to monitor patient and adjust therapy as indicated    Thank you for the consult,  Geo Mares, [facility]  3/21/2025 12:37 PM

## 2025-03-21 NOTE — PROGRESS NOTES
Facility/Department: [facility] SUBACUTE UNIT  Occupational Therapy Initial Assessment    Name: Gloria Abbott  : 1950  MRN: [id number]  Date of Service: 3/21/2025    Discharge Recommendations:  Continue to assess pending progress, Subacute/Skilled Nursing Facility, 24 hour supervision or assist (slow SNF)          Patient Diagnosis(es): There were no encounter diagnoses.  Past Medical History:  has a past medical history of ADHD (attention deficit hyperactivity disorder), Arthritis, Cancer ([facility]), Depression, Hyperlipidemia, Hypertension, Hypothyroid, MDD (major depressive disorder), NPH (normal pressure hydrocephalus) ([facility]), PONV (postoperative nausea and vomiting), Protruded lumbar disc, Stress, and Vitamin D deficiency.  Past Surgical History:  has a past surgical history that includes Foot surgery (Right, ); Ankle surgery (Right, ); sinus surgery (07/10/2012); hip surgery (Right, 2023); Total knee arthroplasty (Left, 2023); LASIK (Bilateral); Colonoscopy; Ventriculoperitoneal shunt (N/A, 03/15/2024); Hysterectomy; Sinus endoscopy (Bilateral, 2024); Sinus endoscopy (Left, 2024); Colonoscopy (N/A, 2024); Upper gastrointestinal endoscopy (N/A, 2024); and shunt removal (N/A, 3/17/2025).    Treatment Diagnosis: Decreased ADL/IADL performance d/t shunt removal, infection      Assessment  Performance deficits / Impairments: Decreased functional mobility ;Decreased ADL status;Decreased strength;Decreased balance;Decreased endurance;Decreased high-level IADLs;Decreased safe awareness;Decreased fine motor control;Decreased posture;Decreased ROM  Assessment: Pt is a 75 y/o F admitted to [facility] after undergoing shunt removal 3/17/25 with Dr. Larson, pt stated she was scratching her head and accidentally pulled out her shunt. Pt also developed an infection which is being treated via midline. PLOF pt is from home with son, was using a SPC for mobility, no recent falls, ADL  level  Home Access: Stairs to enter without rails  Entrance Stairs - Number of Steps: 3  Bathroom Shower/Tub: Walk-in shower, Shower chair with back  Bathroom Toilet: Handicap height  Bathroom Equipment: Shower chair, Hand-held shower, Grab bars in shower  Bathroom Accessibility: Walker accessible  Home Equipment: Cane, Walker - Rolling, Rollator  Has the patient had two or more falls in the past year or any fall with injury in the past year?: No  Receives Help From: Family  Prior Level of Assist for ADLs: Independent  Prior Level of Assist for Homemaking: Independent  Homemaking Responsibilities: Yes  Prior Level of Assist for Ambulation: Independent household ambulator, with or without device, Independent community ambulator, with or without device (cane primarily PRN home and in community)  Prior Level of Assist for Transfers: Independent  Active : Yes  Occupation: Retired  Type of Occupation: [employer]  Leisure & Hobbies: [employer] puzzles    Objective  Temp: 98.9 °F (37.2 °C)  Pulse: 69  Heart Rate Source: Monitor  Respirations: 18  SpO2: 100 %  O2 Device: None (Room air)  BP: (!) 158/65  MAP (Calculated): 96  BP Location: Left upper arm  Vision  Vision: Impaired  Vision Exceptions: Wears glasses for reading  Hearing  Hearing: Within functional limits       Observation/Palpation  Posture: Poor (mod to severe upper kyphosis, somewhat flexible wtih assist of PT, difficulty lifting head and upper back- can't put head to pillow sitting in recliner)  Observation: RUE midline  Safety Devices  Type of Devices: All fall risk precautions in place;Call light within reach;Nurse notified;Left in chair;Chair alarm in place;Gait belt;Patient at risk for falls  Bed Mobility Training  Bed Mobility Training: No  Balance  Sitting: Impaired  Standing: Impaired;With support (FWW used Mod A, decreased posture, backwards leaning at times, head in forward position.)  Transfer Training  Transfer Training: Yes  Overall Level

## 2025-03-21 NOTE — PROGRESS NOTES
Physical Therapy  Facility/Department: [facility] MED SURG UNIT  Daily Treatment Note  NAME: Gloria Abbott  : 1950  MRN: [id number]    Date of Service: 3/21/2025    Discharge Recommendations:  (P) Continue to assess pending progress, 24 hour supervision or assist        Patient Diagnosis(es):73yo hydrocephalus, weakness    Assessment  Assessment: (P) Pt had been up sitting x 5 hrs and PT was called to work with her as she was getting put back into bed. Nurse and nurse aid w/ pt at arrival. Pt required max A x 2-3 to transfer from room chair to Yuki steady. She stood approx ~1-2 mins in Jeyson steady with 2 UE support and Maci in order to change out brief for new one, then sat onto Yuki Steady. From Yuki Steady she required less A- modA  x 2 to stand and followed safey cues for hand placment onto bed before sitting. max A x 2 needed to transfer sit to supine. While supine, pt required PROM to complete most ther Ex with VC's and manual cues on proper form. Pt able to prpduce QS's and AP's in long sitting with AROm w/ limited movement.  Activity Tolerance: (P) Patient tolerated treatment well  Other: has FWW, Rollator and cane    Plan  Physical Therapy Plan  General Plan: (P) 5-7 times per week  Current Treatment Recommendations: (P) Strengthening;ROM;Balance training;Functional mobility training;Transfer training;Endurance training;Gait training;Stair training;Neuromuscular re-education;Home exercise program;Safety education & training;Patient/Caregiver education & training;Equipment evaluation, education, & procurement;Modalities;Positioning;Therapeutic activities;Co-Treatment;Manual  Additional Comments: KT tape trial to L forearm 3/21/25 for possible use with posture and neck pain    Restrictions  Restrictions/Precautions  Restrictions/Precautions: Fall Risk  Activity Level: Up with Assist  Required Braces or Orthoses?: No  Implants Present? : Metal implants  Position Activity Restriction  Other Position/Activity

## 2025-03-21 NOTE — PROGRESS NOTES
Physical Therapy  Facility/Department: [facility] MED SURG 0252/0252-01  Physical Therapy Discharge      NAME: Gloria Abbott    : 1950 (74 y.o.)  MRN: [id number]    Account: [id number]  Gender: female      Patient has been discharged from acute care hospital. DC patient from current PT program.      Electronically signed by Heidi Pope PT on 3/21/25 at 2:02 PM EDT

## 2025-03-21 NOTE — PROGRESS NOTES
Facility/Department: [facility] SURG UNIT  Physical Therapy Initial Assessment    Name: Gloria Abbott  : 1950  MRN: [id number]  Date of Service: 3/21/2025    Discharge Recommendations:  Continue to assess pending progress, 24 hour supervision or assist (slow SNF)   PT Equipment Recommendations  Other: has FWW, Rollator and cane      Patient Diagnosis(es): hydrocephalus, weakness, pain, decreased function  Past Medical History:  has a past medical history of ADHD (attention deficit hyperactivity disorder), Arthritis, Cancer ([facility]), Depression, Hyperlipidemia, Hypertension, Hypothyroid, MDD (major depressive disorder), NPH (normal pressure hydrocephalus) ([facility]), PONV (postoperative nausea and vomiting), Protruded lumbar disc, Stress, and Vitamin D deficiency.  Past Surgical History:  has a past surgical history that includes Foot surgery (Right, ); Ankle surgery (Right, ); sinus surgery (07/10/2012); hip surgery (Right, 2023); Total knee arthroplasty (Left, 2023); LASIK (Bilateral); Colonoscopy; Ventriculoperitoneal shunt (N/A, 03/15/2024); Hysterectomy; Sinus endoscopy (Bilateral, 2024); Sinus endoscopy (Left, 2024); Colonoscopy (N/A, 2024); Upper gastrointestinal endoscopy (N/A, 2024); and shunt removal (N/A, 3/17/2025).    Assessment  Body Structures, Functions, Activity Limitations Requiring Skilled Therapeutic Intervention: Decreased functional mobility ;Decreased ADL status;Decreased strength;Decreased balance;Decreased endurance;Decreased safe awareness;Decreased ROM;Decreased body mechanics;Increased pain;Decreased coordination  Assessment: Pt with severe weakness and high pain  in neck and head at 9/10. Pt erquiring mod/Max to Max a of 2 person with all mobiltiy this date. Pt with mod to severekyphotic posutre. Pt moves slowly but willing to participate as much as she can. PT requested lidocaine patch to neck from MD for pain management. Pt given 2 warm blankets

## 2025-03-21 NOTE — PROGRESS NOTES
Called [facility] and message left on answering service for them to follow pt from [facility] for antibiotic management.   Electronically signed by Sayra Castro RN on 3/20/2025 at 10:10 PM

## 2025-03-21 NOTE — H&P
cooperative.  HEENT: postoperative scar   Normal cephalic, atraumatic without obvious deformity. Pupils equal, round, and reactive to light.  Extra ocular muscles intact. Conjunctivae/corneas clear.  Neck: Supple, with full range of motion. No jugular venous distention. Trachea midline.  Respiratory:  Normal respiratory effort. Clear to auscultation, bilaterally without Rales/Wheezes/Rhonchi.  Cardiovascular:  Regular rate and rhythm with normal S1/S2 without murmurs, rubs or gallops.  Abdomen: Soft, non-tender, non-distended with normal bowel sounds.  Musculoskeletal:  No clubbing, cyanosis or edema bilaterally.  Full range of motion without deformity.  Skin: Skin color, texture, turgor normal.  No rashes or lesions.  Neurologic:  Neurovascularly intact without any focal sensory/motor deficits. Cranial nerves: II-XII intact, grossly non-focal.  Psychiatric:  Alert and oriented, thought content appropriate, normal insight  Capillary Refill: Brisk,< 3 seconds   Peripheral Pulses: +2 palpable, equal bilaterally       Labs:     Recent Labs     03/19/25 0447 03/21/25  0552   WBC 9.5 8.6   HGB 9.7* 10.4*   HCT 28.8* 30.9*    200     Recent Labs     03/19/25  0447 03/21/25  0602    137   K 4.0 4.1    101   CO2 25 26   BUN 18 16   CREATININE 0.85 0.80   CALCIUM 7.9* 8.8     No results for input(s): \"AST\", \"ALT\", \"BILIDIR\", \"BILITOT\", \"ALKPHOS\" in the last 72 hours.  No results for input(s): \"INR\" in the last 72 hours.  No results for input(s): \"CKTOTAL\", \"TROPONINI\" in the last 72 hours.    Urinalysis:      Lab Results   Component Value Date/Time    NITRU Negative 03/18/2025 06:35 PM    WBCUA 10-20 04/14/2023 01:19 PM    BACTERIA Negative 04/14/2023 01:19 PM    RBCUA 0-2 04/14/2023 01:19 PM    BLOODU Negative 03/18/2025 06:35 PM    GLUCOSEU Negative 03/18/2025 06:35 PM       Radiology:     CXR: I have reviewed the CXR with the following interpretation:   EKG:  I have reviewed the EKG with the following

## 2025-03-21 NOTE — PROGRESS NOTES
Pt admitted to room 252. Pt alert and oriented. Pt denies any pain or needs at this time. Pt has an incision on top of scalp with stitches. Pt incontinent of urine, female external catheter in place. Pt bed alarm is on and call light in reach. Will continue to monitor pt.  Electronically signed by Sayar Castro RN on 3/20/2025 at 9:59 PM

## 2025-03-22 LAB
BACTERIA BLD CULT ORG #2: NORMAL
BACTERIA BLD CULT: NORMAL
CULTURE WOUND: ABNORMAL
CULTURE WOUND: NORMAL
ORGANISM: ABNORMAL
ORGANISM: ABNORMAL

## 2025-03-22 PROCEDURE — 97110 THERAPEUTIC EXERCISES: CPT

## 2025-03-22 PROCEDURE — 97530 THERAPEUTIC ACTIVITIES: CPT

## 2025-03-22 PROCEDURE — 6360000002 HC RX W HCPCS: Performed by: INTERNAL MEDICINE

## 2025-03-22 PROCEDURE — 6370000000 HC RX 637 (ALT 250 FOR IP): Performed by: INTERNAL MEDICINE

## 2025-03-22 PROCEDURE — 2580000003 HC RX 258: Performed by: INTERNAL MEDICINE

## 2025-03-22 PROCEDURE — 2500000003 HC RX 250 WO HCPCS: Performed by: INTERNAL MEDICINE

## 2025-03-22 PROCEDURE — 1200000002 HC SEMI PRIVATE SWING BED

## 2025-03-22 RX ADMIN — OMEGA-3-ACID ETHYL ESTERS CAPSULES 1 CAPSULE: 1 CAPSULE, LIQUID FILLED ORAL at 21:54

## 2025-03-22 RX ADMIN — BACITRACIN ZINC, NEOMYCIN, POLYMYXIN B SULFAT: 5000; 3.5; 4 OINTMENT TOPICAL at 20:07

## 2025-03-22 RX ADMIN — CETIRIZINE HYDROCHLORIDE 10 MG: 10 TABLET ORAL at 08:33

## 2025-03-22 RX ADMIN — PRAVASTATIN SODIUM 20 MG: 10 TABLET ORAL at 20:07

## 2025-03-22 RX ADMIN — TOPIRAMATE 25 MG: 25 TABLET, FILM COATED ORAL at 08:33

## 2025-03-22 RX ADMIN — CEFTRIAXONE SODIUM 2000 MG: 2 INJECTION, POWDER, FOR SOLUTION INTRAMUSCULAR; INTRAVENOUS at 06:44

## 2025-03-22 RX ADMIN — GABAPENTIN 100 MG: 100 CAPSULE ORAL at 20:07

## 2025-03-22 RX ADMIN — CYCLOBENZAPRINE 10 MG: 10 TABLET, FILM COATED ORAL at 13:40

## 2025-03-22 RX ADMIN — GABAPENTIN 100 MG: 100 CAPSULE ORAL at 08:34

## 2025-03-22 RX ADMIN — POLYETHYLENE GLYCOL 3350 17 G: 17 POWDER, FOR SOLUTION ORAL at 20:10

## 2025-03-22 RX ADMIN — IBUPROFEN 600 MG: 400 TABLET, FILM COATED ORAL at 10:37

## 2025-03-22 RX ADMIN — SODIUM CHLORIDE, PRESERVATIVE FREE 10 ML: 5 INJECTION INTRAVENOUS at 09:41

## 2025-03-22 RX ADMIN — BACITRACIN ZINC, NEOMYCIN, POLYMYXIN B SULFAT: 5000; 3.5; 4 OINTMENT TOPICAL at 09:38

## 2025-03-22 RX ADMIN — CEFTRIAXONE SODIUM 2000 MG: 2 INJECTION, POWDER, FOR SOLUTION INTRAMUSCULAR; INTRAVENOUS at 17:36

## 2025-03-22 RX ADMIN — SODIUM CHLORIDE, PRESERVATIVE FREE 10 ML: 5 INJECTION INTRAVENOUS at 20:11

## 2025-03-22 RX ADMIN — VANCOMYCIN HYDROCHLORIDE 750 MG: 750 INJECTION, POWDER, LYOPHILIZED, FOR SOLUTION INTRAVENOUS at 07:19

## 2025-03-22 RX ADMIN — CITALOPRAM HYDROBROMIDE 30 MG: 20 TABLET ORAL at 08:33

## 2025-03-22 RX ADMIN — Medication 1 CAPSULE: at 21:54

## 2025-03-22 RX ADMIN — CYCLOBENZAPRINE 10 MG: 10 TABLET, FILM COATED ORAL at 08:33

## 2025-03-22 RX ADMIN — SERTRALINE HYDROCHLORIDE 25 MG: 50 TABLET ORAL at 08:33

## 2025-03-22 RX ADMIN — CYCLOBENZAPRINE 10 MG: 10 TABLET, FILM COATED ORAL at 20:07

## 2025-03-22 RX ADMIN — VANCOMYCIN HYDROCHLORIDE 750 MG: 750 INJECTION, POWDER, LYOPHILIZED, FOR SOLUTION INTRAVENOUS at 18:57

## 2025-03-22 RX ADMIN — OXYBUTYNIN CHLORIDE 10 MG: 5 TABLET ORAL at 08:33

## 2025-03-22 RX ADMIN — GABAPENTIN 100 MG: 100 CAPSULE ORAL at 13:40

## 2025-03-22 RX ADMIN — LEVOTHYROXINE SODIUM 50 MCG: 0.03 TABLET ORAL at 09:38

## 2025-03-22 RX ADMIN — FLUCONAZOLE 400 MG: 400 INJECTION, SOLUTION INTRAVENOUS at 08:30

## 2025-03-22 ASSESSMENT — PAIN DESCRIPTION - LOCATION
LOCATION: BACK;NECK
LOCATION: NECK

## 2025-03-22 ASSESSMENT — PAIN DESCRIPTION - DESCRIPTORS: DESCRIPTORS: SORE;SHARP

## 2025-03-22 ASSESSMENT — PAIN SCALES - GENERAL
PAINLEVEL_OUTOF10: 0
PAINLEVEL_OUTOF10: 8
PAINLEVEL_OUTOF10: 3

## 2025-03-22 ASSESSMENT — PAIN DESCRIPTION - ORIENTATION: ORIENTATION: LEFT

## 2025-03-22 NOTE — PROGRESS NOTES
[facility]   Pharmacy Pharmacokinetic Monitoring Service - Vancomycin    Consulting Provider: Dr Thomas (transfer from [facility])   Indication:   Central Nervous System Infection  Target Concentration: Goal AUC/VIPIN 400-600 mg*hr/L  Day of Therapy: 5  Additional Antimicrobials: Rocephin    Pertinent Laboratory Values:   Wt Readings from Last 1 Encounters:   03/20/25 84 kg (185 lb 3.2 oz)     Temp Readings from Last 1 Encounters:   03/22/25 99 °F (37.2 °C)     Estimated Creatinine Clearance: 63 mL/min (based on SCr of 0.8 mg/dL).  Recent Labs     03/21/25  0552 03/21/25  0602   CREATININE  --  0.80   BUN  --  16   WBC 8.6  --      Procalcitonin: n/a    Pertinent Cultures:  Culture Date Source Results          Microbiology:   No results for input(s): \"BC\" in the last 72 hours.     No results for input(s): \"BLOODCULT2\" in the last 72 hours.   03/17/2025  IntraOp note was reviewed  OPERATIONS PERFORMED: Removal of right ventriculoperitoneal shunt, debridement, irrigation, and primary closure.  By Dr. Larson  CRP was slightly elevated at 13.8  Preliminary culture with yeast.  I wonder if this is a superficial culture from the skin where yeast is a colonizer and not pathogenic  Urinalysis done yesterday was negative  CT of the head done yesterday with moderate hydrocephalus  Small volume intraventricular hemorrhage  Chronic left maxillary sinusitis  CULTURE WOUND        Abnormal   Direct Exam:  FEW NEUTROPHILS  Direct Exam:  NO ORGANISMS SEEN  Cult,Aerobe/Anaerobe:  NORMAL SKIN SERENA  Cult,Aerobe/Anaerobe:  No anaerobic organisms isolated at 3 days.  Performed at [address] [id number] ([phone]  P      Organism Yeast Abnormal  P   CULTURE WOUND RARE GROWTH P      Component  Ref Range & Units (hover)     CULTURE WOUND       Direct Exam:  MANY NEUTROPHILS  Direct Exam:  RARE GRAM POSITIVE RODS  Cult,Aerobe/Anaerobe:  NORMAL SKIN SERENA  Cult,Aerobe/Anaerobe:  No anaerobic  organisms isolated at 2 days.           Organism Candida metapsilosis Abnormal  P   CULTURE WOUND       LIGHT GROWTH  Identification by MALDI-TOF P      Resulting Agency [facility] -        MRSA Nasal Swab: N/A. Non-respiratory infection.    Recent vancomycin administrations                     vancomycin (VANCOCIN) 750 mg in sodium chloride 0.9 % 250 mL IVPB (mg) 750 mg New Bag 03/21/25 0626    vancomycin (VANCOCIN) 750 mg in sodium chloride 0.9 % 250 mL IVPB (mg) 750 mg New Bag 03/20/25 0607     750 mg New Bag 03/19/25 1829     750 mg New Bag  0612    vancomycin (VANCOCIN) 1,000 mg in sodium chloride 0.9 % 250 mL (addEASE) IVPB (mg) 1,000 mg Given 03/18/25 1821                    Assessment:  Date/Time Current Dose Concentration Timing of Concentration (h) AUC   03/21/25 0602 750 mg IV q12h (missed dose 3/20/25 1830 during transfer to facility) 9.2 24 467   Note: Serum concentrations collected for AUC dosing may appear elevated if collected in close proximity to the dose administered, this is not necessarily an indication of toxicity    Plan:  Current dosing regimen is therapeutic (note missed dose 3/20 during transfer resulting in lower level) insight rx updated,   Continue current dose. Daily creatinine ordered for monitoring  Repeat vancomycin concentration ordered for 3/23 @ 0500   Pharmacy will continue to monitor patient and adjust therapy as indicated    Thank you for the consult,  Cate Kulkarni RPH  3/22/2025 7:36 AM

## 2025-03-22 NOTE — PROGRESS NOTES
Physical Therapy  Facility/Department: [facility] SURG UNIT  Daily Treatment Note  NAME: Gloria Abbott  : 1950  MRN: [id number]    Date of Service: 3/22/2025    Discharge Recommendations:  Continue to assess pending progress, 24 hour supervision or assist        Patient Diagnosis(es): There were no encounter diagnoses.    Assessment  Assessment: Patient demonstrated ability to roll up with moderate assistance following training and cuing. Initially required moderate amount of cuing but this improved.  Activity Tolerance: Patient tolerated treatment well    Plan  Physical Therapy Plan  General Plan: 5-7 times per week  Current Treatment Recommendations: Strengthening;ROM;Balance training;Functional mobility training;Transfer training;Endurance training;Gait training;Stair training;Neuromuscular re-education;Home exercise program;Safety education & training;Patient/Caregiver education & training;Equipment evaluation, education, & procurement;Modalities;Positioning;Therapeutic activities;Co-Treatment;Manual    Restrictions  Restrictions/Precautions  Restrictions/Precautions: Fall Risk  Activity Level: Up with Assist  Required Braces or Orthoses?: No  Implants Present? : Metal implants  Position Activity Restriction  Other Position/Activity Restrictions: Post shunt removal, hydrocephalus     Subjective   Subjective  Subjective: Patient reports that she is willing to give it a good try today.    Objective  Vitals     Bed Mobility Training  Bed Mobility Training: Yes  Overall Level of Assistance: Partial/Moderate assistance  Sit to Supine: Substantial/Maximal assistance;2 Person assistance  Scooting: Substantial/Maximal assistance  Balance  Sitting: Impaired  Sitting - Static: Poor (constant support)  Sitting - Dynamic: Poor (constant support)  Standing: Impaired;With support  Transfer Training  Transfer Training: No  Gait Training  Gait Training: No     PT Exercises  A/AROM Exercises: Hip extension, calf stretch,  glute stretch, clam shell stretch all manually resisted  Resistive Exercises: Roll up with mod assistance x 3 (flat to modified long sitting)               Goals  Short Term Goals  Time Frame for Short Term Goals: 2 wks  Short Term Goal 1: transfer with tulio steady standing lift  and <= Mod A of 1  Short Term Goal 2: transfer with FWW bed to chair with <= Mod A of 2 person  Short Term Goal 3: amb >= 25 ft with FWW and <= Odette of 2 person  Short Term Goal 4: tolerate x 10 LE seatedor supine KERRY to gain strength for functional mobility.  Short Term Goal 5: assess curb step in parallel bars with <= Mod A of 2 persons  Long Term Goals  Time Frame for Long Term Goals : 4 or more weeks  Long Term Goal 1: bed mobility with <= Min A  Long Term Goal 2: Transfer sit to stand and bed to chair with <= Min A of 1 and FWW  Long Term Goal 3: Amb>= 50 ft with FWW and <= CGA before fatigued  Long Term Goal 4: 3 steps with one rail and <= Min A of 1  Long Term Goal 5: HEP and safe discharge plan in place  Patient Goals   Patient Goals : \"I want to get stronger to go home, but my neck and head hurt.\"    Education  Patient Education  Education Given To: Patient  Education Provided: Transfer Training;Home Exercise Program  Education Method: Verbal  Barriers to Learning: None  Education Outcome: Continued education needed    AM-PAC - Mobility              Therapy Time   Individual Concurrent Group Co-treatment   Time In 0130         Time Out 0210         Minutes 40                 Pablo Singh, PTA

## 2025-03-22 NOTE — PROGRESS NOTES
Occupational Therapy  Facility/Department: [facility] MED SURG UNIT  Daily Treatment Note  NAME: Gloria Abbott  : 1950  MRN: [id number]    Date of Service: 3/22/2025    Discharge Recommendations:  Subacute/Skilled Nursing Facility, 24 hour supervision or assist (slow SNF)       Treatment Diagnosis: Decreased ADL/IADL performance d/t shunt removal, infection     Patient Diagnosis(es): hydrocephalus, weakness    Assessment    OT follow up:yes  Pt. Was laying in the bed sleeping upon arrival. BUCHANAN woke pt. Up and she nodded her head in agreement to participate with OT. Pt. Tolerated all PROM of BUE and was not able to follow commands to do UB exercises on her own due to increased fatigue and pt. Kept her eyes closed throughout OT session. When BUCHANAN asked pt. Questions she would nod her head or shake her head for yes or no. Pt. Tolerated re-positioning in the bed for bed mobility for comfort and for pressure relief. OT session ended due to lack of participation from pt. BUCHANAN retrieved pt. A warm blanket due to pt.'s hands and arms felt cold. After BUCHANAN covered pt. Up she nodded her head yes and smiled.     Plan   Occupational Therapy Plan  Times Per Week: 4-7  Times Per Day: Once a day  Current Treatment Recommendations: Strengthening, Functional mobility training, Balance training, Endurance training, Pain management, Safety education & training, Patient/Caregiver education & training, Self-Care / ADL, Equipment evaluation, education, & procurement, Home management training, Cognitive/Perceptual training, Coordination training, Neuromuscular re-education, Cognitive reorientation       Restrictions     Restrictions/Precautions  Restrictions/Precautions: Fall Risk  Activity Level: Up with Assist  Required Braces or Orthoses?: No  Implants Present? : Metal implants  Position Activity Restriction  Other Position/Activity Restrictions: Post shunt removal, hydrocephalus     Subjective   Pt. Was laying in the bed upon

## 2025-03-23 LAB
CREAT SERPL-MCNC: 0.7 MG/DL (ref 0.5–0.9)
VANCOMYCIN SERPL-MCNC: 21.5 UG/ML (ref 10–40)

## 2025-03-23 PROCEDURE — 6360000002 HC RX W HCPCS: Performed by: INTERNAL MEDICINE

## 2025-03-23 PROCEDURE — 6370000000 HC RX 637 (ALT 250 FOR IP): Performed by: INTERNAL MEDICINE

## 2025-03-23 PROCEDURE — 2500000003 HC RX 250 WO HCPCS: Performed by: INTERNAL MEDICINE

## 2025-03-23 PROCEDURE — 97535 SELF CARE MNGMENT TRAINING: CPT

## 2025-03-23 PROCEDURE — 2580000003 HC RX 258: Performed by: INTERNAL MEDICINE

## 2025-03-23 PROCEDURE — 1200000002 HC SEMI PRIVATE SWING BED

## 2025-03-23 PROCEDURE — 80202 ASSAY OF VANCOMYCIN: CPT

## 2025-03-23 PROCEDURE — 82565 ASSAY OF CREATININE: CPT

## 2025-03-23 RX ADMIN — SERTRALINE HYDROCHLORIDE 25 MG: 50 TABLET ORAL at 07:52

## 2025-03-23 RX ADMIN — CYCLOBENZAPRINE 10 MG: 10 TABLET, FILM COATED ORAL at 13:27

## 2025-03-23 RX ADMIN — BACITRACIN ZINC, NEOMYCIN, POLYMYXIN B SULFAT: 5000; 3.5; 4 OINTMENT TOPICAL at 21:06

## 2025-03-23 RX ADMIN — PRAVASTATIN SODIUM 20 MG: 10 TABLET ORAL at 21:07

## 2025-03-23 RX ADMIN — VANCOMYCIN HYDROCHLORIDE 1000 MG: 1 INJECTION, POWDER, LYOPHILIZED, FOR SOLUTION INTRAVENOUS at 18:50

## 2025-03-23 RX ADMIN — GABAPENTIN 100 MG: 100 CAPSULE ORAL at 07:51

## 2025-03-23 RX ADMIN — Medication 1 CAPSULE: at 21:07

## 2025-03-23 RX ADMIN — GABAPENTIN 100 MG: 100 CAPSULE ORAL at 13:27

## 2025-03-23 RX ADMIN — POLYETHYLENE GLYCOL 3350 17 G: 17 POWDER, FOR SOLUTION ORAL at 21:06

## 2025-03-23 RX ADMIN — TOPIRAMATE 25 MG: 25 TABLET, FILM COATED ORAL at 07:52

## 2025-03-23 RX ADMIN — OMEGA-3-ACID ETHYL ESTERS CAPSULES 1 CAPSULE: 1 CAPSULE, LIQUID FILLED ORAL at 21:07

## 2025-03-23 RX ADMIN — FLUCONAZOLE 400 MG: 400 INJECTION, SOLUTION INTRAVENOUS at 07:50

## 2025-03-23 RX ADMIN — CEFTRIAXONE SODIUM 2000 MG: 2 INJECTION, POWDER, FOR SOLUTION INTRAMUSCULAR; INTRAVENOUS at 17:27

## 2025-03-23 RX ADMIN — SODIUM CHLORIDE, PRESERVATIVE FREE 10 ML: 5 INJECTION INTRAVENOUS at 09:54

## 2025-03-23 RX ADMIN — VANCOMYCIN HYDROCHLORIDE 750 MG: 750 INJECTION, POWDER, LYOPHILIZED, FOR SOLUTION INTRAVENOUS at 06:20

## 2025-03-23 RX ADMIN — POLYETHYLENE GLYCOL 3350 17 G: 17 POWDER, FOR SOLUTION ORAL at 07:53

## 2025-03-23 RX ADMIN — OXYBUTYNIN CHLORIDE 10 MG: 5 TABLET ORAL at 07:51

## 2025-03-23 RX ADMIN — BACITRACIN ZINC, NEOMYCIN, POLYMYXIN B SULFAT: 5000; 3.5; 4 OINTMENT TOPICAL at 09:54

## 2025-03-23 RX ADMIN — SODIUM CHLORIDE, PRESERVATIVE FREE 10 ML: 5 INJECTION INTRAVENOUS at 21:07

## 2025-03-23 RX ADMIN — GABAPENTIN 100 MG: 100 CAPSULE ORAL at 21:07

## 2025-03-23 RX ADMIN — CEFTRIAXONE SODIUM 2000 MG: 2 INJECTION, POWDER, FOR SOLUTION INTRAMUSCULAR; INTRAVENOUS at 05:43

## 2025-03-23 RX ADMIN — CYCLOBENZAPRINE 10 MG: 10 TABLET, FILM COATED ORAL at 21:07

## 2025-03-23 RX ADMIN — PSYLLIUM HUSK 1 PACKET: 3.4 POWDER ORAL at 07:51

## 2025-03-23 RX ADMIN — CETIRIZINE HYDROCHLORIDE 10 MG: 10 TABLET ORAL at 07:52

## 2025-03-23 RX ADMIN — MAGNESIUM HYDROXIDE 30 ML: 2400 SUSPENSION ORAL at 09:54

## 2025-03-23 RX ADMIN — CITALOPRAM HYDROBROMIDE 30 MG: 20 TABLET ORAL at 07:52

## 2025-03-23 RX ADMIN — CYCLOBENZAPRINE 10 MG: 10 TABLET, FILM COATED ORAL at 07:52

## 2025-03-23 RX ADMIN — LEVOTHYROXINE SODIUM 50 MCG: 0.03 TABLET ORAL at 07:52

## 2025-03-23 ASSESSMENT — PAIN DESCRIPTION - DESCRIPTORS: DESCRIPTORS: SORE;ACHING

## 2025-03-23 ASSESSMENT — PAIN SCALES - GENERAL: PAINLEVEL_OUTOF10: 3

## 2025-03-23 ASSESSMENT — PAIN DESCRIPTION - ORIENTATION: ORIENTATION: LEFT;RIGHT

## 2025-03-23 ASSESSMENT — PAIN DESCRIPTION - LOCATION: LOCATION: NECK;HIP

## 2025-03-23 NOTE — PLAN OF CARE
Problem: Discharge Planning  Goal: Discharge to home or other facility with appropriate resources  3/23/2025 1120 by Krissy Sanderson RN  Outcome: Progressing     Problem: Safety - Adult  Goal: Free from fall injury  3/23/2025 1120 by Krissy Sanderson RN  Outcome: Progressing     Problem: ABCDS Injury Assessment  Goal: Absence of physical injury  3/23/2025 1120 by Krissy Sanderson RN  Outcome: Progressing     Problem: Skin/Tissue Integrity  Goal: Skin integrity remains intact  Description: 1.  Monitor for areas of redness and/or skin breakdown  2.  Assess vascular access sites hourly  3.  Every 4-6 hours minimum:  Change oxygen saturation probe site  4.  Every 4-6 hours:  If on nasal continuous positive airway pressure, respiratory therapy assess nares and determine need for appliance change or resting period  3/23/2025 1120 by Krissy Sanderson RN  Outcome: Progressing     Problem: Skin/Tissue Integrity - Adult  Goal: Skin integrity remains intact  Description: 1.  Monitor for areas of redness and/or skin breakdown  2.  Assess vascular access sites hourly  3.  Every 4-6 hours minimum:  Change oxygen saturation probe site  4.  Every 4-6 hours:  If on nasal continuous positive airway pressure, respiratory therapy assess nares and determine need for appliance change or resting period  3/23/2025 1120 by Krissy Sanderson RN  Outcome: Progressing     Problem: Skin/Tissue Integrity - Adult  Goal: Incisions, wounds, or drain sites healing without S/S of infection  3/23/2025 1120 by Krissy Sanderson RN  Outcome: Progressing     Problem: Skin/Tissue Integrity - Adult  Goal: Oral mucous membranes remain intact  3/23/2025 1120 by Krissy Sanderson RN  Outcome: Progressing     Problem: Musculoskeletal - Adult  Goal: Return mobility to safest level of function  3/23/2025 1120 by Krissy Sanderson RN  Outcome: Progressing     Problem: Musculoskeletal - Adult  Goal: Maintain proper alignment of

## 2025-03-23 NOTE — PROGRESS NOTES
[facility]   Pharmacy Pharmacokinetic Monitoring Service - Vancomycin    Consulting Provider: Dr Thomas (transfer from [facility])   Indication:   Central Nervous System Infection  Target Concentration: Goal AUC/VIPIN 400-600 mg*hr/L  Day of Therapy: 7  Additional Antimicrobials: Rocephin    Pertinent Laboratory Values:   Wt Readings from Last 1 Encounters:   03/20/25 84 kg (185 lb 3.2 oz)     Temp Readings from Last 1 Encounters:   03/23/25 98.2 °F (36.8 °C)     Estimated Creatinine Clearance: 63 mL/min (based on SCr of 0.8 mg/dL).  Recent Labs     03/21/25  0552 03/21/25  0602   CREATININE  --  0.80   BUN  --  16   WBC 8.6  --      Procalcitonin: n/a    Pertinent Cultures:  Culture Date Source Results          CULTURE WOUND        Abnormal   Direct Exam:  FEW NEUTROPHILS  Direct Exam:  NO ORGANISMS SEEN  Cult,Aerobe/Anaerobe:  NORMAL SKIN SERENA  Cult,Aerobe/Anaerobe:  No anaerobic organisms isolated at 3 days.  Performed at [address] [address] ([phone]  P      Organism Yeast Abnormal  P   CULTURE WOUND RARE GROWTH P      Component  Ref Range & Units (hover)     CULTURE WOUND       Direct Exam:  MANY NEUTROPHILS  Direct Exam:  RARE GRAM POSITIVE RODS  Cult,Aerobe/Anaerobe:  NORMAL SKIN SERENA  Cult,Aerobe/Anaerobe:  No anaerobic organisms isolated at 2 days.           Organism Candida metapsilosis Abnormal  P   CULTURE WOUND       LIGHT GROWTH  Identification by MALDI-TOF P      Resulting Agency MH -        MRSA Nasal Swab: N/A. Non-respiratory infection.    Recent vancomycin administrations                     vancomycin (VANCOCIN) 750 mg in sodium chloride 0.9 % 250 mL IVPB (mg) 750 mg New Bag 03/21/25 0626    vancomycin (VANCOCIN) 750 mg in sodium chloride 0.9 % 250 mL IVPB (mg) 750 mg New Bag 03/20/25 0607     750 mg New Bag 03/19/25 1829     750 mg New Bag  0612    vancomycin (VANCOCIN) 1,000 mg in sodium chloride 0.9 % 250 mL (addEASE) IVPB (mg) 1,000 mg  Given 03/18/25 1821                    Assessment:  Date/Time Current Dose Concentration Timing of Concentration (h) AUC   03/23/25 0651 750 mg IV q12h 21.5 mg/L 24 467   Note: Serum concentrations collected for AUC dosing may appear elevated if collected in close proximity to the dose administered, this is not necessarily an indication of toxicity    Plan:  Current dosing regimen is sub-therapeutic. Level appears supra therapeutic, however level drawn immediately following a dose so reflective of a peak level  Increase dose to vancomycin 1000 mg Q12H  Predicted  mg/L.hr, trough 12.6 mg/L  Repeat vancomycin concentration ordered for 3/25 @ 0600   Pharmacy will continue to monitor patient and adjust therapy as indicated    Thank you for the consult,    Alise Constantino, PharmD, BCPS  3/23/2025 1:49 PM

## 2025-03-23 NOTE — PROGRESS NOTES
Facility/Department: [facility] SURGUNIT  Daily Treatment Note  NAME: Gloria Abbott  : 1950  MRN: [id number]    Date of Service: 3/23/2025    Discharge Recommendations:  Continue to assess pending progress, Subacute/Skilled Nursing Facility, 24 hour supervision or assist  OT Equipment Recommendations  Other: Continue to assess      Patient Diagnosis(es): There were no encounter diagnoses.     Assessment   Assessment: Pt willing to participate although requiring max cues to remain alert to task. Instruction in bed mobility and postural techniques with F- carryover demo'd. Pt demo'd decline in skill level with bed mobility and ADL completion. Pt unable to maintain upright seated posture while unsupported. MAX A to maintain upright sitting an complete UB ADL's. Pt easily fatigued and requested to lie back down. Pt left with all needs within reach at end of session.  Activity Tolerance: Patient tolerated treatment well  Discharge Recommendations: Continue to assess pending progress;Subacute/Skilled Nursing Facility;24 hour supervision or assist  Other: Continue to assess     Plan  Occupational Therapy Plan  Times Per Week: 4-7  Times Per Day: Once a day  Days Per Week: 5 Days  Current Treatment Recommendations: Strengthening;Functional mobility training;Balance training;Endurance training;Pain management;Safety education & training;Patient/Caregiver education & training;Self-Care / ADL;Equipment evaluation, education, & procurement;Home management training;Cognitive/Perceptual training;Coordination training;Neuromuscular re-education;Cognitive reorientation    Restrictions       Subjective  Subjective  Subjective: \"Uh huh ok.I can hear you.\" Pt requires cues to remain alert with eye open  Orientation  Overall Orientation Status: Within Functional Limits  Orientation Level: Oriented X4  Cognition  Overall Cognitive Status: WFL  Following Commands: Follows one step commands consistently  Attention Span: Difficulty

## 2025-03-23 NOTE — PROGRESS NOTES
Hospitalist Progress Note      PCP: Xiomara Pappas MD    Date of Admission: 3/20/2025    Chief Complaint: weakness/confusion    Subjective: patient awake/alert     Medications:  Reviewed    Infusion Medications    sodium chloride       Scheduled Medications    fluconazole  400 mg IntraVENous Q24H    lidocaine  1 patch TransDERmal Daily    cyclobenzaprine  10 mg Oral TID    gabapentin  100 mg Oral TID    polyethylene glycol  17 g Oral BID    lactobacillus  1 capsule Oral Daily    cetirizine  10 mg Oral Daily    citalopram  30 mg Oral Daily    omega-3 acid ethyl esters  1 capsule Oral Daily    levothyroxine  50 mcg Oral Daily    oxyBUTYnin  10 mg Oral Daily    pravastatin  20 mg Oral Nightly    sertraline  25 mg Oral Daily    topiramate  25 mg Oral Daily    psyllium  1 packet Oral Daily    cefTRIAXone (ROCEPHIN) IV  2,000 mg IntraVENous Q12H    vancomycin (VANCOCIN) intermittent dosing (placeholder)   Other RX Placeholder    sodium chloride flush  5-40 mL IntraVENous 2 times per day    neomycin-bacitracin-polymyxin   Topical BID    vancomycin  750 mg IntraVENous Q12H     PRN Meds: [DISCONTINUED] ondansetron **OR** ondansetron, tiZANidine, sodium chloride flush, sodium chloride, ibuprofen, ondansetron **OR** ondansetron, polyethylene glycol, acetaminophen **OR** acetaminophen      Intake/Output Summary (Last 24 hours) at 3/23/2025 0902  Last data filed at 3/23/2025 0248  Gross per 24 hour   Intake 920 ml   Output 2600 ml   Net -1680 ml       Exam:    BP (!) 156/71   Pulse 63   Temp 98.2 °F (36.8 °C)   Resp 20   Ht 1.626 m (5' 4\")   Wt 84 kg (185 lb 3.2 oz)   LMP  (LMP Unknown)   SpO2 97%   BMI 31.79 kg/m²     General appearance: No apparent distress, appears stated age and cooperative.  HEENT: postoperative scar/dressing Pupils equal, round, and reactive to light. Conjunctivae/corneas clear.  Neck: Supple, with full range of motion. No jugular venous distention. Trachea midline.  Respiratory:  Normal

## 2025-03-24 ENCOUNTER — APPOINTMENT (OUTPATIENT)
Dept: GENERAL RADIOLOGY | Age: 75
End: 2025-03-24
Attending: INTERNAL MEDICINE
Payer: COMMERCIAL

## 2025-03-24 PROBLEM — M54.2 NECK PAIN: Status: ACTIVE | Noted: 2025-03-24

## 2025-03-24 PROBLEM — B37.9 CANDIDA INFECTION: Status: ACTIVE | Noted: 2025-03-24

## 2025-03-24 PROBLEM — T85.730A INFECTION OF VENTRICULOPERITONEAL SHUNT: Status: ACTIVE | Noted: 2025-03-24

## 2025-03-24 LAB
ANION GAP SERPL CALCULATED.3IONS-SCNC: 10 MEQ/L (ref 9–15)
BASOPHILS # BLD: 0.1 K/UL (ref 0–0.1)
BASOPHILS NFR BLD: 1.1 % (ref 0.1–1.2)
BUN SERPL-MCNC: 14 MG/DL (ref 8–23)
CALCIUM SERPL-MCNC: 9 MG/DL (ref 8.5–9.9)
CHLORIDE SERPL-SCNC: 98 MEQ/L (ref 95–107)
CO2 SERPL-SCNC: 26 MEQ/L (ref 20–31)
CREAT SERPL-MCNC: 0.7 MG/DL (ref 0.5–0.9)
EOSINOPHIL # BLD: 0.3 K/UL (ref 0–0.4)
EOSINOPHIL NFR BLD: 3.6 % (ref 0.7–5.8)
ERYTHROCYTE [DISTWIDTH] IN BLOOD BY AUTOMATED COUNT: 12.9 % (ref 11.7–14.4)
GLUCOSE SERPL-MCNC: 100 MG/DL (ref 70–99)
HCT VFR BLD AUTO: 32.1 % (ref 37–47)
HGB BLD-MCNC: 10.6 G/DL (ref 11.2–15.7)
IMM GRANULOCYTES # BLD: 0.1 K/UL
IMM GRANULOCYTES NFR BLD: 0.9 %
LYMPHOCYTES # BLD: 0.9 K/UL (ref 1.2–3.7)
LYMPHOCYTES NFR BLD: 9.7 %
MCH RBC QN AUTO: 30.5 PG (ref 25.6–32.2)
MCHC RBC AUTO-ENTMCNC: 33 % (ref 32.2–35.5)
MCV RBC AUTO: 92.5 FL (ref 79.4–94.8)
MONOCYTES # BLD: 1.1 K/UL (ref 0.2–0.9)
MONOCYTES NFR BLD: 12.3 % (ref 4.7–12.5)
NEUTROPHILS # BLD: 6.4 K/UL (ref 1.6–6.1)
NEUTS SEG NFR BLD: 72.4 % (ref 34–71.1)
PLATELET # BLD AUTO: 328 K/UL (ref 182–369)
POTASSIUM SERPL-SCNC: 4.3 MEQ/L (ref 3.4–4.9)
RBC # BLD AUTO: 3.47 M/UL (ref 3.93–5.22)
SODIUM SERPL-SCNC: 134 MEQ/L (ref 135–144)
WBC # BLD AUTO: 8.9 K/UL (ref 4–10)

## 2025-03-24 PROCEDURE — 1200000002 HC SEMI PRIVATE SWING BED

## 2025-03-24 PROCEDURE — 6370000000 HC RX 637 (ALT 250 FOR IP): Performed by: INTERNAL MEDICINE

## 2025-03-24 PROCEDURE — 80048 BASIC METABOLIC PNL TOTAL CA: CPT

## 2025-03-24 PROCEDURE — 97116 GAIT TRAINING THERAPY: CPT

## 2025-03-24 PROCEDURE — 72040 X-RAY EXAM NECK SPINE 2-3 VW: CPT

## 2025-03-24 PROCEDURE — 97110 THERAPEUTIC EXERCISES: CPT

## 2025-03-24 PROCEDURE — 97530 THERAPEUTIC ACTIVITIES: CPT

## 2025-03-24 PROCEDURE — 6360000002 HC RX W HCPCS: Performed by: INTERNAL MEDICINE

## 2025-03-24 PROCEDURE — 85025 COMPLETE CBC W/AUTO DIFF WBC: CPT

## 2025-03-24 PROCEDURE — 2580000003 HC RX 258: Performed by: INTERNAL MEDICINE

## 2025-03-24 PROCEDURE — 2500000003 HC RX 250 WO HCPCS: Performed by: INTERNAL MEDICINE

## 2025-03-24 PROCEDURE — 99222 1ST HOSP IP/OBS MODERATE 55: CPT | Performed by: INTERNAL MEDICINE

## 2025-03-24 RX ADMIN — TOPIRAMATE 25 MG: 25 TABLET, FILM COATED ORAL at 08:09

## 2025-03-24 RX ADMIN — POLYETHYLENE GLYCOL 3350 17 G: 17 POWDER, FOR SOLUTION ORAL at 08:28

## 2025-03-24 RX ADMIN — CITALOPRAM HYDROBROMIDE 30 MG: 20 TABLET ORAL at 08:09

## 2025-03-24 RX ADMIN — GABAPENTIN 100 MG: 100 CAPSULE ORAL at 14:34

## 2025-03-24 RX ADMIN — LEVOTHYROXINE SODIUM 50 MCG: 0.03 TABLET ORAL at 08:08

## 2025-03-24 RX ADMIN — CETIRIZINE HYDROCHLORIDE 10 MG: 10 TABLET ORAL at 08:08

## 2025-03-24 RX ADMIN — BACITRACIN ZINC, NEOMYCIN, POLYMYXIN B SULFAT: 5000; 3.5; 4 OINTMENT TOPICAL at 20:18

## 2025-03-24 RX ADMIN — POLYETHYLENE GLYCOL 3350 17 G: 17 POWDER, FOR SOLUTION ORAL at 20:17

## 2025-03-24 RX ADMIN — VANCOMYCIN HYDROCHLORIDE 1000 MG: 1 INJECTION, POWDER, LYOPHILIZED, FOR SOLUTION INTRAVENOUS at 06:47

## 2025-03-24 RX ADMIN — FLUCONAZOLE 400 MG: 400 INJECTION, SOLUTION INTRAVENOUS at 08:25

## 2025-03-24 RX ADMIN — CYCLOBENZAPRINE 10 MG: 10 TABLET, FILM COATED ORAL at 20:18

## 2025-03-24 RX ADMIN — GABAPENTIN 100 MG: 100 CAPSULE ORAL at 20:17

## 2025-03-24 RX ADMIN — PSYLLIUM HUSK 1 PACKET: 3.4 POWDER ORAL at 08:09

## 2025-03-24 RX ADMIN — SODIUM CHLORIDE, PRESERVATIVE FREE 10 ML: 5 INJECTION INTRAVENOUS at 20:15

## 2025-03-24 RX ADMIN — OXYBUTYNIN CHLORIDE 10 MG: 5 TABLET ORAL at 08:08

## 2025-03-24 RX ADMIN — Medication 1 CAPSULE: at 20:17

## 2025-03-24 RX ADMIN — CEFTRIAXONE SODIUM 2000 MG: 2 INJECTION, POWDER, FOR SOLUTION INTRAMUSCULAR; INTRAVENOUS at 18:49

## 2025-03-24 RX ADMIN — GABAPENTIN 100 MG: 100 CAPSULE ORAL at 08:08

## 2025-03-24 RX ADMIN — SERTRALINE HYDROCHLORIDE 25 MG: 50 TABLET ORAL at 08:09

## 2025-03-24 RX ADMIN — CEFTRIAXONE SODIUM 2000 MG: 2 INJECTION, POWDER, FOR SOLUTION INTRAMUSCULAR; INTRAVENOUS at 05:32

## 2025-03-24 RX ADMIN — ACETAMINOPHEN 650 MG: 325 TABLET ORAL at 14:33

## 2025-03-24 RX ADMIN — OMEGA-3-ACID ETHYL ESTERS CAPSULES 1 CAPSULE: 1 CAPSULE, LIQUID FILLED ORAL at 20:18

## 2025-03-24 RX ADMIN — VANCOMYCIN HYDROCHLORIDE 1000 MG: 1 INJECTION, POWDER, LYOPHILIZED, FOR SOLUTION INTRAVENOUS at 20:17

## 2025-03-24 RX ADMIN — BACITRACIN ZINC, NEOMYCIN, POLYMYXIN B SULFAT: 5000; 3.5; 4 OINTMENT TOPICAL at 08:28

## 2025-03-24 RX ADMIN — SODIUM CHLORIDE, PRESERVATIVE FREE 10 ML: 5 INJECTION INTRAVENOUS at 08:10

## 2025-03-24 RX ADMIN — CYCLOBENZAPRINE 10 MG: 10 TABLET, FILM COATED ORAL at 14:34

## 2025-03-24 RX ADMIN — CYCLOBENZAPRINE 10 MG: 10 TABLET, FILM COATED ORAL at 08:09

## 2025-03-24 RX ADMIN — PRAVASTATIN SODIUM 20 MG: 10 TABLET ORAL at 20:17

## 2025-03-24 ASSESSMENT — PAIN DESCRIPTION - ORIENTATION: ORIENTATION: INNER

## 2025-03-24 ASSESSMENT — PAIN SCALES - GENERAL
PAINLEVEL_OUTOF10: 8
PAINLEVEL_OUTOF10: 5
PAINLEVEL_OUTOF10: 0

## 2025-03-24 ASSESSMENT — PAIN DESCRIPTION - LOCATION
LOCATION: GENERALIZED
LOCATION: NECK

## 2025-03-24 ASSESSMENT — PAIN DESCRIPTION - DESCRIPTORS
DESCRIPTORS: ACHING;CRAMPING
DESCRIPTORS: ACHING

## 2025-03-24 ASSESSMENT — PAIN - FUNCTIONAL ASSESSMENT
PAIN_FUNCTIONAL_ASSESSMENT: PREVENTS OR INTERFERES SOME ACTIVE ACTIVITIES AND ADLS
PAIN_FUNCTIONAL_ASSESSMENT: PREVENTS OR INTERFERES SOME ACTIVE ACTIVITIES AND ADLS

## 2025-03-24 NOTE — PROGRESS NOTES
Facility/Department: [facility] SUBACUTE UNIT  Daily Treatment Note  NAME: Gloria Abbott  : 1950  MRN: [id number]    Date of Service: 3/24/2025    Discharge Recommendations:  Continue to assess pending progress, Subacute/Skilled Nursing Facility, 24 hour supervision or assist (slow SNF)         Patient Diagnosis(es): Shunt removal, hydrocephalus    Assessment   Assessment: Pt is seated in chair, still struggling with head posture, head in flexion and rotated, pt keeps eyes closed for majority of session. Pt able to tolerate walking increased distance this date but requires x2 person assist, partial co-tx with PTA needed for safety. BUE and cervical AROM completed, decreased tolerance. Recommend slow paced SNF at d/c to maximize rehab potential and to align with pt's healing course.  Activity Tolerance: Patient limited by fatigue;Patient limited by pain;Patient limited by endurance  Discharge Recommendations: Continue to assess pending progress;Subacute/Skilled Nursing Facility;24 hour supervision or assist (slow SNF)     Plan  Occupational Therapy Plan  Times Per Week: 4-7  Times Per Day: Once a day  Current Treatment Recommendations: Strengthening;Functional mobility training;Balance training;Endurance training;Pain management;Safety education & training;Patient/Caregiver education & training;Self-Care / ADL;Equipment evaluation, education, & procurement;Home management training;Cognitive/Perceptual training;Coordination training;Neuromuscular re-education;Cognitive reorientation    Restrictions   RUE midline    Subjective  Subjective  Subjective: \"I feel crappy.\"  Pain: 5/10 R hip and R side of neck  Orientation  Overall Orientation Status: Within Functional Limits  Orientation Level: Oriented X4  Pain: 5/10 R hip and R side of neck  Cognition  Overall Cognitive Status: WFL       Objective  Vitals  Comment: 100% RA, HR 78 BPM  Bed Mobility Training  Bed Mobility Training: No  Balance  Sitting: Impaired;With

## 2025-03-24 NOTE — PROGRESS NOTES
Physical Therapy  Facility/Department: [facility] MED SURG UNIT  Daily Treatment Note  NAME: Gloria Abbott  : 1950  MRN: [id number]    Date of Service: 3/24/2025    Discharge Recommendations:  (P) Continue to assess pending progress, 24 hour supervision or assist        Patient Diagnosis(es):     Assessment  Assessment: (P) Pt. up in chair at arrival and demo's increase difficulty and unable to correct trunk posture without assist. Pt. demo's increase weight shift to left c/o neck pain and hip pain with weight shifting to right. Pt. cooperative and demo's minimal participation with all ther ex and functional AROM. Pt. appears to demo increase stiffness more with trunk and neck. Pt. able to complete one tsf with 2 person assistance. Once pt standing. max manual, verbal, and tactile cues needed to assist with gait training. Pt. tends to demo low tolerance to weight shift on R LE due to c/o hip pain and quick short B step length ~ 2 inches without cues and ~ 4 to 5 inches with cues to increase left step length. Poor balance overall. Pt. fatigue after ambulating slight increase distance.  Activity Tolerance: (P) Patient tolerated treatment well    Plan  Physical Therapy Plan  General Plan: (P) 5-7 times per week (1-2)  Current Treatment Recommendations: (P) Strengthening;ROM;Balance training;Functional mobility training;Transfer training;Endurance training;Gait training;Stair training;Neuromuscular re-education;Home exercise program;Safety education & training;Patient/Caregiver education & training;Equipment evaluation, education, & procurement;Modalities;Positioning;Therapeutic activities;Co-Treatment;Manual    Restrictions  Restrictions/Precautions  Restrictions/Precautions: Fall Risk  Activity Level: Up with Assist  Required Braces or Orthoses?: No  Implants Present? : Metal implants  Position Activity Restriction  Other Position/Activity Restrictions: Post shunt removal, hydrocephalus     Subjective

## 2025-03-24 NOTE — PROGRESS NOTES
Infectious Diseases Inpatient Consult Note      Reason for Consult:   Antibiotics management  Requesting Physician:   Dr. Hester  Primary Care Physician:  Xiomara Pappas MD  History Obtained From:   Pt, EPIC    Admit Date: 3/20/2025  Hospital Day: 5      HISTORY OF PRESENT ILLNESS:  This is a 74 y.o. female admitted to [facility],  from [facility] after she had a protruding  shunt from scalp area for which she was taken to surgery and had it removed.  Patient was febrile on admission with leukocytosis.  She was complaining of headache and neck stiffness.  Superficial cultures were positive for Candida species.  She was started on IV vancomycin, Rocephin and Diflucan and [facility].  Patient reports tolerating antibiotics well.  She denies any current headaches or fevers.  She reports that she has good appetite.  She tolerated physical therapy today.  No documented fevers.  Her only complaint is related to moderate neck pain.  She has difficulty turning her head from 1 side to the other.  .     Past medical surgical and social history were reviewed    Past Medical History:   Diagnosis Date    ADHD (attention deficit hyperactivity disorder)     Arthritis     Cancer (HCC)     endometrial and uterine    Depression     Hyperlipidemia     Hypertension     Hypothyroid     MDD (major depressive disorder)     NPH (normal pressure hydrocephalus) (HCC)     PONV (postoperative nausea and vomiting)     Nausea    Protruded lumbar disc     L4/L5    Stress     Vitamin D deficiency        Past Surgical History:   Procedure Laterality Date    ANKLE SURGERY Right 2001    COLONOSCOPY      COLONOSCOPY N/A 11/19/2024    COLONOSCOPY DIAGNOSTIC performed by Amalia Lopez MD [place] [facility]    FOOT SURGERY Right 2001    HIP SURGERY Right 02/02/2023    RIGHT HIP PINNING AND LEFT KNEE STERIOD INJECTION performed by Elliot Garcia MD [place] [facility]    HYSTERECTOMY (CERVIX STATUS UNKNOWN)      LASIK

## 2025-03-24 NOTE — PROGRESS NOTES
Patient Name: Gloria Abbott : 1950        Date: 4/10/2025      Type of Appt: Post Op    Reason for appt: POST OP: 3/17/25 - REMOVAL VENTRICULAR PERITONEAL SHUNT     Pt last seen by  Dr. Larson on 2025     Studies done: 2025 CT Head at [facility]  [address]  [place] [address]  O:   F:         Patient: Gloria Abbott  YOB: 1950  Date: 4/10/2025    The patient is a 74 y.o. female who presents today for follow up and suture removal.    According to family, patient improved significantly with shunt placement and deteriorated with shunt removal.  Patient does not think she feels any different after shunt removal other than occasional neck pain.  She has gait instability and is in wheelchair today, uses walker at home. Today she denies any headaches, blurry vision, new weakness is in her upper and lower extremities, urinary incontinence.    Superficial cultures were positive for Candida species.  Culture showed no anaerobic organism growth and normal skin hoda.  Patient is following with infectious disease and has 1 week remaining of antibiotic regimen. She was started on IV vancomycin, Rocephin and Diflucan.    Incision clean dry intact.  Sutures removed today in office.  PICC line in place right arm.    3/18/2025 CT head:  FINDINGS:  BRAIN/VENTRICLES: The lateral ventricles are dilated, somewhat increased as  of 2025.  Small volume of hyperdense blood products noted in the  occipital horns.  Gas is seen lateral ventricles has decreased as of  2025.  Hydrocephalus is grossly stable.     Moderate cerebral volume loss.  As result of dilating ventricles, there is  slight progressive effacement of the sulci, as compared to the prior study  from 2025.  Mild-to-moderate chronic microvascular ischemic changes.  No extra-axial fluid.     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

## 2025-03-25 LAB
CREAT SERPL-MCNC: 0.7 MG/DL (ref 0.5–0.9)
VANCOMYCIN SERPL-MCNC: 15.9 UG/ML (ref 10–40)

## 2025-03-25 PROCEDURE — 97535 SELF CARE MNGMENT TRAINING: CPT

## 2025-03-25 PROCEDURE — 1200000002 HC SEMI PRIVATE SWING BED

## 2025-03-25 PROCEDURE — 6370000000 HC RX 637 (ALT 250 FOR IP): Performed by: INTERNAL MEDICINE

## 2025-03-25 PROCEDURE — 97116 GAIT TRAINING THERAPY: CPT

## 2025-03-25 PROCEDURE — 6360000002 HC RX W HCPCS: Performed by: INTERNAL MEDICINE

## 2025-03-25 PROCEDURE — 2500000003 HC RX 250 WO HCPCS: Performed by: INTERNAL MEDICINE

## 2025-03-25 PROCEDURE — 97530 THERAPEUTIC ACTIVITIES: CPT

## 2025-03-25 PROCEDURE — 2580000003 HC RX 258: Performed by: INTERNAL MEDICINE

## 2025-03-25 PROCEDURE — 82565 ASSAY OF CREATININE: CPT

## 2025-03-25 PROCEDURE — 80202 ASSAY OF VANCOMYCIN: CPT

## 2025-03-25 RX ADMIN — VANCOMYCIN HYDROCHLORIDE 1000 MG: 1 INJECTION, POWDER, LYOPHILIZED, FOR SOLUTION INTRAVENOUS at 17:36

## 2025-03-25 RX ADMIN — VANCOMYCIN HYDROCHLORIDE 1000 MG: 1 INJECTION, POWDER, LYOPHILIZED, FOR SOLUTION INTRAVENOUS at 06:26

## 2025-03-25 RX ADMIN — TIZANIDINE 2 MG: 2 TABLET ORAL at 15:53

## 2025-03-25 RX ADMIN — POLYETHYLENE GLYCOL 3350 17 G: 17 POWDER, FOR SOLUTION ORAL at 08:30

## 2025-03-25 RX ADMIN — CYCLOBENZAPRINE 10 MG: 10 TABLET, FILM COATED ORAL at 21:15

## 2025-03-25 RX ADMIN — Medication 1 CAPSULE: at 21:15

## 2025-03-25 RX ADMIN — PRAVASTATIN SODIUM 20 MG: 10 TABLET ORAL at 21:15

## 2025-03-25 RX ADMIN — GABAPENTIN 100 MG: 100 CAPSULE ORAL at 21:15

## 2025-03-25 RX ADMIN — SODIUM CHLORIDE, PRESERVATIVE FREE 10 ML: 5 INJECTION INTRAVENOUS at 08:30

## 2025-03-25 RX ADMIN — SODIUM CHLORIDE, PRESERVATIVE FREE 10 ML: 5 INJECTION INTRAVENOUS at 21:26

## 2025-03-25 RX ADMIN — CETIRIZINE HYDROCHLORIDE 10 MG: 10 TABLET ORAL at 08:30

## 2025-03-25 RX ADMIN — FLUCONAZOLE 400 MG: 400 INJECTION, SOLUTION INTRAVENOUS at 08:28

## 2025-03-25 RX ADMIN — GABAPENTIN 100 MG: 100 CAPSULE ORAL at 08:31

## 2025-03-25 RX ADMIN — CYCLOBENZAPRINE 10 MG: 10 TABLET, FILM COATED ORAL at 14:41

## 2025-03-25 RX ADMIN — GABAPENTIN 100 MG: 100 CAPSULE ORAL at 14:41

## 2025-03-25 RX ADMIN — CITALOPRAM HYDROBROMIDE 30 MG: 20 TABLET ORAL at 08:31

## 2025-03-25 RX ADMIN — CYCLOBENZAPRINE 10 MG: 10 TABLET, FILM COATED ORAL at 08:32

## 2025-03-25 RX ADMIN — BACITRACIN ZINC, NEOMYCIN, POLYMYXIN B SULFAT: 5000; 3.5; 4 OINTMENT TOPICAL at 21:15

## 2025-03-25 RX ADMIN — LEVOTHYROXINE SODIUM 50 MCG: 0.03 TABLET ORAL at 05:52

## 2025-03-25 RX ADMIN — BACITRACIN ZINC, NEOMYCIN, POLYMYXIN B SULFAT: 5000; 3.5; 4 OINTMENT TOPICAL at 12:10

## 2025-03-25 RX ADMIN — CEFTRIAXONE SODIUM 2000 MG: 2 INJECTION, POWDER, FOR SOLUTION INTRAMUSCULAR; INTRAVENOUS at 05:58

## 2025-03-25 RX ADMIN — ACETAMINOPHEN 650 MG: 325 TABLET ORAL at 15:52

## 2025-03-25 RX ADMIN — TOPIRAMATE 25 MG: 25 TABLET, FILM COATED ORAL at 08:31

## 2025-03-25 RX ADMIN — CEFTRIAXONE SODIUM 2000 MG: 2 INJECTION, POWDER, FOR SOLUTION INTRAMUSCULAR; INTRAVENOUS at 17:13

## 2025-03-25 RX ADMIN — POLYETHYLENE GLYCOL 3350 17 G: 17 POWDER, FOR SOLUTION ORAL at 21:17

## 2025-03-25 RX ADMIN — PSYLLIUM HUSK 1 PACKET: 3.4 POWDER ORAL at 08:30

## 2025-03-25 RX ADMIN — SERTRALINE HYDROCHLORIDE 25 MG: 50 TABLET ORAL at 08:32

## 2025-03-25 RX ADMIN — OMEGA-3-ACID ETHYL ESTERS CAPSULES 1 CAPSULE: 1 CAPSULE, LIQUID FILLED ORAL at 21:15

## 2025-03-25 RX ADMIN — OXYBUTYNIN CHLORIDE 10 MG: 5 TABLET ORAL at 08:31

## 2025-03-25 ASSESSMENT — PAIN SCALES - GENERAL
PAINLEVEL_OUTOF10: 5
PAINLEVEL_OUTOF10: 5

## 2025-03-25 ASSESSMENT — PAIN DESCRIPTION - ORIENTATION: ORIENTATION: LOWER

## 2025-03-25 ASSESSMENT — PAIN DESCRIPTION - LOCATION
LOCATION: NECK
LOCATION: BACK

## 2025-03-25 ASSESSMENT — PAIN DESCRIPTION - DESCRIPTORS
DESCRIPTORS: ACHING
DESCRIPTORS: ACHING

## 2025-03-25 ASSESSMENT — PAIN - FUNCTIONAL ASSESSMENT: PAIN_FUNCTIONAL_ASSESSMENT: PREVENTS OR INTERFERES SOME ACTIVE ACTIVITIES AND ADLS

## 2025-03-25 NOTE — PROGRESS NOTES
Physical Therapy  Facility/Department: [facility] MED SURG UNIT  Daily Treatment Note  NAME: Gloria Abbott  : 1950  MRN: [id number]    Date of Service: 3/25/2025    Discharge Recommendations:  Continue to assess pending progress, 24 hour supervision or assist        Patient Diagnosis(es):    Assessment  Assessment: (P) Pt. transfers 4 trials. First trial pt stood ~ 1 minute for pericare with nursing aide with poor balance needing constant support. Co -treat performed also with following tsf's. Pt. did tolerate ambulating up to 7 feet with slight improve tolerance. However, pt. demo's poor balance and decrease weight shift to increase B step length. Pt. shuffles feet. Second tsf, pt. demo'd increase difficulty to complete safe stand pivot to commode for further hygiene care. Pt. bacem fartigue and unable to perform following tsf's with FWW. Yuki stedy lift used for safety with 2 tsf's with 2 person assistance. Poor trunk posture demo'd needed constand manual assist with vc's to reduce inc left lean. Assisted pt to bed post with c/o fatigue. Moreover, pt. demo's low tolerance to weight shift on R hip due to pain with stand or sit position. PROM with R hip perform to assist with ROM and strength.  Activity Tolerance: Patient limited by fatigue;Patient limited by pain;Patient limited by endurance    Plan  Physical Therapy Plan  General Plan: (P) 5-7 times per week (1-2)  Current Treatment Recommendations: Strengthening;ROM;Balance training;Functional mobility training;Transfer training;Endurance training;Gait training;Stair training;Neuromuscular re-education;Home exercise program;Safety education & training;Patient/Caregiver education & training;Equipment evaluation, education, & procurement;Modalities;Positioning;Therapeutic activities;Co-Treatment;Manual    Restrictions  Restrictions/Precautions  Restrictions/Precautions: Fall Risk  Activity Level: Up with Assist  Required Braces or Orthoses?: No  Implants Present?

## 2025-03-25 NOTE — CARE COORDINATION
Referral made to [facility], unfortunately they are out of network for patient.  List of SNF's given to patient to review. Patient was extremely tired from therapy, I let her know her Care Conference was tomorrow at 11:30, and updated her white board.  She would like her son to be included in Care Conference.  Call place to Harshad, made aware of 11;30 meeting, he asked that we call him.  I also let him know that patient is out of network for [facility], and she has SNF list.  Electronically signed by PERLA Garcia on 3/25/25 at 4:49 PM EDT

## 2025-03-25 NOTE — PROGRESS NOTES
[facility]   Pharmacy Pharmacokinetic Monitoring Service - Vancomycin    Consulting Provider: Dr Thomas   Indication: CNS infection  Target Concentration: Goal AUC/VIPIN 400-600 mg*hr/L  Day of Therapy: 9  Additional Antimicrobials: ceftriaxone    Pertinent Laboratory Values:   Wt Readings from Last 1 Encounters:   03/20/25 84 kg (185 lb 3.2 oz)     Temp Readings from Last 1 Encounters:   03/25/25 98.6 °F (37 °C) (Oral)     Estimated Creatinine Clearance: 72 mL/min (based on SCr of 0.7 mg/dL).  Recent Labs     03/24/25  0615 03/25/25  0608   CREATININE 0.70 0.70   BUN 14  --    WBC 8.9  --      Procalcitonin: n/a    Pertinent Cultures:  Culture Date Source Results        MRSA Nasal Swab: N/A. Non-respiratory infection.    Recent vancomycin administrations                     vancomycin (VANCOCIN) 1,000 mg in sodium chloride 0.9 % 250 mL IVPB (mg) 1,000 mg New Bag 03/24/25 0647     1,000 mg New Bag 03/23/25 1850    vancomycin (VANCOCIN) 750 mg in sodium chloride 0.9 % 250 mL IVPB (mg) 750 mg New Bag 03/23/25 0620     750 mg New Bag 03/22/25 1857     750 mg New Bag  0719     750 mg New Bag 03/21/25 1813                    Assessment:  Date/Time Current Dose Concentration Timing of Concentration (h) AUC   3/25/25 @ 0608 1000 mg IV q12h 15.9 mg/L 10 hr Predicted 538   Note: Serum concentrations collected for AUC dosing may appear elevated if collected in close proximity to the dose administered, this is not necessarily an indication of toxicity    Plan:  Current dosing regimen is therapeutic  Continue current dose of vancomycin 1000 mg Q12H  Predicted  mg/L.hr, trough 13.4 mg/L  Repeat vancomycin concentration ordered for 3/27 @ 0500   Pharmacy will continue to monitor patient and adjust therapy as indicated    Thank you for the consult,    Alise Constantino, PharmD, BCPS  3/25/2025 12:16 PM

## 2025-03-25 NOTE — PROGRESS NOTES
Facility/Department: [facility] SUBACUTE UNIT  Daily Treatment Note  NAME: Gloria Abbott  : 1950  MRN: [id number]    Date of Service: 3/25/2025    Discharge Recommendations:  Continue to assess pending progress, Subacute/Skilled Nursing Facility, 24 hour supervision or assist         Patient Diagnosis(es): shunt removal    Assessment   Assessment: Pt sitting in chair, STS from chair to FWW -MAX A x2 completed short walk to BSC, required BSC to be pulled up behind d/t difficulty with backing up. Pt demo'ing L lateral leaning with unsafe posture while seated on BSC, max assistance in attempt to come to midline for improved safety with P follow through. Pt had BM required x2-3 for TH and clothing mgt. Unable to stand from BSC with MAX A x3, utilized tulio steady to transfer to chair>EOB. max verbal, tactile and manual cues for hand placement and sequencing of task. Sitting EOB with P sitting balance, L lateral leaning. TD to return to Supine and complete bed mobility tasks. partial cotreat with PT to address functional transfers. Continue with recommendation for slow SNF at d/c.  Activity Tolerance: Patient limited by fatigue;Patient limited by pain;Patient limited by endurance;Treatment limited secondary to decreased cognition  Discharge Recommendations: Continue to assess pending progress;Subacute/Skilled Nursing Facility;24 hour supervision or assist     Plan  Occupational Therapy Plan  Times Per Week: 4-7  Times Per Day: Once a day  Current Treatment Recommendations: Strengthening;Functional mobility training;Balance training;Endurance training;Pain management;Safety education & training;Patient/Caregiver education & training;Self-Care / ADL;Equipment evaluation, education, & procurement;Home management training;Coordination training;Positioning;Co-Treatment;ROM    Restrictions   Midline RUE    Subjective  Subjective  Subjective: \"my neck hurts\"  Pain: 9/10 pain in neck and L knee, R hip  Orientation  Overall

## 2025-03-26 LAB — CREAT SERPL-MCNC: 0.7 MG/DL (ref 0.5–0.9)

## 2025-03-26 PROCEDURE — 2580000003 HC RX 258: Performed by: INTERNAL MEDICINE

## 2025-03-26 PROCEDURE — 97530 THERAPEUTIC ACTIVITIES: CPT

## 2025-03-26 PROCEDURE — 6360000002 HC RX W HCPCS: Performed by: INTERNAL MEDICINE

## 2025-03-26 PROCEDURE — 97116 GAIT TRAINING THERAPY: CPT

## 2025-03-26 PROCEDURE — 97110 THERAPEUTIC EXERCISES: CPT

## 2025-03-26 PROCEDURE — 6370000000 HC RX 637 (ALT 250 FOR IP): Performed by: INTERNAL MEDICINE

## 2025-03-26 PROCEDURE — 2500000003 HC RX 250 WO HCPCS: Performed by: INTERNAL MEDICINE

## 2025-03-26 PROCEDURE — 82565 ASSAY OF CREATININE: CPT

## 2025-03-26 PROCEDURE — 1200000002 HC SEMI PRIVATE SWING BED

## 2025-03-26 PROCEDURE — 97535 SELF CARE MNGMENT TRAINING: CPT

## 2025-03-26 RX ADMIN — CITALOPRAM HYDROBROMIDE 30 MG: 20 TABLET ORAL at 07:52

## 2025-03-26 RX ADMIN — VANCOMYCIN HYDROCHLORIDE 1000 MG: 1 INJECTION, POWDER, LYOPHILIZED, FOR SOLUTION INTRAVENOUS at 17:53

## 2025-03-26 RX ADMIN — OMEGA-3-ACID ETHYL ESTERS CAPSULES 1 CAPSULE: 1 CAPSULE, LIQUID FILLED ORAL at 20:27

## 2025-03-26 RX ADMIN — SODIUM CHLORIDE, PRESERVATIVE FREE 10 ML: 5 INJECTION INTRAVENOUS at 07:50

## 2025-03-26 RX ADMIN — CETIRIZINE HYDROCHLORIDE 10 MG: 10 TABLET ORAL at 07:53

## 2025-03-26 RX ADMIN — GABAPENTIN 100 MG: 100 CAPSULE ORAL at 14:39

## 2025-03-26 RX ADMIN — BACITRACIN ZINC, NEOMYCIN, POLYMYXIN B SULFAT: 5000; 3.5; 4 OINTMENT TOPICAL at 07:53

## 2025-03-26 RX ADMIN — SERTRALINE HYDROCHLORIDE 25 MG: 50 TABLET ORAL at 07:53

## 2025-03-26 RX ADMIN — CYCLOBENZAPRINE 10 MG: 10 TABLET, FILM COATED ORAL at 20:27

## 2025-03-26 RX ADMIN — VANCOMYCIN HYDROCHLORIDE 1000 MG: 1 INJECTION, POWDER, LYOPHILIZED, FOR SOLUTION INTRAVENOUS at 06:28

## 2025-03-26 RX ADMIN — BACITRACIN ZINC, NEOMYCIN, POLYMYXIN B SULFAT: 5000; 3.5; 4 OINTMENT TOPICAL at 20:27

## 2025-03-26 RX ADMIN — CYCLOBENZAPRINE 10 MG: 10 TABLET, FILM COATED ORAL at 07:53

## 2025-03-26 RX ADMIN — CYCLOBENZAPRINE 10 MG: 10 TABLET, FILM COATED ORAL at 14:39

## 2025-03-26 RX ADMIN — PRAVASTATIN SODIUM 20 MG: 10 TABLET ORAL at 20:27

## 2025-03-26 RX ADMIN — GABAPENTIN 100 MG: 100 CAPSULE ORAL at 20:27

## 2025-03-26 RX ADMIN — Medication 1 CAPSULE: at 20:27

## 2025-03-26 RX ADMIN — TOPIRAMATE 25 MG: 25 TABLET, FILM COATED ORAL at 07:53

## 2025-03-26 RX ADMIN — GABAPENTIN 100 MG: 100 CAPSULE ORAL at 07:50

## 2025-03-26 RX ADMIN — OXYBUTYNIN CHLORIDE 10 MG: 5 TABLET ORAL at 07:53

## 2025-03-26 RX ADMIN — POLYETHYLENE GLYCOL 3350 17 G: 17 POWDER, FOR SOLUTION ORAL at 07:50

## 2025-03-26 RX ADMIN — CEFTRIAXONE SODIUM 2000 MG: 2 INJECTION, POWDER, FOR SOLUTION INTRAMUSCULAR; INTRAVENOUS at 05:54

## 2025-03-26 RX ADMIN — PSYLLIUM HUSK 1 PACKET: 3.4 POWDER ORAL at 07:50

## 2025-03-26 RX ADMIN — POLYETHYLENE GLYCOL 3350 17 G: 17 POWDER, FOR SOLUTION ORAL at 20:28

## 2025-03-26 RX ADMIN — SODIUM CHLORIDE, PRESERVATIVE FREE 10 ML: 5 INJECTION INTRAVENOUS at 20:34

## 2025-03-26 RX ADMIN — CEFTRIAXONE SODIUM 2000 MG: 2 INJECTION, POWDER, FOR SOLUTION INTRAMUSCULAR; INTRAVENOUS at 17:44

## 2025-03-26 RX ADMIN — LEVOTHYROXINE SODIUM 50 MCG: 0.03 TABLET ORAL at 05:42

## 2025-03-26 ASSESSMENT — PAIN SCALES - GENERAL: PAINLEVEL_OUTOF10: 5

## 2025-03-26 ASSESSMENT — PAIN DESCRIPTION - LOCATION: LOCATION: NECK

## 2025-03-26 ASSESSMENT — PAIN - FUNCTIONAL ASSESSMENT: PAIN_FUNCTIONAL_ASSESSMENT: PREVENTS OR INTERFERES SOME ACTIVE ACTIVITIES AND ADLS

## 2025-03-26 ASSESSMENT — PAIN DESCRIPTION - DESCRIPTORS: DESCRIPTORS: ACHING

## 2025-03-26 NOTE — PROGRESS NOTES
Facility/Department: [facility] SUBACUTE UNIT  Daily Treatment Note  NAME: Gloria Abbott  : 1950  MRN: [id number]    Date of Service: 3/26/2025    Discharge Recommendations:  Continue to assess pending progress, 24 hour supervision or assist , slow SNF  PT Equipment Recommendations  Other: has FWW, Rollator and cane    Patient Diagnosis(es): hydrocephalus, weakness, decreased functional movement    Assessment   Pt very weak and needing 24 hour care. Recommend slow SNF. Pt and son agreeable to transition to slower paced SNF- prefers [facility] if able and covered along with IV needs.    Plan5-7x week, 1-2x per day strength, endurance, functional mobility       Restrictions  Restrictions/Precautions  Restrictions/Precautions: Fall Risk  Activity Level: Up with Assist  Required Braces or Orthoses?: No  Implants Present? : Metal implants  Position Activity Restriction  Other Position/Activity Restrictions: Post shunt removal, hydrocephalus     Subjective      \"Ok we can go to meeting.\"  Objective  Pt and son via phone  present for Interdisciplinary Care Conference this date. See separate note for full report.     Pt to meeting in recliner chair.    Goals  Short Term Goals  Time Frame for Short Term Goals: 2 wks  Short Term Goal 1: transfer with tulio steady standing lift  and <= Mod A of 1  Short Term Goal 2: transfer with FWW bed to chair with <= Mod A of 2 person  Short Term Goal 3: amb >= 25 ft with FWW and <= Odette of 2 person  Short Term Goal 4: tolerate x 10 LE seatedor supine KERRY to gain strength for functional mobility.  Short Term Goal 5: assess curb step in parallel bars with <= Mod A of 2 persons  Long Term Goals  Time Frame for Long Term Goals : 4 or more weeks  Long Term Goal 1: bed mobility with <= Min A  Long Term Goal 2: Transfer sit to stand and bed to chair with <= Min A of 1 and FWW  Long Term Goal 3: Amb>= 50 ft with FWW and <= CGA before fatigued  Long Term Goal 4: 3 steps with one rail and <= Min A

## 2025-03-26 NOTE — PROGRESS NOTES
Chart reviewed.  Patient continues to receive skilled therapies; including but not limited to IV antibiotics. Multidisciplinary team met with patient in care conference with son Rajiv present via phone.  Discussed patient's care and DC planning along with completing psychosocial assessment.  Upon visit patient is alert and sitting up in recliner chair wearing hospital attire and bandage covering head.  Patient is alert and oriented and participates in conversation.  Mood is calm and cooperative.  Patient smiles and is pleasant.  Patient and family report feeling confident in the care patient is currently receiving at [facility].  Therapy reports that patient is cooperative and willing to participate in therapy but requiring 2 person assist.  Therapy recommending 24/7 assist upon DC.  Patient is reported to be from home with son.  However son is reported to drive truck for a living and unable to provide 24/7 assist.  PT manager reports that patient may benefit from DC to a slower SNF to complete IV antibiotics and have longer time to work with therapy to get stronger.  Freedom of choice provided.  Patient and son Harshad agreeable with patient going to a SNF.  [facility] [place] identified as facility of choice.    This  followed up after care conference and contacted [facility].  Confidential voice mail left for Paulina-  at [place] with new referral.  SS to continue to follow

## 2025-03-26 NOTE — PROGRESS NOTES
This  received communication from Paulina -  at [facility].  Paulina reports that [place] is able to meet patient's needs and have submitted precert.  Paulina plans to update [facility] once insurance provides disposition.  Patient will require 0700 should insurance approve SNF.    This  met with patient to provide update along with updating patient's son via phone as requested.  Patient and family thank this  for update and are hopeful insurance will approve admission to [facility].  SS to continue to follow and assist in coordinating safe DC

## 2025-03-26 NOTE — PROGRESS NOTES
Occupational Therapy  Facility/Department: [facility] MED SURG UNIT  Daily Treatment Note  NAME: Gloria Abbott  : 1950  MRN: [id number]    Date of Service: 3/26/2025    Discharge Recommendations:  Subacute/Skilled Nursing Facility, 24 hour supervision or assist       Treatment Diagnosis: Decreased ADL/IADL performance d/t shunt removal, infection     Patient Diagnosis(es): hydrocephalus, weakness    Assessment   Activity Tolerance: Patient limited by fatigue;Patient limited by pain;Patient limited by endurance;Treatment limited secondary to decreased cognition   OT follow up:yes  Pt. Tolerated a co-treat with PT and OT for transfers with 2 person,Standing balance with close w/c follow, and safety awareness. Pt. Has increased fatigue and required frequent RB's. Pt. Tolerated UB exercises with BUE in all planes and directions to increase ROM and strength for transfers. Pt.'s O2 decreased after ambulation to 90%, but increased to 100% after 2 minutes. Pt. Tolerated dynamic standing with ww at Mod A for 3 minutes 1 x with close w/c follow and one static stand with ww at CGA for 3 minutes with left lean. Pt. Required mod vc's to look up and for better posture. Pt. Tolerated LB dressing this day. Pt. Required extra time due to slow movements. Answered pt.'s questions and concerns.  Plan   Occupational Therapy Plan  Times Per Week: 4-7  Times Per Day: Once a day  Current Treatment Recommendations: Strengthening, Functional mobility training, Balance training, Endurance training, Pain management, Safety education & training, Patient/Caregiver education & training, Self-Care / ADL, Equipment evaluation, education, & procurement, Home management training, Cognitive/Perceptual training, Coordination training, Neuromuscular re-education, Cognitive reorientation       Restrictions   Restrictions/Precautions  Restrictions/Precautions: Fall Risk  Activity Level: Up with Assist  Required Braces or Orthoses?: No  Implants Present?  Group Co-treatment   Time In        2:00 pm    Time Out        3:00 pm    Minutes        60           CHANEL Knowles/NIKO  [id number]

## 2025-03-26 NOTE — PROGRESS NOTES
Met with patient and family for weekly care conference, current diet remains appropriate and well tolerated . Intake appears adequate to meet estimated needs at this time, pt declines oral nutritional supplement. No d/c needs identified at this time.RDN to continue to monitor for duration of admission.

## 2025-03-26 NOTE — PROGRESS NOTES
Physical Therapy  Facility/Department: [facility] MED SURG UNIT  Daily Treatment Note  NAME: Gloria Abbott  : 1950  MRN: [id number]    Date of Service: 3/26/2025    Discharge Recommendations:  Continue to assess pending progress, 24 hour supervision or assist        Patient Diagnosis(es):     Assessment  Activity Tolerance: Patient limited by fatigue;Patient limited by pain;Patient limited by endurance;Treatment limited secondary to decreased cognition    Plan  Physical Therapy Plan  General Plan: 5-7 times per week (1-2)  Current Treatment Recommendations: Strengthening;ROM;Balance training;Functional mobility training;Transfer training;Endurance training;Gait training;Stair training;Neuromuscular re-education;Home exercise program;Safety education & training;Patient/Caregiver education & training;Equipment evaluation, education, & procurement;Modalities;Positioning;Therapeutic activities;Co-Treatment;Manual    Restrictions  Restrictions/Precautions  Restrictions/Precautions: Fall Risk  Activity Level: Up with Assist  Required Braces or Orthoses?: No  Implants Present? : Metal implants  Position Activity Restriction  Other Position/Activity Restrictions: Post shunt removal, hydrocephalus     Subjective   Subjective  Subjective: Pt. up in chair with moderate to mild left trunk lean. Pt. reports 8/10 R hip and neck pain.    Objective  Vitals     Balance  Sitting: Impaired;With support  Sitting - Static: Poor (constant support)  Standing: Impaired;With support  Standing - Static:  (Poor +/ Fair -)  Standing - Dynamic:  (Fair -/poor +)  Transfer Training  Transfer Training: Yes  Overall Level of Assistance: 2 Person assistance;Substantial/Maximal assistance;Dependent/Total  Interventions: Safety awareness training;Verbal cues;Tactile cues;Manual cues  Sit to Stand: Substantial/Maximal assistance;Dependent/Total;2 Person assistance  Stand to Sit: Substantial/Maximal assistance;2 Person assistance  Duration:  (2 sit to

## 2025-03-26 NOTE — PROGRESS NOTES
This  received communication from Paulina in admissions at [facility].  Paulina confirms that referral was obtained and under review.  Paulina plans to follow up with [facility] with disposition.  SS to continue to follow

## 2025-03-26 NOTE — PROGRESS NOTES
Comprehensive Nutrition Assessment    Type and Reason for Visit:  Reassess    Nutrition Recommendations/Plan:   Continue regular diet as ordered  PO >75% meals  Adequate fluid intake for hydration.      Malnutrition Assessment:  Malnutrition Status:  No malnutrition (03/21/25 1003)        Nutrition Assessment:    Sub acute patient.  Pt reports appetite being \"fine\". Denies need for oral nutritional supplement at this time. Monitor PO intake. Regular diet-  food to be cut up for ease of self feeding .    Nutrition Related Findings:    Admit post op r/t hydrocepahlus. PMH includes depression, hypothyroid. Admit weight 3/20 185#, - weight trend up over past six months. Diet is regular (pt requests foods to be cut up). PO intake noted at 50-75% meals during acute hospitalization. Meds reviewed, Labs 3/24 - no nutrition realted concerns (sodium low 134#). Skin - surgical incision noted. No edema noted per EMR.  pt denies chewing/swallowing difficulty, feeds self independently. No appetite changes noted. Pt uncertain of UBWR. Wound Type: Surgical Incision (head)       Current Nutrition Intake & Therapies:    Average Meal Intake: 51-75%  Average Supplements Intake: None Ordered  ADULT DIET; Regular; please cut up food for pt    Anthropometric Measures:  Height: 162.6 cm (5' 4\")  Ideal Body Weight (IBW): 120 lbs (55 kg)    Admission Body Weight: 84 kg (185 lb 3 oz) (3/20/25)  Current Body Weight: 84 kg (185 lb 3 oz) (3/20/25), 154.3 % IBW. Weight Source: Bed scale  Current BMI (kg/m2): 31.8  Usual Body Weight: 76.7 kg (169 lb) (11/22/25;  9/16/24 173#. 3/26/24 172#.)     % Weight Change (Calculated): 9.6  BMI Categories: Obese Class 1 (BMI 30.0-34.9)    Estimated Daily Nutrient Needs:  Energy Requirements Based On: Kcal/kg  Weight Used for Energy Requirements: Current  Energy (kcal/day): 1260-1512kcal (15-18kcal/kg)  Weight Used for Protein Requirements: Ideal  Protein (g/day): 72gm-83gm (1.3-1.5gm/kg IBW)  Method Used for

## 2025-03-27 LAB
ANION GAP SERPL CALCULATED.3IONS-SCNC: 10 MEQ/L (ref 9–15)
BASOPHILS # BLD: 0.1 K/UL (ref 0–0.1)
BASOPHILS NFR BLD: 0.7 % (ref 0.1–1.2)
BUN SERPL-MCNC: 16 MG/DL (ref 8–23)
CALCIUM SERPL-MCNC: 8.6 MG/DL (ref 8.5–9.9)
CHLORIDE SERPL-SCNC: 102 MEQ/L (ref 95–107)
CO2 SERPL-SCNC: 24 MEQ/L (ref 20–31)
CREAT SERPL-MCNC: 0.7 MG/DL (ref 0.5–0.9)
EOSINOPHIL # BLD: 0.3 K/UL (ref 0–0.4)
EOSINOPHIL NFR BLD: 4.6 % (ref 0.7–5.8)
ERYTHROCYTE [DISTWIDTH] IN BLOOD BY AUTOMATED COUNT: 13.2 % (ref 11.7–14.4)
GLUCOSE SERPL-MCNC: 99 MG/DL (ref 70–99)
HCT VFR BLD AUTO: 30.2 % (ref 37–47)
HGB BLD-MCNC: 9.7 G/DL (ref 11.2–15.7)
IMM GRANULOCYTES # BLD: 0.1 K/UL
IMM GRANULOCYTES NFR BLD: 1.3 %
LYMPHOCYTES # BLD: 0.8 K/UL (ref 1.2–3.7)
LYMPHOCYTES NFR BLD: 10.4 %
MCH RBC QN AUTO: 30 PG (ref 25.6–32.2)
MCHC RBC AUTO-ENTMCNC: 32.1 % (ref 32.2–35.5)
MCV RBC AUTO: 93.5 FL (ref 79.4–94.8)
MONOCYTES # BLD: 1.2 K/UL (ref 0.2–0.9)
MONOCYTES NFR BLD: 15.6 % (ref 4.7–12.5)
NEUTROPHILS # BLD: 5 K/UL (ref 1.6–6.1)
NEUTS SEG NFR BLD: 67.4 % (ref 34–71.1)
PLATELET # BLD AUTO: 240 K/UL (ref 182–369)
POTASSIUM SERPL-SCNC: 3.8 MEQ/L (ref 3.4–4.9)
RBC # BLD AUTO: 3.23 M/UL (ref 3.93–5.22)
SODIUM SERPL-SCNC: 136 MEQ/L (ref 135–144)
VANCOMYCIN SERPL-MCNC: 16.2 UG/ML (ref 10–40)
WBC # BLD AUTO: 7.4 K/UL (ref 4–10)

## 2025-03-27 PROCEDURE — 6370000000 HC RX 637 (ALT 250 FOR IP): Performed by: INTERNAL MEDICINE

## 2025-03-27 PROCEDURE — 2580000003 HC RX 258: Performed by: INTERNAL MEDICINE

## 2025-03-27 PROCEDURE — 6360000002 HC RX W HCPCS: Performed by: INTERNAL MEDICINE

## 2025-03-27 PROCEDURE — 97535 SELF CARE MNGMENT TRAINING: CPT

## 2025-03-27 PROCEDURE — 85025 COMPLETE CBC W/AUTO DIFF WBC: CPT

## 2025-03-27 PROCEDURE — 1200000002 HC SEMI PRIVATE SWING BED

## 2025-03-27 PROCEDURE — 97530 THERAPEUTIC ACTIVITIES: CPT

## 2025-03-27 PROCEDURE — 80202 ASSAY OF VANCOMYCIN: CPT

## 2025-03-27 PROCEDURE — 97110 THERAPEUTIC EXERCISES: CPT

## 2025-03-27 PROCEDURE — 2500000003 HC RX 250 WO HCPCS: Performed by: INTERNAL MEDICINE

## 2025-03-27 PROCEDURE — 97116 GAIT TRAINING THERAPY: CPT

## 2025-03-27 PROCEDURE — 80048 BASIC METABOLIC PNL TOTAL CA: CPT

## 2025-03-27 RX ORDER — SENNOSIDES A AND B 8.6 MG/1
1 TABLET, FILM COATED ORAL NIGHTLY
Status: DISCONTINUED | OUTPATIENT
Start: 2025-03-27 | End: 2025-03-29 | Stop reason: HOSPADM

## 2025-03-27 RX ADMIN — GABAPENTIN 100 MG: 100 CAPSULE ORAL at 20:11

## 2025-03-27 RX ADMIN — CYCLOBENZAPRINE 10 MG: 10 TABLET, FILM COATED ORAL at 20:11

## 2025-03-27 RX ADMIN — GABAPENTIN 100 MG: 100 CAPSULE ORAL at 14:19

## 2025-03-27 RX ADMIN — POLYETHYLENE GLYCOL 3350 17 G: 17 POWDER, FOR SOLUTION ORAL at 09:01

## 2025-03-27 RX ADMIN — TOPIRAMATE 25 MG: 25 TABLET, FILM COATED ORAL at 09:00

## 2025-03-27 RX ADMIN — CEFTRIAXONE SODIUM 2000 MG: 2 INJECTION, POWDER, FOR SOLUTION INTRAMUSCULAR; INTRAVENOUS at 05:39

## 2025-03-27 RX ADMIN — OMEGA-3-ACID ETHYL ESTERS CAPSULES 1 CAPSULE: 1 CAPSULE, LIQUID FILLED ORAL at 20:11

## 2025-03-27 RX ADMIN — CEFTRIAXONE SODIUM 2000 MG: 2 INJECTION, POWDER, FOR SOLUTION INTRAMUSCULAR; INTRAVENOUS at 17:11

## 2025-03-27 RX ADMIN — SENNOSIDES 8.6 MG: 8.6 TABLET, FILM COATED ORAL at 20:11

## 2025-03-27 RX ADMIN — BACITRACIN ZINC, NEOMYCIN, POLYMYXIN B SULFAT: 5000; 3.5; 4 OINTMENT TOPICAL at 09:02

## 2025-03-27 RX ADMIN — Medication 1 CAPSULE: at 20:11

## 2025-03-27 RX ADMIN — CETIRIZINE HYDROCHLORIDE 10 MG: 10 TABLET ORAL at 09:01

## 2025-03-27 RX ADMIN — OXYBUTYNIN CHLORIDE 10 MG: 5 TABLET ORAL at 09:00

## 2025-03-27 RX ADMIN — CITALOPRAM HYDROBROMIDE 30 MG: 20 TABLET ORAL at 09:00

## 2025-03-27 RX ADMIN — SODIUM CHLORIDE, PRESERVATIVE FREE 10 ML: 5 INJECTION INTRAVENOUS at 17:11

## 2025-03-27 RX ADMIN — CYCLOBENZAPRINE 10 MG: 10 TABLET, FILM COATED ORAL at 14:19

## 2025-03-27 RX ADMIN — VANCOMYCIN HYDROCHLORIDE 1000 MG: 1 INJECTION, POWDER, LYOPHILIZED, FOR SOLUTION INTRAVENOUS at 06:13

## 2025-03-27 RX ADMIN — PRAVASTATIN SODIUM 20 MG: 10 TABLET ORAL at 20:11

## 2025-03-27 RX ADMIN — PSYLLIUM HUSK 1 PACKET: 3.4 POWDER ORAL at 09:01

## 2025-03-27 RX ADMIN — LEVOTHYROXINE SODIUM 50 MCG: 0.03 TABLET ORAL at 05:42

## 2025-03-27 RX ADMIN — SODIUM CHLORIDE, PRESERVATIVE FREE 10 ML: 5 INJECTION INTRAVENOUS at 20:23

## 2025-03-27 RX ADMIN — SERTRALINE HYDROCHLORIDE 25 MG: 50 TABLET ORAL at 09:00

## 2025-03-27 RX ADMIN — POLYETHYLENE GLYCOL 3350 17 G: 17 POWDER, FOR SOLUTION ORAL at 20:23

## 2025-03-27 RX ADMIN — VANCOMYCIN HYDROCHLORIDE 1000 MG: 1 INJECTION, POWDER, LYOPHILIZED, FOR SOLUTION INTRAVENOUS at 17:48

## 2025-03-27 RX ADMIN — CYCLOBENZAPRINE 10 MG: 10 TABLET, FILM COATED ORAL at 09:00

## 2025-03-27 RX ADMIN — BACITRACIN ZINC, NEOMYCIN, POLYMYXIN B SULFAT: 5000; 3.5; 4 OINTMENT TOPICAL at 20:22

## 2025-03-27 RX ADMIN — GABAPENTIN 100 MG: 100 CAPSULE ORAL at 09:00

## 2025-03-27 ASSESSMENT — PAIN DESCRIPTION - LOCATION: LOCATION: LEG

## 2025-03-27 ASSESSMENT — PAIN SCALES - GENERAL
PAINLEVEL_OUTOF10: 0
PAINLEVEL_OUTOF10: 8
PAINLEVEL_OUTOF10: 2
PAINLEVEL_OUTOF10: 0
PAINLEVEL_OUTOF10: 0

## 2025-03-27 ASSESSMENT — PAIN DESCRIPTION - ORIENTATION: ORIENTATION: RIGHT;LEFT

## 2025-03-27 NOTE — PROGRESS NOTES
Hospitalist Progress Note      PCP: Xiomara Pappas MD    Date of Admission: 3/20/2025    Chief Complaint: weakness/pain    Subjective: patient eating breakfast     Medications:  Reviewed    Infusion Medications    sodium chloride       Scheduled Medications    vancomycin  1,000 mg IntraVENous Q12H    lidocaine  1 patch TransDERmal Daily    cyclobenzaprine  10 mg Oral TID    gabapentin  100 mg Oral TID    polyethylene glycol  17 g Oral BID    lactobacillus  1 capsule Oral Daily    cetirizine  10 mg Oral Daily    citalopram  30 mg Oral Daily    omega-3 acid ethyl esters  1 capsule Oral Daily    levothyroxine  50 mcg Oral Daily    oxyBUTYnin  10 mg Oral Daily    pravastatin  20 mg Oral Nightly    sertraline  25 mg Oral Daily    topiramate  25 mg Oral Daily    psyllium  1 packet Oral Daily    cefTRIAXone (ROCEPHIN) IV  2,000 mg IntraVENous Q12H    vancomycin (VANCOCIN) intermittent dosing (placeholder)   Other RX Placeholder    sodium chloride flush  5-40 mL IntraVENous 2 times per day    neomycin-bacitracin-polymyxin   Topical BID     PRN Meds: [DISCONTINUED] ondansetron **OR** ondansetron, tiZANidine, sodium chloride flush, sodium chloride, ibuprofen, ondansetron **OR** ondansetron, polyethylene glycol, acetaminophen **OR** acetaminophen      Intake/Output Summary (Last 24 hours) at 3/27/2025 0819  Last data filed at 3/26/2025 1744  Gross per 24 hour   Intake 300 ml   Output --   Net 300 ml       Exam:    BP (!) 164/81   Pulse 72   Temp 98.8 °F (37.1 °C) (Oral)   Resp 18   Ht 1.626 m (5' 4\")   Wt 84 kg (185 lb 3.2 oz)   LMP  (LMP Unknown)   SpO2 94%   BMI 31.79 kg/m²     General appearance: No apparent distress, appears stated age and cooperative.  HEENT: Pupils equal, round, and reactive to light. Conjunctivae/corneas clear.  Neck: Supple, with full range of motion. No jugular venous distention. Trachea midline.  Respiratory:  Normal respiratory effort. Clear to auscultation, bilaterally without

## 2025-03-27 NOTE — PROGRESS NOTES
Physical Therapy  Facility/Department: [facility] MED SURG UNIT  Daily Treatment Note  NAME: Gloria Abbott  : 1950  MRN: [id number]    Date of Service: 3/27/2025    Discharge Recommendations:  (P) Continue to assess pending progress, 24 hour supervision or assist        Patient Diagnosis(es):     Assessment  Assessment: (P) Pt. able to improve gait training tolerance slightly more this a.m. with moderate assistance of 2 person assist. Max assist of 2 person still needed to complete each tsf. Focus on several postural correction technique and stretches to assist with improving functional skills. Pt. demo's progress with decrease left trunk lean this a.m. almost to neutral. Pt. limited by neck pain. Pt. fatigues quickly with activity and function. Seated ther ex tolerated to assist with strength. Pt stood ~ 3 minutes at best second trial. Moreover, pt. appears to struggle maintaining center of gravity over base of support with fear of falling. Will continue plan of care to assist towards goals.  Activity Tolerance: (P) Patient limited by fatigue;Patient limited by pain;Patient limited by endurance;Treatment limited secondary to decreased cognition    Plan  Physical Therapy Plan  General Plan: (P) 5-7 times per week (1-2)  Current Treatment Recommendations: (P) Strengthening;ROM;Balance training;Functional mobility training;Transfer training;Endurance training;Gait training;Stair training;Neuromuscular re-education;Home exercise program;Safety education & training;Patient/Caregiver education & training;Equipment evaluation, education, & procurement;Modalities;Positioning;Therapeutic activities;Co-Treatment;Manual    Restrictions  Restrictions/Precautions  Restrictions/Precautions: Fall Risk  Activity Level: Up with Assist  Required Braces or Orthoses?: No  Implants Present? : Metal implants  Position Activity Restriction  Other Position/Activity Restrictions: Post shunt removal, hydrocephalus     Subjective

## 2025-03-27 NOTE — PROGRESS NOTES
[facility]  [facility]  NAME: Gloria Abbott  : 1950  MRN: [id number]    Date of Service: 3/27/2025    Discharge Recommendations:  Continue to assess pending progress, Subacute/Skilled Nursing Facility, 24 hour supervision or assist         Patient Diagnosis(es): Shunt removal     Assessment   Assessment: Pt seen for two sessions today d/t time constraints, first session partial co-tx with physical therapy to address bed mobility and functional mobility. OT returned later in the day, pt was interacting with lunch tray, demo's some confusion, was not able to feed self successfully, RN made aware. Will continue to assess cog and continue to recommend slow paced SNF at d/c.  Activity Tolerance: Patient limited by fatigue;Patient limited by pain;Patient limited by endurance;Treatment limited secondary to decreased cognition  Discharge Recommendations: Continue to assess pending progress;Subacute/Skilled Nursing Facility;24 hour supervision or assist     Plan  Occupational Therapy Plan  Times Per Week: 4-7  Times Per Day: Once a day  Current Treatment Recommendations: Strengthening;Functional mobility training;Balance training;Endurance training;Pain management;Safety education & training;Patient/Caregiver education & training;Self-Care / ADL;Equipment evaluation, education, & procurement;Home management training;Coordination training;Positioning;Co-Treatment;ROM    Restrictions   RUE midline    Subjective  Subjective  Subjective: \"I'm afraid to fall.\"  Pain: 8/10 neck  Orientation  Overall Orientation Status: Within Functional Limits  Orientation Level: Oriented X4  Pain: 8/10 neck  Cognition  Overall Cognitive Status: Exceptions  Arousal/Alertness: Impaired  Following Commands: Inconsistently follows commands;Follows one step commands with repetition;Impaired  Attention Span: Unable to maintain attention;Attends with cues to redirect  Memory: Impaired  Safety Judgement:  10-Nov-2019 20:52

## 2025-03-27 NOTE — CARE COORDINATION
I left message on VM for Paulina, , @ [facility], regarding approval for admission there.  Auth#:  [id number]. Approval is for 3/27/25, with NRD 4/3/25.

## 2025-03-27 NOTE — PROGRESS NOTES
[facility]   Pharmacy Pharmacokinetic Monitoring Service - Vancomycin    Consulting Provider: Dr. Thomas   Indication: CNS infection  Target Concentration: Goal AUC/VIPIN 400-600 mg*hr/L  Day of Therapy: 11  Additional Antimicrobials: Rocephin    Pertinent Laboratory Values:   Wt Readings from Last 1 Encounters:   03/20/25 84 kg (185 lb 3.2 oz)     Temp Readings from Last 1 Encounters:   03/27/25 98.8 °F (37.1 °C) (Oral)     Estimated Creatinine Clearance: 72 mL/min (based on SCr of 0.7 mg/dL).  Recent Labs     03/26/25  0626 03/27/25  0556 03/27/25  0600   CREATININE 0.70  --  0.70   BUN  --   --  16   WBC  --  7.4  --      Procalcitonin:   Procalcitonin   Date Value Ref Range Status   09/06/2021 0.07 0.00 - 0.15 ng/mL Final       Pertinent Cultures:  Culture Date Source Results   none N/a N/a   MRSA Nasal Swab: N/A. Non-respiratory infection.    Recent vancomycin administrations                     vancomycin (VANCOCIN) 1,000 mg in sodium chloride 0.9 % 250 mL IVPB (mg) 1,000 mg New Bag 03/27/25 0613     1,000 mg New Bag 03/26/25 1753     1,000 mg New Bag  0628     1,000 mg New Bag 03/25/25 1736     1,000 mg New Bag  0626     1,000 mg New Bag 03/24/25 2017                    Assessment:  Date/Time Current Dose Concentration Timing of Concentration (h) AUC   03/27/28 0600 1000 mg q12h 16.2 12h 6m post dose 469   Note: Serum concentrations collected for AUC dosing may appear elevated if collected in close proximity to the dose administered, this is not necessarily an indication of toxicity    Plan:  Current dosing regimen is therapeutic  Continue current dose  Repeat vancomycin concentration ordered for 03/31/25 @ 0600   Pharmacy will continue to monitor patient and adjust therapy as indicated    Thank you for the consult,  Pablo Montilla, [facility]  3/27/2025 12:24 PM

## 2025-03-28 LAB — CREAT SERPL-MCNC: 0.67 MG/DL (ref 0.5–0.9)

## 2025-03-28 PROCEDURE — 97530 THERAPEUTIC ACTIVITIES: CPT

## 2025-03-28 PROCEDURE — 97110 THERAPEUTIC EXERCISES: CPT

## 2025-03-28 PROCEDURE — 6370000000 HC RX 637 (ALT 250 FOR IP): Performed by: INTERNAL MEDICINE

## 2025-03-28 PROCEDURE — 6360000002 HC RX W HCPCS: Performed by: INTERNAL MEDICINE

## 2025-03-28 PROCEDURE — 2500000003 HC RX 250 WO HCPCS: Performed by: INTERNAL MEDICINE

## 2025-03-28 PROCEDURE — 2580000003 HC RX 258: Performed by: INTERNAL MEDICINE

## 2025-03-28 PROCEDURE — 1200000002 HC SEMI PRIVATE SWING BED

## 2025-03-28 PROCEDURE — 82565 ASSAY OF CREATININE: CPT

## 2025-03-28 PROCEDURE — 97116 GAIT TRAINING THERAPY: CPT

## 2025-03-28 RX ORDER — GABAPENTIN 100 MG/1
100 CAPSULE ORAL 3 TIMES DAILY
Qty: 90 CAPSULE | Refills: 0 | Status: SHIPPED | OUTPATIENT
Start: 2025-03-28 | End: 2025-04-27

## 2025-03-28 RX ADMIN — CYCLOBENZAPRINE 10 MG: 10 TABLET, FILM COATED ORAL at 13:32

## 2025-03-28 RX ADMIN — GABAPENTIN 100 MG: 100 CAPSULE ORAL at 13:32

## 2025-03-28 RX ADMIN — CETIRIZINE HYDROCHLORIDE 10 MG: 10 TABLET ORAL at 08:19

## 2025-03-28 RX ADMIN — SERTRALINE HYDROCHLORIDE 25 MG: 50 TABLET ORAL at 08:20

## 2025-03-28 RX ADMIN — POLYETHYLENE GLYCOL 3350 17 G: 17 POWDER, FOR SOLUTION ORAL at 08:21

## 2025-03-28 RX ADMIN — OMEGA-3-ACID ETHYL ESTERS CAPSULES 1 CAPSULE: 1 CAPSULE, LIQUID FILLED ORAL at 21:54

## 2025-03-28 RX ADMIN — CEFTRIAXONE SODIUM 2000 MG: 2 INJECTION, POWDER, FOR SOLUTION INTRAMUSCULAR; INTRAVENOUS at 19:29

## 2025-03-28 RX ADMIN — GABAPENTIN 100 MG: 100 CAPSULE ORAL at 08:19

## 2025-03-28 RX ADMIN — BACITRACIN ZINC, NEOMYCIN, POLYMYXIN B SULFAT: 5000; 3.5; 4 OINTMENT TOPICAL at 20:55

## 2025-03-28 RX ADMIN — SODIUM CHLORIDE, PRESERVATIVE FREE 10 ML: 5 INJECTION INTRAVENOUS at 08:28

## 2025-03-28 RX ADMIN — VANCOMYCIN HYDROCHLORIDE 1000 MG: 1 INJECTION, POWDER, LYOPHILIZED, FOR SOLUTION INTRAVENOUS at 20:50

## 2025-03-28 RX ADMIN — PRAVASTATIN SODIUM 20 MG: 10 TABLET ORAL at 20:48

## 2025-03-28 RX ADMIN — Medication 1 CAPSULE: at 21:53

## 2025-03-28 RX ADMIN — SODIUM CHLORIDE, PRESERVATIVE FREE 10 ML: 5 INJECTION INTRAVENOUS at 20:56

## 2025-03-28 RX ADMIN — GABAPENTIN 100 MG: 100 CAPSULE ORAL at 20:48

## 2025-03-28 RX ADMIN — POLYETHYLENE GLYCOL 3350 17 G: 17 POWDER, FOR SOLUTION ORAL at 20:48

## 2025-03-28 RX ADMIN — CEFTRIAXONE SODIUM 2000 MG: 2 INJECTION, POWDER, FOR SOLUTION INTRAMUSCULAR; INTRAVENOUS at 05:40

## 2025-03-28 RX ADMIN — LEVOTHYROXINE SODIUM 50 MCG: 0.03 TABLET ORAL at 05:41

## 2025-03-28 RX ADMIN — CITALOPRAM HYDROBROMIDE 30 MG: 20 TABLET ORAL at 08:20

## 2025-03-28 RX ADMIN — VANCOMYCIN HYDROCHLORIDE 1000 MG: 1 INJECTION, POWDER, LYOPHILIZED, FOR SOLUTION INTRAVENOUS at 06:13

## 2025-03-28 RX ADMIN — BACITRACIN ZINC, NEOMYCIN, POLYMYXIN B SULFAT: 5000; 3.5; 4 OINTMENT TOPICAL at 08:20

## 2025-03-28 RX ADMIN — CYCLOBENZAPRINE 10 MG: 10 TABLET, FILM COATED ORAL at 20:48

## 2025-03-28 RX ADMIN — TOPIRAMATE 25 MG: 25 TABLET, FILM COATED ORAL at 08:19

## 2025-03-28 RX ADMIN — CYCLOBENZAPRINE 10 MG: 10 TABLET, FILM COATED ORAL at 08:19

## 2025-03-28 RX ADMIN — SENNOSIDES 8.6 MG: 8.6 TABLET, FILM COATED ORAL at 20:48

## 2025-03-28 RX ADMIN — PSYLLIUM HUSK 1 PACKET: 3.4 POWDER ORAL at 08:21

## 2025-03-28 RX ADMIN — OXYBUTYNIN CHLORIDE 10 MG: 5 TABLET ORAL at 08:19

## 2025-03-28 NOTE — PROGRESS NOTES
Patient's care discussed in daily quality rounds.  MARIANA RN House supervisor reports that Paulina-  at [facility] advised that precert was obtained and that facility is able to accept patient on this date.  MARIANA RN House supervisor reports that transportation arranged and patient advised and agreeable with DC plan.  MARIANA RN House supervisor reports that Health system EMS is scheduled to pick patient up at 1:30pm today.   This  updated patient's son Harshad whom thanks this  for call and assistance in arranging admission to [facility].    This  sent communication to [facility]  Paulina regarding above.  Paulina thanks this  for update. Patient will require 0700 prior to admission to SNF.  This  updated Health system RN of above and Health system RN plans to contact facility with report.  This  completed 0700 in [facility] along with [facility].  No further follow up from Health system SS requested

## 2025-03-28 NOTE — DISCHARGE INSTR - COC
Continuity of Care Form    Patient Name: Gloria Abbott   :  1950  MRN:  [id number]    Admit date:  3/20/2025  Discharge date:  3/28/25    Code Status Order: Full Code   Advance Directives:     Admitting Physician:  Maurizio Hester MD  PCP: Xiomara Pappas MD    Discharging Nurse: SG  Discharging Hospital Unit/Room#: 0252/0252-01  Discharging Unit Phone Number: [phone]    Emergency Contact:   Extended Emergency Contact Information  Primary Emergency Contact: Harshad Abbott  Address: [address] [place] [address] [place]  Mobile Phone: [phone]  Relation: Child  Secondary Emergency Contact: Sabine Vazquez  Address: [address] [place] [address] [facility]  Home Phone: [phone]  Mobile Phone: [phone]  Relation: Child    Past Surgical History:  Past Surgical History:   Procedure Laterality Date    ANKLE SURGERY Right     COLONOSCOPY      COLONOSCOPY N/A 2024    COLONOSCOPY DIAGNOSTIC performed by Amalia Lopez MD at [facility] CENTER    FOOT SURGERY Right 2001    HIP SURGERY Right 2023    RIGHT HIP PINNING AND LEFT KNEE STERIOD INJECTION performed by Elliot Garcia MD at [facility] OR    HYSTERECTOMY (CERVIX STATUS UNKNOWN)      LASIK Bilateral     SHUNT REMOVAL N/A 3/17/2025    REMOVAL VENTRICULAR PERITONEAL SHUNT room 270 performed by Marisela Larson MD at [facility] OR    SINUS ENDOSCOPY Bilateral 2024    ENDOSCOPIC MAXILLARY ANTROSTOMIES 30 MIN performed by Ryan Zepeda MD at [facility] OR    SINUS ENDOSCOPY Left 2024    REVISION MAXILLARY ANTROSTOMY (LEFT)  RECENT SURGERY performed by Ryan Zepeda MD at [facility] OR    SINUS SURGERY  07/10/2012    TOTAL KNEE ARTHROPLASTY Left 2023    Left total knee arthroplasty,Juan C Triathlon Total Knee System,Choice with adductor canal block preop with Nhan 23 at 10:30 am and lab work for 23 at 1:00 pm performed by Elliot Garcia MD at [facility] OR    UPPER GASTROINTESTINAL

## 2025-03-28 NOTE — DISCHARGE SUMMARY
Discharge Summary    Patient:  Gloria Abbott  YOB: 1950    MRN: [id number]   Acct: [id number]    Primary Care Physician: Xiomara aPppas MD    Admit date:  3/20/2025    Discharge date:   03/28/25      Discharge Diagnoses:   Hydrocephalus  Principal Problem:    Hydrocephalus (HCC)  Active Problems:    Infection of ventriculoperitoneal shunt    Candida infection    Neck pain  Resolved Problems:    * No resolved hospital problems. *      Admitted for: (HPI)above    Hospital Course: 74 y.o. female who presented to [facility] after acute admission to [facility]. She had NPH, post  shunt placement,  presented after she had picked a scab  and  pulled out her  shunt.   She was hospitalized and neurosurgery performed I&D and removal of shunt on 3/17.  She had fevers during the hospitalization was maintained on IV vancomycin and ceftriaxone per recommendation of infectious disease.  After completing her   stay at [place] she was transferred to SNF to complete IV atbs x 3 more weeks per ID recommendations     Consultants:  ID    Discharge Medications:       Medication List        START taking these medications      gabapentin 100 MG capsule  Commonly known as: NEURONTIN  Take 1 capsule by mouth 3 times daily for 30 days.     sodium chloride 0.9 % SOLN 50 mL with cefTRIAXone 2 g SOLR 2,000 mg  Infuse 2,000 mg intravenously in the morning and 2,000 mg in the evening. Do all this for 17 days.     vancomycin infusion  Commonly known as: VANCOCIN  Infuse 1,000 mg intravenously in the morning and 1,000 mg in the evening. Do all this for 17 days. Compound per protocol..            CONTINUE taking these medications      cetirizine 10 MG tablet  Commonly known as: ZYRTEC  Take 1 tablet by mouth daily     * citalopram 10 MG tablet  Commonly known as: CELEXA  TAKE 1 TABLET BY MOUTH ONCE DAILY WITH THE 20MG TABLET     * citalopram 20 MG tablet  Commonly known as: CELEXA  Take 1 tablet by mouth daily

## 2025-03-28 NOTE — PLAN OF CARE
Problem: Discharge Planning  Goal: Discharge to home or other facility with appropriate resources  3/27/2025 2121 by Salome Dunbar RN  Outcome: Progressing

## 2025-03-28 NOTE — PROGRESS NOTES
Facility/Department: [facility] MED SURG UNIT  Daily Treatment Note  NAME: Gloria Abbott  : 1950  MRN: [id number]    Date of Service: 3/28/2025    Discharge Recommendations:  Continue to assess pending progress, 24 hour supervision or assist        Patient Diagnosis(es): hydrocephalus, weakness.    Assessment: Co tx with OT provided this date as pt requires assist for 2 for safety with mobility; transfers/gait. Pt demo very stiff/rigid posture, tendency to keep head down- eyes closed; limited cervical ROM with reports of pain. Pt initially retropulsive when sitting up at EOB, reports fear of falling with attempts/cues to assist pt into a more flexed position. Pt demo strong Rt lateral trunk lean, demo shuffled step pattern with R LE ext rotated, step to pattern; decreased step length on Rt- close w/c follow provided for safety with gait, assist to guide walker in forward direction to assist with improved continuity of step pattern. Pt fatigues easily with activity. Emphasis on improved posture and wt shifting to facilitate improved proprioception/decrease risk of falls. Pt seated in recliner at end of session, LEs elevated; pillows used prop up trunk into a more upright position- pt comfortable at rest.   Activity Tolerance: Patient limited by fatigue;Patient limited by pain;Patient limited by endurance;Treatment limited secondary to decreased cognition    Plan  Physical Therapy Plan  General Plan: 5-7 times per week  Current Treatment Recommendations: Strengthening;ROM;Balance training;Functional mobility training;Transfer training;Endurance training;Gait training;Stair training;Neuromuscular re-education;Home exercise program;Safety education & training;Patient/Caregiver education & training;Equipment evaluation, education, & procurement;Modalities;Positioning;Therapeutic activities;Co-Treatment;Manual  Additional Comments: KT tape trial to L forearm 3/21/25 for possible use with posture and neck

## 2025-03-28 NOTE — PROGRESS NOTES
Facility/Department: [facility] ACUTE UNIT  Daily Treatment Note  NAME: Gloria Abbott  : 1950  MRN: [id number]    Date of Service: 3/28/2025    Discharge Recommendations:  Continue to assess pending progress, Subacute/Skilled Nursing Facility, 24 hour supervision or assist         Patient Diagnosis(es): Hydrocephalus    Assessment   Assessment: Pt sitting EOB with MAX A, completed dynamic sitting activity to increase trunk stability and proper posture. STS from EOB to FWW MAX A x2 with verbal/visual cues for hand placement. functional mobility 2 trials. Pt demo'ing R lateral leaning while standing and in sitting with MAX A to bring to midline, unable to maintain posture. Pt left in chair with alarm and call light in reach. Recommend slow SNF to increase safety and I with ADLs and functional mobility/transfers.  Activity Tolerance: Patient limited by fatigue;Patient limited by pain;Patient limited by endurance;Treatment limited secondary to decreased cognition  Discharge Recommendations: Continue to assess pending progress;Subacute/Skilled Nursing Facility;24 hour supervision or assist     Plan  Occupational Therapy Plan  Times Per Week: 4-7  Times Per Day: Once a day  Current Treatment Recommendations: Strengthening;Functional mobility training;Balance training;Endurance training;Pain management;Safety education & training;Patient/Caregiver education & training;Self-Care / ADL;Equipment evaluation, education, & procurement;Home management training;Coordination training;Positioning;Co-Treatment;ROM    Restrictions   Fall risk, midline RUE    Subjective  Subjective  Subjective: \"I'll try.\"  Pain: 8/10 L knee, neck  Orientation  Overall Orientation Status: Within Functional Limits  Orientation Level: Oriented X4  Pain: 8/10 L knee, neck  Cognition  Overall Cognitive Status: WFL  Arousal/Alertness: Impaired  Following Commands: Appears intact;Follows one step commands consistently  Attention Span: Impaired;Attends

## 2025-03-29 VITALS
TEMPERATURE: 98.2 F | DIASTOLIC BLOOD PRESSURE: 63 MMHG | WEIGHT: 185.2 LBS | HEART RATE: 73 BPM | BODY MASS INDEX: 31.62 KG/M2 | OXYGEN SATURATION: 94 % | HEIGHT: 64 IN | RESPIRATION RATE: 18 BRPM | SYSTOLIC BLOOD PRESSURE: 132 MMHG

## 2025-03-29 LAB — CREAT SERPL-MCNC: 0.7 MG/DL (ref 0.5–0.9)

## 2025-03-29 PROCEDURE — 6360000002 HC RX W HCPCS: Performed by: INTERNAL MEDICINE

## 2025-03-29 PROCEDURE — 6370000000 HC RX 637 (ALT 250 FOR IP): Performed by: INTERNAL MEDICINE

## 2025-03-29 PROCEDURE — 2580000003 HC RX 258: Performed by: INTERNAL MEDICINE

## 2025-03-29 PROCEDURE — 82565 ASSAY OF CREATININE: CPT

## 2025-03-29 RX ADMIN — CEFTRIAXONE SODIUM 2000 MG: 2 INJECTION, POWDER, FOR SOLUTION INTRAMUSCULAR; INTRAVENOUS at 05:17

## 2025-03-29 RX ADMIN — LEVOTHYROXINE SODIUM 50 MCG: 0.03 TABLET ORAL at 05:20

## 2025-03-29 RX ADMIN — TOPIRAMATE 25 MG: 25 TABLET, FILM COATED ORAL at 08:04

## 2025-03-29 RX ADMIN — BACITRACIN ZINC, NEOMYCIN, POLYMYXIN B SULFAT: 5000; 3.5; 4 OINTMENT TOPICAL at 08:08

## 2025-03-29 RX ADMIN — CYCLOBENZAPRINE 10 MG: 10 TABLET, FILM COATED ORAL at 08:04

## 2025-03-29 RX ADMIN — CITALOPRAM HYDROBROMIDE 30 MG: 20 TABLET ORAL at 08:03

## 2025-03-29 RX ADMIN — POLYETHYLENE GLYCOL 3350 17 G: 17 POWDER, FOR SOLUTION ORAL at 08:07

## 2025-03-29 RX ADMIN — CETIRIZINE HYDROCHLORIDE 10 MG: 10 TABLET ORAL at 08:04

## 2025-03-29 RX ADMIN — SERTRALINE HYDROCHLORIDE 25 MG: 50 TABLET ORAL at 08:04

## 2025-03-29 RX ADMIN — PSYLLIUM HUSK 1 PACKET: 3.4 POWDER ORAL at 08:08

## 2025-03-29 RX ADMIN — VANCOMYCIN HYDROCHLORIDE 1000 MG: 1 INJECTION, POWDER, LYOPHILIZED, FOR SOLUTION INTRAVENOUS at 05:51

## 2025-03-29 RX ADMIN — OXYBUTYNIN CHLORIDE 10 MG: 5 TABLET ORAL at 08:03

## 2025-03-29 RX ADMIN — GABAPENTIN 100 MG: 100 CAPSULE ORAL at 08:03

## 2025-03-29 NOTE — PROGRESS NOTES
Spoke with [facility], confirmed they are able to do transport this morning to [facility]. I spoke with Ness at [facility] and she is able to accomodate patient any time and this morning for 9am would be okay. Primary nurse updated. I called and cancelled [facility] that was scheduled for later this morning.

## 2025-03-29 NOTE — PROGRESS NOTES
Transport here to  patient for transport to [facility]. Daughter at bedside. Patient left unit with all documented belongings in stable condition. PICC line remains in place.

## 2025-03-29 NOTE — PROGRESS NOTES
[facility] contacted to verify ETA for  and transport to [facility]. They report delays and give a 2200 ETA. [facility] contacted and they do not have appropriate staff present to admit the patient at the late hour. They request transport be rescheduled for tomorrow morning. Transport rescheduled for 1030 am. Patient and family updated on plan of care.

## 2025-03-31 ENCOUNTER — OFFICE VISIT (OUTPATIENT)
Dept: GERIATRIC MEDICINE | Age: 75
End: 2025-03-31
Payer: COMMERCIAL

## 2025-03-31 DIAGNOSIS — R26.81 UNSTEADINESS: ICD-10-CM

## 2025-03-31 DIAGNOSIS — G91.2 NORMAL PRESSURE HYDROCEPHALUS (HCC): Primary | ICD-10-CM

## 2025-03-31 DIAGNOSIS — T85.09XS: ICD-10-CM

## 2025-03-31 PROCEDURE — 99305 1ST NF CARE MODERATE MDM 35: CPT | Performed by: INTERNAL MEDICINE

## 2025-03-31 PROCEDURE — 1123F ACP DISCUSS/DSCN MKR DOCD: CPT | Performed by: INTERNAL MEDICINE

## 2025-04-04 LAB — VANCOMYCIN TROUGH SERPL-MCNC: 19.1 UG/ML (ref 10–20)

## 2025-04-07 ENCOUNTER — HOSPITAL ENCOUNTER (OUTPATIENT)
Age: 75
Setting detail: SPECIMEN
Discharge: HOME | End: 2025-04-07
Payer: COMMERCIAL

## 2025-04-07 LAB — VANCOMYCIN TROUGH SERPL-MCNC: 11.5 UG/ML (ref 10–20)

## 2025-04-07 PROCEDURE — 80202 ASSAY OF VANCOMYCIN: CPT

## 2025-04-08 PROBLEM — T85.09XS: Status: ACTIVE | Noted: 2025-03-16

## 2025-04-08 ASSESSMENT — ENCOUNTER SYMPTOMS
CONSTIPATION: 1
BACK PAIN: 1

## 2025-04-09 ENCOUNTER — OFFICE VISIT (OUTPATIENT)
Dept: GERIATRIC MEDICINE | Age: 75
End: 2025-04-09

## 2025-04-09 ENCOUNTER — TELEPHONE (OUTPATIENT)
Age: 75
End: 2025-04-09

## 2025-04-09 DIAGNOSIS — T85.09XS: ICD-10-CM

## 2025-04-09 DIAGNOSIS — R26.81 UNSTEADINESS: ICD-10-CM

## 2025-04-09 DIAGNOSIS — G91.2 NORMAL PRESSURE HYDROCEPHALUS (HCC): Primary | ICD-10-CM

## 2025-04-09 NOTE — PROGRESS NOTES
Patient Name: Gloria Abbott     YOB: 1950  Medical Record Number: [id number]    History of Present Illness:  74-year-old woman admitted here after recent hospitalization patient known history of NPH has a prior  shunt in place patient apparently picked a scab overnight while at home and found her  shunt dislodged and on the pillow patient was hospitalized did undergo surgical evaluation patient did undergo I&D removal of her soft postoperative patient was stabilized and placed on course of IV antibiotic therapy.  Was seen by neurosurgery was seen by neurology infectious ease.  Patient was stabilized patient did not require aggressive antifungal therapy did not require further neurosurgical intervention.  Patient had evidence of hydrocephalus on imaging was followed clinically felt to be treated conservatively for possibility of return to device placement stabilized and transferred for ongoing course of care.    Review of Systems   Constitutional:  Positive for activity change, appetite change and fatigue.   Cardiovascular:  Negative for chest pain.   Gastrointestinal:  Positive for constipation.   Musculoskeletal:  Positive for back pain.   Neurological:  Positive for weakness and headaches.   Psychiatric/Behavioral:  Positive for self-injury.    All other systems reviewed and are negative.      Review of Systems: All 14 review of systems negative other than as stated above    Social History     Tobacco Use    Smoking status: Never    Smokeless tobacco: Never   Vaping Use    Vaping status: Never Used   Substance Use Topics    Alcohol use: No    Drug use: Never         Past Medical History:   Diagnosis Date    ADHD (attention deficit hyperactivity disorder)     Arthritis     Cancer (HCC)     endometrial and uterine    Depression     Hyperlipidemia     Hypertension     Hypothyroid     MDD (major depressive disorder)     NPH (normal pressure hydrocephalus) ([facility])     PONV (postoperative nausea and

## 2025-04-09 NOTE — TELEPHONE ENCOUNTER
SNF repJasmin calls to obtain a Follow up with ID. Provided 4/15/25 at 9:45am. The next question was to then Confirm IV Abx treatment. Original IV Abx orders upon D/c from hospital are thru 4/14/25.  Per Review with Dr Nieves, Ok to extend IV Abx one more day, have patient F/u.    Returned call to SNF, spoke with nurse supervisor with the update. She verbalized understanding.

## 2025-04-10 ENCOUNTER — OFFICE VISIT (OUTPATIENT)
Age: 75
End: 2025-04-10

## 2025-04-10 VITALS
WEIGHT: 174 LBS | DIASTOLIC BLOOD PRESSURE: 72 MMHG | HEIGHT: 65 IN | SYSTOLIC BLOOD PRESSURE: 130 MMHG | BODY MASS INDEX: 28.99 KG/M2

## 2025-04-10 DIAGNOSIS — T85.730D INFECTION OF VENTRICULOPERITONEAL SHUNT, SUBSEQUENT ENCOUNTER: ICD-10-CM

## 2025-04-10 DIAGNOSIS — G91.2 NORMAL PRESSURE HYDROCEPHALUS (HCC): Primary | ICD-10-CM

## 2025-04-10 PROCEDURE — 99024 POSTOP FOLLOW-UP VISIT: CPT | Performed by: NEUROLOGICAL SURGERY

## 2025-04-10 NOTE — PATIENT INSTRUCTIONS
Please obtain CT head in 3 months and follow-up with Dr. Larson after imaging is obtained.  Order for CT head has been previously placed.

## 2025-04-11 ENCOUNTER — HOSPITAL ENCOUNTER (OUTPATIENT)
Age: 75
Setting detail: SPECIMEN
Discharge: HOME | End: 2025-04-11
Payer: COMMERCIAL

## 2025-04-11 LAB — VANCOMYCIN TROUGH SERPL-MCNC: 20.4 UG/ML (ref 10–20)

## 2025-04-11 PROCEDURE — 36415 COLL VENOUS BLD VENIPUNCTURE: CPT

## 2025-04-11 PROCEDURE — 80202 ASSAY OF VANCOMYCIN: CPT

## 2025-04-11 ASSESSMENT — ENCOUNTER SYMPTOMS
GASTROINTESTINAL NEGATIVE: 1
RESPIRATORY NEGATIVE: 1

## 2025-04-11 NOTE — PROGRESS NOTES
[address] [address]    4/9/2025    Gloria Abbott  is a 74 y.o. in the  being seen for a f/u of   Chief Complaint   Patient presents with    Follow-up       Gloria Abbott is a 74-year-old woman recently admitted to local nursing home.  Patient recent hospitalization after picking a scab and pulling her  shunt out.  Patient underwent surgical evaluation.  neurosurgery performed I&D and removed shunt on 3/17.  Patient developed fevers while hospitalized and was treated with IV vancomycin and ceftriaxone.  Patient did stabilize and was discharged to SNF for rehab.  Patient to follow-up with neurosurgery outpatient.    At time of visit patient resting in bed.  She is alert and oriented x 3.  Patient reports chronic neck pain.  She denies nausea vomiting diarrhea or constipation.  No recent fever or fall.  No change in bowel or bladder habits.            Past Medical History:   Diagnosis Date    ADHD (attention deficit hyperactivity disorder)     Arthritis     Cancer (HCC)     endometrial and uterine    Depression     Hyperlipidemia     Hypertension     Hypothyroid     MDD (major depressive disorder)     NPH (normal pressure hydrocephalus) (HCC)     PONV (postoperative nausea and vomiting)     Nausea    Protruded lumbar disc     L4/L5    Stress     Vitamin D deficiency      Past Surgical History:   Procedure Laterality Date    ANKLE SURGERY Right 2001    COLONOSCOPY      COLONOSCOPY N/A 11/19/2024    COLONOSCOPY DIAGNOSTIC performed by Amalia Lopez MD at [facility]    FOOT SURGERY Right 2001    HIP SURGERY Right 02/02/2023    RIGHT HIP PINNING AND LEFT KNEE STERIOD INJECTION performed by Elliot Garcia MD at [facility]    HYSTERECTOMY (CERVIX STATUS UNKNOWN)      LASIK Bilateral     SHUNT REMOVAL N/A 3/17/2025    REMOVAL VENTRICULAR PERITONEAL SHUNT room 270 performed by Marisela Larson MD at [facility]    SINUS ENDOSCOPY Bilateral 08/06/2024    ENDOSCOPIC MAXILLARY

## 2025-04-14 LAB — VANCOMYCIN TROUGH SERPL-MCNC: 20.3 UG/ML (ref 10–20)

## 2025-04-15 ENCOUNTER — OFFICE VISIT (OUTPATIENT)
Age: 75
End: 2025-04-15
Payer: COMMERCIAL

## 2025-04-15 VITALS
HEART RATE: 69 BPM | DIASTOLIC BLOOD PRESSURE: 75 MMHG | TEMPERATURE: 97.5 F | RESPIRATION RATE: 16 BRPM | OXYGEN SATURATION: 98 % | WEIGHT: 174 LBS | HEIGHT: 65 IN | SYSTOLIC BLOOD PRESSURE: 126 MMHG | BODY MASS INDEX: 28.99 KG/M2

## 2025-04-15 DIAGNOSIS — T85.730D INFECTION OF VENTRICULOPERITONEAL SHUNT, SUBSEQUENT ENCOUNTER: Primary | ICD-10-CM

## 2025-04-15 DIAGNOSIS — M54.2 NECK PAIN: ICD-10-CM

## 2025-04-15 PROCEDURE — 99213 OFFICE O/P EST LOW 20 MIN: CPT | Performed by: INTERNAL MEDICINE

## 2025-04-15 PROCEDURE — 1123F ACP DISCUSS/DSCN MKR DOCD: CPT | Performed by: INTERNAL MEDICINE

## 2025-04-15 ASSESSMENT — PATIENT HEALTH QUESTIONNAIRE - PHQ9
SUM OF ALL RESPONSES TO PHQ QUESTIONS 1-9: 2
2. FEELING DOWN, DEPRESSED OR HOPELESS: SEVERAL DAYS
SUM OF ALL RESPONSES TO PHQ QUESTIONS 1-9: 2
1. LITTLE INTEREST OR PLEASURE IN DOING THINGS: SEVERAL DAYS

## 2025-04-15 NOTE — PROGRESS NOTES
Gloria Abbott (:  1950) is a 74 y.o. female,Established patient, here for evaluation of the following chief complaint(s):  Follow-Up from Hospital and Wound Check ([facility] f/u for post-op scalp surgery ( shunt))     Assessment & Plan  Infection of ventriculoperitoneal shunt, subsequent encounter   New, at goal (stable), DC IV vancomycin and cefepime.  DC PICC line.  Follow-up as needed         Neck pain   Chronic, not at goal (unstable), follow-up with Dr. Larson for neck pain and hydrocephalus         No follow-ups on file.       Subjective   HPI  Follow-up [facility] hospitalization for infected  shunt that fell out on admission, culture was contaminated, was treated empirically for  shunt infection with IV vancomycin and cefepime with overall clinical improvement, resolution of fever and headaches, resolved confusion and disorientation, improved appetite.   Patient is currently doing much better.  Only complaint is related to neck pain.   Patient was found to have cervical subluxation on x-ray done during recent hospitalization.   scalp incision is healed without any drainage  No current fevers  Review of Systems   Musculoskeletal:  Positive for neck pain.   All other systems reviewed and are negative.  Currently in a wheelchair.  Walks with a walker at the nursing home  Feels much better overall  Rest of review of system is negative       Objective   Physical Exam     Vitals:    04/15/25 0921   BP: 126/75   BP Site: Left Upper Arm   Patient Position: Sitting   BP Cuff Size: Medium Adult   Pulse: 69   Resp: 16   Temp: 97.5 °F (36.4 °C)   TempSrc: Temporal   SpO2: 98%   Weight: 78.9 kg (174 lb)   Height: 1.638 m (5' 4.5\")     General Appearance: alert and oriented to person, place and time, well-developed and well-nourished, in no acute distress  Skin: warm and dry, no rash.   Head: normocephalic and atraumatic  Eyes: anicteric sclerae  ENT: oropharynx clear and moist

## 2025-04-21 ENCOUNTER — HOSPITAL ENCOUNTER (OUTPATIENT)
Dept: CT IMAGING | Age: 75
Discharge: HOME | End: 2025-04-23
Attending: NEUROLOGICAL SURGERY
Payer: COMMERCIAL

## 2025-04-21 DIAGNOSIS — G31.9 VENTRICULAR ENLARGEMENT DUE TO BRAIN ATROPHY: ICD-10-CM

## 2025-04-21 PROCEDURE — 70450 CT HEAD/BRAIN W/O DYE: CPT

## 2025-04-22 ENCOUNTER — HOSPITAL ENCOUNTER (OUTPATIENT)
Age: 75
Setting detail: OBSERVATION
Discharge: NURSING, REHAB OR LONG-TERM CARE | End: 2025-04-24
Attending: INTERNAL MEDICINE | Admitting: INTERNAL MEDICINE
Payer: COMMERCIAL

## 2025-04-22 DIAGNOSIS — R53.1 GENERAL WEAKNESS: Primary | ICD-10-CM

## 2025-04-22 LAB
ANION GAP SERPL CALCULATED.3IONS-SCNC: 10 MEQ/L (ref 9–15)
BASOPHILS # BLD: 0.1 K/UL (ref 0–0.1)
BASOPHILS NFR BLD: 0.7 % (ref 0.1–1.2)
BILIRUB UR QL STRIP: NEGATIVE
BUN SERPL-MCNC: 24 MG/DL (ref 8–23)
CALCIUM SERPL-MCNC: 9.2 MG/DL (ref 8.5–9.9)
CHLORIDE SERPL-SCNC: 100 MEQ/L (ref 95–107)
CLARITY UR: CLEAR
CO2 SERPL-SCNC: 27 MEQ/L (ref 20–31)
COLOR UR: YELLOW
CREAT SERPL-MCNC: 0.8 MG/DL (ref 0.5–0.9)
EOSINOPHIL # BLD: 0.2 K/UL (ref 0–0.4)
EOSINOPHIL NFR BLD: 1.7 % (ref 0.7–5.8)
ERYTHROCYTE [DISTWIDTH] IN BLOOD BY AUTOMATED COUNT: 13.4 % (ref 11.7–14.4)
GLUCOSE SERPL-MCNC: 98 MG/DL (ref 70–99)
GLUCOSE UR STRIP-MCNC: NEGATIVE MG/DL
HCT VFR BLD AUTO: 35.5 % (ref 37–47)
HGB BLD-MCNC: 11.3 G/DL (ref 11.2–15.7)
HGB UR QL STRIP: NEGATIVE
IMM GRANULOCYTES # BLD: 0.1 K/UL
IMM GRANULOCYTES NFR BLD: 1.1 %
KETONES UR STRIP-MCNC: NEGATIVE MG/DL
LEUKOCYTE ESTERASE UR QL STRIP: NEGATIVE
LYMPHOCYTES # BLD: 1.3 K/UL (ref 1.2–3.7)
LYMPHOCYTES NFR BLD: 14.2 %
MCH RBC QN AUTO: 29.8 PG (ref 25.6–32.2)
MCHC RBC AUTO-ENTMCNC: 31.8 % (ref 32.2–35.5)
MCV RBC AUTO: 93.7 FL (ref 79.4–94.8)
MONOCYTES # BLD: 0.7 K/UL (ref 0.2–0.9)
MONOCYTES NFR BLD: 7.5 % (ref 4.7–12.5)
NEUTROPHILS # BLD: 6.9 K/UL (ref 1.6–6.1)
NEUTS SEG NFR BLD: 74.8 % (ref 34–71.1)
NITRITE UR QL STRIP: NEGATIVE
PH UR STRIP: 5.5 [PH] (ref 5–9)
PLATELET # BLD AUTO: 309 K/UL (ref 182–369)
POTASSIUM SERPL-SCNC: 4.1 MEQ/L (ref 3.4–4.9)
PROT UR STRIP-MCNC: NEGATIVE MG/DL
RBC # BLD AUTO: 3.79 M/UL (ref 3.93–5.22)
SODIUM SERPL-SCNC: 137 MEQ/L (ref 135–144)
SP GR UR STRIP: 1.01 (ref 1–1.03)
URINE REFLEX TO CULTURE: NORMAL
UROBILINOGEN UR STRIP-ACNC: 0.2 E.U./DL
WBC # BLD AUTO: 9.2 K/UL (ref 4–10)

## 2025-04-22 PROCEDURE — 85025 COMPLETE CBC W/AUTO DIFF WBC: CPT

## 2025-04-22 PROCEDURE — G0378 HOSPITAL OBSERVATION PER HR: HCPCS

## 2025-04-22 PROCEDURE — 99285 EMERGENCY DEPT VISIT HI MDM: CPT

## 2025-04-22 PROCEDURE — 2580000003 HC RX 258

## 2025-04-22 PROCEDURE — 2500000003 HC RX 250 WO HCPCS: Performed by: INTERNAL MEDICINE

## 2025-04-22 PROCEDURE — 81003 URINALYSIS AUTO W/O SCOPE: CPT

## 2025-04-22 PROCEDURE — 93005 ELECTROCARDIOGRAM TRACING: CPT

## 2025-04-22 PROCEDURE — 6370000000 HC RX 637 (ALT 250 FOR IP): Performed by: INTERNAL MEDICINE

## 2025-04-22 PROCEDURE — 36415 COLL VENOUS BLD VENIPUNCTURE: CPT

## 2025-04-22 PROCEDURE — 80048 BASIC METABOLIC PNL TOTAL CA: CPT

## 2025-04-22 RX ORDER — ONDANSETRON 2 MG/ML
4 INJECTION INTRAMUSCULAR; INTRAVENOUS EVERY 6 HOURS PRN
Status: DISCONTINUED | OUTPATIENT
Start: 2025-04-22 | End: 2025-04-24 | Stop reason: HOSPADM

## 2025-04-22 RX ORDER — ACETAMINOPHEN 650 MG/1
650 SUPPOSITORY RECTAL EVERY 6 HOURS PRN
Status: DISCONTINUED | OUTPATIENT
Start: 2025-04-22 | End: 2025-04-24 | Stop reason: HOSPADM

## 2025-04-22 RX ORDER — 0.9 % SODIUM CHLORIDE 0.9 %
1000 INTRAVENOUS SOLUTION INTRAVENOUS ONCE
Status: COMPLETED | OUTPATIENT
Start: 2025-04-22 | End: 2025-04-22

## 2025-04-22 RX ORDER — CITALOPRAM HYDROBROMIDE 20 MG/1
10 TABLET ORAL DAILY
Status: DISCONTINUED | OUTPATIENT
Start: 2025-04-23 | End: 2025-04-23

## 2025-04-22 RX ORDER — SODIUM CHLORIDE 0.9 % (FLUSH) 0.9 %
10 SYRINGE (ML) INJECTION PRN
Status: DISCONTINUED | OUTPATIENT
Start: 2025-04-22 | End: 2025-04-24 | Stop reason: HOSPADM

## 2025-04-22 RX ORDER — GABAPENTIN 100 MG/1
100 CAPSULE ORAL 3 TIMES DAILY
Status: DISCONTINUED | OUTPATIENT
Start: 2025-04-22 | End: 2025-04-24 | Stop reason: HOSPADM

## 2025-04-22 RX ORDER — ACETAMINOPHEN 325 MG/1
650 TABLET ORAL EVERY 6 HOURS PRN
Status: DISCONTINUED | OUTPATIENT
Start: 2025-04-22 | End: 2025-04-24 | Stop reason: HOSPADM

## 2025-04-22 RX ORDER — OXYBUTYNIN CHLORIDE 5 MG/1
10 TABLET, EXTENDED RELEASE ORAL DAILY
Status: DISCONTINUED | OUTPATIENT
Start: 2025-04-23 | End: 2025-04-24 | Stop reason: HOSPADM

## 2025-04-22 RX ORDER — SODIUM CHLORIDE 9 MG/ML
INJECTION, SOLUTION INTRAVENOUS PRN
Status: DISCONTINUED | OUTPATIENT
Start: 2025-04-22 | End: 2025-04-24 | Stop reason: HOSPADM

## 2025-04-22 RX ORDER — PRAVASTATIN SODIUM 10 MG
20 TABLET ORAL DAILY
Status: DISCONTINUED | OUTPATIENT
Start: 2025-04-23 | End: 2025-04-24 | Stop reason: HOSPADM

## 2025-04-22 RX ORDER — PROMETHAZINE HYDROCHLORIDE 12.5 MG/1
12.5 TABLET ORAL EVERY 6 HOURS PRN
Status: DISCONTINUED | OUTPATIENT
Start: 2025-04-22 | End: 2025-04-24 | Stop reason: HOSPADM

## 2025-04-22 RX ORDER — ENOXAPARIN SODIUM 100 MG/ML
40 INJECTION SUBCUTANEOUS DAILY
Status: DISCONTINUED | OUTPATIENT
Start: 2025-04-23 | End: 2025-04-24 | Stop reason: HOSPADM

## 2025-04-22 RX ORDER — TOPIRAMATE 25 MG/1
25 TABLET, FILM COATED ORAL DAILY
Status: DISCONTINUED | OUTPATIENT
Start: 2025-04-23 | End: 2025-04-24 | Stop reason: HOSPADM

## 2025-04-22 RX ORDER — LEVOTHYROXINE SODIUM 50 UG/1
50 TABLET ORAL DAILY
Status: DISCONTINUED | OUTPATIENT
Start: 2025-04-23 | End: 2025-04-24 | Stop reason: HOSPADM

## 2025-04-22 RX ORDER — POLYETHYLENE GLYCOL 3350 17 G/17G
17 POWDER, FOR SOLUTION ORAL DAILY PRN
Status: DISCONTINUED | OUTPATIENT
Start: 2025-04-22 | End: 2025-04-24 | Stop reason: HOSPADM

## 2025-04-22 RX ORDER — SODIUM CHLORIDE 0.9 % (FLUSH) 0.9 %
10 SYRINGE (ML) INJECTION EVERY 12 HOURS SCHEDULED
Status: DISCONTINUED | OUTPATIENT
Start: 2025-04-22 | End: 2025-04-24 | Stop reason: HOSPADM

## 2025-04-22 RX ADMIN — SODIUM CHLORIDE 1000 ML: 0.9 INJECTION, SOLUTION INTRAVENOUS at 17:08

## 2025-04-22 RX ADMIN — GABAPENTIN 100 MG: 100 CAPSULE ORAL at 21:14

## 2025-04-22 RX ADMIN — Medication 10 ML: at 21:15

## 2025-04-22 ASSESSMENT — PAIN SCALES - GENERAL
PAINLEVEL_OUTOF10: 9
PAINLEVEL_OUTOF10: 0

## 2025-04-22 ASSESSMENT — ENCOUNTER SYMPTOMS
VOMITING: 0
ALLERGIC/IMMUNOLOGIC NEGATIVE: 1
NAUSEA: 0
RESPIRATORY NEGATIVE: 1

## 2025-04-22 ASSESSMENT — PAIN - FUNCTIONAL ASSESSMENT: PAIN_FUNCTIONAL_ASSESSMENT: 0-10

## 2025-04-22 ASSESSMENT — VISUAL ACUITY: OU: 1

## 2025-04-22 ASSESSMENT — PAIN DESCRIPTION - LOCATION: LOCATION: NECK

## 2025-04-22 NOTE — ED PROVIDER NOTES
Rate and Rhythm: Normal rate.      Pulses: Normal pulses.      Heart sounds: Normal heart sounds.   Pulmonary:      Effort: Pulmonary effort is normal.      Breath sounds: Normal breath sounds.   Abdominal:      General: Abdomen is flat.   Musculoskeletal:         General: Normal range of motion.      Cervical back: Normal range of motion. No rigidity.   Skin:     General: Skin is warm and dry.   Neurological:      General: No focal deficit present.      Mental Status: She is alert. Mental status is at baseline.      Cranial Nerves: No cranial nerve deficit, dysarthria or facial asymmetry.      Sensory: Sensation is intact.      Motor: Motor function is intact. No weakness, abnormal muscle tone or pronator drift.      Coordination: Coordination is intact. Romberg sign negative. Coordination normal. Finger-Nose-Finger Test normal. Rapid alternating movements normal.      Gait: Gait is intact. Gait normal.   Psychiatric:         Attention and Perception: Attention and perception normal.         Mood and Affect: Mood and affect normal.         Speech: Speech normal.         Behavior: Behavior is cooperative.         Thought Content: Thought content normal.         Cognition and Memory: Cognition and memory normal.         Judgment: Judgment normal.           All other labs were within normal range or not returned as of this dictation.    EMERGENCY DEPARTMENT COURSE and DIFFERENTIALDIAGNOSIS/MDM:   Vitals:    Vitals:    04/22/25 1514 04/22/25 1646 04/22/25 1732 04/22/25 1752   BP: (!) 141/58 (!) 159/72 (!) 159/71 (!) 162/82   Pulse: 67 74 72 77   Resp: 20 12 14 16   Temp:       TempSrc:       SpO2: 100% 97%     Weight:       Height:                Gloria Abbott is a 74 y.o. female who presents to the Emergency Department with weakness difficulty ambulating from wheelchair after discharge from [facility] today.  Patient's son requesting she be sent to different nursing facility.  On examination patient is  answering questions appropriately she has no confusion she is a pleasant mood.  Agrees she was not able to stand up and ambulate from wheelchair after arriving home today.  She denies any headache pain nausea or vomiting denies fevers fatigue chest pain or shortness of breath.  Eyes PERRLA normal extraocular movements normal visual fields no pronator drift normal finger-to-nose bilaterally leg lifts are weak but equal bilaterally.  Heart sounds normal no murmurs or systoles lung sounds clear.  Patient had CT head performed by neurology yesterday follow-up from shunt removal negative for concerning acute findings.  CBC white blood cells 9.2 hemoglobin 11.3 hematocrit 35.5 no significant leukocytosis or anemia.  BMP electrolytes all normal range GFR 76.1 BUN 24 creatinine 0.80.  Urinalysis is negative.  Discussed placement options with care coordination provider at [facility].  Advised patient will need PT OT evaluation before recertification can be obtained.  I discussed patient case and plan for admission to find placement with with the patient she is agreeable, discussed patient case with Dr. Leavitt he is agreeable to admission at [facility].    Medications:    EKG  Independent interpretation sinus rhythm rate 65 no ST changes no significant changes from previous EKG    LABS:  Labs Reviewed   CBC WITH AUTO DIFFERENTIAL - Abnormal; Notable for the following components:       Result Value    RBC 3.79 (*)     Hematocrit 35.5 (*)     MCHC 31.8 (*)     Neutrophils % 74.8 (*)     Neutrophils Absolute 6.9 (*)     All other components within normal limits   BASIC METABOLIC PANEL - Abnormal; Notable for the following components:    BUN 24 (*)     All other components within normal limits   URINALYSIS WITH REFLEX TO CULTURE       Imaging:      CT OF THE HEAD WITHOUT CONTRAST  4/21/2025 10:25 am     TECHNIQUE:  CT of the head was performed without the administration of intravenous  contrast. Automated exposure control,

## 2025-04-22 NOTE — ED NOTES
Per updated report: Patient was discharged from [facility] today around 1200. Her son lives with her. [facility] came out today to start her on [facility] services. At that time, it was brought up that her son can not take care of her anymore and she needed to be placed somewhere. That is when patient was sent to the ED. She is alert and oriented.

## 2025-04-22 NOTE — CARE COORDINATION
This LSW met with patient at bedside.  Patient said she had just been discharged home from [facility] today.  She said she was weak, and her son can't care for her.  Patient had a binder from [facility] and a folder from [facility] on her bed.  Patient's son arrived.  He said mom had been discharged and she is a two person assist and [facility] had no discharge instructions and no home health had been out.  I said there was a binder there from [facility] and the folder from [facility]?  He then said, well [facility] did come out and they said they would help him get mom in to [facility] for more rehab.  I placed a call to [facility] and to [facility].  Paulina at [facility] told me that the insurance had issued a NOMNEC and that patient had signed and chose not to appeal.  Per Paulina patient was walking independently with staff when she was discharged home.  Spoke with Amador at [facility], she was out to meet with patient yesterday, and today, she did notice a decline.  Patient's son wants her to go to [facility], Amador said that they could have their PT see patient and then assist patient and her son getting patient into [facility], or he could take patient to ER, which is what he did.    Per [place], son stated he works 10 hour days and he can't care or her.  Asked PT to see patient for evaluation.  Electronically signed by PERLA Garcia on 4/22/25 at 5:57 PM EDT

## 2025-04-22 NOTE — ED TRIAGE NOTES
Patient to ED from home via EMS.  Patient had an issue with her brain shunt and was at [facility] for rehab.  She was discharged home yesterday.  She lives at home with her son who reports she was discharged home too soon. She was getting around independently prior to coming home.   Once she came home, she was no longer able to transfer independently and just wasn't acting like herself- per her son.  He believes her shunt may be malfunctioning again.  Patient complains of 9/10 neck pain.

## 2025-04-22 NOTE — ED NOTES
Notified supervisor that patient is ready for admission. Was told that they will most likely wait until shift change.

## 2025-04-23 LAB
ANION GAP SERPL CALCULATED.3IONS-SCNC: 12 MEQ/L (ref 9–15)
BASOPHILS # BLD: 0.1 K/UL (ref 0–0.1)
BASOPHILS NFR BLD: 1.4 % (ref 0.1–1.2)
BUN SERPL-MCNC: 17 MG/DL (ref 8–23)
CALCIUM SERPL-MCNC: 9.3 MG/DL (ref 8.5–9.9)
CHLORIDE SERPL-SCNC: 104 MEQ/L (ref 95–107)
CO2 SERPL-SCNC: 25 MEQ/L (ref 20–31)
CREAT SERPL-MCNC: 0.7 MG/DL (ref 0.5–0.9)
EKG ATRIAL RATE: 65 BPM
EKG P AXIS: 27 DEGREES
EKG P-R INTERVAL: 172 MS
EKG Q-T INTERVAL: 436 MS
EKG QRS DURATION: 80 MS
EKG QTC CALCULATION (BAZETT): 453 MS
EKG R AXIS: 3 DEGREES
EKG T AXIS: 11 DEGREES
EKG VENTRICULAR RATE: 65 BPM
EOSINOPHIL # BLD: 0.3 K/UL (ref 0–0.4)
EOSINOPHIL NFR BLD: 3.8 % (ref 0.7–5.8)
ERYTHROCYTE [DISTWIDTH] IN BLOOD BY AUTOMATED COUNT: 13.5 % (ref 11.7–14.4)
GLUCOSE SERPL-MCNC: 80 MG/DL (ref 70–99)
HCT VFR BLD AUTO: 36.2 % (ref 37–47)
HGB BLD-MCNC: 11.4 G/DL (ref 11.2–15.7)
IMM GRANULOCYTES # BLD: 0.1 K/UL
IMM GRANULOCYTES NFR BLD: 1.2 %
LYMPHOCYTES # BLD: 1.2 K/UL (ref 1.2–3.7)
LYMPHOCYTES NFR BLD: 18 %
MCH RBC QN AUTO: 29.7 PG (ref 25.6–32.2)
MCHC RBC AUTO-ENTMCNC: 31.5 % (ref 32.2–35.5)
MCV RBC AUTO: 94.3 FL (ref 79.4–94.8)
MONOCYTES # BLD: 0.6 K/UL (ref 0.2–0.9)
MONOCYTES NFR BLD: 9.7 % (ref 4.7–12.5)
NEUTROPHILS # BLD: 4.3 K/UL (ref 1.6–6.1)
NEUTS SEG NFR BLD: 65.9 % (ref 34–71.1)
PLATELET # BLD AUTO: 298 K/UL (ref 182–369)
POTASSIUM SERPL-SCNC: 3.7 MEQ/L (ref 3.4–4.9)
RBC # BLD AUTO: 3.84 M/UL (ref 3.93–5.22)
SODIUM SERPL-SCNC: 141 MEQ/L (ref 135–144)
WBC # BLD AUTO: 6.6 K/UL (ref 4–10)

## 2025-04-23 PROCEDURE — 97116 GAIT TRAINING THERAPY: CPT

## 2025-04-23 PROCEDURE — 94760 N-INVAS EAR/PLS OXIMETRY 1: CPT

## 2025-04-23 PROCEDURE — 6370000000 HC RX 637 (ALT 250 FOR IP): Performed by: INTERNAL MEDICINE

## 2025-04-23 PROCEDURE — 6360000002 HC RX W HCPCS: Performed by: INTERNAL MEDICINE

## 2025-04-23 PROCEDURE — 80048 BASIC METABOLIC PNL TOTAL CA: CPT

## 2025-04-23 PROCEDURE — 96372 THER/PROPH/DIAG INJ SC/IM: CPT

## 2025-04-23 PROCEDURE — 36415 COLL VENOUS BLD VENIPUNCTURE: CPT

## 2025-04-23 PROCEDURE — G0378 HOSPITAL OBSERVATION PER HR: HCPCS

## 2025-04-23 PROCEDURE — 93010 ELECTROCARDIOGRAM REPORT: CPT | Performed by: INTERNAL MEDICINE

## 2025-04-23 PROCEDURE — 85025 COMPLETE CBC W/AUTO DIFF WBC: CPT

## 2025-04-23 PROCEDURE — 97162 PT EVAL MOD COMPLEX 30 MIN: CPT

## 2025-04-23 PROCEDURE — 97166 OT EVAL MOD COMPLEX 45 MIN: CPT

## 2025-04-23 PROCEDURE — 2500000003 HC RX 250 WO HCPCS: Performed by: INTERNAL MEDICINE

## 2025-04-23 PROCEDURE — 97530 THERAPEUTIC ACTIVITIES: CPT

## 2025-04-23 RX ORDER — LACTOBACILLUS RHAMNOSUS GG 10B CELL
1 CAPSULE ORAL
Status: DISCONTINUED | OUTPATIENT
Start: 2025-04-24 | End: 2025-04-24 | Stop reason: HOSPADM

## 2025-04-23 RX ORDER — POTASSIUM GLUCONATE 2 MEQ
1 TABLET ORAL DAILY
Status: DISCONTINUED | OUTPATIENT
Start: 2025-04-23 | End: 2025-04-23

## 2025-04-23 RX ORDER — POLYETHYLENE GLYCOL 3350 17 G/17G
17 POWDER, FOR SOLUTION ORAL 2 TIMES DAILY
Status: DISCONTINUED | OUTPATIENT
Start: 2025-04-23 | End: 2025-04-24 | Stop reason: HOSPADM

## 2025-04-23 RX ORDER — CITALOPRAM HYDROBROMIDE 20 MG/1
30 TABLET ORAL DAILY
Status: DISCONTINUED | OUTPATIENT
Start: 2025-04-24 | End: 2025-04-24 | Stop reason: HOSPADM

## 2025-04-23 RX ORDER — BACILLUS COAGULANS/INULIN 1B-250 MG
1 CAPSULE ORAL DAILY
Status: DISCONTINUED | OUTPATIENT
Start: 2025-04-23 | End: 2025-04-23 | Stop reason: CLARIF

## 2025-04-23 RX ORDER — AMLODIPINE BESYLATE 2.5 MG/1
2.5 TABLET ORAL DAILY
Status: DISCONTINUED | OUTPATIENT
Start: 2025-04-23 | End: 2025-04-24 | Stop reason: HOSPADM

## 2025-04-23 RX ORDER — CITALOPRAM HYDROBROMIDE 20 MG/1
20 TABLET ORAL DAILY
Status: DISCONTINUED | OUTPATIENT
Start: 2025-04-23 | End: 2025-04-23

## 2025-04-23 RX ORDER — TIZANIDINE 2 MG/1
4 TABLET ORAL 3 TIMES DAILY PRN
Status: DISCONTINUED | OUTPATIENT
Start: 2025-04-23 | End: 2025-04-24 | Stop reason: HOSPADM

## 2025-04-23 RX ADMIN — ENOXAPARIN SODIUM 40 MG: 100 INJECTION SUBCUTANEOUS at 10:10

## 2025-04-23 RX ADMIN — GABAPENTIN 100 MG: 100 CAPSULE ORAL at 10:04

## 2025-04-23 RX ADMIN — GABAPENTIN 100 MG: 100 CAPSULE ORAL at 19:16

## 2025-04-23 RX ADMIN — AMLODIPINE BESYLATE 2.5 MG: 2.5 TABLET ORAL at 05:35

## 2025-04-23 RX ADMIN — LEVOTHYROXINE SODIUM 50 MCG: 0.05 TABLET ORAL at 05:11

## 2025-04-23 RX ADMIN — AMOXICILLIN AND CLAVULANATE POTASSIUM 1 TABLET: 875; 125 TABLET, FILM COATED ORAL at 19:16

## 2025-04-23 RX ADMIN — ACETAMINOPHEN 650 MG: 325 TABLET ORAL at 19:16

## 2025-04-23 RX ADMIN — Medication 10 ML: at 13:42

## 2025-04-23 RX ADMIN — OXYBUTYNIN CHLORIDE 10 MG: 5 TABLET, EXTENDED RELEASE ORAL at 10:03

## 2025-04-23 RX ADMIN — PRAVASTATIN SODIUM 20 MG: 10 TABLET ORAL at 10:09

## 2025-04-23 RX ADMIN — TOPIRAMATE 25 MG: 25 TABLET, FILM COATED ORAL at 10:04

## 2025-04-23 RX ADMIN — POLYETHYLENE GLYCOL 3350 17 G: 17 POWDER, FOR SOLUTION ORAL at 19:20

## 2025-04-23 RX ADMIN — AMOXICILLIN AND CLAVULANATE POTASSIUM 1 TABLET: 875; 125 TABLET, FILM COATED ORAL at 10:04

## 2025-04-23 RX ADMIN — POLYETHYLENE GLYCOL 3350 17 G: 17 POWDER, FOR SOLUTION ORAL at 13:40

## 2025-04-23 RX ADMIN — TIZANIDINE 4 MG: 2 TABLET ORAL at 19:16

## 2025-04-23 RX ADMIN — GABAPENTIN 100 MG: 100 CAPSULE ORAL at 13:40

## 2025-04-23 RX ADMIN — CITALOPRAM HYDROBROMIDE 10 MG: 20 TABLET ORAL at 10:04

## 2025-04-23 ASSESSMENT — PAIN DESCRIPTION - LOCATION: LOCATION: NECK

## 2025-04-23 ASSESSMENT — PAIN SCALES - GENERAL: PAINLEVEL_OUTOF10: 6

## 2025-04-23 ASSESSMENT — PAIN DESCRIPTION - DESCRIPTORS: DESCRIPTORS: ACHING

## 2025-04-23 NOTE — PLAN OF CARE
Problem: Discharge Planning  Goal: Discharge to home or other facility with appropriate resources  Outcome: Progressing     Problem: Pain  Goal: Verbalizes/displays adequate comfort level or baseline comfort level  Outcome: Progressing     Problem: Safety - Adult  Goal: Free from fall injury  Outcome: Progressing     Problem: Skin/Tissue Integrity  Goal: Skin integrity remains intact  Description: 1.  Monitor for areas of redness and/or skin breakdown2.  Assess vascular access sites hourly3.  Every 4-6 hours minimum:  Change oxygen saturation probe site4.  Every 4-6 hours:  If on nasal continuous positive airway pressure, respiratory therapy assess nares and determine need for appliance change or resting period  Outcome: Progressing     Problem: ABCDS Injury Assessment  Goal: Absence of physical injury  Outcome: Progressing

## 2025-04-23 NOTE — PROGRESS NOTES
Facility/Department: [facility] SURG UNIT  Occupational Therapy Initial Assessment    Name: Gloria Abbott  : 1950  MRN: [id number]  Date of Service: 2025    Discharge Recommendations:  Continue to assess pending progress, 24 hour supervision or assist, Subacute/Skilled Nursing Facility (slow paced SNF)          Patient Diagnosis(es): The encounter diagnosis was General weakness.  Past Medical History:  has a past medical history of ADHD (attention deficit hyperactivity disorder), Arthritis, Cancer ([facility]), Depression, Hyperlipidemia, Hypertension, Hypothyroid, MDD (major depressive disorder), NPH (normal pressure hydrocephalus) ([facility]), PONV (postoperative nausea and vomiting), Protruded lumbar disc, Stress, and Vitamin D deficiency.  Past Surgical History:  has a past surgical history that includes Foot surgery (Right, ); Ankle surgery (Right, ); sinus surgery (07/10/2012); hip surgery (Right, 2023); Total knee arthroplasty (Left, 2023); LASIK (Bilateral); Colonoscopy; Ventriculoperitoneal shunt (N/A, 03/15/2024); Hysterectomy; Sinus endoscopy (Bilateral, 2024); Sinus endoscopy (Left, 2024); Colonoscopy (N/A, 2024); Upper gastrointestinal endoscopy (N/A, 2024); and shunt removal (N/A, 3/17/2025).    Treatment Diagnosis: Decreased ADL/IADL performance d/t weakness      Assessment  Performance deficits / Impairments: Decreased functional mobility ;Decreased endurance;Decreased ADL status;Decreased balance;Decreased strength;Decreased safe awareness  Assessment: Pt is a 75 y/o F admitted for weakness to [facility] on 25, pt was at [facility] but d/c home yesterday and was unable to transfer I per ED note and was admitted to hospital same day. OT eval completed with PLOF I prior to issues with shunt. Pt verb pain in neck, use of neck pillow for support and comfort. Completed BUE exercises for 10 reps each, cues for technique to increase strength. Recommend slow paced SNF     Exercise Treatment: BUE exercises 10 reps, shoulder flex/ext, IR/ER, crossing midline to opposite shoulder, chest press, elbow flex/ext with cues for technique  Education Given To: Patient  Education Provided: Role of Therapy;Plan of Care;Precautions;Equipment;ADL Adaptive Strategies;Transfer Training;Fall Prevention Strategies;Mobility Training  Education Method: Demonstration;Verbal;Teach Back  Barriers to Learning: None  Education Outcome: Verbalized understanding;Continued education needed;Demonstrated understanding                          Goals  Short Term Goals  Time Frame for Short Term Goals: 1-2 weeks  Short Term Goal 1: complete UB/LB bathing with MOD I  Short Term Goal 2: complete UB/LB dressing with MOD I  Short Term Goal 3: complete toileting tasks with MOD I  Short Term Goal 4: complete functional mobility and transfers with MOD I  Short Term Goal 5: demo HEP with min vcs  Patient Goals   Patient goals : \"I want to get better\"      Therapy Time   Individual Concurrent Group Co-treatment   Time In 0935         Time Out 1000         Minutes 25                 Mary Tapia, S/OT

## 2025-04-23 NOTE — CARE COORDINATION
This LSW spoke with Paulina at [facility], patient is accepted and we are awaiting pre-cert, Paulina will let us know once she has pre-cert for patient to transfer to [facility].  Electronically signed by PERLA Garcia on 4/23/25 at 3:00 PM EDT

## 2025-04-23 NOTE — PROGRESS NOTES
Facility/Department: [facility] SURG UNIT  Physical Therapy Initial Assessment    Name: Gloria Abbott  : 1950  MRN: [id number]  Date of Service: 2025    Discharge Recommendations:  Continue to assess pending progress, Subacute/Skilled Nursing Facility (Possible placement due to family having difficulty caring for pt at home)          Patient Diagnosis(es): The encounter diagnosis was General weakness.  Past Medical History:  has a past medical history of ADHD (attention deficit hyperactivity disorder), Arthritis, Cancer (HCC), Depression, Hyperlipidemia, Hypertension, Hypothyroid, MDD (major depressive disorder), NPH (normal pressure hydrocephalus) ([facility]), PONV (postoperative nausea and vomiting), Protruded lumbar disc, Stress, and Vitamin D deficiency.  Past Surgical History:  has a past surgical history that includes Foot surgery (Right, ); Ankle surgery (Right, ); sinus surgery (07/10/2012); hip surgery (Right, 2023); Total knee arthroplasty (Left, 2023); LASIK (Bilateral); Colonoscopy; Ventriculoperitoneal shunt (N/A, 03/15/2024); Hysterectomy; Sinus endoscopy (Bilateral, 2024); Sinus endoscopy (Left, 2024); Colonoscopy (N/A, 2024); Upper gastrointestinal endoscopy (N/A, 2024); and shunt removal (N/A, 3/17/2025).    Assessment  Body Structures, Functions, Activity Limitations Requiring Skilled Therapeutic Intervention: Decreased functional mobility ;Decreased strength;Decreased endurance;Increased pain;Decreased balance  Assessment: Pt is 74 female who recently was DC from [facility] and went home with her son who lives with pt. Pt reports having a hard time getting in/out of a chair and son was struggling to help pt so brought back to hospital. PT completed an evaluation and assisted pt OOB and to the restroom and assisted with pericare and donning of her underward and pad. Pt then ambulated with WW and CGA and then was positioned in recliner. PT to work with pt

## 2025-04-23 NOTE — CARE COORDINATION
This LSW met with patient and let her know, Veena Sharma and left a message, have not heard back.  I asked if she would like to return to [place]?  Patient is agreeable to going back to [place].  PT/OT notes faxed to [place] to review, and a message was left for Paulina.  Electronically signed by PERLA Garcia on 4/23/25 at 1:43 PM EDT

## 2025-04-23 NOTE — CARE COORDINATION
This LSW spoke with patient and let her know we are awaiting on pre-cert for her to return to [place].   Electronically signed by PERLA Garcia on 4/23/25 at 3:28 PM EDT

## 2025-04-23 NOTE — PLAN OF CARE
Problem: Discharge Planning  Goal: Discharge to home or other facility with appropriate resources  4/23/2025 1716 by Jd Ugalde, RN  Outcome: Progressing  4/23/2025 1716 by Jd Ugalde, RN  Outcome: Progressing     Problem: Pain  Goal: Verbalizes/displays adequate comfort level or baseline comfort level  4/23/2025 1716 by Jd Ugalde, RN  Outcome: Progressing  4/23/2025 1716 by Jd Ugalde, RN  Outcome: Progressing     Problem: Safety - Adult  Goal: Free from fall injury  4/23/2025 1716 by Jd Ugalde, RN  Outcome: Progressing  4/23/2025 1716 by Jd Ugalde, RN  Outcome: Progressing     Problem: Skin/Tissue Integrity  Goal: Skin integrity remains intact  Description: 1.  Monitor for areas of redness and/or skin breakdown2.  Assess vascular access sites hourly3.  Every 4-6 hours minimum:  Change oxygen saturation probe site4.  Every 4-6 hours:  If on nasal continuous positive airway pressure, respiratory therapy assess nares and determine need for appliance change or resting period  4/23/2025 1716 by Jd Ugalde, RN  Outcome: Progressing  4/23/2025 1716 by Jd Ugalde, RN  Outcome: Progressing     Problem: ABCDS Injury Assessment  Goal: Absence of physical injury  4/23/2025 1716 by Jd Ugalde, RN  Outcome: Progressing  4/23/2025 1716 by Jd Ugalde, RN  Outcome: Progressing

## 2025-04-23 NOTE — CARE COORDINATION
I s/w Mela, [facility], and placed referral for admission.  #: [phone].  She will contact us once she has reviewed appropriateness.     10:48am:  Mela notified me that [facility] does not have a contract with [facility] [employer].` ` `

## 2025-04-23 NOTE — PROGRESS NOTES
Assumed care of pt from previous RN.  Pt ringing call light.  Assist x2 to stand.  Gait steady when up.  Pt had BM and pericare given.  Assisted back to chair.

## 2025-04-23 NOTE — H&P
System,Choice with adductor canal block preop with Nhan 4/20/23 at 10:30 am and lab work for 4/14/23 at 1:00 pm performed by Elliot Garcia MD at [facility] OR    UPPER GASTROINTESTINAL ENDOSCOPY N/A 11/19/2024    ESOPHAGOGASTRODUODENOSCOPY DIAGNOSTIC ONLY with biopsies and esophageal dilation performed by Amalia Lopez MD at [facility] CENTER    VENTRICULOPERITONEAL SHUNT N/A 03/15/2024    RIGHT  SHUNT CREATION AND INDICATED PROCEDURES, MICRODISSECTION, PREOPERATIVE STEROTACTIC PLANNING AND WORK performed by Sumit Shelton MD at [facility] [place]       Medications Prior to Admission:      Prior to Admission medications    Medication Sig Start Date End Date Taking? Authorizing Provider   citalopram (CELEXA) 20 MG tablet Take 1 tablet by mouth daily Indications: Depression Give with 10mg tab to =30mg 3/6/25  Yes Xiomara Pappas MD   pravastatin (PRAVACHOL) 20 MG tablet Take 1 tablet by mouth daily 2/14/25  Yes Xiomara Pappas MD   citalopram (CELEXA) 10 MG tablet TAKE 1 TABLET BY MOUTH ONCE DAILY WITH THE 20MG TABLET 1/13/25  Yes Xiomara Pappas MD   turmeric 500 MG CAPS Take 1 capsule by mouth daily   Yes Cassandra Alvarado MD   Krill Oil 300 MG CAPS Take 1 tablet by mouth daily   Yes Cassandra Alvarado MD   magnesium 30 MG tablet Take 1 tablet by mouth daily   Yes Cassandra Alvarado MD   Potassium Gluconate 2 MEQ TABS Take 1 tablet by mouth daily   Yes Cassandra Alvarado MD   zinc 50 MG TABS tablet Take 1 tablet by mouth daily   Yes Cassandra Alvarado MD   psyllium (KONSYL) 28.3 % PACK Take 1 packet by mouth daily   Yes ProviderCassandra MD   Bacillus Coagulans-Inulin (PROBIOTIC) 1-250 BILLION-MG CAPS Take 1 tablet by mouth daily   Yes Cassandra Alvarado MD   oxyBUTYnin (DITROPAN-XL) 10 MG extended release tablet Take 1 tablet by mouth daily 11/22/24  Yes Xiomara Pappas MD   tiZANidine (ZANAFLEX) 2 MG tablet Take 1 tablet by mouth 3 times daily as needed (muscle pain)  Patient taking differently:  Take 2 tablets by mouth 3 times daily as needed (muscle pain) 1/2 to 1 tablet by mouth at night 3/26/24  Yes Marce Giraldo PA   topiramate (TOPAMAX) 25 MG tablet Take 1 tablet by mouth daily 8/30/23  Yes Cassandra Alvarado MD   Multiple Vitamin (MULTIVITAMIN ADULT PO) Take 1 tablet by mouth Daily Indications: Nutritional Support   Yes Cassandra Alvarado MD   gabapentin (NEURONTIN) 100 MG capsule Take 1 capsule by mouth 3 times daily for 30 days. 3/28/25 4/27/25  Maurizio Hester MD   sertraline (ZOLOFT) 25 MG tablet Take 1 tablet by mouth daily    Cassandra Alvarado MD   levothyroxine (SYNTHROID) 50 MCG tablet Take 1 tablet by mouth daily 2/14/25   Xiomara Pappas MD   Boswellia-Glucosamine-Vit D (OSTEO BI-FLEX ONE PER DAY) TABS Take 1 tablet by mouth daily    Cassandra Alvarado MD   cetirizine (ZYRTEC) 10 MG tablet Take 1 tablet by mouth daily 11/22/24   Xiomara Pappas MD   Handicap Placard MISC by Does not apply route 3/30/2023-3/30/2025 3/30/23   Elliot Garcia MD       Allergies:  Patient has no known allergies.    Social History:      The patient currently lives home    TOBACCO:   reports that she has never smoked. She has never used smokeless tobacco.  ETOH:   reports no history of alcohol use.      Family History:       Reviewed in detail and negative for DM, CAD, Cancer, CVA. Positive as follows:        Problem Relation Age of Onset    Anemia Mother     Heart Disease Mother     Depression Mother     High Blood Pressure Father     Colon Cancer Neg Hx        REVIEW OF SYSTEMS:   Pertinent positives as noted in the HPI. All other systems reviewed and negative.    PHYSICAL EXAM:    BP (!) 177/74   Pulse 66   Temp 97.9 °F (36.6 °C) (Oral)   Resp 17   Ht 1.626 m (5' 4.02\")   Wt 80.6 kg (177 lb 12.8 oz)   LMP  (LMP Unknown)   SpO2 98%   BMI 30.50 kg/m²     General appearance:  No apparent distress, appears stated age and cooperative.  HEENT:  Normal cephalic, atraumatic without obvious

## 2025-04-24 VITALS
DIASTOLIC BLOOD PRESSURE: 76 MMHG | BODY MASS INDEX: 30.35 KG/M2 | HEART RATE: 72 BPM | OXYGEN SATURATION: 100 % | HEIGHT: 64 IN | TEMPERATURE: 97.9 F | WEIGHT: 177.8 LBS | SYSTOLIC BLOOD PRESSURE: 151 MMHG | RESPIRATION RATE: 16 BRPM

## 2025-04-24 PROCEDURE — 6370000000 HC RX 637 (ALT 250 FOR IP): Performed by: INTERNAL MEDICINE

## 2025-04-24 PROCEDURE — 6360000002 HC RX W HCPCS: Performed by: INTERNAL MEDICINE

## 2025-04-24 PROCEDURE — 97535 SELF CARE MNGMENT TRAINING: CPT

## 2025-04-24 PROCEDURE — 97530 THERAPEUTIC ACTIVITIES: CPT

## 2025-04-24 PROCEDURE — 2500000003 HC RX 250 WO HCPCS: Performed by: INTERNAL MEDICINE

## 2025-04-24 PROCEDURE — G0378 HOSPITAL OBSERVATION PER HR: HCPCS

## 2025-04-24 PROCEDURE — 97110 THERAPEUTIC EXERCISES: CPT

## 2025-04-24 PROCEDURE — 96372 THER/PROPH/DIAG INJ SC/IM: CPT

## 2025-04-24 PROCEDURE — 97116 GAIT TRAINING THERAPY: CPT

## 2025-04-24 RX ORDER — AMLODIPINE BESYLATE 2.5 MG/1
2.5 TABLET ORAL DAILY
Qty: 30 TABLET | Refills: 3 | Status: SHIPPED | OUTPATIENT
Start: 2025-04-24

## 2025-04-24 RX ADMIN — Medication 1 CAPSULE: at 08:20

## 2025-04-24 RX ADMIN — TIZANIDINE 4 MG: 2 TABLET ORAL at 14:53

## 2025-04-24 RX ADMIN — AMLODIPINE BESYLATE 2.5 MG: 2.5 TABLET ORAL at 08:20

## 2025-04-24 RX ADMIN — GABAPENTIN 100 MG: 100 CAPSULE ORAL at 14:49

## 2025-04-24 RX ADMIN — Medication 10 ML: at 04:02

## 2025-04-24 RX ADMIN — ENOXAPARIN SODIUM 40 MG: 100 INJECTION SUBCUTANEOUS at 08:21

## 2025-04-24 RX ADMIN — CITALOPRAM HYDROBROMIDE 30 MG: 20 TABLET ORAL at 08:20

## 2025-04-24 RX ADMIN — AMOXICILLIN AND CLAVULANATE POTASSIUM 1 TABLET: 875; 125 TABLET, FILM COATED ORAL at 08:20

## 2025-04-24 RX ADMIN — GABAPENTIN 100 MG: 100 CAPSULE ORAL at 08:20

## 2025-04-24 RX ADMIN — ACETAMINOPHEN 650 MG: 325 TABLET ORAL at 08:47

## 2025-04-24 RX ADMIN — LEVOTHYROXINE SODIUM 50 MCG: 0.05 TABLET ORAL at 05:36

## 2025-04-24 RX ADMIN — Medication 10 ML: at 08:21

## 2025-04-24 RX ADMIN — TIZANIDINE 4 MG: 2 TABLET ORAL at 08:47

## 2025-04-24 RX ADMIN — POLYETHYLENE GLYCOL 3350 17 G: 17 POWDER, FOR SOLUTION ORAL at 08:20

## 2025-04-24 RX ADMIN — OXYBUTYNIN CHLORIDE 10 MG: 5 TABLET, EXTENDED RELEASE ORAL at 08:20

## 2025-04-24 RX ADMIN — PRAVASTATIN SODIUM 20 MG: 10 TABLET ORAL at 08:20

## 2025-04-24 RX ADMIN — TOPIRAMATE 25 MG: 25 TABLET, FILM COATED ORAL at 08:20

## 2025-04-24 ASSESSMENT — PAIN SCALES - GENERAL
PAINLEVEL_OUTOF10: 7
PAINLEVEL_OUTOF10: 4
PAINLEVEL_OUTOF10: 8

## 2025-04-24 ASSESSMENT — PAIN DESCRIPTION - LOCATION
LOCATION: NECK
LOCATION: NECK

## 2025-04-24 NOTE — PROGRESS NOTES
Physical Therapy  Facility/Department: [facility] MED SURG UNIT  Daily Treatment Note  NAME: Gloria Abbott  : 1950  MRN: [id number]    Date of Service: 2025    Discharge Recommendations:  (P) Continue to assess pending progress, Subacute/Skilled Nursing Facility, Home with Home health PT        Patient Diagnosis(es): The encounter diagnosis was General weakness.    Assessment  Assessment: (P) Pt demonstarted 3 gait trails, no LOB, requiring CGA. VC's needed continally for forwrad gaze and to take bigger steps d/t pt tending to look down while walking and shuffling feet. Pt with less neck pain this vist. Once back to room pt performed well all seated Ther Ex given with G follow through of instructions for proper form. Pt required help to get to/ from RR after Ther Ex, then brought to bed to end session requiring modA to get B LE's and get straightened in bed. Pt hoping to go back to [facility] in [place] after hospital stay.  Activity Tolerance: (P) Patient tolerated treatment well    Plan  Physical Therapy Plan  General Plan: (P) 5-7 times per week  Current Treatment Recommendations: (P) Strengthening;Balance training;Functional mobility training;Transfer training;Gait training    Restrictions  Restrictions/Precautions  Restrictions/Precautions: Fall Risk  Activity Level: Up with Assist     Subjective   Subjective  Subjective: (P) Pt sitting up in recliner and agreeable to therapy.  Pain: (P) C/o neck pain 6/10 with rotation    Objective  Vitals   Bed mobility:   Sit to Supine: modA to get B LE's into bed and straighten upper body  Balance  Sitting: (P) With support;Intact  Standing: (P) Impaired;With support  Transfer Training  Transfer Training: (P) Yes  Overall Level of Assistance: (P) Contact guard assistance  Interventions: (P) Safety awareness training;Verbal cues  Sit to Stand: (P) Minimal assistance;Contact guard assistance  Stand to Sit: (P) Contact guard assistance  Stand Pivot Transfers:  needed    AM-PAC - Mobility              Therapy Time   Individual Concurrent Group Co-treatment   Time In (P) 0145         Time Out (P) 0230         Minutes (P) 45                 Sayra Kilpatrick, PTA

## 2025-04-24 NOTE — DISCHARGE INSTR - COC
Continuity of Care Form    Patient Name: Gloria Abbott   :  1950  MRN:  [id number]    Admit date:  2025  Discharge date:  2025    Code Status Order: Full Code   Advance Directives:     Admitting Physician:  Maurizio Hester MD  PCP: Xiomara Pappas MD    Discharging Nurse: Krys VO  Discharging Hospital Unit/Room#: 0202/0202-01  Discharging Unit Phone Number: [phone]    Emergency Contact:   Extended Emergency Contact Information  Primary Emergency Contact: Harshad Abbott  Address: [address] [place] [address] [place]  Mobile Phone: [phone]  Relation: Child  Secondary Emergency Contact: Sabine Vazquez  [address] [place] [address] [facility]  Home Phone: [phone]  Mobile Phone: [phone]  Relation: Child    Past Surgical History:  Past Surgical History:   Procedure Laterality Date    ANKLE SURGERY Right     COLONOSCOPY      COLONOSCOPY N/A 2024    COLONOSCOPY DIAGNOSTIC performed by Amalia Lopez MD at [facility] CENTER    FOOT SURGERY Right 2001    HIP SURGERY Right 2023    RIGHT HIP PINNING AND LEFT KNEE STERIOD INJECTION performed by Elliot Garcia MD at [facility] OR    HYSTERECTOMY (CERVIX STATUS UNKNOWN)      LASIK Bilateral     SHUNT REMOVAL N/A 3/17/2025    REMOVAL VENTRICULAR PERITONEAL SHUNT room 270 performed by Marisela Larson MD at [facility] OR    SINUS ENDOSCOPY Bilateral 2024    ENDOSCOPIC MAXILLARY ANTROSTOMIES 30 MIN performed by Ryan Zepeda MD at [facility] OR    SINUS ENDOSCOPY Left 2024    REVISION MAXILLARY ANTROSTOMY (LEFT)  RECENT SURGERY performed by Ryan Zepeda MD at [facility] OR    SINUS SURGERY  07/10/2012    TOTAL KNEE ARTHROPLASTY Left 2023    Left total knee arthroplasty,[place] [employer] Total Knee System,Choice with adductor canal block preop with Nhan 23 at 10:30 am and lab work for 23 at 1:00 pm performed by Elliot Garcia MD at [facility] OR    [facility]  GASTROINTESTINAL ENDOSCOPY N/A 11/19/2024    ESOPHAGOGASTRODUODENOSCOPY DIAGNOSTIC ONLY with biopsies and esophageal dilation performed by Amalia Lopez MD at [facility]STRO C[facility]    VENTRICULOPERITONEAL SHUNT N/A 03/15/2024    RIGHT  SHUNT CREATION AND INDICATED PROCEDURES, MICRODISSECTION, PREOPERATIVE STEROTACTIC PLANNING AND WORK performed by Sumit Shelton MD at [facility] OR       Immunization History:   Immunization History   Administered Date(s) Administered    COVID-19, PFIZER PURPLE top, DILUTE for use, (age 12 y+), 30mcg/0.3mL 03/05/2021, 03/26/2021    Influenza Virus Vaccine 12/06/2013, 11/09/2017    Influenza Whole 11/16/2007, 11/16/2007    Influenza, FLUAD, (age 65 y+), IM, Trivalent PF, 0.5mL 11/22/2024    Influenza, FLUZONE High Dose (age 65 y+), IM, Quadv, 0.7mL 11/04/2020, 11/04/2020, 10/27/2021, 11/03/2022    Influenza, FLUZONE High Dose, (age 65 y+), IM, Trivalent PF, 0.5mL 09/22/2015, 09/22/2015, 09/17/2016, 09/17/2016, 09/20/2017, 09/20/2017, 10/19/2018, 10/19/2018, 10/22/2019, 10/22/2019, 10/27/2021    Pneumococcal, PCV-13, PREVNAR 13, (age 6w+), IM, 0.5mL 09/17/2016, 09/17/2016    Pneumococcal, PPSV23, PNEUMOVAX 23, (age 2y+), SC/IM, 0.5mL 06/10/2014, 09/20/2017, 09/20/2017, 11/01/2017       Active Problems:  Patient Active Problem List   Diagnosis Code    Hyperlipidemia E78.5    Depression F32.A    Stress F43.9    Vitamin D deficiency E55.9    History of endometrial cancer Z85.42    Hypothyroid E03.9    Maisonneuve fracture of right fibula S82.861A    Major depressive disorder, recurrent episode, moderate (HCC) F33.1    Osteopenia M85.80    Other joint derangement, not elsewhere classified, ankle and foot M24.873, M24.876    Sprain of ankle, deltoid ligament S93.429A    History of uterine cancer Z85.42    COVID-19 U07.1    Osteoarthritis of left knee M17.12    Closed right hip fracture, initial encounter ([facility]) S72.001A    Subcapital fracture of femur, right, closed, initial encounter ([facility])

## 2025-04-24 NOTE — PROGRESS NOTES
Occupational Therapy  Facility/Department: [facility] MED SURG UNIT  Daily Treatment Note  NAME: Gloria Abbott  : 1950  MRN: [id number]    Date of Service: 2025    Discharge Recommendations:  Continue to assess pending progress, 24 hour supervision or assist, Subacute/Skilled Nursing Facility (slow paced SNF)         Patient Diagnosis(es): The encounter diagnosis was General weakness.     Assessment   Assessment: Pt is cooperative, expressed understanding of arm chair push ups to ease STS complained of sore spot in mid section, after use of gait belt yesterday, place higher if needed,  good ADL performance  Activity Tolerance: Patient tolerated treatment well  Discharge Recommendations: Continue to assess pending progress;24 hour supervision or assist;Subacute/Skilled Nursing Facility (slow paced SNF)     Plan  Occupational Therapy Plan  Times Per Week: 4-7  Times Per Day: Once a day  Current Treatment Recommendations: Strengthening;Balance training;Functional mobility training;Endurance training;Safety education & training;Patient/Caregiver education & training;Equipment evaluation, education, & procurement;Self-Care / ADL    Restrictions:  Precautions: Fall Risk  Activity Level: Up with Assist    Subjective  Subjective  Subjective: feeling Ok agreeable to getting up and dressed  Orientation  Overall Orientation Status: Within Functional Limits  Orientation Level: Oriented X4          Objective  Balance  Sitting: With support;Intact  Standing: Impaired;With support  Transfer Training  Overall Level of Assistance: Minimal assistance  Interventions: Safety awareness training;Verbal cues  Sit to Stand: Minimal assistance  Stand to Sit: Minimal assistance     ADL  Grooming: Stand by assistance;Setup (Seated level only)  UE Bathing: Stand by assistance  UE Bathing Skilled Clinical Factors: seated at sink  LE Bathing: Contact guard assistance;Minimal assistance  LE Bathing Skilled Clinical Factors: stand as needed

## 2025-04-24 NOTE — PROGRESS NOTES
Facility/Department: [facility] MED SURG UNIT  Daily Treatment Note  NAME: Gloria Abbott  : 1950  MRN: [id number]    Date of Service: 2025    Discharge Recommendations:  Continue to assess pending progress, Subacute/Skilled Nursing Facility, Home with Home health PT        Patient Diagnosis(es): The encounter diagnosis was General weakness.    Assessment  Assessment: Pt ambulated this date inc distances with slow lili and tendency to look down. VC's throughout gait for more fwd gaze but unable to maintain d/t neck pain/fatigue. Donned KT tape I strip to C7 100% tension to alleviate neck pain and facilitate inc posture. Educated on removing if itchy or max 5 days (25 remove). Pt performed LE ther ex x 10 ea to inc functional str and endurance. Educated pt when reading to prop the book higher to facilitate inc posture and dec neck pain. Recommend SNF or home with inc assist.  Activity Tolerance: Patient tolerated treatment well    Plan  Physical Therapy Plan  General Plan: 5-7 times per week  Current Treatment Recommendations: Strengthening;Balance training;Functional mobility training;Transfer training;Gait training    Restrictions  Restrictions/Precautions  Restrictions/Precautions: Fall Risk  Activity Level: Up with Assist     Subjective   Subjective  Subjective: Pt sitting up in recliner and agreeable to therapy.  Pain: C/o neck pain 8/10 with rotation    Objective  Vitals   Pt ambulated 45' x 2 with standing RB between trials using WW and CGAx1. Focused on fwd gaze with difficulty maintaining.   Balance  Sitting: With support;Intact  Standing: Impaired;With support  Transfer Training  Transfer Training: Yes  Overall Level of Assistance: Minimal assistance  Interventions: Safety awareness training;Verbal cues  Sit to Stand: Minimal assistance  Stand to Sit: Contact guard assistance     PT Exercises  Exercise Treatment: seated LAQ x 10, march x 10, seated hip abduction x 10, pillow squeeze x 10

## 2025-04-24 NOTE — DISCHARGE SUMMARY
Discharge Summary    Patient:  Gloria Abbott  YOB: 1950    MRN: [id number]   Acct: [id number]    Primary Care Physician: Xiomara Pappas MD    Admit date:  4/22/2025    Discharge date:   04/24/25      Discharge Diagnoses:   Weakness  Principal Problem:    Weakness  Resolved Problems:    * No resolved hospital problems. *      Admitted for: (HPI)above    Hospital Course: patient was admitted due to weakness, not able to take care herself at home. After completing her acute admission will be DC to [facility] for further management     Consultants:  PT    Discharge Medications:       Medication List        START taking these medications      amLODIPine 2.5 MG tablet  Commonly known as: NORVASC  Take 1 tablet by mouth daily     amoxicillin-clavulanate 875-125 MG per tablet  Commonly known as: AUGMENTIN  Take 1 tablet by mouth every 12 hours for 10 days            CONTINUE taking these medications      cetirizine 10 MG tablet  Commonly known as: ZYRTEC  Take 1 tablet by mouth daily     * citalopram 10 MG tablet  Commonly known as: CELEXA  TAKE 1 TABLET BY MOUTH ONCE DAILY WITH THE 20MG TABLET     * citalopram 20 MG tablet  Commonly known as: CELEXA  Take 1 tablet by mouth daily Indications: Depression Give with 10mg tab to =30mg     gabapentin 100 MG capsule  Commonly known as: NEURONTIN  Take 1 capsule by mouth 3 times daily for 30 days.     Handicap Placard Misc  by Does not apply route 3/30/2023-3/30/2025     Krill Oil 300 MG Caps     levothyroxine 50 MCG tablet  Commonly known as: SYNTHROID  Take 1 tablet by mouth daily     magnesium 30 MG tablet     MULTIVITAMIN ADULT PO     Osteo Bi-Flex One Per Day Tabs     oxyBUTYnin 10 MG extended release tablet  Commonly known as: DITROPAN-XL  Take 1 tablet by mouth daily     Potassium Gluconate 2 MEQ Tabs     pravastatin 20 MG tablet  Commonly known as: PRAVACHOL  Take 1 tablet by mouth daily     Probiotic 1-250 BILLION-MG Caps     psyllium 28.3 %

## 2025-04-24 NOTE — PROGRESS NOTES
Reports called to [facility]. Patient son her to  patient for transfer to [place]. Patient agreeable. IV removed. Patient discharged from unit via wheelchair with all documented belongings to sons private vehicle.

## 2025-04-24 NOTE — CARE COORDINATION
This THERESAW spoke with Keli at [facility], they are awaiting pre-cert still.  Patient has a bed assignment 125, Bed A, and Dr. Espinoza will be following her.  Keli will let me know as soon as they get pre-cert.  Electronically signed by PERLA Garcia on 4/24/25 at 12:50 PM EDT    [facility] received pre-cert authorization, patient's son called, he will transport patient to [facility] this evening.  Electronically signed by PERLA Garcia on 4/24/25 at 4:22 PM EDT

## 2025-04-24 NOTE — PLAN OF CARE
Problem: Discharge Planning  Goal: Discharge to home or other facility with appropriate resources  4/24/2025 0120 by Salome Dunbar, RN  Outcome: Progressing

## 2025-04-25 ENCOUNTER — OFFICE VISIT (OUTPATIENT)
Dept: GERIATRIC MEDICINE | Age: 75
End: 2025-04-25

## 2025-04-25 DIAGNOSIS — R26.81 UNSTEADINESS: ICD-10-CM

## 2025-04-25 DIAGNOSIS — T85.09XS: ICD-10-CM

## 2025-04-25 DIAGNOSIS — E03.9 HYPOTHYROIDISM, UNSPECIFIED TYPE: ICD-10-CM

## 2025-04-25 DIAGNOSIS — G91.2 NORMAL PRESSURE HYDROCEPHALUS (HCC): Primary | ICD-10-CM

## 2025-04-25 DIAGNOSIS — R53.1 WEAKNESS: ICD-10-CM

## 2025-04-25 DIAGNOSIS — M54.2 NECK PAIN: ICD-10-CM

## 2025-04-25 RX ORDER — DOCUSATE SODIUM 100 MG/1
100 CAPSULE, LIQUID FILLED ORAL DAILY PRN
COMMUNITY
Start: 2025-03-29

## 2025-04-25 RX ORDER — POLYETHYLENE GLYCOL 3350 17 G/17G
17 POWDER, FOR SOLUTION ORAL DAILY PRN
COMMUNITY
Start: 2025-03-29

## 2025-04-28 ENCOUNTER — TELEPHONE (OUTPATIENT)
Age: 75
End: 2025-04-28

## 2025-04-28 DIAGNOSIS — G91.2 NORMAL PRESSURE HYDROCEPHALUS (HCC): ICD-10-CM

## 2025-04-28 DIAGNOSIS — T85.730D INFECTION OF VENTRICULOPERITONEAL SHUNT, SUBSEQUENT ENCOUNTER: Primary | ICD-10-CM

## 2025-04-28 NOTE — TELEPHONE ENCOUNTER
Orders have been placed for external therapy referral.  Can we please let the patient know.    Alfonzo Turner PA-C

## 2025-04-28 NOTE — TELEPHONE ENCOUNTER
Forwarding to Alfonzo Turner PA-C for review:     Patient last seen 04/10/2025. 3 month follow up with imaging was plan at time of last visit. No orders placed for physical therapy at this time. Is patient able to begin physical therapy with [facility]?  Orders will need placed if yes. Please advise.

## 2025-04-28 NOTE — TELEPHONE ENCOUNTER
[facility] IS CALLING TO ASK IF PATIENT IS ABLE TO DO PHYSICAL THERAPY WITH THEM.    PLEASE CONTACT DIRECTLY AT   [phone]

## 2025-04-30 ENCOUNTER — OFFICE VISIT (OUTPATIENT)
Dept: GERIATRIC MEDICINE | Age: 75
End: 2025-04-30
Payer: COMMERCIAL

## 2025-04-30 DIAGNOSIS — R53.1 WEAKNESS: Primary | ICD-10-CM

## 2025-04-30 DIAGNOSIS — G91.2 NORMAL PRESSURE HYDROCEPHALUS (HCC): ICD-10-CM

## 2025-04-30 DIAGNOSIS — T85.09XS: ICD-10-CM

## 2025-04-30 DIAGNOSIS — E03.9 HYPOTHYROIDISM, UNSPECIFIED TYPE: ICD-10-CM

## 2025-04-30 PROCEDURE — 1123F ACP DISCUSS/DSCN MKR DOCD: CPT | Performed by: NURSE PRACTITIONER

## 2025-04-30 PROCEDURE — 99310 SBSQ NF CARE HIGH MDM 45: CPT | Performed by: NURSE PRACTITIONER

## 2025-05-01 ASSESSMENT — ENCOUNTER SYMPTOMS
COUGH: 0
CONSTIPATION: 1
BACK PAIN: 1

## 2025-05-01 NOTE — PROGRESS NOTES
Patient Name: Gloria Abbott    YOB: 1950  Medical Record Number: [id number]        History of Present Illness:  This 74 year old woman admitted here after recent inpatient stay.  She had previous hospitalization earlier in the month after damage to her  shut mechanically.  Previously she had fallen with resultant cerebral edema necessitating shunt placement by NSG.  She had maintained the shunt for approx 6 months. Patient was seen by NSG during the prior hospitalzation for removal of hardware and stabilization.  She had been started on antibiotics and recevied local skin care to scalp.    Upon discharge to home patient remained weak and family were unable to maintain her safely due to her needs. She was readmitted and subsequently transferred here for course of therapy and possible long term placement if no significant improvement.      Today she is weak with intermittent headaches, no seizure activity.    Review of Systems   Constitutional:  Positive for activity change, appetite change and fatigue.   HENT:  Negative for congestion.    Respiratory:  Negative for cough.    Cardiovascular:  Negative for chest pain and palpitations.   Gastrointestinal:  Positive for constipation.   Musculoskeletal:  Positive for back pain, myalgias and neck pain.   Skin:  Positive for pallor.   Neurological:  Positive for weakness, light-headedness and headaches.   All other systems reviewed and are negative.      Review of Systems: All 14 review of systems negative other than as stated above    Social History     Tobacco Use    Smoking status: Never    Smokeless tobacco: Never   Vaping Use    Vaping status: Never Used   Substance Use Topics    Alcohol use: No    Drug use: Never         Past Medical History:   Diagnosis Date    ADHD (attention deficit hyperactivity disorder)     Arthritis     Cancer (HCC)     endometrial and uterine    Depression     Hyperlipidemia     Hypertension     Hypothyroid     MDD (major

## 2025-05-02 ENCOUNTER — HOSPITAL ENCOUNTER (EMERGENCY)
Facility: HOSPITAL | Age: 75
Discharge: HOME | End: 2025-05-02
Attending: EMERGENCY MEDICINE
Payer: MEDICARE

## 2025-05-02 ENCOUNTER — APPOINTMENT (OUTPATIENT)
Dept: RADIOLOGY | Facility: HOSPITAL | Age: 75
End: 2025-05-02
Payer: MEDICARE

## 2025-05-02 VITALS
RESPIRATION RATE: 16 BRPM | BODY MASS INDEX: 29.37 KG/M2 | HEIGHT: 64 IN | HEART RATE: 77 BPM | TEMPERATURE: 98.1 F | OXYGEN SATURATION: 99 % | SYSTOLIC BLOOD PRESSURE: 129 MMHG | DIASTOLIC BLOOD PRESSURE: 65 MMHG | WEIGHT: 172 LBS

## 2025-05-02 DIAGNOSIS — G91.2 NORMAL PRESSURE HYDROCEPHALUS (MULTI): Primary | ICD-10-CM

## 2025-05-02 DIAGNOSIS — E87.6 HYPOKALEMIA: ICD-10-CM

## 2025-05-02 DIAGNOSIS — N39.0 ACUTE UTI: ICD-10-CM

## 2025-05-02 LAB
ALBUMIN SERPL BCP-MCNC: 3.5 G/DL (ref 3.4–5)
ALP SERPL-CCNC: 85 U/L (ref 33–136)
ALT SERPL W P-5'-P-CCNC: 23 U/L (ref 7–45)
ANION GAP BLDV CALCULATED.4IONS-SCNC: 13 MMOL/L (ref 10–25)
ANION GAP SERPL CALC-SCNC: 15 MMOL/L (ref 10–20)
APPEARANCE UR: CLEAR
AST SERPL W P-5'-P-CCNC: 22 U/L (ref 9–39)
BACTERIA #/AREA URNS AUTO: ABNORMAL /HPF
BASE EXCESS BLDV CALC-SCNC: -2.9 MMOL/L (ref -2–3)
BASOPHILS # BLD MANUAL: 0 X10*3/UL (ref 0–0.1)
BASOPHILS NFR BLD MANUAL: 0 %
BILIRUB SERPL-MCNC: 0.4 MG/DL (ref 0–1.2)
BILIRUB UR STRIP.AUTO-MCNC: NEGATIVE MG/DL
BODY TEMPERATURE: ABNORMAL
BUN SERPL-MCNC: 20 MG/DL (ref 6–23)
CA-I BLDV-SCNC: 1.19 MMOL/L (ref 1.1–1.33)
CALCIUM SERPL-MCNC: 8.6 MG/DL (ref 8.6–10.3)
CARDIAC TROPONIN I PNL SERPL HS: 6 NG/L (ref 0–13)
CHLORIDE BLDV-SCNC: 100 MMOL/L (ref 98–107)
CHLORIDE SERPL-SCNC: 99 MMOL/L (ref 98–107)
CO2 SERPL-SCNC: 20 MMOL/L (ref 21–32)
COLOR UR: YELLOW
CREAT SERPL-MCNC: 0.94 MG/DL (ref 0.5–1.05)
EGFRCR SERPLBLD CKD-EPI 2021: 63 ML/MIN/1.73M*2
EOSINOPHIL # BLD MANUAL: 0 X10*3/UL (ref 0–0.4)
EOSINOPHIL NFR BLD MANUAL: 0 %
ERYTHROCYTE [DISTWIDTH] IN BLOOD BY AUTOMATED COUNT: 13.5 % (ref 11.5–14.5)
GLUCOSE BLDV-MCNC: 144 MG/DL (ref 74–99)
GLUCOSE SERPL-MCNC: 136 MG/DL (ref 74–99)
GLUCOSE UR STRIP.AUTO-MCNC: NORMAL MG/DL
HCO3 BLDV-SCNC: 20.6 MMOL/L (ref 22–26)
HCT VFR BLD AUTO: 35.1 % (ref 36–46)
HCT VFR BLD EST: 36 % (ref 36–46)
HGB BLD-MCNC: 11.6 G/DL (ref 12–16)
HGB BLDV-MCNC: 11.9 G/DL (ref 12–16)
HOLD SPECIMEN: 293
HYALINE CASTS #/AREA URNS AUTO: ABNORMAL /LPF
IMM GRANULOCYTES # BLD AUTO: 0.45 X10*3/UL (ref 0–0.5)
IMM GRANULOCYTES NFR BLD AUTO: 3.1 % (ref 0–0.9)
INHALED O2 CONCENTRATION: 21 %
KETONES UR STRIP.AUTO-MCNC: NEGATIVE MG/DL
LACTATE BLDV-SCNC: 1.4 MMOL/L (ref 0.4–2)
LACTATE SERPL-SCNC: 1.2 MMOL/L (ref 0.4–2)
LEUKOCYTE ESTERASE UR QL STRIP.AUTO: ABNORMAL
LIPASE SERPL-CCNC: 20 U/L (ref 9–82)
LYMPHOCYTES # BLD MANUAL: 1.45 X10*3/UL (ref 0.8–3)
LYMPHOCYTES NFR BLD MANUAL: 10 %
MCH RBC QN AUTO: 29.3 PG (ref 26–34)
MCHC RBC AUTO-ENTMCNC: 33 G/DL (ref 32–36)
MCV RBC AUTO: 89 FL (ref 80–100)
MONOCYTES # BLD MANUAL: 1.89 X10*3/UL (ref 0.05–0.8)
MONOCYTES NFR BLD MANUAL: 13 %
MUCOUS THREADS #/AREA URNS AUTO: ABNORMAL /LPF
NEUTROPHILS # BLD MANUAL: 10.74 X10*3/UL (ref 1.6–5.5)
NEUTS BAND # BLD MANUAL: 1.31 X10*3/UL (ref 0–0.5)
NEUTS BAND NFR BLD MANUAL: 9 %
NEUTS SEG # BLD MANUAL: 9.43 X10*3/UL (ref 1.6–5)
NEUTS SEG NFR BLD MANUAL: 65 %
NITRITE UR QL STRIP.AUTO: NEGATIVE
NRBC BLD-RTO: 0 /100 WBCS (ref 0–0)
OXYHGB MFR BLDV: 92.3 % (ref 45–75)
PCO2 BLDV: 31 MM HG (ref 41–51)
PH BLDV: 7.43 PH (ref 7.33–7.43)
PH UR STRIP.AUTO: 6 [PH]
PLATELET # BLD AUTO: 367 X10*3/UL (ref 150–450)
PO2 BLDV: 66 MM HG (ref 35–45)
POTASSIUM BLDV-SCNC: 3.1 MMOL/L (ref 3.5–5.3)
POTASSIUM SERPL-SCNC: 3 MMOL/L (ref 3.5–5.3)
PROT SERPL-MCNC: 6.5 G/DL (ref 6.4–8.2)
PROT UR STRIP.AUTO-MCNC: ABNORMAL MG/DL
RBC # BLD AUTO: 3.96 X10*6/UL (ref 4–5.2)
RBC # UR STRIP.AUTO: NEGATIVE MG/DL
RBC #/AREA URNS AUTO: ABNORMAL /HPF
RBC MORPH BLD: ABNORMAL
SAO2 % BLDV: 94 % (ref 45–75)
SODIUM BLDV-SCNC: 130 MMOL/L (ref 136–145)
SODIUM SERPL-SCNC: 131 MMOL/L (ref 136–145)
SP GR UR STRIP.AUTO: 1.03
SQUAMOUS #/AREA URNS AUTO: ABNORMAL /HPF
TOTAL CELLS COUNTED BLD: 100
UROBILINOGEN UR STRIP.AUTO-MCNC: ABNORMAL MG/DL
VARIANT LYMPHS # BLD MANUAL: 0.44 X10*3/UL (ref 0–0.3)
VARIANT LYMPHS NFR BLD: 3 %
WBC # BLD AUTO: 14.5 X10*3/UL (ref 4.4–11.3)
WBC #/AREA URNS AUTO: ABNORMAL /HPF

## 2025-05-02 PROCEDURE — 83605 ASSAY OF LACTIC ACID: CPT | Performed by: EMERGENCY MEDICINE

## 2025-05-02 PROCEDURE — 80053 COMPREHEN METABOLIC PANEL: CPT | Performed by: EMERGENCY MEDICINE

## 2025-05-02 PROCEDURE — 83690 ASSAY OF LIPASE: CPT | Performed by: EMERGENCY MEDICINE

## 2025-05-02 PROCEDURE — 87086 URINE CULTURE/COLONY COUNT: CPT | Mod: ELYLAB | Performed by: EMERGENCY MEDICINE

## 2025-05-02 PROCEDURE — 81001 URINALYSIS AUTO W/SCOPE: CPT | Performed by: EMERGENCY MEDICINE

## 2025-05-02 PROCEDURE — 36415 COLL VENOUS BLD VENIPUNCTURE: CPT | Performed by: EMERGENCY MEDICINE

## 2025-05-02 PROCEDURE — 84132 ASSAY OF SERUM POTASSIUM: CPT | Performed by: EMERGENCY MEDICINE

## 2025-05-02 PROCEDURE — 2500000002 HC RX 250 W HCPCS SELF ADMINISTERED DRUGS (ALT 637 FOR MEDICARE OP, ALT 636 FOR OP/ED): Performed by: EMERGENCY MEDICINE

## 2025-05-02 PROCEDURE — 2500000004 HC RX 250 GENERAL PHARMACY W/ HCPCS (ALT 636 FOR OP/ED): Mod: JZ | Performed by: EMERGENCY MEDICINE

## 2025-05-02 PROCEDURE — 85007 BL SMEAR W/DIFF WBC COUNT: CPT | Performed by: EMERGENCY MEDICINE

## 2025-05-02 PROCEDURE — 84484 ASSAY OF TROPONIN QUANT: CPT | Performed by: EMERGENCY MEDICINE

## 2025-05-02 PROCEDURE — P9612 CATHETERIZE FOR URINE SPEC: HCPCS

## 2025-05-02 PROCEDURE — 85027 COMPLETE CBC AUTOMATED: CPT | Performed by: EMERGENCY MEDICINE

## 2025-05-02 PROCEDURE — 70450 CT HEAD/BRAIN W/O DYE: CPT

## 2025-05-02 PROCEDURE — 99284 EMERGENCY DEPT VISIT MOD MDM: CPT | Mod: 25 | Performed by: EMERGENCY MEDICINE

## 2025-05-02 PROCEDURE — 87040 BLOOD CULTURE FOR BACTERIA: CPT | Mod: ELYLAB | Performed by: EMERGENCY MEDICINE

## 2025-05-02 PROCEDURE — 70450 CT HEAD/BRAIN W/O DYE: CPT | Performed by: RADIOLOGY

## 2025-05-02 PROCEDURE — 82330 ASSAY OF CALCIUM: CPT | Performed by: EMERGENCY MEDICINE

## 2025-05-02 PROCEDURE — 96365 THER/PROPH/DIAG IV INF INIT: CPT

## 2025-05-02 RX ORDER — CEPHALEXIN 500 MG/1
500 CAPSULE ORAL 4 TIMES DAILY
Qty: 28 CAPSULE | Refills: 0 | Status: SHIPPED | OUTPATIENT
Start: 2025-05-02 | End: 2025-05-09

## 2025-05-02 RX ORDER — POTASSIUM CHLORIDE 20 MEQ/1
40 TABLET, EXTENDED RELEASE ORAL ONCE
Status: COMPLETED | OUTPATIENT
Start: 2025-05-02 | End: 2025-05-02

## 2025-05-02 RX ORDER — CEFTRIAXONE 1 G/50ML
1 INJECTION, SOLUTION INTRAVENOUS ONCE
Status: COMPLETED | OUTPATIENT
Start: 2025-05-02 | End: 2025-05-02

## 2025-05-02 RX ADMIN — POTASSIUM CHLORIDE 40 MEQ: 1500 TABLET, EXTENDED RELEASE ORAL at 15:28

## 2025-05-02 RX ADMIN — CEFTRIAXONE 1 G: 1 INJECTION, SOLUTION INTRAVENOUS at 15:24

## 2025-05-02 ASSESSMENT — PAIN - FUNCTIONAL ASSESSMENT: PAIN_FUNCTIONAL_ASSESSMENT: 0-10

## 2025-05-02 ASSESSMENT — PAIN SCALES - GENERAL: PAINLEVEL_OUTOF10: 0 - NO PAIN

## 2025-05-02 NOTE — ED PROVIDER NOTES
HPI   Chief Complaint   Patient presents with    Weakness, Gen    Altered Mental Status     Pt arrived from [facility] for increased weakness and confusion over past few days. A&Ox2.        HPI: 74-year-old female sent over from the nursing home for increased weakness.  Patient has a history of normal pressure hydrocephalus.  She previously had a  shunt however it was recently taken out for infection.  This was at an outside facility.  She was discharged from that facility to the nursing home as she was not able to care for herself.  In looking at the old notes it appears that the patient is going to have it replaced when it is safe to do so from an infection standpoint.  Patient does not currently have any signs of infection to the wound.  The wound is well-healed.  Patient is oriented to person.  She is normally ambulatory with assistance but was having difficulty with ambulation which was why she was also sent to the nursing home.  Patient is denying chest pain or shortness of breath.  She is denying any abdominal pain    Family HX: Denies any significant/pertinent family history.  Social Hx: Denies ETOH or drug use.  Review of systems:  Gen.: No weight loss, fatigue, anorexia, insomnia, fever.   Eyes: No vision loss, double vision, drainage, eye pain.   ENT: No pharyngitis, dry mouth.   Cardiac: No chest pain, palpitations, syncope, near syncope.   Pulmonary: No shortness of breath, cough, hemoptysis.   Heme/lymph: No swollen glands, fever, bleeding.   GI: No abdominal pain, change in bowel habits, melena, hematemesis, hematochezia, nausea, vomiting, diarrhea.   : No discharge, dysuria, frequency, urgency, hematuria.   Musculoskeletal: No limb pain, joint pain, joint swelling.   Skin: No rashes.   Psych: No depression, anxiety, suicidality, homicidality.   Review of systems is otherwise negative unless stated above or in history of present illness.    Physical Exam:    Appearance: Alert, oriented  to person, cooperative,  in no acute distress. Well nourished & well hydrated.    Skin: Intact,  dry skin, no lesions, rash, petechiae or purpura.     Eyes: PERRLA, EOMs intact,  Conjunctiva pink with no redness or exudates. Eyelids without lesions. No scleral icterus.     ENT: Hearing grossly intact. External auditory canals patent, tympanic membranes intact with visible landmarks. Nares patent, mucus membranes moist. Dentition without lesions. Pharynx clear, uvula midline.     Neck: Supple, without meningismus. Thyroid not palpable. Trachea at midline. No lymphadenopathy.    Pulmonary: Clear bilaterally with good chest wall excursion. No rales, rhonchi or wheezing. No accessory muscle use or stridor.    Cardiac: Normal S1, S2 without murmur, rub, gallop or extrasystole. No JVD, Carotids without bruits.    Abdomen: Soft, nontender, active bowel sounds.  No palpable organomegaly.  No rebound or guarding.  No CVA tenderness.    Genitourinary: Exam deferred.    Musculoskeletal: Full range of motion. no pain, edema, or deformity. Pulses full and equal. No cyanosis, clubbing, or edema.    Neurological:  Cranial nerves II through XII are grossly intact, finger-nose touch is normal, normal sensation, no weakness, no focal findings identified.    Psychiatric: Appropriate mood and affect.     Medical Decision-Making:  Testing: EKG was obtained which is interpreted by me shows a sinus rhythm rate of 85 nonspecific ST-T wave changes but no evidence of obvious ST elevations or T wave versions indicate acute ischemia or infarct.  Given that the patient has a history of normal pressure hydrocephalus and had a previous  shunt I did do a CT of the head to rule out worsening hydrocephalus.  CT scan shows stable chronic changes as described disproportionate prominence of the ventricles in relation to the sulci cannot exclude normal pressure hydrocephalus.  In looking at the notes from the neurosurgeon it appears that they have a  follow-up appointment scheduled in 2 months for reevaluation of replacing the  shunt.  I did also look for other causes of the patient to have confusion.  Patient has a slight white blood cell count that is elevated to 14.  She does also have signs of a UTI.  This was treated with Keflex.  Also checked electrolytes to rule out hyponatremia or acute renal failure.  Patient has a glucose of 136.  A mild hyponatremia at 131 however I do not believe that that is low enough to cause the confusion.  She has a mild hypokalemia at 3.  She this was repleted with p.o. potassium.  I also checked a lactate for signs of worsening sepsis or infection.  It is normal.  She has a normal pH.  At this time the patient will be discharged back to the nursing home  Treatment:   Reevaluation:   Plan: Homegoing. Discussed differential. Will follow-up with the primary physician in the next 2-3 days. Return if worse. They understand return precautions and discharge instructions. Patient and family/friend/caregiver are in agreement with this plan.   Impression:   1.  Normal pressure hydrocephalus  2.  UTI  3.  Hypokalemia    Labs Reviewed  CBC WITH AUTO DIFFERENTIAL - Abnormal     WBC                           14.5 (*)               nRBC                          0.0                    RBC                           3.96 (*)               Hemoglobin                    11.6 (*)               Hematocrit                    35.1 (*)               MCV                           89                     MCH                           29.3                   MCHC                          33.0                   RDW                           13.5                   Platelets                     367                    Immature Granulocytes %, Automated   3.1 (*)                Immature Granulocytes Absolute, Au*   0.45                     Narrative: The previously reported component Neutrophils % is no longer being reported.The previously reported component  Lymphocytes % is no longer being reported.The previously reported component Monocytes % is no longer being reported.The previously                 reported component Eosinophils % is no longer being reported.The previously reported component Basophils % is no longer being reported.The previously reported component Absolute Neutrophils is no longer being reported.The previously reported                 component Absolute Lymphocytes is no longer being reported.The previously reported component Absolute Monocytes is no longer being reported.The previously reported component Absolute Eosinophils is no longer being reported.The previously reported                 component Absolute Basophils is no longer being reported.  COMPREHENSIVE METABOLIC PANEL - Abnormal     Glucose                       136 (*)                Sodium                        131 (*)                Potassium                     3.0 (*)                Chloride                      99                     Bicarbonate                   20 (*)                 Anion Gap                     15                     Urea Nitrogen                 20                     Creatinine                    0.94                   eGFR                          63                     Calcium                       8.6                    Albumin                       3.5                    Alkaline Phosphatase          85                     Total Protein                 6.5                    AST                           22                     Bilirubin, Total              0.4                    ALT                           23                  BLOOD GAS VENOUS FULL PANEL - Abnormal     POCT pH, Venous               7.43                   POCT pCO2, Venous             31 (*)                 POCT pO2, Venous              66 (*)                 POCT SO2, Venous              94 (*)                 POCT Oxy Hemoglobin, Venous   92.3 (*)               POCT Hematocrit  Calculated, Venous   36.0                   POCT Sodium, Venous           130 (*)                POCT Potassium, Venous        3.1 (*)                POCT Chloride, Venous         100                    POCT Ionized Calicum, Venous   1.19                   POCT Glucose, Venous          144 (*)                POCT Lactate, Venous          1.4                    POCT Base Excess, Venous      -2.9 (*)               POCT HCO3 Calculated, Venous   20.6 (*)               POCT Hemoglobin, Venous       11.9 (*)               POCT Anion Gap, Venous        13.0                   Patient Temperature                                  FiO2                          21                  URINALYSIS WITH REFLEX CULTURE AND MICROSCOPIC - Abnormal     Color, Urine                  Yellow                 Appearance, Urine             Clear                  Specific Gravity, Urine       1.030                  pH, Urine                     6.0                    Protein, Urine                50 (1+) (*)               Glucose, Urine                Normal                 Blood, Urine                  NEGATIVE                Ketones, Urine                NEGATIVE                Bilirubin, Urine              NEGATIVE                Urobilinogen, Urine           3 (1+) (*)               Nitrite, Urine                NEGATIVE                Leukocyte Esterase, Urine     25 Wilner/uL (*)            MICROSCOPIC ONLY, URINE - Abnormal     WBC, Urine                    6-10 (*)               RBC, Urine                    NONE                   Squamous Epithelial Cells, Urine                          Bacteria, Urine               1+ (*)                 Mucus, Urine                  4+                     Hyaline Casts, Urine          1+ (*)              MANUAL DIFFERENTIAL - Abnormal     Neutrophils %, Manual         65.0                   Bands %, Manual               9.0                    Lymphocytes %, Manual         10.0                    Monocytes %, Manual           13.0                   Eosinophils %, Manual         0.0                    Basophils %, Manual           0.0                    Atypical Lymphocytes %, Manual   3.0                    Seg Neutrophils Absolute, Manual   9.43 (*)               Bands Absolute, Manual        1.31 (*)               Lymphocytes Absolute, Manual   1.45                   Monocytes Absolute, Manual    1.89 (*)               Eosinophils Absolute, Manual   0.00                   Basophils Absolute, Manual    0.00                   Atypical Lymphs Absolute, Manual   0.44 (*)               Total Cells Counted           100                    Neutrophils Absolute, Manual   10.74 (*)               RBC Morphology                                    LACTATE - Normal     Lactate                       1.2                      Narrative: Venipuncture immediately after or during the administration of Metamizole may lead to falsely low results. Testing should be performed immediately prior to Metamizole dosing.  TROPONIN I, HIGH SENSITIVITY - Normal     Troponin I, High Sensitivity   6                        Narrative: Less than 99th percentile of normal range cutoff-                Female and children under 18 years old <14 ng/L; Male <21 ng/L: Negative                Repeat testing should be performed if clinically indicated.                                 Female and children under 18 years old 14-50 ng/L; Male 21-50 ng/L:                Consistent with possible cardiac damage and possible increased clinical                 risk. Serial measurements may help to assess extent of myocardial damage.                                 >50 ng/L: Consistent with cardiac damage, increased clinical risk and                myocardial infarction. Serial measurements may help assess extent of                 myocardial damage.                                  NOTE: Children less than 1 year old may have higher baseline troponin                  levels and results should be interpreted in conjunction with the overall                 clinical context.                                 NOTE: Troponin I testing is performed using a different                 testing methodology at [facility] than at other                 [facility]. Direct result comparisons should only                 be made within the same method.  LIPASE - Normal     Lipase                        20                       Narrative: Venipuncture immediately after or during the administration of Metamizole may lead to falsely low results. Testing should be performed immediately prior to Metamizole dosing.  BLOOD CULTURE  BLOOD CULTURE  URINE CULTURE  URINALYSIS WITH REFLEX CULTURE AND MICROSCOPIC       Narrative: The following orders were created for panel order Urinalysis with Reflex Culture and Microscopic.                Procedure                               Abnormality         Status                                   ---------                               -----------         ------                                   Urinalysis with Reflex C...[[id number]]  Abnormal            Final result                             Extra Urine Gray Tube[[id number]]                            In process                                               Please view results for these tests on the individual orders.  EXTRA URINE GRAY TUBE     CT head wo IV contrast   Final Result                Stable chronic changes as described. Disproportionate prominence of    the ventricles in relation to the sulci and can not exclude NPH.          MACRO:    None          Signed by: Lena Seo 5/2/2025 11:31 AM    Dictation workstation:   [id number]                    Patient History   Medical History[1]  Surgical History[2]  Family History[3]  Social History[4]    Physical Exam   ED Triage Vitals [05/02/25 1038]   Temperature Heart Rate Respirations BP   37.1 °C (98.8 °F) 90 16 131/58      Pulse  Ox Temp Source Heart Rate Source Patient Position   97 % Temporal Monitor Sitting      BP Location FiO2 (%)     Right arm --       Physical Exam      ED Course & MDM   Diagnoses as of 05/02/25 1510   Normal pressure hydrocephalus (Multi)   Acute UTI   Hypokalemia                 No data recorded     [place] Coma Scale Score: 14 (05/02/25 1040 : Kari Schulz RN)                           Medical Decision Making      Procedure  Procedures         [1]   Past Medical History:  Diagnosis Date    Arthritis     Hyperlipidemia     Hypothyroidism     Lumbar disc disease     PROBLEMS WITH BACK NO SURGERY    PONV (postoperative nausea and vomiting)     Uterine cancer (Multi)     Vertigo     SINCE PROBLEMS WITH NECK   [2]   Past Surgical History:  Procedure Laterality Date    EYE SURGERY      BILATERAL LASIX SURGERY    HEEL SPUR SURGERY Right 2000    SHIFTED HEEL AND PLACED A PIN,    HIP ARTHROPLASTY Right     HIP SURGERY WITH PINNING FROM FX HIP    HYSTERECTOMY      JOINT REPLACEMENT Left     LEFT KNEE    SINUS SURGERY     [3] No family history on file.  [4]   Social History  Tobacco Use    Smoking status: Never    Smokeless tobacco: Never   Vaping Use    Vaping status: Never Used   Substance Use Topics    Alcohol use: Not Currently    Drug use: Never        Zoila Crain MD  05/02/25 7973

## 2025-05-03 ENCOUNTER — OFFICE VISIT (OUTPATIENT)
Dept: GERIATRIC MEDICINE | Age: 75
End: 2025-05-03

## 2025-05-03 DIAGNOSIS — E10.44 DIABETIC AMYOTROPHY ASSOCIATED WITH TYPE 1 DIABETES MELLITUS (HCC): Primary | ICD-10-CM

## 2025-05-03 DIAGNOSIS — F33.1 MAJOR DEPRESSIVE DISORDER, RECURRENT EPISODE, MODERATE (HCC): ICD-10-CM

## 2025-05-03 LAB
BACTERIA UR CULT: NO GROWTH
HOLD SPECIMEN: NORMAL

## 2025-05-04 ENCOUNTER — HOSPITAL ENCOUNTER (OUTPATIENT)
Dept: CARDIOLOGY | Facility: HOSPITAL | Age: 75
Discharge: HOME | End: 2025-05-04
Payer: MEDICARE

## 2025-05-04 LAB
ATRIAL RATE: 85 BPM
BACTERIA BLD CULT: NORMAL
BACTERIA BLD CULT: NORMAL
P AXIS: 16 DEGREES
P OFFSET: 173 MS
P ONSET: 142 MS
PR INTERVAL: 154 MS
Q ONSET: 219 MS
QRS COUNT: 14 BEATS
QRS DURATION: 82 MS
QT INTERVAL: 358 MS
QTC CALCULATION(BAZETT): 426 MS
QTC FREDERICIA: 402 MS
R AXIS: 11 DEGREES
T AXIS: 177 DEGREES
T OFFSET: 398 MS
VENTRICULAR RATE: 85 BPM

## 2025-05-04 PROCEDURE — 93005 ELECTROCARDIOGRAM TRACING: CPT

## 2025-05-05 ASSESSMENT — ENCOUNTER SYMPTOMS
GASTROINTESTINAL NEGATIVE: 1
RESPIRATORY NEGATIVE: 1

## 2025-05-05 NOTE — PROGRESS NOTES
[address] [address]    4/30/2025    Gloria Abbott  is a 74 y.o. in the  being seen for a f/u of   Chief Complaint   Patient presents with    Follow-up       Gloria Abbott is a 74-year-old female recently readmitted to [facility].  Patient initially presented to the ED with complaints of weakness and inability to care for herself.  Patient was evaluated by therapy and SNF was recommended.    At time of visit patient is resting in bed.  She is alert and oriented x 3.  Patient reports chronic neck pain 8 out of 10-pain improves with the use of a neck pillow.  Patient also reports a poor appetite.  She denies nausea vomiting diarrhea or constipation.  No recent fever or fall reported.  No change in bowel or bladder habits.          Past Medical History:   Diagnosis Date    ADHD (attention deficit hyperactivity disorder)     Arthritis     Cancer (HCC)     endometrial and uterine    Depression     Hyperlipidemia     Hypertension     Hypothyroid     MDD (major depressive disorder)     NPH (normal pressure hydrocephalus) (HCC)     PONV (postoperative nausea and vomiting)     Nausea    Protruded lumbar disc     L4/L5    Stress     Vitamin D deficiency      Past Surgical History:   Procedure Laterality Date    ANKLE SURGERY Right 2001    COLONOSCOPY      COLONOSCOPY N/A 11/19/2024    COLONOSCOPY DIAGNOSTIC performed by Amalia Lopez MD at [facility]    FOOT SURGERY Right 2001    HIP SURGERY Right 02/02/2023    RIGHT HIP PINNING AND LEFT KNEE STERIOD INJECTION performed by Elliot Garcia MD at [facility]    HYSTERECTOMY (CERVIX STATUS UNKNOWN)      LASIK Bilateral     SHUNT REMOVAL N/A 3/17/2025    REMOVAL VENTRICULAR PERITONEAL SHUNT room 270 performed by Marisela Larson MD at [facility] OR    SINUS ENDOSCOPY Bilateral 08/06/2024    ENDOSCOPIC MAXILLARY ANTROSTOMIES 30 MIN performed by Ryan Zepeda MD at [facility] OR    SINUS ENDOSCOPY Left 09/17/2024    REVISION MAXILLARY

## 2025-05-06 LAB
BACTERIA BLD CULT: NORMAL
BACTERIA BLD CULT: NORMAL

## 2025-05-07 ENCOUNTER — OFFICE VISIT (OUTPATIENT)
Dept: GERIATRIC MEDICINE | Age: 75
End: 2025-05-07
Payer: MEDICARE

## 2025-05-07 DIAGNOSIS — G91.2 NORMAL PRESSURE HYDROCEPHALUS (HCC): Primary | ICD-10-CM

## 2025-05-07 DIAGNOSIS — R53.1 WEAKNESS: ICD-10-CM

## 2025-05-07 PROCEDURE — 1123F ACP DISCUSS/DSCN MKR DOCD: CPT | Performed by: NURSE PRACTITIONER

## 2025-05-07 PROCEDURE — 99309 SBSQ NF CARE MODERATE MDM 30: CPT | Performed by: NURSE PRACTITIONER

## 2025-05-09 ENCOUNTER — APPOINTMENT (OUTPATIENT)
Dept: ULTRASOUND IMAGING | Age: 75
DRG: 872 | End: 2025-05-09
Payer: MEDICARE

## 2025-05-09 ENCOUNTER — TELEPHONE (OUTPATIENT)
Age: 75
End: 2025-05-09

## 2025-05-09 ENCOUNTER — APPOINTMENT (OUTPATIENT)
Dept: GENERAL RADIOLOGY | Age: 75
DRG: 872 | End: 2025-05-09
Payer: MEDICARE

## 2025-05-09 ENCOUNTER — APPOINTMENT (OUTPATIENT)
Dept: CT IMAGING | Age: 75
DRG: 872 | End: 2025-05-09
Payer: MEDICARE

## 2025-05-09 ENCOUNTER — HOSPITAL ENCOUNTER (INPATIENT)
Age: 75
LOS: 5 days | Discharge: NURSING, REHAB OR LONG-TERM CARE | DRG: 872 | End: 2025-05-14
Attending: INTERNAL MEDICINE | Admitting: INTERNAL MEDICINE
Payer: MEDICARE

## 2025-05-09 DIAGNOSIS — D72.829 LEUKOCYTOSIS, UNSPECIFIED TYPE: ICD-10-CM

## 2025-05-09 DIAGNOSIS — R41.82 ALTERED MENTAL STATUS, UNSPECIFIED ALTERED MENTAL STATUS TYPE: Primary | ICD-10-CM

## 2025-05-09 DIAGNOSIS — R19.7 DIARRHEA, UNSPECIFIED TYPE: ICD-10-CM

## 2025-05-09 LAB
ALBUMIN SERPL-MCNC: 2.9 G/DL (ref 3.5–4.6)
ALP SERPL-CCNC: 399 U/L (ref 40–130)
ALT SERPL-CCNC: 386 U/L (ref 0–33)
AMMONIA PLAS-SCNC: 37 UMOL/L (ref 11–51)
ANION GAP SERPL CALCULATED.3IONS-SCNC: 13 MEQ/L (ref 9–15)
AST SERPL-CCNC: 64 U/L (ref 0–35)
ATRIAL RATE: 85 BPM
BASOPHILS # BLD: 0 K/UL (ref 0–0.2)
BASOPHILS NFR BLD: 0 %
BILIRUB SERPL-MCNC: 0.4 MG/DL (ref 0.2–0.7)
BUN SERPL-MCNC: 40 MG/DL (ref 8–23)
CALCIUM SERPL-MCNC: 8 MG/DL (ref 8.5–9.9)
CHLORIDE SERPL-SCNC: 97 MEQ/L (ref 95–107)
CHP ED QC CHECK: NORMAL
CO2 SERPL-SCNC: 22 MEQ/L (ref 20–31)
CREAT SERPL-MCNC: 0.97 MG/DL (ref 0.5–0.9)
EOSINOPHIL # BLD: 0 K/UL (ref 0–0.7)
EOSINOPHIL NFR BLD: 0.2 %
ERYTHROCYTE [DISTWIDTH] IN BLOOD BY AUTOMATED COUNT: 14 % (ref 11.5–14.5)
ETHANOL PERCENT: NORMAL G/DL
ETHANOLAMINE SERPL-MCNC: <10 MG/DL (ref 0–0.08)
GLOBULIN SER CALC-MCNC: 3.4 G/DL (ref 2.3–3.5)
GLUCOSE BLD-MCNC: 167 MG/DL
GLUCOSE BLD-MCNC: 167 MG/DL (ref 70–99)
GLUCOSE SERPL-MCNC: 161 MG/DL (ref 70–99)
HCT VFR BLD AUTO: 37 % (ref 37–47)
HGB BLD-MCNC: 12.4 G/DL (ref 12–16)
INR PPP: 1.6
LACTIC ACID, SEPSIS: 1.8 MMOL/L (ref 0.5–1.9)
LYMPHOCYTES # BLD: 0.8 K/UL (ref 1–4.8)
LYMPHOCYTES NFR BLD: 1 %
MAGNESIUM SERPL-MCNC: 2.7 MG/DL (ref 1.7–2.4)
MCH RBC QN AUTO: 29.2 PG (ref 27–31.3)
MCHC RBC AUTO-ENTMCNC: 33.5 % (ref 33–37)
MCV RBC AUTO: 87.1 FL (ref 79.4–94.8)
MONOCYTES # BLD: 0.8 K/UL (ref 0.2–0.8)
MONOCYTES NFR BLD: 1.9 %
MYELOCYTES NFR BLD MANUAL: 1 %
NEUTROPHILS # BLD: 37.8 K/UL (ref 1.4–6.5)
NEUTS SEG NFR BLD: 95 %
P AXIS: 16 DEGREES
P OFFSET: 173 MS
P ONSET: 142 MS
PERFORMED ON: ABNORMAL
PLATELET # BLD AUTO: 603 K/UL (ref 130–400)
PLATELET BLD QL SMEAR: ABNORMAL
POTASSIUM SERPL-SCNC: 3.2 MEQ/L (ref 3.4–4.9)
PR INTERVAL: 154 MS
PROT SERPL-MCNC: 6.3 G/DL (ref 6.3–8)
PROTHROMBIN TIME: 19.5 SEC (ref 12.3–14.9)
Q ONSET: 219 MS
QRS COUNT: 14 BEATS
QRS DURATION: 82 MS
QT INTERVAL: 358 MS
QTC CALCULATION(BAZETT): 426 MS
QTC FREDERICIA: 402 MS
R AXIS: 11 DEGREES
RBC # BLD AUTO: 4.25 M/UL (ref 4.2–5.4)
RBC MORPH BLD: NORMAL
SODIUM SERPL-SCNC: 132 MEQ/L (ref 135–144)
T AXIS: 177 DEGREES
T OFFSET: 398 MS
TROPONIN, HIGH SENSITIVITY: 16 NG/L (ref 0–19)
TSH SERPL-MCNC: 1.1 UIU/ML (ref 0.44–3.86)
VARIANT LYMPHS NFR BLD: 1 %
VENTRICULAR RATE: 85 BPM
WBC # BLD AUTO: 39.4 K/UL (ref 4.8–10.8)

## 2025-05-09 PROCEDURE — 84484 ASSAY OF TROPONIN QUANT: CPT

## 2025-05-09 PROCEDURE — 84443 ASSAY THYROID STIM HORMONE: CPT

## 2025-05-09 PROCEDURE — 70450 CT HEAD/BRAIN W/O DYE: CPT

## 2025-05-09 PROCEDURE — 87507 IADNA-DNA/RNA PROBE TQ 12-25: CPT

## 2025-05-09 PROCEDURE — 87449 NOS EACH ORGANISM AG IA: CPT

## 2025-05-09 PROCEDURE — 82140 ASSAY OF AMMONIA: CPT

## 2025-05-09 PROCEDURE — 71045 X-RAY EXAM CHEST 1 VIEW: CPT

## 2025-05-09 PROCEDURE — 99285 EMERGENCY DEPT VISIT HI MDM: CPT

## 2025-05-09 PROCEDURE — 83605 ASSAY OF LACTIC ACID: CPT

## 2025-05-09 PROCEDURE — 1210000000 HC MED SURG R&B

## 2025-05-09 PROCEDURE — 85610 PROTHROMBIN TIME: CPT

## 2025-05-09 PROCEDURE — 85025 COMPLETE CBC W/AUTO DIFF WBC: CPT

## 2025-05-09 PROCEDURE — 76705 ECHO EXAM OF ABDOMEN: CPT

## 2025-05-09 PROCEDURE — 6360000004 HC RX CONTRAST MEDICATION: Performed by: INTERNAL MEDICINE

## 2025-05-09 PROCEDURE — 74177 CT ABD & PELVIS W/CONTRAST: CPT

## 2025-05-09 PROCEDURE — 87324 CLOSTRIDIUM AG IA: CPT

## 2025-05-09 PROCEDURE — 80053 COMPREHEN METABOLIC PANEL: CPT

## 2025-05-09 PROCEDURE — 82077 ASSAY SPEC XCP UR&BREATH IA: CPT

## 2025-05-09 PROCEDURE — 93005 ELECTROCARDIOGRAM TRACING: CPT | Performed by: INTERNAL MEDICINE

## 2025-05-09 PROCEDURE — 83735 ASSAY OF MAGNESIUM: CPT

## 2025-05-09 PROCEDURE — 6360000002 HC RX W HCPCS: Performed by: EMERGENCY MEDICINE

## 2025-05-09 PROCEDURE — 36415 COLL VENOUS BLD VENIPUNCTURE: CPT

## 2025-05-09 PROCEDURE — 87040 BLOOD CULTURE FOR BACTERIA: CPT

## 2025-05-09 RX ORDER — CIPROFLOXACIN 2 MG/ML
400 INJECTION, SOLUTION INTRAVENOUS ONCE
Status: COMPLETED | OUTPATIENT
Start: 2025-05-09 | End: 2025-05-10

## 2025-05-09 RX ORDER — IOPAMIDOL 755 MG/ML
75 INJECTION, SOLUTION INTRAVASCULAR
Status: COMPLETED | OUTPATIENT
Start: 2025-05-09 | End: 2025-05-09

## 2025-05-09 RX ORDER — METRONIDAZOLE 500 MG/100ML
500 INJECTION, SOLUTION INTRAVENOUS ONCE
Status: COMPLETED | OUTPATIENT
Start: 2025-05-09 | End: 2025-05-09

## 2025-05-09 RX ADMIN — IOPAMIDOL 75 ML: 755 INJECTION, SOLUTION INTRAVENOUS at 18:42

## 2025-05-09 RX ADMIN — METRONIDAZOLE 500 MG: 500 INJECTION, SOLUTION INTRAVENOUS at 22:18

## 2025-05-09 ASSESSMENT — PAIN SCALES - GENERAL: PAINLEVEL_OUTOF10: 0

## 2025-05-09 ASSESSMENT — PAIN - FUNCTIONAL ASSESSMENT: PAIN_FUNCTIONAL_ASSESSMENT: NONE - DENIES PAIN

## 2025-05-09 NOTE — ED PROVIDER NOTES
baseline. She is disoriented and confused.   Psychiatric:         Speech: Speech is delayed.         Cognition and Memory: Cognition is impaired.         DIAGNOSTIC RESULTS     EKG: All EKG's are interpreted by the Emergency Department Physician who either signs or Co-signs this chart in the absence of a cardiologist.        RADIOLOGY:   Non-plain film images such as CT, Ultrasound and MRI are read by the radiologist. Plain radiographic images are visualized and preliminarily interpreted by the emergency physician with the below findings:        Interpretation per the Radiologist below, if available at the time of this note:    US ABDOMEN LIMITED Specify organ? GALLBLADDER   Final Result   Technically very limited examination due to shadowing from overlying bowel   gas, limited ability for the patient to assist with examination (positioning,   breath-holding).  No large ascites right upper quadrant.         CT Head W/O Contrast   Final Result   1. No acute intracranial abnormality.   2. Prominence of the ventricles, not significantly changed when compared the   study of 04/21/2025.   3. Chronic appearing left maxillary and sphenoid sinusitis.   4. Gas fluid level within the right mastoid air cells concerning for acute   sinusitis.         CT ABDOMEN PELVIS W IV CONTRAST Additional Contrast? None   Final Result   1. There is minimal low density adjacent to the head of the pancreas, could   represent developing pancreatitis but this is nonspecific.   2. Diverticulosis without signs of diverticulitis.   3. Fixed hiatus hernia.         XR CHEST (SINGLE VIEW FRONTAL)   Final Result   1. No signs of an acute cardiopulmonary process.   2. Chronic eventration of the right hemidiaphragm.               ED BEDSIDE ULTRASOUND:   Performed by ED Physician - none    LABS:  Labs Reviewed   COMPREHENSIVE METABOLIC PANEL W/ REFLEX TO MG FOR LOW K - Abnormal; Notable for the following components:       Result Value    Sodium 132 (*)      Potassium reflex Magnesium 3.2 (*)     Glucose 161 (*)     BUN 40 (*)     Creatinine 0.97 (*)     Calcium 8.0 (*)     Albumin 2.9 (*)     Alkaline Phosphatase 399 (*)      (*)     AST 64 (*)     All other components within normal limits   CBC WITH AUTO DIFFERENTIAL - Abnormal; Notable for the following components:    WBC 39.4 (*)     Platelets 603 (*)     Neutrophils Absolute 37.8 (*)     Lymphocytes Absolute 0.8 (*)     All other components within normal limits   PROTIME-INR - Abnormal; Notable for the following components:    Protime 19.5 (*)     All other components within normal limits   MAGNESIUM - Abnormal; Notable for the following components:    Magnesium 2.7 (*)     All other components within normal limits   COMPREHENSIVE METABOLIC PANEL W/ REFLEX TO MG FOR LOW K - Abnormal; Notable for the following components:    Glucose 107 (*)     BUN 34 (*)     Calcium 8.0 (*)     Total Protein 5.6 (*)     Albumin 2.6 (*)     Alkaline Phosphatase 365 (*)      (*)     AST 48 (*)     All other components within normal limits   CBC WITH AUTO DIFFERENTIAL - Abnormal; Notable for the following components:    WBC 38.4 (*)     RBC 4.13 (*)     Hemoglobin 11.9 (*)     Hematocrit 35.7 (*)     Platelets 557 (*)     All other components within normal limits   POCT GLUCOSE - Abnormal; Notable for the following components:    POC Glucose 167 (*)     All other components within normal limits   POCT GLUCOSE - Normal   GASTROINTESTINAL PANEL, MOLECULAR   CULTURE, BLOOD 1   CULTURE, BLOOD 2   C DIFF TOXIN/ANTIGEN   TROPONIN   TSH   ETHANOL   AMMONIA   LACTATE, SEPSIS   URINALYSIS   URINE DRUG SCREEN   LACTATE, SEPSIS       All other labs were within normal range or not returned as of this dictation.    EMERGENCY DEPARTMENT COURSE and DIFFERENTIAL DIAGNOSIS/MDM:   Vitals:    Vitals:    05/09/25 2235 05/09/25 2346 05/10/25 0144 05/10/25 0613   BP: (!) 133/57 (!) 140/69 135/64    Pulse:  78 78    Resp:  20 20    Temp:

## 2025-05-09 NOTE — TELEPHONE ENCOUNTER
Yuki from [facility] called stating that the pt is experiencing a lot of fatigue, isn't very alert, hard to maneuver around.  What does Dr. Larson think should be done? You can call Yuki back at [phone]

## 2025-05-10 PROBLEM — A04.72 C. DIFFICILE COLITIS: Status: ACTIVE | Noted: 2025-05-10

## 2025-05-10 PROBLEM — R19.7 DIARRHEA: Status: ACTIVE | Noted: 2025-05-10

## 2025-05-10 LAB
ADV 40+41 DNA STL QL NAA+NON-PROBE: NOT DETECTED
ALBUMIN SERPL-MCNC: 2.6 G/DL (ref 3.5–4.6)
ALP SERPL-CCNC: 365 U/L (ref 40–130)
ALT SERPL-CCNC: 285 U/L (ref 0–33)
ANION GAP SERPL CALCULATED.3IONS-SCNC: 13 MEQ/L (ref 9–15)
AST SERPL-CCNC: 48 U/L (ref 0–35)
BASOPHILS # BLD: 0 K/UL (ref 0–0.2)
BASOPHILS NFR BLD: 0 %
BILIRUB SERPL-MCNC: 0.4 MG/DL (ref 0.2–0.7)
BUN SERPL-MCNC: 34 MG/DL (ref 8–23)
BURR CELLS: ABNORMAL
C CAYETANENSIS DNA STL QL NAA+NON-PROBE: NOT DETECTED
C COLI+JEJ+UPSA DNA STL QL NAA+NON-PROBE: NOT DETECTED
C DIFF TOX A+B STL QL IA: ABNORMAL
CALCIUM SERPL-MCNC: 8 MG/DL (ref 8.5–9.9)
CHLORIDE SERPL-SCNC: 102 MEQ/L (ref 95–107)
CO2 SERPL-SCNC: 21 MEQ/L (ref 20–31)
CREAT SERPL-MCNC: 0.74 MG/DL (ref 0.5–0.9)
CRP SERPL HS-MCNC: 214.5 MG/L (ref 0–5)
CRYPTOSP DNA STL QL NAA+NON-PROBE: NOT DETECTED
E HISTOLYT DNA STL QL NAA+NON-PROBE: NOT DETECTED
EAEC PAA PLAS AGGR+AATA ST NAA+NON-PRB: NOT DETECTED
EC STX1+STX2 GENES STL QL NAA+NON-PROBE: NOT DETECTED
EKG ATRIAL RATE: 86 BPM
EKG DIAGNOSIS: NORMAL
EKG P AXIS: 41 DEGREES
EKG P-R INTERVAL: 134 MS
EKG Q-T INTERVAL: 388 MS
EKG QRS DURATION: 78 MS
EKG QTC CALCULATION (BAZETT): 464 MS
EKG R AXIS: 3 DEGREES
EKG T AXIS: 160 DEGREES
EKG VENTRICULAR RATE: 86 BPM
EOSINOPHIL # BLD: 0.4 K/UL (ref 0–0.7)
EOSINOPHIL NFR BLD: 1 %
EPEC EAE GENE STL QL NAA+NON-PROBE: NOT DETECTED
ERYTHROCYTE [DISTWIDTH] IN BLOOD BY AUTOMATED COUNT: 13.9 % (ref 11.5–14.5)
ETEC LTA+ST1A+ST1B TOX ST NAA+NON-PROBE: NOT DETECTED
G LAMBLIA DNA STL QL NAA+NON-PROBE: NOT DETECTED
GI PATH DNA+RNA PNL STL NAA+NON-PROBE: NOT DETECTED
GLOBULIN SER CALC-MCNC: 3 G/DL (ref 2.3–3.5)
GLUCOSE SERPL-MCNC: 107 MG/DL (ref 70–99)
HCT VFR BLD AUTO: 35.7 % (ref 37–47)
HGB BLD-MCNC: 11.9 G/DL (ref 12–16)
LACTIC ACID, SEPSIS: 1.1 MMOL/L (ref 0.5–1.9)
LYMPHOCYTES # BLD: 0 K/UL (ref 1–4.8)
LYMPHOCYTES NFR BLD: 2.7 %
MCH RBC QN AUTO: 28.8 PG (ref 27–31.3)
MCHC RBC AUTO-ENTMCNC: 33.3 % (ref 33–37)
MCV RBC AUTO: 86.4 FL (ref 79.4–94.8)
METAMYELOCYTES NFR BLD MANUAL: 1 %
MONOCYTES # BLD: 1.9 K/UL (ref 0.2–0.8)
MONOCYTES NFR BLD: 4.7 %
NEUTROPHILS # BLD: 36.5 K/UL (ref 1.4–6.5)
NEUTS SEG NFR BLD: 94 %
NOROVIRUS GI+II RNA STL QL NAA+NON-PROBE: NOT DETECTED
P SHIGELLOIDES DNA STL QL NAA+NON-PROBE: NOT DETECTED
PLATELET # BLD AUTO: 557 K/UL (ref 130–400)
PLATELET BLD QL SMEAR: ABNORMAL
POIKILOCYTOSIS BLD QL SMEAR: ABNORMAL
POTASSIUM SERPL-SCNC: 3.8 MEQ/L (ref 3.4–4.9)
PROCALCITONIN SERPL IA-MCNC: 0.23 NG/ML (ref 0–0.15)
PROT SERPL-MCNC: 5.6 G/DL (ref 6.3–8)
RBC # BLD AUTO: 4.13 M/UL (ref 4.2–5.4)
RVA RNA STL QL NAA+NON-PROBE: NOT DETECTED
S ENT+BONG DNA STL QL NAA+NON-PROBE: NOT DETECTED
SAPO I+II+IV+V RNA STL QL NAA+NON-PROBE: NOT DETECTED
SHIGELLA SP+EIEC IPAH ST NAA+NON-PROBE: NOT DETECTED
SODIUM SERPL-SCNC: 136 MEQ/L (ref 135–144)
V CHOL+PARA+VUL DNA STL QL NAA+NON-PROBE: NOT DETECTED
V CHOLERAE DNA STL QL NAA+NON-PROBE: NOT DETECTED
WBC # BLD AUTO: 38.4 K/UL (ref 4.8–10.8)
Y ENTEROCOL DNA STL QL NAA+NON-PROBE: NOT DETECTED

## 2025-05-10 PROCEDURE — 80053 COMPREHEN METABOLIC PANEL: CPT

## 2025-05-10 PROCEDURE — 99222 1ST HOSP IP/OBS MODERATE 55: CPT | Performed by: INTERNAL MEDICINE

## 2025-05-10 PROCEDURE — 6360000002 HC RX W HCPCS: Performed by: INTERNAL MEDICINE

## 2025-05-10 PROCEDURE — 6360000002 HC RX W HCPCS: Performed by: EMERGENCY MEDICINE

## 2025-05-10 PROCEDURE — 97167 OT EVAL HIGH COMPLEX 60 MIN: CPT

## 2025-05-10 PROCEDURE — 97162 PT EVAL MOD COMPLEX 30 MIN: CPT

## 2025-05-10 PROCEDURE — 93010 ELECTROCARDIOGRAM REPORT: CPT | Performed by: INTERNAL MEDICINE

## 2025-05-10 PROCEDURE — 6370000000 HC RX 637 (ALT 250 FOR IP): Performed by: INTERNAL MEDICINE

## 2025-05-10 PROCEDURE — 1210000000 HC MED SURG R&B

## 2025-05-10 PROCEDURE — 84145 PROCALCITONIN (PCT): CPT

## 2025-05-10 PROCEDURE — 85025 COMPLETE CBC W/AUTO DIFF WBC: CPT

## 2025-05-10 PROCEDURE — 2500000003 HC RX 250 WO HCPCS: Performed by: INTERNAL MEDICINE

## 2025-05-10 PROCEDURE — 2580000003 HC RX 258: Performed by: INTERNAL MEDICINE

## 2025-05-10 PROCEDURE — 83605 ASSAY OF LACTIC ACID: CPT

## 2025-05-10 PROCEDURE — 36415 COLL VENOUS BLD VENIPUNCTURE: CPT

## 2025-05-10 PROCEDURE — 86140 C-REACTIVE PROTEIN: CPT

## 2025-05-10 RX ORDER — SODIUM CHLORIDE 9 MG/ML
INJECTION, SOLUTION INTRAVENOUS CONTINUOUS
Status: DISCONTINUED | OUTPATIENT
Start: 2025-05-10 | End: 2025-05-13

## 2025-05-10 RX ORDER — POTASSIUM CHLORIDE 1500 MG/1
40 TABLET, EXTENDED RELEASE ORAL PRN
Status: DISCONTINUED | OUTPATIENT
Start: 2025-05-10 | End: 2025-05-14 | Stop reason: HOSPADM

## 2025-05-10 RX ORDER — MAGNESIUM SULFATE IN WATER 40 MG/ML
2000 INJECTION, SOLUTION INTRAVENOUS PRN
Status: DISCONTINUED | OUTPATIENT
Start: 2025-05-10 | End: 2025-05-14 | Stop reason: HOSPADM

## 2025-05-10 RX ORDER — VANCOMYCIN HYDROCHLORIDE 125 MG/1
125 CAPSULE ORAL EVERY 6 HOURS SCHEDULED
Status: DISCONTINUED | OUTPATIENT
Start: 2025-05-10 | End: 2025-05-14 | Stop reason: HOSPADM

## 2025-05-10 RX ORDER — SODIUM CHLORIDE 0.9 % (FLUSH) 0.9 %
5-40 SYRINGE (ML) INJECTION PRN
Status: DISCONTINUED | OUTPATIENT
Start: 2025-05-10 | End: 2025-05-14 | Stop reason: HOSPADM

## 2025-05-10 RX ORDER — SODIUM CHLORIDE 9 MG/ML
INJECTION, SOLUTION INTRAVENOUS PRN
Status: DISCONTINUED | OUTPATIENT
Start: 2025-05-10 | End: 2025-05-14 | Stop reason: HOSPADM

## 2025-05-10 RX ORDER — ACETAMINOPHEN 325 MG/1
650 TABLET ORAL EVERY 6 HOURS PRN
Status: DISCONTINUED | OUTPATIENT
Start: 2025-05-10 | End: 2025-05-14 | Stop reason: HOSPADM

## 2025-05-10 RX ORDER — ONDANSETRON 4 MG/1
4 TABLET, ORALLY DISINTEGRATING ORAL EVERY 8 HOURS PRN
Status: DISCONTINUED | OUTPATIENT
Start: 2025-05-10 | End: 2025-05-14 | Stop reason: HOSPADM

## 2025-05-10 RX ORDER — SODIUM CHLORIDE 0.9 % (FLUSH) 0.9 %
5-40 SYRINGE (ML) INJECTION EVERY 12 HOURS SCHEDULED
Status: DISCONTINUED | OUTPATIENT
Start: 2025-05-10 | End: 2025-05-14 | Stop reason: HOSPADM

## 2025-05-10 RX ORDER — ONDANSETRON 2 MG/ML
4 INJECTION INTRAMUSCULAR; INTRAVENOUS EVERY 6 HOURS PRN
Status: DISCONTINUED | OUTPATIENT
Start: 2025-05-10 | End: 2025-05-14 | Stop reason: HOSPADM

## 2025-05-10 RX ORDER — POTASSIUM CHLORIDE 7.45 MG/ML
10 INJECTION INTRAVENOUS
Status: DISPENSED | OUTPATIENT
Start: 2025-05-10 | End: 2025-05-10

## 2025-05-10 RX ORDER — PHYTONADIONE 5 MG/1
5 TABLET ORAL ONCE
Status: COMPLETED | OUTPATIENT
Start: 2025-05-10 | End: 2025-05-10

## 2025-05-10 RX ORDER — ACETAMINOPHEN 650 MG/1
650 SUPPOSITORY RECTAL EVERY 6 HOURS PRN
Status: DISCONTINUED | OUTPATIENT
Start: 2025-05-10 | End: 2025-05-14 | Stop reason: HOSPADM

## 2025-05-10 RX ORDER — POTASSIUM CHLORIDE 7.45 MG/ML
10 INJECTION INTRAVENOUS PRN
Status: DISCONTINUED | OUTPATIENT
Start: 2025-05-10 | End: 2025-05-14 | Stop reason: HOSPADM

## 2025-05-10 RX ORDER — POLYETHYLENE GLYCOL 3350 17 G/17G
17 POWDER, FOR SOLUTION ORAL DAILY PRN
Status: DISCONTINUED | OUTPATIENT
Start: 2025-05-10 | End: 2025-05-14 | Stop reason: HOSPADM

## 2025-05-10 RX ORDER — ENOXAPARIN SODIUM 100 MG/ML
40 INJECTION SUBCUTANEOUS DAILY
Status: DISCONTINUED | OUTPATIENT
Start: 2025-05-10 | End: 2025-05-14 | Stop reason: HOSPADM

## 2025-05-10 RX ADMIN — PHYTONADIONE 5 MG: 5 TABLET ORAL at 15:14

## 2025-05-10 RX ADMIN — POTASSIUM CHLORIDE 10 MEQ: 7.46 INJECTION, SOLUTION INTRAVENOUS at 02:29

## 2025-05-10 RX ADMIN — VANCOMYCIN HYDROCHLORIDE 125 MG: 125 CAPSULE ORAL at 23:32

## 2025-05-10 RX ADMIN — SODIUM CHLORIDE: 0.9 INJECTION, SOLUTION INTRAVENOUS at 01:15

## 2025-05-10 RX ADMIN — POTASSIUM CHLORIDE 10 MEQ: 7.46 INJECTION, SOLUTION INTRAVENOUS at 05:22

## 2025-05-10 RX ADMIN — SODIUM CHLORIDE, PRESERVATIVE FREE 10 ML: 5 INJECTION INTRAVENOUS at 08:16

## 2025-05-10 RX ADMIN — VANCOMYCIN HYDROCHLORIDE 125 MG: 125 CAPSULE ORAL at 11:41

## 2025-05-10 RX ADMIN — VANCOMYCIN HYDROCHLORIDE 125 MG: 125 CAPSULE ORAL at 17:18

## 2025-05-10 RX ADMIN — POTASSIUM CHLORIDE 10 MEQ: 7.46 INJECTION, SOLUTION INTRAVENOUS at 04:02

## 2025-05-10 RX ADMIN — SODIUM CHLORIDE: 0.9 INJECTION, SOLUTION INTRAVENOUS at 18:03

## 2025-05-10 RX ADMIN — POTASSIUM CHLORIDE 10 MEQ: 7.46 INJECTION, SOLUTION INTRAVENOUS at 01:21

## 2025-05-10 RX ADMIN — ENOXAPARIN SODIUM 40 MG: 100 INJECTION SUBCUTANEOUS at 08:16

## 2025-05-10 RX ADMIN — VANCOMYCIN HYDROCHLORIDE 125 MG: 125 CAPSULE ORAL at 01:28

## 2025-05-10 RX ADMIN — CIPROFLOXACIN 400 MG: 400 INJECTION, SOLUTION INTRAVENOUS at 01:18

## 2025-05-10 RX ADMIN — ACETAMINOPHEN 650 MG: 325 TABLET ORAL at 08:18

## 2025-05-10 ASSESSMENT — PAIN DESCRIPTION - PAIN TYPE: TYPE: CHRONIC PAIN

## 2025-05-10 ASSESSMENT — PAIN DESCRIPTION - LOCATION
LOCATION: NECK
LOCATION: NECK

## 2025-05-10 ASSESSMENT — PAIN SCALES - GENERAL: PAINLEVEL_OUTOF10: 5

## 2025-05-10 ASSESSMENT — PAIN DESCRIPTION - DESCRIPTORS: DESCRIPTORS: ACHING

## 2025-05-10 NOTE — CONSULTS
Inpatient consult to GI  Consult performed by: Raghavendra Mckenna MD  Consult ordered by: Kim Das MD          Patient Name: Gloria Abbott  Admit Date: 2025  4:33 PM  MR #: [id number]  : 1950    Attending Physician: Kim Das MD  Reason for consult: Abnormal LFTs    History of Presenting Illness:      Gloria Abbott is a 74 y.o. female on hospital day 1 with a history of recent ventriculoperitoneal shunt infection was on IV vancomycin and cefepime was admitted from nursing home owing to diarrhea.  Patient is currently off antibiotic.  She was sent from nursing home as was found to be confused and having multiple episodes of diarrhea for more than 1 to 2 weeks.  History obtained from chart review and discussion with patient.  Patient denies abdominal pain no melena or hematochezia.  Upon admission patient had LFTs checked that showed evidence abnormal liver enzymes with 2 times normal range for both mixed pattern cholestatic and hepatocellular.  Patient had CAT scan that showed?  Low-density adjacent to the head of the pancreas could represent developing pancreatitis the ultrasound was limited.  Patient denies abdominal pain.  And was found to have C. difficile for which currently on vancomycin.  Hepatology consult was obtained for further evaluation of abnormal liver enzymes  History:      Past Medical History:   Diagnosis Date    ADHD (attention deficit hyperactivity disorder)     Arthritis     Cancer (HCC)     endometrial and uterine    Depression     Hyperlipidemia     Hypertension     Hypothyroid     MDD (major depressive disorder)     NPH (normal pressure hydrocephalus) (HCC)     PONV (postoperative nausea and vomiting)     Nausea    Protruded lumbar disc     L4/L5    Stress     Vitamin D deficiency      Past Surgical History:   Procedure Laterality Date    ANKLE SURGERY Right     COLONOSCOPY      COLONOSCOPY N/A 2024    COLONOSCOPY DIAGNOSTIC performed by Amalia Lopez,

## 2025-05-10 NOTE — PROGRESS NOTES
Physical Therapy Med Surg Initial Assessment  Facility/Department: [address] ORTHO TELE  Room: [facility] [address]       NAME: Gloria Abbott  : 1950 (74 y.o.)  MRN: [id number]  CODE STATUS: Full Code    Date of Service: 5/10/2025    Patient Diagnosis(es): Altered mental status, unspecified altered mental status type [R41.82]  Leukocytosis, unspecified type [D72.829]  Diarrhea, unspecified type [R19.7]  AMS (altered mental status) [R41.82]   Chief Complaint   Patient presents with    Altered Mental Status     X's 12 hours      Diarrhea     X's 1.5 weeks       Patient Active Problem List    Diagnosis Date Noted    Acute post-operative pain 2023    Acute blood loss anemia 2023    Subcapital fracture of femur, right, closed, initial encounter ([facility]) 2023    Acute pain due to trauma 2023    Closed right hip fracture, initial encounter ([facility]) 2023    Dizziness 2023    Diarrhea 05/10/2025    C. difficile colitis 05/10/2025    AMS (altered mental status) 2025    Weakness 2025    Infection of ventriculoperitoneal shunt 2025    Candida infection 2025    Neck pain 2025    Hydrocephalus (HCC) 2025    Shunt malfunction 2025    Ventricular enlargement due to brain atrophy 2025    Infection of ventriculo-peritoneal shunt, initial encounter 2025    Nonfunctioning ventriculoperitoneal shunt, sequela 2025    Urge incontinence of urine 2024    Allergic rhinitis 2024    Constipation 2024    Altered bowel habits 10/16/2024    Chronic left maxillary sinusitis 2024    Bilateral high frequency sensorineural hearing loss 2024    Normal pressure hydrocephalus (HCC) 03/15/2024    Carpal tunnel syndrome, bilateral 2023    Status post left knee replacement 05/10/2023    Status post total knee replacement, left 2023    Osteoarthritis of left knee 2023    COVID-19 2021    Osteopenia 2020     3: Patient will sit on EOB independently for 5 minutes.    [facility] (6 CLICK) BASIC MOBILITY  AM-PAC Inpatient Mobility Raw Score : 8     Therapy Time:   Individual   Time In 1503   Time Out 1521   Minutes 18           Jody Cline PT, 05/10/25 at 3:48 PM         Definitions for assistance levels  Independent = pt does not require any physical supervision or assistance from another person for activity completion. Device may be needed.  Stand by assistance = pt requires verbal cues or instructions from another person, close to but not touching, to perform the activity  Minimal assistance= pt performs 75% or more of the activity; assistance is required to complete the activity  Moderate assistance= pt performs 50% of the activity; assistance is required to complete the activity  Maximal assistance = pt performs 25% of the activity; assistance is required to complete the activity  Dependent = pt requires total physical assistance to accomplish the task

## 2025-05-10 NOTE — ACP (ADVANCE CARE PLANNING)
Advance Care Planning   Healthcare Decision Maker:    Primary Decision Maker: Harshad Abbott - Child - [phone]    Primary Decision Maker: Sabine Vazquez - Child - [phone]    Click here to complete Healthcare Decision Makers including selection of the Healthcare Decision Maker Relationship (ie \"Primary\").

## 2025-05-10 NOTE — PROGRESS NOTES
See OT evaluation for all goals and OT POC. Electronically signed by Fabian Valdes OTR/L on 5/10/2025 at 3:58 PM

## 2025-05-10 NOTE — PLAN OF CARE
Problem: Discharge Planning  Goal: Discharge to home or other facility with appropriate resources  5/10/2025 0921 by Yuki Sampson RN  Outcome: Progressing  5/10/2025 0246 by Ester Kwan RN  Outcome: Progressing     Problem: Skin/Tissue Integrity  Goal: Skin integrity remains intact  Description: 1.  Monitor for areas of redness and/or skin breakdown2.  Assess vascular access sites hourly3.  Every 4-6 hours minimum:  Change oxygen saturation probe site4.  Every 4-6 hours:  If on nasal continuous positive airway pressure, respiratory therapy assess nares and determine need for appliance change or resting period  5/10/2025 0921 by Yuki Sampson RN  Outcome: Progressing  Flowsheets (Taken 5/10/2025 0921)  Skin Integrity Remains Intact:   Monitor for areas of redness and/or skin breakdown   Every 4-6 hours minimum:  Change oxygen saturation probe site   Assess vascular access sites hourly   Every 4-6 hours:  If on nasal continuous positive airway pressure, assess nares and determine need for appliance change or resting period   Turn and reposition as indicated   Assess need for specialty bed   Positioning devices   Pressure redistribution bed/mattress (bed type)   Check visual cues for pain  5/10/2025 0246 by Ester Kwan RN  Outcome: Progressing     Problem: Safety - Adult  Goal: Free from fall injury  5/10/2025 0921 by Yuki Sampson RN  Outcome: Progressing  Flowsheets (Taken 5/10/2025 0921)  Free From Fall Injury:   Instruct family/caregiver on patient safety   Based on caregiver fall risk screen, instruct family/caregiver to ask for assistance with transferring infant if caregiver noted to have fall risk factors  5/10/2025 0246 by Ester Kwan RN  Outcome: Progressing     Problem: ABCDS Injury Assessment  Goal: Absence of physical injury  5/10/2025 0921 by Yuki Sampson RN  Outcome: Progressing  Flowsheets (Taken 5/10/2025 0921)  Absence of Physical Injury: Implement safety measures based on patient  assessment  5/10/2025 0246 by Ester Kwan, RN  Outcome: Progressing     Problem: Pain  Goal: Verbalizes/displays adequate comfort level or baseline comfort level  Outcome: Progressing  Flowsheets (Taken 5/10/2025 2969)  Verbalizes/displays adequate comfort level or baseline comfort level:   Assess pain using appropriate pain scale   Encourage patient to monitor pain and request assistance   Administer analgesics based on type and severity of pain and evaluate response   Implement non-pharmacological measures as appropriate and evaluate response   Consider cultural and social influences on pain and pain management   Notify Licensed Independent Practitioner if interventions unsuccessful or patient reports new pain

## 2025-05-10 NOTE — PROGRESS NOTES
Writer spoke with nurse Lovell from [facility] in [place] regarding pt medications and to get information on pt's baseline. She stated that pt is normally independent and able to complete most ADL's with little to no help. She stated that since pt had stent removed, pt's condition has declined.     Writer asked her to send over pt's MAR and med rec for verification since nothing was on file. She stated she would fax everything.    Electronically signed by Ester Kwan RN on 5/10/25 at 12:56 AM EDT

## 2025-05-10 NOTE — CARE COORDINATION
Case Management Assessment  Initial Evaluation    Date/Time of Evaluation: 5/10/2025 10:47 AM  Assessment Completed by: Dacia Bernal RN    If patient is discharged prior to next notation, then this note serves as note for discharge by case management.    Patient Name: Gloria Abbott                   YOB: 1950  Diagnosis: Altered mental status, unspecified altered mental status type [R41.82]  Leukocytosis, unspecified type [D72.829]  Diarrhea, unspecified type [R19.7]  AMS (altered mental status) [R41.82]                   Date / Time: 5/9/2025  4:33 PM    Patient Admission Status: Inpatient   Readmission Risk (Low < 19, Mod (19-27), High > 27): Readmission Risk Score: 21.9    Current PCP: Xiomara Pappas MD  PCP verified by CM? Yes    Chart Reviewed: Yes      History Provided by: Other (see comment), Child/Family (SON ELIZABETH AND DAUGHTER SABINE)  Patient Orientation: Person (MET WITH SON AND DAUGHTER)    Patient Cognition: Alert    Hospitalization in the last 30 days (Readmission):  No    If yes, Readmission Assessment in CM Navigator will be completed.    Advance Directives:      Code Status: Full Code   Patient's Primary Decision Maker is: Legal Next of Kin    Primary Decision Maker: Elizabeth Abbott - Child - [phone]    Primary Decision Maker: GeorgeSabine - Child - [phone]    Discharge Planning:    Patient lives with: Other (Comment) Type of Home: Skilled Nursing Facility  Primary Care Giver: Self  Patient Support Systems include: Children, Family Members   Current Financial resources:    Current community resources:    Current services prior to admission: Skilled Nursing Facility            Current DME:              Type of Home Care services:  Nursing Services    ADLS  Prior functional level: Independent in ADLs/IADLs  Current functional level: Independent in ADLs/IADLs    PT AM-PAC:   /24  OT AM-PAC:   /24    Family can provide assistance at DC: Yes  Would you like Case Management to  Representative Name:       The Patient and/or Patient Representative Agree with the Discharge Plan?      Dacia Bernal RN  Case Management Department

## 2025-05-10 NOTE — PROGRESS NOTES
[facility] OCCUPATIONAL THERAPY EVALUATION - ACUTE     NAME: Gloria Abbott  : 1950 (74 y.o.)  MRN: [id number]  CODE STATUS: Full Code  Room: 76/W276-01    Date of Service: 5/10/2025    Patient Diagnosis(es): Altered mental status, unspecified altered mental status type [R41.82]  Leukocytosis, unspecified type [D72.829]  Diarrhea, unspecified type [R19.7]  AMS (altered mental status) [R41.82]   Patient Active Problem List    Diagnosis Date Noted    Acute post-operative pain 2023    Acute blood loss anemia 2023    Subcapital fracture of femur, right, closed, initial encounter ([facility]) 2023    Acute pain due to trauma 2023    Closed right hip fracture, initial encounter ([facility]) 2023    Dizziness 2023    Diarrhea 05/10/2025    C. difficile colitis 05/10/2025    AMS (altered mental status) 2025    Weakness 2025    Infection of ventriculoperitoneal shunt 2025    Candida infection 2025    Neck pain 2025    Hydrocephalus (HCC) 2025    Shunt malfunction 2025    Ventricular enlargement due to brain atrophy 2025    Infection of ventriculo-peritoneal shunt, initial encounter 2025    Nonfunctioning ventriculoperitoneal shunt, sequela 2025    Urge incontinence of urine 2024    Allergic rhinitis 2024    Constipation 2024    Altered bowel habits 10/16/2024    Chronic left maxillary sinusitis 2024    Bilateral high frequency sensorineural hearing loss 2024    Normal pressure hydrocephalus (HCC) 03/15/2024    Carpal tunnel syndrome, bilateral 2023    Status post left knee replacement 05/10/2023    Status post total knee replacement, left 2023    Osteoarthritis of left knee 2023    COVID-19 2021    Osteopenia 2020    History of uterine cancer 2018    History of endometrial cancer 2018    Other joint derangement, not elsewhere classified, ankle and foot  GASTROINTESTINAL ENDOSCOPY N/A 11/19/2024    ESOPHAGOGASTRODUODENOSCOPY DIAGNOSTIC ONLY with biopsies and esophageal dilation performed by Amalia Lopez MD at [facility]    VENTRICULOPERITONEAL SHUNT N/A 03/15/2024    RIGHT  SHUNT CREATION AND INDICATED PROCEDURES, MICRODISSECTION, PREOPERATIVE STEROTACTIC PLANNING AND WORK performed by Sumit Shelton MD at [facility] [place]        Restrictions  Restrictions/Precautions: Contact Precautions, Fall Risk                 Safety Devices: Safety Devices  Type of Devices: All fall risk precautions in place     Patient's date of birth confirmed: Yes    General:  Chart Reviewed: Yes  Patient assessed for rehabilitation services?: Yes    Subjective          Pain at start of treatment: pt with grimace, but did not state where or quantify     Pain at end of treatment: unchanged     Prior Level of Function:  Social/Functional History  Lives With: Son  Type of Home: House  Home Layout: One level  Home Access: Stairs to enter with rails  Entrance Stairs - Number of Steps: 2  Entrance Stairs - Rails: Left (up)  Bathroom Shower/Tub: Walk-in shower  Bathroom Equipment: Grab bars in shower, Hand-held shower (bench, fold down)  Home Equipment: Cane, Walker - Rolling  Has the patient had two or more falls in the past year or any fall with injury in the past year?: Yes  Prior Level of Assist for ADLs: Independent (after shunt removal, pt was total assist for ADL's)  Prior Level of Assist for Homemaking: Independent (pt unable to complete after shunt removal)  Prior Level of Assist for Ambulation: Independent household ambulator, with or without device, Independent community ambulator, with or without device  Prior Level of Assist for Transfers: Independent  Active : Yes (prior to shunt removal in March)  Additional Comments: pt was IND with ADL prior to shunt removal in March 2025, pt agreed to discuss PLOF with dtr    OBJECTIVE:     Orientation Status:  Orientation  Overall

## 2025-05-10 NOTE — PROGRESS NOTES
Hospitalist Progress Note      PCP: Xiomara Pappas MD    Date of Admission: 5/9/2025    Chief Complaint:  no acute events, low grade fever-37.9 C overnight, stable HD, on RA    Medications:  Reviewed    Infusion Medications    sodium chloride      sodium chloride 100 mL/hr at 05/10/25 0115     Scheduled Medications    sodium chloride flush  5-40 mL IntraVENous 2 times per day    enoxaparin  40 mg SubCUTAneous Daily    vancomycin  125 mg Oral 4 times per day     PRN Meds: sodium chloride flush, sodium chloride, potassium chloride **OR** potassium alternative oral replacement **OR** potassium chloride, magnesium sulfate, ondansetron **OR** ondansetron, melatonin, polyethylene glycol, [Held by provider] acetaminophen **OR** [Held by provider] acetaminophen      Intake/Output Summary (Last 24 hours) at 5/10/2025 1253  Last data filed at 5/10/2025 0753  Gross per 24 hour   Intake 120 ml   Output --   Net 120 ml       Exam:    /72   Pulse 78   Temp 97.3 °F (36.3 °C) (Oral)   Resp 16   Wt 70.8 kg (156 lb 1.4 oz)   LMP  (LMP Unknown)   SpO2 95%   BMI 26.78 kg/m²     General appearance: awake, cooperative.  Respiratory: CTA bilaterally .  Cardiovascular: Regular rate and rhythm, S1/S2 .  Abdomen: Soft, active bowel sounds.  Musculoskeletal: No edema bilaterally.        Labs:   Recent Labs     05/09/25  1644 05/10/25  0506   WBC 39.4* 38.4*   HGB 12.4 11.9*   HCT 37.0 35.7*   * 557*     Recent Labs     05/09/25  1644 05/10/25  0506   * 136   K 3.2* 3.8   CL 97 102   CO2 22 21   BUN 40* 34*   CREATININE 0.97* 0.74   CALCIUM 8.0* 8.0*     Recent Labs     05/09/25  1644 05/10/25  0506   AST 64* 48*   * 285*   BILITOT 0.4 0.4   ALKPHOS 399* 365*     Recent Labs     05/09/25  1644   INR 1.6     No results for input(s): \"CKTOTAL\", \"TROPONINI\" in the last 72 hours.    Urinalysis:      Lab Results   Component Value Date/Time    NITRU Negative 04/22/2025 05:55 PM    WBCUA 10-20 04/14/2023 01:19 PM

## 2025-05-10 NOTE — CONSULTS
Date/Time   Clostridium Difficile Toxin/Antigen [[id number]] (Abnormal) Collected: 05/09/25 1713   Order Status: Completed Specimen: Stool Updated: 05/10/25 0828    C.diff Toxin/Antigen -- Abnormal     POSITIVE for Clostridium difficile antigen and toxin  CONTACT PRECAUTIONS INDICATED  Normal Range: Negative ?Internal Positive Control:  OK        CT Head W/O Contrast Final result 5/9/2025          Narrative   EXAMINATION:  CT OF THE HEAD WITHOUT CONTRAST  5/9/2025 6:32 pm    TECHNIQUE:  CT of the head was performed without the administration of intravenous  contrast. Automated exposure control, iterative reconstruction, and/or weight  based adjustment of the mA/kV was utilized to reduce the radiation dose to as  low as reasonably achievable.    COMPARISON: ...   Impression   1. No acute intracranial abnormality.  2. Prominence of the ventricles, not significantly changed when compared the  study of 04/21/2025.  3. Chronic appearing left maxillary and sphenoid sinusitis.  4. Gas fluid level within the right mastoid air cells concerning for acute  sinusitis.                    CT ABDOMEN PELVIS W IV CONTRAST Additional Contrast? None  Order: [id number]   Status: Final result       Next appt: 05/22/2025 at 01:00 PM in Internal Medicine (Xiomara Pappas MD)    Test Result Released: Yes (not seen)    0 Result Notes  Details    Reading Physician Reading Date Result Priority   Joaquim, Cynthia Steven MD  [phone]     5/9/2025      Narrative & Impression  EXAMINATION:  CT OF THE ABDOMEN AND PELVIS WITH CONTRAST 5/9/2025 6:32 pm     TECHNIQUE:  CT of the abdomen and pelvis was performed with the administration of  intravenous contrast. Multiplanar reformatted images are provided for review.  Automated exposure control, iterative reconstruction, and/or weight based  adjustment of the mA/kV was utilized to reduce the radiation dose to as low  as reasonably achievable.     COMPARISON:  None.     HISTORY:  ORDERING SYSTEM  PROVIDED HISTORY: diarrhea  TECHNOLOGIST PROVIDED HISTORY:  Additional Contrast?->None  Reason for exam:->diarrhea  Decision Support Exception - unselect if not a suspected or confirmed  emergency medical condition->Emergency Medical Condition (MA)  What reading provider will be dictating this exam?->CRC     FINDINGS:  Lower Chest: There are linear bibasilar subpleural opacity may represent  atelectasis.  No large consolidation or pleural effusions are identified.  The heart does not appear enlarged.  There are mild coronary artery  calcifications present.     Organs: Liver reveals a homogeneous density.  There are no signs of a  discrete mass or evidence of duct dilation.     The gallbladder reveals no signs of stones or wall thickening.  The common  duct is not distended.     There is minimal low density adjacent to the head of the pancreas, could  represent developing pancreatitis but this is nonspecific.  No discrete  pancreatic masses or evidence of pancreatic duct dilation were observed.  Short-term follow-up or MRI may be of increased sensitivity.  The spleen and  adrenal glands appear within the normal range.     Each kidney appears normal in size shape and position without stones or  hydronephrosis.  There is an exophytic low-density process extending from the  inferior pole of the right kidney measuring 3.6 cm.     GI/Bowel: There is a fixed hiatus hernia present stomach appears within the  normal range otherwise. The small bowel pattern is nonobstructive. Region of  the terminal ileum and cecum appears within normal range. The appendix is not  identified and may be absent surgically.     The colonic gas pattern is within normal range there are sigmoid diverticula  but no evidence of diverticulitis. There is minimal adventitial stranding  along the sigmoid but no gas or fluid collections are noted.     Pelvis: The urinary bladder reveals a rounded contour without focal wall  thickening the uterus and

## 2025-05-10 NOTE — ED PROVIDER NOTES
I reviewed the patient's current vital signs, lab work and imaging with the hospitalist request that we give Cipro and Yara, Dr. Alves accepts for further management     Chaka Dunbar MD  05/09/25 3271

## 2025-05-11 LAB
ACANTHOCYTES BLD QL SMEAR: ABNORMAL
ALBUMIN SERPL-MCNC: 2.3 G/DL (ref 3.5–4.6)
ALP SERPL-CCNC: 305 U/L (ref 40–130)
ALT SERPL-CCNC: 191 U/L (ref 0–33)
ANION GAP SERPL CALCULATED.3IONS-SCNC: 12 MEQ/L (ref 9–15)
AST SERPL-CCNC: 48 U/L (ref 0–35)
BASOPHILS # BLD: 0 K/UL (ref 0–0.2)
BASOPHILS NFR BLD: 0.5 %
BILIRUB SERPL-MCNC: 0.5 MG/DL (ref 0.2–0.7)
BUN SERPL-MCNC: 18 MG/DL (ref 8–23)
CALCIUM SERPL-MCNC: 7.6 MG/DL (ref 8.5–9.9)
CHLORIDE SERPL-SCNC: 103 MEQ/L (ref 95–107)
CO2 SERPL-SCNC: 20 MEQ/L (ref 20–31)
CREAT SERPL-MCNC: 0.48 MG/DL (ref 0.5–0.9)
CRP SERPL HS-MCNC: 196.6 MG/L (ref 0–5)
EOSINOPHIL # BLD: 0 K/UL (ref 0–0.7)
EOSINOPHIL NFR BLD: 0.7 %
ERYTHROCYTE [DISTWIDTH] IN BLOOD BY AUTOMATED COUNT: 14.1 % (ref 11.5–14.5)
GLOBULIN SER CALC-MCNC: 3.2 G/DL (ref 2.3–3.5)
GLUCOSE SERPL-MCNC: 96 MG/DL (ref 70–99)
HCT VFR BLD AUTO: 34.6 % (ref 37–47)
HGB BLD-MCNC: 11.2 G/DL (ref 12–16)
INR PPP: 1.2
LYMPHOCYTES # BLD: 1.1 K/UL (ref 1–4.8)
LYMPHOCYTES NFR BLD: 4 %
MAGNESIUM SERPL-MCNC: 2.4 MG/DL (ref 1.7–2.4)
MCH RBC QN AUTO: 28.9 PG (ref 27–31.3)
MCHC RBC AUTO-ENTMCNC: 32.4 % (ref 33–37)
MCV RBC AUTO: 89.2 FL (ref 79.4–94.8)
MONOCYTES # BLD: 0.6 K/UL (ref 0.2–0.8)
MONOCYTES NFR BLD: 1.9 %
MYELOCYTES NFR BLD MANUAL: 1 %
NEUTROPHILS # BLD: 26.7 K/UL (ref 1.4–6.5)
NEUTS BAND NFR BLD MANUAL: 1 % (ref 5–11)
NEUTS SEG NFR BLD: 92 %
OVALOCYTES BLD QL SMEAR: ABNORMAL
PLATELET # BLD AUTO: 488 K/UL (ref 130–400)
PLATELET BLD QL SMEAR: ABNORMAL
POIKILOCYTOSIS BLD QL SMEAR: ABNORMAL
POTASSIUM SERPL-SCNC: 3.3 MEQ/L (ref 3.4–4.9)
PROT SERPL-MCNC: 5.5 G/DL (ref 6.3–8)
PROTHROMBIN TIME: 16.2 SEC (ref 12.3–14.9)
RBC # BLD AUTO: 3.88 M/UL (ref 4.2–5.4)
SLIDE REVIEW: ABNORMAL
SMUDGE CELLS BLD QL SMEAR: 1
SODIUM SERPL-SCNC: 135 MEQ/L (ref 135–144)
WBC # BLD AUTO: 28.4 K/UL (ref 4.8–10.8)

## 2025-05-11 PROCEDURE — 85025 COMPLETE CBC W/AUTO DIFF WBC: CPT

## 2025-05-11 PROCEDURE — 2580000003 HC RX 258: Performed by: INTERNAL MEDICINE

## 2025-05-11 PROCEDURE — 85610 PROTHROMBIN TIME: CPT

## 2025-05-11 PROCEDURE — 80053 COMPREHEN METABOLIC PANEL: CPT

## 2025-05-11 PROCEDURE — 6370000000 HC RX 637 (ALT 250 FOR IP): Performed by: INTERNAL MEDICINE

## 2025-05-11 PROCEDURE — 2500000003 HC RX 250 WO HCPCS: Performed by: INTERNAL MEDICINE

## 2025-05-11 PROCEDURE — 1210000000 HC MED SURG R&B

## 2025-05-11 PROCEDURE — 36415 COLL VENOUS BLD VENIPUNCTURE: CPT

## 2025-05-11 PROCEDURE — 83735 ASSAY OF MAGNESIUM: CPT

## 2025-05-11 PROCEDURE — 86140 C-REACTIVE PROTEIN: CPT

## 2025-05-11 PROCEDURE — 6360000002 HC RX W HCPCS: Performed by: INTERNAL MEDICINE

## 2025-05-11 PROCEDURE — 99232 SBSQ HOSP IP/OBS MODERATE 35: CPT | Performed by: INTERNAL MEDICINE

## 2025-05-11 RX ORDER — POTASSIUM CHLORIDE 1500 MG/1
40 TABLET, EXTENDED RELEASE ORAL ONCE
Status: COMPLETED | OUTPATIENT
Start: 2025-05-11 | End: 2025-05-11

## 2025-05-11 RX ADMIN — POTASSIUM CHLORIDE 40 MEQ: 1500 TABLET, EXTENDED RELEASE ORAL at 12:41

## 2025-05-11 RX ADMIN — VANCOMYCIN HYDROCHLORIDE 125 MG: 125 CAPSULE ORAL at 17:18

## 2025-05-11 RX ADMIN — SODIUM CHLORIDE: 0.9 INJECTION, SOLUTION INTRAVENOUS at 16:03

## 2025-05-11 RX ADMIN — VANCOMYCIN HYDROCHLORIDE 125 MG: 125 CAPSULE ORAL at 22:20

## 2025-05-11 RX ADMIN — ENOXAPARIN SODIUM 40 MG: 100 INJECTION SUBCUTANEOUS at 09:12

## 2025-05-11 RX ADMIN — SODIUM CHLORIDE: 0.9 INJECTION, SOLUTION INTRAVENOUS at 04:08

## 2025-05-11 RX ADMIN — VANCOMYCIN HYDROCHLORIDE 125 MG: 125 CAPSULE ORAL at 12:41

## 2025-05-11 RX ADMIN — VANCOMYCIN HYDROCHLORIDE 125 MG: 125 CAPSULE ORAL at 06:07

## 2025-05-11 RX ADMIN — SODIUM CHLORIDE, PRESERVATIVE FREE 10 ML: 5 INJECTION INTRAVENOUS at 09:12

## 2025-05-11 NOTE — CARE COORDINATION
Met with pt son Harshad. SNF choices are 1. [facility] 2. [facility].   Referral faxed to [facility] and message to Zoie with [facility] sent.   Reviewed Important message from medicare with pt son and daughter as pt is confused, consent signed and copy provided.

## 2025-05-11 NOTE — PLAN OF CARE
Problem: Discharge Planning  Goal: Discharge to home or other facility with appropriate resources  Outcome: Progressing     Problem: Skin/Tissue Integrity  Goal: Skin integrity remains intact  Description: 1.  Monitor for areas of redness and/or skin breakdown2.  Assess vascular access sites hourly3.  Every 4-6 hours minimum:  Change oxygen saturation probe site4.  Every 4-6 hours:  If on nasal continuous positive airway pressure, respiratory therapy assess nares and determine need for appliance change or resting period  Outcome: Progressing     Problem: Safety - Adult  Goal: Free from fall injury  Outcome: Progressing     Problem: ABCDS Injury Assessment  Goal: Absence of physical injury  Outcome: Progressing     Problem: Pain  Goal: Verbalizes/displays adequate comfort level or baseline comfort level  Outcome: Progressing

## 2025-05-11 NOTE — PROGRESS NOTES
Hospitalist Progress Note      PCP: Xiomara Pappas MD    Date of Admission: 5/9/2025    Chief Complaint:  no acute events, no fevers overnight, stable HD, on RA    Medications:  Reviewed    Infusion Medications    sodium chloride      sodium chloride 100 mL/hr at 05/11/25 0408     Scheduled Medications    sodium chloride flush  5-40 mL IntraVENous 2 times per day    enoxaparin  40 mg SubCUTAneous Daily    vancomycin  125 mg Oral 4 times per day     PRN Meds: sodium chloride flush, sodium chloride, potassium chloride **OR** potassium alternative oral replacement **OR** potassium chloride, magnesium sulfate, ondansetron **OR** ondansetron, melatonin, polyethylene glycol, [Held by provider] acetaminophen **OR** [Held by provider] acetaminophen      Intake/Output Summary (Last 24 hours) at 5/11/2025 1001  Last data filed at 5/10/2025 1603  Gross per 24 hour   Intake 240 ml   Output --   Net 240 ml       Exam:    BP (!) 151/63   Pulse 71   Temp 97.7 °F (36.5 °C) (Oral)   Resp 18   Wt 70.8 kg (156 lb 1.4 oz)   LMP  (LMP Unknown)   SpO2 99%   BMI 26.78 kg/m²     General appearance: awake, cooperative.  Respiratory: CTA bilaterally .  Cardiovascular: Regular rate and rhythm, S1/S2 .  Abdomen: Soft, active bowel sounds.  Musculoskeletal: No edema bilaterally.        Labs:   Recent Labs     05/09/25  1644 05/10/25  0506 05/11/25  0511   WBC 39.4* 38.4* 28.4*   HGB 12.4 11.9* 11.2*   HCT 37.0 35.7* 34.6*   * 557* 488*     Recent Labs     05/09/25  1644 05/10/25  0506 05/11/25  0511   * 136 135   K 3.2* 3.8 3.3*   CL 97 102 103   CO2 22 21 20   BUN 40* 34* 18   CREATININE 0.97* 0.74 0.48*   CALCIUM 8.0* 8.0* 7.6*     Recent Labs     05/09/25  1644 05/10/25  0506 05/11/25  0511   AST 64* 48* 48*   * 285* 191*   BILITOT 0.4 0.4 0.5   ALKPHOS 399* 365* 305*     Recent Labs     05/09/25  1644 05/11/25  0511   INR 1.6 1.2     No results for input(s): \"CKTOTAL\", \"TROPONINI\" in the last 72

## 2025-05-11 NOTE — PLAN OF CARE
Problem: Discharge Planning  Goal: Discharge to home or other facility with appropriate resources  5/11/2025 1238 by Ephraim Guzman RN  Outcome: Progressing  5/11/2025 0014 by Yana Avalos RN  Outcome: Progressing     Problem: Skin/Tissue Integrity  Goal: Skin integrity remains intact  Description: 1.  Monitor for areas of redness and/or skin breakdown2.  Assess vascular access sites hourly3.  Every 4-6 hours minimum:  Change oxygen saturation probe site4.  Every 4-6 hours:  If on nasal continuous positive airway pressure, respiratory therapy assess nares and determine need for appliance change or resting period  5/11/2025 1238 by Ephraim Guzman RN  Outcome: Progressing  5/11/2025 0014 by Yana Avalos RN  Outcome: Progressing     Problem: Safety - Adult  Goal: Free from fall injury  5/11/2025 1238 by Ephraim Guzman RN  Outcome: Progressing  5/11/2025 0014 by Yana Avalos RN  Outcome: Progressing     Problem: ABCDS Injury Assessment  Goal: Absence of physical injury  5/11/2025 1238 by Ephraim Guzman RN  Outcome: Progressing  5/11/2025 0014 by Yana Avalos RN  Outcome: Progressing     Problem: Pain  Goal: Verbalizes/displays adequate comfort level or baseline comfort level  5/11/2025 1238 by Ephraim Guzman RN  Outcome: Progressing  5/11/2025 0014 by Yana Avalos RN  Outcome: Progressing

## 2025-05-11 NOTE — PROGRESS NOTES
Infectious Disease     Patient Name: Gloria Abbott  Date: 5/11/2025  YOB: 1950  Medical Record Number: [id number]              History of Present Illness:  hypertension, hypothyroidism     Normal pressure hydrocephalus ventriculoperitoneal shunt infection  Shunt was removed 3/17/2025     03/17/2025  OPERATIONS PERFORMED:  Removal of right ventriculoperitoneal shunt, debridement, irrigation, and primary closure.     off antibiotic had treatment with IV vancomycin, Rocephin and Diflucan received 3 weeks of antibiotics      Presents 5/9/2025 with diarrhea disorientation    Stool positive for C. difficile       procalcitonin 0.23    White cell count 39,000    Alkaline Phosphatase    399 U/L          U/L       AST 64 U/L          Review of Systems   Respiratory: Negative.     Cardiovascular: Negative.    Gastrointestinal:  Positive for abdominal pain and diarrhea. Negative for nausea and vomiting.   Neurological:  Positive for weakness.     Physical Exam:      Physical Exam  HENT:      Head:      Comments: Wound at the top of skull slightly to the right side depressed region where incision is no redness no drainage no warmth no swelling  Eyes:      Extraocular Movements: Extraocular movements intact.   Cardiovascular:      Heart sounds: Normal heart sounds. No murmur heard.  Pulmonary:      Effort: Pulmonary effort is normal. No respiratory distress.      Breath sounds: Normal breath sounds. No wheezing, rhonchi or rales.   Abdominal:      General: Abdomen is flat. Bowel sounds are normal. There is no distension.      Palpations: Abdomen is soft. There is no mass.      Tenderness: There is abdominal tenderness. There is no guarding.         Blood pressure (!) 144/56, pulse 67, temperature 97.8 °F (36.6 °C), temperature source Axillary, resp. rate 18, weight 70.8 kg (156 lb 1.4 oz), SpO2 98%, not currently breastfeeding.      .   Lab Results   Component Value Date    WBC 28.4 (H) 05/11/2025  There is a fixed hiatus hernia present stomach appears within the  normal range otherwise. The small bowel pattern is nonobstructive. Region of  the terminal ileum and cecum appears within normal range. The appendix is not  identified and may be absent surgically.     The colonic gas pattern is within normal range there are sigmoid diverticula  but no evidence of diverticulitis. There is minimal adventitial stranding  along the sigmoid but no gas or fluid collections are noted.     Pelvis: The urinary bladder reveals a rounded contour without focal wall  thickening the uterus and adnexa are not visualized and may be absent  surgically.     Peritoneum/Retroperitoneum: There are no signs of retroperitoneal  lymphadenopathy, aneurysm or signs of ascites.  There is an adipose  containing the     Bones/Soft Tissues: There are postoperative changes of the right hip noted  incidentally.  No focal gas or fluid collections are identified.     IMPRESSION:  1. There is minimal low density adjacent to the head of the pancreas, could  represent developing pancreatitis but this is nonspecific.  2. Diverticulosis without signs of diverticulitis.  3. Fixed hiatus hernia.           Exam Ended: 05/09/25 18:34 EDT Last Resulted: 05/09/25 19:40 EDT                   ASSESSMENT:  PLAN:  C. difficile colitis    Continue vancomycin

## 2025-05-12 PROBLEM — G89.29 CHRONIC NECK PAIN: Status: ACTIVE | Noted: 2025-03-24

## 2025-05-12 PROBLEM — J32.4 CHRONIC PANSINUSITIS: Status: ACTIVE | Noted: 2025-05-12

## 2025-05-12 LAB
ALBUMIN SERPL-MCNC: 2.1 G/DL (ref 3.5–4.6)
ALP SERPL-CCNC: 252 U/L (ref 40–130)
ALT SERPL-CCNC: 120 U/L (ref 0–33)
ANION GAP SERPL CALCULATED.3IONS-SCNC: 8 MEQ/L (ref 9–15)
AST SERPL-CCNC: 56 U/L (ref 0–35)
BASOPHILS # BLD: 0.1 K/UL (ref 0–0.2)
BASOPHILS NFR BLD: 0.5 %
BILIRUB SERPL-MCNC: 0.4 MG/DL (ref 0.2–0.7)
BUN SERPL-MCNC: 10 MG/DL (ref 8–23)
CALCIUM SERPL-MCNC: 7.6 MG/DL (ref 8.5–9.9)
CHLORIDE SERPL-SCNC: 107 MEQ/L (ref 95–107)
CO2 SERPL-SCNC: 21 MEQ/L (ref 20–31)
CREAT SERPL-MCNC: 0.45 MG/DL (ref 0.5–0.9)
CRP SERPL HS-MCNC: 182.7 MG/L (ref 0–5)
EOSINOPHIL # BLD: 0.2 K/UL (ref 0–0.7)
EOSINOPHIL NFR BLD: 1.1 %
ERYTHROCYTE [DISTWIDTH] IN BLOOD BY AUTOMATED COUNT: 14.1 % (ref 11.5–14.5)
GLOBULIN SER CALC-MCNC: 3.2 G/DL (ref 2.3–3.5)
GLUCOSE SERPL-MCNC: 94 MG/DL (ref 70–99)
HCT VFR BLD AUTO: 32.3 % (ref 37–47)
HGB BLD-MCNC: 10.2 G/DL (ref 12–16)
LYMPHOCYTES # BLD: 1 K/UL (ref 1–4.8)
LYMPHOCYTES NFR BLD: 4.6 %
MCH RBC QN AUTO: 28 PG (ref 27–31.3)
MCHC RBC AUTO-ENTMCNC: 31.6 % (ref 33–37)
MCV RBC AUTO: 88.7 FL (ref 79.4–94.8)
MONOCYTES # BLD: 1.2 K/UL (ref 0.2–0.8)
MONOCYTES NFR BLD: 5.6 %
NEUTROPHILS # BLD: 18.4 K/UL (ref 1.4–6.5)
NEUTS SEG NFR BLD: 85.8 %
PLATELET # BLD AUTO: 532 K/UL (ref 130–400)
POTASSIUM SERPL-SCNC: 4.2 MEQ/L (ref 3.4–4.9)
PROT SERPL-MCNC: 5.3 G/DL (ref 6.3–8)
RBC # BLD AUTO: 3.64 M/UL (ref 4.2–5.4)
SODIUM SERPL-SCNC: 136 MEQ/L (ref 135–144)
WBC # BLD AUTO: 21.4 K/UL (ref 4.8–10.8)

## 2025-05-12 PROCEDURE — 2580000003 HC RX 258: Performed by: INTERNAL MEDICINE

## 2025-05-12 PROCEDURE — 36415 COLL VENOUS BLD VENIPUNCTURE: CPT

## 2025-05-12 PROCEDURE — 97535 SELF CARE MNGMENT TRAINING: CPT

## 2025-05-12 PROCEDURE — 86140 C-REACTIVE PROTEIN: CPT

## 2025-05-12 PROCEDURE — 99232 SBSQ HOSP IP/OBS MODERATE 35: CPT | Performed by: INTERNAL MEDICINE

## 2025-05-12 PROCEDURE — 6360000002 HC RX W HCPCS: Performed by: INTERNAL MEDICINE

## 2025-05-12 PROCEDURE — 1210000000 HC MED SURG R&B

## 2025-05-12 PROCEDURE — 85025 COMPLETE CBC W/AUTO DIFF WBC: CPT

## 2025-05-12 PROCEDURE — 2500000003 HC RX 250 WO HCPCS: Performed by: INTERNAL MEDICINE

## 2025-05-12 PROCEDURE — 6370000000 HC RX 637 (ALT 250 FOR IP): Performed by: INTERNAL MEDICINE

## 2025-05-12 PROCEDURE — 80053 COMPREHEN METABOLIC PANEL: CPT

## 2025-05-12 RX ORDER — LACTOBACILLUS RHAMNOSUS GG 10B CELL
1 CAPSULE ORAL 2 TIMES DAILY
Status: DISCONTINUED | OUTPATIENT
Start: 2025-05-12 | End: 2025-05-14 | Stop reason: HOSPADM

## 2025-05-12 RX ADMIN — SODIUM CHLORIDE: 0.9 INJECTION, SOLUTION INTRAVENOUS at 18:40

## 2025-05-12 RX ADMIN — VANCOMYCIN HYDROCHLORIDE 125 MG: 125 CAPSULE ORAL at 13:38

## 2025-05-12 RX ADMIN — VANCOMYCIN HYDROCHLORIDE 125 MG: 125 CAPSULE ORAL at 05:25

## 2025-05-12 RX ADMIN — VANCOMYCIN HYDROCHLORIDE 125 MG: 125 CAPSULE ORAL at 18:40

## 2025-05-12 RX ADMIN — VANCOMYCIN HYDROCHLORIDE 125 MG: 125 CAPSULE ORAL at 23:56

## 2025-05-12 RX ADMIN — Medication 1 CAPSULE: at 20:09

## 2025-05-12 RX ADMIN — ENOXAPARIN SODIUM 40 MG: 100 INJECTION SUBCUTANEOUS at 10:15

## 2025-05-12 RX ADMIN — Medication 3 MG: at 20:09

## 2025-05-12 RX ADMIN — SODIUM CHLORIDE, PRESERVATIVE FREE 10 ML: 5 INJECTION INTRAVENOUS at 10:15

## 2025-05-12 RX ADMIN — SODIUM CHLORIDE: 0.9 INJECTION, SOLUTION INTRAVENOUS at 01:16

## 2025-05-12 RX ADMIN — Medication 1 CAPSULE: at 13:38

## 2025-05-12 RX ADMIN — SODIUM CHLORIDE, PRESERVATIVE FREE 10 ML: 5 INJECTION INTRAVENOUS at 20:09

## 2025-05-12 ASSESSMENT — ENCOUNTER SYMPTOMS
RESPIRATORY NEGATIVE: 1
GASTROINTESTINAL NEGATIVE: 1

## 2025-05-12 ASSESSMENT — PAIN SCALES - GENERAL
PAINLEVEL_OUTOF10: 5
PAINLEVEL_OUTOF10: 0
PAINLEVEL_OUTOF10: 2

## 2025-05-12 ASSESSMENT — PAIN DESCRIPTION - LOCATION: LOCATION: NECK

## 2025-05-12 NOTE — DISCHARGE INSTR - COC
Continuity of Care Form    Patient Name: Gloria Abbott   :  1950  MRN:  [id number]    Admit date:  2025  Discharge date:  2025    Code Status Order: Full Code   Advance Directives:     Admitting Physician:  Davidson Alves MD  PCP: Xiomara Pappas MD    Discharging Nurse: Daniela VO  Discharging Hospital Unit/Room#: W276/W276-01  Discharging Unit Phone Number: [phone]    Emergency Contact:   Extended Emergency Contact Information  Primary Emergency Contact: Harshad Abbott  Address: [address] [place] [address] [place]  Mobile Phone: [phone]  Relation: Child  Secondary Emergency Contact: Sabine Vazquez  Address: [address] [place] [address] [place] [address] [facility] [place]  Home Phone: [phone]  Mobile Phone: [phone]  Relation: Child    Past Surgical History:  Past Surgical History:   Procedure Laterality Date    ANKLE SURGERY Right     COLONOSCOPY      COLONOSCOPY N/A 2024    COLONOSCOPY DIAGNOSTIC performed by Amalia Lopez MD at [facility]    FOOT SURGERY Right 2001    HIP SURGERY Right 2023    RIGHT HIP PINNING AND LEFT KNEE STERIOD INJECTION performed by Elliot Garcia MD at [facility] OR    HYSTERECTOMY (CERVIX STATUS UNKNOWN)      LASIK Bilateral     SHUNT REMOVAL N/A 3/17/2025    REMOVAL VENTRICULAR PERITONEAL SHUNT room 270 performed by Marisela Larson MD at [facility] OR    SINUS ENDOSCOPY Bilateral 2024    ENDOSCOPIC MAXILLARY ANTROSTOMIES 30 MIN performed by Ryan Zepeda MD at [facility] OR    SINUS ENDOSCOPY Left 2024    REVISION MAXILLARY ANTROSTOMY (LEFT)  RECENT SURGERY performed by Ryan Zepeda MD at [facility] OR    SINUS SURGERY  07/10/2012    TOTAL KNEE ARTHROPLASTY Left 2023    Left total knee arthroplasty,Juan C Triathlon Total Knee System,Choice with adductor canal block preop with Nhan 23 at 10:30 am and lab work for 23 at 1:00 pm performed by Elliot Garcia MD at [facility] OR    [facility]  Mobility/ADLs:  Walking   Dependent  Transfer  Dependent  Bathing  Dependent  Dressing  Dependent  Toileting  Dependent  Feeding  Assisted set up  Med Admin  Assisted  Med Delivery   whole    Wound Care Documentation and Therapy:  Incision 03/17/25 Head Right;Upper (Active)   Dressing Status Other (Comment) 05/11/25 2000   Number of days: 55        Elimination:  Continence:   Bowel: No and incontinent at times  Bladder: Yes  Urinary Catheter: None   Colostomy/Ileostomy/Ileal Conduit: No       Date of Last BM: 5- x 2    Intake/Output Summary (Last 24 hours) at 5/12/2025 0834  Last data filed at 5/12/2025 0530  Gross per 24 hour   Intake 3171.3 ml   Output 950 ml   Net 2221.3 ml     I/O last 3 completed shifts:  In: 3171.3 [P.O.:600; I.V.:2471.3; IV Piggyback:100]  Out: 950 [Urine:950]    Safety Concerns:     At Risk for Falls    Impairments/Disabilities:      None    Nutrition Therapy:  Current Nutrition Therapy:   - Oral Diet:  General    Routes of Feeding: Oral  Liquids: Thin Liquids  Daily Fluid Restriction: no  Last Modified Barium Swallow with Video (Video Swallowing Test): not done    Treatments at the Time of Hospital Discharge:   Respiratory Treatments: na  Oxygen Therapy:  is not on home oxygen therapy.  Ventilator:    - No ventilator support    Rehab Therapies: Physical Therapy and Occupational Therapy  Weight Bearing Status/Restrictions: No weight bearing restrictions  Other Medical Equipment (for information only, NOT a DME order):  walker  Other Treatments: na    Patient's personal belongings (please select all that are sent with patient):  Donaldo    RN SIGNATURE:  Electronically signed by Daniela Holly RN on 5/14/25 at 5:30 PM EDT    CASE MANAGEMENT/SOCIAL WORK SECTION    Inpatient Status Date: 05/09/2025    Readmission Risk Assessment Score:  [facility] RISK OF UNPLANNED READMISSION 2.0             24.6 Total Score        [facility]

## 2025-05-12 NOTE — PROGRESS NOTES
[address] [address]    5/7/2025    Gloria Abbott  is a 74 y.o. in the  being seen for    Chief Complaint   Patient presents with    Follow-up       Currently residing at local [facility] for rehab.  Patient resting in chair in room at time of visit.  She is alert and oriented x 3.  Patient reports feeling tired today and states she did not participate with therapy.  She denies nausea vomiting diarrhea or constipation.  No recent fever or fall reported.  No change in bowel or bladder habits          Past Medical History:   Diagnosis Date    ADHD (attention deficit hyperactivity disorder)     Arthritis     Cancer (HCC)     endometrial and uterine    Depression     Hyperlipidemia     Hypertension     Hypothyroid     MDD (major depressive disorder)     NPH (normal pressure hydrocephalus) (HCC)     PONV (postoperative nausea and vomiting)     Nausea    Protruded lumbar disc     L4/L5    Stress     Vitamin D deficiency      Past Surgical History:   Procedure Laterality Date    ANKLE SURGERY Right 2001    COLONOSCOPY      COLONOSCOPY N/A 11/19/2024    COLONOSCOPY DIAGNOSTIC performed by Amalia Lopez MD at [facility]    FOOT SURGERY Right 2001    HIP SURGERY Right 02/02/2023    RIGHT HIP PINNING AND LEFT KNEE STERIOD INJECTION performed by Elliot Garcia MD at [facility]    HYSTERECTOMY (CERVIX STATUS UNKNOWN)      LASIK Bilateral     SHUNT REMOVAL N/A 3/17/2025    REMOVAL VENTRICULAR PERITONEAL SHUNT room 270 performed by Marisela Larson MD at [facility]    SINUS ENDOSCOPY Bilateral 08/06/2024    ENDOSCOPIC MAXILLARY ANTROSTOMIES 30 MIN performed by Ryan Zepeda MD at [facility] OR    SINUS ENDOSCOPY Left 09/17/2024    REVISION MAXILLARY ANTROSTOMY (LEFT)  RECENT SURGERY performed by Ryan Zepeda MD at [facility] OR    SINUS SURGERY  07/10/2012    TOTAL KNEE ARTHROPLASTY Left 05/08/2023    Left total knee arthroplasty,Juan C Triathlon Total Knee System,Choice with adductor canal block

## 2025-05-12 NOTE — TELEPHONE ENCOUNTER
Tried to call the patient to bring her in on 5/13 for an appointment per Dr Larson and the patients mail box was full. Unable to leave message. If the patient calls back please schedule a 30 min Follow up with Dr Larson

## 2025-05-12 NOTE — PROGRESS NOTES
Infectious Diseases Inpatient Progress Note          HISTORY OF PRESENT ILLNESS:  Follow up C. difficile colitis with severe leukemoid reaction in a patient with recent treatment for  shunt infection and skilled nursing facility stay on p.o. vancomycin, well tolerated.  Patient remains to be very weak, currently sleepy.  Decreased appetite.   Daughter reports that she had 1 bowel movement today.  No abdominal pain or cramps.  No headaches.  Chronic neck pain.  No confusion or agitation reported.   Has a pure wick with clear urine  No fevers  Current Medications:     lactobacillus  1 capsule Oral BID    sodium chloride flush  5-40 mL IntraVENous 2 times per day    enoxaparin  40 mg SubCUTAneous Daily    vancomycin  125 mg Oral 4 times per day       Allergies:  Patient has no known allergies.      Review of Systems  Limited system review and is negative other than HPI  Current obtundation  Physical Exam  Vitals:    05/11/25 1459 05/11/25 1911 05/12/25 0108 05/12/25 0711   BP: (!) 144/56 (!) 121/54 (!) 131/51 (!) 136/56   Pulse: 67 65 66 64   Resp: 18 16  18   Temp: 97.8 °F (36.6 °C) 97.9 °F (36.6 °C) 98.6 °F (37 °C) 97.7 °F (36.5 °C)   TempSrc: Axillary Oral Oral Oral   SpO2: 98% 100% 97% 100%   Weight:         General Appearance: Lethargic but arousable, in no acute distress  On room air  Skin: warm and dry, no rash.   Head: normocephalic and atraumatic  Eyes: anicteric sclerae  ENT:  normal mucous membranes.   Scalp incision is healed  Lungs: normal respiratory effort, clear lungs  Heart normal S1-S2  Abdomen: soft, no tenderness  No leg edema  No erythema, no tenderness  Pure wick with clear yellow urine    DATA:    Lab Results   Component Value Date    WBC 21.4 (H) 05/12/2025    HGB 10.2 (L) 05/12/2025    HCT 32.3 (L) 05/12/2025    MCV 88.7 05/12/2025     (H) 05/12/2025     Lab Results   Component Value Date    CREATININE 0.45 (L) 05/12/2025    BUN 10 05/12/2025     05/12/2025    K 4.2 05/12/2025

## 2025-05-12 NOTE — PLAN OF CARE
Problem: Discharge Planning  Goal: Discharge to home or other facility with appropriate resources  5/11/2025 2315 by Yana Avalos RN  Outcome: Progressing  5/11/2025 1238 by Ephraim Guzman RN  Outcome: Progressing     Problem: Skin/Tissue Integrity  Goal: Skin integrity remains intact  Description: 1.  Monitor for areas of redness and/or skin breakdown2.  Assess vascular access sites hourly3.  Every 4-6 hours minimum:  Change oxygen saturation probe site4.  Every 4-6 hours:  If on nasal continuous positive airway pressure, respiratory therapy assess nares and determine need for appliance change or resting period  5/11/2025 2315 by Yana Avalos RN  Outcome: Progressing  5/11/2025 1238 by Ephraim Guzman RN  Outcome: Progressing     Problem: Safety - Adult  Goal: Free from fall injury  5/11/2025 2315 by Yana Avalos RN  Outcome: Progressing  5/11/2025 1238 by Ephraim Guzman RN  Outcome: Progressing     Problem: ABCDS Injury Assessment  Goal: Absence of physical injury  5/11/2025 2315 by Yana Avalos RN  Outcome: Progressing  5/11/2025 1238 by Ephraim Guzman RN  Outcome: Progressing     Problem: Pain  Goal: Verbalizes/displays adequate comfort level or baseline comfort level  5/11/2025 2315 by Yana Avalos RN  Outcome: Progressing  5/11/2025 1238 by Ephraim Guzman RN  Outcome: Progressing

## 2025-05-12 NOTE — PROGRESS NOTES
Physical Therapy Med Surg Daily Treatment Note  Facility/Department: [address] ORTHO TELE  Room: [facility] [address]       NAME: Gloria Abbott  : 1950 (74 y.o.)  MRN: [id number]  CODE STATUS: Full Code    Date of Service: 2025    Patient Diagnosis(es): Altered mental status, unspecified altered mental status type [R41.82]  Leukocytosis, unspecified type [D72.829]  Diarrhea, unspecified type [R19.7]  AMS (altered mental status) [R41.82]   Chief Complaint   Patient presents with    Altered Mental Status     X's 12 hours      Diarrhea     X's 1.5 weeks       Patient Active Problem List    Diagnosis Date Noted    Acute post-operative pain 2023    Acute blood loss anemia 2023    Subcapital fracture of femur, right, closed, initial encounter ([facility]) 2023    Acute pain due to trauma 2023    Closed right hip fracture, initial encounter ([facility]) 2023    Dizziness 2023    Chronic pansinusitis 2025    Diarrhea 05/10/2025    Clostridium difficile colitis 05/10/2025    AMS (altered mental status) 2025    Weakness 2025    Infection of ventriculoperitoneal shunt 2025    Candida infection 2025    Chronic neck pain 2025    Hydrocephalus (HCC) 2025    Shunt malfunction 2025    Ventricular enlargement due to brain atrophy 2025    Infection of ventriculo-peritoneal shunt, initial encounter 2025    Nonfunctioning ventriculoperitoneal shunt, sequela 2025    Urge incontinence of urine 2024    Allergic rhinitis 2024    Constipation 2024    Altered bowel habits 10/16/2024    Chronic left maxillary sinusitis 2024    Bilateral high frequency sensorineural hearing loss 2024    Normal pressure hydrocephalus (HCC) 03/15/2024    Carpal tunnel syndrome, bilateral 2023    Status post left knee replacement 05/10/2023    Status post total knee replacement, left 2023    Osteoarthritis of left knee 2023  hip pain/injury minutes.    PLAN    General Plan: 1 time a day 3-6 times a week        AMPAC (6 CLICK) BASIC MOBILITY  AM-PAC Inpatient Mobility Raw Score : 8     Therapy Time   Individual   Time In 1100   Time Out 1115   Minutes 15   15 minutes bed mob/transfers          Marcie Feldman PTA, 05/12/25 at 1:10 PM         Definitions for assistance levels  Independent = pt does not require any physical supervision or assistance from another person for activity completion. Device may be needed.  Stand by assistance = pt requires verbal cues or instructions from another person, close to but not touching, to perform the activity  Minimal assistance= pt performs 75% or more of the activity; assistance is required to complete the activity  Moderate assistance= pt performs 50% of the activity; assistance is required to complete the activity  Maximal assistance = pt performs 25% of the activity; assistance is required to complete the activity  Dependent = pt requires total physical assistance to accomplish the task

## 2025-05-13 ENCOUNTER — APPOINTMENT (OUTPATIENT)
Dept: CT IMAGING | Age: 75
DRG: 872 | End: 2025-05-13
Payer: MEDICARE

## 2025-05-13 LAB
ALBUMIN SERPL-MCNC: 2.2 G/DL (ref 3.5–4.6)
ALP SERPL-CCNC: 200 U/L (ref 40–130)
ALT SERPL-CCNC: 91 U/L (ref 0–33)
ANION GAP SERPL CALCULATED.3IONS-SCNC: 10 MEQ/L (ref 9–15)
AST SERPL-CCNC: 40 U/L (ref 0–35)
BASOPHILS # BLD: 0.1 K/UL (ref 0–0.2)
BASOPHILS NFR BLD: 0.6 %
BILIRUB SERPL-MCNC: 0.3 MG/DL (ref 0.2–0.7)
BUN SERPL-MCNC: 6 MG/DL (ref 8–23)
CALCIUM SERPL-MCNC: 7.5 MG/DL (ref 8.5–9.9)
CHLORIDE SERPL-SCNC: 108 MEQ/L (ref 95–107)
CO2 SERPL-SCNC: 20 MEQ/L (ref 20–31)
CREAT SERPL-MCNC: 0.47 MG/DL (ref 0.5–0.9)
CRP SERPL HS-MCNC: 162.6 MG/L (ref 0–5)
EOSINOPHIL # BLD: 0.2 K/UL (ref 0–0.7)
EOSINOPHIL NFR BLD: 1.3 %
ERYTHROCYTE [DISTWIDTH] IN BLOOD BY AUTOMATED COUNT: 14.2 % (ref 11.5–14.5)
GLOBULIN SER CALC-MCNC: 3.1 G/DL (ref 2.3–3.5)
GLUCOSE SERPL-MCNC: 96 MG/DL (ref 70–99)
HCT VFR BLD AUTO: 31.7 % (ref 37–47)
HGB BLD-MCNC: 10 G/DL (ref 12–16)
LYMPHOCYTES # BLD: 1.1 K/UL (ref 1–4.8)
LYMPHOCYTES NFR BLD: 6.3 %
MAGNESIUM SERPL-MCNC: 1.9 MG/DL (ref 1.7–2.4)
MCH RBC QN AUTO: 28.3 PG (ref 27–31.3)
MCHC RBC AUTO-ENTMCNC: 31.5 % (ref 33–37)
MCV RBC AUTO: 89.8 FL (ref 79.4–94.8)
MONOCYTES # BLD: 1.3 K/UL (ref 0.2–0.8)
MONOCYTES NFR BLD: 7.2 %
NEUTROPHILS # BLD: 14.1 K/UL (ref 1.4–6.5)
NEUTS SEG NFR BLD: 81 %
PLATELET # BLD AUTO: 544 K/UL (ref 130–400)
POTASSIUM SERPL-SCNC: 3.2 MEQ/L (ref 3.4–4.9)
PROT SERPL-MCNC: 5.3 G/DL (ref 6.3–8)
RBC # BLD AUTO: 3.53 M/UL (ref 4.2–5.4)
SODIUM SERPL-SCNC: 138 MEQ/L (ref 135–144)
WBC # BLD AUTO: 17.4 K/UL (ref 4.8–10.8)

## 2025-05-13 PROCEDURE — 85025 COMPLETE CBC W/AUTO DIFF WBC: CPT

## 2025-05-13 PROCEDURE — 74177 CT ABD & PELVIS W/CONTRAST: CPT

## 2025-05-13 PROCEDURE — 1210000000 HC MED SURG R&B

## 2025-05-13 PROCEDURE — 80053 COMPREHEN METABOLIC PANEL: CPT

## 2025-05-13 PROCEDURE — 86140 C-REACTIVE PROTEIN: CPT

## 2025-05-13 PROCEDURE — 6360000002 HC RX W HCPCS: Performed by: INTERNAL MEDICINE

## 2025-05-13 PROCEDURE — 99232 SBSQ HOSP IP/OBS MODERATE 35: CPT | Performed by: INTERNAL MEDICINE

## 2025-05-13 PROCEDURE — 36415 COLL VENOUS BLD VENIPUNCTURE: CPT

## 2025-05-13 PROCEDURE — 6370000000 HC RX 637 (ALT 250 FOR IP): Performed by: INTERNAL MEDICINE

## 2025-05-13 PROCEDURE — 2500000003 HC RX 250 WO HCPCS: Performed by: INTERNAL MEDICINE

## 2025-05-13 PROCEDURE — 83735 ASSAY OF MAGNESIUM: CPT

## 2025-05-13 PROCEDURE — 99232 SBSQ HOSP IP/OBS MODERATE 35: CPT | Performed by: NURSE PRACTITIONER

## 2025-05-13 PROCEDURE — 2580000003 HC RX 258: Performed by: INTERNAL MEDICINE

## 2025-05-13 PROCEDURE — 97535 SELF CARE MNGMENT TRAINING: CPT

## 2025-05-13 PROCEDURE — 6360000004 HC RX CONTRAST MEDICATION: Performed by: STUDENT IN AN ORGANIZED HEALTH CARE EDUCATION/TRAINING PROGRAM

## 2025-05-13 RX ORDER — LACTOBACILLUS ACIDOPH-L.BULGARICUS 1 MILLION CELL CHEWABLE TABLET 1MM CELL
1 TABLET,CHEWABLE ORAL 2 TIMES DAILY
DISCHARGE
Start: 2025-05-13 | End: 2025-06-12

## 2025-05-13 RX ORDER — IOPAMIDOL 755 MG/ML
75 INJECTION, SOLUTION INTRAVASCULAR
Status: COMPLETED | OUTPATIENT
Start: 2025-05-13 | End: 2025-05-13

## 2025-05-13 RX ORDER — VANCOMYCIN HYDROCHLORIDE 125 MG/1
125 CAPSULE ORAL 4 TIMES DAILY
DISCHARGE
Start: 2025-05-13 | End: 2025-05-23

## 2025-05-13 RX ADMIN — Medication 1 CAPSULE: at 21:15

## 2025-05-13 RX ADMIN — ENOXAPARIN SODIUM 40 MG: 100 INJECTION SUBCUTANEOUS at 12:07

## 2025-05-13 RX ADMIN — SODIUM CHLORIDE, PRESERVATIVE FREE 10 ML: 5 INJECTION INTRAVENOUS at 21:16

## 2025-05-13 RX ADMIN — VANCOMYCIN HYDROCHLORIDE 125 MG: 125 CAPSULE ORAL at 21:15

## 2025-05-13 RX ADMIN — SODIUM CHLORIDE, PRESERVATIVE FREE 10 ML: 5 INJECTION INTRAVENOUS at 12:08

## 2025-05-13 RX ADMIN — VANCOMYCIN HYDROCHLORIDE 125 MG: 125 CAPSULE ORAL at 12:07

## 2025-05-13 RX ADMIN — Medication 1 CAPSULE: at 12:07

## 2025-05-13 RX ADMIN — IOPAMIDOL 75 ML: 755 INJECTION, SOLUTION INTRAVENOUS at 17:25

## 2025-05-13 RX ADMIN — SODIUM CHLORIDE: 0.9 INJECTION, SOLUTION INTRAVENOUS at 05:24

## 2025-05-13 RX ADMIN — VANCOMYCIN HYDROCHLORIDE 125 MG: 125 CAPSULE ORAL at 05:20

## 2025-05-13 NOTE — PROGRESS NOTES
Physical Therapy Med Surg Daily Treatment Note  Facility/Department: [address] ORTHO TELE  Room: [facility] [address]       NAME: Gloria Abbott  : 1950 (74 y.o.)  MRN: [id number]  CODE STATUS: Full Code    Date of Service: 2025    Patient Diagnosis(es): Altered mental status, unspecified altered mental status type [R41.82]  Leukocytosis, unspecified type [D72.829]  Diarrhea, unspecified type [R19.7]  AMS (altered mental status) [R41.82]   Chief Complaint   Patient presents with    Altered Mental Status     X's 12 hours      Diarrhea     X's 1.5 weeks       Patient Active Problem List    Diagnosis Date Noted    Acute post-operative pain 2023    Acute blood loss anemia 2023    Subcapital fracture of femur, right, closed, initial encounter ([facility]) 2023    Acute pain due to trauma 2023    Closed right hip fracture, initial encounter ([facility]) 2023    Dizziness 2023    Chronic pansinusitis 2025    Diarrhea 05/10/2025    Clostridium difficile colitis 05/10/2025    AMS (altered mental status) 2025    Weakness 2025    Infection of ventriculoperitoneal shunt 2025    Candida infection 2025    Chronic neck pain 2025    Hydrocephalus (HCC) 2025    Shunt malfunction 2025    Ventricular enlargement due to brain atrophy 2025    Infection of ventriculo-peritoneal shunt, initial encounter 2025    Nonfunctioning ventriculoperitoneal shunt, sequela 2025    Urge incontinence of urine 2024    Allergic rhinitis 2024    Constipation 2024    Altered bowel habits 10/16/2024    Chronic left maxillary sinusitis 2024    Bilateral high frequency sensorineural hearing loss 2024    Normal pressure hydrocephalus (HCC) 03/15/2024    Carpal tunnel syndrome, bilateral 2023    Status post left knee replacement 05/10/2023    Status post total knee replacement, left 2023    Osteoarthritis of left knee 2023  4/14/23 at 1:00 pm performed by Elliot Garcia MD at [facility] OR    UPPER GASTROINTESTINAL ENDOSCOPY N/A 11/19/2024    ESOPHAGOGASTRODUODENOSCOPY DIAGNOSTIC ONLY with biopsies and esophageal dilation performed by Amalia Lopez MD at [facility]    VENTRICULOPERITONEAL SHUNT N/A 03/15/2024    RIGHT  SHUNT CREATION AND INDICATED PROCEDURES, MICRODISSECTION, PREOPERATIVE STEROTACTIC PLANNING AND WORK performed by Sumit Shelton MD at [facility]       Chart Reviewed: Yes    Restrictions:  Restrictions/Precautions: Contact Precautions;Fall Risk    SUBJECTIVE:   Subjective: I would like to get into the chair. That sounds good.    Pain  Pain  Pre-Pain: 0  Post-Pain: 0     OBJECTIVE:   Orientation  Orientation Level: Oriented to person;Oriented to situation    Bed mobility  Rolling to Right: Minimal assistance  Supine to Sit: Substantial/Maximal assistance;Partial/Moderate assistance  Bed Mobility Comments: Bed flat with use of hand rails; hand over hand assist for sequencing and task completion. Good effort given howeer unable to complete without Mod/Max A.    Transfers  Sit to Stand: Substantial/Maximal assistance;Partial/Moderate assistance;2 Person Assistance  Stand to Sit: Substantial/Maximal assistance;Partial/Moderate assistance;2 Person Assistance  Bed to Chair: Partial/Moderate assistance;2 Person Assistance  Comment: vc's for hand placement and technique; 2 hands on WW best at this time. Crouched posture in standing. vc's to correct. vc's for sequencing and step length.    Ambulation  Surface: Level tile  Device: Rolling Walker  Assistance: Partial/Moderate assistance;2 Person assistance  Quality of Gait: small shuffling steps; Crouched posture; vc's for sequencing and step length. Good effort.  Gait Deviations: Slow Mary Jo;Decreased step length;Decreased step height;Shuffles  Distance: 6-7 steps to chair.                   PT Exercises  Exercise Treatment: seated LAQ x 15,          Activity

## 2025-05-13 NOTE — PROGRESS NOTES
[facility] OCCUPATIONAL THERAPY MED SURG TREATMENT NOTE     Date: 2025  Patient Name: Gloria Abbott        MRN: [id number]  Account: [id number]   : 1950  (74 y.o.)  Room: [place] [id number]    Chart Review:    Restrictions  Restrictions/Precautions  Restrictions/Precautions: Fall Risk, Contact Precautions (C-Diff)    Safety:  Safety Devices  Type of Devices: All fall risk precautions in place;Bed alarm in place;Call light within reach;Left in bed    Patient's birthday verified: Yes    Subjective: \"You don't mean that. You people always make loud noises then say you're sorry and you're not.\"     Pain   Pain at start of treatment: Yes: 3/10    Pain at end of treatment: Yes: 3/10    Location: Back   Nursing notified: Declined  Intervention: Repositioned  Pt reports that she had pain medication and that pain is minimal.     Objective: Pt sitting up in bedside chair upon arrival. Pt requests to return to bed. Pt completed as follows.     Bed Mobility  Sit to Supine: Dependent/Total;2 Person assistance (1 person for UB and 1 person for BLEs)  Bed Mobility Comments: HOB flat    Transfers:  Stand Pivot Transfers: Dependent/Total;2 Person assistance  Sit to stand: Dependent/Total;2 Person assistance  Stand to sit: Dependent/Total;2 Person assistance  Transfer Comments: Education and cues for safe transfer techniques. Significantly increased time and effort    Therapy key for assistance levels -   Independent/Mod I = Pt. is able to perform task with no assistance but may require a device   Stand by assistance = Pt. does not perform task at an independent level but does not need physical assistance, requires verbal cues  Minimal, Moderate, Maximal Assistance = Pt. requires physical assistance (25%, 50%, 75% assist from helper) for task but is able to actively participate in task   Dependent = Pt. requires total assistance with task and is not able to actively participate with task completion      Functional

## 2025-05-13 NOTE — PROGRESS NOTES
Infectious Diseases Inpatient Progress Note          HISTORY OF PRESENT ILLNESS:  Follow up C. difficile colitis with severe leukemoid reaction in a patient with recent treatment for  shunt infection and skilled nursing facility stay on p.o. vancomycin, well tolerated.  Patient remains to be very weak, currently sleepy.  Decreased appetite.   no bowel movement today.  No abdominal pain or cramps.  No headaches.  Chronic neck pain.  No confusion or agitation reported.   Has a pure wick with clear urine  No fevers  Current Medications:     lactobacillus  1 capsule Oral BID    sodium chloride flush  5-40 mL IntraVENous 2 times per day    enoxaparin  40 mg SubCUTAneous Daily    vancomycin  125 mg Oral 4 times per day       Allergies:  Patient has no known allergies.      Review of Systems  Limited system review and is negative other than HPI  Remains to be weak and obtunded  Denies any pain  Denies having any bowel movement today  Denies any headache  Persistent poor appetite  Remains to be on IV fluid  Denies any GI symptoms  Physical Exam  Vitals:    05/12/25 1454 05/12/25 1919 05/13/25 0712 05/13/25 1528   BP: (!) 138/59 (!) 127/50 122/63 (!) 114/50   Pulse: 69 81 59 58   Resp: 18 20 20 20   Temp: 97.5 °F (36.4 °C) 97.7 °F (36.5 °C) 97.7 °F (36.5 °C) 97.3 °F (36.3 °C)   TempSrc: Oral Oral Oral Oral   SpO2: 100% 100% 98% 98%   Weight:         General Appearance: Lethargic but arousable, in no acute distress  On room air  Skin: warm and dry, no rash.   Head: normocephalic and atraumatic  Eyes: anicteric sclerae  ENT:  normal mucous membranes.   Scalp incision is healed  Lungs: normal respiratory effort, clear lungs  Heart normal S1-S2  Abdomen: soft, no tenderness  No leg edema  No erythema, no tenderness  Pure wick with clear yellow urine    DATA:    Lab Results   Component Value Date    WBC 17.4 (H) 05/13/2025    HGB 10.0 (L) 05/13/2025    HCT 31.7 (L) 05/13/2025    MCV 89.8 05/13/2025     (H) 05/13/2025

## 2025-05-13 NOTE — PROGRESS NOTES
Gastroenterology Progress Note    Gloria Abbott is a 74 y.o. female patient.  Hospitalization Day:4    Chief C/O: Abnormal LFTs    SUBJECTIVE: Patient tolerating regular diet without difficulty, patient denies any nausea, vomiting nor abdominal pain.  Patient had 1 soft bowel movement overnight.    ROS:  Gastrointestinal ROS: no abdominal pain, change in bowel habits, or black or bloody stools    Physical    VITALS:  /63   Pulse 59   Temp 97.7 °F (36.5 °C) (Oral)   Resp 20   Wt 70.8 kg (156 lb 1.4 oz)   LMP  (LMP Unknown)   SpO2 98%   BMI 26.78 kg/m²   TEMPERATURE:  Current - Temp: 97.7 °F (36.5 °C); Max - Temp  Av.6 °F (36.4 °C)  Min: 97.5 °F (36.4 °C)  Max: 97.7 °F (36.5 °C)    General -alert and oriented  Eyes -no icterus, no pallor  Cardiovascular -RRR, no murmur  Lungs -clear to auscultation bilaterally  Abdomen - non-distended,  non-tender, no organomegaly, Bowel sounds normal  Extremities - no edema  Skin - Warm and dry  Neuro: no asterixis     Data    Data Review:    Recent Labs     25  0525  0541 25  0508   WBC 28.4* 21.4* 17.4*   HGB 11.2* 10.2* 10.0*   HCT 34.6* 32.3* 31.7*   MCV 89.2 88.7 89.8   * 532* 544*     Recent Labs     2511 25  0541 25  0508    136 138   K 3.3* 4.2 3.2*    107 108*   CO2 20 21 20   BUN 18 10 6*   CREATININE 0.48* 0.45* 0.47*     Recent Labs     25  0511 25  0541 25  0508   AST 48* 56* 40*   * 120* 91*   BILITOT 0.5 0.4 0.3   ALKPHOS 305* 252* 200*     No results for input(s): \"LIPASE\", \"AMYLASE\" in the last 72 hours.  Recent Labs     25  0511   PROTIME 16.2*   INR 1.2       Radiology Review:  CT Result (most recent):  CT ABDOMEN PELVIS W IV CONTRAST 2025    Narrative  EXAMINATION:  CT OF THE ABDOMEN AND PELVIS WITH CONTRAST 2025 6:32 pm    TECHNIQUE:  CT of the abdomen and pelvis was performed with the administration of  intravenous contrast. Multiplanar

## 2025-05-13 NOTE — PROGRESS NOTES
Hospitalist Progress Note      PCP: Xiomara Pappas MD    Date of Admission: 5/9/2025    Chief Complaint:    Chief Complaint   Patient presents with    Altered Mental Status     X's 12 hours      Diarrhea     X's 1.5 weeks       Subjective:  No acute events overnight. Pt reports improvement in her diarrhea. Denies chest pain or shortness of breath.    Medications:  Reviewed    Infusion Medications    sodium chloride       Scheduled Medications    lactobacillus  1 capsule Oral BID    sodium chloride flush  5-40 mL IntraVENous 2 times per day    enoxaparin  40 mg SubCUTAneous Daily    vancomycin  125 mg Oral 4 times per day     PRN Meds: sodium chloride flush, sodium chloride, potassium chloride **OR** potassium alternative oral replacement **OR** potassium chloride, magnesium sulfate, ondansetron **OR** ondansetron, melatonin, polyethylene glycol, [Held by provider] acetaminophen **OR** [Held by provider] acetaminophen      Intake/Output Summary (Last 24 hours) at 5/13/2025 1328  Last data filed at 5/12/2025 1855  Gross per 24 hour   Intake 60 ml   Output 300 ml   Net -240 ml     Exam:  Vitals:     05/11/25 1459 05/11/25 1911 05/12/25 0108 05/12/25 0711   BP: (!) 144/56 (!) 121/54 (!) 131/51 (!) 136/56   Pulse: 67 65 66 64   Resp: 18 16   18   Temp: 97.8 °F (36.6 °C) 97.9 °F (36.6 °C) 98.6 °F (37 °C) 97.7 °F (36.5 °C)   TempSrc: Axillary Oral Oral Oral   SpO2: 98% 100% 97% 100%   Physical Exam  Cardiovascular:      Rate and Rhythm: Normal rate and regular rhythm.   Pulmonary:      Effort: Pulmonary effort is normal. No respiratory distress.   Abdominal:      Palpations: Abdomen is soft.      Tenderness: There is no abdominal tenderness.   Neurological:      Mental Status: She is alert and oriented to person, place, and time.   Psychiatric:         Mood and Affect: Mood normal.         Behavior: Behavior normal.       Labs:   Recent Labs     05/11/25  0511 05/12/25  0541 05/13/25  0508   WBC 28.4* 21.4* 17.4*   HGB

## 2025-05-13 NOTE — CARE COORDINATION
Deandrew spoke with Zoie and she confirmed that [facility] has accepted and precert was started yesterday but is still pending.

## 2025-05-13 NOTE — PROGRESS NOTES
Hospitalist Progress Note      PCP: Xiomara Pappas MD    Date of Admission: 5/9/2025    Chief Complaint:    Chief Complaint   Patient presents with    Altered Mental Status     X's 12 hours      Diarrhea     X's 1.5 weeks       Subjective:  No acute events overnight. Pt reports resolution of her diarrhea. Denies chest pain or shortness of breath.    Medications:  Reviewed    Infusion Medications    sodium chloride       Scheduled Medications    lactobacillus  1 capsule Oral BID    sodium chloride flush  5-40 mL IntraVENous 2 times per day    enoxaparin  40 mg SubCUTAneous Daily    vancomycin  125 mg Oral 4 times per day     PRN Meds: sodium chloride flush, sodium chloride, potassium chloride **OR** potassium alternative oral replacement **OR** potassium chloride, magnesium sulfate, ondansetron **OR** ondansetron, melatonin, polyethylene glycol, [Held by provider] acetaminophen **OR** [Held by provider] acetaminophen      Intake/Output Summary (Last 24 hours) at 5/13/2025 1336  Last data filed at 5/12/2025 1855  Gross per 24 hour   Intake 60 ml   Output 300 ml   Net -240 ml     Exam:  /63   Pulse 59   Temp 97.7 °F (36.5 °C) (Oral)   Resp 20   Wt 70.8 kg (156 lb 1.4 oz)   LMP  (LMP Unknown)   SpO2 98%   BMI 26.78 kg/m²   Physical Exam  Cardiovascular:      Rate and Rhythm: Normal rate and regular rhythm.   Pulmonary:      Effort: Pulmonary effort is normal. No respiratory distress.   Abdominal:      Palpations: Abdomen is soft.      Tenderness: There is no abdominal tenderness.   Neurological:      Mental Status: She is alert and oriented to person, place, and time.   Psychiatric:         Mood and Affect: Mood normal.         Behavior: Behavior normal.       Labs:   Recent Labs     05/11/25  0511 05/12/25  0541 05/13/25  0508   WBC 28.4* 21.4* 17.4*   HGB 11.2* 10.2* 10.0*   HCT 34.6* 32.3* 31.7*   * 532* 544*     Recent Labs     05/11/25  0511 05/12/25  0541 05/13/25  0508    136 138   K

## 2025-05-14 VITALS
SYSTOLIC BLOOD PRESSURE: 126 MMHG | HEIGHT: 64 IN | DIASTOLIC BLOOD PRESSURE: 56 MMHG | WEIGHT: 156.09 LBS | TEMPERATURE: 98.2 F | HEART RATE: 73 BPM | OXYGEN SATURATION: 100 % | BODY MASS INDEX: 26.65 KG/M2 | RESPIRATION RATE: 16 BRPM

## 2025-05-14 LAB
ALBUMIN SERPL-MCNC: 2.3 G/DL (ref 3.5–4.6)
ALP SERPL-CCNC: 198 U/L (ref 40–130)
ALT SERPL-CCNC: 80 U/L (ref 0–33)
ANION GAP SERPL CALCULATED.3IONS-SCNC: 13 MEQ/L (ref 9–15)
AST SERPL-CCNC: 46 U/L (ref 0–35)
BACTERIA BLD CULT ORG #2: NORMAL
BACTERIA BLD CULT: NORMAL
BASOPHILS # BLD: 0.1 K/UL (ref 0–0.2)
BASOPHILS NFR BLD: 0.6 %
BILIRUB SERPL-MCNC: 0.3 MG/DL (ref 0.2–0.7)
BUN SERPL-MCNC: 5 MG/DL (ref 8–23)
CALCIUM SERPL-MCNC: 7.9 MG/DL (ref 8.5–9.9)
CHLORIDE SERPL-SCNC: 103 MEQ/L (ref 95–107)
CO2 SERPL-SCNC: 20 MEQ/L (ref 20–31)
CREAT SERPL-MCNC: 0.47 MG/DL (ref 0.5–0.9)
EOSINOPHIL # BLD: 0.3 K/UL (ref 0–0.7)
EOSINOPHIL NFR BLD: 1.8 %
ERYTHROCYTE [DISTWIDTH] IN BLOOD BY AUTOMATED COUNT: 14.3 % (ref 11.5–14.5)
GLOBULIN SER CALC-MCNC: 3.1 G/DL (ref 2.3–3.5)
GLUCOSE SERPL-MCNC: 77 MG/DL (ref 70–99)
HCT VFR BLD AUTO: 31.7 % (ref 37–47)
HGB BLD-MCNC: 10.2 G/DL (ref 12–16)
LYMPHOCYTES # BLD: 1.3 K/UL (ref 1–4.8)
LYMPHOCYTES NFR BLD: 9 %
MAGNESIUM SERPL-MCNC: 2 MG/DL (ref 1.7–2.4)
MCH RBC QN AUTO: 29.1 PG (ref 27–31.3)
MCHC RBC AUTO-ENTMCNC: 32.2 % (ref 33–37)
MCV RBC AUTO: 90.3 FL (ref 79.4–94.8)
MONOCYTES # BLD: 1 K/UL (ref 0.2–0.8)
MONOCYTES NFR BLD: 7 %
NEUTROPHILS # BLD: 11 K/UL (ref 1.4–6.5)
NEUTS SEG NFR BLD: 78 %
PLATELET # BLD AUTO: 494 K/UL (ref 130–400)
POTASSIUM SERPL-SCNC: 2.8 MEQ/L (ref 3.4–4.9)
POTASSIUM SERPL-SCNC: 3.2 MEQ/L (ref 3.4–4.9)
PROT SERPL-MCNC: 5.4 G/DL (ref 6.3–8)
RBC # BLD AUTO: 3.51 M/UL (ref 4.2–5.4)
SODIUM SERPL-SCNC: 136 MEQ/L (ref 135–144)
WBC # BLD AUTO: 14.2 K/UL (ref 4.8–10.8)

## 2025-05-14 PROCEDURE — 6370000000 HC RX 637 (ALT 250 FOR IP): Performed by: INTERNAL MEDICINE

## 2025-05-14 PROCEDURE — 97535 SELF CARE MNGMENT TRAINING: CPT

## 2025-05-14 PROCEDURE — 36415 COLL VENOUS BLD VENIPUNCTURE: CPT

## 2025-05-14 PROCEDURE — 83735 ASSAY OF MAGNESIUM: CPT

## 2025-05-14 PROCEDURE — 6370000000 HC RX 637 (ALT 250 FOR IP): Performed by: STUDENT IN AN ORGANIZED HEALTH CARE EDUCATION/TRAINING PROGRAM

## 2025-05-14 PROCEDURE — 85025 COMPLETE CBC W/AUTO DIFF WBC: CPT

## 2025-05-14 PROCEDURE — 99232 SBSQ HOSP IP/OBS MODERATE 35: CPT | Performed by: NURSE PRACTITIONER

## 2025-05-14 PROCEDURE — 6360000002 HC RX W HCPCS: Performed by: INTERNAL MEDICINE

## 2025-05-14 PROCEDURE — 84132 ASSAY OF SERUM POTASSIUM: CPT

## 2025-05-14 PROCEDURE — 80053 COMPREHEN METABOLIC PANEL: CPT

## 2025-05-14 RX ORDER — POTASSIUM CHLORIDE 1500 MG/1
20 TABLET, EXTENDED RELEASE ORAL DAILY
DISCHARGE
Start: 2025-05-14

## 2025-05-14 RX ORDER — POTASSIUM CHLORIDE 1500 MG/1
40 TABLET, EXTENDED RELEASE ORAL ONCE
Status: COMPLETED | OUTPATIENT
Start: 2025-05-14 | End: 2025-05-14

## 2025-05-14 RX ADMIN — POTASSIUM CHLORIDE 10 MEQ: 7.46 INJECTION, SOLUTION INTRAVENOUS at 08:23

## 2025-05-14 RX ADMIN — Medication 1 CAPSULE: at 09:44

## 2025-05-14 RX ADMIN — POTASSIUM CHLORIDE 10 MEQ: 7.46 INJECTION, SOLUTION INTRAVENOUS at 07:14

## 2025-05-14 RX ADMIN — POTASSIUM CHLORIDE 10 MEQ: 7.46 INJECTION, SOLUTION INTRAVENOUS at 12:18

## 2025-05-14 RX ADMIN — POTASSIUM CHLORIDE 10 MEQ: 7.46 INJECTION, SOLUTION INTRAVENOUS at 09:47

## 2025-05-14 RX ADMIN — POTASSIUM CHLORIDE 10 MEQ: 7.46 INJECTION, SOLUTION INTRAVENOUS at 11:09

## 2025-05-14 RX ADMIN — VANCOMYCIN HYDROCHLORIDE 125 MG: 125 CAPSULE ORAL at 15:19

## 2025-05-14 RX ADMIN — VANCOMYCIN HYDROCHLORIDE 125 MG: 125 CAPSULE ORAL at 09:44

## 2025-05-14 RX ADMIN — VANCOMYCIN HYDROCHLORIDE 125 MG: 125 CAPSULE ORAL at 04:05

## 2025-05-14 RX ADMIN — POTASSIUM CHLORIDE 40 MEQ: 1500 TABLET, EXTENDED RELEASE ORAL at 14:02

## 2025-05-14 ASSESSMENT — PAIN SCALES - GENERAL: PAINLEVEL_OUTOF10: 2

## 2025-05-14 NOTE — PROGRESS NOTES
Physical Therapy Med Surg Daily Treatment Note  Facility/Department: [address] ORTHO TELE  Room: [facility]/[place] [address]       NAME: Gloria Abbott  : 1950 (74 y.o.)  MRN: [id number]  CODE STATUS: Full Code    Date of Service: 2025    Patient Diagnosis(es): Altered mental status, unspecified altered mental status type [R41.82]  Leukocytosis, unspecified type [D72.829]  Diarrhea, unspecified type [R19.7]  AMS (altered mental status) [R41.82]   Chief Complaint   Patient presents with    Altered Mental Status     X's 12 hours      Diarrhea     X's 1.5 weeks       Patient Active Problem List    Diagnosis Date Noted    Acute post-operative pain 2023    Acute blood loss anemia 2023    Subcapital fracture of femur, right, closed, initial encounter ([facility]) 2023    Acute pain due to trauma 2023    Closed right hip fracture, initial encounter ([facility]) 2023    Dizziness 2023    Chronic pansinusitis 2025    Diarrhea 05/10/2025    Clostridium difficile colitis 05/10/2025    AMS (altered mental status) 2025    Weakness 2025    Infection of ventriculoperitoneal shunt 2025    Candida infection 2025    Chronic neck pain 2025    Hydrocephalus (HCC) 2025    Shunt malfunction 2025    Ventricular enlargement due to brain atrophy 2025    Infection of ventriculo-peritoneal shunt, initial encounter 2025    Nonfunctioning ventriculoperitoneal shunt, sequela 2025    Urge incontinence of urine 2024    Allergic rhinitis 2024    Constipation 2024    Altered bowel habits 10/16/2024    Chronic left maxillary sinusitis 2024    Bilateral high frequency sensorineural hearing loss 2024    Normal pressure hydrocephalus (HCC) 03/15/2024    Carpal tunnel syndrome, bilateral 2023    Status post left knee replacement 05/10/2023    Status post total knee replacement, left 2023    Osteoarthritis of left knee 2023  assistance is required to complete the activity  Dependent = pt requires total physical assistance to accomplish the task

## 2025-05-14 NOTE — DISCHARGE INSTR - DIET

## 2025-05-14 NOTE — PROGRESS NOTES
Gastroenterology Progress Note    Gloria Abbott is a 74 y.o. female patient.  Hospitalization Day:5    Chief C/O: abnormal LFTs    SUBJECTIVE: Seen and examined, no acute events, tolerating diet, no further diarrhea, no fever or chills does have mild leukocytosis which is trending down, has persistently elevated LFTs and alk phos which are all trending down to near normal, had CT scan with no extrahepatic cholestasis and normal biliary tree, pancreas with cystic changes in radiological evidence of chronic pancreatitis noted patient is clinically asymptomatic.    ROS:  Gastrointestinal ROS: no abdominal pain, change in bowel habits, or black or bloody stools    Physical    VITALS:  BP (!) 126/56   Pulse 73   Temp 98.2 °F (36.8 °C)   Resp 16   Wt 70.8 kg (156 lb 1.4 oz)   LMP  (LMP Unknown)   SpO2 100%   BMI 26.78 kg/m²   TEMPERATURE:  Current - Temp: 98.2 °F (36.8 °C); Max - Temp  Av.8 °F (36.6 °C)  Min: 97.3 °F (36.3 °C)  Max: 98.6 °F (37 °C)    General:  Alert and oriented,  No apparent distress  Skin- without jaundice  Eyes: anicteric sclera  Cardiac: RRR, Nl s1s2, without murmurs  Lungs CTA Bilaterally, normal effort  Abdomen soft, ND, NT, no HSM, Bowel sounds normal  Ext: without edema  Neuro: no asterixis     Data    Data Review:    Recent Labs     25  05025  05   WBC 21.4* 17.4* 14.2*   HGB 10.2* 10.0* 10.2*   HCT 32.3* 31.7* 31.7*   MCV 88.7 89.8 90.3   * 544* 494*     Recent Labs     25  0541 25  0508 25  05    138 136   K 4.2 3.2* 2.8*    108* 103   CO2 21 20 20   BUN 10 6* 5*   CREATININE 0.45* 0.47* 0.47*     Recent Labs     25  0541 25  0508 25  05   AST 56* 40* 46*   * 91* 80*   BILITOT 0.4 0.3 0.3   ALKPHOS 252* 200* 198*     No results for input(s): \"LIPASE\", \"AMYLASE\" in the last 72 hours.  No results for input(s): \"PROTIME\", \"INR\" in the last 72 hours.        ASSESSMENT:  73 y/o female

## 2025-05-14 NOTE — CARE COORDINATION
WE HAVE PRECERT APPROVAL FOR THE PT TO TRANSFER TO [facility] WHEN SHE IS MEDICALLY CLEARED.     [facility] WAS ARRANGED FOR TODAY AT 5:30PM TO TRANSFER TO [facility].  NY'S SON WAS NOTIFIED AND IS IN AGREEMENT TO THE PLAN.

## 2025-05-16 ENCOUNTER — OFFICE VISIT (OUTPATIENT)
Dept: GERIATRIC MEDICINE | Age: 75
End: 2025-05-16

## 2025-05-16 DIAGNOSIS — E55.9 VITAMIN D DEFICIENCY: ICD-10-CM

## 2025-05-16 DIAGNOSIS — A04.72 C. DIFFICILE DIARRHEA: ICD-10-CM

## 2025-05-16 DIAGNOSIS — G91.2 NORMAL PRESSURE HYDROCEPHALUS (HCC): Primary | ICD-10-CM

## 2025-05-16 DIAGNOSIS — E03.9 HYPOTHYROIDISM, UNSPECIFIED TYPE: ICD-10-CM

## 2025-05-16 DIAGNOSIS — F33.1 MAJOR DEPRESSIVE DISORDER, RECURRENT EPISODE, MODERATE (HCC): ICD-10-CM

## 2025-05-18 NOTE — PROGRESS NOTES
Physical Therapy  Facility/Department: [facility] MED SURG W276/W276-01  Physical Therapy Discharge      NAME: Gloria Abbott    : 1950 (74 y.o.)  MRN: [id number]    Account: [id number]  Gender: female      Patient has been discharged from acute care hospital. DC patient from current PT program.      Electronically signed by Mela Nelson PT on 25 at 9:29 AM EDT

## 2025-05-19 ENCOUNTER — OFFICE VISIT (OUTPATIENT)
Dept: GERIATRIC MEDICINE | Age: 75
End: 2025-05-19

## 2025-05-19 DIAGNOSIS — M54.2 CHRONIC NECK PAIN: Primary | ICD-10-CM

## 2025-05-19 DIAGNOSIS — G91.2 NORMAL PRESSURE HYDROCEPHALUS (HCC): ICD-10-CM

## 2025-05-19 DIAGNOSIS — G89.29 CHRONIC NECK PAIN: Primary | ICD-10-CM

## 2025-05-19 DIAGNOSIS — R19.7 DIARRHEA, UNSPECIFIED TYPE: ICD-10-CM

## 2025-05-22 ENCOUNTER — OFFICE VISIT (OUTPATIENT)
Dept: GERIATRIC MEDICINE | Age: 75
End: 2025-05-22

## 2025-05-22 ENCOUNTER — TELEPHONE (OUTPATIENT)
Age: 75
End: 2025-05-22

## 2025-05-22 DIAGNOSIS — E10.44 DIABETIC AMYOTROPHY ASSOCIATED WITH TYPE 1 DIABETES MELLITUS (HCC): ICD-10-CM

## 2025-05-22 DIAGNOSIS — E03.9 HYPOTHYROIDISM, UNSPECIFIED TYPE: ICD-10-CM

## 2025-05-22 DIAGNOSIS — K59.00 CONSTIPATION, UNSPECIFIED CONSTIPATION TYPE: Primary | ICD-10-CM

## 2025-05-22 DIAGNOSIS — G91.2 NORMAL PRESSURE HYDROCEPHALUS (HCC): ICD-10-CM

## 2025-05-22 NOTE — TELEPHONE ENCOUNTER
There has been a change in Dr. Larson's schedule and he will not be in the office on 07/03/2025. Please assist patient in rescheduling to another date/time that is available.

## 2025-05-23 ENCOUNTER — OFFICE VISIT (OUTPATIENT)
Dept: GERIATRIC MEDICINE | Age: 75
End: 2025-05-23
Payer: MEDICARE

## 2025-05-23 DIAGNOSIS — M43.6 NECK STIFFNESS: ICD-10-CM

## 2025-05-23 DIAGNOSIS — M54.2 CERVICALGIA: Primary | ICD-10-CM

## 2025-05-23 PROCEDURE — 99308 SBSQ NF CARE LOW MDM 20: CPT | Performed by: PHYSICIAN ASSISTANT

## 2025-05-23 PROCEDURE — 1123F ACP DISCUSS/DSCN MKR DOCD: CPT | Performed by: PHYSICIAN ASSISTANT

## 2025-05-23 NOTE — DISCHARGE SUMMARY
Hospital Medicine Discharge Summary    Gloria Abbott  :  1950  MRN:  [id number]    Admit date:  2025  Discharge date:  2025    Admitting Physician:  Davidson Alves MD  Primary Care Physician:  Xiomara Pappas MD      Discharge Diagnoses:    As below    Chief Complaint   Patient presents with    Altered Mental Status     X's 12 hours      Diarrhea     X's 1.5 weeks       Hospital Course:     75 y/o female with history of HTN, NPH s/p  shunt complicated by infection s/p shunt removal on 3/17 and treated with IV Cefepime and Vancomycin until 4/15, who presented from NH with:     Sepsis due to C.Diff colitis  - leukocytosis trending down  - followed by ID  - continue PO Vancomycin for an additional 10 days     Acute encephalopathy  - in the setting of sepsis, volume depletion  - CT head was negative  - improved     MAKENZIE  - resolved with IVFs     Elevated LFTs  - new onset, in the setting of sepsis  - CT and u/s negative for acute findings  - improving  - followed by GI      Hypokalemia   - replaced     Elevated INR  - possibly relate to sepsis, poor nutrition  - s/p Vit K, improved     Exam on discharge:   BP (!) 126/56   Pulse 73   Temp 98.2 °F (36.8 °C)   Resp 16   Ht 1.626 m (5' 4\")   Wt 70.8 kg (156 lb 1.4 oz)   LMP  (LMP Unknown)   SpO2 100%   BMI 26.79 kg/m²   Physical Exam  Cardiovascular:      Rate and Rhythm: Normal rate and regular rhythm.   Pulmonary:      Effort: Pulmonary effort is normal. No respiratory distress.   Abdominal:      Palpations: Abdomen is soft.      Tenderness: There is no abdominal tenderness.   Neurological:      Mental Status: She is alert and oriented to person, place, and time.   Psychiatric:         Mood and Affect: Mood normal.         Behavior: Behavior normal.       Patient was seen by the following consultants   Consults:  IP CONSULT TO INFECTIOUS DISEASES  IP CONSULT TO GI    Significant Diagnostic Studies:    Refer to chart     Please refer to chart if

## 2025-05-27 NOTE — PROGRESS NOTES
Subjective:      Patient ID: Gloria Abbott is a pleasant 74 y.o. female who presents today for:  Chief Complaint   Patient presents with    Other     Cervicalgia  (primary encounter diagnosis)  Neck stiffness         [facility]    Seen today complaining of cervical pain and neck tightness.  Patient has very kyphotic posture with forward head posture that is chronic.  States that she deals with chronic head and neck pain for several years.  Does have tizanidine presently, however patient stating that frequent enough, we will plan to add 2 mg every 8 hours routine, as 5 days.  Also will patient requesting routine APAP for the next several days.  She is here for C. difficile colitis, she is having formed stools as of today.  Will plan to await the next 24 hours and if continues to have formed stools will DC precautions for isolation.      Patient Active Problem List   Diagnosis    Hyperlipidemia    Depression    Stress    Vitamin D deficiency    History of endometrial cancer    Hypothyroid    Maisonneuve fracture of right fibula    Major depressive disorder, recurrent episode, moderate (HCC)    Osteopenia    Other joint derangement, not elsewhere classified, ankle and foot    Sprain of ankle, deltoid ligament    History of uterine cancer    COVID-19    Osteoarthritis of left knee    Closed right hip fracture, initial encounter (HCC)    Subcapital fracture of femur, right, closed, initial encounter (HCC)    Dizziness    Acute pain due to trauma    Acute post-operative pain    Acute blood loss anemia    Status post total knee replacement, left    Status post left knee replacement    Normal pressure hydrocephalus (HCC)    Chronic left maxillary sinusitis    Carpal tunnel syndrome, bilateral    Bilateral high frequency sensorineural hearing loss    Altered bowel habits    Urge incontinence of urine    Allergic rhinitis    Constipation    Ventricular enlargement due to brain atrophy    Infection of

## 2025-05-29 ENCOUNTER — OFFICE VISIT (OUTPATIENT)
Age: 75
End: 2025-05-29
Payer: MEDICARE

## 2025-05-29 VITALS
TEMPERATURE: 98.6 F | HEIGHT: 64 IN | WEIGHT: 160 LBS | RESPIRATION RATE: 22 BRPM | DIASTOLIC BLOOD PRESSURE: 73 MMHG | HEART RATE: 79 BPM | SYSTOLIC BLOOD PRESSURE: 112 MMHG | OXYGEN SATURATION: 98 % | BODY MASS INDEX: 27.31 KG/M2

## 2025-05-29 DIAGNOSIS — A04.72 C. DIFFICILE COLITIS: Primary | ICD-10-CM

## 2025-05-29 PROCEDURE — G8428 CUR MEDS NOT DOCUMENT: HCPCS | Performed by: INTERNAL MEDICINE

## 2025-05-29 PROCEDURE — 99213 OFFICE O/P EST LOW 20 MIN: CPT | Performed by: INTERNAL MEDICINE

## 2025-05-29 PROCEDURE — 1123F ACP DISCUSS/DSCN MKR DOCD: CPT | Performed by: INTERNAL MEDICINE

## 2025-05-29 PROCEDURE — G8417 CALC BMI ABV UP PARAM F/U: HCPCS | Performed by: INTERNAL MEDICINE

## 2025-05-29 PROCEDURE — G8399 PT W/DXA RESULTS DOCUMENT: HCPCS | Performed by: INTERNAL MEDICINE

## 2025-05-29 PROCEDURE — 1111F DSCHRG MED/CURRENT MED MERGE: CPT | Performed by: INTERNAL MEDICINE

## 2025-05-29 PROCEDURE — 1036F TOBACCO NON-USER: CPT | Performed by: INTERNAL MEDICINE

## 2025-05-29 PROCEDURE — 1090F PRES/ABSN URINE INCON ASSESS: CPT | Performed by: INTERNAL MEDICINE

## 2025-05-29 ASSESSMENT — PATIENT HEALTH QUESTIONNAIRE - PHQ9
2. FEELING DOWN, DEPRESSED OR HOPELESS: SEVERAL DAYS
SUM OF ALL RESPONSES TO PHQ QUESTIONS 1-9: 2
1. LITTLE INTEREST OR PLEASURE IN DOING THINGS: SEVERAL DAYS

## 2025-05-29 ASSESSMENT — ENCOUNTER SYMPTOMS
ABDOMINAL PAIN: 0
RESPIRATORY NEGATIVE: 1
NAUSEA: 0
DIARRHEA: 0
ABDOMINAL DISTENTION: 0
VOMITING: 0

## 2025-05-29 NOTE — PROGRESS NOTES
Infectious Disease     Patient Name: Gloria Abbott  Date: 5/29/2025  YOB: 1950  Medical Record Number: [id number]              History of Present Illness:  hypertension, hypothyroidism     Normal pressure hydrocephalus ventriculoperitoneal shunt infection  Shunt was removed 3/17/2025     03/17/2025  OPERATIONS PERFORMED:  Removal of right ventriculoperitoneal shunt, debridement, irrigation, and primary closure.     off antibiotic had treatment with IV vancomycin, Rocephin and Diflucan received 3 weeks of antibiotics      Presents 5/9/2025 with diarrhea disorientation    Stool positive for C. difficile    Sent note on vancomycin for 10 days on 5/13/2025      Review of Systems   Respiratory: Negative.     Cardiovascular: Negative.    Gastrointestinal:  Negative for abdominal distention, abdominal pain, diarrhea, nausea and vomiting.   Neurological:  Positive for weakness.     Physical Exam:      Physical Exam  HENT:      Head:      Comments: Wound at the top of skull slightly to the right side depressed region where incision is no redness no drainage no warmth no swelling  Eyes:      Extraocular Movements: Extraocular movements intact.   Cardiovascular:      Heart sounds: Normal heart sounds. No murmur heard.  Pulmonary:      Effort: Pulmonary effort is normal. No respiratory distress.      Breath sounds: Normal breath sounds. No wheezing, rhonchi or rales.   Abdominal:      General: Abdomen is flat. Bowel sounds are normal. There is no distension.      Palpations: Abdomen is soft. There is no mass.      Tenderness: There is abdominal tenderness. There is no guarding.         /73 (BP Site: Left Upper Arm, Patient Position: Sitting, BP Cuff Size: Medium Adult)   Pulse 79   Temp 98.6 °F (37 °C) (Temporal)   Resp 22   Ht 1.626 m (5' 4\")   Wt 72.6 kg (160 lb)   LMP  (LMP Unknown)   SpO2 98%   BMI 27.46 kg/m²         .   Lab Results   Component Value Date    WBC 9.9 05/16/2025    HGB 8.9 (A)

## 2025-05-30 ENCOUNTER — OFFICE VISIT (OUTPATIENT)
Dept: GERIATRIC MEDICINE | Age: 75
End: 2025-05-30

## 2025-05-30 DIAGNOSIS — K59.00 CONSTIPATION, UNSPECIFIED CONSTIPATION TYPE: Primary | ICD-10-CM

## 2025-05-30 DIAGNOSIS — E55.9 VITAMIN D DEFICIENCY: ICD-10-CM

## 2025-05-30 DIAGNOSIS — G91.2 NORMAL PRESSURE HYDROCEPHALUS (HCC): ICD-10-CM

## 2025-06-01 PROBLEM — E10.44 DIABETIC AMYOTROPHY ASSOCIATED WITH TYPE 1 DIABETES MELLITUS (HCC): Status: ACTIVE | Noted: 2025-06-01

## 2025-06-03 ENCOUNTER — OFFICE VISIT (OUTPATIENT)
Dept: GERIATRIC MEDICINE | Age: 75
End: 2025-06-03

## 2025-06-03 DIAGNOSIS — E03.9 ACQUIRED HYPOTHYROIDISM: Primary | ICD-10-CM

## 2025-06-03 DIAGNOSIS — E10.44 DIABETIC AMYOTROPHY ASSOCIATED WITH TYPE 1 DIABETES MELLITUS (HCC): ICD-10-CM

## 2025-06-03 DIAGNOSIS — F33.1 MAJOR DEPRESSIVE DISORDER, RECURRENT EPISODE, MODERATE (HCC): ICD-10-CM

## 2025-06-03 DIAGNOSIS — G91.2 NORMAL PRESSURE HYDROCEPHALUS (HCC): ICD-10-CM

## 2025-06-03 DIAGNOSIS — R53.1 WEAKNESS: ICD-10-CM

## 2025-06-03 LAB
EKG ATRIAL RATE: 86 BPM
EKG DIAGNOSIS: NORMAL
EKG P AXIS: 41 DEGREES
EKG P-R INTERVAL: 134 MS
EKG Q-T INTERVAL: 388 MS
EKG QRS DURATION: 78 MS
EKG QTC CALCULATION (BAZETT): 464 MS
EKG R AXIS: 3 DEGREES
EKG T AXIS: 160 DEGREES
EKG VENTRICULAR RATE: 86 BPM

## 2025-06-05 ENCOUNTER — OFFICE VISIT (OUTPATIENT)
Dept: GERIATRIC MEDICINE | Age: 75
End: 2025-06-05

## 2025-06-05 DIAGNOSIS — G91.2 NORMAL PRESSURE HYDROCEPHALUS (HCC): ICD-10-CM

## 2025-06-05 DIAGNOSIS — E03.9 HYPOTHYROIDISM, UNSPECIFIED TYPE: ICD-10-CM

## 2025-06-05 DIAGNOSIS — E55.9 VITAMIN D DEFICIENCY: ICD-10-CM

## 2025-06-05 DIAGNOSIS — K59.00 CONSTIPATION, UNSPECIFIED CONSTIPATION TYPE: Primary | ICD-10-CM

## 2025-06-09 ENCOUNTER — OFFICE VISIT (OUTPATIENT)
Dept: GERIATRIC MEDICINE | Age: 75
End: 2025-06-09

## 2025-06-09 DIAGNOSIS — Z91.89 AT RISK FOR DEHYDRATION: ICD-10-CM

## 2025-06-09 DIAGNOSIS — A04.72 C. DIFFICILE DIARRHEA: Primary | ICD-10-CM

## 2025-06-11 ENCOUNTER — OFFICE VISIT (OUTPATIENT)
Dept: GERIATRIC MEDICINE | Age: 75
End: 2025-06-11

## 2025-06-11 DIAGNOSIS — Z87.440 HISTORY OF UTI: ICD-10-CM

## 2025-06-11 DIAGNOSIS — Z86.19 HX OF CLOSTRIDIUM DIFFICILE INFECTION: Primary | ICD-10-CM

## 2025-06-11 DIAGNOSIS — D72.829 LEUKOCYTOSIS, UNSPECIFIED TYPE: ICD-10-CM

## 2025-06-11 NOTE — PROGRESS NOTES
Patient Name: Gloria Abbott     YOB: 1950  Medical Record Number: [id number]         History of Present Illness:  74-year-old woman with a history of normal pressure hydrocephalus status post  shunt history of complicated infection with shunt removal with IV antibiotic therapy.  Patient has had recent C. difficile colitis sent to ER evaluation patient hospitalized seen infectious ease during course of vancomycin and change mental status weakness dehydration patient underwent fluid rehydration did make gains stabilized and subsidy transferred back here for ongoing course of care..  Today patient pain-free    Review of Systems   Constitutional:  Positive for activity change, appetite change and fatigue.   HENT:  Negative for congestion.    Cardiovascular:  Negative for leg swelling.   Gastrointestinal:  Positive for diarrhea.   Neurological:  Positive for weakness and light-headedness.   Psychiatric/Behavioral:  Positive for confusion.    All other systems reviewed and are negative.      Review of Systems: All 14 review of systems negative other than as stated above    Social History     Tobacco Use    Smoking status: Never    Smokeless tobacco: Never   Vaping Use    Vaping status: Never Used   Substance Use Topics    Alcohol use: No    Drug use: Never         Past Medical History:   Diagnosis Date    ADHD (attention deficit hyperactivity disorder)     Arthritis     Cancer (HCC)     endometrial and uterine    Depression     Hyperlipidemia     Hypertension     Hypothyroid     MDD (major depressive disorder)     NPH (normal pressure hydrocephalus) (HCC)     PONV (postoperative nausea and vomiting)     Nausea    Protruded lumbar disc     L4/L5    Stress     Vitamin D deficiency            Past Surgical History:   Procedure Laterality Date    ANKLE SURGERY Right 2001    COLONOSCOPY      COLONOSCOPY N/A 11/19/2024    COLONOSCOPY DIAGNOSTIC performed by Amalia Lopez MD at [facility]

## 2025-06-11 NOTE — H&P
Hospitalist Group   History and Physical      CHIEF COMPLAINT: Diarrhea, altered mental status    History of Present Illness:  74 y.o. female with a history of hypertension, hypothyroidism, NPH status post recent ventriculoperitoneal shunt infection off antibiotic now presents from nursing home with diarrhea and altered mental status.  Patient is oriented to self and place but not time.  She knew where she came from.  She stopped answering questions after she was asked about what the year is.  It was reported that patient has been having diarrhea for more than a week.  It was also reported that she was altered for about 12 hours but unknown exactly what her baseline as.  Patient was seen by primary care less than a week ago and in their note patient denied diarrhea.  Patient was treated for shunt infection and she was on IV antibiotic until 3 weeks ago when they were discontinued by infectious disease.    REVIEW OF SYSTEMS:  Unable to obtain    PMH:  Past Medical History:   Diagnosis Date    ADHD (attention deficit hyperactivity disorder)     Arthritis     Cancer (HCC)     endometrial and uterine    Depression     Hyperlipidemia     Hypertension     Hypothyroid     MDD (major depressive disorder)     NPH (normal pressure hydrocephalus) (HCC)     PONV (postoperative nausea and vomiting)     Nausea    Protruded lumbar disc     L4/L5    Stress     Vitamin D deficiency        Surgical History:  Past Surgical History:   Procedure Laterality Date    ANKLE SURGERY Right 2001    COLONOSCOPY      COLONOSCOPY N/A 11/19/2024    COLONOSCOPY DIAGNOSTIC performed by Amalia Lopze MD at [facility]    FOOT SURGERY Right 2001    HIP SURGERY Right 02/02/2023    RIGHT HIP PINNING AND LEFT KNEE STERIOD INJECTION performed by Elliot Garcia MD [place] [facility]    HYSTERECTOMY (CERVIX STATUS UNKNOWN)      LASIK Bilateral     SHUNT REMOVAL N/A 3/17/2025    REMOVAL VENTRICULAR PERITONEAL SHUNT room 270 performed by Marisela Larson, 
patient

## 2025-06-13 ENCOUNTER — OFFICE VISIT (OUTPATIENT)
Dept: GERIATRIC MEDICINE | Age: 75
End: 2025-06-13

## 2025-06-13 DIAGNOSIS — E86.0 DEHYDRATION, MODERATE: ICD-10-CM

## 2025-06-13 DIAGNOSIS — A04.72 CLOSTRIDIUM DIFFICILE COLITIS: Primary | ICD-10-CM

## 2025-06-13 ASSESSMENT — ENCOUNTER SYMPTOMS: DIARRHEA: 1

## 2025-06-14 ASSESSMENT — ENCOUNTER SYMPTOMS
VOMITING: 0
DIARRHEA: 1
NAUSEA: 0
ABDOMINAL PAIN: 0

## 2025-06-14 NOTE — PROGRESS NOTES
Subjective:      Patient ID: Gloria Abbott is a pleasant 74 y.o. female who presents today for:  Chief Complaint   Patient presents with    Other     C. difficile diarrhea  (primary encounter diagnosis)  At risk for dehydration         [facility]    Patient seen today with son present due to concern of recurrent C. difficile colitis and hx of NPH with  shunt infection/removal back in March.  Patient was treated earlier this month with oral vancomycin for C.diff.  No longer taking at present.  She is having increasingly foul-smelling stools which are mucousy,  with multiple bouts diarrhea daily.  She is struggling to get some food and fluids and throughout the day although she does finish most of her meals per aides and nurse.  Planning to do C. difficile culture of stool as well as labs to assess for dehydration.  Will be given either vancomycin or fidaxomicin.  Patient does have history of recent shunt removal, POA concern for getting her scheduled to see neurology to replace shunt if deemed appropriate.  Requesting imaging.  Will plan to assess need for imaging from neuro team.       Patient Active Problem List   Diagnosis    Hyperlipidemia    Depression    Stress    Vitamin D deficiency    History of endometrial cancer    Hypothyroid    Maisonneuve fracture of right fibula    Major depressive disorder, recurrent episode, moderate (HCC)    Osteopenia    Other joint derangement, not elsewhere classified, ankle and foot    Sprain of ankle, deltoid ligament    History of uterine cancer    COVID-19    Osteoarthritis of left knee    Closed right hip fracture, initial encounter (HCC)    Subcapital fracture of femur, right, closed, initial encounter ([facility])    Dizziness    Acute pain due to trauma    Acute post-operative pain    Acute blood loss anemia    Status post total knee replacement, left    Status post left knee replacement    Normal pressure hydrocephalus (HCC)    Chronic left maxillary sinusitis

## 2025-06-17 NOTE — PROGRESS NOTES
SUBJECTIVE:  74-year-old woman seen for chronic back pain NPH diarrhea patient function stable no new febrile episodes oral intake has been good no chest pain palpitations weak at this time      ROS: Cough  The rest of the 14 point ROS negative    PHYSICAL EXAM: VSS per facility record  Pupils are reactive oral mucosa moist no thrush chest no crackles abdomen soft nontender hyperactive bowel sound bowel sounds    ASSESSMENT & PLAN:   Diagnosis Orders   1. Chronic neck pain        2. Normal pressure hydrocephalus (HCC)        3. Diarrhea, unspecified type          Continue to monitor output nutrition port skin surveillance globally weak no acute pain crisis functionally weak at this time.            Past Medical History:   Diagnosis Date    ADHD (attention deficit hyperactivity disorder)     Arthritis     Cancer (HCC)     endometrial and uterine    Depression     Hyperlipidemia     Hypertension     Hypothyroid     MDD (major depressive disorder)     NPH (normal pressure hydrocephalus) (HCC)     PONV (postoperative nausea and vomiting)     Nausea    Protruded lumbar disc     L4/L5    Stress     Vitamin D deficiency          Past Surgical History:   Procedure Laterality Date    ANKLE SURGERY Right 2001    COLONOSCOPY      COLONOSCOPY N/A 11/19/2024    COLONOSCOPY DIAGNOSTIC performed [facility] Amalia Lopez [facility]    FOOT SURGERY Right 2001    HIP SURGERY Right 02/02/2023    RIGHT HIP PINNING AND LEFT KNEE STERIOD INJECTION performed by Elliot Garcia MD at [facility]    HYSTERECTOMY (CERVIX STATUS UNKNOWN)      LASIK Bilateral     SHUNT REMOVAL N/A 3/17/2025    REMOVAL VENTRICULAR PERITONEAL SHUNT room 270 performed by Marisela Larson MD [place] [facility]    SINUS ENDOSCOPY Bilateral 08/06/2024    ENDOSCOPIC MAXILLARY ANTROSTOMIES 30 MIN performed by Ryan Zepeda MD at [facility]    SINUS ENDOSCOPY Left 09/17/2024    REVISION MAXILLARY ANTROSTOMY (LEFT)  RECENT SURGERY performed by Ryan Zepeda MD at [facility]

## 2025-06-18 ENCOUNTER — OFFICE VISIT (OUTPATIENT)
Dept: GERIATRIC MEDICINE | Age: 75
End: 2025-06-18

## 2025-06-18 DIAGNOSIS — Z91.89 AT RISK FOR DEHYDRATION: ICD-10-CM

## 2025-06-18 DIAGNOSIS — A04.72 C. DIFFICILE DIARRHEA: Primary | ICD-10-CM

## 2025-06-18 NOTE — PROGRESS NOTES
Subjective:      Patient ID: Gloria Abbott is a pleasant 74 y.o. female who presents today for:  Chief Complaint   Patient presents with    Other     Hx of clostridium difficile infection  (primary encounter diagnosis)  Leukocytosis, unspecified type  History of uti         [facility]    Patient is seen today for elevated WBC count of 13.7.  Patient denies any abdominal pain, dysuria, respiratory distress.  Patient has had recent C. difficile colitis treated in the hospital with vancomycin oral.  Patient is a bit impaired cognitively, however she does have reliable ROS.  I did discuss awaiting culture of stool.  Patient agreeable to UA with reflex and CBC on Friday.  Will treat as indicated.  For now we will wait for lab results.      Patient Active Problem List   Diagnosis    Hyperlipidemia    Depression    Stress    Vitamin D deficiency    History of endometrial cancer    Hypothyroid    Maisonneuve fracture of right fibula    Major depressive disorder, recurrent episode, moderate (HCC)    Osteopenia    Other joint derangement, not elsewhere classified, ankle and foot    Sprain of ankle, deltoid ligament    History of uterine cancer    COVID-19    Osteoarthritis of left knee    Closed right hip fracture, initial encounter (HCC)    Subcapital fracture of femur, right, closed, initial encounter (HCC)    Dizziness    Acute pain due to trauma    Acute post-operative pain    Acute blood loss anemia    Status post total knee replacement, left    Status post left knee replacement    Normal pressure hydrocephalus (HCC)    Chronic left maxillary sinusitis    Carpal tunnel syndrome, bilateral    Bilateral high frequency sensorineural hearing loss    Altered bowel habits    Urge incontinence of urine    Allergic rhinitis    Constipation    Ventricular enlargement due to brain atrophy    Infection of ventriculo-peritoneal shunt, initial encounter    Nonfunctioning ventriculoperitoneal shunt, sequela    Shunt

## 2025-06-21 NOTE — PROGRESS NOTES
psyllium (KONSYL) 28.3 % PACK Take 1 packet by mouth daily      oxyBUTYnin (DITROPAN-XL) 10 MG extended release tablet Take 1 tablet by mouth daily (Patient taking differently: Take 1 tablet by mouth daily Indications: Overactive Bladder) 90 tablet 3    cetirizine (ZYRTEC) 10 MG tablet Take 1 tablet by mouth daily 90 tablet 3    tiZANidine (ZANAFLEX) 2 MG tablet Take 1 tablet by mouth 3 times daily as needed (muscle pain) 30 tablet 0    topiramate (TOPAMAX) 25 MG tablet Take 1 tablet by mouth daily Indications: Disorder with Convulsion      Handicap Placard MISC by Does not apply route 3/30/2023-3/30/2025 1 each 0    Multiple Vitamin (MULTIVITAMIN ADULT PO) Take 1 tablet by mouth Daily Indications: Nutritional Support       No current facility-administered medications on file prior to visit.         Family History   Problem Relation Age of Onset    Anemia Mother     Heart Disease Mother     Depression Mother     High Blood Pressure Father     Colon Cancer Neg Hx        Social History     Socioeconomic History    Marital status:      Spouse name: Not on file    Number of children: Not on file    Years of education: Not on file    Highest education level: Not on file   Occupational History    Not on file   Tobacco Use    Smoking status: Never    Smokeless tobacco: Never   Vaping Use    Vaping status: Never Used   Substance and Sexual Activity    Alcohol use: No    Drug use: Never    Sexual activity: Not Currently     Partners: Male   Other Topics Concern    Not on file   Social History Narrative    Not on file     Social Drivers of Health     Financial Resource Strain: Low Risk  (9/6/2024)    Overall Financial Resource Strain (CARDIA)     Difficulty of Paying Living Expenses: Not hard at all   Food Insecurity: No Food Insecurity (5/10/2025)    Hunger Vital Sign     Worried About Running Out of Food in the Last Year: Never true     Ran Out of Food in the Last Year: Never true   Transportation Needs: No

## 2025-06-23 ENCOUNTER — OFFICE VISIT (OUTPATIENT)
Dept: GERIATRIC MEDICINE | Age: 75
End: 2025-06-23
Payer: MEDICARE

## 2025-06-23 DIAGNOSIS — A04.72 C. DIFFICILE DIARRHEA: Primary | ICD-10-CM

## 2025-06-23 PROCEDURE — 1123F ACP DISCUSS/DSCN MKR DOCD: CPT | Performed by: PHYSICIAN ASSISTANT

## 2025-06-23 PROCEDURE — 99308 SBSQ NF CARE LOW MDM 20: CPT | Performed by: PHYSICIAN ASSISTANT

## 2025-06-25 NOTE — PROGRESS NOTES
SUBJECTIVE:        ROS:  The rest of the 14 point ROS negative    PHYSICAL EXAM: VSS per facility record      ASSESSMENT & PLAN:   Diagnosis Orders   1. Acquired hypothyroidism        2. Normal pressure hydrocephalus (HCC)        3. Weakness        4. Major depressive disorder, recurrent episode, moderate (HCC)        5. 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)                      Past Medical History:   Diagnosis Date    ADHD (attention deficit hyperactivity disorder)     Arthritis     Cancer (HCC)     endometrial and uterine    Depression     Hyperlipidemia     Hypertension     Hypothyroid     MDD (major depressive disorder)     NPH (normal pressure hydrocephalus) (HCC)     PONV (postoperative nausea and vomiting)     Nausea    Protruded lumbar disc     L4/L5    Stress     Vitamin D deficiency          Past Surgical History:   Procedure Laterality Date    ANKLE SURGERY Right 2001    COLONOSCOPY      COLONOSCOPY N/A 11/19/2024    COLONOSCOPY DIAGNOSTIC performed by Amalia Lopez MD at [facility] [place]    FOOT SURGERY Right 2001    HIP SURGERY Right 02/02/2023    RIGHT HIP PINNING AND LEFT KNEE STERIOD INJECTION performed by Elliot Garcia MD at [facility]    HYSTERECTOMY (CERVIX STATUS UNKNOWN)      LASIK Bilateral     SHUNT REMOVAL N/A 3/17/2025    REMOVAL VENTRICULAR PERITONEAL SHUNT room 270 performed by Marisela Larson MD at [facility]    SINUS ENDOSCOPY Bilateral 08/06/2024    ENDOSCOPIC MAXILLARY ANTROSTOMIES 30 MIN performed by Ryan Zepeda MD at [facility]    SINUS ENDOSCOPY Left 09/17/2024    REVISION MAXILLARY ANTROSTOMY (LEFT)  RECENT SURGERY performed by Ryan Zepeda MD at [facility]    SINUS SURGERY  07/10/2012    TOTAL KNEE ARTHROPLASTY Left 05/08/2023    Left total knee arthroplasty,Juan C Triathlon Total Knee System,Choice with adductor canal block preop with Nhan 4/20/23 at 10:30 am and lab work for 4/14/23 at 1:00 pm performed by Elliot Garcia MD at [facility] OR    [facility]

## 2025-06-27 ENCOUNTER — CLINICAL DOCUMENTATION (OUTPATIENT)
Age: 75
End: 2025-06-27

## 2025-06-27 ENCOUNTER — TELEPHONE (OUTPATIENT)
Age: 75
End: 2025-06-27

## 2025-06-27 DIAGNOSIS — T85.730D INFECTION OF VENTRICULOPERITONEAL SHUNT, SUBSEQUENT ENCOUNTER: ICD-10-CM

## 2025-06-27 DIAGNOSIS — G91.2 NORMAL PRESSURE HYDROCEPHALUS (HCC): Primary | ICD-10-CM

## 2025-06-27 NOTE — TELEPHONE ENCOUNTER
Nurse from [facility] called. She wanted to know if Pt needed a repeat CT scan of head after her Ventricular Peritoneal Shunt removal back in March 2025. Looks like a CT of Head W/o Contrast was done on 5/9/2025 from Dr. Polo. Does the pt need a new one if so can another order be placed and call the nursing home so they can set this up.

## 2025-06-27 NOTE — PROGRESS NOTES
4/10/2025 postop visit with Dr. Larson: Reviewed CT head from 3/18/2025.  Some evidence of increased hydrocephalus.  Planning to wait 3 months for infection to clear before consideration of new shunt placement.    4/21/2025 CT head no intracranial abnormalities.    5/9/2025 hospital admission for altered mental status.  CT head was done and showed unchanged prominence of the ventricles.  No acute intracranial abnormality.    Will schedule 1 more CT to be completed in August.  Patient to schedule follow-up appointment after the CT.  At this visit with Dr. Larson can discuss possible new shunt placement.    Alfonzo Turner PA-C

## 2025-06-27 NOTE — TELEPHONE ENCOUNTER
Returned call to nursing home with an update to obtain CT and come into the office for a follow up in August. Orders placed for CT and follow up appointment made. They will contact our office with any further questions or concerns they may have.

## 2025-07-01 ENCOUNTER — OFFICE VISIT (OUTPATIENT)
Dept: GERIATRIC MEDICINE | Age: 75
End: 2025-07-01

## 2025-07-01 DIAGNOSIS — E03.9 HYPOTHYROIDISM, UNSPECIFIED TYPE: ICD-10-CM

## 2025-07-01 DIAGNOSIS — K59.00 CONSTIPATION, UNSPECIFIED CONSTIPATION TYPE: Primary | ICD-10-CM

## 2025-07-01 DIAGNOSIS — G91.2 NORMAL PRESSURE HYDROCEPHALUS (HCC): ICD-10-CM

## 2025-07-01 DIAGNOSIS — E55.9 VITAMIN D DEFICIENCY: ICD-10-CM

## 2025-07-08 ENCOUNTER — OFFICE VISIT (OUTPATIENT)
Dept: GERIATRIC MEDICINE | Age: 75
End: 2025-07-08

## 2025-07-08 ENCOUNTER — HOSPITAL ENCOUNTER (OUTPATIENT)
Dept: CT IMAGING | Age: 75
Discharge: HOME | End: 2025-07-10
Payer: MEDICARE

## 2025-07-08 DIAGNOSIS — T85.730D INFECTION OF VENTRICULOPERITONEAL SHUNT, SUBSEQUENT ENCOUNTER: ICD-10-CM

## 2025-07-08 DIAGNOSIS — A04.72 C. DIFFICILE DIARRHEA: Primary | ICD-10-CM

## 2025-07-08 DIAGNOSIS — G91.2 NORMAL PRESSURE HYDROCEPHALUS (HCC): ICD-10-CM

## 2025-07-08 DIAGNOSIS — R53.1 WEAKNESS: ICD-10-CM

## 2025-07-08 DIAGNOSIS — E03.9 ACQUIRED HYPOTHYROIDISM: ICD-10-CM

## 2025-07-08 PROCEDURE — 70450 CT HEAD/BRAIN W/O DYE: CPT

## 2025-07-10 ASSESSMENT — ENCOUNTER SYMPTOMS
ABDOMINAL PAIN: 0
VOMITING: 0
BLOOD IN STOOL: 0
NAUSEA: 0
DIARRHEA: 1

## 2025-07-10 NOTE — PROGRESS NOTES
SUBJECTIVE:    this 74-year-old woman seen in follow-up visit for acute onset worsening diarrhea weakness NPH confusion patient has fluctuating cognition.  Patient has had increased stool output.  Tested positive given for C. difficile colitis per      ROS: Confused at base  The rest of the 14 point ROS negative    PHYSICAL EXAM: VSS per facility record  Pupils are reactive oral mucosa moist no thrush chest coarse breath sound cardiovascular showed a regular abdomen soft with hyperactive bowel    ASSESSMENT & PLAN:   Diagnosis Orders   1. C. difficile diarrhea        2. Weakness        3. Normal pressure hydrocephalus (HCC)        4. Acquired hypothyroidism            Begin full course of oral vancomycin with probiotic agent encourage nutritional intake follow-up CT of the head pending global weakness time          Past Medical History:   Diagnosis Date    ADHD (attention deficit hyperactivity disorder)     Arthritis     Cancer (HCC)     endometrial and uterine    Depression     Hyperlipidemia     Hypertension     Hypothyroid     MDD (major depressive disorder)     NPH (normal pressure hydrocephalus) (HCC)     PONV (postoperative nausea and vomiting)     Nausea    Protruded lumbar disc     L4/L5    Stress     Vitamin D deficiency          Past Surgical History:   Procedure Laterality Date    ANKLE SURGERY Right 2001    COLONOSCOPY      COLONOSCOPY N/A 11/19/2024    COLONOSCOPY DIAGNOSTIC performed by Amalia Lopez MD at [facility] [place] [facility]    FOOT SURGERY Right 2001    HIP SURGERY Right 02/02/2023    RIGHT HIP PINNING AND LEFT KNEE STERIOD INJECTION performed by Elliot Garcia MD at [facility]    HYSTERECTOMY (CERVIX STATUS UNKNOWN)      LASIK Bilateral     SHUNT REMOVAL N/A 3/17/2025    REMOVAL VENTRICULAR PERITONEAL SHUNT room 270 performed by Marisela Larson MD at [facility]    SINUS ENDOSCOPY Bilateral 08/06/2024    ENDOSCOPIC MAXILLARY ANTROSTOMIES 30 MIN performed by Ryan Zepeda MD at [facility] [place]    SINUS ENDOSCOPY

## 2025-07-10 NOTE — PROGRESS NOTES
Subjective:      Patient ID: Gloria Abbott is a pleasant 74 y.o. female who presents today for:  Chief Complaint   Patient presents with    Other     C. difficile diarrhea  (primary encounter diagnosis)         [facility]    Following up with patient for recent recurrent C.diff infection. Pt is now doing well on PO liquid vancomycin. She states her BM's are improving significantly, with less diarrhea day to day. Her oral intake hasn't waned, and she maintains eating >= 75% of her meals and drinking water. VS are stable. No fever. She states she's had multiple formed stools over past 2-3 days. Will plan to cont monitoring.      Patient Active Problem List   Diagnosis    Hyperlipidemia    Depression    Stress    Vitamin D deficiency    History of endometrial cancer    Hypothyroid    Maisonneuve fracture of right fibula    Major depressive disorder, recurrent episode, moderate (HCC)    Osteopenia    Other joint derangement, not elsewhere classified, ankle and foot    Sprain of ankle, deltoid ligament    History of uterine cancer    COVID-19    Osteoarthritis of left knee    Closed right hip fracture, initial encounter ([facility])    Subcapital fracture of femur, right, closed, initial encounter ([facility])    Dizziness    Acute pain due to trauma    Acute post-operative pain    Acute blood loss anemia    Status post total knee replacement, left    Status post left knee replacement    Normal pressure hydrocephalus (HCC)    Chronic left maxillary sinusitis    Carpal tunnel syndrome, bilateral    Bilateral high frequency sensorineural hearing loss    Altered bowel habits    Urge incontinence of urine    Allergic rhinitis    Constipation    Ventricular enlargement due to brain atrophy    Infection of ventriculo-peritoneal shunt, initial encounter    Nonfunctioning ventriculoperitoneal shunt, sequela    Shunt malfunction    Hydrocephalus (HCC)    Infection of ventriculoperitoneal shunt    Candida infection    Chronic neck

## 2025-07-15 ENCOUNTER — OFFICE VISIT (OUTPATIENT)
Dept: GERIATRIC MEDICINE | Age: 75
End: 2025-07-15

## 2025-07-15 DIAGNOSIS — G91.2 NORMAL PRESSURE HYDROCEPHALUS (HCC): Primary | ICD-10-CM

## 2025-07-15 DIAGNOSIS — E03.9 HYPOTHYROIDISM, UNSPECIFIED TYPE: ICD-10-CM

## 2025-07-15 DIAGNOSIS — E78.5 HYPERLIPIDEMIA, UNSPECIFIED HYPERLIPIDEMIA TYPE: ICD-10-CM

## 2025-07-15 DIAGNOSIS — R53.1 WEAKNESS: ICD-10-CM

## 2025-07-22 ENCOUNTER — OFFICE VISIT (OUTPATIENT)
Dept: GERIATRIC MEDICINE | Age: 75
End: 2025-07-22

## 2025-07-22 DIAGNOSIS — G91.2 NORMAL PRESSURE HYDROCEPHALUS (HCC): ICD-10-CM

## 2025-07-22 DIAGNOSIS — K59.00 CONSTIPATION, UNSPECIFIED CONSTIPATION TYPE: Primary | ICD-10-CM

## 2025-07-22 DIAGNOSIS — E03.9 HYPOTHYROIDISM, UNSPECIFIED TYPE: ICD-10-CM

## 2025-07-23 ENCOUNTER — OFFICE VISIT (OUTPATIENT)
Dept: GERIATRIC MEDICINE | Age: 75
End: 2025-07-23

## 2025-07-23 DIAGNOSIS — A09 DIARRHEA OF INFECTIOUS ORIGIN: ICD-10-CM

## 2025-07-23 DIAGNOSIS — A04.72 C. DIFFICILE COLITIS: Primary | ICD-10-CM

## 2025-07-30 NOTE — PROGRESS NOTES
SUBJECTIVE:  74-year-old woman with constipation NPH weekly patient has been recent emesis fevers chills no change in her bowel bladder habits oral intake good change in bowel bladder habits coughing at times      ROS: Dry cough  The rest of the 14 point ROS negative    PHYSICAL EXAM: VSS per facility record  Pupils are reactive oral mucosa moist no thrush chest no crackles abdomen soft nontender extremity trace edema    ASSESSMENT & PLAN:   Diagnosis Orders   1. Constipation, unspecified constipation type        2. Hypothyroidism, unspecified type        3. Normal pressure hydrocephalus (HCC)        4. Vitamin D deficiency          Has been on vitamin D supplementation has been on Synthroid repeat TSH is pending.  Continue current bowel regimen function stable            Past Medical History:   Diagnosis Date    ADHD (attention deficit hyperactivity disorder)     Arthritis     Cancer (HCC)     endometrial and uterine    Depression     Hyperlipidemia     Hypertension     Hypothyroid     MDD (major depressive disorder)     NPH (normal pressure hydrocephalus) (HCC)     PONV (postoperative nausea and vomiting)     Nausea    Protruded lumbar disc     L4/L5    Stress     Vitamin D deficiency          Past Surgical History:   Procedure Laterality Date    ANKLE SURGERY Right 2001    COLONOSCOPY      COLONOSCOPY N/A 11/19/2024    COLONOSCOPY DIAGNOSTIC [place] [facility]    FOOT SURGERY Right 2001    HIP SURGERY Right 02/02/2023    RIGHT HIP PINNING AND LEFT KNEE STERIOD INJECTION performed by Elliot Garcia [place] [facility]    HYSTERECTOMY (CERVIX STATUS UNKNOWN)      LASIK Bilateral     SHUNT REMOVAL N/A 3/17/2025    REMOVAL VENTRICULAR PERITONEAL SHUNT room 270 performed [place] [facility]    SINUS ENDOSCOPY Bilateral 08/06/2024    ENDOSCOPIC MAXILLARY ANTROSTOMIES 30 MIN performed [place]epeda [place] [facility]    SINUS ENDOSCOPY Left 09/17/2024    REVISION MAXILLARY

## 2025-07-31 ASSESSMENT — ENCOUNTER SYMPTOMS
BLOOD IN STOOL: 0
ABDOMINAL PAIN: 0
NAUSEA: 0
DIARRHEA: 1
VOMITING: 0

## 2025-08-01 NOTE — PROGRESS NOTES
Subjective:      Patient ID: Gloria Abbott is a pleasant 74 y.o. female who presents today for:  Chief Complaint   Patient presents with    Other     C. difficile colitis  (primary encounter diagnosis)  Diarrhea of infectious origin         [facility]    Patient seen today for follow-up on C. difficile colitis.  Patient continues on Vanco until 8/7/2025.  She is eating and drinking very well and continues to maintain her weight.  She states no new diarrhea at this point and nursing/aides confirmed.  Will continue monitoring patient, however since she has had greater than 3-4 formed stools and no evident diarrhea, will discontinue any isolation precautions for now.      Patient Active Problem List   Diagnosis    Hyperlipidemia    Depression    Stress    Vitamin D deficiency    History of endometrial cancer    Hypothyroid    Maisonneuve fracture of right fibula    Major depressive disorder, recurrent episode, moderate (HCC)    Osteopenia    Other joint derangement, not elsewhere classified, ankle and foot    Sprain of ankle, deltoid ligament    History of uterine cancer    COVID-19    Osteoarthritis of left knee    Closed right hip fracture, initial encounter (HCC)    Subcapital fracture of femur, right, closed, initial encounter ([facility])    Dizziness    Acute pain due to trauma    Acute post-operative pain    Acute blood loss anemia    Status post total knee replacement, left    Status post left knee replacement    Normal pressure hydrocephalus (HCC)    Chronic left maxillary sinusitis    Carpal tunnel syndrome, bilateral    Bilateral high frequency sensorineural hearing loss    Altered bowel habits    Urge incontinence of urine    Allergic rhinitis    Constipation    Ventricular enlargement due to brain atrophy    Infection of ventriculo-peritoneal shunt, initial encounter    Nonfunctioning ventriculoperitoneal shunt, sequela    Shunt malfunction    Hydrocephalus (HCC)    Infection of ventriculoperitoneal

## 2025-08-08 ENCOUNTER — OFFICE VISIT (OUTPATIENT)
Dept: INTERNAL MEDICINE | Age: 75
End: 2025-08-08

## 2025-08-08 VITALS
HEIGHT: 64 IN | BODY MASS INDEX: 27.28 KG/M2 | OXYGEN SATURATION: 96 % | RESPIRATION RATE: 16 BRPM | WEIGHT: 159.8 LBS | TEMPERATURE: 98.5 F | DIASTOLIC BLOOD PRESSURE: 60 MMHG | SYSTOLIC BLOOD PRESSURE: 124 MMHG | HEART RATE: 80 BPM

## 2025-08-08 DIAGNOSIS — Z09 HOSPITAL DISCHARGE FOLLOW-UP: Primary | ICD-10-CM

## 2025-08-08 RX ORDER — MULTIVIT WITH MINERALS/LUTEIN
250 TABLET ORAL DAILY
COMMUNITY

## 2025-08-08 RX ORDER — CHOLECALCIFEROL (VITAMIN D3) 125 MCG
1 CAPSULE ORAL 2 TIMES DAILY
COMMUNITY

## 2025-08-08 RX ORDER — POTASSIUM CHLORIDE 1500 MG/1
40 TABLET, EXTENDED RELEASE ORAL DAILY
COMMUNITY

## 2025-08-08 RX ORDER — ACETAMINOPHEN 325 MG/1
650 TABLET ORAL EVERY 6 HOURS PRN
COMMUNITY

## 2025-08-08 RX ORDER — FERROUS SULFATE 325(65) MG
325 TABLET ORAL
COMMUNITY

## 2025-08-08 RX ORDER — CITALOPRAM HYDROBROMIDE 20 MG/1
20 TABLET ORAL DAILY
COMMUNITY

## 2025-08-08 RX ORDER — MIRTAZAPINE 7.5 MG/1
7.5 TABLET, FILM COATED ORAL NIGHTLY
COMMUNITY

## 2025-08-08 RX ORDER — NICOTINE POLACRILEX 4 MG
15 LOZENGE BUCCAL SEE ADMIN INSTRUCTIONS
COMMUNITY

## 2025-08-12 PROBLEM — R19.4 ALTERED BOWEL HABITS: Status: RESOLVED | Noted: 2024-10-16 | Resolved: 2025-08-12

## 2025-08-12 PROBLEM — Z87.81 HX OF FRACTURE OF RIGHT HIP: Status: ACTIVE | Noted: 2023-02-01

## 2025-08-12 PROBLEM — G89.18 ACUTE POST-OPERATIVE PAIN: Status: RESOLVED | Noted: 2023-02-03 | Resolved: 2025-08-12

## 2025-08-12 PROBLEM — B37.9 CANDIDA INFECTION: Status: RESOLVED | Noted: 2025-03-24 | Resolved: 2025-08-12

## 2025-08-12 PROBLEM — D62 ACUTE BLOOD LOSS ANEMIA: Status: RESOLVED | Noted: 2023-02-03 | Resolved: 2025-08-12

## 2025-08-12 PROBLEM — G89.11 ACUTE PAIN DUE TO TRAUMA: Status: RESOLVED | Noted: 2023-02-02 | Resolved: 2025-08-12

## 2025-08-12 ASSESSMENT — ENCOUNTER SYMPTOMS
COUGH: 0
SHORTNESS OF BREATH: 0
VOMITING: 0
NAUSEA: 0
SINUS PRESSURE: 0
EYE REDNESS: 0
RHINORRHEA: 0
SORE THROAT: 0
SINUS PAIN: 0
TROUBLE SWALLOWING: 0
WHEEZING: 0
DIARRHEA: 0

## 2025-08-15 DIAGNOSIS — J30.9 ALLERGIC RHINITIS, UNSPECIFIED SEASONALITY, UNSPECIFIED TRIGGER: ICD-10-CM

## 2025-08-15 DIAGNOSIS — N39.41 URGE INCONTINENCE OF URINE: ICD-10-CM

## 2025-08-15 DIAGNOSIS — E03.9 HYPOTHYROIDISM, UNSPECIFIED TYPE: ICD-10-CM

## 2025-08-15 DIAGNOSIS — E78.2 MIXED HYPERLIPIDEMIA: ICD-10-CM

## 2025-08-15 RX ORDER — OXYBUTYNIN CHLORIDE 10 MG/1
10 TABLET, EXTENDED RELEASE ORAL DAILY
Qty: 90 TABLET | Refills: 0 | Status: SHIPPED | OUTPATIENT
Start: 2025-08-15

## 2025-08-15 RX ORDER — LEVOTHYROXINE SODIUM 50 UG/1
50 TABLET ORAL DAILY
Qty: 90 TABLET | Refills: 0 | Status: SHIPPED | OUTPATIENT
Start: 2025-08-15

## 2025-08-15 RX ORDER — PRAVASTATIN SODIUM 20 MG
20 TABLET ORAL DAILY
Qty: 90 TABLET | Refills: 0 | Status: SHIPPED | OUTPATIENT
Start: 2025-08-15

## 2025-08-15 RX ORDER — AMLODIPINE BESYLATE 2.5 MG/1
2.5 TABLET ORAL DAILY
Qty: 30 TABLET | Refills: 0 | Status: SHIPPED | OUTPATIENT
Start: 2025-08-15

## 2025-08-15 RX ORDER — CETIRIZINE HYDROCHLORIDE 10 MG/1
10 TABLET ORAL DAILY
Qty: 90 TABLET | Refills: 0 | Status: SHIPPED | OUTPATIENT
Start: 2025-08-15

## 2025-08-18 ENCOUNTER — TELEPHONE (OUTPATIENT)
Dept: INTERNAL MEDICINE | Age: 75
End: 2025-08-18

## 2025-08-19 ENCOUNTER — HOSPITAL ENCOUNTER (EMERGENCY)
Facility: HOSPITAL | Age: 75
Discharge: HOME | End: 2025-08-20
Payer: MEDICARE

## 2025-08-19 DIAGNOSIS — S09.90XA CLOSED HEAD INJURY, INITIAL ENCOUNTER: ICD-10-CM

## 2025-08-19 DIAGNOSIS — R19.7 DIARRHEA, UNSPECIFIED TYPE: ICD-10-CM
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
## 2025-08-19 DIAGNOSIS — S16.1XXA ACUTE CERVICAL MYOFASCIAL STRAIN, INITIAL ENCOUNTER: ICD-10-CM

## 2025-08-19 PROCEDURE — 99285 EMERGENCY DEPT VISIT HI MDM: CPT

## 2025-08-20 ENCOUNTER — APPOINTMENT (OUTPATIENT)
Dept: CARDIOLOGY | Facility: HOSPITAL | Age: 75
End: 2025-08-20
Payer: MEDICARE

## 2025-08-20 ENCOUNTER — APPOINTMENT (OUTPATIENT)
Dept: RADIOLOGY | Facility: HOSPITAL | Age: 75
End: 2025-08-20
Payer: MEDICARE

## 2025-08-20 VITALS
TEMPERATURE: 98.2 F | OXYGEN SATURATION: 99 % | HEART RATE: 85 BPM | RESPIRATION RATE: 18 BRPM | BODY MASS INDEX: 30.04 KG/M2 | WEIGHT: 175 LBS | SYSTOLIC BLOOD PRESSURE: 140 MMHG | DIASTOLIC BLOOD PRESSURE: 72 MMHG

## 2025-08-20 LAB
ALBUMIN SERPL BCP-MCNC: 3.5 G/DL (ref 3.4–5)
ALP SERPL-CCNC: 67 U/L (ref 33–136)
ALT SERPL W P-5'-P-CCNC: 10 U/L (ref 7–45)
ANION GAP SERPL CALC-SCNC: 14 MMOL/L (ref 10–20)
AST SERPL W P-5'-P-CCNC: 20 U/L (ref 9–39)
ATRIAL RATE: 81 BPM
BASOPHILS # BLD AUTO: 0.05 X10*3/UL (ref 0–0.1)
BASOPHILS NFR BLD AUTO: 0.5 %
BILIRUB SERPL-MCNC: 0.3 MG/DL (ref 0–1.2)
BUN SERPL-MCNC: 14 MG/DL (ref 6–23)
CALCIUM SERPL-MCNC: 8.7 MG/DL (ref 8.6–10.3)
CARDIAC TROPONIN I PNL SERPL HS: 4 NG/L (ref 0–13)
CARDIAC TROPONIN I PNL SERPL HS: 5 NG/L (ref 0–13)
CHLORIDE SERPL-SCNC: 104 MMOL/L (ref 98–107)
CK SERPL-CCNC: 143 U/L (ref 0–215)
CO2 SERPL-SCNC: 23 MMOL/L (ref 21–32)
CREAT SERPL-MCNC: 0.78 MG/DL (ref 0.5–1.05)
EGFRCR SERPLBLD CKD-EPI 2021: 79 ML/MIN/1.73M*2
EOSINOPHIL # BLD AUTO: 0.34 X10*3/UL (ref 0–0.4)
EOSINOPHIL NFR BLD AUTO: 3.3 %
ERYTHROCYTE [DISTWIDTH] IN BLOOD BY AUTOMATED COUNT: 14.8 % (ref 11.5–14.5)
GLUCOSE SERPL-MCNC: 98 MG/DL (ref 74–99)
HCT VFR BLD AUTO: 32.8 % (ref 36–46)
HGB BLD-MCNC: 10.6 G/DL (ref 12–16)
IMM GRANULOCYTES # BLD AUTO: 0.03 X10*3/UL (ref 0–0.5)
IMM GRANULOCYTES NFR BLD AUTO: 0.3 % (ref 0–0.9)
INR PPP: 1.1 (ref 0.9–1.1)
LACTATE SERPL-SCNC: 1.2 MMOL/L (ref 0.4–2)
LIPASE SERPL-CCNC: 23 U/L (ref 9–82)
LYMPHOCYTES # BLD AUTO: 1.05 X10*3/UL (ref 0.8–3)
LYMPHOCYTES NFR BLD AUTO: 10.3 %
MCH RBC QN AUTO: 29.7 PG (ref 26–34)
MCHC RBC AUTO-ENTMCNC: 32.3 G/DL (ref 32–36)
MCV RBC AUTO: 92 FL (ref 80–100)
MONOCYTES # BLD AUTO: 1.03 X10*3/UL (ref 0.05–0.8)
MONOCYTES NFR BLD AUTO: 10.1 %
NEUTROPHILS # BLD AUTO: 7.68 X10*3/UL (ref 1.6–5.5)
NEUTROPHILS NFR BLD AUTO: 75.5 %
NRBC BLD-RTO: 0 /100 WBCS (ref 0–0)
P AXIS: 73 DEGREES
P OFFSET: 175 MS
P ONSET: 142 MS
PLATELET # BLD AUTO: 353 X10*3/UL (ref 150–450)
POTASSIUM SERPL-SCNC: 3.7 MMOL/L (ref 3.5–5.3)
PR INTERVAL: 154 MS
PROT SERPL-MCNC: 6.8 G/DL (ref 6.4–8.2)
PROTHROMBIN TIME: 11.9 SECONDS (ref 9.8–12.4)
Q ONSET: 219 MS
QRS COUNT: 13 BEATS
QRS DURATION: 84 MS
QT INTERVAL: 350 MS
QTC CALCULATION(BAZETT): 406 MS
QTC FREDERICIA: 386 MS
R AXIS: 69 DEGREES
RBC # BLD AUTO: 3.57 X10*6/UL (ref 4–5.2)
SODIUM SERPL-SCNC: 137 MMOL/L (ref 136–145)
T AXIS: 16 DEGREES
T OFFSET: 394 MS
VENTRICULAR RATE: 81 BPM
WBC # BLD AUTO: 10.2 X10*3/UL (ref 4.4–11.3)

## 2025-08-20 PROCEDURE — 93005 ELECTROCARDIOGRAM TRACING: CPT

## 2025-08-20 PROCEDURE — 74177 CT ABD & PELVIS W/CONTRAST: CPT | Mod: FOREIGN READ | Performed by: RADIOLOGY

## 2025-08-20 PROCEDURE — 70450 CT HEAD/BRAIN W/O DYE: CPT | Performed by: RADIOLOGY

## 2025-08-20 PROCEDURE — 72125 CT NECK SPINE W/O DYE: CPT

## 2025-08-20 PROCEDURE — 36415 COLL VENOUS BLD VENIPUNCTURE: CPT | Performed by: PHYSICIAN ASSISTANT

## 2025-08-20 PROCEDURE — 70450 CT HEAD/BRAIN W/O DYE: CPT

## 2025-08-20 PROCEDURE — 82550 ASSAY OF CK (CPK): CPT | Performed by: PHYSICIAN ASSISTANT

## 2025-08-20 PROCEDURE — 72125 CT NECK SPINE W/O DYE: CPT | Performed by: RADIOLOGY

## 2025-08-20 PROCEDURE — 71260 CT THORAX DX C+: CPT | Mod: FOREIGN READ | Performed by: RADIOLOGY

## 2025-08-20 PROCEDURE — 71260 CT THORAX DX C+: CPT

## 2025-08-20 PROCEDURE — 96374 THER/PROPH/DIAG INJ IV PUSH: CPT | Mod: 59

## 2025-08-20 PROCEDURE — 83690 ASSAY OF LIPASE: CPT | Performed by: PHYSICIAN ASSISTANT

## 2025-08-20 PROCEDURE — 2500000004 HC RX 250 GENERAL PHARMACY W/ HCPCS (ALT 636 FOR OP/ED): Performed by: PHYSICIAN ASSISTANT

## 2025-08-20 PROCEDURE — 96361 HYDRATE IV INFUSION ADD-ON: CPT

## 2025-08-20 PROCEDURE — 72131 CT LUMBAR SPINE W/O DYE: CPT | Mod: FOREIGN READ | Performed by: RADIOLOGY

## 2025-08-20 PROCEDURE — 80053 COMPREHEN METABOLIC PANEL: CPT | Performed by: PHYSICIAN ASSISTANT

## 2025-08-20 PROCEDURE — 84484 ASSAY OF TROPONIN QUANT: CPT | Performed by: PHYSICIAN ASSISTANT

## 2025-08-20 PROCEDURE — 72128 CT CHEST SPINE W/O DYE: CPT | Mod: FOREIGN READ | Performed by: RADIOLOGY

## 2025-08-20 PROCEDURE — 2550000001 HC RX 255 CONTRASTS: Performed by: PHYSICIAN ASSISTANT

## 2025-08-20 PROCEDURE — 85025 COMPLETE CBC W/AUTO DIFF WBC: CPT | Performed by: PHYSICIAN ASSISTANT

## 2025-08-20 PROCEDURE — 85610 PROTHROMBIN TIME: CPT | Performed by: PHYSICIAN ASSISTANT

## 2025-08-20 PROCEDURE — 83605 ASSAY OF LACTIC ACID: CPT | Performed by: PHYSICIAN ASSISTANT

## 2025-08-20 RX ORDER — ONDANSETRON HYDROCHLORIDE 2 MG/ML
4 INJECTION, SOLUTION INTRAVENOUS ONCE
Status: COMPLETED | OUTPATIENT
Start: 2025-08-20 | End: 2025-08-20

## 2025-08-20 RX ORDER — FENTANYL CITRATE 50 UG/ML
25 INJECTION, SOLUTION INTRAMUSCULAR; INTRAVENOUS ONCE
Status: DISCONTINUED | OUTPATIENT
Start: 2025-08-20 | End: 2025-08-20 | Stop reason: HOSPADM

## 2025-08-20 RX ADMIN — ONDANSETRON 4 MG: 2 INJECTION INTRAMUSCULAR; INTRAVENOUS at 00:50

## 2025-08-20 RX ADMIN — SODIUM CHLORIDE 500 ML: 0.9 INJECTION, SOLUTION INTRAVENOUS at 00:50

## 2025-08-20 RX ADMIN — IOHEXOL 75 ML: 350 INJECTION, SOLUTION INTRAVENOUS at 01:24

## 2025-08-20 ASSESSMENT — LIFESTYLE VARIABLES
EVER HAD A DRINK FIRST THING IN THE MORNING TO STEADY YOUR NERVES TO GET RID OF A HANGOVER: NO
HAVE PEOPLE ANNOYED YOU BY CRITICIZING YOUR DRINKING: NO
HAVE YOU EVER FELT YOU SHOULD CUT DOWN ON YOUR DRINKING: NO
TOTAL SCORE: 0
EVER FELT BAD OR GUILTY ABOUT YOUR DRINKING: NO

## 2025-08-21 ENCOUNTER — HOSPITAL ENCOUNTER (INPATIENT)
Age: 75
LOS: 2 days | Discharge: NURSING, REHAB OR LONG-TERM CARE | DRG: 372 | End: 2025-08-26
Attending: INTERNAL MEDICINE | Admitting: INTERNAL MEDICINE
Payer: MEDICARE

## 2025-08-21 DIAGNOSIS — R53.1 GENERAL WEAKNESS: Primary | ICD-10-CM

## 2025-08-21 DIAGNOSIS — R19.7 DIARRHEA OF PRESUMED INFECTIOUS ORIGIN: ICD-10-CM

## 2025-08-21 PROBLEM — E86.0 DEHYDRATION: Status: ACTIVE | Noted: 2025-08-21

## 2025-08-21 LAB
ALBUMIN SERPL-MCNC: 3.7 G/DL (ref 3.5–4.6)
ALP SERPL-CCNC: 83 U/L (ref 40–130)
ALT SERPL-CCNC: 12 U/L (ref 0–33)
ANION GAP SERPL CALCULATED.3IONS-SCNC: 13 MEQ/L (ref 9–15)
AST SERPL-CCNC: 26 U/L (ref 0–35)
BASOPHILS # BLD: 0.1 K/UL (ref 0–0.1)
BASOPHILS NFR BLD: 0.7 % (ref 0.1–1.2)
BILIRUB SERPL-MCNC: 0.4 MG/DL (ref 0.2–0.7)
BUN SERPL-MCNC: 13 MG/DL (ref 8–23)
CALCIUM SERPL-MCNC: 8.9 MG/DL (ref 8.5–9.9)
CHLORIDE SERPL-SCNC: 101 MEQ/L (ref 95–107)
CO2 SERPL-SCNC: 23 MEQ/L (ref 20–31)
CREAT SERPL-MCNC: 0.9 MG/DL (ref 0.5–0.9)
EOSINOPHIL # BLD: 0.1 K/UL (ref 0–0.4)
EOSINOPHIL NFR BLD: 1.1 % (ref 0.7–5.8)
ERYTHROCYTE [DISTWIDTH] IN BLOOD BY AUTOMATED COUNT: 14.8 % (ref 11.7–14.4)
GLOBULIN SER CALC-MCNC: 3.3 G/DL (ref 2.3–3.5)
GLUCOSE SERPL-MCNC: 106 MG/DL (ref 70–99)
HCT VFR BLD AUTO: 33.5 % (ref 37–47)
HGB BLD-MCNC: 10.8 G/DL (ref 11.2–15.7)
IMM GRANULOCYTES # BLD: 0 K/UL
IMM GRANULOCYTES NFR BLD: 0.2 %
LIPASE SERPL-CCNC: 14 U/L (ref 12–95)
LYMPHOCYTES # BLD: 1 K/UL (ref 1.2–3.7)
LYMPHOCYTES NFR BLD: 12.2 %
MAGNESIUM SERPL-MCNC: 1.9 MG/DL (ref 1.7–2.4)
MCH RBC QN AUTO: 29.6 PG (ref 25.6–32.2)
MCHC RBC AUTO-ENTMCNC: 32.2 % (ref 32.2–35.5)
MCV RBC AUTO: 91.8 FL (ref 79.4–94.8)
MONOCYTES # BLD: 1.3 K/UL (ref 0.2–0.9)
MONOCYTES NFR BLD: 15.5 % (ref 4.7–12.5)
NEUTROPHILS # BLD: 5.8 K/UL (ref 1.6–6.1)
NEUTS SEG NFR BLD: 70.3 % (ref 34–71.1)
PLATELET # BLD AUTO: 410 K/UL (ref 182–369)
POTASSIUM SERPL-SCNC: 3.6 MEQ/L (ref 3.4–4.9)
PROT SERPL-MCNC: 7 G/DL (ref 6.3–8)
RBC # BLD AUTO: 3.65 M/UL (ref 3.93–5.22)
SODIUM SERPL-SCNC: 137 MEQ/L (ref 135–144)
WBC # BLD AUTO: 8.3 K/UL (ref 4–10)

## 2025-08-21 PROCEDURE — 99285 EMERGENCY DEPT VISIT HI MDM: CPT

## 2025-08-21 PROCEDURE — 87449 NOS EACH ORGANISM AG IA: CPT

## 2025-08-21 PROCEDURE — 87324 CLOSTRIDIUM AG IA: CPT

## 2025-08-21 PROCEDURE — 2580000003 HC RX 258

## 2025-08-21 PROCEDURE — 83690 ASSAY OF LIPASE: CPT

## 2025-08-21 PROCEDURE — 36415 COLL VENOUS BLD VENIPUNCTURE: CPT

## 2025-08-21 PROCEDURE — G0378 HOSPITAL OBSERVATION PER HR: HCPCS

## 2025-08-21 PROCEDURE — 6360000002 HC RX W HCPCS: Performed by: INTERNAL MEDICINE

## 2025-08-21 PROCEDURE — 85025 COMPLETE CBC W/AUTO DIFF WBC: CPT

## 2025-08-21 PROCEDURE — 80053 COMPREHEN METABOLIC PANEL: CPT

## 2025-08-21 PROCEDURE — 6370000000 HC RX 637 (ALT 250 FOR IP): Performed by: INTERNAL MEDICINE

## 2025-08-21 PROCEDURE — 87040 BLOOD CULTURE FOR BACTERIA: CPT

## 2025-08-21 PROCEDURE — 83735 ASSAY OF MAGNESIUM: CPT

## 2025-08-21 PROCEDURE — 2580000003 HC RX 258: Performed by: INTERNAL MEDICINE

## 2025-08-21 PROCEDURE — 87507 IADNA-DNA/RNA PROBE TQ 12-25: CPT

## 2025-08-21 RX ORDER — TIZANIDINE 2 MG/1
2 TABLET ORAL EVERY 6 HOURS PRN
Status: DISCONTINUED | OUTPATIENT
Start: 2025-08-21 | End: 2025-08-26 | Stop reason: HOSPADM

## 2025-08-21 RX ORDER — CITALOPRAM HYDROBROMIDE 20 MG/1
20 TABLET ORAL DAILY
Status: DISCONTINUED | OUTPATIENT
Start: 2025-08-22 | End: 2025-08-26 | Stop reason: HOSPADM

## 2025-08-21 RX ORDER — AMLODIPINE BESYLATE 2.5 MG/1
2.5 TABLET ORAL DAILY
Status: DISCONTINUED | OUTPATIENT
Start: 2025-08-22 | End: 2025-08-26 | Stop reason: HOSPADM

## 2025-08-21 RX ORDER — POTASSIUM CHLORIDE 1500 MG/1
20 TABLET, EXTENDED RELEASE ORAL ONCE
Status: COMPLETED | OUTPATIENT
Start: 2025-08-21 | End: 2025-08-21

## 2025-08-21 RX ORDER — ENOXAPARIN SODIUM 100 MG/ML
40 INJECTION SUBCUTANEOUS DAILY
Status: DISCONTINUED | OUTPATIENT
Start: 2025-08-21 | End: 2025-08-26 | Stop reason: HOSPADM

## 2025-08-21 RX ORDER — LEVOTHYROXINE SODIUM 25 UG/1
50 TABLET ORAL DAILY
Status: DISCONTINUED | OUTPATIENT
Start: 2025-08-22 | End: 2025-08-26 | Stop reason: HOSPADM

## 2025-08-21 RX ORDER — PRAVASTATIN SODIUM 10 MG
20 TABLET ORAL DAILY
Status: DISCONTINUED | OUTPATIENT
Start: 2025-08-22 | End: 2025-08-26 | Stop reason: HOSPADM

## 2025-08-21 RX ORDER — TOPIRAMATE 25 MG/1
25 TABLET, FILM COATED ORAL DAILY
Status: DISCONTINUED | OUTPATIENT
Start: 2025-08-22 | End: 2025-08-26 | Stop reason: HOSPADM

## 2025-08-21 RX ORDER — 0.9 % SODIUM CHLORIDE 0.9 %
1000 INTRAVENOUS SOLUTION INTRAVENOUS ONCE
Status: COMPLETED | OUTPATIENT
Start: 2025-08-21 | End: 2025-08-21

## 2025-08-21 RX ORDER — ACETAMINOPHEN 325 MG/1
650 TABLET ORAL EVERY 6 HOURS PRN
Status: DISCONTINUED | OUTPATIENT
Start: 2025-08-21 | End: 2025-08-26 | Stop reason: HOSPADM

## 2025-08-21 RX ORDER — TIZANIDINE 2 MG/1
2 TABLET ORAL EVERY 6 HOURS PRN
COMMUNITY

## 2025-08-21 RX ORDER — OXYBUTYNIN CHLORIDE 5 MG/1
10 TABLET, EXTENDED RELEASE ORAL DAILY
Status: DISCONTINUED | OUTPATIENT
Start: 2025-08-22 | End: 2025-08-26 | Stop reason: HOSPADM

## 2025-08-21 RX ORDER — ASCORBIC ACID 500 MG
250 TABLET ORAL DAILY
Status: DISCONTINUED | OUTPATIENT
Start: 2025-08-22 | End: 2025-08-26 | Stop reason: HOSPADM

## 2025-08-21 RX ORDER — VANCOMYCIN HYDROCHLORIDE 250 MG/1
500 CAPSULE ORAL ONCE
Status: COMPLETED | OUTPATIENT
Start: 2025-08-21 | End: 2025-08-21

## 2025-08-21 RX ORDER — FERROUS SULFATE 325(65) MG
325 TABLET ORAL
Status: DISCONTINUED | OUTPATIENT
Start: 2025-08-22 | End: 2025-08-26 | Stop reason: HOSPADM

## 2025-08-21 RX ORDER — SODIUM CHLORIDE 9 MG/ML
INJECTION, SOLUTION INTRAVENOUS CONTINUOUS
Status: DISPENSED | OUTPATIENT
Start: 2025-08-21 | End: 2025-08-22

## 2025-08-21 RX ORDER — METRONIDAZOLE 500 MG/100ML
500 INJECTION, SOLUTION INTRAVENOUS EVERY 8 HOURS
Status: DISCONTINUED | OUTPATIENT
Start: 2025-08-21 | End: 2025-08-22

## 2025-08-21 RX ORDER — VANCOMYCIN HYDROCHLORIDE 250 MG/1
250 CAPSULE ORAL 3 TIMES DAILY
Status: DISCONTINUED | OUTPATIENT
Start: 2025-08-22 | End: 2025-08-26 | Stop reason: HOSPADM

## 2025-08-21 RX ORDER — MIRTAZAPINE 15 MG/1
7.5 TABLET, FILM COATED ORAL NIGHTLY
Status: DISCONTINUED | OUTPATIENT
Start: 2025-08-21 | End: 2025-08-26 | Stop reason: HOSPADM

## 2025-08-21 RX ADMIN — MIRTAZAPINE 7.5 MG: 15 TABLET, FILM COATED ORAL at 21:08

## 2025-08-21 RX ADMIN — SODIUM CHLORIDE: 0.9 INJECTION, SOLUTION INTRAVENOUS at 21:12

## 2025-08-21 RX ADMIN — VANCOMYCIN HYDROCHLORIDE 500 MG: 250 CAPSULE ORAL at 21:08

## 2025-08-21 RX ADMIN — ENOXAPARIN SODIUM 40 MG: 100 INJECTION SUBCUTANEOUS at 21:09

## 2025-08-21 RX ADMIN — CEFTRIAXONE 1000 MG: 1 INJECTION, POWDER, FOR SOLUTION INTRAMUSCULAR; INTRAVENOUS at 21:16

## 2025-08-21 RX ADMIN — POTASSIUM CHLORIDE 20 MEQ: 1500 TABLET, EXTENDED RELEASE ORAL at 21:08

## 2025-08-21 RX ADMIN — SODIUM CHLORIDE 1000 ML: 0.9 INJECTION, SOLUTION INTRAVENOUS at 15:51

## 2025-08-21 RX ADMIN — METRONIDAZOLE 500 MG: 500 INJECTION, SOLUTION INTRAVENOUS at 22:02

## 2025-08-21 ASSESSMENT — PAIN SCALES - GENERAL: PAINLEVEL_OUTOF10: 0

## 2025-08-21 ASSESSMENT — PAIN - FUNCTIONAL ASSESSMENT: PAIN_FUNCTIONAL_ASSESSMENT: 0-10

## 2025-08-22 PROBLEM — E44.0 PROTEIN-CALORIE MALNUTRITION, MODERATE: Status: ACTIVE | Noted: 2025-08-22

## 2025-08-22 PROBLEM — A04.71 RECURRENT COLITIS DUE TO CLOSTRIDIOIDES DIFFICILE: Status: ACTIVE | Noted: 2025-05-10

## 2025-08-22 LAB
ADV 40+41 DNA STL QL NAA+NON-PROBE: NOT DETECTED
ALBUMIN SERPL-MCNC: 3.1 G/DL (ref 3.5–4.6)
ALP SERPL-CCNC: 72 U/L (ref 40–130)
ALT SERPL-CCNC: 11 U/L (ref 0–33)
AMORPH SED URNS QL MICRO: ABNORMAL
ANION GAP SERPL CALCULATED.3IONS-SCNC: 11 MEQ/L (ref 9–15)
AST SERPL-CCNC: 21 U/L (ref 0–35)
BACTERIA URNS QL MICRO: ABNORMAL /HPF
BASOPHILS # BLD: 0.1 K/UL (ref 0–0.1)
BASOPHILS NFR BLD: 1.4 % (ref 0.1–1.2)
BILIRUB SERPL-MCNC: <0.2 MG/DL (ref 0.2–0.7)
BILIRUB UR QL STRIP: NEGATIVE
BUN SERPL-MCNC: 10 MG/DL (ref 8–23)
C CAYETANENSIS DNA STL QL NAA+NON-PROBE: NOT DETECTED
C COLI+JEJ+UPSA DNA STL QL NAA+NON-PROBE: NOT DETECTED
C DIFF TOX A+B STL QL IA: ABNORMAL
CALCIUM SERPL-MCNC: 8 MG/DL (ref 8.5–9.9)
CHLORIDE SERPL-SCNC: 105 MEQ/L (ref 95–107)
CLARITY UR: CLEAR
CO2 SERPL-SCNC: 21 MEQ/L (ref 20–31)
COLOR UR: YELLOW
CREAT SERPL-MCNC: 0.7 MG/DL (ref 0.5–0.9)
CRYPTOSP DNA STL QL NAA+NON-PROBE: NOT DETECTED
E HISTOLYT DNA STL QL NAA+NON-PROBE: NOT DETECTED
EAEC PAA PLAS AGGR+AATA ST NAA+NON-PRB: NOT DETECTED
EC STX1+STX2 GENES STL QL NAA+NON-PROBE: NOT DETECTED
EOSINOPHIL # BLD: 0.3 K/UL (ref 0–0.4)
EOSINOPHIL NFR BLD: 4.5 % (ref 0.7–5.8)
EPEC EAE GENE STL QL NAA+NON-PROBE: NOT DETECTED
EPI CELLS #/AREA URNS HPF: ABNORMAL /HPF
ERYTHROCYTE [DISTWIDTH] IN BLOOD BY AUTOMATED COUNT: 14.7 % (ref 11.7–14.4)
ETEC LTA+ST1A+ST1B TOX ST NAA+NON-PROBE: NOT DETECTED
FINE GRAN CASTS #/AREA URNS HPF: ABNORMAL /LPF (ref 0–5)
G LAMBLIA DNA STL QL NAA+NON-PROBE: NOT DETECTED
GI PATH DNA+RNA PNL STL NAA+NON-PROBE: NOT DETECTED
GLOBULIN SER CALC-MCNC: 2.8 G/DL (ref 2.3–3.5)
GLUCOSE SERPL-MCNC: 92 MG/DL (ref 70–99)
GLUCOSE UR STRIP-MCNC: NEGATIVE MG/DL
HCT VFR BLD AUTO: 30.2 % (ref 37–47)
HGB BLD-MCNC: 9.6 G/DL (ref 11.2–15.7)
HGB UR QL STRIP: ABNORMAL
HYALINE CASTS #/AREA URNS LPF: ABNORMAL /LPF (ref 0–5)
IMM GRANULOCYTES # BLD: 0 K/UL
IMM GRANULOCYTES NFR BLD: 0.6 %
KETONES UR STRIP-MCNC: ABNORMAL MG/DL
LEUKOCYTE ESTERASE UR QL STRIP: NEGATIVE
LYMPHOCYTES # BLD: 0.8 K/UL (ref 1.2–3.7)
LYMPHOCYTES NFR BLD: 12.5 %
MCH RBC QN AUTO: 28.8 PG (ref 25.6–32.2)
MCHC RBC AUTO-ENTMCNC: 31.8 % (ref 32.2–35.5)
MCV RBC AUTO: 90.7 FL (ref 79.4–94.8)
MONOCYTES # BLD: 1.3 K/UL (ref 0.2–0.9)
MONOCYTES NFR BLD: 18.8 % (ref 4.7–12.5)
MUCOUS THREADS URNS QL MICRO: PRESENT /LPF
NEUTROPHILS # BLD: 4.2 K/UL (ref 1.6–6.1)
NEUTS SEG NFR BLD: 62.2 % (ref 34–71.1)
NITRITE UR QL STRIP: NEGATIVE
NOROVIRUS GI+II RNA STL QL NAA+NON-PROBE: NOT DETECTED
P SHIGELLOIDES DNA STL QL NAA+NON-PROBE: NOT DETECTED
PH UR STRIP: 5.5 [PH] (ref 5–9)
PLATELET # BLD AUTO: 364 K/UL (ref 182–369)
POTASSIUM SERPL-SCNC: 3 MEQ/L (ref 3.4–4.9)
PROT SERPL-MCNC: 5.9 G/DL (ref 6.3–8)
PROT UR STRIP-MCNC: 30 MG/DL
RBC # BLD AUTO: 3.33 M/UL (ref 3.93–5.22)
RBC #/AREA URNS HPF: ABNORMAL /HPF (ref 0–2)
RVA RNA STL QL NAA+NON-PROBE: NOT DETECTED
S ENT+BONG DNA STL QL NAA+NON-PROBE: NOT DETECTED
SAPO I+II+IV+V RNA STL QL NAA+NON-PROBE: NOT DETECTED
SHIGELLA SP+EIEC IPAH ST NAA+NON-PROBE: NOT DETECTED
SODIUM SERPL-SCNC: 137 MEQ/L (ref 135–144)
SP GR UR STRIP: 1.02 (ref 1–1.03)
UROBILINOGEN UR STRIP-ACNC: 0.2 E.U./DL
V CHOL+PARA+VUL DNA STL QL NAA+NON-PROBE: NOT DETECTED
V CHOLERAE DNA STL QL NAA+NON-PROBE: NOT DETECTED
WBC # BLD AUTO: 6.7 K/UL (ref 4–10)
WBC #/AREA URNS HPF: ABNORMAL /HPF (ref 0–5)
Y ENTEROCOL DNA STL QL NAA+NON-PROBE: NOT DETECTED

## 2025-08-22 PROCEDURE — 6370000000 HC RX 637 (ALT 250 FOR IP): Performed by: INTERNAL MEDICINE

## 2025-08-22 PROCEDURE — 99222 1ST HOSP IP/OBS MODERATE 55: CPT | Performed by: INTERNAL MEDICINE

## 2025-08-22 PROCEDURE — 80053 COMPREHEN METABOLIC PANEL: CPT

## 2025-08-22 PROCEDURE — 97530 THERAPEUTIC ACTIVITIES: CPT

## 2025-08-22 PROCEDURE — G0378 HOSPITAL OBSERVATION PER HR: HCPCS

## 2025-08-22 PROCEDURE — 97163 PT EVAL HIGH COMPLEX 45 MIN: CPT

## 2025-08-22 PROCEDURE — 2580000003 HC RX 258: Performed by: INTERNAL MEDICINE

## 2025-08-22 PROCEDURE — 81001 URINALYSIS AUTO W/SCOPE: CPT

## 2025-08-22 PROCEDURE — 6360000002 HC RX W HCPCS: Performed by: INTERNAL MEDICINE

## 2025-08-22 PROCEDURE — 97166 OT EVAL MOD COMPLEX 45 MIN: CPT

## 2025-08-22 PROCEDURE — 85025 COMPLETE CBC W/AUTO DIFF WBC: CPT

## 2025-08-22 PROCEDURE — 97535 SELF CARE MNGMENT TRAINING: CPT

## 2025-08-22 PROCEDURE — 36415 COLL VENOUS BLD VENIPUNCTURE: CPT

## 2025-08-22 RX ORDER — SODIUM CHLORIDE 9 MG/ML
INJECTION, SOLUTION INTRAVENOUS CONTINUOUS
Status: ACTIVE | OUTPATIENT
Start: 2025-08-22 | End: 2025-08-22

## 2025-08-22 RX ORDER — POTASSIUM CHLORIDE 1500 MG/1
40 TABLET, EXTENDED RELEASE ORAL ONCE
Status: COMPLETED | OUTPATIENT
Start: 2025-08-22 | End: 2025-08-22

## 2025-08-22 RX ADMIN — LEVOTHYROXINE SODIUM 50 MCG: 0.03 TABLET ORAL at 08:08

## 2025-08-22 RX ADMIN — OXYCODONE HYDROCHLORIDE AND ACETAMINOPHEN 250 MG: 500 TABLET ORAL at 08:08

## 2025-08-22 RX ADMIN — METRONIDAZOLE 500 MG: 500 INJECTION, SOLUTION INTRAVENOUS at 11:38

## 2025-08-22 RX ADMIN — SODIUM CHLORIDE: 0.9 INJECTION, SOLUTION INTRAVENOUS at 08:16

## 2025-08-22 RX ADMIN — METRONIDAZOLE 500 MG: 500 INJECTION, SOLUTION INTRAVENOUS at 20:19

## 2025-08-22 RX ADMIN — PRAVASTATIN SODIUM 20 MG: 10 TABLET ORAL at 08:08

## 2025-08-22 RX ADMIN — TOPIRAMATE 25 MG: 25 TABLET, FILM COATED ORAL at 08:11

## 2025-08-22 RX ADMIN — VANCOMYCIN HYDROCHLORIDE 250 MG: 250 CAPSULE ORAL at 08:08

## 2025-08-22 RX ADMIN — POTASSIUM CHLORIDE 40 MEQ: 1500 TABLET, EXTENDED RELEASE ORAL at 08:07

## 2025-08-22 RX ADMIN — ENOXAPARIN SODIUM 40 MG: 100 INJECTION SUBCUTANEOUS at 08:08

## 2025-08-22 RX ADMIN — VANCOMYCIN HYDROCHLORIDE 250 MG: 250 CAPSULE ORAL at 14:59

## 2025-08-22 RX ADMIN — MIRTAZAPINE 7.5 MG: 15 TABLET, FILM COATED ORAL at 21:08

## 2025-08-22 RX ADMIN — VANCOMYCIN HYDROCHLORIDE 250 MG: 250 CAPSULE ORAL at 21:08

## 2025-08-22 RX ADMIN — OXYBUTYNIN CHLORIDE 10 MG: 5 TABLET, EXTENDED RELEASE ORAL at 08:08

## 2025-08-22 RX ADMIN — FERROUS SULFATE TAB 325 MG (65 MG ELEMENTAL FE) 325 MG: 325 (65 FE) TAB at 08:08

## 2025-08-22 RX ADMIN — CITALOPRAM HYDROBROMIDE 20 MG: 20 TABLET ORAL at 08:08

## 2025-08-22 RX ADMIN — METRONIDAZOLE 500 MG: 500 INJECTION, SOLUTION INTRAVENOUS at 03:31

## 2025-08-22 RX ADMIN — AMLODIPINE BESYLATE 2.5 MG: 2.5 TABLET ORAL at 08:08

## 2025-08-22 ASSESSMENT — PAIN SCALES - GENERAL: PAINLEVEL_OUTOF10: 0

## 2025-08-23 LAB
ANION GAP SERPL CALCULATED.3IONS-SCNC: 10 MEQ/L (ref 9–15)
BASOPHILS # BLD: 0.1 K/UL (ref 0–0.1)
BASOPHILS NFR BLD: 1 % (ref 0.1–1.2)
BUN SERPL-MCNC: 8 MG/DL (ref 8–23)
CALCIUM SERPL-MCNC: 8.5 MG/DL (ref 8.5–9.9)
CHLORIDE SERPL-SCNC: 107 MEQ/L (ref 95–107)
CO2 SERPL-SCNC: 23 MEQ/L (ref 20–31)
CREAT SERPL-MCNC: 0.8 MG/DL (ref 0.5–0.9)
EOSINOPHIL # BLD: 0.6 K/UL (ref 0–0.4)
EOSINOPHIL NFR BLD: 8 % (ref 0.7–5.8)
ERYTHROCYTE [DISTWIDTH] IN BLOOD BY AUTOMATED COUNT: 14.6 % (ref 11.7–14.4)
GLUCOSE SERPL-MCNC: 88 MG/DL (ref 70–99)
HCT VFR BLD AUTO: 30.9 % (ref 37–47)
HGB BLD-MCNC: 9.9 G/DL (ref 11.2–15.7)
IMM GRANULOCYTES # BLD: 0.1 K/UL
IMM GRANULOCYTES NFR BLD: 0.7 %
LYMPHOCYTES # BLD: 1.3 K/UL (ref 1.2–3.7)
LYMPHOCYTES NFR BLD: 18.3 %
MAGNESIUM SERPL-MCNC: 1.9 MG/DL (ref 1.7–2.4)
MCH RBC QN AUTO: 29 PG (ref 25.6–32.2)
MCHC RBC AUTO-ENTMCNC: 32 % (ref 32.2–35.5)
MCV RBC AUTO: 90.6 FL (ref 79.4–94.8)
MONOCYTES # BLD: 1.3 K/UL (ref 0.2–0.9)
MONOCYTES NFR BLD: 17.7 % (ref 4.7–12.5)
NEUTROPHILS # BLD: 4 K/UL (ref 1.6–6.1)
NEUTS SEG NFR BLD: 54.3 % (ref 34–71.1)
PHOSPHATE SERPL-MCNC: 2.8 MG/DL (ref 2.3–4.8)
PLATELET # BLD AUTO: 391 K/UL (ref 182–369)
POTASSIUM SERPL-SCNC: 2.6 MEQ/L (ref 3.4–4.9)
POTASSIUM SERPL-SCNC: 3.1 MEQ/L (ref 3.4–4.9)
RBC # BLD AUTO: 3.41 M/UL (ref 3.93–5.22)
SODIUM SERPL-SCNC: 140 MEQ/L (ref 135–144)
WBC # BLD AUTO: 7.3 K/UL (ref 4–10)

## 2025-08-23 PROCEDURE — 6360000002 HC RX W HCPCS: Performed by: INTERNAL MEDICINE

## 2025-08-23 PROCEDURE — 97535 SELF CARE MNGMENT TRAINING: CPT

## 2025-08-23 PROCEDURE — 84100 ASSAY OF PHOSPHORUS: CPT

## 2025-08-23 PROCEDURE — 97530 THERAPEUTIC ACTIVITIES: CPT

## 2025-08-23 PROCEDURE — 97110 THERAPEUTIC EXERCISES: CPT

## 2025-08-23 PROCEDURE — 6370000000 HC RX 637 (ALT 250 FOR IP): Performed by: INTERNAL MEDICINE

## 2025-08-23 PROCEDURE — 84132 ASSAY OF SERUM POTASSIUM: CPT

## 2025-08-23 PROCEDURE — 80048 BASIC METABOLIC PNL TOTAL CA: CPT

## 2025-08-23 PROCEDURE — 85025 COMPLETE CBC W/AUTO DIFF WBC: CPT

## 2025-08-23 PROCEDURE — G0378 HOSPITAL OBSERVATION PER HR: HCPCS

## 2025-08-23 PROCEDURE — 36415 COLL VENOUS BLD VENIPUNCTURE: CPT

## 2025-08-23 PROCEDURE — 83735 ASSAY OF MAGNESIUM: CPT

## 2025-08-23 RX ORDER — POTASSIUM CHLORIDE 7.45 MG/ML
10 INJECTION INTRAVENOUS
Status: COMPLETED | OUTPATIENT
Start: 2025-08-23 | End: 2025-08-23

## 2025-08-23 RX ORDER — POTASSIUM CHLORIDE 1500 MG/1
40 TABLET, EXTENDED RELEASE ORAL ONCE
Status: COMPLETED | OUTPATIENT
Start: 2025-08-23 | End: 2025-08-23

## 2025-08-23 RX ADMIN — FERROUS SULFATE TAB 325 MG (65 MG ELEMENTAL FE) 325 MG: 325 (65 FE) TAB at 07:36

## 2025-08-23 RX ADMIN — POTASSIUM CHLORIDE 10 MEQ: 7.46 INJECTION, SOLUTION INTRAVENOUS at 08:42

## 2025-08-23 RX ADMIN — ENOXAPARIN SODIUM 40 MG: 100 INJECTION SUBCUTANEOUS at 07:37

## 2025-08-23 RX ADMIN — MIRTAZAPINE 7.5 MG: 15 TABLET, FILM COATED ORAL at 20:31

## 2025-08-23 RX ADMIN — PRAVASTATIN SODIUM 20 MG: 10 TABLET ORAL at 07:37

## 2025-08-23 RX ADMIN — POTASSIUM CHLORIDE 40 MEQ: 1500 TABLET, EXTENDED RELEASE ORAL at 20:31

## 2025-08-23 RX ADMIN — OXYCODONE HYDROCHLORIDE AND ACETAMINOPHEN 250 MG: 500 TABLET ORAL at 07:36

## 2025-08-23 RX ADMIN — POTASSIUM CHLORIDE 10 MEQ: 7.46 INJECTION, SOLUTION INTRAVENOUS at 09:38

## 2025-08-23 RX ADMIN — TOPIRAMATE 25 MG: 25 TABLET, FILM COATED ORAL at 07:36

## 2025-08-23 RX ADMIN — OXYBUTYNIN CHLORIDE 10 MG: 5 TABLET, EXTENDED RELEASE ORAL at 07:36

## 2025-08-23 RX ADMIN — VANCOMYCIN HYDROCHLORIDE 250 MG: 250 CAPSULE ORAL at 20:31

## 2025-08-23 RX ADMIN — VANCOMYCIN HYDROCHLORIDE 250 MG: 250 CAPSULE ORAL at 14:34

## 2025-08-23 RX ADMIN — VANCOMYCIN HYDROCHLORIDE 250 MG: 250 CAPSULE ORAL at 07:36

## 2025-08-23 RX ADMIN — AMLODIPINE BESYLATE 2.5 MG: 2.5 TABLET ORAL at 07:36

## 2025-08-23 RX ADMIN — POTASSIUM BICARBONATE 50 MEQ: 978 TABLET, EFFERVESCENT ORAL at 07:37

## 2025-08-23 RX ADMIN — LEVOTHYROXINE SODIUM 50 MCG: 0.03 TABLET ORAL at 07:36

## 2025-08-23 RX ADMIN — POTASSIUM CHLORIDE 10 MEQ: 7.46 INJECTION, SOLUTION INTRAVENOUS at 07:34

## 2025-08-23 RX ADMIN — CITALOPRAM HYDROBROMIDE 20 MG: 20 TABLET ORAL at 07:36

## 2025-08-23 ASSESSMENT — PAIN SCALES - GENERAL: PAINLEVEL_OUTOF10: 6

## 2025-08-23 ASSESSMENT — PAIN DESCRIPTION - LOCATION: LOCATION: NECK

## 2025-08-24 LAB
ANION GAP SERPL CALCULATED.3IONS-SCNC: 12 MEQ/L (ref 9–15)
BASOPHILS # BLD: 0.1 K/UL (ref 0–0.1)
BASOPHILS NFR BLD: 1.4 % (ref 0.1–1.2)
BUN SERPL-MCNC: 8 MG/DL (ref 8–23)
CALCIUM SERPL-MCNC: 8.7 MG/DL (ref 8.5–9.9)
CHLORIDE SERPL-SCNC: 106 MEQ/L (ref 95–107)
CO2 SERPL-SCNC: 22 MEQ/L (ref 20–31)
CREAT SERPL-MCNC: 0.8 MG/DL (ref 0.5–0.9)
EOSINOPHIL # BLD: 0.6 K/UL (ref 0–0.4)
EOSINOPHIL NFR BLD: 8 % (ref 0.7–5.8)
ERYTHROCYTE [DISTWIDTH] IN BLOOD BY AUTOMATED COUNT: 14.6 % (ref 11.7–14.4)
GLUCOSE SERPL-MCNC: 84 MG/DL (ref 70–99)
HCT VFR BLD AUTO: 34.6 % (ref 37–47)
HGB BLD-MCNC: 11.1 G/DL (ref 11.2–15.7)
IMM GRANULOCYTES # BLD: 0.1 K/UL
IMM GRANULOCYTES NFR BLD: 1.6 %
LYMPHOCYTES # BLD: 1.7 K/UL (ref 1.2–3.7)
LYMPHOCYTES NFR BLD: 23.8 %
MCH RBC QN AUTO: 29.1 PG (ref 25.6–32.2)
MCHC RBC AUTO-ENTMCNC: 32.1 % (ref 32.2–35.5)
MCV RBC AUTO: 90.8 FL (ref 79.4–94.8)
MONOCYTES # BLD: 1.1 K/UL (ref 0.2–0.9)
MONOCYTES NFR BLD: 15.9 % (ref 4.7–12.5)
NEUTROPHILS # BLD: 3.5 K/UL (ref 1.6–6.1)
NEUTS SEG NFR BLD: 49.3 % (ref 34–71.1)
PLATELET # BLD AUTO: 425 K/UL (ref 182–369)
POTASSIUM SERPL-SCNC: 3.5 MEQ/L (ref 3.4–4.9)
RBC # BLD AUTO: 3.81 M/UL (ref 3.93–5.22)
SODIUM SERPL-SCNC: 140 MEQ/L (ref 135–144)
WBC # BLD AUTO: 7.1 K/UL (ref 4–10)

## 2025-08-24 PROCEDURE — 6370000000 HC RX 637 (ALT 250 FOR IP): Performed by: INTERNAL MEDICINE

## 2025-08-24 PROCEDURE — 80048 BASIC METABOLIC PNL TOTAL CA: CPT

## 2025-08-24 PROCEDURE — 36415 COLL VENOUS BLD VENIPUNCTURE: CPT

## 2025-08-24 PROCEDURE — 6360000002 HC RX W HCPCS: Performed by: INTERNAL MEDICINE

## 2025-08-24 PROCEDURE — 1210000000 HC MED SURG R&B

## 2025-08-24 PROCEDURE — 97110 THERAPEUTIC EXERCISES: CPT

## 2025-08-24 PROCEDURE — 85025 COMPLETE CBC W/AUTO DIFF WBC: CPT

## 2025-08-24 PROCEDURE — 97530 THERAPEUTIC ACTIVITIES: CPT

## 2025-08-24 RX ORDER — POTASSIUM CHLORIDE 1500 MG/1
40 TABLET, EXTENDED RELEASE ORAL 2 TIMES DAILY
Status: COMPLETED | OUTPATIENT
Start: 2025-08-24 | End: 2025-08-24

## 2025-08-24 RX ORDER — LOPERAMIDE HYDROCHLORIDE 2 MG/1
2 CAPSULE ORAL 4 TIMES DAILY PRN
Status: DISCONTINUED | OUTPATIENT
Start: 2025-08-24 | End: 2025-08-26 | Stop reason: HOSPADM

## 2025-08-24 RX ADMIN — VANCOMYCIN HYDROCHLORIDE 250 MG: 250 CAPSULE ORAL at 14:06

## 2025-08-24 RX ADMIN — ENOXAPARIN SODIUM 40 MG: 100 INJECTION SUBCUTANEOUS at 07:46

## 2025-08-24 RX ADMIN — CITALOPRAM HYDROBROMIDE 20 MG: 20 TABLET ORAL at 07:45

## 2025-08-24 RX ADMIN — TOPIRAMATE 25 MG: 25 TABLET, FILM COATED ORAL at 07:45

## 2025-08-24 RX ADMIN — OXYCODONE HYDROCHLORIDE AND ACETAMINOPHEN 250 MG: 500 TABLET ORAL at 07:45

## 2025-08-24 RX ADMIN — AMLODIPINE BESYLATE 2.5 MG: 2.5 TABLET ORAL at 07:45

## 2025-08-24 RX ADMIN — PRAVASTATIN SODIUM 20 MG: 10 TABLET ORAL at 07:45

## 2025-08-24 RX ADMIN — FERROUS SULFATE TAB 325 MG (65 MG ELEMENTAL FE) 325 MG: 325 (65 FE) TAB at 07:45

## 2025-08-24 RX ADMIN — VANCOMYCIN HYDROCHLORIDE 250 MG: 250 CAPSULE ORAL at 20:44

## 2025-08-24 RX ADMIN — LOPERAMIDE HYDROCHLORIDE 2 MG: 2 CAPSULE ORAL at 14:06

## 2025-08-24 RX ADMIN — LEVOTHYROXINE SODIUM 50 MCG: 0.03 TABLET ORAL at 07:45

## 2025-08-24 RX ADMIN — MIRTAZAPINE 7.5 MG: 15 TABLET, FILM COATED ORAL at 20:44

## 2025-08-24 RX ADMIN — OXYBUTYNIN CHLORIDE 10 MG: 5 TABLET, EXTENDED RELEASE ORAL at 07:45

## 2025-08-24 RX ADMIN — ACETAMINOPHEN 650 MG: 325 TABLET ORAL at 14:09

## 2025-08-24 RX ADMIN — POTASSIUM CHLORIDE 40 MEQ: 1500 TABLET, EXTENDED RELEASE ORAL at 07:45

## 2025-08-24 RX ADMIN — POTASSIUM CHLORIDE 40 MEQ: 1500 TABLET, EXTENDED RELEASE ORAL at 20:44

## 2025-08-24 RX ADMIN — VANCOMYCIN HYDROCHLORIDE 250 MG: 250 CAPSULE ORAL at 07:45

## 2025-08-24 ASSESSMENT — PAIN DESCRIPTION - LOCATION: LOCATION: NECK

## 2025-08-24 ASSESSMENT — PAIN - FUNCTIONAL ASSESSMENT
PAIN_FUNCTIONAL_ASSESSMENT: 0-10
PAIN_FUNCTIONAL_ASSESSMENT: 0-10

## 2025-08-24 ASSESSMENT — PAIN SCALES - GENERAL
PAINLEVEL_OUTOF10: 4
PAINLEVEL_OUTOF10: 2

## 2025-08-25 LAB
ANION GAP SERPL CALCULATED.3IONS-SCNC: 10 MEQ/L (ref 9–15)
BUN SERPL-MCNC: 10 MG/DL (ref 8–23)
CALCIUM SERPL-MCNC: 8.8 MG/DL (ref 8.5–9.9)
CHLORIDE SERPL-SCNC: 109 MEQ/L (ref 95–107)
CO2 SERPL-SCNC: 21 MEQ/L (ref 20–31)
CREAT SERPL-MCNC: 0.8 MG/DL (ref 0.5–0.9)
GLUCOSE SERPL-MCNC: 84 MG/DL (ref 70–99)
POTASSIUM SERPL-SCNC: 4.1 MEQ/L (ref 3.4–4.9)
SODIUM SERPL-SCNC: 140 MEQ/L (ref 135–144)

## 2025-08-25 PROCEDURE — 97530 THERAPEUTIC ACTIVITIES: CPT

## 2025-08-25 PROCEDURE — 97116 GAIT TRAINING THERAPY: CPT

## 2025-08-25 PROCEDURE — 6360000002 HC RX W HCPCS: Performed by: INTERNAL MEDICINE

## 2025-08-25 PROCEDURE — 6370000000 HC RX 637 (ALT 250 FOR IP): Performed by: INTERNAL MEDICINE

## 2025-08-25 PROCEDURE — 1210000000 HC MED SURG R&B

## 2025-08-25 PROCEDURE — 80048 BASIC METABOLIC PNL TOTAL CA: CPT

## 2025-08-25 PROCEDURE — 36415 COLL VENOUS BLD VENIPUNCTURE: CPT

## 2025-08-25 PROCEDURE — 97110 THERAPEUTIC EXERCISES: CPT

## 2025-08-25 RX ADMIN — VANCOMYCIN HYDROCHLORIDE 250 MG: 250 CAPSULE ORAL at 15:29

## 2025-08-25 RX ADMIN — CITALOPRAM HYDROBROMIDE 20 MG: 20 TABLET ORAL at 09:41

## 2025-08-25 RX ADMIN — ENOXAPARIN SODIUM 40 MG: 100 INJECTION SUBCUTANEOUS at 09:42

## 2025-08-25 RX ADMIN — MIRTAZAPINE 7.5 MG: 15 TABLET, FILM COATED ORAL at 20:09

## 2025-08-25 RX ADMIN — PRAVASTATIN SODIUM 20 MG: 10 TABLET ORAL at 09:40

## 2025-08-25 RX ADMIN — ACETAMINOPHEN 650 MG: 325 TABLET ORAL at 09:40

## 2025-08-25 RX ADMIN — TOPIRAMATE 25 MG: 25 TABLET, FILM COATED ORAL at 09:40

## 2025-08-25 RX ADMIN — LEVOTHYROXINE SODIUM 50 MCG: 0.03 TABLET ORAL at 09:40

## 2025-08-25 RX ADMIN — AMLODIPINE BESYLATE 2.5 MG: 2.5 TABLET ORAL at 09:41

## 2025-08-25 RX ADMIN — OXYBUTYNIN CHLORIDE 10 MG: 5 TABLET, EXTENDED RELEASE ORAL at 09:40

## 2025-08-25 RX ADMIN — VANCOMYCIN HYDROCHLORIDE 250 MG: 250 CAPSULE ORAL at 09:41

## 2025-08-25 RX ADMIN — OXYCODONE HYDROCHLORIDE AND ACETAMINOPHEN 250 MG: 500 TABLET ORAL at 09:41

## 2025-08-25 RX ADMIN — VANCOMYCIN HYDROCHLORIDE 250 MG: 250 CAPSULE ORAL at 20:09

## 2025-08-25 RX ADMIN — FERROUS SULFATE TAB 325 MG (65 MG ELEMENTAL FE) 325 MG: 325 (65 FE) TAB at 09:41

## 2025-08-25 ASSESSMENT — PAIN DESCRIPTION - LOCATION
LOCATION: NECK
LOCATION: TOE (COMMENT WHICH ONE)

## 2025-08-25 ASSESSMENT — PAIN SCALES - GENERAL
PAINLEVEL_OUTOF10: 7
PAINLEVEL_OUTOF10: 3

## 2025-08-25 ASSESSMENT — PAIN DESCRIPTION - ORIENTATION: ORIENTATION: RIGHT

## 2025-08-25 ASSESSMENT — PAIN - FUNCTIONAL ASSESSMENT: PAIN_FUNCTIONAL_ASSESSMENT: 0-10

## 2025-08-26 VITALS
BODY MASS INDEX: 28.88 KG/M2 | WEIGHT: 163 LBS | TEMPERATURE: 97.9 F | RESPIRATION RATE: 18 BRPM | HEART RATE: 77 BPM | DIASTOLIC BLOOD PRESSURE: 66 MMHG | OXYGEN SATURATION: 99 % | HEIGHT: 63 IN | SYSTOLIC BLOOD PRESSURE: 137 MMHG

## 2025-08-26 LAB
ANION GAP SERPL CALCULATED.3IONS-SCNC: 10 MEQ/L (ref 9–15)
BACTERIA BLD CULT: NORMAL
BUN SERPL-MCNC: 10 MG/DL (ref 8–23)
CALCIUM SERPL-MCNC: 8.6 MG/DL (ref 8.5–9.9)
CHLORIDE SERPL-SCNC: 109 MEQ/L (ref 95–107)
CO2 SERPL-SCNC: 22 MEQ/L (ref 20–31)
CREAT SERPL-MCNC: 0.8 MG/DL (ref 0.5–0.9)
GLUCOSE SERPL-MCNC: 84 MG/DL (ref 70–99)
POTASSIUM SERPL-SCNC: 3.8 MEQ/L (ref 3.4–4.9)
SODIUM SERPL-SCNC: 141 MEQ/L (ref 135–144)

## 2025-08-26 PROCEDURE — 97110 THERAPEUTIC EXERCISES: CPT

## 2025-08-26 PROCEDURE — 6360000002 HC RX W HCPCS: Performed by: INTERNAL MEDICINE

## 2025-08-26 PROCEDURE — 80048 BASIC METABOLIC PNL TOTAL CA: CPT

## 2025-08-26 PROCEDURE — 97116 GAIT TRAINING THERAPY: CPT

## 2025-08-26 PROCEDURE — 6370000000 HC RX 637 (ALT 250 FOR IP): Performed by: INTERNAL MEDICINE

## 2025-08-26 PROCEDURE — 97535 SELF CARE MNGMENT TRAINING: CPT

## 2025-08-26 PROCEDURE — 36415 COLL VENOUS BLD VENIPUNCTURE: CPT

## 2025-08-26 PROCEDURE — 97530 THERAPEUTIC ACTIVITIES: CPT

## 2025-08-26 RX ORDER — POTASSIUM CHLORIDE 1500 MG/1
20 TABLET, EXTENDED RELEASE ORAL
Status: DISCONTINUED | OUTPATIENT
Start: 2025-08-26 | End: 2025-08-26 | Stop reason: HOSPADM

## 2025-08-26 RX ORDER — VANCOMYCIN HYDROCHLORIDE 250 MG/1
250 CAPSULE ORAL 3 TIMES DAILY
Qty: 90 CAPSULE | Refills: 0 | Status: SHIPPED | OUTPATIENT
Start: 2025-08-26 | End: 2025-09-25

## 2025-08-26 RX ADMIN — ACETAMINOPHEN 650 MG: 325 TABLET ORAL at 19:34

## 2025-08-26 RX ADMIN — LOPERAMIDE HYDROCHLORIDE 2 MG: 2 CAPSULE ORAL at 08:08

## 2025-08-26 RX ADMIN — POTASSIUM CHLORIDE 20 MEQ: 1500 TABLET, EXTENDED RELEASE ORAL at 08:07

## 2025-08-26 RX ADMIN — OXYBUTYNIN CHLORIDE 10 MG: 5 TABLET, EXTENDED RELEASE ORAL at 08:08

## 2025-08-26 RX ADMIN — OXYCODONE HYDROCHLORIDE AND ACETAMINOPHEN 250 MG: 500 TABLET ORAL at 08:08

## 2025-08-26 RX ADMIN — TOPIRAMATE 25 MG: 25 TABLET, FILM COATED ORAL at 08:08

## 2025-08-26 RX ADMIN — PRAVASTATIN SODIUM 20 MG: 10 TABLET ORAL at 08:07

## 2025-08-26 RX ADMIN — CITALOPRAM HYDROBROMIDE 20 MG: 20 TABLET ORAL at 08:08

## 2025-08-26 RX ADMIN — FERROUS SULFATE TAB 325 MG (65 MG ELEMENTAL FE) 325 MG: 325 (65 FE) TAB at 08:08

## 2025-08-26 RX ADMIN — VANCOMYCIN HYDROCHLORIDE 250 MG: 250 CAPSULE ORAL at 19:30

## 2025-08-26 RX ADMIN — MIRTAZAPINE 7.5 MG: 15 TABLET, FILM COATED ORAL at 19:30

## 2025-08-26 RX ADMIN — AMLODIPINE BESYLATE 2.5 MG: 2.5 TABLET ORAL at 08:08

## 2025-08-26 RX ADMIN — ENOXAPARIN SODIUM 40 MG: 100 INJECTION SUBCUTANEOUS at 08:08

## 2025-08-26 RX ADMIN — VANCOMYCIN HYDROCHLORIDE 250 MG: 250 CAPSULE ORAL at 08:07

## 2025-08-26 RX ADMIN — VANCOMYCIN HYDROCHLORIDE 250 MG: 250 CAPSULE ORAL at 14:20

## 2025-08-26 RX ADMIN — LEVOTHYROXINE SODIUM 50 MCG: 0.03 TABLET ORAL at 08:07

## 2025-08-26 ASSESSMENT — PAIN DESCRIPTION - DESCRIPTORS: DESCRIPTORS: ACHING

## 2025-08-26 ASSESSMENT — PAIN SCALES - GENERAL
PAINLEVEL_OUTOF10: 0
PAINLEVEL_OUTOF10: 3

## 2025-08-26 ASSESSMENT — PAIN - FUNCTIONAL ASSESSMENT: PAIN_FUNCTIONAL_ASSESSMENT: 0-10

## 2025-08-26 ASSESSMENT — PAIN DESCRIPTION - LOCATION: LOCATION: NECK

## 2025-08-26 ASSESSMENT — PAIN DESCRIPTION - ORIENTATION: ORIENTATION: RIGHT

## 2025-08-27 ENCOUNTER — OFFICE VISIT (OUTPATIENT)
Dept: GERIATRIC MEDICINE | Age: 75
End: 2025-08-27

## 2025-08-27 DIAGNOSIS — A04.72 C. DIFFICILE COLITIS: Primary | ICD-10-CM

## 2025-08-27 LAB
ATRIAL RATE: 81 BPM
BACTERIA BLD CULT ORG #2: NORMAL
P AXIS: 73 DEGREES
P OFFSET: 175 MS
P ONSET: 142 MS
PR INTERVAL: 154 MS
Q ONSET: 219 MS
QRS COUNT: 13 BEATS
QRS DURATION: 84 MS
QT INTERVAL: 350 MS
QTC CALCULATION(BAZETT): 406 MS
QTC FREDERICIA: 386 MS
R AXIS: 69 DEGREES
T AXIS: 16 DEGREES
T OFFSET: 394 MS
VENTRICULAR RATE: 81 BPM

## (undated) DEVICE — SINGLE PORT MANIFOLD: Brand: NEPTUNE 2

## (undated) DEVICE — ZYPHR DISPOSABLE CRANIAL PERFORATOR, LARGE 14/11MM: Brand: ZYPHR

## (undated) DEVICE — TUBING, SUCTION, 9/32" X 12', STRAIGHT: Brand: MEDLINE INDUSTRIES, INC.

## (undated) DEVICE — BLADE,CARBON-STEEL,15,STRL,DISPOSABLE,TB: Brand: MEDLINE

## (undated) DEVICE — 4-PORT MANIFOLD: Brand: NEPTUNE 2

## (undated) DEVICE — GLOVE ORANGE PI 8 1/2   MSG9085

## (undated) DEVICE — YANKAUER,SMOOTH HANDLE,HIGH CAPACITY: Brand: MEDLINE INDUSTRIES, INC.

## (undated) DEVICE — PACK,EENT,TURBAN DRAPE,PK II: Brand: MEDLINE

## (undated) DEVICE — SHEET, DRAPE, SPLIT, STERILE: Brand: MEDLINE

## (undated) DEVICE — CONTAINER,SPECIMEN,OR STERILE,4OZ: Brand: MEDLINE

## (undated) DEVICE — PADDING UNDERCAST W6INXL4YD RAYON POLY SYN NONADHESIVE

## (undated) DEVICE — SUTURE PERMAHAND SZ 2-0 L30IN NONABSORBABLE BLK SILK W/O A305H

## (undated) DEVICE — SUTURE VICRYL SZ 2-0 L27IN ABSRB UD L22MM X-1 1/2 CIR REV CUT J459H

## (undated) DEVICE — TUBING SET, UNIDRIVE/UNIDRIVE SIII: Brand: N.A.

## (undated) DEVICE — BLADE,CARBON-STEEL,11,STRL,DISPOSABLE,TB: Brand: MEDLINE

## (undated) DEVICE — GLOVE ORANGE PI 7 1/2   MSG9075

## (undated) DEVICE — SURGIFOAM SPNG SZ 12-7

## (undated) DEVICE — WAX SURG 2.5GM HEMSTAT BNE BEESWAX PARAFFIN ISO PALMITATE

## (undated) DEVICE — COVER LT HNDL BLU PLAS

## (undated) DEVICE — DRAPE,ISOLATION,INCISE,INVISISHIELD: Brand: MEDLINE

## (undated) DEVICE — 3M™ IOBAN™ 2 ANTIMICROBIAL INCISE DRAPE 6651EZ: Brand: IOBAN™ 2

## (undated) DEVICE — NEPTUNE E-SEP SMOKE EVACUATION PENCIL, COATED, 70MM BLADE, PUSH BUTTON SWITCH: Brand: NEPTUNE E-SEP

## (undated) DEVICE — PATIENT TRACKER 9734887 NON-INVASIVE

## (undated) DEVICE — ELECTRODE PT RET AD L9FT HI MOIST COND ADH HYDRGEL CORDED

## (undated) DEVICE — SYRINGE MED 10ML LUERLOCK TIP W/O SFTY DISP

## (undated) DEVICE — SYRINGE IRRIG 60ML SFT PLIABLE BLB EZ TO GRP 1 HND USE W/

## (undated) DEVICE — ENDO CARRY-ON PROCEDURE KIT: Brand: ENDO CARRY-ON PROCEDURE KIT

## (undated) DEVICE — COUNTER NDL 40 COUNT HLD 70 FOAM BLK ADH W/ MAG

## (undated) DEVICE — PAD,NON-ADHERENT,3X8,STERILE,LF,1/PK: Brand: MEDLINE

## (undated) DEVICE — MARKER SURG SKIN GENTIAN VLT REG TIP W/ 6IN RUL DYNJSM01

## (undated) DEVICE — DRAPE,UTILITY,TAPE,15X26,STERILE: Brand: MEDLINE

## (undated) DEVICE — SPONGE GZ W4XL4IN RAYON POLY CVR W/NONWOVEN FAB STRL 2/PK

## (undated) DEVICE — SPONGE,NEURO,0.5"X0.5",XR,STRL,10/PK: Brand: MEDLINE

## (undated) DEVICE — DRAPE,U/ SHT,SPLIT,PLAS,STERIL: Brand: MEDLINE

## (undated) DEVICE — APPLICATOR MEDICATED 26 CC SOLUTION HI LT ORNG CHLORAPREP

## (undated) DEVICE — GOWN,AURORA,NONREINFORCED,LARGE: Brand: MEDLINE

## (undated) DEVICE — CODMAN® HAKIM® PRECISION FIXED PRESSURE VALVE IN-LINE VALVE AND UNITIZED DISTAL CATHETER: Brand: CODMAN® HAKIM®

## (undated) DEVICE — PACK,BASIC: Brand: MEDLINE

## (undated) DEVICE — X-LARGE COTTON GLOVE: Brand: DEROYAL

## (undated) DEVICE — KIT,ANTI FOG,W/SPONGE & FLUID,SOFT PACK: Brand: MEDLINE

## (undated) DEVICE — PIN ADLT MAYFIELD RIGID MOLD FINGER

## (undated) DEVICE — SUTURE VICRYL SZ 4-0 L18IN ABSRB UD L19MM PS-2 3/8 CIR PRIM J496H

## (undated) DEVICE — STAPLER SKIN H3.9MM WIRE DIA0.58MM CRWN 6.9MM 35 STPL FIX

## (undated) DEVICE — BLADE ES ELASTOMERIC COAT INSUL DURABLE BEND UPTO 90DEG

## (undated) DEVICE — FLOSEAL WITH RECOTHROM - 10ML.: Brand: FLOSEAL HEMOSTATIC MATRIX

## (undated) DEVICE — 3M™ IOBAN™ 2 ANTIMICROBIAL INCISE DRAPE 6650EZ: Brand: IOBAN™ 2

## (undated) DEVICE — DIFFUSER: Brand: CORE, MAESTRO

## (undated) DEVICE — HOOD: Brand: T7PLUS

## (undated) DEVICE — CONMED SCOPE SAVER BITE BLOCK, 20X27 MM: Brand: SCOPE SAVER

## (undated) DEVICE — SUPPLEMENT DIGESTIVE H2O SOL GI-EASE

## (undated) DEVICE — BOOT,SUTURE,STANDARD,YELLOW-IN-BLUE: Brand: MEDLINE

## (undated) DEVICE — 3M™ STERI-DRAPE™ U-DRAPE 1015: Brand: STERI-DRAPE™

## (undated) DEVICE — SPONGE,NEURO,1"X3",XR,STRL,LF,10/PK: Brand: MEDLINE

## (undated) DEVICE — SUTURE VICRYL SZ 3-0 L18IN ABSRB UD PS-2 L19MM 3/8 CRV PRIM J497H

## (undated) DEVICE — DILATOR ESOPHAGEAL CLEANGUIDE DISP OTW  19MM

## (undated) DEVICE — SHAVER BLADE, Ø 3 MM, 5X, STERILE: Brand: N.A.

## (undated) DEVICE — CATHETER IV 16 GAX2 IN STR INTROCAN SAFETY

## (undated) DEVICE — LABEL MED MINI W/ MARKER

## (undated) DEVICE — Device: Brand: STABLECUT®

## (undated) DEVICE — SYRINGE MED 50ML LUERLOCK TIP

## (undated) DEVICE — SHAVER BLADE, Ø 4 MM, 5X, STERILE: Brand: N.A.

## (undated) DEVICE — BLADE,CARBON-STEEL,10,STRL,DISPOSABLE,TB: Brand: MEDLINE

## (undated) DEVICE — YANKAUER,BULB TIP,W/O VENT,RIGID,STERILE: Brand: MEDLINE

## (undated) DEVICE — GUIDEWIRE WITH SPRING TIP - SINGLE USE: Brand: SAFEGUIDE®

## (undated) DEVICE — SUTURE VCRL SZ 1 L36IN ABSRB UD L36MM CT-1 1/2 CIR J947H

## (undated) DEVICE — INTENDED FOR TISSUE SEPARATION, AND OTHER PROCEDURES THAT REQUIRE A SHARP SURGICAL BLADE TO PUNCTURE OR CUT.: Brand: BARD-PARKER ® CARBON RIB-BACK BLADES

## (undated) DEVICE — CORD,CAUTERY,BIPOLAR,STERILE: Brand: MEDLINE

## (undated) DEVICE — PROVE COVER: Brand: UNBRANDED

## (undated) DEVICE — SURGIFOAM SPNG SZ 100

## (undated) DEVICE — SUTURE VCRL SZ 0 L36IN ABSRB UD L36MM CT-1 1/2 CIR J946H

## (undated) DEVICE — Device: Brand: BOOT LINER, DISPOSABLE

## (undated) DEVICE — SUTURE ETHLN SZ 3-0 L18IN NONABSORBABLE BLK PS-2 L19MM 3/8 1669H

## (undated) DEVICE — FORCEPS BX L240CM JAW DIA2.4MM ORNG L CAP W/ NDL DISP RAD

## (undated) DEVICE — SUTURE VICRYL + SZ 2-0 L27IN ABSRB UD CP-1 1/2 CIR REV CUT VCP266H

## (undated) DEVICE — NEEDLE HYPO 25GA L1.5IN BLU POLYPR HUB S STL REG BVL STR

## (undated) DEVICE — SPONGE,LAP,18"X18",DLX,XR,ST,5/PK,40/PK: Brand: MEDLINE

## (undated) DEVICE — 9.0MM PRECISION ACORN

## (undated) DEVICE — DRESSING HYDROFIBER AQUACEL AG ADVANTAGE 3.5X12 IN

## (undated) DEVICE — TUBING, SUCTION, 1/4" X 10', STRAIGHT: Brand: MEDLINE

## (undated) DEVICE — NEURO: Brand: MEDLINE INDUSTRIES, INC.

## (undated) DEVICE — SIPS DUAL 2 MINUTE TIP

## (undated) DEVICE — Device: Brand: PROTECTORS, LEG SPAR BALL JOINT, 12/PR

## (undated) DEVICE — HYPODERMIC SAFETY NEEDLE: Brand: MAGELLAN

## (undated) DEVICE — PACK,LAPAROTOMY,NO GOWNS: Brand: MEDLINE

## (undated) DEVICE — TUBING, SUCTION, 9/32" X 20', STRAIGHT: Brand: MEDLINE INDUSTRIES, INC.

## (undated) DEVICE — GLOVE ORANGE PI 8   MSG9080

## (undated) DEVICE — SHAVER BLADE, Ø 3 MM, 65°, 5X STERILE: Brand: N.A.

## (undated) DEVICE — PENCIL SMOKE EVAC PUSH BUTTON COATED

## (undated) DEVICE — ADHESIVE SKIN CLSR 0.7ML TOP DERMBND ADV

## (undated) DEVICE — DRAPE SURG EXT FEN REINF ST W O FLD PCH STD

## (undated) DEVICE — ADHESIVE SKIN CLOSURE WND 8.661X1.5 IN 22 CM LIQUIBAND SECUR

## (undated) DEVICE — STERILE COTTON BALLS LARGE 5/P: Brand: MEDLINE

## (undated) DEVICE — SURGICAL SUCTION CONNECTING TUBE WITH MALE CONNECTOR AND SUCTION CLAMP, 2 FT. LONG (.6 M), 5 MM I.D.: Brand: CONMED

## (undated) DEVICE — ANTISEPTIC 16OZ H PEROX 1ST AID ORAL DEBRIDING AGNT

## (undated) DEVICE — NEEDLE SPNL 20GA L3 1 2IN YEL S STL POLYCARB HUB MTL STYL

## (undated) DEVICE — GOWN,SIRUS,POLYRNF,BRTHSLV,XLN/XXL,18/CS: Brand: MEDLINE

## (undated) DEVICE — SUTURE VCRL SZ 2-0 L36IN ABSRB UD L36MM CT-1 1/2 CIR J945H

## (undated) DEVICE — GAUZE,SPONGE,4"X4",16PLY,XRAY,STRL,LF: Brand: MEDLINE

## (undated) DEVICE — TOWEL,OR,DSP,ST,BLUE,STD,4/PK,20PK/CS: Brand: MEDLINE

## (undated) DEVICE — PADDING CAST W6INXL4YD RAYON UNDERCAST SOF-ROL

## (undated) DEVICE — SUTURE PERMAHAND SZ 2-0 L12X18IN NONABSORBABLE BLK SILK A185H

## (undated) DEVICE — SUTURE MCRYL SZ 3-0 L27IN ABSRB UD L19MM PS-2 3/8 CIR PRIM Y427H

## (undated) DEVICE — GLOVE SURG SZ 9 THK91MIL LTX FREE SYN POLYISOPRENE ANTI

## (undated) DEVICE — GUIDEWIRE ORTH L300MM DIA2.8MM S STL THRD SHRP TRCR TIP FOR

## (undated) DEVICE — BANDAGE COMPR M W6INXL10YD WHT BGE VELC E MTRX HK AND LOOP

## (undated) DEVICE — SHEET,DRAPE,53X77,STERILE: Brand: MEDLINE

## (undated) DEVICE — TRAUMACATH™ VENTRICULAR CATHETER SET: Brand: TRAUMACATH™

## (undated) DEVICE — SUTURE VCRL SZ 3-0 L18IN ABSRB UD CP-2 L26MM 1/2 CIR REV J761D

## (undated) DEVICE — TUBE SET 96 MM 64 MM H2O PERISTALTIC STD AUX CHANNEL

## (undated) DEVICE — 3.0MM PRECISION NEURO (MATCH HEAD)

## (undated) DEVICE — LIQUIBAND RAPID ADHESIVE 36/CS 0.8ML: Brand: MEDLINE

## (undated) DEVICE — SUTURE PERMAHAND SZ 0 L30IN NONABSORBABLE BLK SILK BRAID A306H

## (undated) DEVICE — MARKER SURG SKIN GENTIAN VLT REG TIP W/ 6IN RUL

## (undated) DEVICE — SPLINT 1522000 20PK PAIR SIMPLESPLINTS

## (undated) DEVICE — Device: Brand: NAKAO QUICKTRAP

## (undated) DEVICE — TABLE COVER: Brand: CONVERTORS

## (undated) DEVICE — DRESSING HYDROFIBER AQUACEL AG ADVANTAGE 3.5X6 IN

## (undated) DEVICE — SUTURE PERMA-HAND 4-0 L18IN NONABSORBABLE BLK L13MM TF 1/2 M104T

## (undated) DEVICE — SUTURE VCRL SZ 2-0 L27IN ABSRB UD L26MM CT-2 1/2 CIR J269H

## (undated) DEVICE — SUTURE ETHBND EXCEL SZ 1 L30IN NONABSORBABLE GRN L48MM CTX X865H

## (undated) DEVICE — BANDAGE,ELASTIC,ESMARK,STERILE,6"X9',LF: Brand: MEDLINE

## (undated) DEVICE — BRUSH ENDO CLN L90.5IN SHTH DIA1.7MM BRIST DIA5-7MM 2-6MM

## (undated) DEVICE — MAT FLR ABSRB ECODRI-SAFE

## (undated) DEVICE — TUBING IRRIGATION 140/160/180/190 SER GI ENDOSCP SMARTCAP

## (undated) DEVICE — SYRINGE MED 30ML STD CLR PLAS LUERLOCK TIP N CTRL DISP

## (undated) DEVICE — SUTURE PROL SZ 0 L30IN NONABSORBABLE BLU L36MM CT-1 1/2 CIR 8424H

## (undated) DEVICE — SUTURE VCRL SZ 3-0 L18IN ABSRB VLT L26MM SH 1/2 CIR J774D